# Patient Record
Sex: FEMALE | Race: WHITE | NOT HISPANIC OR LATINO | Employment: OTHER | URBAN - METROPOLITAN AREA
[De-identification: names, ages, dates, MRNs, and addresses within clinical notes are randomized per-mention and may not be internally consistent; named-entity substitution may affect disease eponyms.]

---

## 2017-01-04 RX ORDER — FLUTICASONE PROPIONATE 50 MCG
1 SPRAY, SUSPENSION (ML) NASAL DAILY
Qty: 1 BOTTLE | Refills: 3 | Status: SHIPPED | OUTPATIENT
Start: 2017-01-04 | End: 2017-10-16 | Stop reason: SDUPTHER

## 2017-01-14 ENCOUNTER — HOSPITAL ENCOUNTER (EMERGENCY)
Facility: HOSPITAL | Age: 67
Discharge: HOME | End: 2017-01-14
Attending: FAMILY MEDICINE
Payer: MEDICARE

## 2017-01-14 VITALS
SYSTOLIC BLOOD PRESSURE: 126 MMHG | WEIGHT: 230 LBS | TEMPERATURE: 98 F | DIASTOLIC BLOOD PRESSURE: 73 MMHG | OXYGEN SATURATION: 97 % | HEIGHT: 67 IN | HEART RATE: 62 BPM | RESPIRATION RATE: 16 BRPM | BODY MASS INDEX: 36.1 KG/M2

## 2017-01-14 DIAGNOSIS — Z86.79 HISTORY OF PAROXYSMAL SUPRAVENTRICULAR TACHYCARDIA: ICD-10-CM

## 2017-01-14 DIAGNOSIS — S16.1XXA ACUTE CERVICAL MYOFASCIAL STRAIN, INITIAL ENCOUNTER: Primary | ICD-10-CM
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
## 2017-01-14 PROCEDURE — 99283 EMERGENCY DEPT VISIT LOW MDM: CPT

## 2017-01-14 RX ORDER — DILTIAZEM HYDROCHLORIDE 120 MG/1
120 CAPSULE, COATED, EXTENDED RELEASE ORAL DAILY
Refills: 1 | Status: ON HOLD | COMMUNITY
Start: 2016-12-16 | End: 2017-03-11

## 2017-01-14 RX ORDER — APIXABAN 5 MG/1
5 TABLET, FILM COATED ORAL 2 TIMES DAILY
Refills: 1 | COMMUNITY
Start: 2016-12-15 | End: 2017-06-05 | Stop reason: SDUPTHER
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
## 2017-01-14 NOTE — DISCHARGE INSTRUCTIONS
Understanding Cervical Strain    There are 7 bones (vertebrae) in the neck that are part of the spine. These are called the cervical spine. Cervical strain is a medical term for neck pain. The neck has several layers of muscles. These are connected with tendons to the cervical spine and other bones. Neck pain is often the result of injury to these muscles and tendons.  Causes of cervical strain  Different types of stress on the neck can damage muscles and tendons (soft tissues) and cause cervical strain. Cervical tissues can be damaged by:  · The neck being forced past its normal range of motion, such as in a car accident or sports injury  · Constant, low-level stress, such as from poor posture or a poorly set-up workspace  Symptoms of cervical strain  These may include:  · Neck pain or stiffness  · Pain in the shoulders or upper back  · Muscle spasms  · Headache, often starting at the base of the neck  · Irritability, difficulty concentrating, or sleeplessness  Treatment for cervical strain  This problem often gets better on its own. Treatments aim to reduce pain and inflammation and increase the range of motion of the neck. Possible treatments include:  · Over-the-counter or prescription pain medicine. These help relieve pain and inflammation.  · Stretching exercises to decrease neck stiffness.  · Massage to decrease neck stiffness.  · Cold or heat pack. These help reduce pain and swelling.  Call 911  Call emergency services right away if you have any of these:  · Face drooping or numbness  · Numbness or weakness, especially in the arms or on one side  · Slurred speech or difficulty speaking  · Blurred vision   When to call your healthcare provider  Call your healthcare provider right away if you have any of these:  · Fever of 100.4°F (38°C) or higher, or as directed  · Pain or stiffness that gets worse  · Symptoms that dont get better, or get worse  · Numbness, tingling, weakness or shooting pains into the  arms or legs  · New symptoms  © 9640-3370 [employer]. [address] [place] [address], [place] [address]. All rights reserved. This information is not intended as a substitute for professional medical care. Always follow your healthcare professional's instructions.

## 2017-01-24 ENCOUNTER — OFFICE VISIT (OUTPATIENT)
Dept: FAMILY MEDICINE | Facility: CLINIC | Age: 67
End: 2017-01-24
Payer: MEDICARE

## 2017-01-24 VITALS
WEIGHT: 236.13 LBS | HEIGHT: 67 IN | BODY MASS INDEX: 37.06 KG/M2 | DIASTOLIC BLOOD PRESSURE: 60 MMHG | SYSTOLIC BLOOD PRESSURE: 94 MMHG | HEART RATE: 52 BPM | TEMPERATURE: 98 F

## 2017-01-24 DIAGNOSIS — I48.91 ATRIAL FIBRILLATION WITH RVR: ICD-10-CM

## 2017-01-24 DIAGNOSIS — M25.561 ACUTE PAIN OF BOTH KNEES: ICD-10-CM
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
## 2017-01-24 DIAGNOSIS — M47.812 SPONDYLOSIS OF CERVICAL REGION WITHOUT MYELOPATHY OR RADICULOPATHY: ICD-10-CM

## 2017-01-24 DIAGNOSIS — E66.01 SEVERE OBESITY (BMI 35.0-35.9 WITH COMORBIDITY): ICD-10-CM

## 2017-01-24 DIAGNOSIS — M25.562 ACUTE PAIN OF BOTH KNEES: ICD-10-CM

## 2017-01-24 PROBLEM — S16.1XXA CERVICAL MUSCLE STRAIN: Status: ACTIVE | Noted: 2017-01-24

## 2017-01-24 PROCEDURE — 99499 UNLISTED E&M SERVICE: CPT | Mod: S$GLB,,, | Performed by: FAMILY MEDICINE

## 2017-01-24 PROCEDURE — 99999 PR PBB SHADOW E&M-EST. PATIENT-LVL III: CPT | Mod: PBBFAC,,, | Performed by: FAMILY MEDICINE

## 2017-01-24 PROCEDURE — 1125F AMNT PAIN NOTED PAIN PRSNT: CPT | Mod: S$GLB,,, | Performed by: FAMILY MEDICINE

## 2017-01-24 PROCEDURE — 1159F MED LIST DOCD IN RCRD: CPT | Mod: S$GLB,,, | Performed by: FAMILY MEDICINE

## 2017-01-24 PROCEDURE — 99214 OFFICE O/P EST MOD 30 MIN: CPT | Mod: S$GLB,,, | Performed by: FAMILY MEDICINE

## 2017-01-24 PROCEDURE — 1157F ADVNC CARE PLAN IN RCRD: CPT | Mod: S$GLB,,, | Performed by: FAMILY MEDICINE

## 2017-01-24 PROCEDURE — 1160F RVW MEDS BY RX/DR IN RCRD: CPT | Mod: S$GLB,,, | Performed by: FAMILY MEDICINE
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
## 2017-01-24 NOTE — PATIENT INSTRUCTIONS
Let me know if you're not better & I can refer to Orthopedist    Follow with PT    Tylenol ES three times a day    Continue meds    Follow with cardiologist    Orders Placed This Encounter    Ambulatory Referral to Physical/Occupational Therapy        Continue other medications, I can refill them when needed    Counselled risk of heart attack, stroke  Counselled high fiber, low fat diet, exercise   Follow up yearly with fasting lipids, CMP, CBC, TSH prior    [unfilled]
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
## 2017-01-31 ENCOUNTER — CLINICAL SUPPORT (OUTPATIENT)
Dept: REHABILITATION | Facility: HOSPITAL | Age: 67
End: 2017-01-31
Attending: FAMILY MEDICINE
Payer: MEDICARE

## 2017-01-31 DIAGNOSIS — R52 PAIN AGGRAVATED BY PHYSICAL ACTIVITY: ICD-10-CM

## 2017-01-31 DIAGNOSIS — R29.898 DECREASED RANGE OF MOTION OF NECK: ICD-10-CM

## 2017-01-31 DIAGNOSIS — R29.898 WEAKNESS OF BOTH LOWER EXTREMITIES: ICD-10-CM

## 2017-01-31 DIAGNOSIS — R26.89 ANTALGIC GAIT: ICD-10-CM

## 2017-01-31 PROCEDURE — 97110 THERAPEUTIC EXERCISES: CPT

## 2017-01-31 PROCEDURE — G8979 MOBILITY GOAL STATUS: HCPCS | Mod: CI

## 2017-01-31 PROCEDURE — 97162 PT EVAL MOD COMPLEX 30 MIN: CPT

## 2017-01-31 PROCEDURE — G8978 MOBILITY CURRENT STATUS: HCPCS | Mod: CK
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
## 2017-02-02 PROBLEM — R52 PAIN AGGRAVATED BY PHYSICAL ACTIVITY: Status: ACTIVE | Noted: 2017-02-02

## 2017-02-02 PROBLEM — R29.898 WEAKNESS OF BOTH LOWER EXTREMITIES: Status: ACTIVE | Noted: 2017-02-02

## 2017-02-02 PROBLEM — R26.89 ANTALGIC GAIT: Status: ACTIVE | Noted: 2017-02-02

## 2017-02-02 PROBLEM — R29.898 DECREASED RANGE OF MOTION OF NECK: Status: ACTIVE | Noted: 2017-02-02

## 2017-02-07 ENCOUNTER — OFFICE VISIT (OUTPATIENT)
Dept: INTERNAL MEDICINE | Facility: CLINIC | Age: 67
End: 2017-02-07
Payer: MEDICARE

## 2017-02-07 VITALS
WEIGHT: 237.63 LBS | HEART RATE: 62 BPM | DIASTOLIC BLOOD PRESSURE: 68 MMHG | RESPIRATION RATE: 18 BRPM | HEIGHT: 67 IN | BODY MASS INDEX: 37.3 KG/M2 | SYSTOLIC BLOOD PRESSURE: 110 MMHG | TEMPERATURE: 97 F

## 2017-02-07 DIAGNOSIS — J01.40 ACUTE NON-RECURRENT PANSINUSITIS: Primary | ICD-10-CM

## 2017-02-07 PROCEDURE — 99213 OFFICE O/P EST LOW 20 MIN: CPT | Mod: S$GLB,,, | Performed by: INTERNAL MEDICINE

## 2017-02-07 PROCEDURE — 1157F ADVNC CARE PLAN IN RCRD: CPT | Mod: S$GLB,,, | Performed by: INTERNAL MEDICINE

## 2017-02-07 PROCEDURE — 1160F RVW MEDS BY RX/DR IN RCRD: CPT | Mod: S$GLB,,, | Performed by: INTERNAL MEDICINE

## 2017-02-07 PROCEDURE — 1159F MED LIST DOCD IN RCRD: CPT | Mod: S$GLB,,, | Performed by: INTERNAL MEDICINE

## 2017-02-07 PROCEDURE — 1126F AMNT PAIN NOTED NONE PRSNT: CPT | Mod: S$GLB,,, | Performed by: INTERNAL MEDICINE

## 2017-02-07 PROCEDURE — 99999 PR PBB SHADOW E&M-EST. PATIENT-LVL III: CPT | Mod: PBBFAC,,, | Performed by: INTERNAL MEDICINE

## 2017-02-07 RX ORDER — AMOXICILLIN AND CLAVULANATE POTASSIUM 875; 125 MG/1; MG/1
1 TABLET, FILM COATED ORAL 2 TIMES DAILY
Qty: 20 TABLET | Refills: 0 | Status: SHIPPED | OUTPATIENT
Start: 2017-02-07 | End: 2017-02-09

## 2017-02-07 RX ORDER — BENZONATATE 200 MG/1
200 CAPSULE ORAL 3 TIMES DAILY PRN
Qty: 30 CAPSULE | Refills: 0 | Status: SHIPPED | OUTPATIENT
Start: 2017-02-07 | End: 2017-02-17

## 2017-02-07 NOTE — PROGRESS NOTES
Subjective:       Patient ID: Flores Fuentes is a 66 y.o. female.    Chief Complaint: Cough and Sinus Problem (nasal drip)    HPI     Patient is a 66-year-old female here today for cough and sinus congestion.  Her symptoms began over one week ago, with sinus pressure and congestion.  She now describes a postnasal drip and a very productive cough with clear sputum.  She says her cough is worse at night.  No fever or chills.  Minor ear pressure bilaterally.  Her sore throat is mostly resolved.  No upset stomach or diarrhea.  She does have sick contacts with her  last similar symptoms.    Review of Systems   Constitutional: Negative for chills, fatigue and fever.   HENT: Positive for congestion, postnasal drip, rhinorrhea and sinus pressure. Negative for ear pain and sore throat.    Eyes: Negative for itching and visual disturbance.   Respiratory: Positive for cough. Negative for shortness of breath and wheezing.    Cardiovascular: Negative for chest pain, palpitations and leg swelling.   Gastrointestinal: Negative for abdominal pain and nausea.   Genitourinary: Negative for dysuria.   Musculoskeletal: Negative for arthralgias and myalgias.   Skin: Negative for rash.   Neurological: Negative for weakness, light-headedness and headaches.       Objective:      Physical Exam   Constitutional: She is oriented to person, place, and time. She appears well-developed and well-nourished. No distress.   HENT:   Head: Normocephalic and atraumatic.   Right Ear: External ear normal.   Left Ear: External ear normal.   Cobblestoning  Posterior oropharyngeal erythema  Mild ear effusion bilaterally  No tonsillar exudate   Eyes: Conjunctivae and EOM are normal. Pupils are equal, round, and reactive to light.   Neck: Normal range of motion. Neck supple. No thyromegaly present.   Cardiovascular: Normal rate, regular rhythm, normal heart sounds and intact distal pulses.    No murmur heard.  Pulmonary/Chest: Effort normal and  breath sounds normal. No respiratory distress. She has no wheezes. She has no rales.   Musculoskeletal: She exhibits no edema.   Lymphadenopathy:     She has no cervical adenopathy.   Neurological: She is alert and oriented to person, place, and time.   Skin: Skin is warm and dry. She is not diaphoretic.   Nursing note and vitals reviewed.      Assessment:       1. Acute non-recurrent pansinusitis        Plan:       Augmentin BID x 10 days  GI side effects reviewed  Continue oral antihistamine, flonase nasal spray daily  Rx for Tessalon perles 200 mg TID PRN cough

## 2017-02-08 ENCOUNTER — CLINICAL SUPPORT (OUTPATIENT)
Dept: REHABILITATION | Facility: HOSPITAL | Age: 67
End: 2017-02-08
Attending: FAMILY MEDICINE
Payer: MEDICARE

## 2017-02-08 DIAGNOSIS — R29.898 WEAKNESS OF BOTH LOWER EXTREMITIES: ICD-10-CM

## 2017-02-08 DIAGNOSIS — R29.898 DECREASED RANGE OF MOTION OF NECK: ICD-10-CM

## 2017-02-08 DIAGNOSIS — R26.89 ANTALGIC GAIT: ICD-10-CM

## 2017-02-08 DIAGNOSIS — R52 PAIN AGGRAVATED BY PHYSICAL ACTIVITY: ICD-10-CM

## 2017-02-08 PROCEDURE — 97110 THERAPEUTIC EXERCISES: CPT

## 2017-02-08 NOTE — PROGRESS NOTES
"TIME RECORD    Date:  02/08/2017    Start Time:  11:00  Stop Time:  12:00    PROCEDURES:    TIMED  Procedure Min.   TE 30   TE 30 NC                 UNTIMED  Procedure Min.             Total Timed Minutes:  30  Total Timed Units:  2  Total Untimed Units:  0  Charges Billed/# of units:  2 (TE-2)      Progress/Current Status    Subjective:     Patient ID: Flores Fuentes is a 66 y.o. female.  Diagnosis:   1. Decreased range of motion of neck     2. Weakness of both lower extremities     3. Antalgic gait     4. Pain aggravated by physical activity       Pain: 5 /10  Patient reports feeling a little better since her first visit.     Objective:     Patient was educated and performed therapeutic exercises as per log below 1:1 with PTA x 30 minutes and supervised exercises by PTA x 30 minutes to improve ROM, flexibility, strength and gait.  Patient declined cold pack at the end of therapy.     Date  02/08/17   VISIT 2/30   G CODE VISIT 2/10   POC EXP. DATE 03/31/17   VISIT AMOUNT    MEDICARE TOTAL 63.96    171.54   FACE-TO-FACE 03/02/17       Bike  --   TABLE:    HSS w/ strap 10 x 10"   Quad sets 10 x 5"   Bridge  --   LTR --   Marching  1 x 10   SLR 1 x 2 R, 1 x 10 L   Hip abduction - supine 1 x 15 RTB   Hip adduction - supine 10 x 3" w/ ball   Hip extension --   SAQ --   Heel Slides --   SEATED:    Cervical ROM  - flex/ ext  - rot R/L  - side bend R/L  - chin tucks   1 x 10  1 x 10  1 x 10  1 x 10   Levator scap. stretch 5 x 5"   UT stretch 5 x 5"   SCM stretch  5 x 5"   Pec. stretch 5 x 5"   LAQs --   HS curls --   Seated Hip Flex 1 x 10   STANDING:    Wall slides --   TKE --   Hip Abd --   Hip Flex --   Hip Ext --   HS Curls --   Step Ups --       CP declined       Initials GWA 1/6       Assessment:     Patient required frequent cues to keep exercises in a pain free range.    Patient Education/Response:     Patient was issued a written copy of today's exercises for her HEP and instructed to perform twice a day.  " Patient verbalized understanding instructions.     Plans and Goals:     Continue with Plan Of Care and progress toward PT goals.    Short Term Goals (4 Weeks):   1. This patient will be independent with a basic HEP.  2. This patient will have cervical AROM WFL with no increase in symptoms in order to drive safely.  3. This patient will increase LE strength by 1 grade in order to perform sit to stand transfer with no increase in symptoms or UE leverage.  4. This patient will have a pain rating of 5/10 at worst with ADLs.  5. Patient will be able to achieve greater than or equal to 45 on the FOTO knee placing patient in 40%<60% impaired, limited, or restricted category demonstrating overall improved functional ability with lower extremity.   6. Patient able to score greater than or equal to 64 on the FOTO neck placing patient in 20%<40% impaired, limited, or restricted category demonstrating overall decreased neck pain with functional activities    Long Term Goals (8 Weeks):   1. This patient will be independent with an updated HEP.  2. This patient will increase LE strength to 5/5 in order to be able to ascend/descend the step in front of her home with no difficulty.  3. This patient will have a pain rating of 2/10 at worst with ADLs.  4. Patient will be able to achieve greater than or equal to 65 on the FOTO knee placing patient in 20%<40% impaired, limited, or restricted category demonstrating overall improved functional ability with lower extremity.  5. Patient able to score greater than or equal to 84 on the FOTO neck placing patient in 1%<20% impaired, limited, or restricted category demonstrating overall decreased neck pain with functional activities

## 2017-02-09 ENCOUNTER — TELEPHONE (OUTPATIENT)
Dept: INTERNAL MEDICINE | Facility: CLINIC | Age: 67
End: 2017-02-09

## 2017-02-09 RX ORDER — DOXYCYCLINE 100 MG/1
100 CAPSULE ORAL EVERY 12 HOURS
Qty: 20 CAPSULE | Refills: 0 | Status: SHIPPED | OUTPATIENT
Start: 2017-02-09 | End: 2017-02-19

## 2017-02-09 NOTE — TELEPHONE ENCOUNTER
----- Message from Farnaz Taylor sent at 2/9/2017 10:59 AM CST -----  Contact: self  [phone]  Pt states she saw you on 02/07 and was told to take amoxicillin-clavulanate 875-125mg (AUGMENTIN), but pt states when she takes this medication it gives her heart palpitations. Pt states she tried cutting tablet in half but it still caused her heart to skip. She states this happened before when she took this medication. Pt would like to know if you can call her in a lesser dosage or something different. Please call to advise.

## 2017-02-09 NOTE — TELEPHONE ENCOUNTER
Spoke with pt-she stated that amoxicillin is giving her palpitations, can she be changed to something else?   please advise

## 2017-02-13 ENCOUNTER — CLINICAL SUPPORT (OUTPATIENT)
Dept: REHABILITATION | Facility: HOSPITAL | Age: 67
End: 2017-02-13
Attending: FAMILY MEDICINE
Payer: MEDICARE

## 2017-02-13 DIAGNOSIS — R29.898 DECREASED RANGE OF MOTION OF NECK: ICD-10-CM

## 2017-02-13 DIAGNOSIS — R52 PAIN AGGRAVATED BY PHYSICAL ACTIVITY: ICD-10-CM

## 2017-02-13 DIAGNOSIS — R29.898 WEAKNESS OF BOTH LOWER EXTREMITIES: ICD-10-CM

## 2017-02-13 DIAGNOSIS — R26.89 ANTALGIC GAIT: ICD-10-CM

## 2017-02-13 PROCEDURE — 97110 THERAPEUTIC EXERCISES: CPT

## 2017-02-13 PROCEDURE — 97140 MANUAL THERAPY 1/> REGIONS: CPT

## 2017-02-13 NOTE — PROGRESS NOTES
"TIME RECORD    Date:  02/13/2017    Start Time:  2:00  Stop Time:  3:00    PROCEDURES:    TIMED  Procedure Min.   TE 45   MT 15                 UNTIMED  Procedure Min.             Total Timed Minutes:  60  Total Timed Units:  4  Total Untimed Units:  0  Charges Billed/# of units:  4 (TE-3, MT-1)      Progress/Current Status    Subjective:     Patient ID: Flores Fuentes is a 66 y.o. female.  Diagnosis:   1. Decreased range of motion of neck     2. Weakness of both lower extremities     3. Antalgic gait     4. Pain aggravated by physical activity       Pain: 5 /10  She reports on Sat noticed that she had to drag her leg while walking in [employer], but she was able to do it today without dragging her leg. She has pain in her L triceps area when she lifts her arm the next day.    Objective:     Patient was educated and performed therapeutic exercises as per log below, along with today's progression 1:1 with PT x 45 minutes to improve ROM, flexibility, strength and gait. STM/MFR to B UT and cervical paraspinals x 15 minutes, 7 minutes was IASTM in a seated position. Patient declined cold pack at the end of therapy.     Date  02/13/17 02/08/17   VISIT 3/30 2/30   G CODE VISIT 3/10 2/10   POC EXP. DATE 03/31/17 03/31/17   VISIT AMOUNT    MEDICARE TOTAL 125.80    297.34 63.96    171.54   FACE-TO-FACE 03/02/17 03/02/17        Bike  -- --   TABLE:     HSS w/ strap 10 x 10" 10 x 10"   Quad sets 10 x 5" 10 x 5"   Bridge  -- --   LTR -- --   Marching  1 x 10 1 x 10   SLR 1 x 10 R SL, 1 x 10 L 1 x 2 R, 1 x 10 L   Hip abduction - supine 1 x 15 RTB 1 x 15 RTB   Hip adduction - supine 10 x 3" w/ ball 10 x 3" w/ ball   Hip extension -- --   SAQ -- --   Heel Slides -- --   SEATED:     Cervical ROM  - flex/ ext  - rot R/L  - side bend R/L  - chin tucks   1 x 10  1 x 10  1 x 10  1 x 10   1 x 10  1 x 10  1 x 10  1 x 10   Levator scap. stretch 5 x 5" 5 x 5"   UT stretch 5 x 5" 5 x 5"   SCM stretch  5 x 5" 5 x 5"   Pec. stretch 5 x 5" " "5 x 5"   LAQs 1 x 10 --   HS curls -- --   Seated Hip Flex 1 x 10 1 x 10   STANDING:     Wall slides -- --   TKE -- --   Hip Abd -- --   Hip Flex -- --   Hip Ext -- --   HS Curls -- --   Step Ups -- --        CP declined declined        Initials DG GWA 1/6       Assessment:     She continues to require cues to keep her exercises in a pain free range. She was able to perform all of today's exercises with no increase in symptoms prior to leaving the clinic.    Patient Education/Response:     Patient was given a hand out of her hamstring stretch in long sitting for her HEP and instructed to perform twice a day.  Patient verbalized understanding instructions.     Plans and Goals:     Continue with Plan Of Care and progress toward PT goals.    Short Term Goals (4 Weeks):   1. This patient will be independent with a basic HEP.  2. This patient will have cervical AROM WFL with no increase in symptoms in order to drive safely.  3. This patient will increase LE strength by 1 grade in order to perform sit to stand transfer with no increase in symptoms or UE leverage.  4. This patient will have a pain rating of 5/10 at worst with ADLs.  5. Patient will be able to achieve greater than or equal to 45 on the FOTO knee placing patient in 40%<60% impaired, limited, or restricted category demonstrating overall improved functional ability with lower extremity.   6. Patient able to score greater than or equal to 64 on the FOTO neck placing patient in 20%<40% impaired, limited, or restricted category demonstrating overall decreased neck pain with functional activities    Long Term Goals (8 Weeks):   1. This patient will be independent with an updated HEP.  2. This patient will increase LE strength to 5/5 in order to be able to ascend/descend the step in front of her home with no difficulty.  3. This patient will have a pain rating of 2/10 at worst with ADLs.  4. Patient will be able to achieve greater than or equal to 65 on the FOTO " knee placing patient in 20%<40% impaired, limited, or restricted category demonstrating overall improved functional ability with lower extremity.  5. Patient able to score greater than or equal to 84 on the FOTO neck placing patient in 1%<20% impaired, limited, or restricted category demonstrating overall decreased neck pain with functional activities

## 2017-02-15 ENCOUNTER — CLINICAL SUPPORT (OUTPATIENT)
Dept: REHABILITATION | Facility: HOSPITAL | Age: 67
End: 2017-02-15
Attending: FAMILY MEDICINE
Payer: MEDICARE

## 2017-02-15 DIAGNOSIS — R52 PAIN AGGRAVATED BY PHYSICAL ACTIVITY: ICD-10-CM

## 2017-02-15 DIAGNOSIS — R29.898 WEAKNESS OF BOTH LOWER EXTREMITIES: ICD-10-CM

## 2017-02-15 DIAGNOSIS — R29.898 DECREASED RANGE OF MOTION OF NECK: ICD-10-CM

## 2017-02-15 DIAGNOSIS — R26.89 ANTALGIC GAIT: ICD-10-CM

## 2017-02-15 PROCEDURE — 97140 MANUAL THERAPY 1/> REGIONS: CPT

## 2017-02-15 PROCEDURE — 97110 THERAPEUTIC EXERCISES: CPT

## 2017-02-15 NOTE — PROGRESS NOTES
"TIME RECORD    Date:  02/15/2017    Start Time:  11:00  Stop Time:  12:00    PROCEDURES:    TIMED  Procedure Min.   TE 37   TE sup 15 NC   MT 8             UNTIMED  Procedure Min.             Total Timed Minutes:  45  Total Timed Units:  3  Total Untimed Units:  0  Charges Billed/# of units:  3 (TE-2, MT-1)      Progress/Current Status    Subjective:     Patient ID: Flores Fuentes is a 66 y.o. female.  Diagnosis:   1. Decreased range of motion of neck     2. Weakness of both lower extremities     3. Antalgic gait     4. Pain aggravated by physical activity       Pain: 3 /10  Patient reports having less pain and tightness since her last visit.     Objective:     Patient was educated and performed therapeutic exercises as per log below, along with today's progression, 1:1 with PTA x 37 minutes and supervised exercises by PTA x 15 minutes to improve ROM, flexibility, strength and gait.  Patient received IASTM x 8 minutes in a seated position to left UT and levator scapula by PTA.  Patient declined cold pack at the end of therapy.     Date  02/15/17 02/13/17 02/08/17   VISIT 4/30 3/30 2/30   G CODE VISIT 4/10 3/10 2/10   POC EXP. DATE 03/31/17 03/31/17 03/31/17   VISIT AMOUNT    MEDICARE TOTAL 93.82    391.16 125.80    297.34 63.96    171.54   FACE-TO-FACE 03/02/17 03/02/17 03/02/17         Bike  -- -- --   TABLE:      HSS long sitting 10 x 10" 10 x 10" 10 x 10"   Quad sets 15 x 5" 10 x 5" 10 x 5"   Bridge  -- -- --   LTR -- -- --   Marching  1 x 10 1 x 10 1 x 10   SLR 1 x 10 B 1 x 10 R SL, 1 x 10 L 1 x 2 R, 1 x 10 L   Hip abduction - supine 1 x 20 RTB 1 x 15 RTB 1 x 15 RTB   Hip adduction - supine 15 x 3" w/ ball 10 x 3" w/ ball 10 x 3" w/ ball   Hip extension -- -- --   SAQ -- -- --   Heel Slides -- -- --   SEATED:      Cervical ROM  - flex/ ext  - rot R/L  - side bend R/L  - chin tucks   1 x 10  1 x 10  1 x 10  1 x 10   1 x 10  1 x 10  1 x 10  1 x 10   1 x 10  1 x 10  1 x 10  1 x 10   Levator scap. stretch 5 x " "5" 5 x 5" 5 x 5"   UT stretch 5 x 5" 5 x 5" 5 x 5"   SCM stretch  5 x 5" 5 x 5" 5 x 5"   Pec. stretch 5 x 5" 5 x 5" 5 x 5"   LAQs 1 x 15 1 x 10 --   HS curls -- -- --   Seated Hip Flex 1 x 15 1 x 10 1 x 10   STANDING:      Wall slides -- -- --   TKE -- -- --   Hip Abd -- -- --   Hip Flex -- -- --   Hip Ext -- -- --   HS Curls -- -- --   Step Ups -- -- --         CP declined declined declined         Initials GWA 1/6 DG GWA 1/6       Assessment:     Patient continues to require cues to keep her exercises in a pain free range along with cues for posture with sitting exercises.    Patient Education/Response:     Patient was instructed to continue with her HEP twice a day.  Patient verbalized understanding instructions.     Plans and Goals:     Continue with Plan Of Care and progress toward PT goals.    Short Term Goals (4 Weeks):   1. This patient will be independent with a basic HEP.  2. This patient will have cervical AROM WFL with no increase in symptoms in order to drive safely.  3. This patient will increase LE strength by 1 grade in order to perform sit to stand transfer with no increase in symptoms or UE leverage.  4. This patient will have a pain rating of 5/10 at worst with ADLs.  5. Patient will be able to achieve greater than or equal to 45 on the FOTO knee placing patient in 40%<60% impaired, limited, or restricted category demonstrating overall improved functional ability with lower extremity.   6. Patient able to score greater than or equal to 64 on the FOTO neck placing patient in 20%<40% impaired, limited, or restricted category demonstrating overall decreased neck pain with functional activities    Long Term Goals (8 Weeks):   1. This patient will be independent with an updated HEP.  2. This patient will increase LE strength to 5/5 in order to be able to ascend/descend the step in front of her home with no difficulty.  3. This patient will have a pain rating of 2/10 at worst with ADLs.  4. Patient will " be able to achieve greater than or equal to 65 on the FOTO knee placing patient in 20%<40% impaired, limited, or restricted category demonstrating overall improved functional ability with lower extremity.  5. Patient able to score greater than or equal to 84 on the FOTO neck placing patient in 1%<20% impaired, limited, or restricted category demonstrating overall decreased neck pain with functional activities

## 2017-02-20 ENCOUNTER — CLINICAL SUPPORT (OUTPATIENT)
Dept: REHABILITATION | Facility: HOSPITAL | Age: 67
End: 2017-02-20
Attending: FAMILY MEDICINE
Payer: MEDICARE

## 2017-02-20 DIAGNOSIS — R29.898 DECREASED RANGE OF MOTION OF NECK: ICD-10-CM

## 2017-02-20 DIAGNOSIS — R29.898 WEAKNESS OF BOTH LOWER EXTREMITIES: ICD-10-CM

## 2017-02-20 DIAGNOSIS — R52 PAIN AGGRAVATED BY PHYSICAL ACTIVITY: ICD-10-CM

## 2017-02-20 DIAGNOSIS — R26.89 ANTALGIC GAIT: ICD-10-CM

## 2017-02-20 PROCEDURE — 97140 MANUAL THERAPY 1/> REGIONS: CPT

## 2017-02-20 PROCEDURE — 97110 THERAPEUTIC EXERCISES: CPT

## 2017-02-20 NOTE — PROGRESS NOTES
"TIME RECORD    Date:  02/20/2017    Start Time:  11:00  Stop Time:  12:00    PROCEDURES:    TIMED  Procedure Min.   TE 20   TE sup 30 NC   MT 10             UNTIMED  Procedure Min.             Total Timed Minutes:  30  Total Timed Units:  2  Total Untimed Units:  0  Charges Billed/# of units:  2 (TE-1, MT-1)      Progress/Current Status    Subjective:     Patient ID: Flores Fuentes is a 66 y.o. female.  Diagnosis:   1. Decreased range of motion of neck     2. Weakness of both lower extremities     3. Antalgic gait     4. Pain aggravated by physical activity       Pain: 3 /10  She reports doing better, but the bottom of her R foot is bothering her the most with walking. She is still getting pain in L triceps after cervcial stretches.     Objective:     Patient was educated and performed therapeutic exercises as per log below, along with today's progressions and new exercises, 1:1 with PT x 20 minutes and supervised exercises by PT x 30 minutes to improve ROM, flexibility, strength and gait.  Patient received IASTM x 10 minutes in a seated position to left UT and levator scapula by PT.  Patient declined cold pack at the end of therapy.     Date  02/20/17 02/15/17 02/13/17 02/08/17   VISIT 5/30 4/30 3/30 2/30   G CODE VISIT 5/10 4/10 3/10 2/10   POC EXP. DATE 03/31/17 03/31/17 03/31/17 03/31/17   VISIT AMOUNT    MEDICARE TOTAL 61.84    453.00 93.82    391.16 125.80    297.34 63.96    171.54   FACE-TO-FACE 03/02/17 03/02/17 03/02/17 03/02/17          Bike  -- -- -- --   TABLE:       HSS long sitting 10 x 10" 10 x 10" 10 x 10" 10 x 10"   Quad sets 15 x 5" 15 x 5" 10 x 5" 10 x 5"   Bridge  -- -- -- --   LTR -- -- -- --   Marching  1 x 15 1 x 10 1 x 10 1 x 10   SLR 1 x 15 B 1 x 10 B 1 x 10 R SL, 1 x 10 L 1 x 2 R, 1 x 10 L   Hip abduction - supine 3 x 10 RTB 1 x 20 RTB 1 x 15 RTB 1 x 15 RTB   Hip adduction - supine 20 x 3" 15 x 3" w/ ball 10 x 3" w/ ball 10 x 3" w/ ball   Hip extension -- -- -- --   SAQ -- -- -- -- " "  Heel Slides -- -- -- --   SEATED:       Cervical ROM  - flex/ ext  - rot R/L  - side bend R/L  - chin tucks   1 x 10  1 x 10   1 x 10  1 x 10   1 x 10  1 x 10  1 x 10  1 x 10   1 x 10  1 x 10  1 x 10  1 x 10   1 x 10  1 x 10  1 x 10  1 x 10   Levator scap. stretch 5 x 5" 5 x 5" 5 x 5" 5 x 5"   UT stretch 5 x 5" 5 x 5" 5 x 5" 5 x 5"   SCM stretch  5 x 5" 5 x 5" 5 x 5" 5 x 5"   Pec. stretch 5 x 5" 5 x 5" 5 x 5" 5 x 5"   LAQs 1 x 20 1 x 15 1 x 10 --   HS curls -- -- -- --   Seated Hip Flex 2 x 10 1 x 15 1 x 10 1 x 10   STANDING:       Wall slides -- -- -- --   Mini squats 1 x 10 -- -- --   Hip Abd -- -- -- --   Hip Flex -- -- -- --   Hip Ext -- -- -- --   HS Curls -- -- -- --   Step Ups -- -- -- --   Rows   T's 1 x 10 YTB  1 x 10 YTB      CP declined declined declined declined          Initials DG GWA 1/6 DG GWA 1/6       Assessment:     She continues to require cues for posture with all exercises. She was able to perform all of today's progressions with no increase in symptoms prior to leaving the clinic.     Patient Education/Response:     Patient was instructed to continue with her HEP twice a day and given handouts of today's exercises to add to her HEP.  Patient verbalized understanding instructions.     Plans and Goals:     Continue with Plan Of Care and progress toward PT goals.    Short Term Goals (4 Weeks):   1. This patient will be independent with a basic HEP.  2. This patient will have cervical AROM WFL with no increase in symptoms in order to drive safely.  3. This patient will increase LE strength by 1 grade in order to perform sit to stand transfer with no increase in symptoms or UE leverage.  4. This patient will have a pain rating of 5/10 at worst with ADLs.  5. Patient will be able to achieve greater than or equal to 45 on the FOTO knee placing patient in 40%<60% impaired, limited, or restricted category demonstrating overall improved functional ability with lower extremity.   6. Patient able to " score greater than or equal to 64 on the FOTO neck placing patient in 20%<40% impaired, limited, or restricted category demonstrating overall decreased neck pain with functional activities    Long Term Goals (8 Weeks):   1. This patient will be independent with an updated HEP.  2. This patient will increase LE strength to 5/5 in order to be able to ascend/descend the step in front of her home with no difficulty.  3. This patient will have a pain rating of 2/10 at worst with ADLs.  4. Patient will be able to achieve greater than or equal to 65 on the FOTO knee placing patient in 20%<40% impaired, limited, or restricted category demonstrating overall improved functional ability with lower extremity.  5. Patient able to score greater than or equal to 84 on the FOTO neck placing patient in 1%<20% impaired, limited, or restricted category demonstrating overall decreased neck pain with functional activities

## 2017-02-22 ENCOUNTER — CLINICAL SUPPORT (OUTPATIENT)
Dept: REHABILITATION | Facility: HOSPITAL | Age: 67
End: 2017-02-22
Attending: FAMILY MEDICINE
Payer: MEDICARE

## 2017-02-22 DIAGNOSIS — R52 PAIN AGGRAVATED BY PHYSICAL ACTIVITY: ICD-10-CM

## 2017-02-22 DIAGNOSIS — R29.898 DECREASED RANGE OF MOTION OF NECK: ICD-10-CM

## 2017-02-22 DIAGNOSIS — R29.898 WEAKNESS OF BOTH LOWER EXTREMITIES: ICD-10-CM

## 2017-02-22 DIAGNOSIS — R26.89 ANTALGIC GAIT: ICD-10-CM

## 2017-02-22 PROCEDURE — 97110 THERAPEUTIC EXERCISES: CPT

## 2017-02-22 PROCEDURE — 97140 MANUAL THERAPY 1/> REGIONS: CPT

## 2017-02-22 NOTE — PROGRESS NOTES
"TIME RECORD    Date:  02/22/2017    Start Time:  10:00  Stop Time:  10:55    PROCEDURES:    TIMED  Procedure Min.   TE 20   TE sup 25 NC   MT 10             UNTIMED  Procedure Min.             Total Timed Minutes:  30  Total Timed Units:  2  Total Untimed Units:  0  Charges Billed/# of units:  2 (TE-1, MT-1)      Progress/Current Status    Subjective:     Patient ID: Flores Fuentes is a 66 y.o. female.  Diagnosis:   1. Decreased range of motion of neck     2. Weakness of both lower extremities     3. Antalgic gait     4. Pain aggravated by physical activity       Pain: 1 /10  Patient reports her pain is much better along with the tightness in her neck.    Objective:     Patient was educated and performed therapeutic exercises as per log below, along with today's progressions and new exercises, 1:1 with PTA x 20 minutes and supervised exercises by PTA x 25 minutes to improve ROM, flexibility, strength and gait.  Patient received IASTM x 10 minutes in a seated position to left UT and levator scapula by PTA.  Patient declined cold pack at the end of therapy.     Date  02/22/17 02/20/17 02/15/17 02/13/17 02/08/17   VISIT 6/30 5/30 4/30 3/30 2/30   G CODE VISIT 6/10 5/10 4/10 3/10 2/10   POC EXP. DATE 03/31/17 03/31/17 03/31/17 03/31/17 03/31/17   VISIT AMOUNT    MEDICARE TOTAL 61.84    514.84 61.84    453.00 93.82    391.16 125.80    297.34 63.96    171.54   FACE-TO-FACE 03/02/17 03/02/17 03/02/17 03/02/17 03/02/17           Bike  -- -- -- -- --   TABLE:        HSS long sitting 10 x 10" 10 x 10" 10 x 10" 10 x 10" 10 x 10"   Quad sets 20 x 5" 15 x 5" 15 x 5" 10 x 5" 10 x 5"   Bridge  -- -- -- -- --   LTR -- -- -- -- --   Marching  1 x 15 1 x 15 1 x 10 1 x 10 1 x 10   SLR 2 x 10 B 1 x 15 B 1 x 10 B 1 x 10 R SL, 1 x 10 L 1 x 2 R, 1 x 10 L   Hip abduction - supine 1 x 30 RTB 3 x 10 RTB 1 x 20 RTB 1 x 15 RTB 1 x 15 RTB   Hip adduction - supine 20 x 3" 20 x 3" 15 x 3" w/ ball 10 x 3" w/ ball 10 x 3" w/ ball   Hip " "extension -- -- -- -- --   SAQ -- -- -- -- --   Heel Slides -- -- -- -- --   SEATED:        Cervical ROM  - flex/ ext  - rot R/L  - side bend R/L  - chin tucks   1 x 10  1 x 10   1 x 10  1 x 10   1 x 10  1 x 10   1 x 10  1 x 10   1 x 10  1 x 10  1 x 10  1 x 10   1 x 10  1 x 10  1 x 10  1 x 10   1 x 10  1 x 10  1 x 10  1 x 10   Levator scap. stretch 5 x 5" 5 x 5" 5 x 5" 5 x 5" 5 x 5"   UT stretch 5 x 5" 5 x 5" 5 x 5" 5 x 5" 5 x 5"   SCM stretch  5 x 5" 5 x 5" 5 x 5" 5 x 5" 5 x 5"   Pec. stretch 5 x 5" 5 x 5" 5 x 5" 5 x 5" 5 x 5"   LAQs 1 x 20 1 x 20 1 x 15 1 x 10 --   HS curls -- -- -- -- --   Seated Hip Flex 2 x 10 2 x 10 1 x 15 1 x 10 1 x 10   STANDING:        Wall slides -- -- -- -- --   Mini squats 1 x 15 1 x 10 -- -- --   Hip Abd -- -- -- -- --   Hip Flex -- -- -- -- --   Hip Ext -- -- -- -- --   HS Curls -- -- -- -- --   Step Ups -- -- -- -- --   Rows   T's 2 x 10 YTB  2 x 10 YTB 1 x 10 YTB  1 x 10 YTB      CP declined declined declined declined declined           Initials GWA 1/6 DG GWA 1/6 DG GWA 1/6       Assessment:     She continues to require cues for posture with all exercises.  Patient demonstrates improvement with decreased complaint of pain and tightness.     Patient Education/Response:     Patient was instructed to continue with her HEP twice a day.  Patient verbalized understanding instructions.     Plans and Goals:     Continue with Plan Of Care and progress toward PT goals.    Short Term Goals (4 Weeks):   1. This patient will be independent with a basic HEP.  2. This patient will have cervical AROM WFL with no increase in symptoms in order to drive safely.  3. This patient will increase LE strength by 1 grade in order to perform sit to stand transfer with no increase in symptoms or UE leverage.  4. This patient will have a pain rating of 5/10 at worst with ADLs.  5. Patient will be able to achieve greater than or equal to 45 on the FOTO knee placing patient in 40%<60% impaired, limited, or " restricted category demonstrating overall improved functional ability with lower extremity.   6. Patient able to score greater than or equal to 64 on the FOTO neck placing patient in 20%<40% impaired, limited, or restricted category demonstrating overall decreased neck pain with functional activities    Long Term Goals (8 Weeks):   1. This patient will be independent with an updated HEP.  2. This patient will increase LE strength to 5/5 in order to be able to ascend/descend the step in front of her home with no difficulty.  3. This patient will have a pain rating of 2/10 at worst with ADLs.  4. Patient will be able to achieve greater than or equal to 65 on the FOTO knee placing patient in 20%<40% impaired, limited, or restricted category demonstrating overall improved functional ability with lower extremity.  5. Patient able to score greater than or equal to 84 on the FOTO neck placing patient in 1%<20% impaired, limited, or restricted category demonstrating overall decreased neck pain with functional activities

## 2017-03-01 ENCOUNTER — CLINICAL SUPPORT (OUTPATIENT)
Dept: REHABILITATION | Facility: HOSPITAL | Age: 67
End: 2017-03-01
Attending: FAMILY MEDICINE
Payer: MEDICARE

## 2017-03-01 DIAGNOSIS — R26.89 ANTALGIC GAIT: ICD-10-CM

## 2017-03-01 DIAGNOSIS — R52 PAIN AGGRAVATED BY PHYSICAL ACTIVITY: ICD-10-CM

## 2017-03-01 DIAGNOSIS — R29.898 DECREASED RANGE OF MOTION OF NECK: ICD-10-CM

## 2017-03-01 DIAGNOSIS — R29.898 WEAKNESS OF BOTH LOWER EXTREMITIES: ICD-10-CM

## 2017-03-01 PROCEDURE — 97110 THERAPEUTIC EXERCISES: CPT

## 2017-03-01 NOTE — PROGRESS NOTES
"TIME RECORD    Date:  03/01/2017    Start Time:  2:00  Stop Time:  2:50    PROCEDURES:    TIMED  Procedure Min.   TE  25   TE sup 25 NC                 UNTIMED  Procedure Min.             Total Timed Minutes:  25  Total Timed Units:  2  Total Untimed Units:  0  Charges Billed/# of units:  2 (TE-2)      Progress/Current Status    Subjective:     Patient ID: Flores Fuentes is a 66 y.o. female.  Diagnosis:   1. Decreased range of motion of neck     2. Weakness of both lower extremities     3. Antalgic gait     4. Pain aggravated by physical activity       Pain: 4 /10  Patient reports she continues with knee pain, R.>L, just above her patella.     Objective:     Patient was educated and performed therapeutic exercises as per log below, along with today's progressions, 1:1 with PTA x 25 minutes and supervised exercises by PTA x 25 minutes to improve ROM, flexibility, strength and gait.  Patient declined cold pack at the end of therapy.     Date  03/01/17 02/22/17 02/20/17 02/15/17 02/13/17 02/08/17   VISIT 7/30 6/30 5/30 4/30 3/30 2/30   G CODE VISIT 7/10 6/10 5/10 4/10 3/10 2/10   POC EXP. DATE 03/31/17 03/31/17 03/31/17 03/31/17 03/31/17 03/31/17   VISIT AMOUNT    MEDICARE TOTAL 63.96    578.80 61.84    514.84 61.84    453.00 93.82    391.16 125.80    297.34 63.96    171.54   FACE-TO-FACE 03/02/17 03/02/17 03/02/17 03/02/17 03/02/17 03/02/17            Bike  -- -- -- -- -- --   TABLE:         HSS long sitting 10 x 10" 10 x 10" 10 x 10" 10 x 10" 10 x 10" 10 x 10"   Quad sets 20 x 5" 20 x 5" 15 x 5" 15 x 5" 10 x 5" 10 x 5"   Bridge  -- -- -- -- -- --   LTR -- -- -- -- -- --   Marching  Hold - pain 1 x 15 1 x 15 1 x 10 1 x 10 1 x 10   SLR 1 x 10 R  1 x 20 L 2 x 10 B 1 x 15 B 1 x 10 B 1 x 10 R SL, 1 x 10 L 1 x 2 R, 1 x 10 L   Hip abduction - supine 1 x 30 RTB 1 x 30 RTB 3 x 10 RTB 1 x 20 RTB 1 x 15 RTB 1 x 15 RTB   Hip adduction - supine 20 x 3" 20 x 3" 20 x 3" 15 x 3" w/ ball 10 x 3" w/ ball 10 x 3" w/ ball   Hip " "extension -- -- -- -- -- --   SAQ -- -- -- -- -- --   Heel Slides -- -- -- -- -- --   SEATED:         Cervical ROM  - flex/ ext  - rot R/L  - side bend R/L  - chin tucks   1 x 15  1 x 15  1 x 15  1 x 10   1 x 10  1 x 10   1 x 10  1 x 10   1 x 10  1 x 10   1 x 10  1 x 10   1 x 10  1 x 10  1 x 10  1 x 10   1 x 10  1 x 10  1 x 10  1 x 10   1 x 10  1 x 10  1 x 10  1 x 10   Levator scap. stretch 5 x 5" 5 x 5" 5 x 5" 5 x 5" 5 x 5" 5 x 5"   UT stretch 5 x 5" 5 x 5" 5 x 5" 5 x 5" 5 x 5" 5 x 5"   SCM stretch  5 x 5" 5 x 5" 5 x 5" 5 x 5" 5 x 5" 5 x 5"   Pec. stretch 5 x 5" 5 x 5" 5 x 5" 5 x 5" 5 x 5" 5 x 5"   LAQs 1 x 20 1 x 20 1 x 20 1 x 15 1 x 10 --   HS curls -- -- -- -- -- --   Seated Hip Flex 2 x 10 2 x 10 2 x 10 1 x 15 1 x 10 1 x 10   STANDING:         Wall slides -- -- -- -- -- --   Mini squats 1 x 15 1 x 15 1 x 10 -- -- --   Hip Abd -- -- -- -- -- --   Hip Flex -- -- -- -- -- --   Hip Ext -- -- -- -- -- --   HS Curls -- -- -- -- -- --   Step Ups -- -- -- -- -- --   Rows   T's 1 x 25 YTB  1 x 25 YTB 2 x 10 YTB  2 x 10 YTB 1 x 10 YTB  1 x 10 YTB      CP declined declined declined declined declined declined            Initials GWA 2/6 GWA 1/6 DG GWA 1/6 DG GWA 1/6       Assessment:     She continues to require cues for posture with all exercises.  Patient did not complete all of her SLR on her right LE due to complaint of pain and weakness.     Patient Education/Response:     Patient was instructed to continue with her HEP twice a day.  Patient verbalized understanding instructions.     Plans and Goals:     Continue with Plan Of Care and progress toward PT goals.    Short Term Goals (4 Weeks):   1. This patient will be independent with a basic HEP.  2. This patient will have cervical AROM WFL with no increase in symptoms in order to drive safely.  3. This patient will increase LE strength by 1 grade in order to perform sit to stand transfer with no increase in symptoms or UE leverage.  4. This patient will have a pain " rating of 5/10 at worst with ADLs.  5. Patient will be able to achieve greater than or equal to 45 on the FOTO knee placing patient in 40%<60% impaired, limited, or restricted category demonstrating overall improved functional ability with lower extremity.   6. Patient able to score greater than or equal to 64 on the FOTO neck placing patient in 20%<40% impaired, limited, or restricted category demonstrating overall decreased neck pain with functional activities    Long Term Goals (8 Weeks):   1. This patient will be independent with an updated HEP.  2. This patient will increase LE strength to 5/5 in order to be able to ascend/descend the step in front of her home with no difficulty.  3. This patient will have a pain rating of 2/10 at worst with ADLs.  4. Patient will be able to achieve greater than or equal to 65 on the FOTO knee placing patient in 20%<40% impaired, limited, or restricted category demonstrating overall improved functional ability with lower extremity.  5. Patient able to score greater than or equal to 84 on the FOTO neck placing patient in 1%<20% impaired, limited, or restricted category demonstrating overall decreased neck pain with functional activities

## 2017-03-02 ENCOUNTER — DOCUMENTATION ONLY (OUTPATIENT)
Dept: REHABILITATION | Facility: HOSPITAL | Age: 67
End: 2017-03-02

## 2017-03-02 DIAGNOSIS — R26.89 ANTALGIC GAIT: ICD-10-CM

## 2017-03-02 DIAGNOSIS — R52 PAIN AGGRAVATED BY PHYSICAL ACTIVITY: ICD-10-CM

## 2017-03-02 DIAGNOSIS — R29.898 DECREASED RANGE OF MOTION OF NECK: ICD-10-CM

## 2017-03-02 DIAGNOSIS — R29.898 WEAKNESS OF BOTH LOWER EXTREMITIES: ICD-10-CM

## 2017-03-02 NOTE — PROGRESS NOTES
Face to Face PTA Conference performed with Usman Conley, PTA regarding patient's current status, overall progress, and plan of care    Face to Face PTA Conference performed with Nichole Ryan, PT regarding patient's current status, overall progress, and plan of care    Usman Conley  3/2/2017

## 2017-03-03 ENCOUNTER — CLINICAL SUPPORT (OUTPATIENT)
Dept: REHABILITATION | Facility: HOSPITAL | Age: 67
End: 2017-03-03
Attending: FAMILY MEDICINE
Payer: MEDICARE

## 2017-03-03 DIAGNOSIS — R29.898 WEAKNESS OF BOTH LOWER EXTREMITIES: ICD-10-CM

## 2017-03-03 DIAGNOSIS — R26.89 ANTALGIC GAIT: ICD-10-CM

## 2017-03-03 DIAGNOSIS — R29.898 DECREASED RANGE OF MOTION OF NECK: ICD-10-CM

## 2017-03-03 DIAGNOSIS — R52 PAIN AGGRAVATED BY PHYSICAL ACTIVITY: ICD-10-CM

## 2017-03-03 PROCEDURE — 97110 THERAPEUTIC EXERCISES: CPT

## 2017-03-03 PROCEDURE — 97140 MANUAL THERAPY 1/> REGIONS: CPT

## 2017-03-03 NOTE — PROGRESS NOTES
"TIME RECORD    Date:  03/03/2017    Start Time:  9:00  Stop Time:  9:55    PROCEDURES:    TIMED  Procedure Min.   TE 17   TE sup 30 NC   MT 8              UNTIMED  Procedure Min.             Total Timed Minutes:  25  Total Timed Units:  2  Total Untimed Units:  0  Charges Billed/# of units:  2 (TE-1, MT-1)      Progress/Current Status    Subjective:     Patient ID: Flores Fuentes is a 66 y.o. female.  Diagnosis:   1. Decreased range of motion of neck     2. Weakness of both lower extremities     3. Antalgic gait     4. Pain aggravated by physical activity       Pain: 2 /10  Patient reports her neck pain is low today.    Objective:     Patient was educated and performed therapeutic exercises as per log below, along with today's progressions, 1:1 with PTA x 17 minutes and supervised exercises by PTA x 30 minutes to improve ROM, flexibility, strength and gait.  Patient declined cold pack at the end of therapy.  Patient received IASTM x 8 minutes in a seated position to left UT and levator scapula by PTA. Patient declined cold pack at the end of therapy.     Date  03/03/17 03/01/17 02/22/17 02/20/17 02/15/17 02/13/17 02/08/17   VISIT 8/30 7/30 6/30 5/30 4/30 3/30 2/30   G CODE VISIT 8/10 7/10 6/10 5/10 4/10 3/10 2/10   POC EXP. DATE 03/31/17 03/31/17 03/31/17 03/31/17 03/31/17 03/31/17 03/31/17   VISIT AMOUNT    MEDICARE TOTAL 61.84    640.64 63.96    578.80 61.84    514.84 61.84    453.00 93.82    391.16 125.80    297.34 63.96    171.54   FACE-TO-FACE 04/02/17 03/02/17 03/02/17 03/02/17 03/02/17 03/02/17 03/02/17             Bike  -- -- -- -- -- -- --   TABLE:          HSS long sitting 10 x 10" 10 x 10" 10 x 10" 10 x 10" 10 x 10" 10 x 10" 10 x 10"   Quad sets 20 x 5" 20 x 5" 20 x 5" 15 x 5" 15 x 5" 10 x 5" 10 x 5"   Bridge  -- -- -- -- -- -- --   LTR -- -- -- -- -- -- --   Marching  -- Hold - pain 1 x 15 1 x 15 1 x 10 1 x 10 1 x 10   SLR 2 x 10 L  1 x 10 R 1 x 10 R  1 x 20 L 2 x 10 B 1 x 15 B 1 x 10 B 1 x 10 R " "SL, 1 x 10 L 1 x 2 R, 1 x 10 L   Hip abduction - supine 1 x 30 RTB 1 x 30 RTB 1 x 30 RTB 3 x 10 RTB 1 x 20 RTB 1 x 15 RTB 1 x 15 RTB   Hip adduction - supine 20 x 3" 20 x 3" 20 x 3" 20 x 3" 15 x 3" w/ ball 10 x 3" w/ ball 10 x 3" w/ ball   Hip extension -- -- -- -- -- -- --   SAQ -- -- -- -- -- -- --   Heel Slides -- -- -- -- -- -- --   SEATED:          Cervical ROM  - flex/ ext  - rot R/L  - side bend R/L  - chin tucks   1 x 15  1 x 15  1 x 15  1 x 15   1 x 15  1 x 15  1 x 15  1 x 10   1 x 10  1 x 10   1 x 10  1 x 10   1 x 10  1 x 10   1 x 10  1 x 10   1 x 10  1 x 10  1 x 10  1 x 10   1 x 10  1 x 10  1 x 10  1 x 10   1 x 10  1 x 10  1 x 10  1 x 10   Levator scap. stretch 5 x 5" 5 x 5" 5 x 5" 5 x 5" 5 x 5" 5 x 5" 5 x 5"   UT stretch 5 x 5" 5 x 5" 5 x 5" 5 x 5" 5 x 5" 5 x 5" 5 x 5"   SCM stretch  5 x 5" 5 x 5" 5 x 5" 5 x 5" 5 x 5" 5 x 5" 5 x 5"   Pec. stretch 5 x 5" 5 x 5" 5 x 5" 5 x 5" 5 x 5" 5 x 5" 5 x 5"   LAQs 1 x 20 1 x 20 1 x 20 1 x 20 1 x 15 1 x 10 --   HS curls -- -- -- -- -- -- --   Seated Hip Flex 2 x 10 2 x 10 2 x 10 2 x 10 1 x 15 1 x 10 1 x 10   STANDING:          Wall slides -- -- -- -- -- -- --   Mini squats 4 x 5 1 x 15 1 x 15 1 x 10 -- -- --   Hip Abd -- -- -- -- -- -- --   Hip Flex -- -- -- -- -- -- --   Hip Ext -- -- -- -- -- -- --   HS Curls -- -- -- -- -- -- --   Step Ups -- -- -- -- -- -- --   Rows   T's 3 x 10 YTB  3 x 10 YTB 1 x 25 YTB  1 x 25 YTB 2 x 10 YTB  2 x 10 YTB 1 x 10 YTB  1 x 10 YTB      CP declined declined declined declined declined declined declined             Initials GWA 3/6 GWA 2/6 GWA 1/6 DG GWA 1/6 DG GWA 1/6       Assessment:     She continues to require cues for posture with all exercises.  Patient completed her therapy along with today's progressions with no increase in symptoms prior to leaving the clinic.    Patient Education/Response:     Patient was instructed to continue with her HEP twice a day.  Patient verbalized understanding instructions.     Plans and Goals: "     Continue with Plan Of Care and progress toward PT goals.    Short Term Goals (4 Weeks):   1. This patient will be independent with a basic HEP.  2. This patient will have cervical AROM WFL with no increase in symptoms in order to drive safely.  3. This patient will increase LE strength by 1 grade in order to perform sit to stand transfer with no increase in symptoms or UE leverage.  4. This patient will have a pain rating of 5/10 at worst with ADLs.  5. Patient will be able to achieve greater than or equal to 45 on the FOTO knee placing patient in 40%<60% impaired, limited, or restricted category demonstrating overall improved functional ability with lower extremity.   6. Patient able to score greater than or equal to 64 on the FOTO neck placing patient in 20%<40% impaired, limited, or restricted category demonstrating overall decreased neck pain with functional activities    Long Term Goals (8 Weeks):   1. This patient will be independent with an updated HEP.  2. This patient will increase LE strength to 5/5 in order to be able to ascend/descend the step in front of her home with no difficulty.  3. This patient will have a pain rating of 2/10 at worst with ADLs.  4. Patient will be able to achieve greater than or equal to 65 on the FOTO knee placing patient in 20%<40% impaired, limited, or restricted category demonstrating overall improved functional ability with lower extremity.  5. Patient able to score greater than or equal to 84 on the FOTO neck placing patient in 1%<20% impaired, limited, or restricted category demonstrating overall decreased neck pain with functional activities

## 2017-03-06 ENCOUNTER — CLINICAL SUPPORT (OUTPATIENT)
Dept: REHABILITATION | Facility: HOSPITAL | Age: 67
End: 2017-03-06
Attending: FAMILY MEDICINE
Payer: MEDICARE

## 2017-03-06 DIAGNOSIS — R29.898 WEAKNESS OF BOTH LOWER EXTREMITIES: ICD-10-CM

## 2017-03-06 DIAGNOSIS — R52 PAIN AGGRAVATED BY PHYSICAL ACTIVITY: ICD-10-CM

## 2017-03-06 DIAGNOSIS — R26.89 ANTALGIC GAIT: ICD-10-CM

## 2017-03-06 DIAGNOSIS — R29.898 DECREASED RANGE OF MOTION OF NECK: ICD-10-CM

## 2017-03-06 PROCEDURE — 97110 THERAPEUTIC EXERCISES: CPT

## 2017-03-06 NOTE — PROGRESS NOTES
"TIME RECORD    Date:  03/06/2017    Start Time:  10:00  Stop Time:  10:50    PROCEDURES:    TIMED  Procedure Min.   TE 25   TE sup 25 NC                 UNTIMED  Procedure Min.             Total Timed Minutes:  25  Total Timed Units:  2  Total Untimed Units:  0  Charges Billed/# of units:  2 (TE-2)      Progress/Current Status    Subjective:     Patient ID: Flores Fuentes is a 66 y.o. female.  Diagnosis:   1. Decreased range of motion of neck     2. Weakness of both lower extremities     3. Antalgic gait     4. Pain aggravated by physical activity       Pain: 3 /10  Patient reports she continues with pain across the distal patella of both knees.  Patient reports that she has been practicing using "nose over toes" guide for getting out of chairs and it is helping her, having less pain and difficulty getting out of chairs.     Objective:     Patient was educated and performed therapeutic exercises as per log below, along with new exercise added and today's progressions, 1:1 with PTA x 25 minutes and supervised exercises by PTA x 25 minutes to improve ROM, flexibility, strength and gait.  Patient declined cold pack at the end of therapy.  Patient did not receive IASTM today, she declined. Patient declined cold pack at the end of therapy.     Date  03/06/17 03/03/17 03/01/17 02/22/17 02/20/17 02/15/17 02/13/17 02/08/17   VISIT 9/30 8/30 7/30 6/30 5/30 4/30 3/30 2/30   G CODE VISIT 9/10 8/10 7/10 6/10 5/10 4/10 3/10 2/10   POC EXP. DATE 03/31/17 03/31/17 03/31/17 03/31/17 03/31/17 03/31/17 03/31/17 03/31/17   VISIT AMOUNT    MEDICARE TOTAL 63.96    704.60 61.84    640.64 63.96    578.80 61.84    514.84 61.84    453.00 93.82    391.16 125.80    297.34 63.96    171.54   FACE-TO-FACE 04/02/17 04/02/17 03/02/17 03/02/17 03/02/17 03/02/17 03/02/17 03/02/17              Bike  -- -- -- -- -- -- -- --   TABLE:           HSS long sitting 10 x 10" 10 x 10" 10 x 10" 10 x 10" 10 x 10" 10 x 10" 10 x 10" 10 x 10"   Quad sets 20 " "x 5" 20 x 5" 20 x 5" 20 x 5" 15 x 5" 15 x 5" 10 x 5" 10 x 5"   Bridge  -- -- -- -- -- -- -- --   LTR -- -- -- -- -- -- -- --   Marching  1 x 20 -- Hold - pain 1 x 15 1 x 15 1 x 10 1 x 10 1 x 10   SLR 2 x 10 B 2 x 10 L  1 x 10 R 1 x 10 R  1 x 20 L 2 x 10 B 1 x 15 B 1 x 10 B 1 x 10 R SL, 1 x 10 L 1 x 2 R, 1 x 10 L   Hip abduction - sitting 1 x 20 GTB 1 x 30 RTB 1 x 30 RTB 1 x 30 RTB 3 x 10 RTB 1 x 20 RTB 1 x 15 RTB 1 x 15 RTB   Hip adduction - sitting 25 x 3" 20 x 3" 20 x 3" 20 x 3" 20 x 3" 15 x 3" w/ ball 10 x 3" w/ ball 10 x 3" w/ ball   Hip extension -- -- -- -- -- -- -- --   SAQ -- -- -- -- -- -- -- --   Heel Slides -- -- -- -- -- -- -- --   SEATED:           Cervical ROM  - flex/ ext  - rot R/L  - side bend R/L  - chin tucks   1 x 20  1 x 20  1 x 20  1 x 10   1 x 15  1 x 15  1 x 15  1 x 15   1 x 15  1 x 15  1 x 15  1 x 10   1 x 10  1 x 10   1 x 10  1 x 10   1 x 10  1 x 10   1 x 10  1 x 10   1 x 10  1 x 10  1 x 10  1 x 10   1 x 10  1 x 10  1 x 10  1 x 10   1 x 10  1 x 10  1 x 10  1 x 10   Levator scap. stretch 5 x 5" 5 x 5" 5 x 5" 5 x 5" 5 x 5" 5 x 5" 5 x 5" 5 x 5"   UT stretch 5 x 5" 5 x 5" 5 x 5" 5 x 5" 5 x 5" 5 x 5" 5 x 5" 5 x 5"   SCM stretch  5 x 5" 5 x 5" 5 x 5" 5 x 5" 5 x 5" 5 x 5" 5 x 5" 5 x 5"   Pec. stretch 5 x 5" 5 x 5" 5 x 5" 5 x 5" 5 x 5" 5 x 5" 5 x 5" 5 x 5"   LAQs 1 x 25 1 x 20 1 x 20 1 x 20 1 x 20 1 x 15 1 x 10 --   HS curls -- -- -- -- -- -- -- --   Seated Hip Flex 3 x 10 2 x 10 2 x 10 2 x 10 2 x 10 1 x 15 1 x 10 1 x 10   STANDING:           Wall slides -- -- -- -- -- -- -- --   Mini squats 4 x 5 4 x 5 1 x 15 1 x 15 1 x 10 -- -- --   Hip Abd -- -- -- -- -- -- -- --   Hip Flex -- -- -- -- -- -- -- --   Hip Ext -- -- -- -- -- -- -- --   HS Curls -- -- -- -- -- -- -- --   Step Ups -- -- -- -- -- -- -- --   Rows   T's  ER 2 x 10 RTB  3 x 10 YTB  2 x 10 YTB 3 x 10 YTB  3 x 10 YTB 1 x 25 YTB  1 x 25 YTB 2 x 10 YTB  2 x 10 YTB 1 x 10 YTB  1 x 10 YTB      CP declined declined declined declined declined " declined declined declined              Initials GWA 4/6 GWA 3/6 GWA 2/6 GWA 1/6 DG GWA 1/6 DG GWA 1/6       Assessment:     She continues to require cues for good posture with resistance exercises. Patient completed her therapy along with new exercise added and today's progressions with no increase in symptoms prior to leaving the clinic.    Patient Education/Response:     Patient was instructed to continue with her HEP twice a day.  Patient verbalized understanding instructions.     Plans and Goals:     Continue with Plan Of Care and progress toward PT goals.    Short Term Goals (4 Weeks):   1. This patient will be independent with a basic HEP.  2. This patient will have cervical AROM WFL with no increase in symptoms in order to drive safely.  3. This patient will increase LE strength by 1 grade in order to perform sit to stand transfer with no increase in symptoms or UE leverage.  4. This patient will have a pain rating of 5/10 at worst with ADLs.  5. Patient will be able to achieve greater than or equal to 45 on the FOTO knee placing patient in 40%<60% impaired, limited, or restricted category demonstrating overall improved functional ability with lower extremity.   6. Patient able to score greater than or equal to 64 on the FOTO neck placing patient in 20%<40% impaired, limited, or restricted category demonstrating overall decreased neck pain with functional activities    Long Term Goals (8 Weeks):   1. This patient will be independent with an updated HEP.  2. This patient will increase LE strength to 5/5 in order to be able to ascend/descend the step in front of her home with no difficulty.  3. This patient will have a pain rating of 2/10 at worst with ADLs.  4. Patient will be able to achieve greater than or equal to 65 on the FOTO knee placing patient in 20%<40% impaired, limited, or restricted category demonstrating overall improved functional ability with lower extremity.  5. Patient able to score greater  than or equal to 84 on the FOTO neck placing patient in 1%<20% impaired, limited, or restricted category demonstrating overall decreased neck pain with functional activities

## 2017-03-08 ENCOUNTER — CLINICAL SUPPORT (OUTPATIENT)
Dept: REHABILITATION | Facility: HOSPITAL | Age: 67
End: 2017-03-08
Attending: FAMILY MEDICINE
Payer: MEDICARE

## 2017-03-08 DIAGNOSIS — R29.898 WEAKNESS OF BOTH LOWER EXTREMITIES: ICD-10-CM

## 2017-03-08 DIAGNOSIS — R52 PAIN AGGRAVATED BY PHYSICAL ACTIVITY: ICD-10-CM

## 2017-03-08 DIAGNOSIS — R26.89 ANTALGIC GAIT: ICD-10-CM

## 2017-03-08 DIAGNOSIS — R29.898 DECREASED RANGE OF MOTION OF NECK: ICD-10-CM

## 2017-03-08 PROCEDURE — G8978 MOBILITY CURRENT STATUS: HCPCS | Mod: CK

## 2017-03-08 PROCEDURE — 97110 THERAPEUTIC EXERCISES: CPT

## 2017-03-08 PROCEDURE — G8979 MOBILITY GOAL STATUS: HCPCS | Mod: CI

## 2017-03-08 NOTE — PROGRESS NOTES
"TIME RECORD    Date:  03/08/2017    Start Time:  10:00  Stop Time:  11:00    PROCEDURES:    TIMED  Procedure Min.   TE 30   TE sup 30 NC                 UNTIMED  Procedure Min.             Total Timed Minutes:  30  Total Timed Units:  2  Total Untimed Units:  0  Charges Billed/# of units:  2 (TE-2)      Progress/Current Status    Subjective:     Patient ID: Flores Fuentes is a 66 y.o. female.  Diagnosis:   1. Decreased range of motion of neck     2. Weakness of both lower extremities     3. Antalgic gait     4. Pain aggravated by physical activity       Pain: 3 /10  She reports having pain in her knees still but her neck is better. She forgot her exercise sheets today but thinks she can do it without the sheets.     Objective:     Patient was educated and performed therapeutic exercises as per log below, along with new exercise added and today's progressions, 1:1 with PT x 30 minutes and supervised exercises by PT x 30 minutes to improve ROM, flexibility, strength and gait.  Patient declined cold pack at the end of therapy.  Patient declined manual therapy treatments today.     Date  03/08/17 03/06/17 03/03/17 03/01/17 02/22/17 02/20/17 02/15/17 02/13/17 02/08/17   VISIT 10/30 9/30 8/30 7/30 6/30 5/30 4/30 3/30 2/30   G CODE VISIT 1/10 9/10 8/10 7/10 6/10 5/10 4/10 3/10 2/10   POC EXP. DATE 03/31/17 03/31/17 03/31/17 03/31/17 03/31/17 03/31/17 03/31/17 03/31/17 03/31/17   VISIT AMOUNT    MEDICARE TOTAL 63.96    768.56 63.96    704.60 61.84    640.64 63.96    578.80 61.84    514.84 61.84    453.00 93.82    391.16 125.80    297.34 63.96    171.54   FACE-TO-FACE 04/02/17 04/02/17 04/02/17 03/02/17 03/02/17 03/02/17 03/02/17 03/02/17 03/02/17               Bike  -- -- -- -- -- -- -- -- --   TABLE:            HSS long sitting 10 x 10" 10 x 10" 10 x 10" 10 x 10" 10 x 10" 10 x 10" 10 x 10" 10 x 10" 10 x 10"   Quad sets 20 x 5" 20 x 5" 20 x 5" 20 x 5" 20 x 5" 15 x 5" 15 x 5" 10 x 5" 10 x 5"   Bridge  1 x 10 -- -- -- " "-- -- -- -- --   Marching  1 x 25 1 x 20 -- Hold - pain 1 x 15 1 x 15 1 x 10 1 x 10 1 x 10   SLR 1 x 25 B 2 x 10 B 2 x 10 L  1 x 10 R 1 x 10 R  1 x 20 L 2 x 10 B 1 x 15 B 1 x 10 B 1 x 10 R SL, 1 x 10 L 1 x 2 R, 1 x 10 L   Hip abduction - sitting 1 x 30 GTB 1 x 20 GTB 1 x 30 RTB 1 x 30 RTB 1 x 30 RTB 3 x 10 RTB 1 x 20 RTB 1 x 15 RTB 1 x 15 RTB   Hip adduction - sitting 30 x 3" 25 x 3" 20 x 3" 20 x 3" 20 x 3" 20 x 3" 15 x 3" w/ ball 10 x 3" w/ ball 10 x 3" w/ ball   Hip extension -- -- -- -- -- -- -- -- --   SAQ -- -- -- -- -- -- -- -- --   Heel Slides -- -- -- -- -- -- -- -- --   SEATED:            Cervical ROM  - flex/ ext  - rot R/L  - side bend R/L  - chin tucks   2 x 10  2 x 10  2 x 10  1 x 10   1 x 20  1 x 20  1 x 20  1 x 10   1 x 15  1 x 15  1 x 15  1 x 15   1 x 15  1 x 15  1 x 15  1 x 10   1 x 10  1 x 10   1 x 10  1 x 10   1 x 10  1 x 10   1 x 10  1 x 10   1 x 10  1 x 10  1 x 10  1 x 10   1 x 10  1 x 10  1 x 10  1 x 10   1 x 10  1 x 10  1 x 10  1 x 10   Levator scap. stretch 10 x 5" 5 x 5" 5 x 5" 5 x 5" 5 x 5" 5 x 5" 5 x 5" 5 x 5" 5 x 5"   UT stretch 10 x 5" 5 x 5" 5 x 5" 5 x 5" 5 x 5" 5 x 5" 5 x 5" 5 x 5" 5 x 5"   SCM stretch  10 x 5" 5 x 5" 5 x 5" 5 x 5" 5 x 5" 5 x 5" 5 x 5" 5 x 5" 5 x 5"   Pec. stretch 5 x 5" 5 x 5" 5 x 5" 5 x 5" 5 x 5" 5 x 5" 5 x 5" 5 x 5" 5 x 5"   LAQs 2 x 10 x 1# 1 x 25 1 x 20 1 x 20 1 x 20 1 x 20 1 x 15 1 x 10 --   HS curls 1 x 10 RTB -- -- -- -- -- -- -- --   Seated Hip Flex 2 x 10 x 1# 3 x 10 2 x 10 2 x 10 2 x 10 2 x 10 1 x 15 1 x 10 1 x 10   STANDING:            Wall slides -- -- -- -- -- -- -- -- --   Mini squats X 25 4 x 5 4 x 5 1 x 15 1 x 15 1 x 10 -- -- --   Hip Abd -- -- -- -- -- -- -- -- --   Hip Flex -- -- -- -- -- -- -- -- --   Hip Ext -- -- -- -- -- -- -- -- --   HS Curls -- -- -- -- -- -- -- -- --   Step Ups -- -- -- -- -- -- -- -- --   Rows   T's  ER 3 x 10 RTB  2 x 10 RTB  1 x 25 YTB 2 x 10 RTB  3 x 10 YTB  2 x 10 YTB 3 x 10 YTB  3 x 10 YTB 1 x 25 YTB  1 x 25 YTB 2 x 10 " YTB  2 x 10 YTB 1 x 10 YTB  1 x 10 YTB      CP declined declined declined declined declined declined declined declined declined               Initials DG GWA 4/6 GWA 3/6 GWA 2/6 GWA 1/6 DG GWA 1/6 DG GWA 1/6     FOTO neck 56, G code CK 40%<60%  FOTO knee 34, G code CL, 60%<80%  Assessment:     Patient was able to perform all of today's exercises with no increase in symptoms prior to leaving the clinic. She did require occasional cues to perform her exercises correctly and to only perform the number prescribed. Her current score on FOTO neck places her in the 40%<60% impaired, limited, or restricted category. Her score on FOTO knee places her in the 60%<80% impaired, limited, or restricted category.    Patient Education/Response:     Patient was instructed to continue with her HEP twice a day.  Patient verbalized understanding instructions.     Plans and Goals:     Continue with Plan Of Care and progress toward PT goals.    Short Term Goals (4 Weeks):   1. This patient will be independent with a basic HEP.  2. This patient will have cervical AROM WFL with no increase in symptoms in order to drive safely.  3. This patient will increase LE strength by 1 grade in order to perform sit to stand transfer with no increase in symptoms or UE leverage.  4. This patient will have a pain rating of 5/10 at worst with ADLs.  5. Patient will be able to achieve greater than or equal to 45 on the FOTO knee placing patient in 40%<60% impaired, limited, or restricted category demonstrating overall improved functional ability with lower extremity.   6. Patient able to score greater than or equal to 64 on the FOTO neck placing patient in 20%<40% impaired, limited, or restricted category demonstrating overall decreased neck pain with functional activities    Long Term Goals (8 Weeks):   1. This patient will be independent with an updated HEP.  2. This patient will increase LE strength to 5/5 in order to be able to ascend/descend the step  in front of her home with no difficulty.  3. This patient will have a pain rating of 2/10 at worst with ADLs.  4. Patient will be able to achieve greater than or equal to 65 on the FOTO knee placing patient in 20%<40% impaired, limited, or restricted category demonstrating overall improved functional ability with lower extremity.  5. Patient able to score greater than or equal to 84 on the FOTO neck placing patient in 1%<20% impaired, limited, or restricted category demonstrating overall decreased neck pain with functional activities

## 2017-03-10 ENCOUNTER — HOSPITAL ENCOUNTER (INPATIENT)
Facility: HOSPITAL | Age: 67
LOS: 3 days | Discharge: HOME | DRG: 310 | End: 2017-03-13
Attending: FAMILY MEDICINE | Admitting: INTERNAL MEDICINE
Payer: MEDICARE

## 2017-03-10 DIAGNOSIS — I48.91 ATRIAL FIBRILLATION WITH RAPID VENTRICULAR RESPONSE: Primary | ICD-10-CM

## 2017-03-10 LAB
ALBUMIN SERPL BCP-MCNC: 4.5 G/DL
ALP SERPL-CCNC: 99 IU/L
ALT SERPL W/O P-5'-P-CCNC: 20 IU/L
ANION GAP SERPL CALC-SCNC: 13 MMOL/L
AST SERPL-CCNC: 20 IU/L
BASOPHILS # BLD AUTO: 0.04 K/UL
BASOPHILS NFR BLD: 0.6 %
BILIRUB SERPL-MCNC: 0.6 MG/DL
BUN SERPL-MCNC: 17 MG/DL
CALCIUM SERPL-MCNC: 9.7 MG/DL
CHLORIDE SERPL-SCNC: 105 MMOL/L
CO2 SERPL-SCNC: 24 MMOL/L
CREAT SERPL-MCNC: 0.68 MG/DL
DIFFERENTIAL METHOD: NORMAL
EOSINOPHIL # BLD AUTO: 0.2 K/UL
EOSINOPHIL NFR BLD: 3.4 %
ERYTHROCYTE [DISTWIDTH] IN BLOOD BY AUTOMATED COUNT: 14 %
EST. GFR  (AFRICAN AMERICAN): >60 ML/MIN/1.73 M^2
EST. GFR  (NON AFRICAN AMERICAN): >60 ML/MIN/1.73 M^2
GLUCOSE SERPL-MCNC: 102 MG/DL
HCT VFR BLD AUTO: 41 %
HGB BLD-MCNC: 13.7 G/DL
INR PPP: 1.2
LYMPHOCYTES # BLD AUTO: 2.6 K/UL
LYMPHOCYTES NFR BLD: 39.5 %
MCH RBC QN AUTO: 28.9 PG
MCHC RBC AUTO-ENTMCNC: 33.4 %
MCV RBC AUTO: 87 FL
MONOCYTES # BLD AUTO: 0.7 K/UL
MONOCYTES NFR BLD: 10.9 %
NEUTROPHILS # BLD AUTO: 3 K/UL
NEUTROPHILS NFR BLD: 45.4 %
NT-PROBNP: 218 PG/ML
PLATELET # BLD AUTO: 319 K/UL
PMV BLD AUTO: 10.3 FL
POTASSIUM SERPL-SCNC: 4.2 MMOL/L
PROT SERPL-MCNC: 8.1 G/DL
PROTHROMBIN TIME: 13.3 SEC
RBC # BLD AUTO: 4.74 M/UL
SODIUM SERPL-SCNC: 142 MMOL/L
TROPONIN I SERPL DL<=0.01 NG/ML-MCNC: <0.012 NG/ML
TROPONIN I SERPL DL<=0.01 NG/ML-MCNC: <0.012 NG/ML
WBC # BLD AUTO: 6.53 K/UL

## 2017-03-10 PROCEDURE — 85610 PROTHROMBIN TIME: CPT

## 2017-03-10 PROCEDURE — 96376 TX/PRO/DX INJ SAME DRUG ADON: CPT

## 2017-03-10 PROCEDURE — 20000000 HC ICU ROOM

## 2017-03-10 PROCEDURE — 96365 THER/PROPH/DIAG IV INF INIT: CPT

## 2017-03-10 PROCEDURE — 99285 EMERGENCY DEPT VISIT HI MDM: CPT | Mod: 25

## 2017-03-10 PROCEDURE — 93005 ELECTROCARDIOGRAM TRACING: CPT

## 2017-03-10 PROCEDURE — 96366 THER/PROPH/DIAG IV INF ADDON: CPT

## 2017-03-10 PROCEDURE — 84484 ASSAY OF TROPONIN QUANT: CPT

## 2017-03-10 PROCEDURE — 83880 ASSAY OF NATRIURETIC PEPTIDE: CPT

## 2017-03-10 PROCEDURE — 80053 COMPREHEN METABOLIC PANEL: CPT

## 2017-03-10 PROCEDURE — 85025 COMPLETE CBC W/AUTO DIFF WBC: CPT

## 2017-03-10 PROCEDURE — 25000003 PHARM REV CODE 250: Performed by: FAMILY MEDICINE

## 2017-03-10 RX ORDER — DILTIAZEM HYDROCHLORIDE 120 MG/1
120 CAPSULE, COATED, EXTENDED RELEASE ORAL
Status: DISCONTINUED | OUTPATIENT
Start: 2017-03-10 | End: 2017-03-10

## 2017-03-10 RX ORDER — DILTIAZEM HCL 1 MG/ML
5 INJECTION, SOLUTION INTRAVENOUS
Status: COMPLETED | OUTPATIENT
Start: 2017-03-10 | End: 2017-03-10

## 2017-03-10 RX ORDER — DILTIAZEM HYDROCHLORIDE 5 MG/ML
20 INJECTION INTRAVENOUS
Status: COMPLETED | OUTPATIENT
Start: 2017-03-10 | End: 2017-03-10

## 2017-03-10 RX ORDER — DILTIAZEM HCL 1 MG/ML
5 INJECTION, SOLUTION INTRAVENOUS
Status: DISCONTINUED | OUTPATIENT
Start: 2017-03-10 | End: 2017-03-10

## 2017-03-10 RX ORDER — DILTIAZEM HYDROCHLORIDE 5 MG/ML
INJECTION INTRAVENOUS
Status: DISPENSED
Start: 2017-03-10 | End: 2017-03-11

## 2017-03-10 RX ORDER — DILTIAZEM HYDROCHLORIDE 5 MG/ML
35 INJECTION INTRAVENOUS
Status: COMPLETED | OUTPATIENT
Start: 2017-03-10 | End: 2017-03-10

## 2017-03-10 RX ADMIN — DILTIAZEM HYDROCHLORIDE 35 MG: 5 INJECTION INTRAVENOUS at 05:03

## 2017-03-10 RX ADMIN — DILTIAZEM HYDROCHLORIDE 20 MG: 5 INJECTION INTRAVENOUS at 04:03

## 2017-03-10 RX ADMIN — DILTIAZEM HYDROCHLORIDE 5 MG/HR: 5 INJECTION INTRAVENOUS at 07:03

## 2017-03-10 NOTE — IP AVS SNAPSHOT
[facility]  [address]  [place] [address]  Phone: [phone]           Patient Discharge Instructions     Our goal is to set you up for success. This packet includes information on your condition, medications, and your home care. It will help you to care for yourself so you don't get sicker and need to go back to the hospital.     Please ask your nurse if you have any questions.        There are many details to remember when preparing to leave the hospital. Here is what you will need to do:    1. Take your medicine. If you are prescribed medications, review your Medication List in the following pages. You may have new medications to  at the pharmacy and others that you'll need to stop taking. Review the instructions for how and when to take your medications. Talk with your doctor or nurses if you are unsure of what to do.     2. Go to your follow-up appointments. Specific follow-up information is listed in the following pages. Your may be contacted by a transition nurse or clinical provider about future appointments. Be sure we have all of the phone numbers to reach you, if needed. Please contact your provider's office if you are unable to make an appointment.     3. Watch for warning signs. Your doctor or nurse will give you detailed warning signs to watch for and when to call for assistance. These instructions may also include educational information about your condition. If you experience any of warning signs to your health, call your doctor.               ** Verify the list of medication(s) below is accurate and up to date. Carry this with you in case of emergency. If your medications have changed, please notify your healthcare provider.             Medication List      START taking these medications        Additional Info                      dronedarone 400 mg Tab   Commonly known as:  MULTAQ   Quantity:  60 tablet   Refills:  11   Dose:  400 mg    Last time this was given:   400 mg on 3/13/2017  8:19 AM   Instructions:  Take 1 tablet (400 mg total) by mouth 2 (two) times daily with meals.     Begin Date    AM    Noon    PM    Bedtime         CHANGE how you take these medications        Additional Info                      diltiaZEM 180 MG 24 hr capsule   Commonly known as:  CARDIZEM CD   Quantity:  30 capsule   Refills:  11   Dose:  180 mg   What changed:    - medication strength  - how much to take    Last time this was given:  180 mg on 3/13/2017 12:43 PM   Instructions:  Take 1 capsule (180 mg total) by mouth once daily.     Begin Date    AM    Noon    PM    Bedtime         CONTINUE taking these medications        Additional Info                      ELIQUIS 5 mg Tab   Refills:  1   Dose:  5 mg   Generic drug:  apixaban    Last time this was given:  5 mg on 3/13/2017 12:43 PM   Instructions:  Take 5 mg by mouth 2 (two) times daily.     Begin Date    AM    Noon    PM    Bedtime       FLUARIX QUAD 4488-0188 (PF) 60 mcg (15 mcg x 4)/0.5 mL Syrg   Refills:  0   Generic drug:  flu vac [id number] 36mos up(PF)      Begin Date    AM    Noon    PM    Bedtime       fluocinonide 0.05 % ointment   Commonly known as:  LIDEX   Refills:  5      Begin Date    AM    Noon    PM    Bedtime       fluticasone 50 mcg/actuation nasal spray   Commonly known as:  FLONASE   Quantity:  1 Bottle   Refills:  3   Dose:  1 spray    Instructions:  1 spray by Each Nare route once daily.     Begin Date    AM    Noon    PM    Bedtime       levothyroxine 25 MCG tablet   Commonly known as:  SYNTHROID   Quantity:  90 tablet   Refills:  3   Dose:  25 mcg    Last time this was given:  25 mcg on 3/13/2017  6:15 AM   Instructions:  Take 1 tablet (25 mcg total) by mouth once daily.     Begin Date    AM    Noon    PM    Bedtime       pravastatin 40 MG tablet   Commonly known as:  PRAVACHOL   Quantity:  90 tablet   Refills:  3   Dose:  40 mg    Last time this was given:  40 mg on 3/13/2017 12:43 PM   Instructions:  Take 1 tablet  (40 mg total) by mouth once daily.     Begin Date    AM    Noon    PM    Bedtime         STOP taking these medications     acebutolol 200 MG capsule   Commonly known as:  SECTRAL            Where to Get Your Medications      You can get these medications from any pharmacy     Bring a paper prescription for each of these medications     diltiaZEM 180 MG 24 hr capsule    dronedarone 400 mg Tab                  Please bring to all follow up appointments:    1. A copy of your discharge instructions.  2. All medicines you are currently taking in their original bottles.  3. Identification and insurance card.    Please arrive 15 minutes ahead of scheduled appointment time.    Please call 24 hours in advance if you must reschedule your appointment and/or time.        Your Scheduled Appointments     Mar 15, 2017 10:00 AM CDT   Established Physical Therapy with Usman Conley PTA [facility]    [address]  [place] [address]   [phone]            Jul 12, 2017  8:00 AM CDT   Fasting Lab with [facility]    [address]  [place] [address]   [phone]            Jul 19, 2017  2:00 PM CDT   Physical with Naz Condon MD   [facility] ([facility])    [address] [facility], [address]  [facility] [address] [phone]              Follow-up Information     Follow up with Robyn Mcdonough MD On 3/27/2017.    Specialty:  Cardiology    Why:  time: 9:45    Contact information:    [address]  [address]  [facility] [address] [phone]          Discharge Instructions     Future Orders    Activity as tolerated     Call MD for:  difficulty breathing or increased cough     Call MD for:  increased confusion or weakness     Call MD for:  persistent dizziness, light-headedness, or visual disturbances     Diet general     Questions:    Total calories:      Fat restriction, if any:       "Protein restriction, if any:      Na restriction, if any:      Fluid restriction:      Additional restrictions:        Discharge References/Attachments     ABOUT ARRHYTHMIAS (ENGLISH)    FOOD AND CHOLESTEROL, UNDERSTANDING  (ENGLISH)    LIVING WITH ATRIAL FIBRILLATION: PREVENTING STROKE (ENGLISH)    CARDIOVERSION (ENGLISH)    CARDIOVERSION, DISCHARGE INSTRUCTIONS FOR (ENGLISH)        Primary Diagnosis     Your primary diagnosis was:  Atrial Fibrillation (Irregular Heartbeat)      Admission Information     Date & Time Provider Department CSN    3/10/2017  4:08 PM Robyn Mcdonough MD [facility] [id number]      Care Providers     Provider Role Specialty Primary office phone    Robyn Mcdonough MD Attending Provider Cardiology [phone]    Robyn Mcdonough MD Surgeon  Cardiology [phone]      Your Vitals Were     BP Pulse Temp Resp Height Weight    109/63 62 97.7 °F (36.5 °C) (Oral) 17 5' 7" (1.702 m) 90.5 kg (199 lb 8.3 oz)    SpO2 BMI             96% 31.25 kg/m2         Recent Lab Values        10/7/2014                          10:55 AM           A1C 5.7                       Allergies as of 3/13/2017        Reactions    Decongestant D [Pseudoephedrine-dm]     Atrial Fibrillation    Augmentin [Amoxicillin-pot Clavulanate]     palpitations      [facility]     [facility] Nurse Care Line - 24/7 Assistance  Unless otherwise directed by your provider, please contact [facility], our nurse care line that is available for 24/7 assistance.     Registered nurses in the [facility] provide clinical advisement, health education, appointment booking, and other advisory services.  Call for this free service at [phone].        Advance Directives     An advance directive is a document which, in the event you are no longer able to make decisions for yourself, tells your healthcare team what kind of treatment you do or do not want to receive, or who you would like " to make those decisions for you.  If you do not currently have an advance directive, [facility] encourages you to create one.  For more information call:  [phone]), [phone] ([phone]),  or log on to [email or web address].        Language Assistance Services     ATTENTION: Language assistance services are available, free of charge. Please call [phone].      ATENCIÓN: Si habla español, tiene a fong disposición servicios gratuitos de asistencia lingüística. Llame al [phone].     [facility] Ý: N?u b?n nói Ti?ng Vi?t, có các d?ch v? h? tr? ngôn ng? mi?n phí dành cho b?n. G?i s? [phone].        Eliquis Informaiton            [facility] complies with applicable Federal civil rights laws and does not discriminate on the basis of race, color, national origin, age, disability, or sex.

## 2017-03-10 NOTE — ED NOTES
Pt denies chest, SOB, diaphoresis, dizziness, tingling, nausea or any other symptoms other than knowing when she goes into a-fib.

## 2017-03-10 NOTE — ED PROVIDER NOTES
Encounter Date: 3/10/2017       History     Chief Complaint   Patient presents with    irregular heart rate     Pt reports feeling irregular heart beat in her throat and chest that started about an hour ago.     Review of patient's allergies indicates:   Allergen Reactions    Decongestant d [pseudoephedrine-dm]      Atrial Fibrillation    Augmentin [amoxicillin-pot clavulanate]      palpitations       HPI Comments: Patient complains of palpitations since about an hour.  She feels like her heart is radiating and she has a history of atrial fibrillation.  Patient takes her medication for atrial fibrillation and is compliant.  Had similar kind of episodes a few times in the past.  Patient denies any chest pain.  No shortness of breath.    The history is provided by the patient.     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 Dr Viktor Toure    Paroxysmal atrioventricular tachycardia     Plantar fasciitis     Right knee meniscal tear     Dr Mayfield, tx conservatively    Severe obesity (BMI 35.0-35.9 with comorbidity) 1/24/2017    Thyroid disease      Past Surgical History:   Procedure Laterality Date    APPENDECTOMY      HYSTERECTOMY      TAHBSO for fibroids    MANDIBLE SURGERY  2015    L mandible, Dr Toure    TONSILLECTOMY       Family History   Problem Relation Age of Onset    Cancer Mother      stomach, liver    Melanoma Neg Hx      Social History   Substance Use Topics    Smoking status: Never Smoker    Smokeless tobacco: Never Used    Alcohol use No     Review of Systems   Constitutional: Negative for activity change, appetite change, chills and fever.   HENT: Negative for congestion, rhinorrhea and sore  throat.    Eyes: Negative for pain, discharge, redness and itching.   Respiratory: Negative for cough, chest tightness, shortness of breath and wheezing.    Cardiovascular: Positive for palpitations. Negative for chest pain.   Gastrointestinal: Negative for abdominal distention, abdominal pain, constipation, diarrhea, nausea and vomiting.   Genitourinary: Negative for dysuria and frequency.   Musculoskeletal: Negative for back pain and neck pain.   Skin: Negative for pallor.   Neurological: Negative for light-headedness, numbness and headaches.   Psychiatric/Behavioral: The patient is not nervous/anxious.    All other systems reviewed and are negative.      Physical Exam   Initial Vitals   BP Pulse Resp Temp SpO2   03/10/17 1610 03/10/17 1610 03/10/17 1610 03/10/17 1610 03/10/17 1610   160/82 133 15 97.9 °F (36.6 °C) 100 %     Physical Exam    Nursing note and vitals reviewed.  Constitutional: She appears well-developed and well-nourished.   HENT:   Head: Normocephalic and atraumatic.   Right Ear: External ear normal.   Left Ear: External ear normal.   Nose: Nose normal.   Mouth/Throat: Oropharynx is clear and moist.   Eyes: Conjunctivae and EOM are normal. Pupils are equal, round, and reactive to light.   Neck: Normal range of motion. Neck supple.   Cardiovascular: Normal heart sounds and intact distal pulses. An irregularly irregular rhythm present. Tachycardia present.    Pulmonary/Chest: Breath sounds normal. No respiratory distress. She has no wheezes. She has no rales. She exhibits no tenderness.   Abdominal: Soft. Bowel sounds are normal. She exhibits no distension and no mass. There is no tenderness. There is no rebound and no guarding.   Musculoskeletal: Normal range of motion.   Neurological: She is alert and oriented to person, place, and time. She has normal strength and normal reflexes. She displays normal reflexes. No cranial nerve deficit or sensory deficit.         ED Course   Procedures  Labs  Reviewed   CBC W/ AUTO DIFFERENTIAL    Narrative:     PLEASE REVIEW ORDER START TIME BEFORE MARKING SPECIMEN  COLLECTED.   COMPREHENSIVE METABOLIC PANEL    Narrative:     PLEASE REVIEW ORDER START TIME BEFORE MARKING SPECIMEN  COLLECTED.   PROTIME-INR    Narrative:     PLEASE REVIEW ORDER START TIME BEFORE MARKING SPECIMEN  COLLECTED.   TROPONIN I    Narrative:     PLEASE REVIEW ORDER START TIME BEFORE MARKING SPECIMEN  COLLECTED.   TROPONIN I    Narrative:     PLEASE REVIEW ORDER START TIME BEFORE MARKING SPECIMEN  COLLECTED.   NT-PRO NATRIURETIC PEPTIDE    Narrative:     PLEASE REVIEW ORDER START TIME BEFORE MARKING SPECIMEN  COLLECTED.             Medical Decision Making:   Clinical Tests:   Lab Tests: Ordered and Reviewed  Radiological Study: Ordered and Reviewed  Medical Tests: Ordered and Reviewed                   ED Course     Clinical Impression:   The encounter diagnosis was Atrial fibrillation with rapid ventricular response.    Disposition:   Disposition: Admitted  Condition: Stable       Sara Soria MD  03/10/17 5165

## 2017-03-11 PROBLEM — I48.91 ATRIAL FIBRILLATION WITH RAPID VENTRICULAR RESPONSE: Status: RESOLVED | Noted: 2017-03-10 | Resolved: 2017-03-11

## 2017-03-11 PROBLEM — I48.91 ATRIAL FIBRILLATION WITH RAPID VENTRICULAR RESPONSE: Status: ACTIVE | Noted: 2017-03-11

## 2017-03-11 LAB
ANION GAP SERPL CALC-SCNC: 10 MMOL/L
BUN SERPL-MCNC: 11 MG/DL
CALCIUM SERPL-MCNC: 9.4 MG/DL
CHLORIDE SERPL-SCNC: 106 MMOL/L
CO2 SERPL-SCNC: 23 MMOL/L
CREAT SERPL-MCNC: 0.8 MG/DL
EST. GFR  (AFRICAN AMERICAN): >60 ML/MIN/1.73 M^2
EST. GFR  (NON AFRICAN AMERICAN): >60 ML/MIN/1.73 M^2
GLUCOSE SERPL-MCNC: 104 MG/DL
MAGNESIUM SERPL-MCNC: 2.3 MG/DL
POTASSIUM SERPL-SCNC: 4 MMOL/L
SODIUM SERPL-SCNC: 139 MMOL/L
T4 SERPL-MCNC: 7.3 UG/DL
TROPONIN I SERPL DL<=0.01 NG/ML-MCNC: 0.01 NG/ML
TSH SERPL DL<=0.005 MIU/L-ACNC: 1.82 UIU/ML

## 2017-03-11 PROCEDURE — 84436 ASSAY OF TOTAL THYROXINE: CPT

## 2017-03-11 PROCEDURE — 83735 ASSAY OF MAGNESIUM: CPT

## 2017-03-11 PROCEDURE — 80048 BASIC METABOLIC PNL TOTAL CA: CPT

## 2017-03-11 PROCEDURE — 25000003 PHARM REV CODE 250: Performed by: INTERNAL MEDICINE

## 2017-03-11 PROCEDURE — 94761 N-INVAS EAR/PLS OXIMETRY MLT: CPT

## 2017-03-11 PROCEDURE — 84443 ASSAY THYROID STIM HORMONE: CPT

## 2017-03-11 PROCEDURE — 63600175 PHARM REV CODE 636 W HCPCS: Performed by: INTERNAL MEDICINE

## 2017-03-11 PROCEDURE — 84484 ASSAY OF TROPONIN QUANT: CPT

## 2017-03-11 PROCEDURE — 80162 ASSAY OF DIGOXIN TOTAL: CPT

## 2017-03-11 PROCEDURE — 36415 COLL VENOUS BLD VENIPUNCTURE: CPT

## 2017-03-11 PROCEDURE — 11000001 HC ACUTE MED/SURG PRIVATE ROOM

## 2017-03-11 RX ORDER — ACETAMINOPHEN 325 MG/1
650 TABLET ORAL EVERY 6 HOURS PRN
Status: DISCONTINUED | OUTPATIENT
Start: 2017-03-11 | End: 2017-03-13 | Stop reason: HOSPADM

## 2017-03-11 RX ORDER — LEVOTHYROXINE SODIUM 25 UG/1
25 TABLET ORAL
Status: DISCONTINUED | OUTPATIENT
Start: 2017-03-11 | End: 2017-03-13 | Stop reason: HOSPADM

## 2017-03-11 RX ORDER — ACEBUTOLOL HYDROCHLORIDE 200 MG/1
200 CAPSULE ORAL DAILY
Status: DISCONTINUED | OUTPATIENT
Start: 2017-03-11 | End: 2017-03-13

## 2017-03-11 RX ORDER — DILTIAZEM HYDROCHLORIDE 180 MG/1
180 CAPSULE, COATED, EXTENDED RELEASE ORAL DAILY
Status: DISCONTINUED | OUTPATIENT
Start: 2017-03-11 | End: 2017-03-13 | Stop reason: HOSPADM

## 2017-03-11 RX ORDER — ONDANSETRON 2 MG/ML
4 INJECTION INTRAMUSCULAR; INTRAVENOUS EVERY 8 HOURS PRN
Status: DISCONTINUED | OUTPATIENT
Start: 2017-03-11 | End: 2017-03-13 | Stop reason: HOSPADM

## 2017-03-11 RX ORDER — DILTIAZEM HYDROCHLORIDE 180 MG/1
180 CAPSULE, COATED, EXTENDED RELEASE ORAL DAILY
Qty: 30 CAPSULE | Refills: 11 | Status: SHIPPED | OUTPATIENT
Start: 2017-03-11 | End: 2017-05-23 | Stop reason: SDUPTHER

## 2017-03-11 RX ORDER — ZOLPIDEM TARTRATE 5 MG/1
5 TABLET ORAL NIGHTLY PRN
Status: DISCONTINUED | OUTPATIENT
Start: 2017-03-11 | End: 2017-03-13 | Stop reason: HOSPADM

## 2017-03-11 RX ORDER — PRAVASTATIN SODIUM 40 MG/1
40 TABLET ORAL DAILY
Status: DISCONTINUED | OUTPATIENT
Start: 2017-03-11 | End: 2017-03-13 | Stop reason: HOSPADM

## 2017-03-11 RX ORDER — DILTIAZEM HCL 1 MG/ML
5 INJECTION, SOLUTION INTRAVENOUS
Status: DISCONTINUED | OUTPATIENT
Start: 2017-03-11 | End: 2017-03-11

## 2017-03-11 RX ORDER — DILTIAZEM HCL 1 MG/ML
5 INJECTION, SOLUTION INTRAVENOUS CONTINUOUS
Status: DISCONTINUED | OUTPATIENT
Start: 2017-03-11 | End: 2017-03-11

## 2017-03-11 RX ORDER — DIGOXIN 0.25 MG/ML
250 INJECTION INTRAMUSCULAR; INTRAVENOUS ONCE
Status: COMPLETED | OUTPATIENT
Start: 2017-03-11 | End: 2017-03-11

## 2017-03-11 RX ORDER — FAMOTIDINE 20 MG/1
20 TABLET, FILM COATED ORAL DAILY PRN
Status: DISCONTINUED | OUTPATIENT
Start: 2017-03-11 | End: 2017-03-13 | Stop reason: HOSPADM

## 2017-03-11 RX ORDER — DILTIAZEM HCL 1 MG/ML
5 INJECTION, SOLUTION INTRAVENOUS
Status: COMPLETED | OUTPATIENT
Start: 2017-03-11 | End: 2017-03-11

## 2017-03-11 RX ADMIN — DILTIAZEM HYDROCHLORIDE 180 MG: 180 CAPSULE, COATED, EXTENDED RELEASE ORAL at 10:03

## 2017-03-11 RX ADMIN — DIGOXIN 250 MCG: 250 INJECTION, SOLUTION INTRAMUSCULAR; INTRAVENOUS at 03:03

## 2017-03-11 RX ADMIN — Medication 12.5 MG/HR: at 02:03

## 2017-03-11 RX ADMIN — APIXABAN 5 MG: 5 TABLET, FILM COATED ORAL at 08:03

## 2017-03-11 RX ADMIN — PRAVASTATIN SODIUM 40 MG: 40 TABLET ORAL at 08:03

## 2017-03-11 RX ADMIN — ACEBUTOLOL HYDROCHLORIDE 200 MG: 200 CAPSULE ORAL at 08:03

## 2017-03-11 RX ADMIN — DIGOXIN 250 MCG: 0.25 INJECTION INTRAMUSCULAR; INTRAVENOUS at 08:03

## 2017-03-11 RX ADMIN — LEVOTHYROXINE SODIUM 25 MCG: 25 TABLET ORAL at 10:03

## 2017-03-11 RX ADMIN — APIXABAN 5 MG: 5 TABLET, FILM COATED ORAL at 10:03

## 2017-03-11 NOTE — H&P
Cardiology    *    SUBJECTIVE:     History of Present Illness:  Patient is a 66 y.o. female presents with paroxysmal Afib on Eliquis , well known to Dr. Mcdonough from outpatient setting presented yesterday evening with palpitations and the sensation of an abnormal rhythm.This began spontaneously as the patient was sitting in the car with her .  Has been hospitalized 3 times for Afib with RVR since 2014. No complaints of CP, SOB, lightheadedness, presyncope or syncope. States that at home BP runs 80s/50s in the AM to 120s/70s in the afternoon. No symptoms with lower BP. She wishes that she could convert back to SR and states that she is still a bit anxious and feels that her HR is irregular. Has never had EP eval for PVI per patient. No bleeding issues on eliquis. Patient states good compliance with medications. Avoids palpitations inducing substances, no tobacco etoh or illicit drugs.       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 Dr Viktor Toure    Paroxysmal atrioventricular tachycardia     Plantar fasciitis     Right knee meniscal tear     Dr Mayfield, tx conservatively    Severe obesity (BMI 35.0-35.9 with comorbidity) 1/24/2017    Thyroid disease      Past Surgical History:   Procedure Laterality Date    APPENDECTOMY      HYSTERECTOMY  1990    TAHBSO for fibroids    MANDIBLE SURGERY  2015    L mandible, Dr Toure    TONSILLECTOMY       Family History   Problem Relation Age of Onset    Cancer Mother       stomach, liver    Melanoma Neg Hx      Social History   Substance Use Topics    Smoking status: Never Smoker    Smokeless tobacco: Never Used    Alcohol use No        Home meds:  No current facility-administered medications on file prior to encounter.      Current Outpatient Prescriptions on File Prior to Encounter   Medication Sig Dispense Refill    acebutolol (SECTRAL) 200 MG capsule Take 1 capsule (200 mg total) by mouth once daily. 90 capsule 3    diltiaZEM (CARDIZEM CD) 120 MG Cp24 Take 120 mg by mouth once daily.  1    ELIQUIS 5 mg Tab Take 5 mg by mouth 2 (two) times daily.  1    FLUARIX QUAD 4621-7684, PF, 60 mcg (15 mcg x 4)/0.5 mL Syrg       fluocinonide (LIDEX) 0.05 % ointment   5    fluticasone (FLONASE) 50 mcg/actuation nasal spray 1 spray by Each Nare route once daily. 1 Bottle 3    levothyroxine (SYNTHROID) 25 MCG tablet Take 1 tablet (25 mcg total) by mouth once daily. 90 tablet 3    pravastatin (PRAVACHOL) 40 MG tablet Take 1 tablet (40 mg total) by mouth once daily. 90 tablet 3       Current meds:  Scheduled Meds:   acebutolol  200 mg Oral Daily    apixaban  5 mg Oral BID    diltiaZEM  180 mg Oral Daily    levothyroxine  25 mcg Oral Before breakfast    pravastatin  40 mg Oral Daily     Continuous Infusions:   PRN Meds:.      OBJECTIVE:     Vital Signs (Most Recent)  Temp: 98 °F (36.7 °C) (03/11/17 0515)  Pulse: 80 (03/11/17 0907)  Resp: (!) 37 (03/11/17 0907)  BP: (!) 159/65 (03/11/17 0907)  SpO2: 97 % (03/11/17 0907)    Vital Signs Range (Last 24H):  Temp:  [97.8 °F (36.6 °C)-98.3 °F (36.8 °C)]   Pulse:  []   Resp:  [6-37]   BP: ()/()   SpO2:  [92 %-100 %]     Physical Exam:  GEN: NAD , appears well but slightly anxious  HEENT: NCAT, EOMI  Neck: supple, no jvd, no cbs  Heart: irregularly irregular soft heart sounds, no m/r/g  Lungs: cta b/l no w/r/r/c  Abdomen: soft, nabs  Ext: R>>L non pitting edema consistent with lymphedema (pt states this is chronic)  :  def  MSK:def  Skin: def    Laboratory:  LABS  CBC    Recent Labs  Lab 03/10/17  1622   WBC 6.53   RBC 4.74   HGB 13.7   HCT 41.0      MCV 87   MCH 28.9   MCHC 33.4     BMP    Recent Labs  Lab 03/10/17  1622 03/11/17  0358    139   K 4.2 4.0   CO2 24 23    106   BUN 17 11   CREATININE 0.68 0.8    104         Recent Labs  Lab 03/10/17  1622 03/11/17  0358   CALCIUM 9.7 9.4       LFT    Recent Labs  Lab 03/10/17  1622   PROT 8.1   ALBUMIN 4.5   BILITOT 0.6   AST 20   ALKPHOS 99   ALT 20       COAGS    Recent Labs  Lab 03/10/17  1622   INR 1.2     CE    Recent Labs  Lab 03/10/17  1622 03/11/17  0358   TROPONINI <0.012  <0.012 0.008     BNP  No results for input(s): BNP in the last 168 hours.  Lipid panel:  Lab Results   Component Value Date    CHOL 150 01/23/2016    CHOL 152 11/29/2014    CHOL 144 10/07/2014     Lab Results   Component Value Date    HDL 48 01/23/2016    HDL 41 11/29/2014    HDL 43 10/07/2014     Lab Results   Component Value Date    LDLCALC 85.6 01/23/2016    LDLCALC 92.0 11/29/2014    LDLCALC 83.8 10/07/2014     Lab Results   Component Value Date    TRIG 82 01/23/2016    TRIG 95 11/29/2014    TRIG 86 10/07/2014     Lab Results   Component Value Date    CHOLHDL 32.0 01/23/2016    CHOLHDL 27.0 11/29/2014    CHOLHDL 29.9 10/07/2014     Diagnostic Results:  Tele: afib with CVR      Chart review:  Echo: per 2014 recs: NLVSF and mild LA enlargement  ASSESSMENT/PLAN:     1. Paroxysmal Afib with RVR-etiology of exacerbation uncertain; currently rate controlled and on eliquis  2. Hypothyroidism  3. Chronic LE lymphedema  4. Paroxysmal Atrial tachycardia  5. VTE ppx: eliquis    Plan:    As d/w Dr. Mcdonough: will place patient on Cardizem 180 CD daily (increased from 120) and c/w bb. C/w other home meds.  Ok to check TFTs and Mag level.  No need for 2DECHO at this time  Can be d/c home later today if stable; Dr. Mcdonough will see in her clinic on 3/16/17  Ok for diet  now      Rakan Espinoza MD  [facility]  [phone] (m)  [phone] (p)

## 2017-03-11 NOTE — ED NOTES
Patient is resting comfortably with  at bedside. Needs attended. In no acute distress at this time. Will continue to monitor

## 2017-03-11 NOTE — PLAN OF CARE
03/11/17 1241   Discharge Assessment   Assessment Type Discharge Planning Assessment   Confirmed/corrected address and phone number on facesheet? Yes   Assessment information obtained from? Patient   Prior to hospitilization cognitive status: Alert/Oriented   Prior to hospitalization functional status: Independent   Current cognitive status: Alert/Oriented   Current Functional Status: Independent   Arrived From [facility]  ([facility])   Lives With spouse   Is patient able to care for self after discharge? Yes   How many people do you have in your home that can help with your care after discharge? >4   Who are your caregiver(s) and their phone number(s)? Dorian Fuentes()[phone]   Patient's perception of discharge disposition home or selfcare   Readmission Within The Last 30 Days no previous admission in last 30 days   Patient currently being followed by outpatient case management? No   Patient currently receives home health services? No   Does the patient currently use HME? No   Patient currently receives private duty nursing? No   Patient currently receives any other outside agency services? No   Equipment Currently Used at Home none   Do you have any problems affording any of your prescribed medications? No   Is the patient taking medications as prescribed? yes   Do you have any financial concerns preventing you from receiving the healthcare you need? No   Does the patient have transportation to healthcare appointments? Yes   Transportation Available car;family or friend will provide   On Dialysis? No   Does the patient receive services at the Coumadin Clinic? No   Are there any open cases? No   Discharge Plan A Home with family   Discharge Plan B Home Health   Patient/Family In Agreement With Plan yes

## 2017-03-11 NOTE — PLAN OF CARE
Spoke with Dr Espinoza about increased heart rate while ambulating and feeling of palpitations, waiting for new orders will continue to monitor

## 2017-03-12 LAB — DIGOXIN SERPL-MCNC: 1.4 NG/ML

## 2017-03-12 PROCEDURE — 93010 ELECTROCARDIOGRAM REPORT: CPT | Mod: ,,, | Performed by: INTERNAL MEDICINE

## 2017-03-12 PROCEDURE — 11000001 HC ACUTE MED/SURG PRIVATE ROOM

## 2017-03-12 PROCEDURE — 93005 ELECTROCARDIOGRAM TRACING: CPT

## 2017-03-12 PROCEDURE — 25000003 PHARM REV CODE 250: Performed by: INTERNAL MEDICINE

## 2017-03-12 RX ADMIN — PRAVASTATIN SODIUM 40 MG: 40 TABLET ORAL at 11:03

## 2017-03-12 RX ADMIN — DRONEDARONE 400 MG: 400 TABLET, FILM COATED ORAL at 11:03

## 2017-03-12 RX ADMIN — DILTIAZEM HYDROCHLORIDE 180 MG: 180 CAPSULE, COATED, EXTENDED RELEASE ORAL at 09:03

## 2017-03-12 RX ADMIN — APIXABAN 5 MG: 5 TABLET, FILM COATED ORAL at 11:03

## 2017-03-12 RX ADMIN — DRONEDARONE 400 MG: 400 TABLET, FILM COATED ORAL at 04:03

## 2017-03-12 RX ADMIN — LEVOTHYROXINE SODIUM 25 MCG: 25 TABLET ORAL at 06:03

## 2017-03-12 RX ADMIN — APIXABAN 5 MG: 5 TABLET, FILM COATED ORAL at 08:03

## 2017-03-12 NOTE — PLAN OF CARE
Pharmacy called to say that they do not carry acebutolol and that it would have to be ordered. Dr Mcdonough notified-Okay for patient to take from her home supply.

## 2017-03-12 NOTE — PROGRESS NOTES
Cardiology    SUBJECTIVE:     NAEON. [id number] on tele overnight however this morning when patient sat at the edge of bed and made a motion to rise up and go to use the restroom her HR increased to 130s-140s (in Afib) and she feel lightheadedness and palpitations. She is nervous about being discharged in this condition. Currently asymptomatic. Patient received total of 500 mcg IV of digoxin yesterday, dig level noted from yesterday evening.       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, l mandible, 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 Dr Viktor Toure    Paroxysmal atrioventricular tachycardia     Plantar fasciitis     Right knee meniscal tear     Dr Mayfield, tx conservatively    Severe obesity (BMI 35.0-35.9 with comorbidity) 1/24/2017    Thyroid disease      Past Surgical History:   Procedure Laterality Date    APPENDECTOMY      HYSTERECTOMY  1990    TAHBSO for fibroids    MANDIBLE SURGERY  2015    L mandible, Dr Toure    TONSILLECTOMY       Family History   Problem Relation Age of Onset    Cancer Mother      stomach, liver    Melanoma Neg Hx      Social History   Substance Use Topics    Smoking status: Never Smoker    Smokeless tobacco: Never Used    Alcohol use No        Home meds:  No current facility-administered medications on file prior to encounter.      Current Outpatient Prescriptions on File Prior to Encounter   Medication Sig Dispense Refill    acebutolol (SECTRAL) 200 MG capsule Take 1 capsule (200 mg total) by mouth once daily. 90 capsule 3     ELIQUIS 5 mg Tab Take 5 mg by mouth 2 (two) times daily.  1    FLUARIX QUAD 3763-4167, PF, 60 mcg (15 mcg x 4)/0.5 mL Syrg       fluocinonide (LIDEX) 0.05 % ointment   5    fluticasone (FLONASE) 50 mcg/actuation nasal spray 1 spray by Each Nare route once daily. 1 Bottle 3    levothyroxine (SYNTHROID) 25 MCG tablet Take 1 tablet (25 mcg total) by mouth once daily. 90 tablet 3    pravastatin (PRAVACHOL) 40 MG tablet Take 1 tablet (40 mg total) by mouth once daily. 90 tablet 3       Current meds:  Scheduled Meds:   acebutolol  200 mg Oral Daily    apixaban  5 mg Oral BID    diltiaZEM  180 mg Oral Daily    levothyroxine  25 mcg Oral Before breakfast    pravastatin  40 mg Oral Daily     Continuous Infusions:   PRN Meds:.acetaminophen, famotidine, ondansetron, zolpidem      OBJECTIVE:     Vital Signs (Most Recent)  Temp: 97.8 °F (36.6 °C) (03/12/17 0740)  Pulse: 87 (03/12/17 0740)  Resp: 18 (03/12/17 0740)  BP: 109/68 (03/12/17 0740)  SpO2: 95 % (03/11/17 1730)    Vital Signs Range (Last 24H):  Temp:  [97.5 °F (36.4 °C)-98 °F (36.7 °C)]   Pulse:  []   Resp:  [13-37]   BP: ()/(55-83)   SpO2:  [87 %-97 %]     Physical Exam:  GEN: NAD , appears well but slightly anxious  HEENT: NCAT, EOMI  Neck: supple, no jvd, no cbs  Heart: irregularly irregular soft heart sounds, no m/r/g  Lungs: cta b/l no w/r/r/c  Abdomen: soft, nabs  Ext: R>>L non pitting edema consistent with lymphedema (pt states this is chronic)  : def  MSK:def  Skin: def    Laboratory:  LABS  CBC    Recent Labs  Lab 03/10/17  1622   WBC 6.53   RBC 4.74   HGB 13.7   HCT 41.0      MCV 87   MCH 28.9   MCHC 33.4     BMP    Recent Labs  Lab 03/10/17  1622 03/11/17  0358    139   K 4.2 4.0   CO2 24 23    106   BUN 17 11   CREATININE 0.68 0.8    104         Recent Labs  Lab 03/10/17  1622 03/11/17  0358 03/11/17  1000   CALCIUM 9.7 9.4  --    MG  --   --  2.3       LFT    Recent Labs  Lab 03/10/17  1622   PROT 8.1    ALBUMIN 4.5   BILITOT 0.6   AST 20   ALKPHOS 99   ALT 20       COAGS    Recent Labs  Lab 03/10/17  1622   INR 1.2     CE    Recent Labs  Lab 03/10/17  1622 03/11/17  0358   TROPONINI <0.012  <0.012 0.008     BNP  No results for input(s): BNP in the last 168 hours.  Lipid panel:  Lab Results   Component Value Date    CHOL 150 01/23/2016    CHOL 152 11/29/2014    CHOL 144 10/07/2014     Lab Results   Component Value Date    HDL 48 01/23/2016    HDL 41 11/29/2014    HDL 43 10/07/2014     Lab Results   Component Value Date    LDLCALC 85.6 01/23/2016    LDLCALC 92.0 11/29/2014    LDLCALC 83.8 10/07/2014     Lab Results   Component Value Date    TRIG 82 01/23/2016    TRIG 95 11/29/2014    TRIG 86 10/07/2014     Lab Results   Component Value Date    CHOLHDL 32.0 01/23/2016    CHOLHDL 27.0 11/29/2014    CHOLHDL 29.9 10/07/2014       ASSESSMENT/PLAN:     1. Paroxysmal Afib with RVR-etiology of exacerbation uncertain; currently rate controlled and on eliquis. Rate is not controlled with ambulation. She does not meet appropriate RACE II criteria with even minimal exertion.   2. Hypothyroidism-TFT wnl  3. Chronic LE lymphedema-stable  4. Paroxysmal Atrial tachycardia-inactive  5. VTE ppx: eliquis     Plan:     Patient is now s/p cardizem gtt (weaned off), Oral increased dose of cardizem, home dose of BB and 2 doses of 250 mcg IV digoxin. She is still in Afib and with minimal exertion develops RVR + symptoms as above.    D/W Dr. Mcdonough and patient; will try Dronedarone 400 mg BID (AADT) for rhythm control.    -per attending DCCV will unlikely work in the long term  -C/w current medications.  -may consider repeat ECHO this admission or soon as outpatient, she may need an o/p EP referral to be considered for PVI and Afib ablation if AADT doesn't work.       Dispo: possible D/C tomorrow if stable and asymptomatic       Rakan Espinoza MD  [facility]  [phone] (m)  [phone] (p)      Staff: Dr. Najera

## 2017-03-12 NOTE — PLAN OF CARE
Transferred to room 230 report given to Jackson DORADO, no s/s of acute distress, no c/o pain/discomfort

## 2017-03-12 NOTE — PLAN OF CARE
Pt lying in bed, family at bedside. No acute distress noted. Pt remains on tele, afib in 70s -90s predominately but with occasional increases to 110's. Bed alarm on, call light in reach, fall precautions in place. Report given to oncoming nurse.

## 2017-03-13 ENCOUNTER — ANESTHESIA (OUTPATIENT)
Dept: SURGERY | Facility: HOSPITAL | Age: 67
DRG: 310 | End: 2017-03-13
Payer: MEDICARE

## 2017-03-13 ENCOUNTER — ANESTHESIA EVENT (OUTPATIENT)
Dept: SURGERY | Facility: HOSPITAL | Age: 67
DRG: 310 | End: 2017-03-13
Payer: MEDICARE

## 2017-03-13 ENCOUNTER — SURGERY (OUTPATIENT)
Age: 67
End: 2017-03-13

## 2017-03-13 VITALS — RESPIRATION RATE: 6 BRPM

## 2017-03-13 VITALS
WEIGHT: 199.5 LBS | HEIGHT: 67 IN | DIASTOLIC BLOOD PRESSURE: 63 MMHG | OXYGEN SATURATION: 96 % | BODY MASS INDEX: 31.31 KG/M2 | TEMPERATURE: 98 F | RESPIRATION RATE: 17 BRPM | SYSTOLIC BLOOD PRESSURE: 109 MMHG | HEART RATE: 62 BPM

## 2017-03-13 PROBLEM — I48.91 ATRIAL FIBRILLATION WITH RAPID VENTRICULAR RESPONSE: Status: RESOLVED | Noted: 2017-03-11 | Resolved: 2017-03-13

## 2017-03-13 PROCEDURE — 63600175 PHARM REV CODE 636 W HCPCS: Performed by: NURSE ANESTHETIST, CERTIFIED REGISTERED

## 2017-03-13 PROCEDURE — 92960 CARDIOVERSION ELECTRIC EXT: CPT

## 2017-03-13 PROCEDURE — 37000008 HC ANESTHESIA 1ST 15 MINUTES: Performed by: INTERNAL MEDICINE

## 2017-03-13 PROCEDURE — 5A2204Z RESTORATION OF CARDIAC RHYTHM, SINGLE: ICD-10-PCS | Performed by: INTERNAL MEDICINE

## 2017-03-13 PROCEDURE — 25000003 PHARM REV CODE 250: Performed by: INTERNAL MEDICINE

## 2017-03-13 PROCEDURE — 25000003 PHARM REV CODE 250: Performed by: NURSE ANESTHETIST, CERTIFIED REGISTERED

## 2017-03-13 PROCEDURE — 93005 ELECTROCARDIOGRAM TRACING: CPT

## 2017-03-13 PROCEDURE — 71000033 HC RECOVERY, INTIAL HOUR: Performed by: INTERNAL MEDICINE

## 2017-03-13 RX ORDER — PROPOFOL 10 MG/ML
VIAL (ML) INTRAVENOUS
Status: DISCONTINUED | OUTPATIENT
Start: 2017-03-13 | End: 2017-03-13

## 2017-03-13 RX ORDER — LIDOCAINE HCL/PF 100 MG/5ML
SYRINGE (ML) INTRAVENOUS
Status: DISCONTINUED | OUTPATIENT
Start: 2017-03-13 | End: 2017-03-13

## 2017-03-13 RX ORDER — SODIUM CHLORIDE 9 MG/ML
INJECTION, SOLUTION INTRAVENOUS CONTINUOUS PRN
Status: DISCONTINUED | OUTPATIENT
Start: 2017-03-13 | End: 2017-03-13

## 2017-03-13 RX ADMIN — PRAVASTATIN SODIUM 40 MG: 40 TABLET ORAL at 12:03

## 2017-03-13 RX ADMIN — LIDOCAINE HYDROCHLORIDE 50 MG: 20 INJECTION, SOLUTION INTRAVENOUS at 11:03

## 2017-03-13 RX ADMIN — DRONEDARONE 400 MG: 400 TABLET, FILM COATED ORAL at 08:03

## 2017-03-13 RX ADMIN — APIXABAN 5 MG: 5 TABLET, FILM COATED ORAL at 12:03

## 2017-03-13 RX ADMIN — DILTIAZEM HYDROCHLORIDE 180 MG: 180 CAPSULE, COATED, EXTENDED RELEASE ORAL at 12:03

## 2017-03-13 RX ADMIN — SODIUM CHLORIDE: 0.9 INJECTION, SOLUTION INTRAVENOUS at 11:03

## 2017-03-13 RX ADMIN — LEVOTHYROXINE SODIUM 25 MCG: 25 TABLET ORAL at 06:03

## 2017-03-13 RX ADMIN — PROPOFOL 120 MG: 10 INJECTION, EMULSION INTRAVENOUS at 11:03

## 2017-03-13 NOTE — PLAN OF CARE
Went over discharge instructions with the patient, verbalized understanding. Removed IV, tip intact. Removed tele, no ectopy

## 2017-03-13 NOTE — PLAN OF CARE
Pt was cardioverted today and Dr. Mcdonough request follow up in 2 weeks and plans to discharge home later today if remains stable. Follow up  appt scheduled for 3/27/17 @ 9:45. Pt's spouse in room and will provide transportation home when discharge orders written. Pt voices no other discharge needs.     03/13/17 1244   Final Note   Assessment Type Final Discharge Note   Discharge Disposition Home   Discharge planning education complete? Yes   What phone number can be called within the next 1-3 days to see how you are doing after discharge? [phone]   Hospital Follow Up  Appt(s) scheduled? (TN left message for follow up with DR. Mcdonough in 2 weeks)   Offered [facility] -- Bedside Delivery? n/a   Discharge/Hospital Encounter Summary to (non-[facility]) PCP n/a   Referral to Outpatient Case Management complete? n/a   Referral to / orders for Home Health Complete? n/a   30 day supply of medicines given at discharge, if documented non-compliance / non-adherence? n/a   Any social issues identified prior to discharge? No   Did you assess the readiness or willingness of the family or caregiver to support self management of care? Yes   Right Care Referral Info   Post Acute Recommendation No Care

## 2017-03-13 NOTE — PLAN OF CARE
Meets criteria to return to room  Rouses easily and spontaneously. VSS.  Heart rate Sinus rhythm. O2 sat 96-97% on room air. See Flow sheet.

## 2017-03-13 NOTE — TRANSFER OF CARE
"Anesthesia Transfer of Care Note    Patient: Flores Fuentes    Procedure(s) Performed: Procedure(s) (LRB):  CARDIOVERSION (N/A)    Patient location: PACU    Anesthesia Type: MAC    Transport from OR: Transported from OR on room air with adequate spontaneous ventilation    Post pain: adequate analgesia    Post assessment: no apparent anesthetic complications    Post vital signs: stable    Level of consciousness: awake, alert and oriented    Nausea/Vomiting: no nausea/vomiting    Complications: none          Last vitals:   Visit Vitals    /72    Pulse (!) 126    Temp 36.5 °C (97.7 °F) (Oral)    Resp 15    Ht 5' 7" (1.702 m)    Wt 90.5 kg (199 lb 8.3 oz)    SpO2 98%    Breastfeeding No    BMI 31.25 kg/m2     "

## 2017-03-13 NOTE — PLAN OF CARE
Pt lying in bed, no acute distress noted. Family at bedside. Pt remains on tele, afib in 70s-90s while in bed and 120-140 when getting up for restroom. Bed low, rails up x 2, call light in reach. Report given to oncoming nurse.

## 2017-03-13 NOTE — ANESTHESIA POSTPROCEDURE EVALUATION
"Anesthesia Post Evaluation    Patient: Flores Fuentes    Procedure(s) Performed: Procedure(s) (LRB):  CARDIOVERSION (N/A)    Final Anesthesia Type: MAC  Patient location during evaluation: PACU  Patient participation: Yes- Able to Participate  Level of consciousness: awake and alert  Post-procedure vital signs: reviewed and stable  Pain management: adequate  Airway patency: patent  PONV status at discharge: No PONV  Anesthetic complications: no      Cardiovascular status: blood pressure returned to baseline  Respiratory status: unassisted, room air and spontaneous ventilation  Hydration status: euvolemic  Follow-up not needed.        Visit Vitals    /63    Pulse 62    Temp 36.5 °C (97.7 °F) (Oral)    Resp 17    Ht 5' 7" (1.702 m)    Wt 90.5 kg (199 lb 8.3 oz)    SpO2 96%    Breastfeeding No    BMI 31.25 kg/m2       Pain/Dalia Score: Pain Assessment Performed: Yes (3/13/2017  8:00 AM)  Presence of Pain: denies (3/13/2017  8:00 AM)  Dalia Score: 10 (3/13/2017 12:00 PM)      "

## 2017-03-13 NOTE — ANESTHESIA PREPROCEDURE EVALUATION
03/13/2017  Flores Fuentes is a 66 y.o., female with PMH paroxsymal afib on Eliquis here for PERCIO cardioversion.    [facility] Anesthesia Evaluation    I have reviewed the Patient Summary Reports.    I have reviewed the Nursing Notes.   I have reviewed the Medications.     Review of Systems  Anesthesia Hx:  No problems with previous Anesthesia Denies Hx of Anesthetic complications  History of prior surgery of interest to airway management or planning: Previous anesthesia: General Denies Family Hx of Anesthesia complications.   Denies Personal Hx of Anesthesia complications.   Social:  Non-Smoker, No Alcohol Use    Hematology/Oncology:  Hematology Normal   Oncology Normal     EENT/Dental:EENT/Dental Normal   Cardiovascular:   Exercise tolerance: good Valvular problems/Murmurs Dysrhythmias atrial fibrillation hyperlipidemia    Pulmonary:  Pulmonary Normal    Renal/:  Renal/ Normal     Hepatic/GI:  Hepatic/GI Normal    Musculoskeletal:   Arthritis     Neurological:   Neuromuscular Disease,    Endocrine:   Hypothyroidism    Dermatological:  Skin Normal    Psych:  Psychiatric Normal           Physical Exam  General:  Well nourished    Airway/Jaw/Neck:  Airway Findings: Mouth Opening: Normal Mallampati: III  Improves to II with phonation.  TM Distance: Normal, at least 6 cm         Dental:  DENTAL FINDINGS: Normal   Chest/Lungs:  Chest/Lungs Findings: Clear to auscultation, Normal Respiratory Rate     Heart/Vascular:  Heart Findings: Rate: Normal  Rhythm: Irregularly Irregular  Sounds: Normal      Musculoskeletal:  Musculoskeletal Findings: Normal         Anesthesia Plan  Type of Anesthesia, risks & benefits discussed:  Anesthesia Type:  MAC, general  Patient's Preference:   Intra-op Monitoring Plan:   Intra-op Monitoring Plan Comments:   Post Op Pain Control Plan:   Post Op Pain Control Plan Comments:    Induction:    Beta Blocker:  Patient is on a Beta-Blocker and has received one dose within the past 24 hours (No further documentation required).       Informed Consent: Patient understands risks and agrees with Anesthesia plan.  Questions answered. Anesthesia consent signed with patient.  ASA Score: 3     Day of Surgery Review of History & Physical: I have interviewed and examined the patient. I have reviewed the patient's H&P dated:  There are no significant changes.  H&P update referred to the provider.         Ready For Surgery From Anesthesia Perspective.

## 2017-03-13 NOTE — PROCEDURES
Procedure: DCCV w/ 200 J biphasic x1 under MAC  Attending: Dr. SHAISTA Mcdonough  Fellow: Dr. LLOYD Espinoza    Patient underwent successful DCCV w/ 200 J biphasic x1 under MAC for Afib with RVR and was converted to SR. Post op ECG performed. Patient tolerated the procedure well without complications.    Likely discharge home later today on cardizem and multaqu

## 2017-03-13 NOTE — PLAN OF CARE
Did assessment on patient. Went over plan of care. Bed in low position, call light in reach.Saftey measure taken. Spouse at the bedside

## 2017-03-13 NOTE — DISCHARGE SUMMARY
Cardiology      SUBJECTIVE:     Patient was admitted with Afib w/ RVR a/w lightheadedness and palpitations. After attempts to control her rate with IV digoxin CCB and PO CCB, PO BB were inadequate it was decided to attempt DCCV which the patient agreed to. The procedure was successful and patient is being discharged in stable condition after being appropriately monitored after anesthesia.        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, l mandible, 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 Dr Viktor Toure    Paroxysmal atrioventricular tachycardia     Plantar fasciitis     Right knee meniscal tear     Dr Mayfield, tx conservatively    Severe obesity (BMI 35.0-35.9 with comorbidity) 1/24/2017    Thyroid disease      Past Surgical History:   Procedure Laterality Date    APPENDECTOMY      HYSTERECTOMY  1990    TAHBSO for fibroids    MANDIBLE SURGERY  2015    L mandible, Dr Toure    TONSILLECTOMY       Family History   Problem Relation Age of Onset    Cancer Mother      stomach, liver    Melanoma Neg Hx      Social History   Substance Use Topics    Smoking status: Never Smoker    Smokeless tobacco: Never Used    Alcohol use No        Home meds:  No current facility-administered medications on file prior to encounter.      Current Outpatient Prescriptions on File Prior to Encounter   Medication Sig Dispense Refill    ELIQUIS 5 mg Tab Take 5 mg by mouth 2 (two) times daily.  1    FLUARIX QUAD 3374-2235, PF, 60 mcg (15 mcg x 4)/0.5 mL Syrg       fluocinonide (LIDEX) 0.05 %  ointment   5    fluticasone (FLONASE) 50 mcg/actuation nasal spray 1 spray by Each Nare route once daily. 1 Bottle 3    levothyroxine (SYNTHROID) 25 MCG tablet Take 1 tablet (25 mcg total) by mouth once daily. 90 tablet 3    pravastatin (PRAVACHOL) 40 MG tablet Take 1 tablet (40 mg total) by mouth once daily. 90 tablet 3    [DISCONTINUED] acebutolol (SECTRAL) 200 MG capsule Take 1 capsule (200 mg total) by mouth once daily. 90 capsule 3       Current meds:  Scheduled Meds:   apixaban  5 mg Oral BID    diltiaZEM  180 mg Oral Daily    dronedarone  400 mg Oral BID WM    levothyroxine  25 mcg Oral Before breakfast    pravastatin  40 mg Oral Daily     Continuous Infusions:   PRN Meds:.acetaminophen, famotidine, ondansetron, zolpidem      OBJECTIVE:     Vital Signs (Most Recent)  Temp: 97.7 °F (36.5 °C) (03/13/17 0800)  Pulse: 62 (03/13/17 1200)  Resp: 17 (03/13/17 1200)  BP: 109/63 (03/13/17 1200)  SpO2: 96 % (03/13/17 1200)    Vital Signs Range (Last 24H):  Temp:  [97.7 °F (36.5 °C)-98 °F (36.7 °C)]   Pulse:  []   Resp:  [6-18]   BP: ()/(57-73)   SpO2:  [95 %-98 %]     Physical Exam:  GEN:nad  Heart: s1,s2 rrr, no m/r/g  Lungs: cta b/l      Laboratory:  LABS  CBC    Recent Labs  Lab 03/10/17  1622   WBC 6.53   RBC 4.74   HGB 13.7   HCT 41.0      MCV 87   MCH 28.9   MCHC 33.4     BMP    Recent Labs  Lab 03/10/17  1622 03/11/17  0358    139   K 4.2 4.0   CO2 24 23    106   BUN 17 11   CREATININE 0.68 0.8    104         Recent Labs  Lab 03/10/17  1622 03/11/17  0358 03/11/17  1000   CALCIUM 9.7 9.4  --    MG  --   --  2.3       LFT    Recent Labs  Lab 03/10/17  1622   PROT 8.1   ALBUMIN 4.5   BILITOT 0.6   AST 20   ALKPHOS 99   ALT 20       COAGS    Recent Labs  Lab 03/10/17  1622   INR 1.2     CE    Recent Labs  Lab 03/10/17  1622 03/11/17  0358   TROPONINI <0.012  <0.012 0.008     BNP  No results for input(s): BNP in the last 168 hours.  Lipid panel:  Lab Results    Component Value Date    CHOL 150 01/23/2016    CHOL 152 11/29/2014    CHOL 144 10/07/2014     Lab Results   Component Value Date    HDL 48 01/23/2016    HDL 41 11/29/2014    HDL 43 10/07/2014     Lab Results   Component Value Date    LDLCALC 85.6 01/23/2016    LDLCALC 92.0 11/29/2014    LDLCALC 83.8 10/07/2014     Lab Results   Component Value Date    TRIG 82 01/23/2016    TRIG 95 11/29/2014    TRIG 86 10/07/2014     Lab Results   Component Value Date    CHOLHDL 32.0 01/23/2016    CHOLHDL 27.0 11/29/2014    CHOLHDL 29.9 10/07/2014     ASSESSMENT/PLAN:   1. Afib with RVR on eliquis s/p DCCV w/ 200J under MAC-converted to SR and stable    Plan:    C/w Multaq, Cardizem CD and other medications as ordered  F/u with Dr. Mcdonough on 3/27/17  Get 2DECHO as outpatient  Recommending EP referral as outpatient for PVI consultation     All questions and concerns addressed with patient and her  at bedside.     Rakan Espinoza MD  [facility]  [phone] (m)  [phone] (p)

## 2017-03-13 NOTE — PROGRESS NOTES
Cardiology      SUBJECTIVE:     History of Present Illness:  JULITA, states she woke up a few times and felt uncomfortable palpitations. Tele shows Afib predominantly 80s-low 100s at rest. Patient has been kept NPO since last night and wants to have DCCV today.      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, l mandible, 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 Dr Viktor Toure    Paroxysmal atrioventricular tachycardia     Plantar fasciitis     Right knee meniscal tear     Dr Mayfield, tx conservatively    Severe obesity (BMI 35.0-35.9 with comorbidity) 1/24/2017    Thyroid disease      Past Surgical History:   Procedure Laterality Date    APPENDECTOMY      HYSTERECTOMY  1990    TAHBSO for fibroids    MANDIBLE SURGERY  2015    L mandible, Dr Toure    TONSILLECTOMY       Family History   Problem Relation Age of Onset    Cancer Mother      stomach, liver    Melanoma Neg Hx      Social History   Substance Use Topics    Smoking status: Never Smoker    Smokeless tobacco: Never Used    Alcohol use No        Home meds:  No current facility-administered medications on file prior to encounter.      Current Outpatient Prescriptions on File Prior to Encounter   Medication Sig Dispense Refill    acebutolol (SECTRAL) 200 MG capsule Take 1 capsule (200 mg total) by mouth once daily. 90 capsule 3    ELIQUIS 5 mg Tab Take 5 mg by mouth 2 (two) times daily.  1    FLUARIX QUAD 4783-0931, PF, 60 mcg (15 mcg x 4)/0.5 mL Syrg       fluocinonide (LIDEX) 0.05 % ointment   5    fluticasone  (FLONASE) 50 mcg/actuation nasal spray 1 spray by Each Nare route once daily. 1 Bottle 3    levothyroxine (SYNTHROID) 25 MCG tablet Take 1 tablet (25 mcg total) by mouth once daily. 90 tablet 3    pravastatin (PRAVACHOL) 40 MG tablet Take 1 tablet (40 mg total) by mouth once daily. 90 tablet 3       Current meds:  Scheduled Meds:   acebutolol  200 mg Oral Daily    apixaban  5 mg Oral BID    diltiaZEM  180 mg Oral Daily    dronedarone  400 mg Oral BID WM    levothyroxine  25 mcg Oral Before breakfast    pravastatin  40 mg Oral Daily     Continuous Infusions:   PRN Meds:.acetaminophen, famotidine, ondansetron, zolpidem      OBJECTIVE:     Vital Signs (Most Recent)  Temp: 97.9 °F (36.6 °C) (03/13/17 0530)  Pulse: 67 (03/13/17 0530)  Resp: 18 (03/13/17 0530)  BP: 112/65 (03/13/17 0530)  SpO2: 95 % (03/11/17 1730)    Vital Signs Range (Last 24H):  Temp:  [97.5 °F (36.4 °C)-98 °F (36.7 °C)]   Pulse:  []   Resp:  [18]   BP: (103-112)/(57-65)     Physical Exam:  GEN: NAD , appears well but slightly anxious  HEENT: NCAT, EOMI  Neck: supple, no jvd, no cbs  Heart: irregularly irregular soft heart sounds, no m/r/g  Lungs: cta b/l no w/r/r/c  Abdomen: soft, nabs  Ext: R>>L non pitting edema consistent with lymphedema (pt states this is chronic)  : def  MSK:def  Skin: def    Laboratory:  LABS  CBC    Recent Labs  Lab 03/10/17  1622   WBC 6.53   RBC 4.74   HGB 13.7   HCT 41.0      MCV 87   MCH 28.9   MCHC 33.4     BMP    Recent Labs  Lab 03/10/17  1622 03/11/17  0358    139   K 4.2 4.0   CO2 24 23    106   BUN 17 11   CREATININE 0.68 0.8    104         Recent Labs  Lab 03/10/17  1622 03/11/17  0358 03/11/17  1000   CALCIUM 9.7 9.4  --    MG  --   --  2.3       LFT    Recent Labs  Lab 03/10/17  1622   PROT 8.1   ALBUMIN 4.5   BILITOT 0.6   AST 20   ALKPHOS 99   ALT 20       COAGS    Recent Labs  Lab 03/10/17  1622   INR 1.2     CE    Recent Labs  Lab 03/10/17  1622 03/11/17  0356    TROPONINI <0.012  <0.012 0.008     BNP  No results for input(s): BNP in the last 168 hours.  Lipid panel:  Lab Results   Component Value Date    CHOL 150 01/23/2016    CHOL 152 11/29/2014    CHOL 144 10/07/2014     Lab Results   Component Value Date    HDL 48 01/23/2016    HDL 41 11/29/2014    HDL 43 10/07/2014     Lab Results   Component Value Date    LDLCALC 85.6 01/23/2016    LDLCALC 92.0 11/29/2014    LDLCALC 83.8 10/07/2014     Lab Results   Component Value Date    TRIG 82 01/23/2016    TRIG 95 11/29/2014    TRIG 86 10/07/2014     Lab Results   Component Value Date    CHOLHDL 32.0 01/23/2016    CHOLHDL 27.0 11/29/2014    CHOLHDL 29.9 10/07/2014       ASSESSMENT/PLAN:     Plan:      Patient is s/p cardizem gtt (weaned off), Oral increased dose of cardizem, home dose of BB and 2 doses of 250 mcg IV digoxin. Started on Multaq 3/12/17, still in Afib.      Consented for DCCV (no PERICO required given duration on anticoagulation) this AM. All risks/benefits explained.   Will cardiovert initially with 120J under MAC. Will discuss outpatient medication regiment with attending post cardioversion.     -may consider repeat ECHO this admission or soon as outpatient, she may need an o/p EP referral to be considered for PVI and Afib ablation if AADT doesn't work.         Dispo: possible D/C later today if stable and asymptomatic       Rakan Espinoza MD  [facility]  [phone] (m)  [phone] (p)

## 2017-03-14 ENCOUNTER — PATIENT OUTREACH (OUTPATIENT)
Dept: ADMINISTRATIVE | Facility: CLINIC | Age: 67
End: 2017-03-14
Payer: MEDICARE

## 2017-03-14 NOTE — PATIENT INSTRUCTIONS
Discharge Instructions for Atrial Fibrillation  You have been diagnosed with an abnormal heart rhythm called atrial fibrillation. With this condition, your hearts 2 upper chambers quiver rather than squeeze the blood out in a normal pattern. This leads to an irregular and sometimes rapid heartbeat. Some people will develop associated symptoms such as a flip-flopping heartbeat, chest pain, lightheadedness, or shortness of breath. Other people may have no symptoms at all. Atrial fibrillation is serious because it affects the hearts ability to fill with blood as it should. Blood clots may form. This increases the risk for stroke. Untreated atrial fibrillation can also lead to heart failure. Atrial fibrillation can be controlled. With treatment, most people with atrial fibrillation lead normal lives.  Treatment options  Recommended treatment for atrial fibrillation depends on your age, symptoms, how long you have had atrial fibrillation, and other factors. You will have a complete evaluation to find out if you have any abnormalities that caused your heart to go into atrial fibrillation. This might be blocked heart arteries or a thyroid problem. Your doctor will assess your particular case and discuss choices with you.  Treatment choices may include:  · Treating an underlying disorder that puts you at risk for atrial fibrillation. For example, correcting an abnormal thyroid or electrolyte problem, or treating a blocked heart artery.  · Restoring a normal heart rhythm with an electrical shock (cardioversion) or with an antiarrhythmic medicine (chemical cardioversion)  · Using medicine to control your heart rate in atrial fibrillation.  · Preventing the risk for blood clot and stroke using blood-thinning medicines. Your doctor will tell you what he or she recommends. Choices may include aspirin, clopidogrel, warfarin, dabigatran, rivaroxaban, apixaban, and edoxaban.  · Doing catheter ablation or a surgical maze  procedure. These use different methods to destroy certain areas of heart tissue. This interrupts the electrical signals causing atrial fibrillation. One of these procedures may be a choice when medicines do not work, or as an alternative to long-term medicine.  · Other treatment choices may be recommended for you by your doctor.  Managing risk factors for stroke and preventing heart failure are important parts of any treatment plan for atrial fibrillation.  Home care  · Take your medicines exactly as directed. Dont skip doses.  · Work with your doctor to find the right medicines and doses for you.  · Learn to take your own pulse. Keep a record of your results. Ask your doctor which pulse rates mean that you need medical attention. Slowing your pulse is often the goal of treatment. Ask your doctor if its OK for you to use an automatic machine to check your pulse at home. Sometimes these machines dont count the pulse correctly when you have atrial fibrillation.  · Limit your intake of coffee, tea, cola, and other beverages with caffeine. Talk with your doctor about whether you should eliminate caffeine.  · Avoid over-the-counter medicines that have caffeine in them.  · Let your doctor know what medicines you take, including prescription and over-the-counter medicines, as well as any supplements. They interfere with some medicines given for atrial fibrillation.  · Ask your doctor about whether you can drink alcohol. Some people need to avoid alcohol to better treat atrial fibrillation. If you are taking blood-thinner medicines, alcohol may interfere with them by increasing their effect.  · Never take stimulants such as amphetamines or cocaine. These drugs can speed up your heart rate and trigger atrial fibrillation.  Follow-up care  Follow up with your doctor, or as advised.     When should I call my healthcare provider  Call your healthcare provider right away if you have any of the  following:  · Weakness  · Dizziness  · Fainting  · Fatigue  · Shortness of breath  · Chest pain with increased activity  · A change in the usual regularity of your heartbeat, or an unusually fast heartbeat   Date Last Reviewed: 4/23/2016  © 9131-1560 [employer]. [address] [place] [address], [place] [address]. All rights reserved. This information is not intended as a substitute for professional medical care. Always follow your healthcare professional's instructions.

## 2017-03-20 ENCOUNTER — TELEPHONE (OUTPATIENT)
Dept: PRIMARY CARE CLINIC | Facility: CLINIC | Age: 67
End: 2017-03-20

## 2017-03-20 ENCOUNTER — OFFICE VISIT (OUTPATIENT)
Dept: PRIMARY CARE CLINIC | Facility: CLINIC | Age: 67
End: 2017-03-20
Payer: MEDICARE

## 2017-03-20 VITALS
WEIGHT: 234.38 LBS | HEART RATE: 56 BPM | DIASTOLIC BLOOD PRESSURE: 65 MMHG | BODY MASS INDEX: 36.7 KG/M2 | TEMPERATURE: 98 F | OXYGEN SATURATION: 99 % | SYSTOLIC BLOOD PRESSURE: 137 MMHG

## 2017-03-20 DIAGNOSIS — Z79.01 CHRONIC ANTICOAGULATION: ICD-10-CM

## 2017-03-20 DIAGNOSIS — I48.0 PAROXYSMAL ATRIAL FIBRILLATION WITH RAPID VENTRICULAR RESPONSE: Primary | ICD-10-CM

## 2017-03-20 DIAGNOSIS — E03.9 HYPOTHYROIDISM, UNSPECIFIED TYPE: ICD-10-CM

## 2017-03-20 DIAGNOSIS — E78.5 HYPERLIPIDEMIA, UNSPECIFIED HYPERLIPIDEMIA TYPE: ICD-10-CM

## 2017-03-20 DIAGNOSIS — Z92.89 HISTORY OF CARDIOVERSION: ICD-10-CM

## 2017-03-20 PROCEDURE — 1160F RVW MEDS BY RX/DR IN RCRD: CPT | Mod: S$GLB,,, | Performed by: INTERNAL MEDICINE

## 2017-03-20 PROCEDURE — 1159F MED LIST DOCD IN RCRD: CPT | Mod: S$GLB,,, | Performed by: INTERNAL MEDICINE

## 2017-03-20 PROCEDURE — 99499 UNLISTED E&M SERVICE: CPT | Mod: S$GLB,,, | Performed by: INTERNAL MEDICINE

## 2017-03-20 PROCEDURE — 99215 OFFICE O/P EST HI 40 MIN: CPT | Mod: S$GLB,,, | Performed by: INTERNAL MEDICINE

## 2017-03-20 PROCEDURE — 1157F ADVNC CARE PLAN IN RCRD: CPT | Mod: S$GLB,,, | Performed by: INTERNAL MEDICINE

## 2017-03-20 PROCEDURE — 1125F AMNT PAIN NOTED PAIN PRSNT: CPT | Mod: S$GLB,,, | Performed by: INTERNAL MEDICINE

## 2017-03-20 PROCEDURE — 99999 PR PBB SHADOW E&M-EST. PATIENT-LVL III: CPT | Mod: PBBFAC,,, | Performed by: INTERNAL MEDICINE

## 2017-03-20 NOTE — MR AVS SNAPSHOT
[facility]  [address]  [place] [address]  Phone: [phone]  Fax: [phone]                  Flores Fuentes   3/20/2017 10:30 AM   Office Visit    Description:  Female : 1950   Provider:  Melinda Walsh MD   Department:  [facility]           Diagnoses this Visit        Comments    Paroxysmal atrial fibrillation with rapid ventricular response    -  Primary     History of cardioversion         Chronic anticoagulation         Hypothyroidism, unspecified type         Hyperlipidemia, unspecified hyperlipidemia type                To Do List           Future Appointments        Provider Department Dept Phone    3/21/2017 10:00 AM Nichole Ryan, PT [facility] [phone]    3/23/2017 10:00 AM Nichole Ryan, PT [facility] [phone]    2017 8:00 AM [facility] [place] [facility] [phone]    2017 2:00 PM Naz Condon MD [facility] [phone]      Goals (5 Years of Data)     None      [facility]     [facility] Care [facility] -  Assistance  Registered nurses in the [facility] provide clinical advisement, health education, appointment booking, and other advisory services.  Call for this free service at [phone].             Medications           Message regarding Medications     Verify the changes and/or additions to your medication regime listed below are the same as discussed with your clinician today.  If any of these changes or additions are incorrect, please notify your healthcare provider.             Verify that the below list of medications is an accurate representation of the medications you are currently taking.  If none reported, the list may be blank. If incorrect, please contact your healthcare provider. Carry this list with you in case of emergency.           Current Medications      diltiaZEM (CARDIZEM CD) 180 MG 24 hr capsule Take 1 capsule (180 mg total) by mouth once daily.    dronedarone (MULTAQ) 400 mg Tab Take 1 tablet (400 mg total) by mouth 2 (two) times daily with meals.    ELIQUIS 5 mg Tab Take 5 mg by mouth 2 (two) times daily.    fluocinonide (LIDEX) 0.05 % ointment daily as needed.     fluticasone (FLONASE) 50 mcg/actuation nasal spray 1 spray by Each Nare route once daily.    levothyroxine (SYNTHROID) 25 MCG tablet Take 1 tablet (25 mcg total) by mouth once daily.    pravastatin (PRAVACHOL) 40 MG tablet Take 1 tablet (40 mg total) by mouth once daily.    FLUARIX QUAD 9541-2588, PF, 60 mcg (15 mcg x 4)/0.5 mL Syrg            Clinical Reference Information           Your Vitals Were     BP Pulse Temp Weight SpO2 BMI    137/65 (BP Location: Right arm, Patient Position: Sitting) 56 97.7 °F (36.5 °C) (Oral) 106.3 kg (234 lb 5.6 oz) 99% 36.7 kg/m2      Blood Pressure          Most Recent Value    BP  137/65      Allergies as of 3/20/2017     Decongestant D [Pseudoephedrine-dm]    Augmentin [Amoxicillin-pot Clavulanate]      Immunizations Administered on Date of Encounter - 3/20/2017     None      Language Assistance Services     ATTENTION: Language assistance services are available, free of charge. Please call [phone].      ATENCIÓN: Si habla español, tiene a fong disposición servicios gratuitos de asistencia lingüística. Llame al [phone].     [facility] Ý: N?u b?n nói Ti?ng Vi?t, có các d?ch v? h? tr? ngôn ng? mi?n phí dành cho b?n. G?i s? [phone].         [facility] complies with applicable Federal civil rights laws and does not discriminate on the basis of race, color, national origin, age, disability, or sex.

## 2017-03-20 NOTE — TELEPHONE ENCOUNTER
Call placed to pt as a reminder of appointment at 10:30 , pt stated understanding and agreed to comply.

## 2017-03-20 NOTE — PROGRESS NOTES
[facility]  New Visit Progress Note   Recent Hospital Discharge     PRESENTING HISTORY     Chief Complaint/Reason for Admission:  Follow up Hospital Discharge   No chief complaint on file.    PCP: Naz Condon MD    History of Present Illness:  Ms. Flores Fuentes is a 66 y.o. female who was recently admitted to the hospital.    Discharge Summaries  Rakan Espinoza MD   Cardiology   Cosigned by: Robyn Mcdonough MD at 3/13/2017  2:50 PM      []Hide copied text  []Hover for attribution information  Cardiology        SUBJECTIVE:      Patient was admitted with Afib w/ RVR a/w lightheadedness and palpitations. After attempts to control her rate with IV digoxin CCB and PO CCB, PO BB were inadequate it was decided to attempt DCCV which the patient agreed to. The procedure was successful and patient is being discharged in stable condition after being appropriately monitored after anesthesia.                 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 Dr Viktor Toure    Paroxysmal atrioventricular tachycardia      Plantar fasciitis      Right knee meniscal tear       Dr Mayfield, tx conservatively    Severe obesity (BMI 35.0-35.9 with comorbidity) 1/24/2017    Thyroid disease              Past Surgical History:   Procedure Laterality Date    APPENDECTOMY        HYSTERECTOMY   1990     TAHBSO for fibroids    MANDIBLE SURGERY   2015     L mandible, Dr Toure    TONSILLECTOMY                 Family  History   Problem Relation Age of Onset    Cancer Mother         stomach, liver    Melanoma Neg Hx             Social History   Substance Use Topics    Smoking status: Never Smoker    Smokeless tobacco: Never Used    Alcohol use No         Home meds:  No current facility-administered medications on file prior to encounter.              Current Outpatient Prescriptions on File Prior to Encounter   Medication Sig Dispense Refill    ELIQUIS 5 mg Tab Take 5 mg by mouth 2 (two) times daily.   1    FLUARIX QUAD 5240-2682, PF, 60 mcg (15 mcg x 4)/0.5 mL Syrg          fluocinonide (LIDEX) 0.05 % ointment     5    fluticasone (FLONASE) 50 mcg/actuation nasal spray 1 spray by Each Nare route once daily. 1 Bottle 3    levothyroxine (SYNTHROID) 25 MCG tablet Take 1 tablet (25 mcg total) by mouth once daily. 90 tablet 3    pravastatin (PRAVACHOL) 40 MG tablet Take 1 tablet (40 mg total) by mouth once daily. 90 tablet 3    [DISCONTINUED] acebutolol (SECTRAL) 200 MG capsule Take 1 capsule (200 mg total) by mouth once daily. 90 capsule 3         Current meds:  Scheduled Meds:   apixaban 5 mg Oral BID    diltiaZEM 180 mg Oral Daily    dronedarone 400 mg Oral BID WM    levothyroxine 25 mcg Oral Before breakfast    pravastatin 40 mg Oral Daily      Continuous Infusions:   PRN Meds:.acetaminophen, famotidine, ondansetron, zolpidem        OBJECTIVE:      Vital Signs (Most Recent)  Temp: 97.7 °F (36.5 °C) (03/13/17 0800)  Pulse: 62 (03/13/17 1200)  Resp: 17 (03/13/17 1200)  BP: 109/63 (03/13/17 1200)  SpO2: 96 % (03/13/17 1200)     Vital Signs Range (Last 24H):  Temp: [97.7 °F (36.5 °C)-98 °F (36.7 °C)]   Pulse: []   Resp: [6-18]   BP: ()/(57-73)   SpO2: [95 %-98 %]      Physical Exam:  GEN:nad  Heart: s1,s2 rrr, no m/r/g  Lungs: cta b/l        Laboratory:  LABS  CBC     Recent Labs  Lab 03/10/17  1622   WBC 6.53   RBC 4.74   HGB 13.7   HCT 41.0      MCV 87   MCH 28.9   MCHC 33.4      BMP     Recent  Labs  Lab 03/10/17  1622 03/11/17  0358    139   K 4.2 4.0   CO2 24 23    106   BUN 17 11   CREATININE 0.68 0.8    104            Recent Labs  Lab 03/10/17  1622 03/11/17  0358 03/11/17  1000   CALCIUM 9.7 9.4 --    MG --  --  2.3         LFT     Recent Labs  Lab 03/10/17  1622   PROT 8.1   ALBUMIN 4.5   BILITOT 0.6   AST 20   ALKPHOS 99   ALT 20         COAGS     Recent Labs  Lab 03/10/17  1622   INR 1.2      CE     Recent Labs  Lab 03/10/17  1622 03/11/17  0358   TROPONINI <0.012  <0.012 0.008      BNP  No results for input(s): BNP in the last 168 hours.  Lipid panel:        Lab Results   Component Value Date     CHOL 150 01/23/2016     CHOL 152 11/29/2014     CHOL 144 10/07/2014      Lab Results   Component Value Date     HDL 48 01/23/2016     HDL 41 11/29/2014     HDL 43 10/07/2014            Lab Results   Component Value Date     LDLCALC 85.6 01/23/2016     LDLCALC 92.0 11/29/2014     LDLCALC 83.8 10/07/2014            Lab Results   Component Value Date     TRIG 82 01/23/2016     TRIG 95 11/29/2014     TRIG 86 10/07/2014            Lab Results   Component Value Date     CHOLHDL 32.0 01/23/2016     CHOLHDL 27.0 11/29/2014     CHOLHDL 29.9 10/07/2014      ASSESSMENT/PLAN:   1. Afib with RVR on eliquis s/p DCCV w/ 200J under MAC-converted to SR and stable     Plan:     C/w Multaq, Cardizem CD and other medications as ordered  F/u with Dr. Mcdonough on 3/27/17  Get 2DECHO as outpatient  Recommending EP referral as outpatient for PVI consultation      All questions and concerns addressed with patient and her  at bedside.      Rakan Espinoza MD  [facility]  [phone] (m)  [phone] (p)          ___________________________________________________________________    Today:  Mrs. Fuentes is a 66-year-old female who [facility] the [facility] for hospital follow-up.    The patient was recently hospitalized for recurrent atrial fibrillation.  She underwent cardioversion  followed by medication adjustment.  Multaq was initiated.  She is anticoagulated with Eliquis, and this was continued.    The patient reports that she has a long-standing history of multifocal atrial tachycardia.  She has experienced 5 different episodes of atrial fibrillation.  In the past she generally cardioverted spontaneously while in the hospital on medication.  However this time she did not, and required cardioversion.    Labs from her hospital stay were reviewed with the patient.  Her TSH was within normal limits, and there were no significant electrolyte or hematologic abnormalities.      Review of Systems  General ROS: negative for chills, fever or weight loss  Psychological ROS: negative for hallucination, depression or suicidal ideation  Ophthalmic ROS: negative for blurry vision, photophobia or eye pain  ENT ROS: negative for epistaxis, sore throat or rhinorrhea  Respiratory ROS: no cough, shortness of breath, or wheezing  Cardiovascular ROS: no chest pain or dyspnea on exertion  Gastrointestinal ROS: no abdominal pain, change in bowel habits, or black/ bloody stools  Genito-Urinary ROS: no dysuria, trouble voiding, or hematuria  Musculoskeletal ROS: negative for gait disturbance or muscular weakness  Neurological ROS: no syncope or seizures; no ataxia  Dermatological ROS: negative for pruritis, rash and jaundice          PAST HISTORY:     Past Medical History:   Diagnosis Date    Atrial fibrillation 2014    Eliquis, q 6 mo, Dr Raya U [place]    Cervical muscle strain 1/24/2017    DJD (degenerative joint disease) of cervical spine 1/24/2017    Hyperlipidemia     Mitral valve disease     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, l mandible, 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 Dr Viktor Toure    Paroxysmal atrioventricular tachycardia     Plantar fasciitis     Right knee meniscal tear     Dr Mayfield, tx conservatively    Severe obesity (BMI 35.0-35.9 with comorbidity) 1/24/2017    Thyroid disease         Past Surgical History:   Procedure Laterality Date    APPENDECTOMY      HYSTERECTOMY  1990    TAHBSO for fibroids    MANDIBLE SURGERY  2015    L mandible, Dr Toure    TONSILLECTOMY         Family History   Problem Relation Age of Onset    Cancer Mother      stomach, liver    Melanoma Neg Hx        Social History     Social History    Marital status:      Spouse name: N/A    Number of children: N/A    Years of education: N/A     Social History Main Topics    Smoking status: Never Smoker    Smokeless tobacco: Never Used    Alcohol use No    Drug use: No    Sexual activity: Not Asked     Other Topics Concern    Are You Pregnant Or Think You May Be? No     Social History Narrative    Retired 2012,  [employer], 3 children, nonsmoker, ETOH socially, GYN [facility], colonoscopy normal 58, rec repeat at 68       MEDICATIONS & ALLERGIES:     Current Outpatient Prescriptions on File Prior to Visit   Medication Sig Dispense Refill    diltiaZEM (CARDIZEM CD) 180 MG 24 hr capsule Take 1 capsule (180 mg total) by mouth once daily. 30 capsule 11    dronedarone (MULTAQ) 400 mg Tab Take 1 tablet (400 mg total) by mouth 2 (two) times daily with meals. 60 tablet 11    ELIQUIS 5 mg Tab Take 5 mg by mouth 2 (two) times daily.  1    fluocinonide (LIDEX) 0.05 % ointment daily as needed.   5    fluticasone (FLONASE) 50 mcg/actuation nasal spray 1 spray by Each Nare route once daily. (Patient taking differently: 1 spray by Each Nare route daily as needed. ) 1 Bottle 3    levothyroxine (SYNTHROID) 25 MCG tablet Take 1 tablet (25 mcg total) by mouth once daily. 90 tablet 3    pravastatin (PRAVACHOL) 40 MG tablet Take 1 tablet (40 mg total) by mouth once daily. 90 tablet 3    FLUARIX QUAD 8630-0458, PF, 60 mcg (15 mcg x 4)/0.5 mL Syrg        No current facility-administered medications on file prior to visit.         Review of patient's allergies indicates:   Allergen Reactions    Decongestant d  [pseudoephedrine-dm]      Atrial Fibrillation    Augmentin [amoxicillin-pot clavulanate]      palpitations         OBJECTIVE:     Vital Signs:  Vitals:    03/20/17 1025   BP: 137/65   Pulse: (!) 56   Temp: 97.7 °F (36.5 °C)     Wt Readings from Last 1 Encounters:   03/20/17 1025 106.3 kg (234 lb 5.6 oz)     Body mass index is 36.7 kg/(m^2).       Physical Exam:  /65 (BP Location: Right arm, Patient Position: Sitting)  Pulse (!) 56  Temp 97.7 °F (36.5 °C) (Oral)   Wt 106.3 kg (234 lb 5.6 oz)  SpO2 99%  BMI 36.7 kg/m2  General appearance: alert, cooperative, no distress  Constitutional:Oriented to person, place, and time.appears well-developed and well-nourished.   HEENT: Normocephalic, atraumatic, neck symmetrical, no nasal discharge   Eyes: conjunctivae/corneas clear, PERRL, EOM's intact  Lungs: clear to auscultation bilaterally, no dullness to percussion bilaterally  Heart: regular rate and rhythm without rub; no displacement of the PMI   Abdomen: soft, non-tender; bowel sounds normoactive; no organomegaly  Extremities: extremities symmetric; no clubbing, cyanosis, or edema  Integument: Skin color, texture, turgor normal; no rashes; hair distrubution normal  Neurologic: Alert and oriented X 3, normal strength, normal coordination and gait  Psychiatric: no pressured speech; normal affect; no evidence of impaired cognition     Laboratory  Lab Results   Component Value Date    WBC 6.53 03/10/2017    HGB 13.7 03/10/2017    HCT 41.0 03/10/2017    MCV 87 03/10/2017     03/10/2017     BMP  Lab Results   Component Value Date     03/11/2017    K 4.0 03/11/2017     03/11/2017    CO2 23 03/11/2017    BUN 11 03/11/2017    CREATININE 0.8 03/11/2017    CALCIUM 9.4 03/11/2017    ANIONGAP 10 03/11/2017    ESTGFRAFRICA >60 03/11/2017    EGFRNONAA >60 03/11/2017     Lab Results   Component Value Date    ALT 20 03/10/2017    AST 20 03/10/2017    ALKPHOS 99 03/10/2017    BILITOT 0.6 03/10/2017     Lab  Results   Component Value Date    INR 1.2 03/10/2017    INR 1.0 10/06/2014    INR 1.0 08/27/2014     Lab Results   Component Value Date    HGBA1C 5.7 10/07/2014     No results for input(s): POCTGLUCOSE in the last 72 hours.      ASSESSMENT & PLAN:       Paroxysmal atrial fibrillation with rapid ventricular response  Patient has follow-up appointment with cardiologist,  Dr. Mcdonough on 3/27/17  Referral to an EP cardiology specialist was recommended upon hospital discharge, we will defer to her primary cardiologist for this referral.  Patient is currently maintained on Multaq, Cardizem, and anticoagulation with Eliquis.    History of cardioversion    Chronic anticoagulation  Stable on Eliquis, no bleeding complications noted.     Hypothyroidism, unspecified type  Stable on medication, Last TSH 1.825 (3/11/17)    Hyperlipidemia, unspecified hyperlipidemia type  Stable on statin, tolerating.       > 50 % of visit spent on education, counseling, coordination of care.     Patient stable and will be discharged from [facility].   Will follow with Cardiology and PCP.   Patient counseled that she may return to [facility] if needed.     Instructions for the patient:      Scheduled Follow-up :  Future Appointments  Date Time Provider Department Center   3/21/2017 10:00 AM [facility]   3/23/2017 10:00 AM Nichole Ryan, PT [facility]   7/12/2017 8:00 AM [facility]   7/19/2017 2:00 PM Naz Condon MD [facility]       Post Visit Medication List:     Medication List          This list is accurate as of: 3/20/17 11:27 AM.  Always use your most recent med list.                     diltiaZEM 180 MG 24 hr capsule   Commonly known as:  CARDIZEM CD   Take 1 capsule (180 mg total) by mouth once daily.       dronedarone 400 mg Tab   Commonly known as:  MULTAQ   Take 1 tablet (400 mg total) by mouth 2 (two) times daily with meals.        ELIQUIS 5 mg Tab   Generic drug:  apixaban       FLUARIX QUAD 2211-9425 (PF) 60 mcg (15 mcg x 4)/0.5 mL Syrg   Generic drug:  flu vac [id number] 36mos up(PF)       fluocinonide 0.05 % ointment   Commonly known as:  LIDEX       fluticasone 50 mcg/actuation nasal spray   Commonly known as:  FLONASE   1 spray by Each Nare route once daily.       levothyroxine 25 MCG tablet   Commonly known as:  SYNTHROID   Take 1 tablet (25 mcg total) by mouth once daily.       pravastatin 40 MG tablet   Commonly known as:  PRAVACHOL   Take 1 tablet (40 mg total) by mouth once daily.             Signing Physician:  Melinda Walsh MD

## 2017-03-21 ENCOUNTER — CLINICAL SUPPORT (OUTPATIENT)
Dept: REHABILITATION | Facility: HOSPITAL | Age: 67
End: 2017-03-21
Attending: FAMILY MEDICINE
Payer: MEDICARE

## 2017-03-21 DIAGNOSIS — R52 PAIN AGGRAVATED BY PHYSICAL ACTIVITY: ICD-10-CM

## 2017-03-21 DIAGNOSIS — R29.898 DECREASED RANGE OF MOTION OF NECK: ICD-10-CM

## 2017-03-21 DIAGNOSIS — R26.89 ANTALGIC GAIT: ICD-10-CM

## 2017-03-21 DIAGNOSIS — R29.898 WEAKNESS OF BOTH LOWER EXTREMITIES: ICD-10-CM

## 2017-03-21 PROCEDURE — 97110 THERAPEUTIC EXERCISES: CPT

## 2017-03-21 NOTE — PROGRESS NOTES
"TIME RECORD    Date:  03/21/2017    Start Time:  10:00  Stop Time:  11:00    PROCEDURES:    TIMED  Procedure Min.   TE 30   TE sup 30 NC                 UNTIMED  Procedure Min.             Total Timed Minutes:  30  Total Timed Units:  2  Total Untimed Units:  0  Charges Billed/# of units:  2 (TE-2)      Progress/Current Status    Subjective:     Patient ID: Flores Fuentes is a 66 y.o. female.  Diagnosis:   1. Decreased range of motion of neck     2. Weakness of both lower extremities     3. Antalgic gait     4. Pain aggravated by physical activity       Pain: 3 /10  She reports feeling better her pain is about a 2, she has been taking it easy since coming home from the hospital. She can tell a big difference with getting up from a chair now.    Objective:     Patient was educated and performed therapeutic exercises as per log below, supervsied by PT x 30 minutes and 1:1 with PT x 30 minutes to improve ROM, flexibility, strength and gait. Patient declined cold pack at the end of therapy.  Patient declined manual therapy treatments today.     Date  03/21/17 03/08/17 03/06/17 03/03/17 03/01/17 02/22/17 02/20/17 02/15/17 02/13/17 02/08/17   VISIT 11/30 10/30 9/30 8/30 7/30 6/30 5/30 4/30 3/30 2/30   G CODE VISIT 2/10 1/10 9/10 8/10 7/10 6/10 5/10 4/10 3/10 2/10   POC EXP. DATE 03/31/17 03/31/17 03/31/17 03/31/17 03/31/17 03/31/17 03/31/17 03/31/17 03/31/17 03/31/17   VISIT AMOUNT    MEDICARE TOTAL 63.96    832.52 63.96    768.56 63.96    704.60 61.84    640.64 63.96    578.80 61.84    514.84 61.84    453.00 93.82    391.16 125.80    297.34 63.96    171.54   FACE-TO-FACE 04/02/17 04/02/17 04/02/17 04/02/17 03/02/17 03/02/17 03/02/17 03/02/17 03/02/17 03/02/17                Norberto  -- -- -- -- -- -- -- -- -- --   TABLE:             HSS long sitting 10 x 10" 10 x 10" 10 x 10" 10 x 10" 10 x 10" 10 x 10" 10 x 10" 10 x 10" 10 x 10" 10 x 10"   Quad sets 20 x 5" 20 x 5" 20 x 5" 20 x 5" 20 x 5" 20 x 5" 15 x 5" 15 x 5" 10 x " "5" 10 x 5"   Bridge  Held  1 x 10 -- -- -- -- -- -- -- --   Marching  1 x 30 1 x 25 1 x 20 -- Hold - pain 1 x 15 1 x 15 1 x 10 1 x 10 1 x 10   SLR 1 x 20 B 1 x 25 B 2 x 10 B 2 x 10 L  1 x 10 R 1 x 10 R  1 x 20 L 2 x 10 B 1 x 15 B 1 x 10 B 1 x 10 R SL, 1 x 10 L 1 x 2 R, 1 x 10 L   Hip abduction - sitting 1 x 30 GTB 1 x 30 GTB 1 x 20 GTB 1 x 30 RTB 1 x 30 RTB 1 x 30 RTB 3 x 10 RTB 1 x 20 RTB 1 x 15 RTB 1 x 15 RTB   Hip adduction - sitting 30 x 3" 30 x 3" 25 x 3" 20 x 3" 20 x 3" 20 x 3" 20 x 3" 15 x 3" w/ ball 10 x 3" w/ ball 10 x 3" w/ ball   Hip extension -- -- -- -- -- -- -- -- -- --   SAQ -- -- -- -- -- -- -- -- -- --   Heel Slides -- -- -- -- -- -- -- -- -- --   SEATED:             Cervical ROM  - flex/ ext  - rot R/L  - side bend R/L  - chin tucks   2 x 10  2 x 10  2 x 10  2 x 10   2 x 10  2 x 10  2 x 10  1 x 10   1 x 20  1 x 20  1 x 20  1 x 10   1 x 15  1 x 15  1 x 15  1 x 15   1 x 15  1 x 15  1 x 15  1 x 10   1 x 10  1 x 10   1 x 10  1 x 10   1 x 10  1 x 10   1 x 10  1 x 10   1 x 10  1 x 10  1 x 10  1 x 10   1 x 10  1 x 10  1 x 10  1 x 10   1 x 10  1 x 10  1 x 10  1 x 10   Levator scap. stretch 10 x 5" 10 x 5" 5 x 5" 5 x 5" 5 x 5" 5 x 5" 5 x 5" 5 x 5" 5 x 5" 5 x 5"   UT stretch 10 x 5" 10 x 5" 5 x 5" 5 x 5" 5 x 5" 5 x 5" 5 x 5" 5 x 5" 5 x 5" 5 x 5"   SCM stretch  10 x 5" 10 x 5" 5 x 5" 5 x 5" 5 x 5" 5 x 5" 5 x 5" 5 x 5" 5 x 5" 5 x 5"   Pec. stretch 5 x 5" 5 x 5" 5 x 5" 5 x 5" 5 x 5" 5 x 5" 5 x 5" 5 x 5" 5 x 5" 5 x 5"   LAQs 1 x 30 2 x 10 x 1# 1 x 25 1 x 20 1 x 20 1 x 20 1 x 20 1 x 15 1 x 10 --   HS curls  2 x 10 RTB 1 x 10 RTB -- -- -- -- -- -- -- --   Seated Hip Flex 1 x 30 2 x 10 x 1# 3 x 10 2 x 10 2 x 10 2 x 10 2 x 10 1 x 15 1 x 10 1 x 10   STANDING:             Wall slides -- -- -- -- -- -- -- -- -- --   Mini squats X 25 X 25 4 x 5 4 x 5 1 x 15 1 x 15 1 x 10 -- -- --   Hip Abd -- -- -- -- -- -- -- -- -- --   Hip Flex -- -- -- -- -- -- -- -- -- --   Hip Ext -- -- -- -- -- -- -- -- -- --   HS Curls -- -- -- " -- -- -- -- -- -- --   Step Ups -- -- -- -- -- -- -- -- -- --   Rows   T's  ER 3 x 10 RTB  oot  oot 3 x 10 RTB  2 x 10 RTB  1 x 25 YTB 2 x 10 RTB  3 x 10 YTB  2 x 10 YTB 3 x 10 YTB  3 x 10 YTB 1 x 25 YTB  1 x 25 YTB 2 x 10 YTB  2 x 10 YTB 1 x 10 YTB  1 x 10 YTB      CP declined declined declined declined declined declined declined declined declined declined                Initials DG DG GWA 4/6 GWA 3/6 GWA 2/6 GWA 1/6 DG GWA 1/6 DG GWA 1/6     Assessment:     Patient requested to hold bridging exercise due to not wanting to elevate her heart rate and still adjusting to her new medications. She required occasional cues for correct technique with standing resistance exercises for scapula stabilization.     Patient Education/Response:     Patient was instructed to continue with her HEP twice a day. Patient verbalized understanding instructions.     Plans and Goals:     Continue with Plan Of Care and progress toward PT goals. Reassess patient for possible discharge next visit.    Short Term Goals (4 Weeks):   1. This patient will be independent with a basic HEP.  2. This patient will have cervical AROM WFL with no increase in symptoms in order to drive safely.  3. This patient will increase LE strength by 1 grade in order to perform sit to stand transfer with no increase in symptoms or UE leverage.  4. This patient will have a pain rating of 5/10 at worst with ADLs.  5. Patient will be able to achieve greater than or equal to 45 on the FOTO knee placing patient in 40%<60% impaired, limited, or restricted category demonstrating overall improved functional ability with lower extremity.   6. Patient able to score greater than or equal to 64 on the FOTO neck placing patient in 20%<40% impaired, limited, or restricted category demonstrating overall decreased neck pain with functional activities    Long Term Goals (8 Weeks):   1. This patient will be independent with an updated HEP.  2. This patient will increase LE strength  to 5/5 in order to be able to ascend/descend the step in front of her home with no difficulty.  3. This patient will have a pain rating of 2/10 at worst with ADLs.  4. Patient will be able to achieve greater than or equal to 65 on the FOTO knee placing patient in 20%<40% impaired, limited, or restricted category demonstrating overall improved functional ability with lower extremity.  5. Patient able to score greater than or equal to 84 on the FOTO neck placing patient in 1%<20% impaired, limited, or restricted category demonstrating overall decreased neck pain with functional activities

## 2017-03-23 ENCOUNTER — CLINICAL SUPPORT (OUTPATIENT)
Dept: REHABILITATION | Facility: HOSPITAL | Age: 67
End: 2017-03-23
Attending: FAMILY MEDICINE
Payer: MEDICARE

## 2017-03-23 ENCOUNTER — OFFICE VISIT (OUTPATIENT)
Dept: FAMILY MEDICINE | Facility: CLINIC | Age: 67
End: 2017-03-23
Payer: MEDICARE

## 2017-03-23 VITALS
SYSTOLIC BLOOD PRESSURE: 120 MMHG | HEART RATE: 56 BPM | DIASTOLIC BLOOD PRESSURE: 66 MMHG | WEIGHT: 233.44 LBS | HEIGHT: 67 IN | BODY MASS INDEX: 36.64 KG/M2 | TEMPERATURE: 98 F

## 2017-03-23 DIAGNOSIS — E03.9 HYPOTHYROIDISM, UNSPECIFIED TYPE: ICD-10-CM

## 2017-03-23 DIAGNOSIS — E66.01 SEVERE OBESITY (BMI 35.0-35.9 WITH COMORBIDITY): ICD-10-CM

## 2017-03-23 DIAGNOSIS — E78.5 HYPERLIPIDEMIA, UNSPECIFIED HYPERLIPIDEMIA TYPE: ICD-10-CM

## 2017-03-23 DIAGNOSIS — R29.898 DECREASED RANGE OF MOTION OF NECK: ICD-10-CM

## 2017-03-23 DIAGNOSIS — I48.0 PAROXYSMAL A-FIB: ICD-10-CM

## 2017-03-23 DIAGNOSIS — R26.89 ANTALGIC GAIT: ICD-10-CM

## 2017-03-23 DIAGNOSIS — Z00.00 ROUTINE GENERAL MEDICAL EXAMINATION AT A HEALTH CARE FACILITY: Primary | ICD-10-CM

## 2017-03-23 DIAGNOSIS — R52 PAIN AGGRAVATED BY PHYSICAL ACTIVITY: ICD-10-CM

## 2017-03-23 DIAGNOSIS — R29.898 WEAKNESS OF BOTH LOWER EXTREMITIES: ICD-10-CM

## 2017-03-23 PROCEDURE — G8980 MOBILITY D/C STATUS: HCPCS | Mod: CJ

## 2017-03-23 PROCEDURE — G8979 MOBILITY GOAL STATUS: HCPCS | Mod: CI

## 2017-03-23 PROCEDURE — 99999 PR PBB SHADOW E&M-EST. PATIENT-LVL III: CPT | Mod: PBBFAC,,, | Performed by: FAMILY MEDICINE

## 2017-03-23 PROCEDURE — 97110 THERAPEUTIC EXERCISES: CPT

## 2017-03-23 PROCEDURE — 99397 PER PM REEVAL EST PAT 65+ YR: CPT | Mod: S$GLB,,, | Performed by: FAMILY MEDICINE

## 2017-03-23 PROCEDURE — 99499 UNLISTED E&M SERVICE: CPT | Mod: S$GLB,,, | Performed by: FAMILY MEDICINE

## 2017-03-23 NOTE — Clinical Note
Physical therapy discharge summary on Floresearline Fuentes. Thank you for this referral.  Nichole Ryan, PT 3/23/2017

## 2017-03-23 NOTE — PATIENT INSTRUCTIONS
==============================================================    Follow with  GYN & cardiologist    Blood pressure stable, continue medications  ==================    RECOMMENDATIONS FOR FEMALES    Prevent the #1 cause of death- cardiovascular disease (HEART ATTACK & STROKE) by checking for normal blood pressure, cholesterol, sugars, & by not smoking.     VACCINES: Yearly FLU shot, ONE PNEUMONIA shot after 65, ONE SHINGLES shot after 60    Screening colonoscopy at AGE  50 & every 10 years to check for COLON CANCER,  one of the most common & preventable cancers. Repeat in 3 years if POLYP found    I recommend  high fiber (5 fresh fruits or vegetables daily), low fat diet and aerobic  exercise (huffing/ puffing/ sweating for 20 min straight at least 4 days a week)    Follow up yearly with fasting lipids, CMP, CBC, TSH prior.   ==============================================================    Pneumonia vaccine next year (since recent cardioversion with a fib & don't want to flare up any problems)

## 2017-03-23 NOTE — PROGRESS NOTES
"TIME RECORD    Date:  03/23/2017    Start Time:  10:00  Stop Time:  10:30    PROCEDURES:    TIMED  Procedure Min.   TE 30                     UNTIMED  Procedure Min.             Total Timed Minutes:  30  Total Timed Units:  2  Total Untimed Units:  0  Charges Billed/# of units:  2 (TE-2)      Progress/Current Status    Subjective:     Patient ID: Flores Fuentes is a 66 y.o. female.  Diagnosis:   1. Decreased range of motion of neck     2. Weakness of both lower extremities     3. Antalgic gait     4. Pain aggravated by physical activity       Pain: 3 /10  She reports wanting to hold on therapy at this time until she gets her medication settled for her heart.    Objective:     Patient was educated and performed therapeutic exercises as per log below along with today's reassessment, 1:1 with PT x 30 minutes to improve ROM, flexibility, strength and gait. Patient declined cold pack at the end of therapy.  Patient declined manual therapy treatments today.     Date  03/23/17 03/21/17 03/08/17 03/06/17 03/03/17 03/01/17 02/22/17 02/20/17 02/15/17 02/13/17 02/08/17   VISIT 12/30 11/30 10/30 9/30 8/30 7/30 6/30 5/30 4/30 3/30 2/30   G CODE VISIT 1/10 2/10 1/10 9/10 8/10 7/10 6/10 5/10 4/10 3/10 2/10   POC EXP. DATE 03/31/17 03/31/17 03/31/17 03/31/17 03/31/17 03/31/17 03/31/17 03/31/17 03/31/17 03/31/17 03/31/17   VISIT AMOUNT    MEDICARE TOTAL 63.96    896.48 63.96    832.52 63.96    768.56 63.96    704.60 61.84    640.64 63.96    578.80 61.84    514.84 61.84    453.00 93.82    391.16 125.80    297.34 63.96    171.54   FACE-TO-FACE 04/02/17 04/02/17 04/02/17 04/02/17 04/02/17 03/02/17 03/02/17 03/02/17 03/02/17 03/02/17 03/02/17                 Norberto  -- -- -- -- -- -- -- -- -- -- --   TABLE:              HSS long sitting 10 x 10" 10 x 10" 10 x 10" 10 x 10" 10 x 10" 10 x 10" 10 x 10" 10 x 10" 10 x 10" 10 x 10" 10 x 10"   Quad sets oot 20 x 5" 20 x 5" 20 x 5" 20 x 5" 20 x 5" 20 x 5" 15 x 5" 15 x 5" 10 x 5" 10 x 5" " "  Bridge  oot Held  1 x 10 -- -- -- -- -- -- -- --   Marching  oot 1 x 30 1 x 25 1 x 20 -- Hold - pain 1 x 15 1 x 15 1 x 10 1 x 10 1 x 10   SLR oot 1 x 20 B 1 x 25 B 2 x 10 B 2 x 10 L  1 x 10 R 1 x 10 R  1 x 20 L 2 x 10 B 1 x 15 B 1 x 10 B 1 x 10 R SL, 1 x 10 L 1 x 2 R, 1 x 10 L   Hip abduction - sitting oot 1 x 30 GTB 1 x 30 GTB 1 x 20 GTB 1 x 30 RTB 1 x 30 RTB 1 x 30 RTB 3 x 10 RTB 1 x 20 RTB 1 x 15 RTB 1 x 15 RTB   Hip adduction - sitting oot 30 x 3" 30 x 3" 25 x 3" 20 x 3" 20 x 3" 20 x 3" 20 x 3" 15 x 3" w/ ball 10 x 3" w/ ball 10 x 3" w/ ball   Hip extension -- -- -- -- -- -- -- -- -- -- --   SAQ -- -- -- -- -- -- -- -- -- -- --   SEATED:              Cervical ROM  - flex/ ext  - rot R/L  - side bend R/L  - chin tucks   2 x 10  2 x 10  2 x 10  2 x 10   2 x 10  2 x 10  2 x 10  2 x 10   2 x 10  2 x 10  2 x 10  1 x 10   1 x 20  1 x 20  1 x 20  1 x 10   1 x 15  1 x 15  1 x 15  1 x 15   1 x 15  1 x 15  1 x 15  1 x 10   1 x 10  1 x 10   1 x 10  1 x 10   1 x 10  1 x 10   1 x 10  1 x 10   1 x 10  1 x 10  1 x 10  1 x 10   1 x 10  1 x 10  1 x 10  1 x 10   1 x 10  1 x 10  1 x 10  1 x 10   Levator scap. stretch 10 x 5" 10 x 5" 10 x 5" 5 x 5" 5 x 5" 5 x 5" 5 x 5" 5 x 5" 5 x 5" 5 x 5" 5 x 5"   UT stretch 10 x 5" 10 x 5" 10 x 5" 5 x 5" 5 x 5" 5 x 5" 5 x 5" 5 x 5" 5 x 5" 5 x 5" 5 x 5"   SCM stretch  10 x 5" 10 x 5" 10 x 5" 5 x 5" 5 x 5" 5 x 5" 5 x 5" 5 x 5" 5 x 5" 5 x 5" 5 x 5"   Pec. stretch oot 5 x 5" 5 x 5" 5 x 5" 5 x 5" 5 x 5" 5 x 5" 5 x 5" 5 x 5" 5 x 5" 5 x 5"   LAQs oot 1 x 30 2 x 10 x 1# 1 x 25 1 x 20 1 x 20 1 x 20 1 x 20 1 x 15 1 x 10 --   HS curls oot  2 x 10 RTB 1 x 10 RTB -- -- -- -- -- -- -- --   Seated Hip Flex oot 1 x 30 2 x 10 x 1# 3 x 10 2 x 10 2 x 10 2 x 10 2 x 10 1 x 15 1 x 10 1 x 10   STANDING:              Wall slides -- -- -- -- -- -- -- -- -- -- --   Mini squats oot X 25 X 25 4 x 5 4 x 5 1 x 15 1 x 15 1 x 10 -- -- --   Hip Abd -- -- -- -- -- -- -- -- -- -- --   Hip Flex -- -- -- -- -- -- -- -- -- -- -- " "  Hip Ext -- -- -- -- -- -- -- -- -- -- --   HS Curls -- -- -- -- -- -- -- -- -- -- --   Step Ups -- -- -- -- -- -- -- -- -- -- --   Rows   T's  ER oot 3 x 10 RTB  oot  oot 3 x 10 RTB  2 x 10 RTB  1 x 25 YTB 2 x 10 RTB  3 x 10 YTB  2 x 10 YTB 3 x 10 YTB  3 x 10 YTB 1 x 25 YTB  1 x 25 YTB 2 x 10 YTB  2 x 10 YTB 1 x 10 YTB  1 x 10 YTB      CP declined declined declined declined declined declined declined declined declined declined declined                 Initials DG DG DG GWA 4/6 GWA 3/6 GWA 2/6 GWA 1/6 DG GWA 1/6 DG GWA 1/6     Cervical AROM: Pain/Dysfunction with Movement:   Flexion 45* "to stop" at T3 level   Extension 24* Tightness in B UT   Right side bending 45*    Left side bending 44*    Right rotation 76*    Left rotation 80*        L/E MMT Right Left Pain/Dysfunction with Movement   Hip Flexion 4/5 5/5    Hip Extension 3+/5 3+/5    Hip Abduction 4/5 5/5 R top of knee pain   Knee Flexion 5/5 5/5    Knee Extension 5/5 5/5 Pain across R knee   Ankle DF 5/5 5/5    Ankle PF 5/5 5/5      FOTO neck 61, G code CJ, 20%<40%  FOTO knee 49, G code CK, 40%<60%  Assessment:     Patient was able to demonstrate an increase in cervical ROM to WNL. Her LE strength has also improved but she continues to require UE leverage with transfers and complaints of pain with sit to stand transfers. Sh wanda is also reporting decreased complaints of pain with her usual ADLs. Her score on FOTO knee places her in the 40%<60% impaired, limited, or restricted category and her score on FOTO neck places her in the 20%<40% impaired, limited, or restricted category. She is independent with her HEP and has met all goals set for her to her max rehab potential. She is ready for discharge to [facility].    Patient Education/Response:     Patient was instructed to continue with her HEP twice a day. Patient verbalized understanding instructions.     Plans and Goals:     Discharge to HEP.    Short Term Goals (4 Weeks):   1. This patient will be independent with " a basic HEP. MET  2. This patient will have cervical AROM WFL with no increase in symptoms in order to drive safely. MET  3. This patient will increase LE strength by 1 grade in order to perform sit to stand transfer with no increase in symptoms or UE leverage. MET  4. This patient will have a pain rating of 5/10 at worst with ADLs. MET  5. Patient will be able to achieve greater than or equal to 45 on the FOTO knee placing patient in 40%<60% impaired, limited, or restricted category demonstrating overall improved functional ability with lower extremity. MET  6. Patient able to score greater than or equal to 64 on the FOTO neck placing patient in 20%<40% impaired, limited, or restricted category demonstrating overall decreased neck pain with functional activities. MET    Long Term Goals (8 Weeks):   1. This patient will be independent with an updated HEP. MET  2. This patient will increase LE strength to 5/5 in order to be able to ascend/descend the step in front of her home with no difficulty.PARTIALLY MET  3. This patient will have a pain rating of 2/10 at worst with ADLs. PARTIALLY MET  4. Patient will be able to achieve greater than or equal to 65 on the FOTO knee placing patient in 20%<40% impaired, limited, or restricted category demonstrating overall improved functional ability with lower extremity. NOT MET  5. Patient able to score greater than or equal to 84 on the FOTO neck placing patient in 1%<20% impaired, limited, or restricted category demonstrating overall decreased neck pain with functional activities PARTIALLY MET

## 2017-03-23 NOTE — MR AVS SNAPSHOT
[facility]  [address] [facility], [address]  [facility] [address]  Phone: [phone]  Fax: [phone]                  Flores Fuentes   3/23/2017 1:00 PM   Office Visit    Description:  Female : 1950   Provider:  Naz Condon MD   Department:  [facility]           Reason for Visit     Annual Exam           Diagnoses this Visit        Comments    Routine general medical examination at a health care facility    -  Primary     Paroxysmal a-fib         Severe obesity (BMI 35.0-35.9 with comorbidity)         Hypothyroidism, unspecified type         Hyperlipidemia, unspecified hyperlipidemia type                To Do List           Goals (5 Years of Data)     None      [facility]     [facility] On Call Nurse Care Line -  Assistance  Registered nurses in the [facility] On Call Center provide clinical advisement, health education, appointment booking, and other advisory services.  Call for this free service at [phone].             Medications           Message regarding Medications     Verify the changes and/or additions to your medication regime listed below are the same as discussed with your clinician today.  If any of these changes or additions are incorrect, please notify your healthcare provider.        STOP taking these medications     FLUARIX QUAD 3229-5268, PF, 60 mcg (15 mcg x 4)/0.5 mL Syrg            Verify that the below list of medications is an accurate representation of the medications you are currently taking.  If none reported, the list may be blank. If incorrect, please contact your healthcare provider. Carry this list with you in case of emergency.           Current Medications     diltiaZEM (CARDIZEM CD) 180 MG 24 hr capsule Take 1 capsule (180 mg total) by mouth once daily.    dronedarone (MULTAQ) 400 mg Tab Take 1 tablet (400 mg total) by mouth 2 (two) times daily with meals.    ELIQUIS 5 mg Tab Take 5 mg by mouth 2 (two) times  "daily.    fluocinonide (LIDEX) 0.05 % ointment daily as needed.     fluticasone (FLONASE) 50 mcg/actuation nasal spray 1 spray by Each Nare route once daily.    levothyroxine (SYNTHROID) 25 MCG tablet Take 1 tablet (25 mcg total) by mouth once daily.    pravastatin (PRAVACHOL) 40 MG tablet Take 1 tablet (40 mg total) by mouth once daily.           Clinical Reference Information           Your Vitals Were     BP Pulse Temp Height Weight BMI    120/66 (BP Location: Right arm) 56 97.6 °F (36.4 °C) 5' 7" (1.702 m) 105.9 kg (233 lb 7.5 oz) 36.57 kg/m2      Blood Pressure          Most Recent Value    BP  120/66      Allergies as of 3/23/2017     Decongestant D [Pseudoephedrine-dm]    Augmentin [Amoxicillin-pot Clavulanate]      Immunizations Administered on Date of Encounter - 3/23/2017     None      Instructions    ==============================================================    Follow with  GYN & cardiologist    Blood pressure stable, continue medications  ==================    RECOMMENDATIONS FOR FEMALES    Prevent the #1 cause of death- cardiovascular disease (HEART ATTACK & STROKE) by checking for normal blood pressure, cholesterol, sugars, & by not smoking.     VACCINES: Yearly FLU shot, ONE PNEUMONIA shot after 65, ONE SHINGLES shot after 60    Screening colonoscopy at AGE  50 & every 10 years to check for COLON CANCER,  one of the most common & preventable cancers. Repeat in 3 years if POLYP found    I recommend  high fiber (5 fresh fruits or vegetables daily), low fat diet and aerobic  exercise (huffing/ puffing/ sweating for 20 min straight at least 4 days a week)    Follow up yearly with fasting lipids, CMP, CBC, TSH prior.   ==============================================================           Language Assistance Services     ATTENTION: Language assistance services are available, free of charge. Please call [phone].      ATENCIÓN: Si habla español, tiene a fong disposición servicios gratuitos de " asistencia lingüística. Florencia calhoun [phone].     MADONNA Ý: N?u b?n nói Ti?ng Vi?t, có các d?ch v? h? tr? ngôn ng? mi?n phí dành cho b?n. G?i s? [phone].         [facility] complies with applicable Federal civil rights laws and does not discriminate on the basis of race, color, national origin, age, disability, or sex.
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
## 2017-03-23 NOTE — PLAN OF CARE
REHAB SERVICES OUTPATIENT DISCHARGE SUMMARY  Physical Therapy      Name:  Flores Fuentes  Date:  03/23/17  Date of Evaluation:  01/31/17  Physician:  Naz Condon MD  Total # Of Visits:  12  Cancelled:  2 No Shows:  1  Diagnosis:    1. Decreased range of motion of neck     2. Weakness of both lower extremities     3. Antalgic gait     4. Pain aggravated by physical activity         Physical/Functional Status:  At time of discharge, patient was able to demonstrate an increase in cervical ROM to WNL. Her LE strength has also improved but she continues to require UE leverage with transfers and complaints of pain with sit to stand transfers. Sh wanda is also reporting decreased complaints of pain with her usual ADLs. Her score on FOTO knee places her in the 40%<60% impaired, limited, or restricted category and her score on FOTO neck places her in the 20%<40% impaired, limited, or restricted category. She is independent with her HEP and has met all goals set for her to her max rehab potential. She is ready for discharge to [facility].    The patient is to be discharged from our Therapy service for the following reason(s):  Patient has reached the maximum rehab potential for the present time    Degree of Goal Achievement:  Patient has met all goals    Patient Education:  Patient educated to continue with her HEP three times a week.    Discharge Plan:  Home Program:  For LE strength, cervical ROM, flexibility, and posture.

## 2017-04-14 ENCOUNTER — PATIENT MESSAGE (OUTPATIENT)
Dept: FAMILY MEDICINE | Facility: CLINIC | Age: 67
End: 2017-04-14

## 2017-04-26 ENCOUNTER — HOSPITAL ENCOUNTER (EMERGENCY)
Facility: HOSPITAL | Age: 67
Discharge: HOME | End: 2017-04-27
Attending: EMERGENCY MEDICINE
Payer: MEDICARE

## 2017-04-26 DIAGNOSIS — Z86.79 HISTORY OF ATRIAL FIBRILLATION: ICD-10-CM

## 2017-04-26 DIAGNOSIS — R00.2 INTERMITTENT PALPITATIONS: Primary | ICD-10-CM

## 2017-04-26 PROCEDURE — 96360 HYDRATION IV INFUSION INIT: CPT

## 2017-04-26 PROCEDURE — 99284 EMERGENCY DEPT VISIT MOD MDM: CPT | Mod: 25

## 2017-04-26 PROCEDURE — 93010 ELECTROCARDIOGRAM REPORT: CPT | Mod: ,,, | Performed by: INTERNAL MEDICINE

## 2017-04-26 PROCEDURE — 93005 ELECTROCARDIOGRAM TRACING: CPT

## 2017-04-26 NOTE — ED AVS SNAPSHOT
[facility]  [address]  [place] [address]               Flores Fuentes   2017 11:53 PM   ED    Description:  Female : 1950   Department:  [facility]           Your Care was Coordinated By:     Provider Role From To    Pia Almonte MD Attending Provider 17 0956 --      Reason for Visit     Palpitations           Diagnoses this Visit        Comments    Intermittent palpitations    -  Primary     History of atrial fibrillation           ED Disposition     None           To Do List           Follow-up Information     Follow up with Nza Condon MD In 1 week.    Specialty:  Family Medicine    Contact information:    [address] [facility] [address]  [address]  [facility] [address]  [phone]          Follow up with Robyn Mcdonough MD In 1 week.    Specialty:  Cardiology    Contact information:    [address]  [address]  [facility] [address]  [phone]        [facility]     [facility] Care Line -  Assistance  Unless otherwise directed by your provider, please contact [facility], our nurse care line that is available for  assistance.     Registered nurses in the [facility] provide: appointment scheduling, clinical advisement, health education, and other advisory services.  Call: [phone] (toll free)               Medications           Message regarding Medications     Verify the changes and/or additions to your medication regime listed below are the same as discussed with your clinician today.  If any of these changes or additions are incorrect, please notify your healthcare provider.        These medications were administered today        Dose Freq    sodium chloride 0.9% bolus 500 mL 500 mL Once    Sig: Inject 500 mLs into the vein once.    Class: Normal    Route: Intravenous           Verify that the below list of medications is an accurate representation of the medications you  "are currently taking.  If none reported, the list may be blank. If incorrect, please contact your healthcare provider. Carry this list with you in case of emergency.           Current Medications     diltiaZEM (CARDIZEM CD) 180 MG 24 hr capsule Take 1 capsule (180 mg total) by mouth once daily.    dronedarone (MULTAQ) 400 mg Tab Take 1 tablet (400 mg total) by mouth 2 (two) times daily with meals.    ELIQUIS 5 mg Tab Take 5 mg by mouth 2 (two) times daily.    fluocinonide (LIDEX) 0.05 % ointment daily as needed.     fluticasone (FLONASE) 50 mcg/actuation nasal spray 1 spray by Each Nare route once daily.    levothyroxine (SYNTHROID) 25 MCG tablet Take 1 tablet (25 mcg total) by mouth once daily.    pravastatin (PRAVACHOL) 40 MG tablet Take 1 tablet (40 mg total) by mouth once daily.           Clinical Reference Information           Your Vitals Were     BP Pulse Temp Resp Height Weight    122/69 63 97.7 °F (36.5 °C) (Oral) 12 5' 7" (1.702 m) 103.4 kg (228 lb)    SpO2 BMI             99% 35.71 kg/m2         Allergies as of 4/27/2017        Reactions    Decongestant D [Pseudoephedrine-dm]     Atrial Fibrillation    Augmentin [Amoxicillin-pot Clavulanate]     palpitations      Immunizations Administered on Date of Encounter - 4/27/2017     None      ED Micro, Lab, POCT     Start Ordered       Status Ordering Provider    04/27/17 0010 04/27/17 0009  CBC auto differential  STAT      Final result     04/27/17 0010 04/27/17 0009  Comprehensive metabolic panel  STAT      Final result     04/27/17 0010 04/27/17 0009  Protime-INR  STAT      Final result     04/27/17 0010 04/27/17 0009  Troponin I  Now then every 3 hours     Comments:  PLEASE REVIEW ORDER START TIME BEFORE MARKING SPECIMEN COLLECTED.   Start Status   04/27/17 0010 Final result   04/27/17 0310 Acknowledged       Acknowledged     04/27/17 0010 04/27/17 0009  Magnesium  STAT      Final result       ED Imaging Orders     None        Discharge Instructions     "     Understanding Heart Palpitations    Heart palpitations are a symptom. Its the feeling you have when your heartbeat seems to be racing, pounding, skipping, or fluttering. Heart palpitations are most often felt in the chest. Sometimes, they may also be felt in the neck.  What causes heart palpitations?  In most cases, heart palpitations are caused by:  · Stress or anxiety  · Exercise  · Pregnancy  · Some medicines  · Caffeine  · Nicotine  · Alcohol  · Illegal drugs, such as cocaine  · Health problems, such as anemia or overactive thyroid  In some cases, heart palpitations may be caused by a problem with the heart. Abnormal heart rhythms (arrhythmias) are the main concern. They may need to be managed by you and your healthcare provider or treated right away.  How are heart palpitations treated?  Treatments for heart palpitations depend on the cause. Options may include:  · Managing the things that trigger your heart palpitations. This could mean:  ¨ Learning ways to reduce stress and anxiety  ¨ Avoiding caffeine, nicotine, alcohol, or illegal drugs  ¨ Stopping the use of certain medicines, under your doctors guidance  · Medicines, procedures, or surgery to treat an arrhythmia or other health problem that is causing your symptoms  What are the complications of heart palpitations?  Complications of heart palpitations are rare unless they are caused by a problem such as an arrhythmia. In such cases, complications can include:  · Fainting  · Heart failure. This problem occurs when the heart is so weak it no longer pumps blood well.  · Blood clots and stroke  · Sudden cardiac arrest. This problem occurs when the heart suddenly stops beating.  When should I call my healthcare provider?  Call your healthcare provider right away if you have any of these:  · Fever of 100.4°F (38°C) or higher, or as directed  · Symptoms that dont get better with treatment, or symptoms that get worse  · New symptoms, such as chest pain,  "shortness of breath, dizziness, or fainting   Date Last Reviewed: 5/1/2016  © 4500-1896 [employer]. [address] [place] [address], [place] [address]. All rights reserved. This information is not intended as a substitute for professional medical care. Always follow your healthcare professional's instructions.          Heart Palpitations    Palpitations are the feeling that your heart is beating hard, fast, or irregular. Some describe it as "pounding" or "skipped beats." Palpitations may occur in someone with heart disease, but can also occur in a healthy person.  Heart-related causes:  · Arrhythmia (a change from the heart's normal rhythm)  · Heart valve disease  · Disease of the heart muscle  · Coronary artery disease  · High blood pressure  Non-heart-related causes:  · Certain medicines (such as asthma inhalers and decongestants)  · Some herbal supplements, energy drinks and pills, and weight loss pills  · Illegal stimulant drugs (such as cocaine, crank, methamphetamine, PCP, bath salts, ecstasy)  · Caffeine, alcohol, and tobacco  · Medical conditions such as thyroid disease, anemia, anxiety, and panic disorder  Sometimes the cause can't be found.  Home care  Follow these home care tips:  · Avoid excess caffeine, alcohol, tobacco, and any stimulant drugs.  · Tell your doctor about any prescription or over-the-counter or herbal medicines you take.  Follow-up care  · Follow up with your doctor, or as advised.  Call 911  This is the fastest and safest way to get to the emergency department. The paramedics can also begin treatment on the way to the hospital, if needed.  Don't wait until your symptoms are severe to call 911. These are reasons to call 911:  · Chest pain  · Shortness of breath  · Feeling lightheaded, faint, or dizzy  · Fainting or loss of consciousness  · Very irregular heartbeat  · Rapid heartbeat that makes you uncomfortable  · Slower than usual heart rate associated with symptoms  · Slower than " usual heart rate  · Chest pain with weakness, dizziness, heavy sweating, nausea, or vomiting  · Extreme drowsiness or confusion  · Weakness of an arm or leg, or on 1 side of the face  · Difficulty with speech or vision  When to seek medical advice  Get prompt medical attention if you have palpitations and any of the following:  · Weakness  · Dizziness  · Lightheadedness  · Fainting  Date Last Reviewed: 4/27/2016 © 2000-2016 [employer]. [address] [place] [address], [place] [address]. All rights reserved. This information is not intended as a substitute for professional medical care. Always follow your healthcare professional's instructions.           [facility] complies with applicable Federal civil rights laws and does not discriminate on the basis of race, color, national origin, age, disability, or sex.        Language Assistance Services     ATTENTION: Language assistance services are available, free of charge. Please call [phone].      ATENCIÓN: Si habla español, tiene a fong disposición servicios gratuitos de asistencia lingüística. Llame al [phone].     CHÚ Ý: N?u b?n nói Ti?ng Vi?t, có các d?ch v? h? tr? ngôn ng? mi?n phí dành cho b?n. G?i s? [phone].

## 2017-04-27 VITALS
OXYGEN SATURATION: 100 % | RESPIRATION RATE: 13 BRPM | BODY MASS INDEX: 35.79 KG/M2 | DIASTOLIC BLOOD PRESSURE: 62 MMHG | WEIGHT: 228 LBS | HEART RATE: 67 BPM | SYSTOLIC BLOOD PRESSURE: 126 MMHG | TEMPERATURE: 98 F | HEIGHT: 67 IN

## 2017-04-27 LAB
ALBUMIN SERPL BCP-MCNC: 4 G/DL
ALP SERPL-CCNC: 73 U/L
ALT SERPL W/O P-5'-P-CCNC: 11 U/L
ANION GAP SERPL CALC-SCNC: 12 MMOL/L
AST SERPL-CCNC: 14 U/L
BASOPHILS # BLD AUTO: 0.04 K/UL
BASOPHILS NFR BLD: 0.6 %
BILIRUB SERPL-MCNC: 0.7 MG/DL
BUN SERPL-MCNC: 18 MG/DL
CALCIUM SERPL-MCNC: 9.2 MG/DL
CHLORIDE SERPL-SCNC: 108 MMOL/L
CO2 SERPL-SCNC: 20 MMOL/L
CREAT SERPL-MCNC: 0.8 MG/DL
DIFFERENTIAL METHOD: NORMAL
EOSINOPHIL # BLD AUTO: 0.2 K/UL
EOSINOPHIL NFR BLD: 2.7 %
ERYTHROCYTE [DISTWIDTH] IN BLOOD BY AUTOMATED COUNT: 14 %
EST. GFR  (AFRICAN AMERICAN): >60 ML/MIN/1.73 M^2
EST. GFR  (NON AFRICAN AMERICAN): >60 ML/MIN/1.73 M^2
GLUCOSE SERPL-MCNC: 95 MG/DL
HCT VFR BLD AUTO: 41 %
HGB BLD-MCNC: 14 G/DL
INR PPP: 1
LYMPHOCYTES # BLD AUTO: 2 K/UL
LYMPHOCYTES NFR BLD: 31.4 %
MAGNESIUM SERPL-MCNC: 2.3 MG/DL
MCH RBC QN AUTO: 29 PG
MCHC RBC AUTO-ENTMCNC: 34.1 %
MCV RBC AUTO: 85 FL
MONOCYTES # BLD AUTO: 0.6 K/UL
MONOCYTES NFR BLD: 9.7 %
NEUTROPHILS # BLD AUTO: 3.5 K/UL
NEUTROPHILS NFR BLD: 55.4 %
PLATELET # BLD AUTO: 298 K/UL
PMV BLD AUTO: 10.3 FL
POTASSIUM SERPL-SCNC: 3.9 MMOL/L
PROT SERPL-MCNC: 7.5 G/DL
PROTHROMBIN TIME: 10.9 SEC
RBC # BLD AUTO: 4.82 M/UL
SODIUM SERPL-SCNC: 140 MMOL/L
TROPONIN I SERPL DL<=0.01 NG/ML-MCNC: 0.01 NG/ML
WBC # BLD AUTO: 6.3 K/UL

## 2017-04-27 PROCEDURE — 85025 COMPLETE CBC W/AUTO DIFF WBC: CPT

## 2017-04-27 PROCEDURE — 84484 ASSAY OF TROPONIN QUANT: CPT

## 2017-04-27 PROCEDURE — 85610 PROTHROMBIN TIME: CPT

## 2017-04-27 PROCEDURE — 83735 ASSAY OF MAGNESIUM: CPT

## 2017-04-27 PROCEDURE — 80053 COMPREHEN METABOLIC PANEL: CPT

## 2017-04-27 PROCEDURE — 25000003 PHARM REV CODE 250: Performed by: EMERGENCY MEDICINE

## 2017-04-27 RX ADMIN — SODIUM CHLORIDE 500 ML: 0.9 INJECTION, SOLUTION INTRAVENOUS at 12:04

## 2017-04-27 NOTE — DISCHARGE INSTRUCTIONS
Understanding Heart Palpitations    Heart palpitations are a symptom. Its the feeling you have when your heartbeat seems to be racing, pounding, skipping, or fluttering. Heart palpitations are most often felt in the chest. Sometimes, they may also be felt in the neck.  What causes heart palpitations?  In most cases, heart palpitations are caused by:  · Stress or anxiety  · Exercise  · Pregnancy  · Some medicines  · Caffeine  · Nicotine  · Alcohol  · Illegal drugs, such as cocaine  · Health problems, such as anemia or overactive thyroid  In some cases, heart palpitations may be caused by a problem with the heart. Abnormal heart rhythms (arrhythmias) are the main concern. They may need to be managed by you and your healthcare provider or treated right away.  How are heart palpitations treated?  Treatments for heart palpitations depend on the cause. Options may include:  · Managing the things that trigger your heart palpitations. This could mean:  ¨ Learning ways to reduce stress and anxiety  ¨ Avoiding caffeine, nicotine, alcohol, or illegal drugs  ¨ Stopping the use of certain medicines, under your doctors guidance  · Medicines, procedures, or surgery to treat an arrhythmia or other health problem that is causing your symptoms  What are the complications of heart palpitations?  Complications of heart palpitations are rare unless they are caused by a problem such as an arrhythmia. In such cases, complications can include:  · Fainting  · Heart failure. This problem occurs when the heart is so weak it no longer pumps blood well.  · Blood clots and stroke  · Sudden cardiac arrest. This problem occurs when the heart suddenly stops beating.  When should I call my healthcare provider?  Call your healthcare provider right away if you have any of these:  · Fever of 100.4°F (38°C) or higher, or as directed  · Symptoms that dont get better with treatment, or symptoms that get worse  · New symptoms, such as chest pain,  "shortness of breath, dizziness, or fainting   Date Last Reviewed: 5/1/2016  © 2784-1605 [employer], [employer]. [address] [place] [address], [place] [address]. All rights reserved. This information is not intended as a substitute for professional medical care. Always follow your healthcare professional's instructions.          Heart Palpitations    Palpitations are the feeling that your heart is beating hard, fast, or irregular. Some describe it as "pounding" or "skipped beats." Palpitations may occur in someone with heart disease, but can also occur in a healthy person.  Heart-related causes:  · Arrhythmia (a change from the heart's normal rhythm)  · Heart valve disease  · Disease of the heart muscle  · Coronary artery disease  · High blood pressure  Non-heart-related causes:  · Certain medicines (such as asthma inhalers and decongestants)  · Some herbal supplements, energy drinks and pills, and weight loss pills  · Illegal stimulant drugs (such as cocaine, crank, methamphetamine, PCP, bath salts, ecstasy)  · Caffeine, alcohol, and tobacco  · Medical conditions such as thyroid disease, anemia, anxiety, and panic disorder  Sometimes the cause can't be found.  Home care  Follow these home care tips:  · Avoid excess caffeine, alcohol, tobacco, and any stimulant drugs.  · Tell your doctor about any prescription or over-the-counter or herbal medicines you take.  Follow-up care  · Follow up with your doctor, or as advised.  Call 911  This is the fastest and safest way to get to the emergency department. The paramedics can also begin treatment on the way to the hospital, if needed.  Don't wait until your symptoms are severe to call 911. These are reasons to call 911:  · Chest pain  · Shortness of breath  · Feeling lightheaded, faint, or dizzy  · Fainting or loss of consciousness  · Very irregular heartbeat  · Rapid heartbeat that makes you uncomfortable  · Slower than usual heart rate associated with symptoms  · Slower than " usual heart rate  · Chest pain with weakness, dizziness, heavy sweating, nausea, or vomiting  · Extreme drowsiness or confusion  · Weakness of an arm or leg, or on 1 side of the face  · Difficulty with speech or vision  When to seek medical advice  Get prompt medical attention if you have palpitations and any of the following:  · Weakness  · Dizziness  · Lightheadedness  · Fainting  Date Last Reviewed: 4/27/2016  © 4014-0966 [employer]. [address] [place] [address], [place] [address]. All rights reserved. This information is not intended as a substitute for professional medical care. Always follow your healthcare professional's instructions.

## 2017-04-27 NOTE — ED NOTES
"Pt sitting up in bed, AAO x's 3,  at bedside. Pt stated that she came to the ER with c/o "fast HR" that does not feel like her a.fib usually feels. Pt is NSR on monitor with HR 60's. Pt denies SOB, NV, CP or any other symptoms.   APPEARANCE: Alert, oriented and in no acute distress.  CARDIAC: Normal rate and rhythm, no murmur heard.   PERIPHERAL VASCULAR: peripheral pulses present. Normal cap refill. No edema. Warm to touch.    RESPIRATORY:Normal rate and effort, breath sounds clear bilaterally throughout chest. Respirations are equal and unlabored no obvious signs of distress.  GASTRO: soft, bowel sounds normal, no tenderness, no abdominal distention.  MUSC: Full ROM. No bony tenderness or soft tissue tenderness. No obvious deformity.  SKIN: Skin is warm and dry, normal skin turgor, mucous membranes moist.  NEURO: 5/5 strength major flexors/extensors bilaterally. Sensory intact to light touch bilaterally. Demarcus coma scale: eyes open spontaneously-4, oriented & converses-5, obeys commands-6. No neurological abnormalities.   MENTAL STATUS: awake, alert and aware of environment.  EYE: PERRL, both eyes: pupils brisk and reactive to light. Normal size.  ENT: EARS: no obvious drainage. NOSE: no active bleeding.       "

## 2017-04-27 NOTE — ED PROVIDER NOTES
Encounter Date: 4/26/2017       History     Chief Complaint   Patient presents with    Palpitations     pt. reports palpitations in chest approx. 1hr ago. hx. a-fib.      Review of patient's allergies indicates:   Allergen Reactions    Decongestant d [pseudoephedrine-dm]      Atrial Fibrillation    Augmentin [amoxicillin-pot clavulanate]      palpitations       HPI Comments: Patient has seen Dr. Raya and is on eliquis, antiarrhythmics.  Has required cardioversion once previously. No recent excess caffeine, decongestants.  Seems to occur when eating dinner most frequently.       Patient is a 66 y.o. female presenting with the following complaint: palpitations.   Palpitations    This is a recurrent problem. The current episode started 1 to 2 hours ago. The problem occurs occasionally. The problem has been gradually improving. The problem is associated with anxiety (Worse after eating. ). On average, each episode lasts 15 minutes. Associated symptoms include dizziness. Pertinent negatives include no fever, no chest pain, no chest pressure, no nausea, no back pain, no weakness and no shortness of breath. She has tried breathing exercises for the symptoms. The treatment provided mild relief. Past medical history comments: Hypothyroidism and atrial fibrillation. .     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, l mandible, 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 Dr Viktor Toure    Paroxysmal atrioventricular tachycardia     Plantar fasciitis     Right knee meniscal tear     Dr Mayfield, tx conservatively    Severe obesity (BMI 35.0-35.9 with comorbidity) 1/24/2017    Thyroid disease      Past Surgical History:   Procedure Laterality Date     APPENDECTOMY      [facility]  [facility]    [facility]ro[facility]    MANDIBLE SURGERY  2015    L mandible, Dr Toure    TONSILLECTOMY       Family History   Problem Relation Age of Onset    Cancer Mother      stomach, liver    Melanoma Neg Hx      Social History   Substance Use Topics    Smoking status: Never Smoker    Smokeless tobacco: Never Used    Alcohol use No     Review of Systems   Constitutional: Negative for fever.   HENT: Negative for sore throat.    Respiratory: Negative for shortness of breath.    Cardiovascular: Positive for palpitations. Negative for chest pain.   Gastrointestinal: Negative for nausea.   Genitourinary: Negative for dysuria.   Musculoskeletal: Negative for back pain.   Skin: Negative for rash.   Neurological: Positive for dizziness. Negative for weakness.   Hematological: Does not bruise/bleed easily.   All other systems reviewed and are negative.      Physical Exam   Initial Vitals   BP Pulse Resp Temp SpO2   04/26/17 2350 04/26/17 2350 04/26/17 2350 04/26/17 2350 04/26/17 2350   166/85 70 16 97.7 °F (36.5 °C) 97 %     Physical Exam    Nursing note and vitals reviewed.  Constitutional: She appears well-developed and well-nourished.   HENT:   Head: Normocephalic and atraumatic.   Eyes: Conjunctivae and EOM are normal. Pupils are equal, round, and reactive to light. Right eye exhibits no discharge. Left eye exhibits no discharge. No scleral icterus.   Neck: Normal range of motion. Neck supple.   Cardiovascular: Normal rate, regular rhythm and normal heart sounds. Exam reveals no gallop and no friction rub.    No murmur heard.  Pulmonary/Chest: Breath sounds normal. No stridor. No respiratory distress. She has no wheezes. She has no rhonchi. She has no rales.   Abdominal: Soft. Bowel sounds are normal. She exhibits no distension and no mass. There is no tenderness. There is no rebound and no guarding.   Neurological: She is alert and oriented to person, place, and time. She has normal  strength. No cranial nerve deficit or sensory deficit.   Skin: Skin is warm and dry.   Psychiatric: She has a normal mood and affect. Her behavior is normal. Judgment and thought content normal.         ED Course   Procedures  Labs Reviewed   COMPREHENSIVE METABOLIC PANEL - Abnormal; Notable for the following:        Result Value    CO2 20 (*)     All other components within normal limits    Narrative:     PLEASE REVIEW ORDER START TIME BEFORE MARKING SPECIMEN  COLLECTED.   CBC W/ AUTO DIFFERENTIAL    Narrative:     PLEASE REVIEW ORDER START TIME BEFORE MARKING SPECIMEN  COLLECTED.   PROTIME-INR    Narrative:     PLEASE REVIEW ORDER START TIME BEFORE MARKING SPECIMEN  COLLECTED.   TROPONIN I    Narrative:     PLEASE REVIEW ORDER START TIME BEFORE MARKING SPECIMEN  COLLECTED.   MAGNESIUM    Narrative:     PLEASE REVIEW ORDER START TIME BEFORE MARKING SPECIMEN  COLLECTED.   TROPONIN I     EKG Readings: (Independently Interpreted)   Initial Reading: No STEMI. Rhythm: Normal Sinus Rhythm. Ectopy: No Ectopy. Conduction: Normal. ST Segments: Normal ST Segments. T Waves: Normal. Axis: Normal. Clinical Impression: Normal Sinus Rhythm            Medical Decision Making:   Initial Assessment:   Palpitations in patient with hx of atrial fibrillation on eliquis.  Currently in NSR.  Will r/o electrolyte abnormalities and monitor.    Differential Diagnosis:   Paroxysmal atrial fibrillation, sinus arrhythmia, COPD, bronchitis, CHF, GERD  ED Management:  Patient remained in sinus rhythm and labs did not show any acute abnormalities. Recommended f/u with cardiologist ASAP.                   ED Course     Clinical Impression:   The primary encounter diagnosis was Intermittent palpitations. A diagnosis of History of atrial fibrillation was also pertinent to this visit.    Disposition:   Disposition: Discharged  Condition: Stable       Pia Almonte MD  05/02/17 1040

## 2017-05-17 ENCOUNTER — OFFICE VISIT (OUTPATIENT)
Dept: FAMILY MEDICINE | Facility: CLINIC | Age: 67
End: 2017-05-17
Payer: MEDICARE

## 2017-05-17 VITALS
TEMPERATURE: 98 F | BODY MASS INDEX: 36.09 KG/M2 | HEART RATE: 56 BPM | WEIGHT: 229.94 LBS | HEIGHT: 67 IN | DIASTOLIC BLOOD PRESSURE: 70 MMHG | SYSTOLIC BLOOD PRESSURE: 138 MMHG

## 2017-05-17 DIAGNOSIS — I48.0 PAROXYSMAL A-FIB: Chronic | ICD-10-CM

## 2017-05-17 DIAGNOSIS — L98.9 LESION OF SKIN OF FACE: Primary | ICD-10-CM

## 2017-05-17 PROCEDURE — 99999 PR PBB SHADOW E&M-EST. PATIENT-LVL III: CPT | Mod: PBBFAC,,, | Performed by: FAMILY MEDICINE

## 2017-05-17 PROCEDURE — 1159F MED LIST DOCD IN RCRD: CPT | Mod: S$GLB,,, | Performed by: FAMILY MEDICINE

## 2017-05-17 PROCEDURE — 99499 UNLISTED E&M SERVICE: CPT | Mod: S$GLB,,, | Performed by: FAMILY MEDICINE

## 2017-05-17 PROCEDURE — 1126F AMNT PAIN NOTED NONE PRSNT: CPT | Mod: S$GLB,,, | Performed by: FAMILY MEDICINE

## 2017-05-17 PROCEDURE — 99212 OFFICE O/P EST SF 10 MIN: CPT | Mod: S$GLB,,, | Performed by: FAMILY MEDICINE

## 2017-05-17 PROCEDURE — 1160F RVW MEDS BY RX/DR IN RCRD: CPT | Mod: S$GLB,,, | Performed by: FAMILY MEDICINE

## 2017-05-17 NOTE — PROGRESS NOTES
Subjective:      Patient ID: Flores Fuentes is a 66 y.o. female.    Chief Complaint: dry spot left cheek    Here today for flaking lesion on the left cheek which has been present for about a year but increased in size, no bleeding.  She would like me to evaluate this    She'll then also make a referral to an electrophysiologist with her atrial fibrillation    Current Outpatient Prescriptions on File Prior to Visit   Medication Sig    diltiaZEM (CARDIZEM CD) 180 MG 24 hr capsule Take 1 capsule (180 mg total) by mouth once daily. (Patient taking differently: Take 120 mg by mouth once daily. )    dronedarone (MULTAQ) 400 mg Tab Take 1 tablet (400 mg total) by mouth 2 (two) times daily with meals.    ELIQUIS 5 mg Tab Take 5 mg by mouth 2 (two) times daily.    fluocinonide (LIDEX) 0.05 % ointment daily as needed.     fluticasone (FLONASE) 50 mcg/actuation nasal spray 1 spray by Each Nare route once daily. (Patient taking differently: 1 spray by Each Nare route daily as needed. )    levothyroxine (SYNTHROID) 25 MCG tablet Take 1 tablet (25 mcg total) by mouth once daily.    pravastatin (PRAVACHOL) 40 MG tablet Take 1 tablet (40 mg total) by mouth once daily.     No current facility-administered medications on file prior to visit.      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, l mandible, 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 Dr Viktor Toure    Paroxysmal atrioventricular tachycardia     Plantar fasciitis     Right knee meniscal tear     Dr Mayfield, tx conservatively    Severe obesity (BMI 35.0-35.9 with comorbidity) 1/24/2017    Thyroid disease      Past Surgical History:   Procedure Laterality Date    APPENDECTOMY      HYSTERECTOMY  1990     "[facility] for fibroids    MANDIBLE SURGERY  2015    L mandible, Dr Toure    TONSILLECTOMY       Social History     Social History Narrative    Retired 2012,  [employer], 3 children, nonsmoker, ETOH socially, GYN [facility], colonoscopy normal 58, rec repeat at 68     Family History   Problem Relation Age of Onset    Cancer Mother      stomach, liver    Melanoma Neg Hx      Vitals:    05/17/17 0941   BP: 138/70   Pulse: (!) 56   Temp: 97.5 °F (36.4 °C)   Weight: 104.3 kg (229 lb 15 oz)   Height: 5' 7" (1.702 m)   PainSc: 0-No pain     Objective:   Physical Exam   Skin:   1-1/2 cm irregular flaking lesion on the left cheek, not elevated, not dark     Assessment:     1. Lesion of skin of face    2. Paroxysmal a-fib      Plan:     Orders Placed This Encounter    Ambulatory referral to Dermatology    Ambulatory Referral to Electrophysiology       There are no Patient Instructions on file for this visit.                            "

## 2017-05-17 NOTE — MR AVS SNAPSHOT
[facility]  [address] [facility], [address]  [facility] [address]  Phone: [phone]  Fax: [phone]                  Flores Fuentes   2017 10:40 AM   Office Visit    Description:  Female : 1950   Provider:  Naz Condon MD   Department:  [facility]           Reason for Visit     dry spot left cheek           Diagnoses this Visit        Comments    Lesion of skin of face    -  Primary     Paroxysmal a-fib                To Do List           Future Appointments        Provider Department Dept Phone    2017 10:40 AM Naz Condon MD [facility] [phone]      Goals (5 Years of Data)     None      [facility]     [facility] On Call Nurse Care Line -  Assistance  Unless otherwise directed by your provider, please contact [facility], our nurse care line that is available for  assistance.     Registered nurses in the [facility] On Call Center provide: appointment scheduling, clinical advisement, health education, and other advisory services.  Call: [phone] (toll free)               Medications           Message regarding Medications     Verify the changes and/or additions to your medication regime listed below are the same as discussed with your clinician today.  If any of these changes or additions are incorrect, please notify your healthcare provider.             Verify that the below list of medications is an accurate representation of the medications you are currently taking.  If none reported, the list may be blank. If incorrect, please contact your healthcare provider. Carry this list with you in case of emergency.           Current Medications     diltiaZEM (CARDIZEM CD) 180 MG 24 hr capsule Take 1 capsule (180 mg total) by mouth once daily.    dronedarone (MULTAQ) 400 mg Tab Take 1 tablet (400 mg total) by mouth 2 (two) times daily with meals.    ELIQUIS 5 mg Tab Take 5 mg by mouth 2 (two) times daily.  "   fluocinonide (LIDEX) 0.05 % ointment daily as needed.     fluticasone (FLONASE) 50 mcg/actuation nasal spray 1 spray by Each Nare route once daily.    levothyroxine (SYNTHROID) 25 MCG tablet Take 1 tablet (25 mcg total) by mouth once daily.    pravastatin (PRAVACHOL) 40 MG tablet Take 1 tablet (40 mg total) by mouth once daily.           Clinical Reference Information           Your Vitals Were     BP Pulse Temp Height Weight BMI    138/70 (BP Location: Right arm) 56 97.5 °F (36.4 °C) 5' 7" (1.702 m) 104.3 kg (229 lb 15 oz) 36.01 kg/m2      Blood Pressure          Most Recent Value    BP  138/70      Allergies as of 5/17/2017     Decongestant D [Pseudoephedrine-dm]    Augmentin [Amoxicillin-pot Clavulanate]      Immunizations Administered on Date of Encounter - 5/17/2017     None      Orders Placed During Today's Visit      Normal Orders This Visit    Ambulatory referral to Dermatology     Ambulatory Referral to Electrophysiology       Language Assistance Services     ATTENTION: Language assistance services are available, free of charge. Please call [phone].      ATENCIÓN: Si habla shirley, tiene a fong disposición servicios gratuitos de asistencia lingüística. Llame al [phone].     MADONNA Ý: N?u b?n nói Ti?ng Vi?t, có các d?ch v? h? tr? ngôn ng? mi?n phí dành cho b?n. G?i s? [phone].         [facility] complies with applicable Federal civil rights laws and does not discriminate on the basis of race, color, national origin, age, disability, or sex.        "

## 2017-05-19 DIAGNOSIS — I48.0 PAF (PAROXYSMAL ATRIAL FIBRILLATION): Primary | ICD-10-CM

## 2017-05-22 ENCOUNTER — OFFICE VISIT (OUTPATIENT)
Dept: CARDIOLOGY | Facility: CLINIC | Age: 67
End: 2017-05-22
Payer: MEDICARE

## 2017-05-22 VITALS
SYSTOLIC BLOOD PRESSURE: 128 MMHG | WEIGHT: 229 LBS | DIASTOLIC BLOOD PRESSURE: 75 MMHG | HEIGHT: 67 IN | HEART RATE: 64 BPM | BODY MASS INDEX: 35.94 KG/M2

## 2017-05-22 DIAGNOSIS — I48.0 PAF (PAROXYSMAL ATRIAL FIBRILLATION): ICD-10-CM

## 2017-05-22 DIAGNOSIS — E78.5 HYPERLIPIDEMIA, UNSPECIFIED HYPERLIPIDEMIA TYPE: Chronic | ICD-10-CM

## 2017-05-22 DIAGNOSIS — I48.0 PAROXYSMAL A-FIB: Primary | Chronic | ICD-10-CM

## 2017-05-22 DIAGNOSIS — E03.4 HYPOTHYROIDISM DUE TO ACQUIRED ATROPHY OF THYROID: Chronic | ICD-10-CM

## 2017-05-22 PROCEDURE — 1126F AMNT PAIN NOTED NONE PRSNT: CPT | Mod: S$GLB,,, | Performed by: INTERNAL MEDICINE

## 2017-05-22 PROCEDURE — 99999 PR PBB SHADOW E&M-EST. PATIENT-LVL III: CPT | Mod: PBBFAC,,, | Performed by: INTERNAL MEDICINE

## 2017-05-22 PROCEDURE — 1159F MED LIST DOCD IN RCRD: CPT | Mod: S$GLB,,, | Performed by: INTERNAL MEDICINE

## 2017-05-22 PROCEDURE — 1160F RVW MEDS BY RX/DR IN RCRD: CPT | Mod: S$GLB,,, | Performed by: INTERNAL MEDICINE

## 2017-05-22 PROCEDURE — 93000 ELECTROCARDIOGRAM COMPLETE: CPT | Mod: S$GLB,,, | Performed by: INTERNAL MEDICINE

## 2017-05-22 PROCEDURE — 99205 OFFICE O/P NEW HI 60 MIN: CPT | Mod: S$GLB,,, | Performed by: INTERNAL MEDICINE

## 2017-05-22 PROCEDURE — 99499 UNLISTED E&M SERVICE: CPT | Mod: S$GLB,,, | Performed by: INTERNAL MEDICINE

## 2017-05-22 RX ORDER — METOPROLOL SUCCINATE 25 MG/1
25 TABLET, EXTENDED RELEASE ORAL DAILY
Qty: 90 TABLET | Refills: 3 | Status: SHIPPED | OUTPATIENT
Start: 2017-05-22 | End: 2017-06-05 | Stop reason: SDUPTHER

## 2017-05-22 RX ORDER — FLECAINIDE ACETATE 150 MG/1
150 TABLET ORAL DAILY PRN
Qty: 10 TABLET | Refills: 3 | Status: SHIPPED | OUTPATIENT
Start: 2017-05-22 | End: 2019-04-02 | Stop reason: SDUPTHER

## 2017-05-22 NOTE — PROGRESS NOTES
Subjective:    Patient ID:  Flores Fuentes is a 66 y.o. female who presents for evaluation of Atrial Fibrillation      Atrial Fibrillation   Symptoms include palpitations. Symptoms are negative for chest pain, dizziness, shortness of breath, syncope and weakness. Past medical history includes atrial fibrillation.      66 y.o. F  AF first dx 2006 after lots of caffeine. Few episodes since then.  HL, on meds  obesity  hypothyroid, on med    Went to ER 3/10 with palps. Underwent DCCV 3/13/17 after remaining in AF with RVR. Started on multaq, and BB d/c'd.  Since, feeling not quite as well as usually, and on 4/17, at which time HR was 120s and didn't feel same as prior AF episodes.  She'd been on BB for years w/o issues.  Good exertion tolerance. No CP.    No recent echo    My interpretation of today's ECG is NSR      Review of Systems   Constitution: Negative. Negative for weakness and malaise/fatigue.   HENT: Negative.  Negative for ear pain and tinnitus.    Eyes: Negative for blurred vision.   Cardiovascular: Positive for palpitations. Negative for chest pain, dyspnea on exertion, near-syncope and syncope.   Respiratory: Negative.  Negative for shortness of breath.    Endocrine: Negative.  Negative for polyuria.   Hematologic/Lymphatic: Does not bruise/bleed easily.   Skin: Negative.  Negative for rash.   Musculoskeletal: Negative.  Negative for joint pain and muscle weakness.   Gastrointestinal: Negative.  Negative for abdominal pain and change in bowel habit.   Genitourinary: Negative for frequency.   Neurological: Negative.  Negative for dizziness.   Psychiatric/Behavioral: Negative.  Negative for depression. The patient is not nervous/anxious.    Allergic/Immunologic: Negative for environmental allergies.        Objective:    Physical Exam   Constitutional: She is oriented to person, place, and time. Vital signs are normal. She appears well-developed and well-nourished. She is active and cooperative.    HENT:   Head: Normocephalic and atraumatic.   Eyes: Conjunctivae and EOM are normal.   Neck: Normal range of motion. Carotid bruit is not present. No tracheal deviation and no edema present. No thyroid mass and no thyromegaly present.   Cardiovascular: Normal rate, regular rhythm, normal heart sounds, intact distal pulses and normal pulses.   No extrasystoles are present. PMI is not displaced.  Exam reveals no gallop and no friction rub.    No murmur heard.  Pulmonary/Chest: Effort normal and breath sounds normal. No respiratory distress. She has no wheezes. She has no rales.   Abdominal: Soft. Normal appearance. She exhibits no distension. There is no hepatosplenomegaly.   Musculoskeletal: Normal range of motion.   Neurological: She is alert and oriented to person, place, and time. Coordination normal.   Skin: Skin is warm and dry. No rash noted.   Psychiatric: She has a normal mood and affect. Her speech is normal and behavior is normal. Thought content normal. Cognition and memory are normal.   Nursing note and vitals reviewed.        Assessment:       1. Paroxysmal a-fib    2. Hypothyroidism due to acquired atrophy of thyroid    3. Hyperlipidemia, unspecified hyperlipidemia type         Plan:       Discussed AF and its basic pathophysiology, including its health implications and treatment options (rate vs. rhythm control, meds vs. procedural/device treatment) as appropriate for the patient.  Discussed options for anticoagulation, including ASA vs coumadin vs dabigatran/rivaroxaban/apixaban (novel anticoagulants). Discussed risks and benefits of each, including dosing, side effects, risk (including no specific reversal agent for some NOACs), and cost. Answered all questions. This process took about 15 minutes.    Symptomatic AF.  d/c Multaq due to pt not feeling well on it, and due to its low efficacy and the need to take it chronically (rather than prn).  Due to rare episodes of AF, a Pill-In-The-Pocket  approach would made sense here. Discussed pill-in-the-pocket approach to AF. Gave pt instructions to present to ER if sustained AF is felt, for the first trial of that approach; Give Flecainide 300 mg PO x 1 in ED. Provided reference to [facility] article (Amanda et al. N Engl J Med 2004;351:2384-91). If tolerated and effective, thereafter patient would be able to self-direct pill-in-the-pocket treatment.     Update echo  Restart beta blocker    Return in 1 year with echo, or earlier prn.

## 2017-05-22 NOTE — LETTER
May 22, 2017      Naz Condon MD  [address] [facility]  [address]  [facility] [address]           [facility]  [address], [address]  [facility] [address]  Phone: [phone]          Patient: Flores Fuentes   MR Number: [id number]   YOB: 1950   Date of Visit: 5/22/2017       Dear Dr. Naz Condon:    Thank you for referring Flores Fuentes to me for evaluation. Attached you will find relevant portions of my assessment and plan of care.    If you have questions, please do not hesitate to call me. I look forward to following Flores Fuentes along with you.    Sincerely,    Ameya Servin MD    Enclosure  CC:  No Recipients    If you would like to receive this communication electronically, please contact [email or web address] or [phone] to request more information on [employer] Link access.    For providers and/or their staff who would like to refer a patient to [facility], please contact us through our one-stop-shop provider referral line, [facility] , at [phone].    If you feel you have received this communication in error or would no longer like to receive these types of communications, please e-mail [email or web address]

## 2017-05-23 DIAGNOSIS — I48.0 PAROXYSMAL A-FIB: Primary | Chronic | ICD-10-CM

## 2017-05-23 RX ORDER — DILTIAZEM HYDROCHLORIDE 120 MG/1
120 CAPSULE, COATED, EXTENDED RELEASE ORAL DAILY
Qty: 90 CAPSULE | Refills: 3
Start: 2017-05-23 | End: 2017-06-05 | Stop reason: SDUPTHER

## 2017-05-23 NOTE — TELEPHONE ENCOUNTER
----- Message from Ameya Servin MD sent at 5/23/2017 12:16 PM CDT -----  Contact: [phone]  The lower dose, as we restarted the beta blocker yesterday.    ----- Message -----  From: Helene Carrera RN  Sent: 5/23/2017  11:28 AM  To: Ameya Servin MD    Pt was seen in clinic yesterday. She reports Dr. Mcdonough decreased her Diltiazem back to 120 mg from 180 mg. Which dose you want her on?  ----- Message -----  From: Brittani Santana MA  Sent: 5/22/2017   4:03 PM  To: Helene Carrera RN    Hi Helene,    Pt has  Two dosages of Diltiazem and wants to know which one to take. She has 120 and 180mg, please advise.    ThanksBrittani  ----- Message -----  From: Urszula Whiting  Sent: 5/22/2017   3:56 PM  To: Malathi BELL Staff    Pt requesting a nurse call back regarding her medication. Please advise.

## 2017-05-26 ENCOUNTER — HOSPITAL ENCOUNTER (OUTPATIENT)
Dept: CARDIOLOGY | Facility: HOSPITAL | Age: 67
Discharge: HOME | End: 2017-05-26
Attending: INTERNAL MEDICINE
Payer: MEDICARE

## 2017-05-26 DIAGNOSIS — I48.0 PAROXYSMAL A-FIB: Chronic | ICD-10-CM

## 2017-05-26 LAB
DIASTOLIC DYSFUNCTION: NO
ESTIMATED PA SYSTOLIC PRESSURE: 25.28
MITRAL VALVE MOBILITY: NORMAL
RETIRED EF AND QEF - SEE NOTES: 65 (ref 55–65)
TRICUSPID VALVE REGURGITATION: NORMAL

## 2017-05-26 PROCEDURE — 93306 TTE W/DOPPLER COMPLETE: CPT | Mod: PO

## 2017-05-26 PROCEDURE — 93306 TTE W/DOPPLER COMPLETE: CPT | Mod: 26,,, | Performed by: INTERNAL MEDICINE

## 2017-05-30 ENCOUNTER — TELEPHONE (OUTPATIENT)
Dept: ELECTROPHYSIOLOGY | Facility: CLINIC | Age: 67
End: 2017-05-30

## 2017-05-30 NOTE — TELEPHONE ENCOUNTER
----- Message from Ameya Servin MD sent at 5/29/2017  4:24 PM CDT -----  Contact: pt   Might need more beta blocker indeed.  Let's wait for beta blocker to reach steady state (4 days). If sx persist, say, on Wednesday, please get a 24 hour Holter this week and have her f/u with me or Krystle next week.  thanks  DPM      ----- Message -----  From: Helene Carrera RN  Sent: 5/29/2017   2:49 PM  To: Ameya Servin MD    Pt saw you on 5/22-you stopped her Multaq due to SE's, Started Metoprolol 25 mg daily, then Flecainide pill in pocket. Pt now reports intermittent palpitations since med changes. She is asking if Metoprolol dose needs increase?  ----- Message -----  From: Deanna Fitzgerald  Sent: 5/29/2017   8:52 AM  To: Helene Carrera RN    PT called because she is noticing heart palpitations since she has started taking the new medicine.  She was just started on the metoporal about 3 days ago.  She can be reached @ [phone].  Thanks!!

## 2017-05-30 NOTE — TELEPHONE ENCOUNTER
Spoke with pt about palpitations and waiting to see if they persist until Wednesday. Pt reports they seem to be subsiding some. Pt will dunia back tomorrow and update on how she is doing.

## 2017-05-31 ENCOUNTER — INITIAL CONSULT (OUTPATIENT)
Dept: DERMATOLOGY | Facility: CLINIC | Age: 67
End: 2017-05-31
Payer: MEDICARE

## 2017-05-31 VITALS — BODY MASS INDEX: 35.87 KG/M2 | WEIGHT: 229 LBS

## 2017-05-31 DIAGNOSIS — D22.9 NEVUS OF MULTIPLE SITES: ICD-10-CM

## 2017-05-31 DIAGNOSIS — L91.8 CUTANEOUS SKIN TAGS: ICD-10-CM

## 2017-05-31 DIAGNOSIS — L82.1 SEBORRHEIC KERATOSES: Primary | ICD-10-CM

## 2017-05-31 PROCEDURE — 99999 PR PBB SHADOW E&M-EST. PATIENT-LVL II: CPT | Mod: PBBFAC,,, | Performed by: DERMATOLOGY

## 2017-05-31 PROCEDURE — 1159F MED LIST DOCD IN RCRD: CPT | Mod: S$GLB,,, | Performed by: DERMATOLOGY

## 2017-05-31 PROCEDURE — 99202 OFFICE O/P NEW SF 15 MIN: CPT | Mod: S$GLB,,, | Performed by: DERMATOLOGY

## 2017-05-31 NOTE — PROGRESS NOTES
"  Subjective:       Patient ID:  Flores Fuentes is a 66 y.o. female who presents for   Chief Complaint   Patient presents with    Spot    Skin Check     face     History of Present Illness: The patient presents with chief complaint of spot.  Location: left cheek  Duration: months  Signs/Symptoms: none     Prior treatments: non        Spot         Review of Systems   Constitutional: Negative for fever.   Skin: Negative for itching and rash.   Hematologic/Lymphatic: Does not bruise/bleed easily.        Objective:    Physical Exam   Constitutional: She appears well-developed and well-nourished. No distress.   Neurological: She is alert and oriented to person, place, and time. She is not disoriented.   Psychiatric: She has a normal mood and affect.   Skin:   Areas Examined (abnormalities noted in diagram):   Head / Face Inspection Performed  Neck Inspection Performed  Chest / Axilla Inspection Performed  Back Inspection Performed  RUE Inspected  LUE Inspection Performed  RLE Inspected  LLE Inspection Performed                   Diagram Legend        Pigmented verrucoid papule/plaque c/w seborrheic keratosis        Pedunculated fleshy papule(s) c/w skin tag(s)       Assessment / Plan:        Seborrheic keratoses  .reassurance    Cutaneous skin tags  reassurance        Nevus of multiple sites  Brochure provided  The "ABCD" rules to observe pigmented lesions were reviewed.               Return in about 1 year (around 5/31/2018).  "

## 2017-05-31 NOTE — LETTER
May 31, 2017      Naz Condon MD  [address] [facility]  [address]  [facility] [address]           [place] [facility] Dermatology  [address] [address]  Phone: [phone]  Fax: [phone]          Patient: Flores Fuentes   MR Number: [id number]   YOB: 1950   Date of Visit: 5/31/2017       Dear Dr. Naz Condon:    Thank you for referring Flores Fuentes to me for evaluation. Attached you will find relevant portions of my assessment and plan of care.    If you have questions, please do not hesitate to call me. I look forward to following Flores Fuentes along with you.    Sincerely,    Carito Ac MD    Enclosure  CC:  No Recipients    If you would like to receive this communication electronically, please contact [email or web address] or [phone] to request more information on [employer] Link access.    For providers and/or their staff who would like to refer a patient to [facility], please contact us through our one-stop-shop provider referral line, [facility], at [phone].    If you feel you have received this communication in error or would no longer like to receive these types of communications, please e-mail [email or web address]

## 2017-06-05 DIAGNOSIS — I48.0 PAROXYSMAL A-FIB: Chronic | ICD-10-CM

## 2017-06-05 RX ORDER — METOPROLOL SUCCINATE 25 MG/1
25 TABLET, EXTENDED RELEASE ORAL DAILY
Qty: 90 TABLET | Refills: 3 | Status: SHIPPED | OUTPATIENT
Start: 2017-06-05 | End: 2018-06-09 | Stop reason: SDUPTHER

## 2017-06-05 RX ORDER — DILTIAZEM HYDROCHLORIDE 120 MG/1
120 CAPSULE, COATED, EXTENDED RELEASE ORAL DAILY
Qty: 90 CAPSULE | Refills: 3 | Status: SHIPPED | OUTPATIENT
Start: 2017-06-05 | End: 2018-03-10 | Stop reason: SDUPTHER

## 2017-06-05 NOTE — TELEPHONE ENCOUNTER
----- Message from Johanna Joy sent at 6/5/2017  9:42 AM CDT -----  Contact: pt  Pt need refills on her Cardizem  mg CP24, Toprol-XL 25 mg 24hr tablet, Eliquis 5 mg. [facility]    Thanks

## 2017-10-16 RX ORDER — FLUTICASONE PROPIONATE 50 MCG
SPRAY, SUSPENSION (ML) NASAL
Qty: 32 G | Refills: 3 | Status: SHIPPED | OUTPATIENT
Start: 2017-10-16 | End: 2018-03-28 | Stop reason: SDUPTHER

## 2017-10-31 ENCOUNTER — HOSPITAL ENCOUNTER (OUTPATIENT)
Dept: RADIOLOGY | Facility: HOSPITAL | Age: 67
Discharge: HOME | End: 2017-10-31
Attending: FAMILY MEDICINE
Payer: MEDICARE

## 2017-10-31 ENCOUNTER — OFFICE VISIT (OUTPATIENT)
Dept: FAMILY MEDICINE | Facility: CLINIC | Age: 67
End: 2017-10-31
Payer: MEDICARE

## 2017-10-31 VITALS
OXYGEN SATURATION: 98 % | HEIGHT: 67 IN | WEIGHT: 233.69 LBS | BODY MASS INDEX: 36.68 KG/M2 | SYSTOLIC BLOOD PRESSURE: 118 MMHG | DIASTOLIC BLOOD PRESSURE: 60 MMHG | TEMPERATURE: 98 F | HEART RATE: 56 BPM

## 2017-10-31 DIAGNOSIS — R06.2 WHEEZING: ICD-10-CM

## 2017-10-31 DIAGNOSIS — E78.5 HYPERLIPIDEMIA, UNSPECIFIED HYPERLIPIDEMIA TYPE: Chronic | ICD-10-CM

## 2017-10-31 DIAGNOSIS — I48.0 PAROXYSMAL A-FIB: Chronic | ICD-10-CM

## 2017-10-31 DIAGNOSIS — J18.9 COMMUNITY ACQUIRED PNEUMONIA, UNSPECIFIED LATERALITY: ICD-10-CM

## 2017-10-31 DIAGNOSIS — R06.2 WHEEZING: Primary | ICD-10-CM

## 2017-10-31 PROCEDURE — 71020 XR CHEST PA AND LATERAL: CPT | Mod: 26,,, | Performed by: RADIOLOGY

## 2017-10-31 PROCEDURE — 71020 XR CHEST PA AND LATERAL: CPT | Mod: TC,PO

## 2017-10-31 PROCEDURE — 99214 OFFICE O/P EST MOD 30 MIN: CPT | Mod: S$GLB,,, | Performed by: FAMILY MEDICINE

## 2017-10-31 PROCEDURE — 99499 UNLISTED E&M SERVICE: CPT | Mod: S$GLB,,, | Performed by: FAMILY MEDICINE

## 2017-10-31 PROCEDURE — 99999 PR PBB SHADOW E&M-EST. PATIENT-LVL III: CPT | Mod: PBBFAC,,, | Performed by: FAMILY MEDICINE

## 2017-10-31 RX ORDER — FLUTICASONE PROPIONATE AND SALMETEROL 250; 50 UG/1; UG/1
1 POWDER RESPIRATORY (INHALATION) 2 TIMES DAILY
Qty: 1 EACH | Refills: 1 | Status: SHIPPED | OUTPATIENT
Start: 2017-10-31 | End: 2018-03-28

## 2017-10-31 RX ORDER — LEVOFLOXACIN 250 MG/1
250 TABLET ORAL DAILY
Qty: 7 TABLET | Refills: 0 | Status: SHIPPED | OUTPATIENT
Start: 2017-10-31 | End: 2017-10-31 | Stop reason: SDUPTHER

## 2017-10-31 RX ORDER — LEVOFLOXACIN 500 MG/1
500 TABLET, FILM COATED ORAL DAILY
Qty: 7 TABLET | Refills: 0 | Status: SHIPPED | OUTPATIENT
Start: 2017-10-31 | End: 2018-03-28

## 2017-10-31 NOTE — PATIENT INSTRUCTIONS
--------------------------  WATER BALANCE-  Your body systems work best with 64 ounces DAILY (HALF A GALLON DAILY)  - to flush out impurities & cleanse our organs. For every 8 ounces of caffeine you drink, ADD ANOTHER CUP of water to your daily water needs. (2 cups of coffee and one diet coke = you need 11 glasses of water a day). We were not made to drink sugary drinks  - not even artificial sweeteners. You'll notice a difference in your brain function, energy level & overall wellness. Your liver & kidney will thank you too for keeping them properly cleansed  ---------------------------    Let me know if your heart races with Advair or if your cough worsens - then proceed directly to the emergency room    You can take plain Mucinex twice daily

## 2017-10-31 NOTE — PROGRESS NOTES
Subjective:      Patient ID: Flores Fuentes is a 67 y.o. female.    Chief Complaint: Cough and Nasal Congestion    Presents today with continual cough, sore throat, shortness of breath, productive cough worse at night, post nasal drip . More fatigued, symptoms for 7 days & worsening. No fever , nausea, vomiting, diarrhea. No other meds. Decreased appetite.    No results found for: MICALBCREAT    Current Outpatient Prescriptions on File Prior to Visit   Medication Sig    apixaban (ELIQUIS) 5 mg Tab Take 1 tablet (5 mg total) by mouth 2 (two) times daily.    diltiaZEM (CARDIZEM CD) 120 MG Cp24 Take 1 capsule (120 mg total) by mouth once daily.    flecainide (TAMBOCOR) 150 MG Tab Take 1 tablet (150 mg total) by mouth daily as needed (for new-onset AF).    fluocinonide (LIDEX) 0.05 % ointment daily as needed.     fluticasone (FLONASE) 50 mcg/actuation nasal spray USE 1 SPRAY IN EACH NOSTRIL ONE TIME DAILY    levothyroxine (SYNTHROID) 25 MCG tablet Take 1 tablet (25 mcg total) by mouth once daily.    metoprolol succinate (TOPROL-XL) 25 MG 24 hr tablet Take 1 tablet (25 mg total) by mouth once daily.    pravastatin (PRAVACHOL) 40 MG tablet Take 1 tablet (40 mg total) by mouth once daily.     No current facility-administered medications on file prior to visit.      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, l mandible, 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 Dr Viktor Toure    Paroxysmal atrioventricular tachycardia     Plantar fasciitis     Right knee meniscal tear     Dr Mayfield, tx conservatively    Severe obesity (BMI 35.0-35.9 with comorbidity) 1/24/2017    Thyroid disease      Past Surgical History:   Procedure Laterality Date     "APPENDECTOMY      HYSTERECTOMY  1990    [facility] for fibroids    MANDIBLE SURGERY  2015    L mandible, Dr Toure    TONSILLECTOMY       Social History     Social History Narrative    Retired 2012,  [employer], 3 children, nonsmoker, ETOH socially, GYN [facility], colonoscopy normal 58, rec repeat at 68     Family History   Problem Relation Age of Onset    Cancer Mother      stomach, liver    Melanoma Neg Hx      Vitals:    10/31/17 1038 10/31/17 1140   BP: 118/60    Pulse: (!) 56    Temp: 97.7 °F (36.5 °C)    SpO2:  98%   Weight: 106 kg (233 lb 11 oz)    Height: 5' 7" (1.702 m)    PainSc:   1      Objective:   Physical Exam   Constitutional: She appears well-developed and well-nourished. She appears distressed.   HENT:   Right Ear: Tympanic membrane and external ear normal.   Left Ear: Tympanic membrane and external ear normal.   Nose: Mucosal edema and rhinorrhea present. Right sinus exhibits no maxillary sinus tenderness and no frontal sinus tenderness. Left sinus exhibits no maxillary sinus tenderness and no frontal sinus tenderness.   Mouth/Throat: Mucous membranes are normal. Posterior oropharyngeal erythema present. No oropharyngeal exudate.   Eyes: EOM are normal. Pupils are equal, round, and reactive to light.   Neck: Normal range of motion. Neck supple. No thyromegaly present.   Cardiovascular: Normal rate, regular rhythm and normal heart sounds.    No murmur heard.  Pulmonary/Chest: Effort normal. She has wheezes. She has no rales.   Wheezes RLL with rales   Lymphadenopathy:     She has no cervical adenopathy.   Skin: Skin is warm and dry.   Nursing note and vitals reviewed.    Assessment:     1. Wheezing    2. Community acquired pneumonia, unspecified laterality    3. Paroxysmal a-fib    4. Hyperlipidemia, unspecified hyperlipidemia type      Plan:     Orders Placed This Encounter    X-Ray Chest PA And Lateral    fluticasone-salmeterol 250-50 mcg/dose (ADVAIR) 250-50 mcg/dose diskus inhaler    " levoFLOXacin (LEVAQUIN) 500 MG tablet     Continue other medications    Patient Instructions   --------------------------  WATER BALANCE-  Your body systems work best with 64 ounces DAILY (HALF A GALLON DAILY)  - to flush out impurities & cleanse our organs. For every 8 ounces of caffeine you drink, ADD ANOTHER CUP of water to your daily water needs. (2 cups of coffee and one diet coke = you need 11 glasses of water a day). We were not made to drink sugary drinks  - not even artificial sweeteners. You'll notice a difference in your brain function, energy level & overall wellness. Your liver & kidney will thank you too for keeping them properly cleansed  ---------------------------    Let me know if your heart races with Advair or if your cough worsens - then proceed directly to the emergency room    You can take plain Mucinex twice daily

## 2017-11-01 ENCOUNTER — PATIENT MESSAGE (OUTPATIENT)
Dept: FAMILY MEDICINE | Facility: CLINIC | Age: 67
End: 2017-11-01

## 2017-11-02 ENCOUNTER — PATIENT MESSAGE (OUTPATIENT)
Dept: FAMILY MEDICINE | Facility: CLINIC | Age: 67
End: 2017-11-02

## 2017-11-14 ENCOUNTER — PATIENT MESSAGE (OUTPATIENT)
Dept: FAMILY MEDICINE | Facility: CLINIC | Age: 67
End: 2017-11-14

## 2017-11-27 ENCOUNTER — PATIENT MESSAGE (OUTPATIENT)
Dept: CARDIOLOGY | Facility: CLINIC | Age: 67
End: 2017-11-27

## 2017-11-30 ENCOUNTER — TELEPHONE (OUTPATIENT)
Dept: ELECTROPHYSIOLOGY | Facility: CLINIC | Age: 67
End: 2017-11-30

## 2017-11-30 ENCOUNTER — HOSPITAL ENCOUNTER (EMERGENCY)
Facility: HOSPITAL | Age: 67
Discharge: HOME | End: 2017-11-30
Attending: EMERGENCY MEDICINE
Payer: MEDICARE

## 2017-11-30 VITALS
HEIGHT: 67 IN | BODY MASS INDEX: 36.1 KG/M2 | HEART RATE: 51 BPM | RESPIRATION RATE: 14 BRPM | SYSTOLIC BLOOD PRESSURE: 138 MMHG | DIASTOLIC BLOOD PRESSURE: 66 MMHG | WEIGHT: 230 LBS | OXYGEN SATURATION: 98 % | TEMPERATURE: 98 F

## 2017-11-30 DIAGNOSIS — I48.0 PAROXYSMAL ATRIAL FIBRILLATION: ICD-10-CM

## 2017-11-30 DIAGNOSIS — R00.2 PALPITATIONS: Primary | ICD-10-CM

## 2017-11-30 LAB
ALBUMIN SERPL BCP-MCNC: 3.8 G/DL
ALP SERPL-CCNC: 88 U/L
ALT SERPL W/O P-5'-P-CCNC: 13 U/L
ANION GAP SERPL CALC-SCNC: 9 MMOL/L
AST SERPL-CCNC: 16 U/L
BASOPHILS # BLD AUTO: 0.03 K/UL
BASOPHILS NFR BLD: 0.6 %
BILIRUB SERPL-MCNC: 0.8 MG/DL
BNP SERPL-MCNC: 95 PG/ML
BUN SERPL-MCNC: 12 MG/DL
CALCIUM SERPL-MCNC: 9.3 MG/DL
CHLORIDE SERPL-SCNC: 108 MMOL/L
CO2 SERPL-SCNC: 25 MMOL/L
CREAT SERPL-MCNC: 0.7 MG/DL
DIFFERENTIAL METHOD: NORMAL
EOSINOPHIL # BLD AUTO: 0.2 K/UL
EOSINOPHIL NFR BLD: 3.4 %
ERYTHROCYTE [DISTWIDTH] IN BLOOD BY AUTOMATED COUNT: 14.2 %
EST. GFR  (AFRICAN AMERICAN): >60 ML/MIN/1.73 M^2
EST. GFR  (NON AFRICAN AMERICAN): >60 ML/MIN/1.73 M^2
GLUCOSE SERPL-MCNC: 95 MG/DL
HCT VFR BLD AUTO: 41.7 %
HGB BLD-MCNC: 13.8 G/DL
LYMPHOCYTES # BLD AUTO: 1.5 K/UL
LYMPHOCYTES NFR BLD: 28.7 %
MCH RBC QN AUTO: 29.1 PG
MCHC RBC AUTO-ENTMCNC: 33.1 G/DL
MCV RBC AUTO: 88 FL
MONOCYTES # BLD AUTO: 0.4 K/UL
MONOCYTES NFR BLD: 8.2 %
NEUTROPHILS # BLD AUTO: 3.1 K/UL
NEUTROPHILS NFR BLD: 58.9 %
PLATELET # BLD AUTO: 254 K/UL
PMV BLD AUTO: 10.4 FL
POTASSIUM SERPL-SCNC: 4.3 MMOL/L
PROT SERPL-MCNC: 7.9 G/DL
RBC # BLD AUTO: 4.75 M/UL
SODIUM SERPL-SCNC: 142 MMOL/L
TROPONIN I SERPL DL<=0.01 NG/ML-MCNC: <0.006 NG/ML
WBC # BLD AUTO: 5.26 K/UL

## 2017-11-30 PROCEDURE — 93010 ELECTROCARDIOGRAM REPORT: CPT | Mod: ,,, | Performed by: INTERNAL MEDICINE

## 2017-11-30 PROCEDURE — 84484 ASSAY OF TROPONIN QUANT: CPT

## 2017-11-30 PROCEDURE — 99284 EMERGENCY DEPT VISIT MOD MDM: CPT | Mod: 25

## 2017-11-30 PROCEDURE — 83880 ASSAY OF NATRIURETIC PEPTIDE: CPT

## 2017-11-30 PROCEDURE — 93005 ELECTROCARDIOGRAM TRACING: CPT

## 2017-11-30 PROCEDURE — 85025 COMPLETE CBC W/AUTO DIFF WBC: CPT

## 2017-11-30 PROCEDURE — 93010 ELECTROCARDIOGRAM REPORT: CPT | Mod: 76,,, | Performed by: INTERNAL MEDICINE

## 2017-11-30 PROCEDURE — 80053 COMPREHEN METABOLIC PANEL: CPT

## 2017-11-30 NOTE — TELEPHONE ENCOUNTER
Returned call to Pt this morning. Pt went into Afib this morning and went to the ER. She was given the pill in the pocket (flencainide 300 mg) and monitored. Pt is now in Sinus. She was told to follow up with Dr Servin. Advised I would update Dr Servin and get back with her. Pt voiced understanding.

## 2017-11-30 NOTE — ED PROVIDER NOTES
Encounter Date: 11/30/2017    SCRIBE #1 NOTE: I, Margarita Bolaños, am scribing for, and in the presence of, Mele Soliman MD.       History     Chief Complaint   Patient presents with    Palpitations     hx. of a-fib., awoke with palpitations at 0600. pt. took two flecainide tabs at onset of symptoms     The patient is a 67 y.o. female with hx of atrial fibrillation and HTN that presents to the ED with a complaint of palpitations that began at 600 this morning. Pt reports feeling that her heart was racing and took two Flecainide tabs in triage. The patient has required cardioversion in the past for her atrial fibrillation. She also reports frequent but brief episodes of palpitations, this episode was different this morning and felt like her prior episodes of afib, lasting longer and feeling a faster beat than her occasional palpations. Pt was advised by cardiologist to check in the ED the first time taking Flecainide. No other symptoms reported.  She denies any chest pain, shortness of breath, lightheadedness or dizziness. After taking medication the patient's symptoms completely resolved and she has no complaints at this time.       The history is provided by the patient.     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, l mandible, 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 Dr Viktor Toure    Paroxysmal atrioventricular tachycardia     Plantar fasciitis     Right knee meniscal tear     Dr Mayfield, tx conservatively    Severe obesity (BMI 35.0-35.9 with comorbidity)  1/24/2017    Thyroid disease      Past Surgical History:   Procedure Laterality Date    APPENDECTOMY      HYSTERECTOMY  1990    [facility] for fibroids    MANDIBLE SURGERY  2015    L mandible, Dr Toure    TONSILLECTOMY       Family History   Problem Relation Age of Onset    Cancer Mother      stomach, liver    Melanoma Neg Hx      Social History   Substance Use Topics    Smoking status: Never Smoker    Smokeless tobacco: Never Used    Alcohol use No     Review of Systems   Constitutional: Negative for chills, fatigue and fever.   HENT: Negative for congestion, facial swelling and voice change.    Eyes: Negative for photophobia, pain and redness.   Respiratory: Negative for cough, choking and shortness of breath.    Cardiovascular: Positive for palpitations. Negative for chest pain and leg swelling.             Gastrointestinal: Negative for abdominal pain, diarrhea and vomiting.   Genitourinary: Negative for dysuria, frequency and urgency.   Musculoskeletal: Negative for back pain, neck pain and neck stiffness.   Skin: Negative for rash.   Neurological: Negative for seizures, speech difficulty, light-headedness, numbness and headaches.   All other systems reviewed and are negative.      Physical Exam     Initial Vitals [11/30/17 0634]   BP Pulse Resp Temp SpO2   (!) 133/93 (!) 146 20 97.8 °F (36.6 °C) 100 %      MAP       106.33         Physical Exam    Nursing note and vitals reviewed.  Constitutional: She appears well-developed and well-nourished. No distress.   HENT:   Head: Normocephalic and atraumatic.   Mouth/Throat: Oropharynx is clear and moist.   Eyes: Conjunctivae and EOM are normal. Pupils are equal, round, and reactive to light.   Neck: Normal range of motion. Neck supple.   Cardiovascular: Regular rhythm, normal heart sounds and intact distal pulses.   Pulmonary/Chest: Breath sounds normal. No respiratory distress. She has no wheezes. She has no rhonchi. She has no rales.   Abdominal: Soft. Bowel  sounds are normal. She exhibits no distension. There is no tenderness. There is no rebound and no guarding.   Musculoskeletal: Normal range of motion. She exhibits no edema or tenderness.   Neurological: She is alert and oriented to person, place, and time. She has normal strength. No cranial nerve deficit or sensory deficit.   Skin: Skin is warm and dry. Capillary refill takes less than 2 seconds.         ED Course   Procedures  Labs Reviewed   CBC W/ AUTO DIFFERENTIAL   COMPREHENSIVE METABOLIC PANEL   TROPONIN I   B-TYPE NATRIURETIC PEPTIDE     EKG Readings: (Independently Interpreted)   Initial Reading: No STEMI. Previous EKG: Compared with most recent EKG Previous EKG Date: 5/22/17 (minimally changed). Rhythm: Normal Sinus Rhythm. Heart Rate: 59. ST Segments: Normal ST Segments. T Waves: Normal. Axis: Normal.   Nonspecific intavaventricular conductive delay.     Imaging Results          X-Ray Chest AP Portable (Final result)  Result time 11/30/17 07:22:34    Final result by Julieta Amor MD (11/30/17 07:22:34)                 Impression:      No evidence of acute intrathoracic process on this single radiographic view of the chest.      Electronically signed by: JULIETA AMOR  Date:     11/30/17  Time:    07:22              Narrative:    Comparison: 10/31/2017    Technique: Single AP chest radiograph.    Findings: The cardiomediastinal silhouette is within normal limits. The visualized airway is unremarkable.  The lungs appear symmetrically aerated without definite focal alveolar consolidation. No large pleural effusion or pneumothorax is appreciated.  Osseous structures demonstrate no acute abnormality.                            I have visualized all imaging for this patient, radiology has done the interpretation.      Medical Decision Making:   History:   Old Medical Records: I decided to obtain old medical records.  Initial Assessment:   The patient is a 67 y.o. female with hx of atrial fibrillation and HTN    that presents to the ED with a complaint of palpitation that began at 600. Pt took 2 tabs of flecainide PTA and now she has resolution of symptoms and no complaints at this time. Will order CXR and EKG. Will also evaluate with basic blood work including CBC, CMP, troponin, and BNP.   Differential Diagnosis:   ACS, dissection, thyroid disease/storm, dehydration, electrolyte dyscrasia, A. fib, other arrhythmia  Independently Interpreted Test(s):   I have ordered and independently interpreted X-rays - see prior notes.  I have ordered and independently interpreted EKG Reading(s) - see prior notes  Clinical Tests:   Lab Tests: Reviewed       <> Summary of Lab: benign  ED Management:  Heart rate stable throughout ED stay without any intervention. No other complaints. Will discharge the patient. She knows to follow up with cardiologist and return to the ED with any new or worsening symptoms.                    ED Course      Clinical Impression:   The primary encounter diagnosis was Palpitations. A diagnosis of Paroxysmal atrial fibrillation was also pertinent to this visit.    Disposition:   Disposition: Discharged  Condition: Stable    I, Dr. Mele Soliman, personally performed the services described in this documentation. All medical record entries made by the scribe were at my direction and in my presence.  I have reviewed the chart and agree that the record reflects my personal performance and is accurate and complete. Mele Soliman MD.  4:41 AM 12/13/2017                        Mele Soliman MD  12/13/17 0443

## 2017-11-30 NOTE — TELEPHONE ENCOUNTER
----- Message from Alanna Rivas MA sent at 11/30/2017  8:12 AM CST -----  Contact: self  Can I just offer pt tomorrow since no availability today?  ----- Message -----  From: Jennifer Parra  Sent: 11/30/2017   8:09 AM  To: Mal[facility] BELL Staff    Patient need to speak with nurse   Pt states went into Afib and went to ER patient states currently out of Afib.  patient need appointment for today   Please call pt at [phone]

## 2017-11-30 NOTE — TELEPHONE ENCOUNTER
Updated Dr Servin on Pt going to ER this morning for raoid Afib and the pill in the pocket being given. Pt now in Sinus. Dr Servin advised that Pt does not need to be seen. The pill in the pocket did its job. She can continue the Pill in the pocket method for these infrequent episodes. If the episodes become more frequent or the pill in the pocket method not working she needs to call the office to make adjustments to medications.     Contacted Pt to explain what Dr Servin's advised. Pt voiced understanding.

## 2017-11-30 NOTE — ED NOTES
Patient identifiers for Flores Fuentes checked and correct.  LOC: The patient is awake, alert and aware of environment with an appropriate affect, the patient is oriented x 3 and speaking appropriately.  APPEARANCE: Patient resting comfortably and in no acute distress, patient is clean and well groomed, patient's clothing are properly fastened.  SKIN: The skin is warm and dry, patient has normal skin turgor and moist mucus membranes.  MUSKULOSKELETAL: Patient moving all extremities well, no obvious swelling or deformities noted.  RESPIRATORY: Airway is open and patent, respirations are spontaneous, patient has a normal effort and rate.  CARDIAC: Bradycardic.

## 2017-11-30 NOTE — ED NOTES
Assumed care of this patient. Pt is alert and oriented. Pt is on cardiac monitor. HR 54 at this time. Pt states she took a pill prescribed by MD and her heart rate decreased.  is at bedside. SR up and CB in reach. No distress noted.

## 2017-12-01 DIAGNOSIS — R00.2 PALPITATIONS: Primary | ICD-10-CM

## 2017-12-05 RX ORDER — LEVOTHYROXINE SODIUM 25 UG/1
TABLET ORAL
Qty: 90 TABLET | Refills: 1 | Status: SHIPPED | OUTPATIENT
Start: 2017-12-05 | End: 2018-03-28 | Stop reason: SDUPTHER

## 2017-12-11 ENCOUNTER — PATIENT MESSAGE (OUTPATIENT)
Dept: CARDIOLOGY | Facility: CLINIC | Age: 67
End: 2017-12-11

## 2018-01-08 RX ORDER — PRAVASTATIN SODIUM 40 MG/1
TABLET ORAL
Qty: 90 TABLET | Refills: 0 | Status: SHIPPED | OUTPATIENT
Start: 2018-01-08 | End: 2018-03-10 | Stop reason: SDUPTHER

## 2018-02-21 ENCOUNTER — PES CALL (OUTPATIENT)
Dept: ADMINISTRATIVE | Facility: CLINIC | Age: 68
End: 2018-02-21

## 2018-03-10 DIAGNOSIS — I48.0 PAROXYSMAL A-FIB: Chronic | ICD-10-CM

## 2018-03-12 RX ORDER — PRAVASTATIN SODIUM 40 MG/1
TABLET ORAL
Qty: 90 TABLET | Refills: 0 | Status: SHIPPED | OUTPATIENT
Start: 2018-03-12 | End: 2018-03-28 | Stop reason: SDUPTHER

## 2018-03-13 RX ORDER — DILTIAZEM HYDROCHLORIDE 120 MG/1
CAPSULE, EXTENDED RELEASE ORAL
Qty: 90 CAPSULE | Refills: 3 | Status: SHIPPED | OUTPATIENT
Start: 2018-03-13 | End: 2019-03-07 | Stop reason: SDUPTHER

## 2018-03-26 DIAGNOSIS — E03.9 HYPOTHYROIDISM, UNSPECIFIED TYPE: ICD-10-CM

## 2018-03-26 DIAGNOSIS — Z00.00 ROUTINE GENERAL MEDICAL EXAMINATION AT A HEALTH CARE FACILITY: Primary | ICD-10-CM

## 2018-03-26 DIAGNOSIS — E78.5 HYPERLIPIDEMIA, UNSPECIFIED HYPERLIPIDEMIA TYPE: ICD-10-CM

## 2018-03-27 ENCOUNTER — APPOINTMENT (OUTPATIENT)
Dept: LAB | Facility: HOSPITAL | Age: 68
End: 2018-03-27
Attending: FAMILY MEDICINE
Payer: MEDICARE

## 2018-03-28 ENCOUNTER — OFFICE VISIT (OUTPATIENT)
Dept: FAMILY MEDICINE | Facility: CLINIC | Age: 68
End: 2018-03-28
Payer: MEDICARE

## 2018-03-28 VITALS
HEIGHT: 67 IN | SYSTOLIC BLOOD PRESSURE: 90 MMHG | WEIGHT: 239.63 LBS | DIASTOLIC BLOOD PRESSURE: 70 MMHG | TEMPERATURE: 98 F | BODY MASS INDEX: 37.61 KG/M2 | HEART RATE: 52 BPM

## 2018-03-28 DIAGNOSIS — N39.3 STRESS INCONTINENCE, FEMALE: ICD-10-CM

## 2018-03-28 DIAGNOSIS — E03.8 OTHER SPECIFIED HYPOTHYROIDISM: Chronic | ICD-10-CM

## 2018-03-28 DIAGNOSIS — E78.00 PURE HYPERCHOLESTEROLEMIA: ICD-10-CM

## 2018-03-28 DIAGNOSIS — Z00.00 ROUTINE GENERAL MEDICAL EXAMINATION AT A HEALTH CARE FACILITY: Primary | ICD-10-CM

## 2018-03-28 DIAGNOSIS — Z28.39 IMMUNIZATION DEFICIENCY: ICD-10-CM

## 2018-03-28 DIAGNOSIS — E66.01 SEVERE OBESITY (BMI 35.0-35.9 WITH COMORBIDITY): ICD-10-CM

## 2018-03-28 DIAGNOSIS — I48.0 PAROXYSMAL A-FIB: Chronic | ICD-10-CM

## 2018-03-28 PROCEDURE — 99999 PR PBB SHADOW E&M-EST. PATIENT-LVL III: CPT | Mod: PBBFAC,,, | Performed by: FAMILY MEDICINE

## 2018-03-28 PROCEDURE — 99397 PER PM REEVAL EST PAT 65+ YR: CPT | Mod: 25,S$GLB,, | Performed by: FAMILY MEDICINE

## 2018-03-28 PROCEDURE — 90732 PPSV23 VACC 2 YRS+ SUBQ/IM: CPT | Mod: S$GLB,,, | Performed by: FAMILY MEDICINE

## 2018-03-28 PROCEDURE — 99499 UNLISTED E&M SERVICE: CPT | Mod: S$GLB,,, | Performed by: FAMILY MEDICINE

## 2018-03-28 PROCEDURE — G0009 ADMIN PNEUMOCOCCAL VACCINE: HCPCS | Mod: S$GLB,,, | Performed by: FAMILY MEDICINE

## 2018-03-28 RX ORDER — LEVOTHYROXINE SODIUM 25 UG/1
TABLET ORAL
Qty: 90 TABLET | Refills: 3 | Status: SHIPPED | OUTPATIENT
Start: 2018-03-28 | End: 2019-06-05 | Stop reason: SDUPTHER

## 2018-03-28 RX ORDER — PRAVASTATIN SODIUM 40 MG/1
40 TABLET ORAL DAILY
Qty: 90 TABLET | Refills: 3 | Status: SHIPPED | OUTPATIENT
Start: 2018-03-28 | End: 2019-06-05 | Stop reason: SDUPTHER

## 2018-03-28 RX ORDER — FLUTICASONE PROPIONATE 50 MCG
SPRAY, SUSPENSION (ML) NASAL
Qty: 32 G | Refills: 3 | Status: SHIPPED | OUTPATIENT
Start: 2018-03-28 | End: 2019-09-10 | Stop reason: SDUPTHER

## 2018-03-28 NOTE — PROGRESS NOTES
Subjective:      Patient ID: Flores Fuentes is a 67 y.o. female.    Chief Complaint: Annual Exam    Here today for general exam.     He has had 3 atrial fibrillation episodes since I last saw her, but her new cardiologist gave her flecainide to take at the onset of exacerbation.  It has worked beautifully and subsides within 60 minutes.  She feels that her episodes have decreased recently.  She is on several medications noted below, denies any side effects    She is having urinary stress incontinence when she coughs or sneezes.  Symptoms worsened after her hysterectomy.  The pads are expensive.  She is asking for treatment options.    She follows with GYn    Denies any chest pain, shortness of breath, nausea vomiting constipation diarrhea, blood in stool, heartburn    The 10-year ASCVD risk score (Berlin Heightsshaneka CEE Jr., et al., 2013) is: 3.4%    Values used to calculate the score:      Age: 67 years      Sex: Female      Is Non- : No      Diabetic: No      Tobacco smoker: No      Systolic Blood Pressure: 90 mmHg      Is BP treated: No      HDL Cholesterol: 44 mg/dL      Total Cholesterol: 152 mg/dL   No results found for: MICALBCREAT  Lab Results   Component Value Date    LDLCALC 92.0 03/27/2018    LDLCALC 77.4 03/21/2017    CHOL 152 03/27/2018    HDL 44 03/27/2018    TRIG 80 03/27/2018       Lab Results   Component Value Date     03/27/2018    K 4.3 03/27/2018     03/27/2018    CO2 27 03/27/2018    GLU 86 03/27/2018    BUN 12 03/27/2018    CREATININE 0.58 03/27/2018    CALCIUM 9.1 03/27/2018    PROT 7.3 03/27/2018    ALBUMIN 4.0 03/27/2018    BILITOT 0.4 03/27/2018    ALKPHOS 88 03/27/2018    AST 20 03/27/2018    ALT 27 03/27/2018    ANIONGAP 11 03/27/2018    ESTGFRAFRICA >60.0 03/27/2018    EGFRNONAA >60.0 03/27/2018    WBC 6.39 03/27/2018    WBC 6.39 03/27/2018    HGB 13.0 03/27/2018    HGB 13.0 03/27/2018    HCT 41.2 03/27/2018    HCT 41.2 03/27/2018    MCV 91 03/27/2018    MCV 91  03/27/2018     03/27/2018     03/27/2018    TSH 2.920 03/27/2018         Current Outpatient Prescriptions on File Prior to Visit   Medication Sig    apixaban (ELIQUIS) 5 mg Tab Take 1 tablet (5 mg total) by mouth 2 (two) times daily.    CARTIA  mg Cp24 TAKE 1 CAPSULE EVERY DAY    flecainide (TAMBOCOR) 150 MG Tab Take 1 tablet (150 mg total) by mouth daily as needed (for new-onset AF).    fluocinonide (LIDEX) 0.05 % ointment daily as needed.     fluticasone (FLONASE) 50 mcg/actuation nasal spray USE 1 SPRAY IN EACH NOSTRIL ONE TIME DAILY    levothyroxine (SYNTHROID) 25 MCG tablet TAKE 1 TABLET ONE TIME DAILY    metoprolol succinate (TOPROL-XL) 25 MG 24 hr tablet Take 1 tablet (25 mg total) by mouth once daily.   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, l mandible, 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 Dr Viktor Toure    Paroxysmal atrioventricular tachycardia     Plantar fasciitis     Right knee meniscal tear     Dr Mayfield, tx conservatively    Severe obesity (BMI 35.0-35.9 with comorbidity) 1/24/2017    Stress incontinence, female 3/28/2018    After HYST, Try Kegels    Thyroid disease      Past Surgical History:   Procedure Laterality Date    APPENDECTOMY      HYSTERECTOMY  1990    [facility] for fibroids    MANDIBLE SURGERY  2015    L mandible, Dr Toure    TONSILLECTOMY       Social History     Social History Narrative    Retired 2012,  [employer], 3 children, nonsmoker, ETOH socially, GYN [facility], colonoscopy normal 58, rec repeat at 68     Family History   Problem Relation Age of Onset    Cancer Mother      stomach, liver     "Melanoma Neg Hx      Vitals:    03/28/18 0840   BP: 90/70   Pulse: (!) 52   Temp: 97.5 °F (36.4 °C)   Weight: 108.7 kg (239 lb 10.2 oz)   Height: 5' 7" (1.702 m)     Objective:   Physical Exam   Constitutional: She is oriented to person, place, and time. She appears well-developed and well-nourished.   HENT:   Head: Normocephalic and atraumatic.   Right Ear: External ear normal.   Left Ear: External ear normal.   Nose: Nose normal.   Mouth/Throat: Oropharynx is clear and moist.   Eyes: EOM are normal. Pupils are equal, round, and reactive to light.   Neck: Neck supple. No thyromegaly present.   Cardiovascular: Normal rate, regular rhythm, normal heart sounds and intact distal pulses.    No murmur heard.  Pulmonary/Chest: Effort normal and breath sounds normal. She has no wheezes.   Abdominal: Soft. Bowel sounds are normal. She exhibits no distension and no mass. There is no tenderness. There is no rebound and no guarding.   Musculoskeletal: She exhibits no edema.   Lymphadenopathy:     She has no cervical adenopathy.   Neurological: She is alert and oriented to person, place, and time.   Skin: Skin is warm and dry.   Psychiatric: She has a normal mood and affect. Her behavior is normal.     Assessment:     1. Routine general medical examination at a health care facility    2. Pure hypercholesterolemia    3. Paroxysmal A-fib    4. Severe obesity (BMI 35.0-35.9 with comorbidity)    5. Other specified hypothyroidism    6. Stress incontinence, female      Plan:     Orders Placed This Encounter    pravastatin (PRAVACHOL) 40 MG tablet    levothyroxine (SYNTHROID) 25 MCG tablet    fluticasone (FLONASE) 50 mcg/actuation nasal spray       Patient Instructions   Follow with GYN, Dr Murillo (has mammo & DEXA next week with him) & with Cardiologist for A Fib    Continue medications, BP normal    Try Kegel exercises - if not better, consider seeing Urogynecology    ==============================================    Pneumonia " vaccine    Due for  Vaccines  - Tetanus  Your insurance will pay for certain vaccines only when administered by your pharmacy. We sent a prescription to your pharmacy    ==============================================================      RECOMMENDATIONS FOR FEMALES    Prevent the #1 cause of death- cardiovascular disease (HEART ATTACK & STROKE) by checking for normal blood pressure, cholesterol, sugars, & by not smoking.     VACCINES: Yearly FLU shot, ONE PNEUMONIA shot after 65, ONE SHINGLES shot after 60    Screening colonoscopy at AGE  50 & every 10 years to check for COLON CANCER,  one of the most common & preventable cancers. Or FIT z12.11, Repeat in 3 years if POLYP found     I recommend  high fiber (5 fresh fruits or vegetables daily), low fat diet and aerobic  exercise (huffing/ puffing/ sweating for 20 min straight at least 4 days a week)    Follow up yearly with mammogram (every 2 years) , fasting lipids, CMP, CBC, TSH prior.   ==============================================================

## 2018-03-28 NOTE — PATIENT INSTRUCTIONS
Follow with GYN, Dr Murillo (has mammo & DEXA next week with him) & with Cardiologist for A Fib    Continue medications, BP normal    Try Kegel exercises - if not better, consider seeing Urogynecology    ==============================================    Pneumonia vaccine    Due for  Vaccines  - Tetanus  Your insurance will pay for certain vaccines only when administered by your pharmacy. We sent a prescription to your pharmacy    ==============================================================      RECOMMENDATIONS FOR FEMALES    Prevent the #1 cause of death- cardiovascular disease (HEART ATTACK & STROKE) by checking for normal blood pressure, cholesterol, sugars, & by not smoking.     VACCINES: Yearly FLU shot, ONE PNEUMONIA shot after 65, ONE SHINGLES shot after 60    Screening colonoscopy at AGE  50 & every 10 years to check for COLON CANCER,  one of the most common & preventable cancers. Or FIT z12.11, Repeat in 3 years if POLYP found     I recommend  high fiber (5 fresh fruits or vegetables daily), low fat diet and aerobic  exercise (huffing/ puffing/ sweating for 20 min straight at least 4 days a week)    Follow up yearly with mammogram (every 2 years) , fasting lipids, CMP, CBC, TSH prior.   ==============================================================

## 2018-03-28 NOTE — PROGRESS NOTES
Two patient identifiers used, allergies reviewed, vaccine confirmed.  Pneumovax/23 pneumococcal polysaccharide vaccine administered IM.Pt tolerated well, no redness or bruising at injection site.  Pt advised to remain in clinic 15 mins following injection for observation.

## 2018-04-03 ENCOUNTER — TELEPHONE (OUTPATIENT)
Dept: ELECTROPHYSIOLOGY | Facility: CLINIC | Age: 68
End: 2018-04-03

## 2018-04-03 DIAGNOSIS — I48.0 PAROXYSMAL A-FIB: Primary | ICD-10-CM

## 2018-04-03 NOTE — TELEPHONE ENCOUNTER
----- Message from Amber Ayers MA sent at 4/3/2018  4:28 PM CDT -----  Contact: self  Please I need ECHo  order/MYRNA/Malathi  ----- Message -----  From: Mirela Culver RN  Sent: 4/3/2018  12:01 PM  To: Amber Ayers MA        ----- Message -----  From: Debby Leung MA  Sent: 4/3/2018  11:23 AM  To: Malathi BELL Staff    Pt. has an appt. on 6/4 with  from  recall.Needs an echo & ekg before visit but there is no orders. Pl;ease call [phone].Please do echo in [place]

## 2018-05-09 DIAGNOSIS — I48.0 PAROXYSMAL A-FIB: Chronic | ICD-10-CM

## 2018-05-09 RX ORDER — APIXABAN 5 MG/1
TABLET, FILM COATED ORAL
Qty: 180 TABLET | Refills: 3 | Status: SHIPPED | OUTPATIENT
Start: 2018-05-09 | End: 2019-06-05 | Stop reason: SDUPTHER

## 2018-05-21 ENCOUNTER — HOSPITAL ENCOUNTER (OUTPATIENT)
Dept: CARDIOLOGY | Facility: HOSPITAL | Age: 68
Discharge: HOME | End: 2018-05-21
Attending: INTERNAL MEDICINE
Payer: MEDICARE

## 2018-05-21 DIAGNOSIS — I48.0 PAROXYSMAL A-FIB: ICD-10-CM

## 2018-05-21 LAB
DIASTOLIC DYSFUNCTION: NO
ESTIMATED PA SYSTOLIC PRESSURE: 21.66
MITRAL VALVE MOBILITY: NORMAL
RETIRED EF AND QEF - SEE NOTES: 65 (ref 55–65)
TRICUSPID VALVE REGURGITATION: NORMAL

## 2018-05-21 PROCEDURE — 93306 TTE W/DOPPLER COMPLETE: CPT | Mod: PO

## 2018-05-21 PROCEDURE — 93306 TTE W/DOPPLER COMPLETE: CPT | Mod: 26,,, | Performed by: INTERNAL MEDICINE

## 2018-05-30 DIAGNOSIS — I48.0 PAF (PAROXYSMAL ATRIAL FIBRILLATION): Primary | ICD-10-CM

## 2018-06-04 ENCOUNTER — OFFICE VISIT (OUTPATIENT)
Dept: CARDIOLOGY | Facility: CLINIC | Age: 68
End: 2018-06-04
Payer: MEDICARE

## 2018-06-04 VITALS
WEIGHT: 238 LBS | SYSTOLIC BLOOD PRESSURE: 134 MMHG | DIASTOLIC BLOOD PRESSURE: 66 MMHG | HEART RATE: 57 BPM | BODY MASS INDEX: 37.35 KG/M2 | HEIGHT: 67 IN

## 2018-06-04 DIAGNOSIS — E78.00 PURE HYPERCHOLESTEROLEMIA: Primary | Chronic | ICD-10-CM

## 2018-06-04 DIAGNOSIS — I48.0 PAF (PAROXYSMAL ATRIAL FIBRILLATION): ICD-10-CM

## 2018-06-04 DIAGNOSIS — I48.0 PAROXYSMAL A-FIB: Chronic | ICD-10-CM

## 2018-06-04 PROCEDURE — 93000 ELECTROCARDIOGRAM COMPLETE: CPT | Mod: S$GLB,,, | Performed by: INTERNAL MEDICINE

## 2018-06-04 PROCEDURE — 99499 UNLISTED E&M SERVICE: CPT | Mod: S$PBB,,, | Performed by: INTERNAL MEDICINE

## 2018-06-04 PROCEDURE — 99214 OFFICE O/P EST MOD 30 MIN: CPT | Mod: S$GLB,,, | Performed by: INTERNAL MEDICINE

## 2018-06-04 PROCEDURE — 99999 PR PBB SHADOW E&M-EST. PATIENT-LVL III: CPT | Mod: PBBFAC,,, | Performed by: INTERNAL MEDICINE

## 2018-06-04 NOTE — PROGRESS NOTES
Subjective:    Patient ID:  Flores Fuentes is a 67 y.o. female who presents for evaluation of Atrial Fibrillation      Atrial Fibrillation   Symptoms include palpitations. Symptoms are negative for chest pain, dizziness, shortness of breath, syncope and weakness. Past medical history includes atrial fibrillation.      67 y.o. F  AF first dx 2006 after lots of caffeine. Few episodes since then. Pill in pocket strategy.  HL, on meds  obesity  hypothyroid, on med    Went to ER 3/10 with palps. Underwent DCCV 3/13/17 after remaining in AF with RVR. Started on multaq, and BB d/c'd.  Since, feeling not quite as well as usually, and on 4/17, at which time HR was 120s and didn't feel same as prior AF episodes.  She'd been on BB for years w/o issues. Good exertion tolerance. No CP.    Due to rare PAF episodes, at the last visit we adopted a pill-in-the-pocket strategy. She used it first in 11/17; went to the ER. AF resolved <30 min after taking dose. Had 2 other episodes, self-treated successfully, in 12/17 and 1/18. Since, no AF. She's feeling great.    My interpretation of today's ECG is NSR      Review of Systems   Constitution: Negative. Negative for weakness and malaise/fatigue.   HENT: Negative.  Negative for ear pain and tinnitus.    Eyes: Negative for blurred vision.   Cardiovascular: Positive for palpitations. Negative for chest pain, dyspnea on exertion, near-syncope and syncope.   Respiratory: Negative.  Negative for shortness of breath.    Endocrine: Negative.  Negative for polyuria.   Hematologic/Lymphatic: Does not bruise/bleed easily.   Skin: Negative.  Negative for rash.   Musculoskeletal: Negative.  Negative for joint pain and muscle weakness.   Gastrointestinal: Negative.  Negative for abdominal pain and change in bowel habit.   Genitourinary: Negative for frequency.   Neurological: Negative.  Negative for dizziness.   Psychiatric/Behavioral: Negative.  Negative for depression. The patient is not  nervous/anxious.    Allergic/Immunologic: Negative for environmental allergies.        Objective:    Physical Exam   Constitutional: She is oriented to person, place, and time. Vital signs are normal. She appears well-developed and well-nourished. She is active and cooperative.   HENT:   Head: Normocephalic and atraumatic.   Eyes: Conjunctivae and EOM are normal.   Neck: Normal range of motion. Carotid bruit is not present. No tracheal deviation and no edema present. No thyroid mass and no thyromegaly present.   Cardiovascular: Normal rate, regular rhythm, normal heart sounds, intact distal pulses and normal pulses.   No extrasystoles are present. PMI is not displaced.  Exam reveals no gallop and no friction rub.    No murmur heard.  Pulmonary/Chest: Effort normal and breath sounds normal. No respiratory distress. She has no wheezes. She has no rales.   Abdominal: Soft. Normal appearance. She exhibits no distension. There is no hepatosplenomegaly.   Musculoskeletal: Normal range of motion.   Neurological: She is alert and oriented to person, place, and time. Coordination normal.   Skin: Skin is warm and dry. No rash noted.   Psychiatric: She has a normal mood and affect. Her speech is normal and behavior is normal. Thought content normal. Cognition and memory are normal.   Nursing note and vitals reviewed.        Assessment:       1. Pure hypercholesterolemia    2. PAF (paroxysmal atrial fibrillation)    3. Paroxysmal A-fib         Plan:       Doing great on pill-in-pocket flecainide. Using it rarely (none x 5 months!).    Continue present mgmt. Continue eliquis for a/c.    Return in 1 year with echo, or earlier prn.

## 2018-06-09 DIAGNOSIS — I48.0 PAROXYSMAL A-FIB: Chronic | ICD-10-CM

## 2018-06-10 RX ORDER — METOPROLOL SUCCINATE 25 MG/1
TABLET, EXTENDED RELEASE ORAL
Qty: 90 TABLET | Refills: 3 | Status: SHIPPED | OUTPATIENT
Start: 2018-06-10 | End: 2019-06-05 | Stop reason: SDUPTHER

## 2018-07-31 ENCOUNTER — PATIENT MESSAGE (OUTPATIENT)
Dept: CARDIOLOGY | Facility: CLINIC | Age: 68
End: 2018-07-31

## 2018-08-03 ENCOUNTER — PATIENT MESSAGE (OUTPATIENT)
Dept: CARDIOLOGY | Facility: CLINIC | Age: 68
End: 2018-08-03

## 2018-09-25 ENCOUNTER — PES CALL (OUTPATIENT)
Dept: ADMINISTRATIVE | Facility: CLINIC | Age: 68
End: 2018-09-25

## 2018-10-12 ENCOUNTER — PATIENT MESSAGE (OUTPATIENT)
Dept: CARDIOLOGY | Facility: CLINIC | Age: 68
End: 2018-10-12

## 2018-10-12 ENCOUNTER — TELEPHONE (OUTPATIENT)
Dept: ELECTROPHYSIOLOGY | Facility: CLINIC | Age: 68
End: 2018-10-12

## 2018-10-12 DIAGNOSIS — R00.2 PALPITATIONS: Primary | ICD-10-CM

## 2018-10-12 NOTE — TELEPHONE ENCOUNTER
Returned call to Pt. Pt stated for the last week her palpitations have really been active.  She stated while she isn't in any pain its very aggravating having this pounding in your chest.  Pt stated its not AFIB its palpitations. Advised Pt that we could get a holter to evaluate whats going on.  Appt scheduled.      ----- Message from Yessy Roberts MA sent at 10/12/2018 11:05 AM CDT -----  Contact: Patient      ----- Message -----  From: Rose Mayr Tom  Sent: 10/12/2018  10:49 AM  To: Malathi BELL Staff    The Pt is calling to see if the email she sent was looked at and if someone was going to answer it today. Please call her back @ [phone]. Thanks, Rose Mary

## 2018-12-06 ENCOUNTER — PATIENT MESSAGE (OUTPATIENT)
Dept: FAMILY MEDICINE | Facility: CLINIC | Age: 68
End: 2018-12-06

## 2019-02-07 ENCOUNTER — TELEPHONE (OUTPATIENT)
Dept: FAMILY MEDICINE | Facility: CLINIC | Age: 69
End: 2019-02-07

## 2019-02-07 ENCOUNTER — PES CALL (OUTPATIENT)
Dept: ADMINISTRATIVE | Facility: CLINIC | Age: 69
End: 2019-02-07

## 2019-02-07 DIAGNOSIS — E03.8 OTHER SPECIFIED HYPOTHYROIDISM: Chronic | ICD-10-CM

## 2019-02-07 DIAGNOSIS — E78.00 PURE HYPERCHOLESTEROLEMIA: Chronic | ICD-10-CM

## 2019-02-07 DIAGNOSIS — Z00.00 ANNUAL PHYSICAL EXAM: Primary | ICD-10-CM

## 2019-02-07 NOTE — TELEPHONE ENCOUNTER
Pt advised lab orders have been entered and linked.  If pt needs Levothyroxine refill prior to appt, just have Frances send refill request.

## 2019-02-07 NOTE — TELEPHONE ENCOUNTER
----- Message from Claudette Calderon sent at 2/7/2019 11:03 AM CST -----  Contact: self/[phone]  Pt called in regard to getting her epp lab orders put into the system. Pt appointment is on 4 10-19.      Please advise

## 2019-02-27 DIAGNOSIS — Z23 NEED FOR TETANUS BOOSTER: Primary | ICD-10-CM

## 2019-02-27 NOTE — TELEPHONE ENCOUNTER
Pt advised order for Tdap (Boostrix) has been sent to [facility].

## 2019-02-27 NOTE — TELEPHONE ENCOUNTER
----- Message from Claudette Hickman sent at 2/27/2019  9:11 AM CST -----  Contact: patient [phone]  Pt cut herself while shaving last night. Her tetanus shot is not up to date and she would like to have an order sent to her pharmacy. Zita, Please call pt when order has been sent    [facility] [address] AT [facility] [phone]

## 2019-03-07 DIAGNOSIS — I48.0 PAROXYSMAL A-FIB: Chronic | ICD-10-CM

## 2019-03-08 RX ORDER — DILTIAZEM HYDROCHLORIDE 120 MG/1
CAPSULE, EXTENDED RELEASE ORAL
Qty: 90 CAPSULE | Refills: 3 | Status: SHIPPED | OUTPATIENT
Start: 2019-03-08 | End: 2020-03-03

## 2019-04-02 ENCOUNTER — TELEPHONE (OUTPATIENT)
Dept: DERMATOLOGY | Facility: CLINIC | Age: 69
End: 2019-04-02

## 2019-04-02 ENCOUNTER — PATIENT OUTREACH (OUTPATIENT)
Dept: ADMINISTRATIVE | Facility: HOSPITAL | Age: 69
End: 2019-04-02

## 2019-04-02 RX ORDER — FLECAINIDE ACETATE 150 MG/1
TABLET ORAL
Qty: 10 TABLET | Refills: 3 | Status: SHIPPED | OUTPATIENT
Start: 2019-04-02 | End: 2020-11-23

## 2019-04-02 NOTE — TELEPHONE ENCOUNTER
----- Message from Maribel Maldonado MA sent at 4/2/2019  4:06 PM CDT -----  Contact: Patient   This is a Ray pt . Can you see if someone over there can see her soon  ----- Message -----  From: Maribel Maldonado MA  Sent: 4/2/2019  10:21 AM  To: Maribel Maldonado MA        ----- Message -----  From: Bere Ferreira  Sent: 4/2/2019   9:40 AM  To: Vashti TREJO Staff    Patient Requesting Sooner Appointment.     Reason for sooner appt.: Patient has rash on her left breast that she would like to have examined. Please contact pt to schedule appt.     Communication Preference: [phone] or [phone]

## 2019-04-10 ENCOUNTER — LAB VISIT (OUTPATIENT)
Dept: LAB | Facility: HOSPITAL | Age: 69
End: 2019-04-10
Attending: FAMILY MEDICINE
Payer: MEDICARE

## 2019-04-10 DIAGNOSIS — Z00.00 ANNUAL PHYSICAL EXAM: ICD-10-CM

## 2019-04-10 DIAGNOSIS — E78.00 PURE HYPERCHOLESTEROLEMIA: Chronic | ICD-10-CM

## 2019-04-10 DIAGNOSIS — E03.8 OTHER SPECIFIED HYPOTHYROIDISM: Chronic | ICD-10-CM

## 2019-04-10 LAB
ALBUMIN SERPL BCP-MCNC: 4.3 G/DL (ref 3.5–5.2)
ALP SERPL-CCNC: 78 U/L (ref 38–126)
ALT SERPL W/O P-5'-P-CCNC: 18 U/L (ref 10–44)
ANION GAP SERPL CALC-SCNC: 7 MMOL/L (ref 8–16)
AST SERPL-CCNC: 25 U/L (ref 15–46)
BASOPHILS # BLD AUTO: 0.04 K/UL (ref 0–0.2)
BASOPHILS NFR BLD: 0.7 % (ref 0–1.9)
BILIRUB SERPL-MCNC: 0.6 MG/DL (ref 0.1–1)
BUN SERPL-MCNC: 18 MG/DL (ref 7–17)
CALCIUM SERPL-MCNC: 9.4 MG/DL (ref 8.7–10.5)
CHLORIDE SERPL-SCNC: 104 MMOL/L (ref 95–110)
CHOLEST SERPL-MCNC: 151 MG/DL (ref 120–199)
CHOLEST/HDLC SERPL: 3.1 {RATIO} (ref 2–5)
CO2 SERPL-SCNC: 27 MMOL/L (ref 23–29)
CREAT SERPL-MCNC: 0.67 MG/DL (ref 0.5–1.4)
DIFFERENTIAL METHOD: NORMAL
EOSINOPHIL # BLD AUTO: 0.2 K/UL (ref 0–0.5)
EOSINOPHIL NFR BLD: 3.2 % (ref 0–8)
ERYTHROCYTE [DISTWIDTH] IN BLOOD BY AUTOMATED COUNT: 14.1 % (ref 11.5–14.5)
EST. GFR  (AFRICAN AMERICAN): >60 ML/MIN/1.73 M^2
EST. GFR  (NON AFRICAN AMERICAN): >60 ML/MIN/1.73 M^2
GLUCOSE SERPL-MCNC: 94 MG/DL (ref 70–110)
HCT VFR BLD AUTO: 43 % (ref 37–48.5)
HDLC SERPL-MCNC: 48 MG/DL (ref 40–75)
HDLC SERPL: 31.8 % (ref 20–50)
HGB BLD-MCNC: 13.9 G/DL (ref 12–16)
LDLC SERPL CALC-MCNC: 89 MG/DL (ref 63–159)
LYMPHOCYTES # BLD AUTO: 1.7 K/UL (ref 1–4.8)
LYMPHOCYTES NFR BLD: 30.8 % (ref 18–48)
MCH RBC QN AUTO: 28.7 PG (ref 27–31)
MCHC RBC AUTO-ENTMCNC: 32.3 G/DL (ref 32–36)
MCV RBC AUTO: 89 FL (ref 82–98)
MONOCYTES # BLD AUTO: 0.5 K/UL (ref 0.3–1)
MONOCYTES NFR BLD: 8.8 % (ref 4–15)
NEUTROPHILS # BLD AUTO: 3.2 K/UL (ref 1.8–7.7)
NEUTROPHILS NFR BLD: 56.3 % (ref 38–73)
NONHDLC SERPL-MCNC: 103 MG/DL
PLATELET # BLD AUTO: 275 K/UL (ref 150–350)
PMV BLD AUTO: 10.7 FL (ref 9.2–12.9)
POTASSIUM SERPL-SCNC: 5.3 MMOL/L (ref 3.5–5.1)
PROT SERPL-MCNC: 7.7 G/DL (ref 6–8.4)
RBC # BLD AUTO: 4.84 M/UL (ref 4–5.4)
SODIUM SERPL-SCNC: 138 MMOL/L (ref 136–145)
TRIGL SERPL-MCNC: 70 MG/DL (ref 30–150)
TSH SERPL DL<=0.005 MIU/L-ACNC: 2.85 UIU/ML (ref 0.4–4)
WBC # BLD AUTO: 5.65 K/UL (ref 3.9–12.7)

## 2019-04-10 PROCEDURE — 36415 COLL VENOUS BLD VENIPUNCTURE: CPT | Mod: HCNC,PO

## 2019-04-10 PROCEDURE — 80061 LIPID PANEL: CPT | Mod: HCNC

## 2019-04-10 PROCEDURE — 85025 COMPLETE CBC W/AUTO DIFF WBC: CPT | Mod: HCNC,PO

## 2019-04-10 PROCEDURE — 80053 COMPREHEN METABOLIC PANEL: CPT | Mod: HCNC,PO

## 2019-04-10 PROCEDURE — 84443 ASSAY THYROID STIM HORMONE: CPT | Mod: HCNC,PO

## 2019-04-15 ENCOUNTER — OFFICE VISIT (OUTPATIENT)
Dept: DERMATOLOGY | Facility: CLINIC | Age: 69
End: 2019-04-15
Payer: MEDICARE

## 2019-04-15 DIAGNOSIS — Z12.83 SCREENING EXAM FOR SKIN CANCER: ICD-10-CM

## 2019-04-15 DIAGNOSIS — D48.5 NEOPLASM OF UNCERTAIN BEHAVIOR OF SKIN: Primary | ICD-10-CM

## 2019-04-15 DIAGNOSIS — L57.0 ACTINIC KERATOSIS: ICD-10-CM

## 2019-04-15 DIAGNOSIS — L30.9 ECZEMA, UNSPECIFIED TYPE: ICD-10-CM

## 2019-04-15 DIAGNOSIS — D18.00 ANGIOMA: ICD-10-CM

## 2019-04-15 PROCEDURE — 99999 PR PBB SHADOW E&M-EST. PATIENT-LVL II: ICD-10-PCS | Mod: PBBFAC,HCNC,, | Performed by: DERMATOLOGY

## 2019-04-15 PROCEDURE — 99999 PR PBB SHADOW E&M-EST. PATIENT-LVL II: CPT | Mod: PBBFAC,HCNC,, | Performed by: DERMATOLOGY

## 2019-04-15 PROCEDURE — 1101F PT FALLS ASSESS-DOCD LE1/YR: CPT | Mod: HCNC,CPTII,S$GLB, | Performed by: DERMATOLOGY

## 2019-04-15 PROCEDURE — 99213 PR OFFICE/OUTPT VISIT, EST, LEVL III, 20-29 MIN: ICD-10-PCS | Mod: 25,HCNC,S$GLB, | Performed by: DERMATOLOGY

## 2019-04-15 PROCEDURE — 88305 TISSUE EXAM BY PATHOLOGIST: CPT | Mod: HCNC | Performed by: PATHOLOGY

## 2019-04-15 PROCEDURE — 11102 PR TANGENTIAL BIOPSY, SKIN, SINGLE LESION: ICD-10-PCS | Mod: HCNC,S$GLB,, | Performed by: DERMATOLOGY

## 2019-04-15 PROCEDURE — 99213 OFFICE O/P EST LOW 20 MIN: CPT | Mod: 25,HCNC,S$GLB, | Performed by: DERMATOLOGY

## 2019-04-15 PROCEDURE — 88305 TISSUE SPECIMEN TO PATHOLOGY, DERMATOLOGY: ICD-10-PCS | Mod: 26,HCNC,, | Performed by: PATHOLOGY

## 2019-04-15 PROCEDURE — 17000 PR DESTRUCTION(LASER SURGERY,CRYOSURGERY,CHEMOSURGERY),PREMALIGNANT LESIONS,FIRST LESION: ICD-10-PCS | Mod: 59,HCNC,S$GLB, | Performed by: DERMATOLOGY

## 2019-04-15 PROCEDURE — 88305 TISSUE EXAM BY PATHOLOGIST: CPT | Mod: 26,HCNC,, | Performed by: PATHOLOGY

## 2019-04-15 PROCEDURE — 1101F PR PT FALLS ASSESS DOC 0-1 FALLS W/OUT INJ PAST YR: ICD-10-PCS | Mod: HCNC,CPTII,S$GLB, | Performed by: DERMATOLOGY

## 2019-04-15 PROCEDURE — 11102 TANGNTL BX SKIN SINGLE LES: CPT | Mod: HCNC,S$GLB,, | Performed by: DERMATOLOGY

## 2019-04-15 PROCEDURE — 17000 DESTRUCT PREMALG LESION: CPT | Mod: 59,HCNC,S$GLB, | Performed by: DERMATOLOGY

## 2019-04-15 RX ORDER — TRIAMCINOLONE ACETONIDE 1 MG/G
OINTMENT TOPICAL 2 TIMES DAILY
Qty: 15 G | Refills: 1 | Status: SHIPPED | OUTPATIENT
Start: 2019-04-15 | End: 2019-06-19

## 2019-04-15 NOTE — PROGRESS NOTES
Subjective:       Patient ID:  Flores Fuentes is a 68 y.o. female who presents for   Chief Complaint   Patient presents with    Rash     breast    Skin Check     UBSE     Rash  - Initial  Affected locations: chest  Duration: 4 months  Signs / symptoms: itching and redness  Aggravated by: nothing  Treatments tried: eczema creams.    Here for rash on left breast; had in the past was biopsied and returned as eczema. Has tried OTC medications on it. No other triggers. Tried neosporin and old lidex ointment; it is dry.    Also would like UBSE; has spots; last checked 5/2017 and had SK left cheek per Dr Ray that is now red and inflamed.  She has a flaky spot near her left nose that does not heal.    Review of Systems   Skin: Positive for rash and activity-related sunscreen use. Negative for daily sunscreen use, recent sunburn and wears hat.   Hematologic/Lymphatic: Bruises/bleeds easily.        Objective:    Physical Exam   Constitutional: She appears well-developed and well-nourished. No distress.   Neurological: She is alert and oriented to person, place, and time. She is not disoriented.   Psychiatric: She has a normal mood and affect.   Skin:   Areas Examined (abnormalities noted in diagram):   Head / Face Inspection Performed  Neck Inspection Performed  Chest / Axilla Inspection Performed  Abdomen Inspection Performed  Back Inspection Performed  RUE Inspected  LUE Inspection Performed  Nails and Digits Inspection Performed                   Diagram Legend     Erythematous scaling macule/papule c/w actinic keratosis       Vascular papule c/w angioma      Pigmented verrucoid papule/plaque c/w seborrheic keratosis      Yellow umbilicated papule c/w sebaceous hyperplasia      Irregularly shaped tan macule c/w lentigo     1-2 mm smooth white papules consistent with Milia      Movable subcutaneous cyst with punctum c/w epidermal inclusion cyst      Subcutaneous movable cyst c/w pilar cyst      Firm pink to brown  papule c/w dermatofibroma      Pedunculated fleshy papule(s) c/w skin tag(s)      Evenly pigmented macule c/w junctional nevus     Mildly variegated pigmented, slightly irregular-bordered macule c/w mildly atypical nevus      Flesh colored to evenly pigmented papule c/w intradermal nevus       Pink pearly papule/plaque c/w basal cell carcinoma      Erythematous hyperkeratotic cursted plaque c/w SCC      Surgical scar with no sign of skin cancer recurrence      Open and closed comedones      Inflammatory papules and pustules      Verrucoid papule consistent consistent with wart     Erythematous eczematous patches and plaques     Dystrophic onycholytic nail with subungual debris c/w onychomycosis     Umbilicated papule    Erythematous-base heme-crusted tan verrucoid plaque consistent with inflamed seborrheic keratosis     Erythematous Silvery Scaling Plaque c/w Psoriasis     See annotation          Assessment / Plan:      Pathology Orders:     Normal Orders This Visit    Tissue Specimen To Pathology, Dermatology     Questions:    Directional Terms:  Other(comment)    Clinical Information:  erythematous scaly 1.5 cm plaque AK r/o SCCIS    Specific Site:  left cheek        Neoplasm of uncertain behavior of skin - left cheek  -     Tissue Specimen To Pathology, Dermatology  Shave biopsy procedure note:    Shave biopsy performed after verbal consent including risk of infection, scar, recurrence, need for additional treatment of site. Area prepped with alcohol, anesthetized with approximately 1.0cc of 1% lidocaine with epinephrine. Lesional tissue shaved with razor blade. Hemostasis achieved with application of aluminum chloride. No complications. Dressing applied. Wound care explained.      Eczema, unspecified type - left breast -   -     triamcinolone acetonide 0.1% (KENALOG) 0.1 % ointment; Apply topically 2 (two) times daily. To rash on left breast until resolves  Dispense: 15 g; Refill: 1    Actinic  keratosis  Cryosurgery Procedure Note    Verbal consent from the patient is obtained including, but not limited to, risk of hypopigmentation/hyperpigmentation, scar, recurrence of lesion. The patient is aware of the precancerous quality and need for treatment of these lesions. Liquid nitrogen cryosurgery is applied to the 1 actinic keratoses, as detailed in the physical exam, to produce a freeze injury. The patient is aware that blisters may form and is instructed on wound care with gentle cleansing and use of vaseline ointment to keep moist until healed. The patient is supplied a handout on cryosurgery and is instructed to call if lesions do not completely resolve.    Screening exam for skin cancer  Upper body skin examination performed today including at least 6 points as noted in physical examination. Suspicious lesions noted.    Patient instructed in importance in daily sun protection of at least spf 30. Sun avoidance and topical protection discussed.         Angioma  This is a benign vascular lesion. Reassurance given. No treatment required.              Follow up if symptoms worsen or fail to improve.

## 2019-04-17 ENCOUNTER — LAB VISIT (OUTPATIENT)
Dept: LAB | Facility: HOSPITAL | Age: 69
End: 2019-04-17
Attending: FAMILY MEDICINE
Payer: MEDICARE

## 2019-04-17 ENCOUNTER — OFFICE VISIT (OUTPATIENT)
Dept: FAMILY MEDICINE | Facility: CLINIC | Age: 69
End: 2019-04-17
Payer: MEDICARE

## 2019-04-17 VITALS
DIASTOLIC BLOOD PRESSURE: 70 MMHG | BODY MASS INDEX: 39.38 KG/M2 | SYSTOLIC BLOOD PRESSURE: 124 MMHG | HEIGHT: 67 IN | WEIGHT: 250.88 LBS | HEART RATE: 58 BPM | TEMPERATURE: 97 F

## 2019-04-17 DIAGNOSIS — E87.5 POTASSIUM EXCESS: ICD-10-CM

## 2019-04-17 DIAGNOSIS — N39.3 STRESS INCONTINENCE, FEMALE: ICD-10-CM

## 2019-04-17 DIAGNOSIS — I48.0 PAF (PAROXYSMAL ATRIAL FIBRILLATION): Primary | ICD-10-CM

## 2019-04-17 DIAGNOSIS — Z12.39 SCREENING FOR BREAST CANCER: ICD-10-CM

## 2019-04-17 DIAGNOSIS — E66.01 SEVERE OBESITY (BMI 35.0-35.9 WITH COMORBIDITY): ICD-10-CM

## 2019-04-17 LAB — POTASSIUM SERPL-SCNC: 4.4 MMOL/L (ref 3.5–5.1)

## 2019-04-17 PROCEDURE — 99499 UNLISTED E&M SERVICE: CPT | Mod: HCNC,S$GLB,, | Performed by: FAMILY MEDICINE

## 2019-04-17 PROCEDURE — 99499 UNLISTED E&M SERVICE: CPT | Mod: HCNC,,, | Performed by: FAMILY MEDICINE

## 2019-04-17 PROCEDURE — 99999 PR PBB SHADOW E&M-EST. PATIENT-LVL IV: ICD-10-PCS | Mod: PBBFAC,HCNC,, | Performed by: FAMILY MEDICINE

## 2019-04-17 PROCEDURE — 99999 PR PBB SHADOW E&M-EST. PATIENT-LVL IV: CPT | Mod: PBBFAC,HCNC,, | Performed by: FAMILY MEDICINE

## 2019-04-17 PROCEDURE — 99499 RISK ADDL DX/OHS AUDIT: ICD-10-PCS | Mod: HCNC,S$GLB,, | Performed by: FAMILY MEDICINE

## 2019-04-17 PROCEDURE — 84132 ASSAY OF SERUM POTASSIUM: CPT | Mod: HCNC

## 2019-04-17 PROCEDURE — 1101F PR PT FALLS ASSESS DOC 0-1 FALLS W/OUT INJ PAST YR: ICD-10-PCS | Mod: HCNC,CPTII,S$GLB, | Performed by: FAMILY MEDICINE

## 2019-04-17 PROCEDURE — 36415 COLL VENOUS BLD VENIPUNCTURE: CPT | Mod: HCNC,PO

## 2019-04-17 PROCEDURE — 99214 PR OFFICE/OUTPT VISIT, EST, LEVL IV, 30-39 MIN: ICD-10-PCS | Mod: HCNC,S$GLB,, | Performed by: FAMILY MEDICINE

## 2019-04-17 PROCEDURE — 99214 OFFICE O/P EST MOD 30 MIN: CPT | Mod: HCNC,S$GLB,, | Performed by: FAMILY MEDICINE

## 2019-04-17 PROCEDURE — 1101F PT FALLS ASSESS-DOCD LE1/YR: CPT | Mod: HCNC,CPTII,S$GLB, | Performed by: FAMILY MEDICINE

## 2019-04-17 PROCEDURE — 99499 RISK ADDL DX/OHS AUDIT: ICD-10-PCS | Mod: HCNC,,, | Performed by: FAMILY MEDICINE

## 2019-04-17 NOTE — PATIENT INSTRUCTIONS
Trusted medical sites for you to peruse  American Academy of Family Practice  Journal of the American Medical Association  CDC.gov    Follow with Cardiologist yearly, sooner if needed. She takes Flecainide prn flare up of a fib.     Follow with GYN for female health & cancer prevention    ==============================  RECOMMENDATIONS FOR FEMALES  ==============================  Your #1 MEDICINE is DAILY EXERCISE - 15-20 minutes of huffing & puffing EVERY DAY.     Prevent the #1 cause of death- cardiovascular disease (HEART ATTACK & STROKE) by checking for normal blood pressure, cholesterol, sugars, & by not smoking.     VACCINES: Yearly FLU shot, PNEUMONIA shot after 65,  SHINGLES shot after 50    Screening colonoscopy at AGE  50 & every 10 years to check for COLON CANCER,  one of the most common & preventable cancers (Or FIT kit yearly) Repeat in 3 years if POLYP found     I recommend  high fiber (5 fresh fruits or vegetables daily), low fat diet and aerobic  exercise (huffing/ puffing/ sweating for 20 min straight at least 4 days a week)    Follow up yearly with mammogram, fasting lipids, CMP, CBC prior.   ==============================================================

## 2019-04-17 NOTE — PROGRESS NOTES
Subjective:      Patient ID: Flores Fuentes is a 68 y.o. female.    Chief Complaint: follow up labs, meds    Here today for follow up labs, meds    She follows with Cardiologist yearly for atrial fib & has Flecainide prn flare up .  She had a flare-up last week that resolved after 0.5 hr with flecainide, previous was last September.    She saw dermatologist recently for removal of skin lesions on the face.  She googles symptoms & gets very upset concerning possible medical diagnosis.     She has persisting urinary incontinence, having to wear pads at night.  Keep goes did not work.  She would like referral to specialist for evaluation & treatment options    Denies any chest pain, shortness of breath, nausea vomiting constipation diarrhea, blood in stool, heartburn      Current Outpatient Medications:     CARTIA  mg Cp24, TAKE 1 CAPSULE EVERY DAY, Disp: 90 capsule, Rfl: 3    ELIQUIS 5 mg Tab, TAKE 1 TABLET TWICE DAILY, Disp: 180 tablet, Rfl: 3    flecainide (TAMBOCOR) 150 MG Tab, 2 tabs (300 mg total) up to once per day for atrial fibrillation., Disp: 10 tablet, Rfl: 3    fluocinonide (LIDEX) 0.05 % ointment, daily as needed. , Disp: , Rfl: 5    fluticasone (FLONASE) 50 mcg/actuation nasal spray, USE 1 SPRAY IN EACH NOSTRIL ONE TIME DAILY, Disp: 32 g, Rfl: 3    levothyroxine (SYNTHROID) 25 MCG tablet, TAKE 1 TABLET ONE TIME DAILY, Disp: 90 tablet, Rfl: 3    metoprolol succinate (TOPROL-XL) 25 MG 24 hr tablet, TAKE 1 TABLET EVERY DAY, Disp: 90 tablet, Rfl: 3    pravastatin (PRAVACHOL) 40 MG tablet, Take 1 tablet (40 mg total) by mouth once daily., Disp: 90 tablet, Rfl: 3    triamcinolone acetonide 0.1% (KENALOG) 0.1 % ointment, Apply topically 2 (two) times daily. To rash on left breast until resolves, Disp: 15 g, Rfl: 1    Lab Results   Component Value Date    HGBA1C 5.7 10/07/2014     No results found for: MICALBCREAT  Lab Results   Component Value Date    LDLCALC 89.0 04/10/2019    LDLCALC 92.0  03/27/2018    CHOL 151 04/10/2019    HDL 48 04/10/2019    TRIG 70 04/10/2019       Lab Results   Component Value Date     04/10/2019    K 4.4 04/17/2019     04/10/2019    CO2 27 04/10/2019    GLU 94 04/10/2019    BUN 18 (H) 04/10/2019    CREATININE 0.67 04/10/2019    CALCIUM 9.4 04/10/2019    PROT 7.7 04/10/2019    ALBUMIN 4.3 04/10/2019    BILITOT 0.6 04/10/2019    ALKPHOS 78 04/10/2019    AST 25 04/10/2019    ALT 18 04/10/2019    ANIONGAP 7 (L) 04/10/2019    ESTGFRAFRICA >60.0 04/10/2019    EGFRNONAA >60.0 04/10/2019    WBC 5.65 04/10/2019    HGB 13.9 04/10/2019    HGB 13.0 03/27/2018    HGB 13.0 03/27/2018    HCT 43.0 04/10/2019    MCV 89 04/10/2019     04/10/2019    TSH 2.850 04/10/2019    HEPCAB Negative 01/23/2016       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, l mandible, 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 Dr Viktor Toure    Paroxysmal atrioventricular tachycardia     Plantar fasciitis     Right knee meniscal tear     Dr Mayfield, tx conservatively    Severe obesity (BMI 35.0-35.9 with comorbidity) 1/24/2017    Stress incontinence, female 03/28/2018    After HYST, Kegels didn't work, worse at night wearing pads    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    Thyroid disease      Past Surgical History:   Procedure Laterality Date    APPENDECTOMY      CARDIOVERSION N/A 3/13/2017    Performed by [facility]yn [facility]cd[facility]ough [facility] at [facility]    HYSTERECTOMY  1990    TAHBSO for fibroids    MANDIBLE SURGERY  2015    L mandible, Dr Toure    TONSILLECTOMY       Social History     Social History Narrative    Retired 2012,  [employer], 3 children,  "nonsmoker, ETOH socially, GYN [facility], colonoscopy normal 58, rec repeat at 68     Family History   Problem Relation Age of Onset    Cancer Mother         stomach, liver    Melanoma Neg Hx      Vitals:    04/17/19 0841   BP: 124/70   Pulse: (!) 58   Temp: 97.4 °F (36.3 °C)   TempSrc: Oral   Weight: 113.8 kg (250 lb 14.1 oz)   Height: 5' 7" (1.702 m)   PainSc: 0-No pain     Objective:   Physical Exam   Constitutional: She is oriented to person, place, and time. She appears well-developed and well-nourished.   HENT:   Head: Normocephalic and atraumatic.   Right Ear: External ear normal.   Left Ear: External ear normal.   Nose: Nose normal.   Mouth/Throat: Oropharynx is clear and moist.   Eyes: Pupils are equal, round, and reactive to light. EOM are normal.   Neck: Neck supple. No thyromegaly present.   Cardiovascular: Normal rate, regular rhythm, normal heart sounds and intact distal pulses.   No murmur heard.  Pulmonary/Chest: Effort normal and breath sounds normal. She has no wheezes.   Abdominal: Soft. Bowel sounds are normal. She exhibits no distension and no mass. There is no tenderness. There is no rebound and no guarding.   Musculoskeletal: She exhibits no edema.   Lymphadenopathy:     She has no cervical adenopathy.   Neurological: She is alert and oriented to person, place, and time.   Skin: Skin is warm and dry.   Psychiatric: She has a normal mood and affect. Her behavior is normal.     Assessment:     1. PAF (paroxysmal atrial fibrillation)    2. Severe obesity (BMI 35.0-35.9 with comorbidity)    3. Potassium excess    4. Stress incontinence, female    5. Screening for breast cancer      Plan:     Orders Placed This Encounter    Mammo Digital Screening Bilat    Potassium    Ambulatory Referral to Urogynecology       Patient Instructions   Trusted medical sites for you to peruse  American Academy of Family Practice  Journal of the American Medical Association  CDC.gov    Follow with Cardiologist " yearly, sooner if needed. She takes Flecainide prn flare up of a fib.     Follow with GYN for female health & cancer prevention    ==============================  RECOMMENDATIONS FOR FEMALES  ==============================  Your #1 MEDICINE is DAILY EXERCISE - 15-20 minutes of huffing & puffing EVERY DAY.     Prevent the #1 cause of death- cardiovascular disease (HEART ATTACK & STROKE) by checking for normal blood pressure, cholesterol, sugars, & by not smoking.     VACCINES: Yearly FLU shot, PNEUMONIA shot after 65,  SHINGLES shot after 50    Screening colonoscopy at AGE  50 & every 10 years to check for COLON CANCER,  one of the most common & preventable cancers (Or FIT kit yearly) Repeat in 3 years if POLYP found     I recommend  high fiber (5 fresh fruits or vegetables daily), low fat diet and aerobic  exercise (huffing/ puffing/ sweating for 20 min straight at least 4 days a week)    Follow up yearly with mammogram, fasting lipids, CMP, CBC prior.   ==============================================================                                  Answers for HPI/ROS submitted by the patient on 4/16/2019   activity change: No  unexpected weight change: No  neck pain: No  hearing loss: No  rhinorrhea: No  trouble swallowing: No  eye discharge: No  visual disturbance: No  chest tightness: No  wheezing: No  chest pain: No  palpitations: No  blood in stool: No  constipation: No  vomiting: No  diarrhea: No  polydipsia: No  polyuria: No  difficulty urinating: No  hematuria: No  menstrual problem: No  dysuria: No  joint swelling: No  arthralgias: No  headaches: No  weakness: No  confusion: No  dysphoric mood: No

## 2019-04-22 ENCOUNTER — HOSPITAL ENCOUNTER (OUTPATIENT)
Dept: RADIOLOGY | Facility: HOSPITAL | Age: 69
Discharge: HOME | End: 2019-04-22
Attending: FAMILY MEDICINE
Payer: MEDICARE

## 2019-04-22 DIAGNOSIS — Z12.39 SCREENING FOR BREAST CANCER: ICD-10-CM

## 2019-04-22 DIAGNOSIS — L57.0 ACTINIC KERATOSIS: Primary | ICD-10-CM

## 2019-04-22 PROCEDURE — 77067 SCR MAMMO BI INCL CAD: CPT | Mod: TC,HCNC,PO

## 2019-04-22 RX ORDER — FLUOROURACIL 50 MG/G
CREAM TOPICAL 2 TIMES DAILY
Qty: 40 G | Refills: 0 | Status: SHIPPED | OUTPATIENT
Start: 2019-04-22 | End: 2019-09-09

## 2019-04-23 ENCOUNTER — OFFICE VISIT (OUTPATIENT)
Dept: UROGYNECOLOGY | Facility: CLINIC | Age: 69
End: 2019-04-23
Payer: MEDICARE

## 2019-04-23 VITALS
SYSTOLIC BLOOD PRESSURE: 124 MMHG | WEIGHT: 251.31 LBS | HEIGHT: 67 IN | DIASTOLIC BLOOD PRESSURE: 60 MMHG | BODY MASS INDEX: 39.44 KG/M2

## 2019-04-23 DIAGNOSIS — N95.2 VAGINAL ATROPHY: ICD-10-CM

## 2019-04-23 DIAGNOSIS — Z01.419 WELL WOMAN EXAM: Primary | ICD-10-CM

## 2019-04-23 DIAGNOSIS — R35.1 NOCTURIA: ICD-10-CM

## 2019-04-23 DIAGNOSIS — N39.46 MIXED INCONTINENCE URGE AND STRESS: ICD-10-CM

## 2019-04-23 DIAGNOSIS — N81.89 PELVIC FLOOR WEAKNESS: ICD-10-CM

## 2019-04-23 PROCEDURE — 87088 URINE BACTERIA CULTURE: CPT | Mod: HCNC

## 2019-04-23 PROCEDURE — G0101 PR CA SCREEN;PELVIC/BREAST EXAM: ICD-10-PCS | Mod: HCNC,S$GLB,, | Performed by: NURSE PRACTITIONER

## 2019-04-23 PROCEDURE — 99999 PR PBB SHADOW E&M-EST. PATIENT-LVL IV: ICD-10-PCS | Mod: PBBFAC,HCNC,, | Performed by: NURSE PRACTITIONER

## 2019-04-23 PROCEDURE — 99999 PR PBB SHADOW E&M-EST. PATIENT-LVL IV: CPT | Mod: PBBFAC,HCNC,, | Performed by: NURSE PRACTITIONER

## 2019-04-23 PROCEDURE — G0101 CA SCREEN;PELVIC/BREAST EXAM: HCPCS | Mod: HCNC,S$GLB,, | Performed by: NURSE PRACTITIONER

## 2019-04-23 PROCEDURE — 87086 URINE CULTURE/COLONY COUNT: CPT | Mod: HCNC

## 2019-04-23 PROCEDURE — 51701 PR INSERTION OF NON-INDWELLING BLADDER CATHETERIZATION FOR RESIDUAL UR: ICD-10-PCS | Mod: HCNC,S$GLB,, | Performed by: NURSE PRACTITIONER

## 2019-04-23 PROCEDURE — 87186 SC STD MICRODIL/AGAR DIL: CPT | Mod: HCNC

## 2019-04-23 PROCEDURE — 51701 INSERT BLADDER CATHETER: CPT | Mod: HCNC,S$GLB,, | Performed by: NURSE PRACTITIONER

## 2019-04-23 PROCEDURE — 87077 CULTURE AEROBIC IDENTIFY: CPT | Mod: HCNC

## 2019-04-23 RX ORDER — OXYBUTYNIN CHLORIDE 10 MG/1
10 TABLET, EXTENDED RELEASE ORAL DAILY
Qty: 30 TABLET | Refills: 11 | Status: SHIPPED | OUTPATIENT
Start: 2019-04-23 | End: 2019-06-19

## 2019-04-23 NOTE — PROGRESS NOTES
04/23/2019    SUBJECTIVE:   68 y.o. female for annual exam.    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, l mandible, 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 Dr Viktor Toure    Paroxysmal atrioventricular tachycardia     Plantar fasciitis     Right knee meniscal tear     Dr Mayfield, tx conservatively    Severe obesity (BMI 35.0-35.9 with comorbidity) 1/24/2017    Stress incontinence, female 03/28/2018    After HYST, Kegels didn't work, worse at night wearing pads    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    Thyroid disease        Past Surgical History:   Procedure Laterality Date    APPENDECTOMY      CARDIOVERSION N/A 3/13/2017    Performed [facility]    HYSTERECTOMY  1990    TAHBSO for fibroids    MANDIBLE SURGERY  2015    L mandible, Dr Toure    OOPHORECTOMY      TONSILLECTOMY         Current Outpatient Medications   Medication Sig Dispense Refill    CARTIA  mg Cp24 TAKE 1 CAPSULE EVERY DAY 90 capsule 3    ELIQUIS 5 mg Tab TAKE 1 TABLET TWICE DAILY 180 tablet 3    flecainide (TAMBOCOR) 150 MG Tab 2 tabs (300 mg total) up to once per day for atrial fibrillation. 10 tablet 3    fluocinonide (LIDEX) 0.05 % ointment daily as needed.   5    fluorouracil (EFUDEX) 5 % cream Apply topically 2 (two) times daily. Thin layer to site on left cheek x 2 weeks or until irritation develops, then stop. 40 g 0    fluticasone (FLONASE) 50 mcg/actuation nasal spray USE 1 SPRAY IN EACH NOSTRIL ONE TIME DAILY 32 g 3    levothyroxine (SYNTHROID) 25 MCG tablet TAKE 1 TABLET ONE TIME DAILY 90 tablet 3    metoprolol succinate (TOPROL-XL) 25 MG 24  "hr tablet TAKE 1 TABLET EVERY DAY 90 tablet 3    pravastatin (PRAVACHOL) 40 MG tablet Take 1 tablet (40 mg total) by mouth once daily. 90 tablet 3    triamcinolone acetonide 0.1% (KENALOG) 0.1 % ointment Apply topically 2 (two) times daily. To rash on left breast until resolves 15 g 1    oxybutynin (DITROPAN-XL) 10 MG 24 hr tablet Take 1 tablet (10 mg total) by mouth once daily. 30 tablet 11    sulfamethoxazole-trimethoprim 800-160mg (BACTRIM DS) 800-160 mg Tab Take 1 tablet by mouth 2 (two) times daily. 6 tablet 0     No current facility-administered medications for this visit.      Allergies: Decongestant d [pseudoephedrine-dm] and Augmentin [amoxicillin-pot clavulanate]   No LMP recorded. Patient has had a hysterectomy.      Well Woman:  Pap: post hysterectomy due to fibroids.  Denies history of abnormal pap smear  Mammo:[id number] normal  Colonoscopy:  Dexa:2018 normal per patient report      Family History  Family History   Problem Relation Age of Onset    Cancer Mother         stomach, liver    Breast cancer Neg Hx     Ovarian cancer Neg Hx     Cervical cancer Neg Hx     Endometrial cancer Neg Hx     Vaginal cancer Neg Hx     Melanoma Neg Hx         ROS:  Feeling well.   No dyspnea or chest pain on exertion.    No abdominal pain, change in bowel habits, black or bloody stools.    + UUI.  Using 8-9/ day.  Voiding every 2 hours.  Nocturia 2/ night.  Limits fluids prior to bedtime. + enuresis  GYN ROS: no breast pain or new or enlarging lumps on self exam, no vaginal bleeding.   No neurological complaints.    OBJECTIVE:   The patient appears well, alert, oriented x 3, in no distress.  /60 (BP Location: Right arm, Patient Position: Sitting, BP Method: Large (Manual))   Ht 5' 7" (1.702 m)   Wt 114 kg (251 lb 5.2 oz)   BMI 39.36 kg/m²   ENT normal.  Neck supple. No adenopathy or thyromegaly. NANY.   Lungs are clear, good air entry, no wheezes, rhonchi or rales.   S1 and S2 normal, no murmurs, " regular rate and rhythm.   Abdomen soft without tenderness, guarding, mass or organomegaly.   Extremities show no edema, normal peripheral pulses.   Neurological is normal, no focal findings.    BREAST EXAM: breasts appear normal, no suspicious masses, no skin or nipple changes or axillary nodes    PELVIC EXAM:   VULVA: normal appearing vulva with no masses, tenderness or lesions, + hemangiomas R > L  VAGINA: normal appearing vagina with normal color and discharge, no lesions,  CERVIX: surgically absent,   UTERUS: absent,   ADNEXA: no masses,   RECTAL: deferred    pvr 20    Kegel 1/5    ASSESSMENT:   1. Well woman exam     2. Mixed incontinence urge and stress  Urine culture    Ambulatory consult to Physical Therapy    oxybutynin (DITROPAN-XL) 10 MG 24 hr tablet    CANCELED: Ambulatory consult to Physical Therapy   3. Pelvic floor weakness  Ambulatory consult to Physical Therapy    CANCELED: Ambulatory consult to Physical Therapy   4. Vaginal atrophy     5. Nocturia         PLAN:     1. Well woman up to date    2.Mixed urinary incontinence, urge > stress:    --Empty bladder every 3 hours.  Empty well: wait a minute, lean forward on toilet.    --Avoid dietary irritants (see sheet).  Keep diary x 3-5 days to determine your irritants. Keep 3 days now and 3 days after 1 month of PT and oxybutynin  --KEGELS: do 10 in AM and 10 in PM, holding each x 10 seconds.  When you feel urge to go, STOP, KEGEL, and when urge has passed, then go to bathroom.    --start pelvic floor PT with Bere Anna ([facility]: [facility], [place] [address]).  Phone: [phone].  Fax:  [phone].\  --URGE: trial oxybutynin xl 10 mg daily. Takes 2-4 weeks to see if will have effect.  For dry mouth: get sour, sugar free lozenge or gum.    --STRESS:  Pessary vs. Sling.   --urine culture today    2. Vaginal atrophy (dryness):   Use REPLENS or REFRESH OTC: 1/2 applicator full in vagina twice a week.      3. Nocturia (nighttime  urination): stop fluids 2 hours before bed.  If have leg swelling:  Elevate feet above chest x 1 hour before bed to get excess fluid off.  Can also use support hose (knee highs).      4. RTC 3 months      40 minutes were spent in face to face time with this patient  90 % of this time was spent in counseling and/or coordination of care  Anette VILLARREAL [facility]and [facility]  Division of Female Pelvic Medicine and Reconstructive Surgery  Department of Obstetrics & Gynecology

## 2019-04-23 NOTE — LETTER
April 28, 2019      Naz Condon MD  [address] [facility]  [address]  [facility] [address]           [facility] - Gynecology  [address]  [place] [address]  Phone: [phone]          Patient: Flores Fuentes   MR Number: [id number]   YOB: 1950   Date of Visit: 4/23/2019       Dear Dr. Naz Condon:    Thank you for referring Flores Fuentes to me for evaluation. Attached you will find relevant portions of my assessment and plan of care.    If you have questions, please do not hesitate to call me. I look forward to following Flores Fuentes along with you.    Sincerely,    Anette Infante, NP    Enclosure  CC:  No Recipients    If you would like to receive this communication electronically, please contact [email or web address] or [phone] to request more information on [facility] Link access.    For providers and/or their staff who would like to refer a patient to [facility], please contact us through our one-stop-shop provider referral line, [facility], at [phone].    If you feel you have received this communication in error or would no longer like to receive these types of communications, please e-mail [email or web address]

## 2019-04-23 NOTE — PATIENT INSTRUCTIONS
1. Well woman up to date    2.Mixed urinary incontinence, urge > stress:    --Empty bladder every 3 hours.  Empty well: wait a minute, lean forward on toilet.    --Avoid dietary irritants (see sheet).  Keep diary x 3-5 days to determine your irritants. Keep 3 days now and 3 days after 1 month of PT and oxybutynin  --KEGELS: do 10 in AM and 10 in PM, holding each x 10 seconds.  When you feel urge to go, STOP, KEGEL, and when urge has passed, then go to bathroom.    --start pelvic floor PT with Bere Anna ([facility]: [address], [place] [address]).  Phone: [phone].  Fax:  [phone].\  --URGE: trial oxybutynin xl 10 mg daily. Takes 2-4 weeks to see if will have effect.  For dry mouth: get sour, sugar free lozenge or gum.    --STRESS:  Pessary vs. Sling.   --urine culture today    2. Vaginal atrophy (dryness):   Use REPLENS or REFRESH OTC: 1/2 applicator full in vagina twice a week.      3. Nocturia (nighttime urination): stop fluids 2 hours before bed.  If have leg swelling:  Elevate feet above chest x 1 hour before bed to get excess fluid off.  Can also use support hose (knee highs).      4. RTC 3 months    ______________________________________________________________________    Bladder Irritants  Certain foods and drinks have been associated with worsening symptoms of urinary frequency, urgency, urge incontinence, or  bladder pain. If you suffer from any of these conditions, you may wish to try eliminating one or more of these foods from your  diet and see if your symptoms improve. If bladder symptoms are related to dietary factors, strict adherence to a diet that  eliminates the food should bring marked relief in 10 days. Once you are feeling better, you can begin to add foods back into  your diet, one at a time. If symptoms return, you will be able to identify the irritant. As you add foods back to your diet it is  very important that you drink significant amounts of water.  Low-acid fruit  substitutions include apricots, papaya, pears and watermelon. Coffee drinkers can drink Kava or other lowacid  instant drinks. Tea drinkers can substitute non-citrus herbal and sun brewed teas. Calcium carbonate co-buffered with  calcium ascorbate can be substituted for Vitamin C. Prelief is a dietary supplement that works as an acid blocker for the  bladder.  Where to get more information:   Overcoming Bladder Disorders by Hodan Maria and Gorge Garduno, 1990   You Dont Have to Live with Cystitis! By Chica Farnsworth, 1988  List of Common Bladder Irritants*  Alcoholic beverages  Apples and apple juice  Cantaloupe  Carbonated beverages  Chili and spicy foods  Chocolate  Citrus fruit  Coffee (including decaffeinated)  Cranberries and cranberry juice  Grapes  Guava  Milk Products: milk, cheese, cottage cheese, yogurt, ice cream  Peaches  Pineapple  Plums  Strawberries  Sugar especially artificial sweeteners, saccharin, aspartame, corn sweeteners, honey, fructose, sucrose, lactose  Tea  Tomatoes and tomato juice  Vitamin B complex  Vinegar  *Most people are not sensitive to ALL of these products; your goal is to find the foods that make YOUR  symptoms worse

## 2019-04-24 ENCOUNTER — PATIENT MESSAGE (OUTPATIENT)
Dept: CARDIOLOGY | Facility: CLINIC | Age: 69
End: 2019-04-24

## 2019-04-25 LAB — BACTERIA UR CULT: NORMAL

## 2019-04-26 ENCOUNTER — PATIENT MESSAGE (OUTPATIENT)
Dept: UROGYNECOLOGY | Facility: CLINIC | Age: 69
End: 2019-04-26

## 2019-04-26 DIAGNOSIS — N30.00 ACUTE CYSTITIS WITHOUT HEMATURIA: Primary | ICD-10-CM

## 2019-04-26 RX ORDER — SULFAMETHOXAZOLE AND TRIMETHOPRIM 800; 160 MG/1; MG/1
1 TABLET ORAL 2 TIMES DAILY
Qty: 6 TABLET | Refills: 0 | Status: SHIPPED | OUTPATIENT
Start: 2019-04-26 | End: 2019-06-19

## 2019-04-29 ENCOUNTER — TELEPHONE (OUTPATIENT)
Dept: ELECTROPHYSIOLOGY | Facility: CLINIC | Age: 69
End: 2019-04-29

## 2019-04-29 ENCOUNTER — PATIENT MESSAGE (OUTPATIENT)
Dept: CARDIOLOGY | Facility: CLINIC | Age: 69
End: 2019-04-29

## 2019-04-29 ENCOUNTER — PATIENT MESSAGE (OUTPATIENT)
Dept: DERMATOLOGY | Facility: CLINIC | Age: 69
End: 2019-04-29

## 2019-04-29 NOTE — TELEPHONE ENCOUNTER
,      The patient was recently prescribed new medication from her urologist- Oxybutynin for her bladder and Sulfamethoxazole-TMP DS for a UTI. She read that these 2 meds can cause her heart rate to increase. She spoke to her pharmacist who could not give her a clear answer.    She is reluctant to start as she has H/O occasional AFIB.    Please advise of your recommendations.       Emmy DORADO CCM

## 2019-04-30 ENCOUNTER — TELEPHONE (OUTPATIENT)
Dept: ELECTROPHYSIOLOGY | Facility: CLINIC | Age: 69
End: 2019-04-30

## 2019-04-30 NOTE — TELEPHONE ENCOUNTER
Ameya Servin MD  You 12 hours ago (9:17 PM)      TMP/SMX definitely no problem.   Oxybutinin: quite unlikely to be a problem. If she gets any symptom, she should stop using it and discuss alternatives with the prescriber... but I doubt she'll have any problem.   DPM    Routing comment       You  Ameya Servin MD 18 hours ago (2:55 PM)      Please see my note.   Thanks.Sherrell Ward RN      Routing comment       You 18 hours ago (2:52 PM)         ,        The patient was recently prescribed new medication from her urologist- Oxybutynin for her bladder and Sulfamethoxazole-TMP DS for a UTI. She read that these 2 meds can cause her heart rate to increase. She spoke to her pharmacist who could not give her a clear answer.     She is reluctant to start as she has H/O occasional AFIB.     Please advise of your recommendations.         Emmy DORADO CCM

## 2019-04-30 NOTE — TELEPHONE ENCOUNTER
Spoke with patient and notified that  recommends the following:    TMP/SMX definitely no problem.   Oxybutinin: quite unlikely to be a problem. If she gets any symptom, she should stop using it and discuss alternatives with the prescriber... but I doubt she'll have any problem.       Pt verbalized understanding. No further questions or concerns at this time.      Emmy DORADO CCM

## 2019-05-02 ENCOUNTER — PATIENT MESSAGE (OUTPATIENT)
Dept: FAMILY MEDICINE | Facility: CLINIC | Age: 69
End: 2019-05-02

## 2019-05-07 ENCOUNTER — PATIENT MESSAGE (OUTPATIENT)
Dept: DERMATOLOGY | Facility: CLINIC | Age: 69
End: 2019-05-07

## 2019-05-31 ENCOUNTER — OFFICE VISIT (OUTPATIENT)
Dept: DERMATOLOGY | Facility: CLINIC | Age: 69
End: 2019-05-31
Payer: MEDICARE

## 2019-05-31 DIAGNOSIS — R21 RASH AND NONSPECIFIC SKIN ERUPTION: Primary | ICD-10-CM

## 2019-05-31 DIAGNOSIS — L57.0 ACTINIC KERATOSIS: ICD-10-CM

## 2019-05-31 PROCEDURE — 99999 PR PBB SHADOW E&M-EST. PATIENT-LVL II: ICD-10-PCS | Mod: PBBFAC,HCNC,, | Performed by: DERMATOLOGY

## 2019-05-31 PROCEDURE — 88305 TISSUE SPECIMEN TO PATHOLOGY, DERMATOLOGY: ICD-10-PCS | Mod: 26,HCNC,, | Performed by: PATHOLOGY

## 2019-05-31 PROCEDURE — 1101F PT FALLS ASSESS-DOCD LE1/YR: CPT | Mod: HCNC,CPTII,S$GLB, | Performed by: DERMATOLOGY

## 2019-05-31 PROCEDURE — 1101F PR PT FALLS ASSESS DOC 0-1 FALLS W/OUT INJ PAST YR: ICD-10-PCS | Mod: HCNC,CPTII,S$GLB, | Performed by: DERMATOLOGY

## 2019-05-31 PROCEDURE — 99999 PR PBB SHADOW E&M-EST. PATIENT-LVL II: CPT | Mod: PBBFAC,HCNC,, | Performed by: DERMATOLOGY

## 2019-05-31 PROCEDURE — 11104 PR PUNCH BIOPSY, SKIN, SINGLE LESION: ICD-10-PCS | Mod: HCNC,S$GLB,, | Performed by: DERMATOLOGY

## 2019-05-31 PROCEDURE — 99213 OFFICE O/P EST LOW 20 MIN: CPT | Mod: 25,HCNC,S$GLB, | Performed by: DERMATOLOGY

## 2019-05-31 PROCEDURE — 88305 TISSUE EXAM BY PATHOLOGIST: CPT | Mod: HCNC | Performed by: PATHOLOGY

## 2019-05-31 PROCEDURE — 11104 PUNCH BX SKIN SINGLE LESION: CPT | Mod: HCNC,S$GLB,, | Performed by: DERMATOLOGY

## 2019-05-31 PROCEDURE — 88305 TISSUE EXAM BY PATHOLOGIST: CPT | Mod: 26,HCNC,, | Performed by: PATHOLOGY

## 2019-05-31 PROCEDURE — 99213 PR OFFICE/OUTPT VISIT, EST, LEVL III, 20-29 MIN: ICD-10-PCS | Mod: 25,HCNC,S$GLB, | Performed by: DERMATOLOGY

## 2019-05-31 NOTE — PROGRESS NOTES
Subjective:       Patient ID:  Flores Fuentes is a 68 y.o. female who presents for   Chief Complaint   Patient presents with    Actinic Keratosis     L cheek    Rash     L breast f/u     Actinic Keratosis  - Follow-up  Symptom course: stable  Affected locations: face  Signs / symptoms: asymptomatic    Rash  - Follow-up  Currently using: TAC ointment.  AFFECTED LOCATIONS F/U: L breast.  Signs / symptoms: asymptomatic    Here for f/u biopsy proven ak LEFT CHEEK, s/p Efudex treatment x 2 weeks. Area became red and inflamed. It is still pink but no longer scaly. Doing well.  She also asks about rash on left breast; she has had rash on this nipple in the past that was biopsied to r/o paget's and returned as eczema. She reports using TAC 0.1 consistently and the 3 dots of scaly skin have not resolved.        Review of Systems   Skin: Positive for rash and activity-related sunscreen use. Negative for itching, daily sunscreen use and wears hat.   Hematologic/Lymphatic: Bruises/bleeds easily.        Objective:    Physical Exam   Constitutional: She appears well-developed and well-nourished. No distress.   Neurological: She is alert and oriented to person, place, and time. She is not disoriented.   Psychiatric: She has a normal mood and affect.   Skin:   Areas Examined (abnormalities noted in diagram):   Head / Face Inspection Performed  Chest / Axilla Inspection Performed                   Diagram Legend     Erythematous scaling macule/papule c/w actinic keratosis       Vascular papule c/w angioma      Pigmented verrucoid papule/plaque c/w seborrheic keratosis      Yellow umbilicated papule c/w sebaceous hyperplasia      Irregularly shaped tan macule c/w lentigo     1-2 mm smooth white papules consistent with Milia      Movable subcutaneous cyst with punctum c/w epidermal inclusion cyst      Subcutaneous movable cyst c/w pilar cyst      Firm pink to brown papule c/w dermatofibroma      Pedunculated fleshy papule(s)  c/w skin tag(s)      Evenly pigmented macule c/w junctional nevus     Mildly variegated pigmented, slightly irregular-bordered macule c/w mildly atypical nevus      Flesh colored to evenly pigmented papule c/w intradermal nevus       Pink pearly papule/plaque c/w basal cell carcinoma      Erythematous hyperkeratotic cursted plaque c/w SCC      Surgical scar with no sign of skin cancer recurrence      Open and closed comedones      Inflammatory papules and pustules      Verrucoid papule consistent consistent with wart     Erythematous eczematous patches and plaques     Dystrophic onycholytic nail with subungual debris c/w onychomycosis     Umbilicated papule    Erythematous-base heme-crusted tan verrucoid plaque consistent with inflamed seborrheic keratosis     Erythematous Silvery Scaling Plaque c/w Psoriasis     See annotation          Assessment / Plan:      Pathology Orders:     Normal Orders This Visit    Tissue Specimen To Pathology, Dermatology     Questions:    Directional Terms:  Other(comment)    Clinical Information:  3 nonhealing papules near breast - trauma r/o malignancy - scaly thin plaques    Specific Site:  left breast        Rash and nonspecific skin eruption - left breast favor resolving eczema  -     Tissue Specimen To Pathology, Dermatology  Punch biopsy procedure note:  Punch biopsy performed after verbal consent obtained. Area marked and prepped with alcohol. Approximately 1cc of 1% lidocaine with epinephrine injected. 3 mm disposable punch used to remove lesion. Hemostasis obtained and biopsy site closed with 1 - 2 Prolene sutures. Wound care instructions reviewed with patient and handout given.    May continue TAC to the 2 other papules not biopsied today.    Actinic keratosis  Resolved left cheek s/p efudex  Continue to monitor site    Patient instructed in importance in daily sun protection of at least spf 30. Sun avoidance and topical protection discussed.              Follow up in about 2  weeks (around 6/14/2019).

## 2019-06-03 ENCOUNTER — HOSPITAL ENCOUNTER (OUTPATIENT)
Dept: CARDIOLOGY | Facility: HOSPITAL | Age: 69
Discharge: HOME | End: 2019-06-03
Attending: INTERNAL MEDICINE
Payer: MEDICARE

## 2019-06-03 ENCOUNTER — TELEPHONE (OUTPATIENT)
Dept: ELECTROPHYSIOLOGY | Facility: CLINIC | Age: 69
End: 2019-06-03

## 2019-06-03 DIAGNOSIS — E78.00 PURE HYPERCHOLESTEROLEMIA: ICD-10-CM

## 2019-06-03 DIAGNOSIS — I48.0 PAF (PAROXYSMAL ATRIAL FIBRILLATION): ICD-10-CM

## 2019-06-03 DIAGNOSIS — I48.0 PAROXYSMAL A-FIB: ICD-10-CM

## 2019-06-03 DIAGNOSIS — I48.0 PAF (PAROXYSMAL ATRIAL FIBRILLATION): Primary | ICD-10-CM

## 2019-06-03 PROCEDURE — 93306 TTE W/DOPPLER COMPLETE: CPT | Mod: HCNC,PO

## 2019-06-03 PROCEDURE — 93306 TTE W/DOPPLER COMPLETE: CPT | Mod: 26,HCNC,, | Performed by: STUDENT IN AN ORGANIZED HEALTH CARE EDUCATION/TRAINING PROGRAM

## 2019-06-03 PROCEDURE — 93306 TRANSTHORACIC ECHO (TTE) COMPLETE: ICD-10-PCS | Mod: 26,HCNC,, | Performed by: STUDENT IN AN ORGANIZED HEALTH CARE EDUCATION/TRAINING PROGRAM

## 2019-06-03 NOTE — TELEPHONE ENCOUNTER
Spoke w/ pt & rescheduled for 6/19 w/ [id number]  ----- Message from Jarett Khan sent at 6/3/2019  9:17 AM CDT -----  Contact: [phone]  Patient will be out of town on 06/06/19 and would like to reschedule her appt. Patient advised she needs to be seen before 08/29/19. Please call.

## 2019-06-04 LAB
AORTIC ROOT ANNULUS: 2.77 CM
AORTIC VALVE CUSP SEPERATION: 2.15 CM
AV INDEX (PROSTH): 0.86
AV MEAN GRADIENT: 5.19 MMHG
AV PEAK GRADIENT: 8.41 MMHG
AV VALVE AREA: 2.25 CM2
AV VELOCITY RATIO: 0.85
CV ECHO LV RWT: 0.39 CM
DOP CALC AO PEAK VEL: 1.45 M/S
DOP CALC AO VTI: 41.69 CM
DOP CALC LVOT AREA: 2.6 CM2
DOP CALC LVOT DIAMETER: 1.82 CM
DOP CALC LVOT PEAK VEL: 1.23 M/S
DOP CALC LVOT STROKE VOLUME: 93.63 CM3
DOP CALCLVOT PEAK VEL VTI: 36.01 CM
E WAVE DECELERATION TIME: 176.31 MSEC
E/A RATIO: 1.27
E/E' RATIO: 10.36
ECHO LV POSTERIOR WALL: 1 CM (ref 0.6–1.1)
FRACTIONAL SHORTENING: 45 % (ref 28–44)
INTERVENTRICULAR SEPTUM: 1.01 CM (ref 0.6–1.1)
IVC PROX: 2 CM
LA MAJOR: 5.29 CM
LA MINOR: 5.77 CM
LA WIDTH: 4 CM
LEFT ATRIUM SIZE: 4.1 CM
LEFT ATRIUM VOLUME: 76.94 CM3
LEFT INTERNAL DIMENSION IN SYSTOLE: 2.83 CM (ref 2.1–4)
LEFT VENTRICLE DIASTOLIC VOLUME: 128.88 ML
LEFT VENTRICLE SYSTOLIC VOLUME: 30.29 ML
LEFT VENTRICULAR INTERNAL DIMENSION IN DIASTOLE: 5.19 CM (ref 3.5–6)
LEFT VENTRICULAR MASS: 194.83 G
LV LATERAL E/E' RATIO: 11.4
LV SEPTAL E/E' RATIO: 9.5
MV A" WAVE DURATION": 134 MSEC
MV PEAK A VEL: 0.9 M/S
MV PEAK E VEL: 1.14 M/S
PISA TR MAX VEL: 2.66 M/S
PULM VEIN A" WAVE DURATION": 171 MSEC
PULM VEIN S/D RATIO: 0.75
PV PEAK D VEL: 0.6 M/S
PV PEAK S VEL: 0.45 M/S
PV PEAK VELOCITY: 1.09 CM/S
RA MAJOR: 5.33 CM
RA PRESSURE: 3 MMHG
RA WIDTH: 2.82 CM
TDI LATERAL: 0.1
TDI SEPTAL: 0.12
TDI: 0.11
TR MAX PG: 28.3 MMHG
TRICUSPID ANNULAR PLANE SYSTOLIC EXCURSION: 2.73 CM
TV REST PULMONARY ARTERY PRESSURE: 31 MMHG

## 2019-06-05 DIAGNOSIS — I48.0 PAROXYSMAL A-FIB: Chronic | ICD-10-CM

## 2019-06-05 RX ORDER — APIXABAN 5 MG/1
TABLET, FILM COATED ORAL
Qty: 180 TABLET | Refills: 3 | Status: SHIPPED | OUTPATIENT
Start: 2019-06-05 | End: 2020-05-31

## 2019-06-05 RX ORDER — PRAVASTATIN SODIUM 40 MG/1
TABLET ORAL
Qty: 90 TABLET | Refills: 3 | Status: SHIPPED | OUTPATIENT
Start: 2019-06-05 | End: 2020-06-01

## 2019-06-05 RX ORDER — METOPROLOL SUCCINATE 25 MG/1
TABLET, EXTENDED RELEASE ORAL
Qty: 90 TABLET | Refills: 3 | Status: SHIPPED | OUTPATIENT
Start: 2019-06-05 | End: 2019-07-13

## 2019-06-05 RX ORDER — LEVOTHYROXINE SODIUM 25 UG/1
TABLET ORAL
Qty: 90 TABLET | Refills: 3 | Status: SHIPPED | OUTPATIENT
Start: 2019-06-05 | End: 2020-06-01

## 2019-06-07 LAB — HEMOCCULT STL QL IA: NEGATIVE

## 2019-06-13 ENCOUNTER — PES CALL (OUTPATIENT)
Dept: ADMINISTRATIVE | Facility: CLINIC | Age: 69
End: 2019-06-13

## 2019-06-13 ENCOUNTER — PATIENT MESSAGE (OUTPATIENT)
Dept: DERMATOLOGY | Facility: CLINIC | Age: 69
End: 2019-06-13

## 2019-06-19 ENCOUNTER — HOSPITAL ENCOUNTER (OUTPATIENT)
Dept: CARDIOLOGY | Facility: CLINIC | Age: 69
Discharge: HOME | End: 2019-06-19
Payer: MEDICARE

## 2019-06-19 ENCOUNTER — CLINICAL SUPPORT (OUTPATIENT)
Dept: DERMATOLOGY | Facility: CLINIC | Age: 69
End: 2019-06-19
Payer: MEDICARE

## 2019-06-19 ENCOUNTER — OFFICE VISIT (OUTPATIENT)
Dept: ELECTROPHYSIOLOGY | Facility: CLINIC | Age: 69
End: 2019-06-19
Payer: MEDICARE

## 2019-06-19 VITALS
BODY MASS INDEX: 39.93 KG/M2 | SYSTOLIC BLOOD PRESSURE: 139 MMHG | DIASTOLIC BLOOD PRESSURE: 70 MMHG | WEIGHT: 254.44 LBS | HEIGHT: 67 IN | HEART RATE: 54 BPM

## 2019-06-19 DIAGNOSIS — I48.0 PAROXYSMAL A-FIB: Primary | Chronic | ICD-10-CM

## 2019-06-19 DIAGNOSIS — I48.0 PAF (PAROXYSMAL ATRIAL FIBRILLATION): ICD-10-CM

## 2019-06-19 DIAGNOSIS — E78.2 MIXED HYPERLIPIDEMIA: ICD-10-CM

## 2019-06-19 DIAGNOSIS — Z48.02 VISIT FOR SUTURE REMOVAL: Primary | ICD-10-CM

## 2019-06-19 DIAGNOSIS — I10 HYPERTENSION, UNSPECIFIED TYPE: ICD-10-CM

## 2019-06-19 PROCEDURE — 3078F DIAST BP <80 MM HG: CPT | Mod: HCNC,CPTII,S$GLB, | Performed by: INTERNAL MEDICINE

## 2019-06-19 PROCEDURE — 3075F PR MOST RECENT SYSTOLIC BLOOD PRESS GE 130-139MM HG: ICD-10-PCS | Mod: HCNC,CPTII,S$GLB, | Performed by: INTERNAL MEDICINE

## 2019-06-19 PROCEDURE — 1101F PT FALLS ASSESS-DOCD LE1/YR: CPT | Mod: HCNC,CPTII,S$GLB, | Performed by: INTERNAL MEDICINE

## 2019-06-19 PROCEDURE — 99999 PR PBB SHADOW E&M-EST. PATIENT-LVL III: ICD-10-PCS | Mod: PBBFAC,HCNC,,

## 2019-06-19 PROCEDURE — 99214 OFFICE O/P EST MOD 30 MIN: CPT | Mod: HCNC,S$GLB,, | Performed by: INTERNAL MEDICINE

## 2019-06-19 PROCEDURE — 3078F PR MOST RECENT DIASTOLIC BLOOD PRESSURE < 80 MM HG: ICD-10-PCS | Mod: HCNC,CPTII,S$GLB, | Performed by: INTERNAL MEDICINE

## 2019-06-19 PROCEDURE — 93010 RHYTHM STRIP: ICD-10-PCS | Mod: HCNC,S$GLB,, | Performed by: INTERNAL MEDICINE

## 2019-06-19 PROCEDURE — 99214 PR OFFICE/OUTPT VISIT, EST, LEVL IV, 30-39 MIN: ICD-10-PCS | Mod: HCNC,S$GLB,, | Performed by: INTERNAL MEDICINE

## 2019-06-19 PROCEDURE — 99024 POSTOP FOLLOW-UP VISIT: CPT | Mod: HCNC,S$GLB,, | Performed by: DERMATOLOGY

## 2019-06-19 PROCEDURE — 93005 ELECTROCARDIOGRAM TRACING: CPT | Mod: HCNC,S$GLB,, | Performed by: INTERNAL MEDICINE

## 2019-06-19 PROCEDURE — 99999 PR PBB SHADOW E&M-EST. PATIENT-LVL III: CPT | Mod: PBBFAC,HCNC,,

## 2019-06-19 PROCEDURE — 99999 PR PBB SHADOW E&M-EST. PATIENT-LVL III: ICD-10-PCS | Mod: PBBFAC,HCNC,, | Performed by: INTERNAL MEDICINE

## 2019-06-19 PROCEDURE — 1101F PR PT FALLS ASSESS DOC 0-1 FALLS W/OUT INJ PAST YR: ICD-10-PCS | Mod: HCNC,CPTII,S$GLB, | Performed by: INTERNAL MEDICINE

## 2019-06-19 PROCEDURE — 99024 PR POST-OP FOLLOW-UP VISIT: ICD-10-PCS | Mod: HCNC,S$GLB,, | Performed by: DERMATOLOGY

## 2019-06-19 PROCEDURE — 3075F SYST BP GE 130 - 139MM HG: CPT | Mod: HCNC,CPTII,S$GLB, | Performed by: INTERNAL MEDICINE

## 2019-06-19 PROCEDURE — 99999 PR PBB SHADOW E&M-EST. PATIENT-LVL III: CPT | Mod: PBBFAC,HCNC,, | Performed by: INTERNAL MEDICINE

## 2019-06-19 PROCEDURE — 93010 ELECTROCARDIOGRAM REPORT: CPT | Mod: HCNC,S$GLB,, | Performed by: INTERNAL MEDICINE

## 2019-06-19 PROCEDURE — 93005 RHYTHM STRIP: ICD-10-PCS | Mod: HCNC,S$GLB,, | Performed by: INTERNAL MEDICINE

## 2019-06-19 NOTE — PROGRESS NOTES
Subjective:    Patient ID:  Flores Fuentes is a 68 y.o. female who presents for evaluation of paf      Atrial Fibrillation   Symptoms include palpitations. Symptoms are negative for chest pain, dizziness, shortness of breath, syncope and weakness. Past medical history includes atrial fibrillation.      68 y.o. F  AF first dx 2006 after lots of caffeine. Few episodes since then. Pill in pocket strategy.  HL, on meds  obesity  hypothyroid, on med    Went to ER 3/10 with palps. Underwent DCCV 3/13/17 after remaining in AF with RVR. Started on multaq, and BB d/c'd.  Since, feeling not quite as well as usually, and on 4/17, at which time HR was 120s and didn't feel same as prior AF episodes.  She'd been on BB for years w/o issues. Good exertion tolerance. No CP.    Due to rare PAF episodes, at the last visit we adopted a pill-in-the-pocket strategy. She used it first in 11/17; went to the ER. AF resolved <30 min after taking dose. Had 2 other episodes, self-treated successfully, in 12/17 and 1/18. Since last seen, has had 4 such episodes, all aborted with PIP. She's feeling great.    echo 6/19 55%    My interpretation of today's ECG is NSR      Review of Systems   Constitution: Negative. Negative for malaise/fatigue.   HENT: Negative.  Negative for ear pain and tinnitus.    Eyes: Negative for blurred vision.   Cardiovascular: Positive for palpitations. Negative for chest pain, dyspnea on exertion, near-syncope and syncope.   Respiratory: Negative.  Negative for shortness of breath.    Endocrine: Negative.  Negative for polyuria.   Hematologic/Lymphatic: Does not bruise/bleed easily.   Skin: Negative.  Negative for rash.   Musculoskeletal: Negative.  Negative for joint pain and muscle weakness.   Gastrointestinal: Negative.  Negative for abdominal pain and change in bowel habit.   Genitourinary: Negative for frequency.   Neurological: Negative.  Negative for dizziness and weakness.   Psychiatric/Behavioral:  Negative.  Negative for depression. The patient is not nervous/anxious.    Allergic/Immunologic: Negative for environmental allergies.        Objective:    Physical Exam   Constitutional: She is oriented to person, place, and time. Vital signs are normal. She appears well-developed and well-nourished. She is active and cooperative.   HENT:   Head: Normocephalic and atraumatic.   Eyes: Conjunctivae and EOM are normal.   Neck: Normal range of motion. Carotid bruit is not present. No tracheal deviation and no edema present. No thyroid mass and no thyromegaly present.   Cardiovascular: Normal rate, regular rhythm, normal heart sounds, intact distal pulses and normal pulses.  No extrasystoles are present. PMI is not displaced. Exam reveals no gallop and no friction rub.   No murmur heard.  Pulmonary/Chest: Effort normal and breath sounds normal. No respiratory distress. She has no wheezes. She has no rales.   Abdominal: Soft. Normal appearance. She exhibits no distension. There is no hepatosplenomegaly.   Musculoskeletal: Normal range of motion.   Neurological: She is alert and oriented to person, place, and time. Coordination normal.   Skin: Skin is warm and dry. No rash noted.   Psychiatric: She has a normal mood and affect. Her speech is normal and behavior is normal. Thought content normal. Cognition and memory are normal.   Nursing note and vitals reviewed.        Assessment:       1. Paroxysmal A-fib    2. Mixed hyperlipidemia    3. Hypertension, unspecified type         Plan:       Doing great on pill-in-pocket flecainide. Using it rarely. She's very happy with her AF control.    Continue present mgmt. Continue eliquis for a/c.    Return in 1 year with echo, or earlier prn.

## 2019-06-19 NOTE — PROGRESS NOTES
Suture Removal note:  CC: 68 y.o. female patient is here for suture removal.         HPI: Patient is s/p punch of rule out malignancy scaly thin plaques from the left breast on 5/31/2019.  Patient reports no problems.    WOUND PE:  Sutures intact.  Wound healing well.  Good approximation of skin edges.  No signs or symptoms of infection.    IMPRESSION:  Skin, left breast, punch biopsy:  -LICHENOID DERMATITIS, see comment  COMMENT: Such findings can be seen in lichen planus or lichenoid drug eruption. Given the presence of a few  rare scattered eosinophils within the infiltrate, a lichenoid drug is a possibility. Other lichenoid processes may  display similar histology. Clinical correlation is recommended. - margins clear.    PLAN:  Sutures removed today.  Continue wound care.    RTC: In 6 months or sooner if concerns arise.

## 2019-06-21 ENCOUNTER — PATIENT MESSAGE (OUTPATIENT)
Dept: ELECTROPHYSIOLOGY | Facility: CLINIC | Age: 69
End: 2019-06-21

## 2019-06-25 ENCOUNTER — PATIENT MESSAGE (OUTPATIENT)
Dept: ELECTROPHYSIOLOGY | Facility: CLINIC | Age: 69
End: 2019-06-25

## 2019-07-05 ENCOUNTER — HOSPITAL ENCOUNTER (EMERGENCY)
Facility: HOSPITAL | Age: 69
Discharge: HOME | End: 2019-07-05
Attending: EMERGENCY MEDICINE
Payer: MEDICARE

## 2019-07-05 VITALS
HEART RATE: 62 BPM | OXYGEN SATURATION: 98 % | HEIGHT: 67 IN | TEMPERATURE: 98 F | BODY MASS INDEX: 37.67 KG/M2 | SYSTOLIC BLOOD PRESSURE: 150 MMHG | WEIGHT: 240 LBS | RESPIRATION RATE: 19 BRPM | DIASTOLIC BLOOD PRESSURE: 78 MMHG

## 2019-07-05 DIAGNOSIS — M25.569 KNEE PAIN: ICD-10-CM

## 2019-07-05 DIAGNOSIS — M25.461 EFFUSION OF RIGHT KNEE: Primary | ICD-10-CM

## 2019-07-05 DIAGNOSIS — M79.604 RIGHT LEG PAIN: ICD-10-CM

## 2019-07-05 PROCEDURE — 25000003 PHARM REV CODE 250: Mod: HCNC | Performed by: NURSE PRACTITIONER

## 2019-07-05 PROCEDURE — 99284 EMERGENCY DEPT VISIT MOD MDM: CPT | Mod: HCNC

## 2019-07-05 RX ORDER — NAPROXEN 375 MG/1
375 TABLET ORAL 2 TIMES DAILY WITH MEALS
Qty: 6 TABLET | Refills: 0 | Status: SHIPPED | OUTPATIENT
Start: 2019-07-05 | End: 2019-07-17

## 2019-07-05 RX ORDER — ACETAMINOPHEN 325 MG/1
650 TABLET ORAL
Status: COMPLETED | OUTPATIENT
Start: 2019-07-05 | End: 2019-07-05

## 2019-07-05 RX ORDER — TRAMADOL HYDROCHLORIDE 50 MG/1
50 TABLET ORAL EVERY 6 HOURS PRN
Qty: 10 TABLET | Refills: 0 | Status: SHIPPED | OUTPATIENT
Start: 2019-07-05 | End: 2019-07-17

## 2019-07-05 RX ADMIN — ACETAMINOPHEN 650 MG: 325 TABLET ORAL at 05:07

## 2019-07-05 NOTE — ED PROVIDER NOTES
Encounter Date: 7/5/2019       History     Chief Complaint   Patient presents with    Knee Pain     Pain to R knee, reports had been getting out of chair and since then has been having severe pains. Reports hx of osteoarthritis.      68-year-old female presents the ED for right knee pain. She reports knee pain for a few weeks worsening over the past few days.  She reports that the pain is worse with movement.  No fever.  She does have history of AFib and is on Xarelto.  She reports compliance with her Xarelto.  The patient states that she was moving some objects a few weeks ago which is when the pain started.  A few days ago when trying to get up from a chair the pain worsened.  The patient also reports recent jaw surgery.  The pain started prior to surgery.  She reports chronic right lower extremity swelling which is unchanged from usual.  No weakness, numbness/tingling, or wound.  She states that she is reluctant to take medications for pain but has tried 1 dose of Tylenol last night.  No other complaints at this time.    The history is provided by the patient and the spouse.     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, l mandible, 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 Dr Viktor Toure    Paroxysmal atrioventricular tachycardia     Plantar fasciitis     Right knee meniscal tear     Dr Mayfield, tx conservatively    Severe obesity (BMI 35.0-35.9 with comorbidity)  1/24/2017    Stress incontinence, female 03/28/2018    After HYST, Vimal didn't work, worse at night wearing pads    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    Thyroid disease      Past Surgical History:   Procedure Laterality Date    APPENDECTOMY      CARDIOVERSION N/A 3/13/2017    [facility]    HYSTERECTOMY  1990    TAHBSO for fibroids    MANDIBLE SURGERY  2015    L jamie, Dr Toure    OOPHORECTOMY      TONSILLECTOMY       Family History   Problem Relation Age of Onset    Cancer Mother         stomach, liver    Breast cancer Neg Hx     Ovarian cancer Neg Hx     Cervical cancer Neg Hx     Endometrial cancer Neg Hx     Vaginal cancer Neg Hx     Melanoma Neg Hx      Social History     Tobacco Use    Smoking status: Never Smoker    Smokeless tobacco: Never Used   Substance Use Topics    Alcohol use: No    Drug use: No     Review of Systems   Constitutional: Positive for activity change. Negative for fever.   Respiratory: Negative for cough and shortness of breath.    Cardiovascular: Positive for leg swelling (chronic). Negative for chest pain.   Gastrointestinal: Negative for abdominal pain and vomiting.   Musculoskeletal: Positive for arthralgias and joint swelling. Negative for back pain, neck pain and neck stiffness.   Skin: Negative for color change and wound.   Allergic/Immunologic: Negative for immunocompromised state.   Neurological: Negative for weakness and numbness.   Hematological: Bruises/bleeds easily.   Psychiatric/Behavioral: Negative for confusion.       Physical Exam     Initial Vitals [07/05/19 1653]   BP Pulse Resp Temp SpO2   (!) 164/73 68 19 98.3 °F (36.8 °C) 99 %      MAP       --         Physical Exam    Nursing note and vitals reviewed.  Constitutional: Vital signs are normal. She appears well-developed and well-nourished. She is active and cooperative. She is easily aroused.  Non-toxic appearance. She does not have a  sickly appearance. She does not appear ill. No distress.   HENT:   Head: Normocephalic and atraumatic.   Eyes: Conjunctivae and EOM are normal.   Neck: Normal range of motion. Neck supple.   Cardiovascular: Normal rate.   Pulses:       Dorsalis pedis pulses are 2+ on the right side, and 2+ on the left side.   Pulmonary/Chest: Effort normal.   Abdominal: Soft. Normal appearance and bowel sounds are normal.   Musculoskeletal:        Right knee: She exhibits decreased range of motion (mild in flexion due to pain. ) and effusion. She exhibits no swelling, no ecchymosis, no deformity, no laceration, no erythema, normal alignment, no LCL laxity, normal patellar mobility, no bony tenderness, normal meniscus and no MCL laxity. Tenderness found. Medial joint line tenderness noted. No lateral joint line, no MCL, no LCL and no patellar tendon tenderness noted.        Right upper leg: Normal.        Right lower leg: She exhibits edema (chronic). She exhibits no tenderness, no bony tenderness, no swelling, no deformity and no laceration.        Right foot: Normal.   RLE venous stasis skin changes. No wounds. Right leg edema which pt states is unchanged from usual. Negative Mildred's sign bilaterally.    Neurological: She is alert, oriented to person, place, and time and easily aroused. She has normal strength. No sensory deficit. GCS eye subscore is 4. GCS verbal subscore is 5. GCS motor subscore is 6.   Skin: Skin is warm, dry and intact. Capillary refill takes less than 2 seconds. No abrasion, no bruising and no rash noted. No erythema.   Psychiatric: She has a normal mood and affect. Her speech is normal and behavior is normal. Judgment and thought content normal. Cognition and memory are normal.         ED Course   Procedures  Labs Reviewed - No data to display       Imaging Results          X-Ray Knee 3 View Right (Final result)  Result time 07/05/19 18:33:50    Final result by Ruperto Howe MD (07/05/19 18:33:50)                  Impression:      Large knee effusion.  Degenerative changes.  No fracture or erosion seen.      Electronically signed by: Ruperto Howe  Date:    07/05/2019  Time:    18:33             Narrative:    EXAMINATION:  XR KNEE 3 VIEW RIGHT    CLINICAL HISTORY:  Pain in unspecified knee    TECHNIQUE:  AP, lateral, and Merchant views of the right knee were performed.    COMPARISON:  09/04/2014 knee films    FINDINGS:  Frontal, oblique and cross-table lateral views presented.  There is moderate degenerative changes of the femoral tibial compartment which is worsening from 2014.  There is increasing medial joint line spurring.  Bone density may be mildly decreased.  There appears to be surface vessels in the subcutaneous tissues.  There is cystic change of the patellar articular surface.  There is a large knee effusion.  Patellar tendon appears normal.                               US Lower Extremity Veins Right (Final result)  Result time 07/05/19 18:04:22    Final result by Josefina Barrett MD (07/05/19 18:04:22)                 Impression:      No evidence of right lower extremity deep venous thrombosis.    Right popliteal fossa cyst.      Electronically signed by: Josefina Barrett MD  Date:    07/05/2019  Time:    18:04             Narrative:    EXAMINATION:  US LOWER EXTREMITY VEINS RIGHT    CLINICAL HISTORY:  Pain in right leg    TECHNIQUE:  Duplex and color flow Doppler evaluation of the right lower extremity veins was performed.    COMPARISON:  None    FINDINGS:  No evidence of clot involving the bilateral common femoral veins or right greater saphenous, femoral, popliteal, peroneal, anterior and posterior tibial veins.  All venous structures demonstrate normal respiratory phasicity and augment adequately.  Right popliteal fossa cyst is visualized measuring 5.4 x 3.0 x 2.6 cm.                                 Medical Decision Making:   Initial Assessment:   68-year-old female here for right knee pain for the  past few weeks, worse in the past few days.  Pain is worse with movement.  She reports history of arthritis.  She also has history of AFib in is on Xarelto.  Patient recently had jaw surgery.  The patient appears well, nontoxic.  She reports chronic right lower extremity swelling which is unchanged.  Painful flexion of right knee.  There is a right knee effusion and tenderness to the medial and posterior joint.  No laxity.  Negative Homans bilaterally. NVI BLE.   Differential Diagnosis:   Strain, effusion, OA, DJD, DVT, baker's cyst  Clinical Tests:   Radiological Study: Ordered and Reviewed  ED Management:  Xray, US RLE Veins, PO tylenol  Other:   I have discussed this case with another health care provider.       <> Summary of the Discussion: Discussed with  who did assess this patient.   Ultrasound negative for DVT.  No sign of septic joint.  The patient has a right knee effusion.  The patient was assessed by my attending provider.  She is provided an Ace wrap and advised rest and compression.  She will be prescribed a short course of NSAID therapy.  Also advised follow up with Orthopedics.  Return to the ED if her condition changes, progresses, or if there are any concerns.  Review narcotic prescription precautions.  Rx Ultram and Naprosyn.              Attending Attestation:     Physician Attestation Statement for NP/PA:   I have conducted a face to face encounter with this patient in addition to the NP/PA, due to NP/PA Request    Other NP/PA Attestation Additions:    History of Present Illness: Patient with PMHX of Afib on xarelto here with right knee pain after moving things several days ago.  Denies fever, chills   Physical Exam: VSS, NAD, nontoxic appearing.  Right knee without deformity.  No swelling, no warmth, no erythema  + medial TTP.  ROM restricted secondary to pain   Medical Decision Making: US negative for DVT.  XR reveals knee effusion.  Low suspicion for septic knee given physical exam  and no hx of infectious sx.  Therapeutic arthrocentesis could worsen patient's condition given the fact that she is anticoagulated.  Recommended RICE and follow up with her orthopedic surgeon                    Clinical Impression:       ICD-10-CM ICD-9-CM   1. Effusion of right knee M25.461 719.06   2. Knee pain M25.569 719.46   3. Right leg pain M79.604 729.5                                Theresa Valencia, SABAS  07/05/19 1913       Pati Calloway MD  07/06/19 1716

## 2019-07-05 NOTE — ED TRIAGE NOTES
Pt presents to ED with C/O R knee pain with onset x2 weeks ago after getting out of a chair. She states she may have over worked her knees that day. She states pain is 8/10 at this time and that she a hx of  osteoarthritis. )

## 2019-07-08 ENCOUNTER — HOSPITAL ENCOUNTER (OUTPATIENT)
Dept: RADIOLOGY | Facility: HOSPITAL | Age: 69
Discharge: HOME | End: 2019-07-08
Attending: FAMILY MEDICINE
Payer: MEDICARE

## 2019-07-08 ENCOUNTER — OFFICE VISIT (OUTPATIENT)
Dept: SPORTS MEDICINE | Facility: CLINIC | Age: 69
End: 2019-07-08
Payer: MEDICARE

## 2019-07-08 VITALS — WEIGHT: 240 LBS | BODY MASS INDEX: 37.67 KG/M2 | HEIGHT: 67 IN | TEMPERATURE: 98 F

## 2019-07-08 DIAGNOSIS — M25.562 PAIN IN BOTH KNEES, UNSPECIFIED CHRONICITY: ICD-10-CM

## 2019-07-08 DIAGNOSIS — M25.561 PAIN IN BOTH KNEES, UNSPECIFIED CHRONICITY: ICD-10-CM

## 2019-07-08 DIAGNOSIS — M25.561 CHRONIC PAIN OF RIGHT KNEE: ICD-10-CM

## 2019-07-08 DIAGNOSIS — G89.29 CHRONIC PAIN OF RIGHT KNEE: ICD-10-CM

## 2019-07-08 DIAGNOSIS — M17.0 PRIMARY LOCALIZED OSTEOARTHRITIS OF KNEES, BILATERAL: Primary | ICD-10-CM

## 2019-07-08 PROCEDURE — 73564 XR KNEE ORTHO BILAT WITH FLEXION: ICD-10-PCS | Mod: 26,50,HCNC, | Performed by: RADIOLOGY

## 2019-07-08 PROCEDURE — 73564 X-RAY EXAM KNEE 4 OR MORE: CPT | Mod: TC,50,HCNC,FY,PO

## 2019-07-08 PROCEDURE — 1101F PT FALLS ASSESS-DOCD LE1/YR: CPT | Mod: HCNC,CPTII,S$GLB, | Performed by: FAMILY MEDICINE

## 2019-07-08 PROCEDURE — 99204 OFFICE O/P NEW MOD 45 MIN: CPT | Mod: 25,HCNC,S$GLB, | Performed by: FAMILY MEDICINE

## 2019-07-08 PROCEDURE — 99999 PR PBB SHADOW E&M-EST. PATIENT-LVL III: ICD-10-PCS | Mod: PBBFAC,HCNC,, | Performed by: FAMILY MEDICINE

## 2019-07-08 PROCEDURE — 99204 PR OFFICE/OUTPT VISIT, NEW, LEVL IV, 45-59 MIN: ICD-10-PCS | Mod: 25,HCNC,S$GLB, | Performed by: FAMILY MEDICINE

## 2019-07-08 PROCEDURE — 99999 PR PBB SHADOW E&M-EST. PATIENT-LVL III: CPT | Mod: PBBFAC,HCNC,, | Performed by: FAMILY MEDICINE

## 2019-07-08 PROCEDURE — 20611 LARGE JOINT ASPIRATION/INJECTION: R KNEE: ICD-10-PCS | Mod: HCNC,RT,S$GLB, | Performed by: FAMILY MEDICINE

## 2019-07-08 PROCEDURE — 73564 X-RAY EXAM KNEE 4 OR MORE: CPT | Mod: 26,50,HCNC, | Performed by: RADIOLOGY

## 2019-07-08 PROCEDURE — 20611 DRAIN/INJ JOINT/BURSA W/US: CPT | Mod: HCNC,RT,S$GLB, | Performed by: FAMILY MEDICINE

## 2019-07-08 PROCEDURE — 1101F PR PT FALLS ASSESS DOC 0-1 FALLS W/OUT INJ PAST YR: ICD-10-PCS | Mod: HCNC,CPTII,S$GLB, | Performed by: FAMILY MEDICINE

## 2019-07-08 RX ORDER — TRIAMCINOLONE ACETONIDE 40 MG/ML
40 INJECTION, SUSPENSION INTRA-ARTICULAR; INTRAMUSCULAR
Status: DISCONTINUED | OUTPATIENT
Start: 2019-07-08 | End: 2019-07-08 | Stop reason: HOSPADM

## 2019-07-08 RX ADMIN — TRIAMCINOLONE ACETONIDE 40 MG: 40 INJECTION, SUSPENSION INTRA-ARTICULAR; INTRAMUSCULAR at 03:07

## 2019-07-08 NOTE — PROCEDURES
"Large Joint Aspiration/Injection: R knee  Date/Time: 7/8/2019 3:10 PM  Performed by: Orion Lewis MD  Authorized by: Orion Lewis MD     Consent Done?:  Yes (Verbal)  Indications:  Pain  Procedure site marked: Yes    Timeout: Prior to procedure the correct patient, procedure, and site was verified      Location:  Knee  Site:  R knee  Prep: Patient was prepped and draped in usual sterile fashion    Ultrasonic Guidance for needle placement: Yes  Images are saved and documented.  Needle size:  20 G  Approach:  Lateral  Medications:  40 mg triamcinolone acetonide 40 mg/mL  Patient tolerance:  Patient tolerated the procedure well with no immediate complications    Additional Comments: Description of ultrasound utilization for needle guidance:   Ultrasound guidance used for needle localization. Images saved and stored for documentation. The knee joint was visualized. Dynamic visualization of the 20g x 3.5" needle was continuous throughout the procedure.      "
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
## 2019-07-08 NOTE — LETTER
July 8, 2019      Pati Calloway MD  [address] [place] [address]           [facility]  [address]  [facility] [address]  Phone: [phone]          Patient: Flores Fuentes   MR Number: [id number]   YOB: 1950   Date of Visit: 7/8/2019       Dear Dr. Pati Calloway:    Thank you for referring Flores Fuentes to me for evaluation. Attached you will find relevant portions of my assessment and plan of care.    If you have questions, please do not hesitate to call me. I look forward to following Flores Fuentes along with you.    Sincerely,    Orion Lewis MD    Enclosure  CC:  No Recipients    If you would like to receive this communication electronically, please contact [email or web address] or [phone] to request more information on [employer] access.    For providers and/or their staff who would like to refer a patient to [facility], please contact us through our one-stop-shop provider referral line, [facility], at [phone].    If you feel you have received this communication in error or would no longer like to receive these types of communications, please e-mail [email or web address]

## 2019-07-09 ENCOUNTER — TELEPHONE (OUTPATIENT)
Dept: UROGYNECOLOGY | Facility: CLINIC | Age: 69
End: 2019-07-09

## 2019-07-09 NOTE — TELEPHONE ENCOUNTER
----- Message from Sally Soto sent at 7/9/2019  3:10 PM CDT -----  Contact: Flores from [employer]  Name of Who is Calling: Flores from [employer]    What is the request in detail Flores from [employer] is requesting a call back in regards to patient, no other information was given. Please contact to further discuss and advise      Can the clinic reply by [facility]: No    What Number to Call Back if not in [facility]: [phone]

## 2019-07-11 ENCOUNTER — PATIENT MESSAGE (OUTPATIENT)
Dept: ELECTROPHYSIOLOGY | Facility: CLINIC | Age: 69
End: 2019-07-11

## 2019-07-13 ENCOUNTER — HOSPITAL ENCOUNTER (EMERGENCY)
Facility: HOSPITAL | Age: 69
Discharge: HOME | End: 2019-07-13
Attending: EMERGENCY MEDICINE
Payer: MEDICARE

## 2019-07-13 VITALS
HEART RATE: 66 BPM | SYSTOLIC BLOOD PRESSURE: 135 MMHG | TEMPERATURE: 98 F | BODY MASS INDEX: 37.67 KG/M2 | DIASTOLIC BLOOD PRESSURE: 68 MMHG | HEIGHT: 67 IN | OXYGEN SATURATION: 98 % | WEIGHT: 240 LBS | RESPIRATION RATE: 20 BRPM

## 2019-07-13 DIAGNOSIS — R00.2 PALPITATIONS: ICD-10-CM

## 2019-07-13 DIAGNOSIS — I48.0 PAROXYSMAL A-FIB: Chronic | ICD-10-CM

## 2019-07-13 LAB
ALBUMIN SERPL BCP-MCNC: 4.2 G/DL (ref 3.5–5.2)
ALP SERPL-CCNC: 97 U/L (ref 55–135)
ALT SERPL W/O P-5'-P-CCNC: 15 U/L (ref 10–44)
ANION GAP SERPL CALC-SCNC: 9 MMOL/L (ref 8–16)
AST SERPL-CCNC: 15 U/L (ref 10–40)
BASOPHILS # BLD AUTO: 0.02 K/UL (ref 0–0.2)
BASOPHILS NFR BLD: 0.3 % (ref 0–1.9)
BILIRUB SERPL-MCNC: 0.6 MG/DL (ref 0.1–1)
BNP SERPL-MCNC: 79 PG/ML (ref 0–99)
BUN SERPL-MCNC: 15 MG/DL (ref 8–23)
CALCIUM SERPL-MCNC: 9.8 MG/DL (ref 8.7–10.5)
CHLORIDE SERPL-SCNC: 105 MMOL/L (ref 95–110)
CO2 SERPL-SCNC: 25 MMOL/L (ref 23–29)
CREAT SERPL-MCNC: 0.8 MG/DL (ref 0.5–1.4)
DIFFERENTIAL METHOD: NORMAL
EOSINOPHIL # BLD AUTO: 0.2 K/UL (ref 0–0.5)
EOSINOPHIL NFR BLD: 3.1 % (ref 0–8)
ERYTHROCYTE [DISTWIDTH] IN BLOOD BY AUTOMATED COUNT: 13.9 % (ref 11.5–14.5)
EST. GFR  (AFRICAN AMERICAN): >60 ML/MIN/1.73 M^2
EST. GFR  (NON AFRICAN AMERICAN): >60 ML/MIN/1.73 M^2
GLUCOSE SERPL-MCNC: 100 MG/DL (ref 70–110)
HCT VFR BLD AUTO: 43.7 % (ref 37–48.5)
HGB BLD-MCNC: 14.2 G/DL (ref 12–16)
LYMPHOCYTES # BLD AUTO: 2.2 K/UL (ref 1–4.8)
LYMPHOCYTES NFR BLD: 29.9 % (ref 18–48)
MAGNESIUM SERPL-MCNC: 2.5 MG/DL (ref 1.6–2.6)
MCH RBC QN AUTO: 29.4 PG (ref 27–31)
MCHC RBC AUTO-ENTMCNC: 32.5 G/DL (ref 32–36)
MCV RBC AUTO: 91 FL (ref 82–98)
MONOCYTES # BLD AUTO: 0.7 K/UL (ref 0.3–1)
MONOCYTES NFR BLD: 9 % (ref 4–15)
NEUTROPHILS # BLD AUTO: 4.1 K/UL (ref 1.8–7.7)
NEUTROPHILS NFR BLD: 57.7 % (ref 38–73)
PLATELET # BLD AUTO: 322 K/UL (ref 150–350)
PMV BLD AUTO: 9.6 FL (ref 9.2–12.9)
POTASSIUM SERPL-SCNC: 3.7 MMOL/L (ref 3.5–5.1)
PROT SERPL-MCNC: 7.9 G/DL (ref 6–8.4)
RBC # BLD AUTO: 4.83 M/UL (ref 4–5.4)
SODIUM SERPL-SCNC: 139 MMOL/L (ref 136–145)
TROPONIN I SERPL DL<=0.01 NG/ML-MCNC: <0.006 NG/ML (ref 0–0.03)
TSH SERPL DL<=0.005 MIU/L-ACNC: 2.76 UIU/ML (ref 0.4–4)
WBC # BLD AUTO: 7.2 K/UL (ref 3.9–12.7)

## 2019-07-13 PROCEDURE — 84443 ASSAY THYROID STIM HORMONE: CPT | Mod: HCNC

## 2019-07-13 PROCEDURE — 80053 COMPREHEN METABOLIC PANEL: CPT | Mod: HCNC

## 2019-07-13 PROCEDURE — 99285 EMERGENCY DEPT VISIT HI MDM: CPT | Mod: HCNC

## 2019-07-13 PROCEDURE — 25000003 PHARM REV CODE 250: Mod: HCNC | Performed by: EMERGENCY MEDICINE

## 2019-07-13 PROCEDURE — 85025 COMPLETE CBC W/AUTO DIFF WBC: CPT | Mod: HCNC

## 2019-07-13 PROCEDURE — 93005 ELECTROCARDIOGRAM TRACING: CPT | Mod: HCNC

## 2019-07-13 PROCEDURE — 83880 ASSAY OF NATRIURETIC PEPTIDE: CPT | Mod: HCNC

## 2019-07-13 PROCEDURE — 84484 ASSAY OF TROPONIN QUANT: CPT | Mod: HCNC

## 2019-07-13 PROCEDURE — 83735 ASSAY OF MAGNESIUM: CPT | Mod: HCNC

## 2019-07-13 RX ORDER — METOPROLOL SUCCINATE 50 MG/1
50 TABLET, EXTENDED RELEASE ORAL DAILY
Qty: 30 TABLET | Refills: 0 | Status: SHIPPED | OUTPATIENT
Start: 2019-07-13 | End: 2019-07-18 | Stop reason: SDUPTHER

## 2019-07-13 RX ORDER — METOPROLOL TARTRATE 25 MG/1
25 TABLET, FILM COATED ORAL
Status: DISCONTINUED | OUTPATIENT
Start: 2019-07-13 | End: 2019-07-13

## 2019-07-13 RX ORDER — POTASSIUM CHLORIDE 20 MEQ/1
40 TABLET, EXTENDED RELEASE ORAL
Status: COMPLETED | OUTPATIENT
Start: 2019-07-13 | End: 2019-07-13

## 2019-07-13 RX ADMIN — POTASSIUM CHLORIDE 40 MEQ: 20 TABLET, EXTENDED RELEASE ORAL at 04:07

## 2019-07-13 NOTE — ED NOTES
APPEARANCE: Alert, oriented and in no acute distress.  PERIPHERAL VASCULAR: peripheral pulses present. Normal cap refill. No edema. Warm to touch.    RESPIRATORY:Normal rate and effort, breath sounds clear bilaterally throughout chest. Respirations are equal and unlabored no obvious signs of distress.  GASTRO: soft, bowel sounds normal, no tenderness, no abdominal distention.  MUSC: Full ROM. No bony tenderness or soft tissue tenderness. No obvious deformity.  SKIN: Skin is warm and dry, normal skin turgor, mucous membranes moist.  NEURO: 5/5 strength major flexors/extensors bilaterally. Sensory intact to light touch bilaterally. Demarcus coma scale: eyes open spontaneously-4, oriented & converses-5, obeys commands-6. No neurological abnormalities.   MENTAL STATUS: awake, alert and aware of environment.  EYE: PERRL, both eyes: pupils brisk and reactive to light. Normal size.  ENT: EARS: no obvious drainage. NOSE: no active bleeding.

## 2019-07-13 NOTE — ED PROVIDER NOTES
Encounter Date: 7/13/2019    SCRIBE #1 NOTE: I, Christian Mejía, am scribing for, and in the presence of,  Dr. Washburn. I have scribed the entire note.       History     Chief Complaint   Patient presents with    Palpitations     c/o intermittent palpitations since receiving an injection in her knee on Monday.      Flores Fuentes is a 68 y.o. 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    The patient presents to the ED due to intermittent palpitations for the past 5 days. She reports that she received a steroid injection on 7/8 for knee pain, and developed palpitations about 2 hours later. Patient denies any CP, SOB, lightheadedness, dizziness, numbness, weakness, or any other symptoms. She has a history of a-fib and is compliant with all of her medications including Eliquis. She states her current palpitations are unlike her prior episodes of a-fib. Patient denies any recent caffeine or drug use.    The history is provided by the patient.     Review of patient's allergies indicates:   Allergen Reactions    Decongestant d [pseudoephedrine-dm]      Atrial Fibrillation    Augmentin [amoxicillin-pot clavulanate]      palpitations      Amoxicillin Palpitations     Past Medical History:   Diagnosis Date    Atrial fibrillation 2014    cardioverted 2017, Eliquis, q 6 mo, Dr Servin, flecainide at home prn flare up 4/2019, 9/2018)    Cervical muscle strain 1/24/2017    DJD (degenerative joint disease) of cervical spine 1/24/2017     Hyperlipidemia     Mitral valve disease     Mixed hyperlipidemia 11/7/2013    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, l mandible, 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 Dr Viktor Toure    Paroxysmal atrioventricular tachycardia     Plantar fasciitis     Right knee meniscal tear     Dr Mayfield, tx conservatively    Severe obesity (BMI 35.0-35.9 with comorbidity) 1/24/2017    Stress incontinence, female 03/28/2018    After HYST, Kegels didn't work, worse at night wearing pads    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    Thyroid disease      Past Surgical History:   Procedure Laterality Date    APPENDECTOMY      CARDIOVERSION N/A 3/13/2017    Performed by [facility]yn [facility]cdonough [facility] at [facility]    HYSTERECTOMY  1990    TAHBSO for fibroids    MANDIBLE SURGERY  2015    L mandible, Dr Toure    OOPHORECTOMY      TONSILLECTOMY       Family History   Problem Relation Age of Onset    Cancer Mother         stomach, liver    Breast cancer Neg Hx     Ovarian cancer Neg Hx     Cervical cancer Neg Hx     Endometrial cancer Neg Hx     Vaginal cancer Neg Hx     Melanoma Neg Hx      Social History     Tobacco Use    Smoking status: Never Smoker    Smokeless tobacco: Never Used   Substance Use Topics    Alcohol use: No    Drug use: No     Review of Systems   Constitutional: Negative for chills and fever.   HENT: Negative for sore throat.    Respiratory: Negative for cough and shortness of breath.    Cardiovascular: Positive for palpitations. Negative for chest pain.   Gastrointestinal: Negative for nausea and vomiting.   Genitourinary: Negative for dysuria, frequency and urgency.   Musculoskeletal: Negative for back pain, neck pain and neck stiffness.   Skin: Negative for rash and wound.   Neurological: Negative for syncope and weakness.   Hematological: Does not bruise/bleed easily.   Psychiatric/Behavioral: Negative for agitation, behavioral problems and confusion.     Physical Exam     Initial Vitals [07/13/19  1402]   BP Pulse Resp Temp SpO2   (!) 197/77 66 18 98.3 °F (36.8 °C) 98 %      MAP       --         Physical Exam    Nursing note and vitals reviewed.  Constitutional: She appears well-developed and well-nourished. She is not diaphoretic. No distress.   HENT:   Head: Normocephalic and atraumatic.   Mouth/Throat: Oropharynx is clear and moist.   Eyes: EOM are normal. Pupils are equal, round, and reactive to light.   Neck: No tracheal deviation present.   Cardiovascular: Normal rate, normal heart sounds and intact distal pulses.   No murmur heard.  Pulmonary/Chest: Breath sounds normal. No stridor. No respiratory distress. She has no wheezes.   Abdominal: Soft. Bowel sounds are normal. She exhibits no distension and no mass. There is no tenderness.   Musculoskeletal: Normal range of motion. She exhibits edema.   RLE swelling   Neurological: She is alert and oriented to person, place, and time. No cranial nerve deficit or sensory deficit.   Skin: Skin is warm and dry. Capillary refill takes less than 2 seconds. No rash noted.       ED Course   Procedures  Labs Reviewed   B-TYPE NATRIURETIC PEPTIDE   CBC W/ AUTO DIFFERENTIAL   COMPREHENSIVE METABOLIC PANEL   MAGNESIUM   TROPONIN I   TSH     EKG Readings: (Independently Interpreted)   Sinus rhythm with PVCs at 70 bpm  T wave inversions in lead III  No STEMI     X-Rays:   Independently Interpreted Readings:   Other Readings:  Reviewed by myself, read by radiology.    Imaging Results          X-Ray Chest 1 View (Final result)  Result time 07/13/19 14:42:36    Final result by Kiko Rosa MD (07/13/19 14:42:36)                 Impression:      1. Interstitial attenuation may be on the basis of shallow inspiratory effort however interstitial edema or other nonspecific pneumonitis are considerations.  Obscuration of the left costophrenic angle is likely related to overlying soft tissue rather than pleural effusion although correlation is advised.      Electronically  signed by: Kiko Rosa MD  Date:    07/13/2019  Time:    14:42             Narrative:    EXAMINATION:  XR CHEST 1 VIEW    CLINICAL HISTORY:  Palpitations    TECHNIQUE:  Single frontal view of the chest was performed.    COMPARISON:  11/30/2017    FINDINGS:  The cardiomediastinal silhouette is prominent, similar to the previous exam noting some magnification by technique.  The aorta is tortuous..  There is obscuration of the left costophrenic angle, likely on the basis of overlying soft tissue, small effusion not excluded..  The trachea is midline.  The lungs are symmetrically expanded bilaterally with coarse interstitial attenuation.  No large focal consolidation seen.  There is no pneumothorax.  The osseous structures are remarkable for degenerative change..                              Medical Decision Making:   Differential Diagnosis:   Differential diagnosis includes but is not limited to:   Electrolyte abnormality hyperthyroid dysrhythmia ACS CHF  Clinical Tests:   Lab Tests: Ordered and Reviewed  Radiological Study: Ordered and Reviewed  Medical Tests: Ordered and Reviewed  ED Management:  60-year-old female presents today with palpitations.  She has a history of paroxysmal AFib as currently taking metoprolol and diltiazem extended release for her symptoms. Patient denies dizziness syncope chest pain or trouble breathing.    Her labs are without significant abnormality given additional potassium here to hopefully increase her potassium greater than 4.  Magnesium greater than 2.    EKG is remarkable for occasional PVCs, trigeminy pattern.    I discussed with Cardiology reviewed current medications including diltiazem and cardia.  He recommended increasing metoprolol to 50 mg at night.    Patient denies additional complaints.  I advised that she follow up with her cardiologist Dr. Servin on Monday to arrange a follow-up appointment sometime this week.    Patient was given strict return precautions to the  emergency department immediately if she begins to have lightheadedness dizziness chest pain trouble breathing loss of consciousness or any other concerns.    After taking into careful account the historical factors and physical exam findings of the patient's presentation today, in conjunction with the empirical and objective data obtained on ED workup, no acute emergent medical condition has been identified. The patient appears to be low risk for an emergent medical condition and I feel it is safe and appropriate at this time for the patient to be discharged to follow-up as detailed in their discharge instructions for reevaluation and possible continued outpatient workup and management. I have discussed the specifics of the workup with the patient and the patient has verbalized understanding of the details of the workup, the diagnosis, the treatment plan, and the need for outpatient follow-up.  Although the patient has no emergent etiology today this does not preclude the development of an emergent condition so in addition, I have advised the patient that they can return to the ED and/or activate EMS at any time with worsening of their symptoms, change of their symptoms, or with any other medical complaint.  The patient remained comfortable and stable during their visit in the ED.  Discharge and follow-up instructions discussed with the patient who expressed understanding and willingness to comply with my recommendations.                      ED Course as of Jul 14 1009   Sat Jul 13, 2019   1634 Discussed with Dr. Fritz, reviewed EKGs and medications including cartia and metoprolol dosage. Recommends increasing metoprolol XL to 50 daily for improved rate control     [RN]      ED Course User Index  [RN] Anshul Washburn Jr., MD     Clinical Impression:       ICD-10-CM ICD-9-CM   1. Palpitations R00.2 785.1   2. Paroxysmal A-fib I48.0 427.31          Scribe attestation I, Anshul Washburn,  personally performed the  services described in this documentation. All medical record entries made by the scribe were at my direction and in my presence.  I have reviewed the chart and agree that the record reflects my personal performance and is accurate and complete. Anshul Washburn M.D. 10:15 AM07/14/2019    Portions of this note were dictated using voice recognition software and may contain dictation related errors in spelling/grammar/syntax not found on text review         Anshul Washburn Jr., MD  07/14/19 1015

## 2019-07-13 NOTE — DISCHARGE INSTRUCTIONS
Please take metoprolol 50 mg at night if you're still having palpitations.  Please follow up with Dr. Servin on Monday.  Please return if you are having dizziness shortness of breath chest pain worsening palpitations or if you have any other concerns.

## 2019-07-13 NOTE — ED NOTES
68 year old female presents to ed cc of palpitations that began on Monday after receiving injection for knee pain. Patient denies any active chest pain denies sob patient awake alert ox4 in no acute distress family at bedside call light at bedside nurse will continue to monitor

## 2019-07-16 NOTE — PROGRESS NOTES
Ms. Fuentes is a patient of Dr. Servin and was last seen in clinic 6/19/2019.      Subjective:   Patient ID:  Flores Fuentes is a 68 y.o. female who presents for follow-up of Atrial Fibrillation  .     HPI:    Ms. Fuentes is a 68 y.o. female with pAF, HLD, obesity, hypothyroid here for hospital follow up.     Background:    AF first dx 2006 after lots of caffeine. Few episodes since then. Pill in pocket strategy.  HL, on meds  obesity  hypothyroid, on med    Went to ER 3/10 with palps. Underwent DCCV 3/13/17 after remaining in AF with RVR. Started on multaq, and BB d/c'd.  Since, feeling not quite as well as usually, and on 4/17, at which time HR was 120s and didn't feel same as prior AF episodes.  She'd been on BB for years w/o issues. Good exertion tolerance. No CP.    Due to rare PAF episodes, at the last visit we adopted a pill-in-the-pocket strategy. She used it first in 11/17; went to the ER. AF resolved <30 min after taking dose. Had 2 other episodes, self-treated successfully, in 12/17 and 1/18. Since last seen, has had 4 such episodes, all aborted with PIP. She's feeling great.    Update (07/17/2019):    7/13/2019 went to hospital with palpitations - EKG showed SR with PVCs. Prior to this, she had a kenalog injection for her knee and a procedure on her jaw and says she was under a lot of stress. Today she says her palpitations are not as severe as when she was in the hospital, but they worsen with exercise. Palpitations do not feel like her usual atrial fibrillation episodes and she has not been taking flecainide. Denies JOHANSEN, CP, light-headedness, or syncope.     He is currently taking eliquis 5mg BID for stroke prophylaxis and denies significant bleeding episodes. She is currently being treated with flecainide 300mg daily PRN for rhythm control and diltiazem 120mg daily and metoprolol succinate 25mg daily for HR control. Kidney function is stable, with a creatinine of 0.8 on 7/13/2019. I have personally  reviewed the patient's EKG today, which shows sinus rhythm at 62bpm. MN interval is 184. QTc is 406. No PVCs on rhythm strip.    Recent Cardiac Tests:    2D Echo (6/3/2019):  · Normal left ventricular systolic function. The estimated ejection fraction is 55%  · Normal LV diastolic function.  · Normal right ventricular systolic function.  · Mild left atrial enlargement.  · The estimated PA systolic pressure is 31 mm Hg  · Normal central venous pressure (3 mm Hg).    Current Outpatient Medications   Medication Sig    CARTIA  mg Cp24 TAKE 1 CAPSULE EVERY DAY    ELIQUIS 5 mg Tab TAKE 1 TABLET TWICE DAILY    flecainide (TAMBOCOR) 150 MG Tab 2 tabs (300 mg total) up to once per day for atrial fibrillation.    fluocinonide (LIDEX) 0.05 % ointment daily as needed.     fluorouracil (EFUDEX) 5 % cream Apply topically 2 (two) times daily. Thin layer to site on left cheek x 2 weeks or until irritation develops, then stop.    fluticasone (FLONASE) 50 mcg/actuation nasal spray USE 1 SPRAY IN EACH NOSTRIL ONE TIME DAILY    levothyroxine (SYNTHROID) 25 MCG tablet TAKE 1 TABLET EVERY DAY    metoprolol succinate (TOPROL-XL) 50 MG 24 hr tablet Take 1 tablet (50 mg total) by mouth once daily.    pravastatin (PRAVACHOL) 40 MG tablet TAKE 1 TABLET ONE TIME DAILY     No current facility-administered medications for this visit.        Review of Systems   Constitution: Negative for malaise/fatigue.   Cardiovascular: Positive for irregular heartbeat and palpitations. Negative for chest pain, dyspnea on exertion and leg swelling.   Respiratory: Negative for shortness of breath.    Hematologic/Lymphatic: Negative for bleeding problem.   Skin: Negative for rash.   Musculoskeletal: Negative for myalgias.   Gastrointestinal: Negative for hematemesis, hematochezia and nausea.   Genitourinary: Negative for hematuria.   Neurological: Negative for light-headedness.   Psychiatric/Behavioral: Negative for altered mental status.  "  Allergic/Immunologic: Negative for persistent infections.     Objective:        /60   Pulse 67   Ht 5' 7" (1.702 m)   Wt 108.9 kg (240 lb)   BMI 37.59 kg/m²     Physical Exam   Constitutional: She is oriented to person, place, and time. She appears well-developed and well-nourished.   HENT:   Head: Normocephalic.   Nose: Nose normal.   Eyes: Pupils are equal, round, and reactive to light.   Cardiovascular: Normal rate, regular rhythm, S1 normal and S2 normal.   No murmur heard.  Pulses:       Radial pulses are 2+ on the right side, and 2+ on the left side.   Pulmonary/Chest: Breath sounds normal. No respiratory distress.   Abdominal: Normal appearance.   Musculoskeletal: Normal range of motion. She exhibits no edema.   Neurological: She is alert and oriented to person, place, and time.   Skin: Skin is warm and dry. No erythema.   Psychiatric: She has a normal mood and affect. Her speech is normal and behavior is normal.   Nursing note and vitals reviewed.    Lab Results   Component Value Date     07/13/2019    K 3.7 07/13/2019    MG 2.5 07/13/2019    BUN 15 07/13/2019    CREATININE 0.8 07/13/2019    ALT 15 07/13/2019    AST 15 07/13/2019    HGB 14.2 07/13/2019    HCT 43.7 07/13/2019    TSH 2.757 07/13/2019    LDLCALC 89.0 04/10/2019       Recent Labs   Lab 03/10/17  1622 04/27/17  0023   INR 1.2 1.0       Assessment:     1. PVC's (premature ventricular contractions)    2. Paroxysmal A-fib    3. Hypertension, unspecified type      Plan:     In summary, Ms. Fuentes is a 68 y.o. female with pAF, HLD, obesity, hypothyroid here for follow up.   She is s/p hospital visit for palpitations. EKG showed frequent PVCs. No atrial fibrillation. No PVCs or AF on EKG today, but she reports that she is still experiencing palpitations with exertion. Likely PVCs were a result of various stressors, including kenalog injection. Her metoprolol was increased in the hospital but she was unsure if she should continue with " this. I encouraged her to go ahead with this increase and we will get a Holter monitor in a couple of weeks to assess. If her PVCs continue to be a problem, can consider switching diltiazem to verapamil. She takes flecainide PRN (pill in a pocket) for AF and is on eliquis for CVA prophylaxis.    Continue increase of metoprolol to 50mg daily.  24hr Holter monitor in 2 weeks.   RTC in one month, sooner if needed.    *A copy of this note has been sent to Dr. Servin*    Follow up in about 1 month (around 8/14/2019).    ------------------------------------------------------------------    LEXY Bal, NP-C  Cardiac Electrophysiology

## 2019-07-17 ENCOUNTER — OFFICE VISIT (OUTPATIENT)
Dept: ELECTROPHYSIOLOGY | Facility: CLINIC | Age: 69
End: 2019-07-17
Payer: MEDICARE

## 2019-07-17 VITALS
HEART RATE: 67 BPM | HEIGHT: 67 IN | WEIGHT: 240 LBS | SYSTOLIC BLOOD PRESSURE: 126 MMHG | DIASTOLIC BLOOD PRESSURE: 60 MMHG | BODY MASS INDEX: 37.67 KG/M2

## 2019-07-17 DIAGNOSIS — I10 HYPERTENSION, UNSPECIFIED TYPE: ICD-10-CM

## 2019-07-17 DIAGNOSIS — I49.3 PVC'S (PREMATURE VENTRICULAR CONTRACTIONS): Primary | ICD-10-CM

## 2019-07-17 DIAGNOSIS — I48.0 PAROXYSMAL A-FIB: Chronic | ICD-10-CM

## 2019-07-17 PROCEDURE — 1101F PR PT FALLS ASSESS DOC 0-1 FALLS W/OUT INJ PAST YR: ICD-10-PCS | Mod: HCNC,CPTII,S$GLB, | Performed by: NURSE PRACTITIONER

## 2019-07-17 PROCEDURE — 93005 RHYTHM STRIP: ICD-10-PCS | Mod: HCNC,S$GLB,, | Performed by: INTERNAL MEDICINE

## 2019-07-17 PROCEDURE — 93010 ELECTROCARDIOGRAM REPORT: CPT | Mod: HCNC,S$GLB,, | Performed by: INTERNAL MEDICINE

## 2019-07-17 PROCEDURE — 99999 PR PBB SHADOW E&M-EST. PATIENT-LVL III: ICD-10-PCS | Mod: PBBFAC,HCNC,, | Performed by: NURSE PRACTITIONER

## 2019-07-17 PROCEDURE — 99214 PR OFFICE/OUTPT VISIT, EST, LEVL IV, 30-39 MIN: ICD-10-PCS | Mod: HCNC,S$GLB,, | Performed by: NURSE PRACTITIONER

## 2019-07-17 PROCEDURE — 93005 ELECTROCARDIOGRAM TRACING: CPT | Mod: HCNC,S$GLB,, | Performed by: INTERNAL MEDICINE

## 2019-07-17 PROCEDURE — 3074F SYST BP LT 130 MM HG: CPT | Mod: HCNC,CPTII,S$GLB, | Performed by: NURSE PRACTITIONER

## 2019-07-17 PROCEDURE — 3078F PR MOST RECENT DIASTOLIC BLOOD PRESSURE < 80 MM HG: ICD-10-PCS | Mod: HCNC,CPTII,S$GLB, | Performed by: NURSE PRACTITIONER

## 2019-07-17 PROCEDURE — 99999 PR PBB SHADOW E&M-EST. PATIENT-LVL III: CPT | Mod: PBBFAC,HCNC,, | Performed by: NURSE PRACTITIONER

## 2019-07-17 PROCEDURE — 3074F PR MOST RECENT SYSTOLIC BLOOD PRESSURE < 130 MM HG: ICD-10-PCS | Mod: HCNC,CPTII,S$GLB, | Performed by: NURSE PRACTITIONER

## 2019-07-17 PROCEDURE — 93010 RHYTHM STRIP: ICD-10-PCS | Mod: HCNC,S$GLB,, | Performed by: INTERNAL MEDICINE

## 2019-07-17 PROCEDURE — 99214 OFFICE O/P EST MOD 30 MIN: CPT | Mod: HCNC,S$GLB,, | Performed by: NURSE PRACTITIONER

## 2019-07-17 PROCEDURE — 3078F DIAST BP <80 MM HG: CPT | Mod: HCNC,CPTII,S$GLB, | Performed by: NURSE PRACTITIONER

## 2019-07-17 PROCEDURE — 1101F PT FALLS ASSESS-DOCD LE1/YR: CPT | Mod: HCNC,CPTII,S$GLB, | Performed by: NURSE PRACTITIONER

## 2019-07-18 ENCOUNTER — PATIENT MESSAGE (OUTPATIENT)
Dept: ELECTROPHYSIOLOGY | Facility: CLINIC | Age: 69
End: 2019-07-18

## 2019-07-18 ENCOUNTER — PATIENT MESSAGE (OUTPATIENT)
Dept: UROGYNECOLOGY | Facility: CLINIC | Age: 69
End: 2019-07-18

## 2019-07-18 DIAGNOSIS — I48.0 PAROXYSMAL A-FIB: Primary | Chronic | ICD-10-CM

## 2019-07-18 RX ORDER — METOPROLOL SUCCINATE 50 MG/1
50 TABLET, EXTENDED RELEASE ORAL DAILY
Qty: 30 TABLET | Refills: 11 | Status: SHIPPED | OUTPATIENT
Start: 2019-07-18 | End: 2020-02-19 | Stop reason: DRUGHIGH

## 2019-07-18 RX ORDER — METOPROLOL SUCCINATE 50 MG/1
50 TABLET, EXTENDED RELEASE ORAL DAILY
Qty: 30 TABLET | Refills: 11 | Status: SHIPPED | OUTPATIENT
Start: 2019-07-18 | End: 2019-07-18 | Stop reason: SDUPTHER

## 2019-07-18 NOTE — TELEPHONE ENCOUNTER
WIN,    I did 7 weeks of physical therapy.  The outcome got me on the right track.  I am doing my keagels every day and it is helping.  The amount of pads have gone from 9 to 4 in a 24hr span.  At P.T.  she showed me an exercise to help relax the bladder.  Working on that.  I am not using any medication to relax the bladder  due to my fear of having any Afib situations.  I would rather do this naturally.  I still have leakage but it is better. There are times when I just can't stop it.  At this time I feel comfortable enough to just continue working on my exercises to help  make things better.  Is this okay in your opinion?   I have an appt. with  you 07/23 and would like to know if we can hold off on that and I promise I will keep working on this issue.    Flores Fuentes

## 2019-07-26 ENCOUNTER — TELEPHONE (OUTPATIENT)
Dept: SPORTS MEDICINE | Facility: CLINIC | Age: 69
End: 2019-07-26

## 2019-07-26 NOTE — TELEPHONE ENCOUNTER
patient education:      Below is what Dr. Lewis recommends for treating joint & muscle pain:    #1   up to 48 hours after injury  ice: apply 20 minutes on/20 minutes off, 3-4 times a day    after 48 hours after injury  heat: apply warm (but not hot enough to burn your skin) heat several times a day   examples: heating pad, hot tub, hot bath    #2   acetaminophen (Tylenol)  1000 mg (two extra-strength tablets), three times a day [breakfast, mid-day, bedtime]   NOT FOR: people with liver problems   NOT FOR: people with alcohol problems   The maximum dose is 3000 mg / day    If you still have pain 20 minutes after taking #2, then take:    #3   ibuprofen (Advil, Motrin)  600 mg (three tablets), three times a day [breakfast, mid-day, bedtime]   NOT FOR: people with kidney or severe liver problems   NOT FOR: people with heart problems, blood clots, or a history of strokes   NOT FOR: long-term use (no more than 14 days in a row)   NOT FOR: people with stomach ulcers    or    naproxen (Aleve, Naprosyn)  440 mg (two tablets), twice a day [breakfast, bedtime]  NOT FOR: people with kidney or severe liver problems   NOT FOR: people with heart problems, blood clots, or a history of strokes   NOT FOR: long-term use (no more than 14 days in a row)   NOT FOR: people with stomach ulcers    If for any reason, your condition worsens, please do not hesitate to seek emergency care.     As far as relieving the pain the options are:    #1 Physical Therapy aimed at strengthening the muscles around the joint.  #2 Injections (CSI) to help relieve the inflammation  #3 Orthobiologics - Platelet Rich Plasma (PRP), Bone Marrow Aspirate Concentrate (BMAC)  #4 Surgery     The patient chooses #1      Cj Rasheed   Sports Medicine Assistant   [facility] [place]         ----- Message from Arya Silva sent at 7/26/2019  9:39 AM CDT -----  Contact: patient   Please call pt at [phone]      Patient is having severe  right knee pain and requesting a same day appt (nothing available)    Thank you

## 2019-07-29 ENCOUNTER — CLINICAL SUPPORT (OUTPATIENT)
Dept: REHABILITATION | Facility: HOSPITAL | Age: 69
End: 2019-07-29
Payer: MEDICARE

## 2019-07-29 DIAGNOSIS — R26.89 ANTALGIC GAIT: Primary | ICD-10-CM

## 2019-07-29 DIAGNOSIS — M25.661 DECREASED RANGE OF MOTION OF RIGHT KNEE: ICD-10-CM

## 2019-07-29 DIAGNOSIS — R29.898 WEAKNESS OF BOTH LOWER EXTREMITIES: ICD-10-CM

## 2019-07-29 PROCEDURE — G8979 MOBILITY GOAL STATUS: HCPCS | Mod: CK,HCNC,PO

## 2019-07-29 PROCEDURE — 97110 THERAPEUTIC EXERCISES: CPT | Mod: HCNC,PO

## 2019-07-29 PROCEDURE — G8978 MOBILITY CURRENT STATUS: HCPCS | Mod: CL,HCNC,PO

## 2019-07-29 PROCEDURE — 97162 PT EVAL MOD COMPLEX 30 MIN: CPT | Mod: HCNC,PO

## 2019-07-29 NOTE — PLAN OF CARE
[facility]  Physical Therapy Initial Evaluation    Name: Flores Fuentes  Clinic Number: [id number]    Therapy Diagnosis:   Encounter Diagnoses   Name Primary?    Decreased range of motion of right knee     Weakness of both lower extremities     Antalgic gait Yes     Physician: rOion Lewis, *    Physician Orders: PT Eval and Treat   Medical Diagnosis from Referral: Primary localized osteoarthritis of knees, bilateral  Evaluation Date: 7/29/2019  Authorization Period Expiration: 12/31/2019  Plan of Care Expiration: 9/29/2019  Visit # / Visits authorized: 1/ 20    Time In: 8:00 am  Time Out: 9:00 am  Total Billable Time: 60 minutes    Precautions: Standard and Fall    Subjective   Date of onset: 6 weeks ago  History of current condition - Flores reports: a 's cyst ruptured behind her R knee about 6 weeks ago and she was in severe pain afterwards. She had this severe pain off/on for at least 3 weeks and was unable to lift her right foot off the floor. She received an injection but for 2 weeks she was still getting the pain. After talking to her MD's office she started to use heat and 2 days later that sever pain was gone. Currently she has a tightness in the back of her knee and a burning sensation across the patella. She has not been doing much walking since the injury 6 weeks ago and only recently started walking in between the rooms in her home and has not started to try to resume her walking program outdoor. Prior to this she was walking outside around the block (under a mile) but was doing this occasionally. She has been trying to focus on flexing while she walks as she has been walking with a peg leg. She has difficulty getting in/out of a vehicle and has to use her hands to lift her right leg. She also has to hold on to the walls of the shower to step into it, and has not resumed doing all of her usual household duties. She is currently doing some light  housekeeping.      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    Medications:   Flores has a current medication list which includes the following prescription(s): cartia xt, eliquis, flecainide, fluocinonide, fluorouracil, fluticasone propionate, levothyroxine, metoprolol succinate, and pravastatin.    Allergies:   Review of patient's allergies indicates:   Allergen Reactions    Decongestant d [pseudoephedrine-dm]      Atrial Fibrillation    Augmentin [amoxicillin-pot clavulanate]      palpitations      Amoxicillin Palpitations        Imaging, x-rays: Bilateral knee 07/08/2019  FINDINGS:  DJD with the narrowing of the patellofemoral and the medial tibiofemoral joint spaces bilaterally.  No fracture or dislocation.  No bone destruction identified    Prior Therapy: Patient previously seen in this facility for knee and neck  Social History: single story home, small step to enter the home, lives with their family  Occupation: retired  Prior Level of  Function: Independent  Current Level of Function: Mod I    Pain:  Current 5/10, worst 10/10, best 5/10   Location: right knee   Description: Aching, Burning and Tight  Aggravating Factors: Standing, Walking, Night Time, Lifting and Getting out of bed/chair  Easing Factors: pain medication, heating pad, injection, hot bath and elevation    Pts goals: to walk without pain    Objective     Observation: Patient is a 68 year old female, who presents to the clinic ambulating without an AD, mild antalgic gait.    Posture: R LE in extension in sitting, decreased WB R LE with squatting, decreased WB on R LE in standing.     Range of Motion:   Knee Left active Left Passive Right Active R passive   Flexion 115 120 92 100   Extension 5 hyper NT -10 0       Lower Extremity Strength  Right LE  Left LE    Knee extension: 3+/5 pain across Knee extension: 4/5   Knee flexion: 3+/5 pain across Knee flexion: 5/5   Hip flexion: 3/5 Hip flexion: 3+/5   Hip extension:  NT Hip extension: NT   Hip abduction: 3/5 Hip abduction: 3/5   Hip adduction: 3/5 Hip adduction 3/5   Ankle dorsiflexion: 4/5 Ankle dorsiflexion: 5/5   Ankle plantarflexion: 5/5 pain across pat Ankle plantarflexion: 5/5       Special Tests:   Left Right   Valgus Stress Test negative negative   Varus Stress test negative negative   Lachman's test negative negative   Posterior Lachman negative negative   Jeanne's Test negative negative   Patellar Grind Test negative positive     Function:    - SLS R: unable  - SLS L: unable  - Squat: decreased WB on R LE, increase pronation of B feet.   - Sit <--> Stand: increased reliance on UEs and L LE  - Bed Mobility: independent        Joint Mobility: hypomobile patella all plans R compared to L     Palpation: B medial joint line, superior patella border R    Sensation: light touch intact    Flexibility: SLR L 60 degrees, R 55 degrees      Edema: any measurements are unreliable as patient reports having a significant size difference  between her two legs since she was 12 years old.         CMS Impairment/Limitation/Restriction for FOTO Knee Survey    Therapist reviewed FOTO scores for Flores Fuentes on 7/29/2019.   FOTO documents entered into [employer] - see Media section.    Limitation Score: 69%  Category: Mobility    Current : CL = least 60% but < 80% impaired, limited or restricted  Goal: CK = at least 40% but < 60% impaired, limited or restricted  Discharge: N/A at this time       TREATMENT   Treatment Time In: 8:45 am  Treatment Time Out: 9:00 am  Total Treatment time separate from Evaluation: 15 minutes    Flores received therapeutic exercises to develop strength, ROM and flexibility for 15 minutes including:  Supine and seated hamstring stretch, quad sets, heel slides.      Home Exercises and Patient Education Provided    Education provided:   - compliance with HEP  - role of PT and goals for PT    Written Home Exercises Provided: yes.  Exercises were reviewed and Flores was able to demonstrate them prior to the end of the session.  Flores demonstrated good  understanding of the education provided.     See EMR under Patient Instructions for exercises provided 7/29/2019.    Assessment   Flores is a 68 y.o. female referred to outpatient Physical Therapy with a medical diagnosis of Primary localized osteoarthritis of knees, bilateral. Pt presents with decreased range of motion of her right knee, weakness of both lower extremities R>L, an antalgic gait pattern, and pain that limits her functional mobility and ability to perform her usual ADLs. Due to her limited range of motion and strength she relies on her UEs for transfers and is unable to perform her usual recreational activities. Her current score on FOTO Knee Survey places her in the 60%<80% impaired, limited, or restricted category.     Pt prognosis is Fair.   Pt will benefit from skilled outpatient Physical Therapy to address the deficits stated above and in the chart below,  provide pt/family education, and to maximize pt's level of independence.     Plan of care discussed with patient: Yes  Pt's spiritual, cultural and educational needs considered and patient is agreeable to the plan of care and goals as stated below:     Anticipated Barriers for therapy: none    Medical Necessity is demonstrated by the following  History  Co-morbidities and personal factors that may impact the plan of care Co-morbidities:   high BMI, level of undertstanding of current condition and arthritis    Personal Factors:   no deficits     moderate   Examination  Body Structures and Functions, activity limitations and participation restrictions that may impact the plan of care Body Regions:   lower extremities    Body Systems:    ROM  strength  gait  transfers    Participation Restrictions:   Difficulty with transfers, prolonged ambulation, standing, performing usual household duties    Activity limitations:   Learning and applying knowledge  no deficits    General Tasks and Commands  no deficits    Communication  no deficits    Mobility  lifting and carrying objects  walking  using transportation (bus, train, plane, car)  driving (bike, car, motorcycle)    Self care  dressing    Domestic Life  shopping  cooking  doing house work (cleaning house, washing dishes, laundry)    Interactions/Relationships  no deficits    Life Areas  no deficits    Community and Social Life  community life  recreation and leisure         moderate   Clinical Presentation evolving clinical presentation with changing clinical characteristics moderate   Decision Making/ Complexity Score: moderate     Goals:  Short Term Goals: 4 weeks   1. This patient will be independent with a basic HEP.  2. This patient will increase R knee AROM by 20 degrees in jair/doff socks and shoes with no difficulty.   3. This patient will increase LE strength by 1 grade in order to perform vehicle transfer with no increase in symptoms or UE leverage.  4. This  patient will have a pain rating of 5/10 at worst with ADLs.  5. Patient will be able to achieve greater than or equal to 41 on the FOTO knee placing patient in 40%<60% impaired, limited, or restricted category demonstrating overall improved functional ability with lower extremity.   Long Term Goals: 8 weeks   1. This patient will be independent with an updated HEP.  2. This patient will increase LE strength to 5/5 in order to be able to perform her usual household duties with no increase in symptoms.   3. This patient will have a pain rating of 2/10 at worst with ADLs.  4. Patient will be able to achieve greater than or equal to 50 on the FOTO knee placing patient in 40%<60% impaired, limited, or restricted category demonstrating overall improved functional ability with lower extremity.      Plan   Plan of care Certification: 7/29/2019 to 9/29/2019.    Outpatient Physical Therapy 2 times weekly for 8 weeks to include the following interventions: Gait Training, Manual Therapy, Moist Heat/ Ice, Neuromuscular Re-ed, Patient Education, Therapeutic Exercise and IASTM. Dry needling with manual therapy techniques to decrease pain, inflammation and swelling, increase circulation and promote healing process.    Nichole Ryan, PT

## 2019-07-29 NOTE — PATIENT INSTRUCTIONS
Supine: Leg Stretch With Strap (Intermediate)        Lie on back with one knee bent, foot flat on floor. Hook strap around other foot. Straighten knee. Raise leg further toward its maximal range. Hold 10 seconds. Relax leg completely down to floor.   Repeat 10 times per session. Do 2 sessions per day.    Copyright © [employer]. All rights reserved.   Chair Sitting        Sit at edge of seat, spine straight, one leg extended. Put a hand on each thigh and bend forward from the hip, keeping spine straight. Allow hand on extended leg to reach toward toes. Support upper body with other arm. Hold 10 seconds.  Repeat 10 times per session. Do 2 sessions per day.    Copyright © [employer]. All rights reserved.   Quad Sets        Slowly tighten thigh muscles of straight, right leg while counting out loud to 5. Relax.  Repeat 10 times. Do 2 sessions per day.     https://[employer]/292     Copyright © [employer]. All rights reserved.   Heel Slide        Bend right knee and pull heel toward buttocks.  Repeat 10 times. Do 2 sessions per day.     https://[employer]/300     Copyright © [employer]. All rights reserved.

## 2019-07-30 PROBLEM — M25.661 DECREASED RANGE OF MOTION OF RIGHT KNEE: Status: ACTIVE | Noted: 2019-07-30

## 2019-07-30 PROBLEM — R29.898 WEAKNESS OF BOTH LOWER EXTREMITIES: Status: ACTIVE | Noted: 2019-07-30

## 2019-08-05 ENCOUNTER — CLINICAL SUPPORT (OUTPATIENT)
Dept: REHABILITATION | Facility: HOSPITAL | Age: 69
End: 2019-08-05
Payer: MEDICARE

## 2019-08-05 DIAGNOSIS — R26.89 ANTALGIC GAIT: ICD-10-CM

## 2019-08-05 DIAGNOSIS — R29.898 WEAKNESS OF BOTH LOWER EXTREMITIES: ICD-10-CM

## 2019-08-05 DIAGNOSIS — M25.661 DECREASED RANGE OF MOTION OF RIGHT KNEE: ICD-10-CM

## 2019-08-05 PROCEDURE — 97110 THERAPEUTIC EXERCISES: CPT | Mod: HCNC,PO

## 2019-08-05 PROCEDURE — 97014 ELECTRIC STIMULATION THERAPY: CPT | Mod: HCNC,PO

## 2019-08-05 NOTE — PATIENT INSTRUCTIONS
Ankle Pump        Bend ankles up and down, alternating feet.  Repeat 10 times. Do 2 sessions per day.     https://[employer].us/290     Copyright © [employer]. All rights reserved.

## 2019-08-05 NOTE — PROGRESS NOTES
"  Physical Therapy Daily Treatment Note     Name: Flores Olsen [place]  Clinic Number: [id number]    Therapy Diagnosis:   Encounter Diagnoses   Name Primary?    Decreased range of motion of right knee     Weakness of both lower extremities     Antalgic gait      Physician: Orion Lewis, *    Visit Date: 8/5/2019    Physician Orders: PT Eval and Treat   Medical Diagnosis from Referral: Primary localized osteoarthritis of knees, bilateral  Evaluation Date: 7/29/2019  Authorization Period Expiration: 12/31/2019  Plan of Care Expiration: 9/29/2019  Visit # / Visits authorized: 2/ 20    Time In: 10:00 am  Time Out:10:55 am  Total Billable Time: 55 minutes    Precautions: Standard and Fall    Subjective     Pt reports: pain waking her up at 3 am this morning, had like a knot R lateral thigh above her knee. She has been having the swelling 4x since her initial evaluation. This was the first time that it has woken her up.  She was not compliant with home exercise program.  Response to previous treatment: no soreness after initial eval, felt it that next day and that night feels like it fills with water.   Functional change: none    Pain: 7/10  Location: right knee      Objective     Flores received therapeutic exercises to develop strength, ROM and flexibility for 45 minutes including:      Date 8/5/19   Visit 2/20   FTF 8/29/19   FOTO 2/5   G code   2/10  8/29/19   POC exp 9/29/19   Vs total   total 105.57  218.77       Gastroc stretch 10 x 10"   Bike --   QS 10 x 3"   Ankle pumps 1 x 10   SAQ 1 x 10   SLR Next?   Hip Abd Next?   Heel Slides 1 x 10   Kari Hip Add 10 x 3" w/ ball   LAQs --   Seated Hip Flex --       TKE --   Hip Abd --   Hip Flex --   Hip Ext --   HS Curls --       Step Ups --   Step Downs --       E-stim 10'   MH 10'   Initials DG     Flores received hot pack for 10 minutes to L knee at end of session..    Flores received TENS electrical stimulation for pain to the R knee. Pt received knee " program on EMPI TENS unit for 10 minutes. Flores tolerated treatment well without any adverse effects.       Home Exercises Provided and Patient Education Provided     Education provided:   - importance of performing her HEP on a regular basis.   - using her heating pad for only 20 minutes at a time, but she can use it several times a day.     Written Home Exercises Provided: yes.  Exercises were reviewed and Flores was able to demonstrate them prior to the end of the session.  Flores demonstrated fair  understanding of the education provided.     See EMR under Patient Instructions for exercises provided 8/5/2019.    Assessment     Flores required frequent verbal cues to not push herself to the point of pain with all of her exercises and frequent encouragement to attempt all of her exercises.  She was able to perform all of today's activities with no increase in symptoms prior to leaving the clinic and reported feeling looser after modalities. She ambulated into the clinic with bilateral axillary crutches using a four point gait pattern keeping her R knee in extension, at the end of the session she ambulated out of the gym with a four point gait pattern flexing her R knee during swing phase of gait.     Flores is progressing slowly towards her goals.   Pt prognosis is Fair.     Pt will continue to benefit from skilled outpatient physical therapy to address the deficits listed in the problem list box on initial evaluation, provide pt/family education and to maximize pt's level of independence in the home and community environment.     Pt's spiritual, cultural and educational needs considered and pt agreeable to plan of care and goals.     Anticipated barriers to physical therapy: None    Goals:   Short Term Goals: 4 weeks   1. This patient will be independent with a basic HEP.  2. This patient will increase R knee AROM by 20 degrees in jair/doff socks and shoes with no difficulty.   3. This patient will increase  LE strength by 1 grade in order to perform vehicle transfer with no increase in symptoms or UE leverage.  4. This patient will have a pain rating of 5/10 at worst with ADLs.  5. Patient will be able to achieve greater than or equal to 41 on the FOTO knee placing patient in 40%<60% impaired, limited, or restricted category demonstrating overall improved functional ability with lower extremity.   Long Term Goals: 8 weeks   1. This patient will be independent with an updated HEP.  2. This patient will increase LE strength to 5/5 in order to be able to perform her usual household duties with no increase in symptoms.   3. This patient will have a pain rating of 2/10 at worst with ADLs.  4. Patient will be able to achieve greater than or equal to 50 on the FOTO knee placing patient in 40%<60% impaired, limited, or restricted category demonstrating overall improved functional ability with lower extremity.    Plan     Begin SLR and hip abduction next visit. Continue with moist head and electrical stimulation as needed next visit.     Nichole Ryan, PT

## 2019-08-07 ENCOUNTER — CLINICAL SUPPORT (OUTPATIENT)
Dept: REHABILITATION | Facility: HOSPITAL | Age: 69
End: 2019-08-07
Payer: MEDICARE

## 2019-08-07 DIAGNOSIS — R26.89 ANTALGIC GAIT: ICD-10-CM

## 2019-08-07 DIAGNOSIS — R29.898 WEAKNESS OF BOTH LOWER EXTREMITIES: ICD-10-CM

## 2019-08-07 DIAGNOSIS — M25.661 DECREASED RANGE OF MOTION OF RIGHT KNEE: ICD-10-CM

## 2019-08-07 PROCEDURE — 97110 THERAPEUTIC EXERCISES: CPT | Mod: HCNC,PO

## 2019-08-07 NOTE — PROGRESS NOTES
"  Physical Therapy Daily Treatment Note     Name: Flores Fuentes  Clinic Number: [id number]    Therapy Diagnosis:   Encounter Diagnoses   Name Primary?    Decreased range of motion of right knee     Weakness of both lower extremities     Antalgic gait      Physician: Orion Lewis, *    Visit Date: 8/7/2019    Physician Orders: PT Eval and Treat   Medical Diagnosis from Referral: Primary localized osteoarthritis of knees, bilateral  Evaluation Date: 7/29/2019  Authorization Period Expiration: 12/31/2019  Plan of Care Expiration: 9/29/2019  Visit # / Visits authorized: 3/ 20    Time In: 10:00 am  Time Out: 10:45 am  Total Billable Time: 35 minutes    Precautions: Standard and Fall    Subjective     Pt reports: doing better than the other day, haven't had the awful pain at night or during the day since last visit. She tried to walk flexing the knee but is real real tight behind the knee.   Flores was compliant with home exercise program.  Response to previous treatment: increased soreness after the last visit.    Functional change: none    Pain: 5/10  Location: right knee      Objective     Flores received therapeutic exercises to develop strength, ROM and flexibility for 35 minutes including:      Date 08/07/19 8/5/19   Visit 3/20 2/20   FTF 8/29/19 8/29/19   FOTO 3/5 2/5   G code   3/10  8/29/19 2/10  8/29/19   POC exp 9/29/19 9/29/19   Vs total   total 60.64  279.41 105.57  218.77        Gastroc stretch 10 x 10" 10 x 10"   Bike -- --   QS 15 x 3" 10 x 3"   Glute sets 10 x 3"    Ankle pumps 1 x 15 1 x 10   SAQ 1 x 15 1 x 10   SLR 1 x 10 Next?   Hip Abd 1 x 10 RTB Next?   Heel Slides 1 x 15 1 x 10   Kari Hip Add 15 x 3" w/ ball  10 x 3" w/ ball   LAQs 1 x 10 --   Seated Hip Flex 1 x 10 --        TKE -- --   Hip Abd -- --   Hip Flex -- --   Hip Ext -- --   HS Curls -- --        Step Ups -- --   Step Downs -- --        E-stim -- 10'   MH 10' 10'   Initials DG DG     Flores received hot pack for 10 " minutes to L knee at end of session..    Home Exercises Provided and Patient Education Provided     Education provided:   - importance of performing her HEP on a regular basis.     Written Home Exercises Provided: yes.  Exercises were reviewed and Flores was able to demonstrate them prior to the end of the session.  Flores demonstrated fair  understanding of the education provided.     See EMR under Patient Instructions for exercises provided 8/5/2019.    Assessment     Flores required occasional verbal cues to move through her full available range of motion with LAQs and SAQs. She was able to perform all of today's progressions and new exercises with no increase in symptoms prior to leaving the clinic. When ambulating into/out of the gym with one axillary crutch she required one verbal cue to increase knee flexion on the right.     Flores is progressing slowly towards her goals.   Pt prognosis is Fair.     Pt will continue to benefit from skilled outpatient physical therapy to address the deficits listed in the problem list box on initial evaluation, provide pt/family education and to maximize pt's level of independence in the home and community environment.     Pt's spiritual, cultural and educational needs considered and pt agreeable to plan of care and goals.     Anticipated barriers to physical therapy: None    Goals:   Short Term Goals: 4 weeks   1. This patient will be independent with a basic HEP. IN PROGRESS  2. This patient will increase R knee AROM by 20 degrees in jair/doff socks and shoes with no difficulty. IN PROGRESS  3. This patient will increase LE strength by 1 grade in order to perform vehicle transfer with no increase in symptoms or UE leverage. IN PROGRESS  4. This patient will have a pain rating of 5/10 at worst with ADLs. IN PROGRESS  5. Patient will be able to achieve greater than or equal to 41 on the FOTO knee placing patient in 40%<60% impaired, limited, or restricted category  demonstrating overall improved functional ability with lower extremity. IN PROGRESS  Long Term Goals: 8 weeks   1. This patient will be independent with an updated HEP. IN PROGRESS  2. This patient will increase LE strength to 5/5 in order to be able to perform her usual household duties with no increase in symptoms. IN PROGRESS  3. This patient will have a pain rating of 2/10 at worst with ADLs. IN PROGRESS  4. Patient will be able to achieve greater than or equal to 50 on the FOTO knee placing patient in 40%<60% impaired, limited, or restricted category demonstrating overall improved functional ability with lower extremity. IN PROGRESS    Plan     Increase reps with all exercises next visit. Begin seated hamstring curls next visit. Continue with moist head and electrical stimulation as needed next visit.     Nichole Ryan, PT

## 2019-08-07 NOTE — PATIENT INSTRUCTIONS
Strengthening: Straight Leg Raise (Phase 3)        Resting on hands, tighten muscles on front of left thigh, then lift leg from surface, keeping knee locked.  Repeat 10 times per set. Do 3 sets per session. Do 2 sessions per day.     https://[employer]/618     Copyright © [employer]. All rights reserved.   Strengthening: Hip Abductor - Resisted        With band looped around both legs above knees, push thighs apart.  Repeat 10 times per set. Do 3 sets per session. Do 2 sessions per day.     https://[employer]/688     Copyright © [employer]. All rights reserved.   Knee Extension: Resisted (Sitting)        With band looped around left ankle and under other foot, straighten leg with ankle loop. Keep other leg bent to increase resistance.  Repeat 10 times per set. Do 3 sets per session. Do 2 sessions per day.     https://[employer]/690     Copyright © [employer]. All rights reserved.   Gluteal Squeeze        Squeeze buttocks muscles as tightly as possible while counting out loud to 3.  Repeat 10 times per set. Do 3 sets per session. Do 2 sessions per day.     https://[employer]/363     Copyright © [employer]. All rights reserved.   Sitting Chair Flexion        Bring knee up toward chest. Repeat with other leg.  Repeat 10 times per set. Do 3 sets per session. Do 2 sessions per day.     https://[employer]/391     Copyright © [employer]. All rights reserved.

## 2019-08-12 ENCOUNTER — CLINICAL SUPPORT (OUTPATIENT)
Dept: REHABILITATION | Facility: HOSPITAL | Age: 69
End: 2019-08-12
Payer: MEDICARE

## 2019-08-12 DIAGNOSIS — R29.898 WEAKNESS OF BOTH LOWER EXTREMITIES: ICD-10-CM

## 2019-08-12 DIAGNOSIS — R26.89 ANTALGIC GAIT: ICD-10-CM

## 2019-08-12 DIAGNOSIS — M25.661 DECREASED RANGE OF MOTION OF RIGHT KNEE: ICD-10-CM

## 2019-08-12 PROCEDURE — 97110 THERAPEUTIC EXERCISES: CPT | Mod: HCNC,PO

## 2019-08-12 NOTE — PROGRESS NOTES
"  Physical Therapy Daily Treatment Note     Name: Flores Fuentes  Clinic Number: [id number]    Therapy Diagnosis:   Encounter Diagnoses   Name Primary?    Decreased range of motion of right knee     Weakness of both lower extremities     Antalgic gait      Physician: Orion Lewis, *    Visit Date: 8/12/2019    Physician Orders: PT Eval and Treat   Medical Diagnosis from Referral: Primary localized osteoarthritis of knees, bilateral  Evaluation Date: 7/29/2019  Authorization Period Expiration: 12/31/2019  Plan of Care Expiration: 9/29/2019  Visit # / Visits authorized: 4/ 20    Time In: 10:00 am  Time Out: 10:45 am  Total Billable Time: 35 minutes    Precautions: Standard and Fall    Subjective     Pt reports: having increased knee pain over the weekend due to trying to walk without crutch and normal gait pattern.   Flores was not compliant with home exercise program.  Response to previous treatment: increased soreness after the last visit.    Functional change: none    Pain: 7/10  Location: right knee      Objective     Flores received therapeutic exercises to develop strength, ROM and flexibility for 35 minutes including:      Date 8/12/19 08/07/19 8/5/19   Visit 4/20 3/20 2/20   FTF 8/29/19 8/29/19 8/29/19   FOTO 4/5 3/5 2/5   G code   4/10  8/29/19 3/10  8/29/19 2/10  8/29/19   POC exp 9/29/19 9/29/19 9/29/19   Vs total   total 60.64  340.05 60.64  279.41 105.57  218.77         Gastroc stretch 10 x 10" 10 x 10" 10 x 10"   Bike  -- --   QS 15 x 3" 15 x 3" 10 x 3"   Glute sets 10 x 3" 10 x 3"    Ankle pumps 2 x 10 1 x 15 1 x 10   SAQ 2 x 10 1 x 15 1 x 10   SLR Not today 1 x 10 Next?   Hip Abd 2 x 10 RTB 1 x 10 RTB Next?   Heel Slides 1 x 5 1 x 15 1 x 10   Kari Hip Add 20 x 3" w/ball 15 x 3" w/ ball  10 x 3" w/ ball   LAQs 1 x 10 1 x 10 --   Seated Hip Flex 1 x 10 1 x 10 --         TKE  -- --   Hip Abd  -- --   Hip Flex  -- --   Hip Ext  -- --   HS Curls  -- --         Step Ups  -- --   Step Downs "  -- --         E-stim  -- 10'   MH 10' 10' 10'   Initials DENG ANAYA DG     Flores received hot pack for 10 minutes to L knee at end of session.    Patient was screened for use of kinesiotape and was cleared for use.  1. Has the patient ever had a reaction to the adhesive in bandaids? YNo  2. Has the patient ever uses athletic/kinesiotape in the past? No  3. Is the patient hemodynamically impaired (PE, DVT, CHF, Kidney failure)? No  4. Can the PT/PTA apply the tape to your skin? Yes    Patient was instructed on duration to wear the tape, proper drying techniques for the tape, and to remove the tape if he/she had any issues with it.    Kinesiotaping for tibial internal rotation with space correction across joint line.   Patient verbalized understanding of these instructions.      Home Exercises Provided and Patient Education Provided     Education provided:   - importance of performing her HEP on a regular basis.     Written Home Exercises Provided: yes.  Exercises were reviewed and Flores was able to demonstrate them prior to the end of the session.  Flores demonstrated fair  understanding of the education provided.     See EMR under Patient Instructions for exercises provided 8/5/2019.    Assessment     Flores returns to therapy on this date with increased complaints of knee pain due to increasing her walking without crutch this weekend.  She receives kinesiotaping in attempt to decrease joint pain while ambulating.  Patient was instructed to use crutch while walking to decrease weight bearing through right LE. She will attempt progression of exercises next visit to improve strength and mobility of knees.      Flores is progressing slowly towards her goals.   Pt prognosis is Fair.     Pt will continue to benefit from skilled outpatient physical therapy to address the deficits listed in the problem list box on initial evaluation, provide pt/family education and to maximize pt's level of independence in the home and  community environment.     Pt's spiritual, cultural and educational needs considered and pt agreeable to plan of care and goals.     Anticipated barriers to physical therapy: None    Goals:   Short Term Goals: 4 weeks   1. This patient will be independent with a basic HEP. IN PROGRESS  2. This patient will increase R knee AROM by 20 degrees in jair/doff socks and shoes with no difficulty. IN PROGRESS  3. This patient will increase LE strength by 1 grade in order to perform vehicle transfer with no increase in symptoms or UE leverage. IN PROGRESS  4. This patient will have a pain rating of 5/10 at worst with ADLs. IN PROGRESS  5. Patient will be able to achieve greater than or equal to 41 on the FOTO knee placing patient in 40%<60% impaired, limited, or restricted category demonstrating overall improved functional ability with lower extremity. IN PROGRESS  Long Term Goals: 8 weeks   1. This patient will be independent with an updated HEP. IN PROGRESS  2. This patient will increase LE strength to 5/5 in order to be able to perform her usual household duties with no increase in symptoms. IN PROGRESS  3. This patient will have a pain rating of 2/10 at worst with ADLs. IN PROGRESS  4. Patient will be able to achieve greater than or equal to 50 on the FOTO knee placing patient in 40%<60% impaired, limited, or restricted category demonstrating overall improved functional ability with lower extremity. IN PROGRESS    Plan     Increase reps with all exercises next visit. Begin seated hamstring curls next visit. Continue with moist heat and electrical stimulation as needed next visit.     Dorian Rachel, PT

## 2019-08-14 ENCOUNTER — CLINICAL SUPPORT (OUTPATIENT)
Dept: REHABILITATION | Facility: HOSPITAL | Age: 69
End: 2019-08-14
Payer: MEDICARE

## 2019-08-14 DIAGNOSIS — R29.898 WEAKNESS OF BOTH LOWER EXTREMITIES: ICD-10-CM

## 2019-08-14 DIAGNOSIS — R26.89 ANTALGIC GAIT: ICD-10-CM

## 2019-08-14 DIAGNOSIS — M25.661 DECREASED RANGE OF MOTION OF RIGHT KNEE: ICD-10-CM

## 2019-08-14 PROCEDURE — 97110 THERAPEUTIC EXERCISES: CPT | Mod: HCNC,PO

## 2019-08-14 NOTE — PROGRESS NOTES
"  Physical Therapy Daily Treatment Note     Name: Flores Fuentes  Clinic Number: [id number]    Therapy Diagnosis:   Encounter Diagnoses   Name Primary?    Decreased range of motion of right knee     Weakness of both lower extremities     Antalgic gait      Physician: Orion Lewis, *    Visit Date: 8/14/2019    Physician Orders: PT Eval and Treat   Medical Diagnosis from Referral: Primary localized osteoarthritis of knees, bilateral  Evaluation Date: 7/29/2019  Authorization Period Expiration: 12/31/2019  Plan of Care Expiration: 9/29/2019  Visit # / Visits authorized: 5/ 20    Time In: 10:00 am  Time Out: 10:43 am  Total Billable Time: 33 minutes    Precautions: Standard and Fall    Subjective     Pt reports:she was able to bend knee after last visit, but today she is feeling pressure all around the knee. She had to go to [facility] yesterday and she feels she can't flex her leg when walking.  Flores was not compliant with home exercise program.  Response to previous treatment: no increase in soreness after the last visit.    Functional change: none    Pain: 5/10  Location: right knee      Objective     Flores received therapeutic exercises to develop strength, ROM and flexibility for 33 minutes including:    Date 8/14/19 8/12/19 08/07/19 8/5/19   Visit 5/20 4/20 3/20 2/20   FTF 8/29/19 8/29/19 8/29/19 8/29/19   FOTO 5/5 4/5 3/5 2/5   G code   5/10  8/29/19 4/10  8/29/19 3/10  8/29/19 2/10  8/29/19   POC exp 9/29/19 9/29/19 9/29/19 9/29/19   Vs total   total 60.64  400.69 60.64  340.05 60.64  279.41 105.57  218.77          Gastroc stretch 10 x 10" 10 x 10" 10 x 10" 10 x 10"   Bike -- -- -- --   QS 20 x 3" 15 x 3" 15 x 3" 10 x 3"   Glute sets 15 x 3" 10 x 3" 10 x 3"    Ankle pumps 2 x 10 2 x 10 1 x 15 1 x 10   SAQ 2 x 10 2 x 10 1 x 15 1 x 10   SLR 2 x 5 w/ min A Not today 1 x 10 Next?   Hip Abd 1 x 25 RTB 2 x 10 RTB 1 x 10 RTB Next?   Heel Slides 1 x 10 1 x 5 1 x 15 1 x 10   Kari Hip Add 20 x 3" w/ ball " "20 x 3" w/ball 15 x 3" w/ ball  10 x 3" w/ ball   LAQs 1 x 10 1 x 10 1 x 10 --   Seated Hip Flex 1 x 10 1 x 10 1 x 10 --   Seated knee flex 1 x 10 YTB      TKE --  -- --   Hip Abd --  -- --   Hip Flex --  -- --   Hip Ext --  -- --   HS Curls --  -- --          Step Ups --  -- --   Step Downs --  -- --          E-stim --  -- 10'   MH 10' 10' 10' 10'   Initials DG MA DG DG     Flores received hot pack for 10 minutes to L knee at end of session.    FOTO Knee Survey 67% impaired, limited, or restricted category.    Home Exercises Provided and Patient Education Provided     Education provided:   - importance of performing her HEP on a regular basis.     Written Home Exercises Provided: yes.  Exercises were reviewed and Flores was able to demonstrate them prior to the end of the session.  Flores demonstrated fair  understanding of the education provided.     See EMR under Patient Instructions for exercises provided 8/5/2019.    Assessment     Flores continues to require verbal and tactile cues to move through her full range of motion with all of her exercises. She also required Min A with one finger to perform SLR today. Kinesiotape was still intact from previous visit. She also requested several short rest breaks with all exercises due to complaints of fatigue. Patient continues to ambulate with single axillary crutch due to complaints of pain with increased WB on R LE.     Flores is progressing slowly towards her goals.   Pt prognosis is Fair.     Pt will continue to benefit from skilled outpatient physical therapy to address the deficits listed in the problem list box on initial evaluation, provide pt/family education and to maximize pt's level of independence in the home and community environment.     Pt's spiritual, cultural and educational needs considered and pt agreeable to plan of care and goals.     Anticipated barriers to physical therapy: None    Goals:   Short Term Goals: 4 weeks   1. This patient will " be independent with a basic HEP. IN PROGRESS  2. This patient will increase R knee AROM by 20 degrees in jair/doff socks and shoes with no difficulty. IN PROGRESS  3. This patient will increase LE strength by 1 grade in order to perform vehicle transfer with no increase in symptoms or UE leverage. IN PROGRESS  4. This patient will have a pain rating of 5/10 at worst with ADLs. IN PROGRESS  5. Patient will be able to achieve greater than or equal to 41 on the FOTO knee placing patient in 40%<60% impaired, limited, or restricted category demonstrating overall improved functional ability with lower extremity. IN PROGRESS  Long Term Goals: 8 weeks   1. This patient will be independent with an updated HEP. IN PROGRESS  2. This patient will increase LE strength to 5/5 in order to be able to perform her usual household duties with no increase in symptoms. IN PROGRESS  3. This patient will have a pain rating of 2/10 at worst with ADLs. IN PROGRESS  4. Patient will be able to achieve greater than or equal to 50 on the FOTO knee placing patient in 40%<60% impaired, limited, or restricted category demonstrating overall improved functional ability with lower extremity. IN PROGRESS    Plan     Increase reps with seated hamstring curls next visit. Continue with moist heat and electrical stimulation as needed next visit.     Nichole Ryan, PT

## 2019-08-15 ENCOUNTER — TELEPHONE (OUTPATIENT)
Dept: SPORTS MEDICINE | Facility: CLINIC | Age: 69
End: 2019-08-15

## 2019-08-15 NOTE — TELEPHONE ENCOUNTER
Called Flores Fuentes to discuss Physical Therapy with her.   I was unable to reach the patient, I left patient a voicemail to return my call.    Cj Rasheed   Sports Medicine Assistant   [facility]     ----- Message from Cj Rasheed MA sent at 8/15/2019  2:35 PM CDT -----  Contact: Dorian () @ [phone]      ----- Message -----  From: Tess Scott  Sent: 8/15/2019   1:27 PM  To: Debbie HUTCHISON Staff    Pt is currently going to physical therapy but she can not do the therapy because it is too painful.  Pls call to discuss asap.

## 2019-08-16 ENCOUNTER — TELEPHONE (OUTPATIENT)
Dept: SPORTS MEDICINE | Facility: CLINIC | Age: 69
End: 2019-08-16

## 2019-08-16 DIAGNOSIS — M25.561 ACUTE PAIN OF RIGHT KNEE: ICD-10-CM

## 2019-08-16 DIAGNOSIS — M25.561 MECHANICAL KNEE PAIN, RIGHT: Primary | ICD-10-CM

## 2019-08-16 NOTE — TELEPHONE ENCOUNTER
Communication with :    Patient is unable to move her leg at this time. Pt. reports that she continues to have mechanical sx's and it is unbearable to complete fPT at this time. Spoke to patient about MRI vs. VSI/Orthobiologics.    Patient would like to get an MRI at this time of the right knee.    Cj Rasheed   Sports Medicine Assistant   [facility] [place]     ----- Message from Margarita Bridges sent at 8/16/2019  2:02 PM CDT -----  Contact: Dad  Needs Advice    Reason for call: Pt is having pain and would like a call back. Please call 's phone.         Communication Preference: [phone]    Additional Information:

## 2019-08-19 NOTE — TELEPHONE ENCOUNTER
Called patient back to schedule MRI and make f/u appointment. Patient agreed.    Cj Rasheed   Sports Medicine Assistant   [facility] [place]

## 2019-08-21 ENCOUNTER — TELEPHONE (OUTPATIENT)
Dept: ELECTROPHYSIOLOGY | Facility: CLINIC | Age: 69
End: 2019-08-21

## 2019-08-21 NOTE — TELEPHONE ENCOUNTER
Returned call to [facility] clinic and got the answering machine. Left message.         ----- Message from Amina Murillo MA sent at 8/20/2019  3:48 PM CDT -----  Contact: Odilia [facility]      ----- Message -----  From: Ashley Santana  Sent: 8/20/2019   3:42 PM  To: Malathi BELL Staff     Type: Needs Medical Advice    Who Called:  Odilia  Best Call Back Number: [phone]  Additional Information: odilia is stating she has sent a fax on 8/14/19 for risk stratifications and recommendations for her procedure on 8/27/19 and have not heard anything back.Please call back and advise.

## 2019-08-22 ENCOUNTER — TELEPHONE (OUTPATIENT)
Dept: ELECTROPHYSIOLOGY | Facility: CLINIC | Age: 69
End: 2019-08-22

## 2019-08-22 ENCOUNTER — HOSPITAL ENCOUNTER (OUTPATIENT)
Dept: RADIOLOGY | Facility: HOSPITAL | Age: 69
Discharge: HOME | End: 2019-08-22
Attending: FAMILY MEDICINE
Payer: MEDICARE

## 2019-08-22 DIAGNOSIS — M25.561 ACUTE PAIN OF RIGHT KNEE: ICD-10-CM

## 2019-08-22 DIAGNOSIS — M25.561 MECHANICAL KNEE PAIN, RIGHT: ICD-10-CM

## 2019-08-22 NOTE — TELEPHONE ENCOUNTER
----- Message from Amina Murillo MA sent at 8/22/2019  3:29 PM CDT -----  Contact: Jackie with [facility] dental       ----- Message -----  From: April Casper  Sent: 8/22/2019   3:25 PM  To: Malathi BELL Staff    Jackie with [facility] is requesting a call back in regards to pt clearance for her upcoming surgery. Jackie stated pt surgery is 08/27/19 and the clinic will need her clearance before then.       Jackie with [facility] can be contacted at [phone]

## 2019-08-22 NOTE — TELEPHONE ENCOUNTER
Attempted to return call and got the answering service. Unable to leave a message. We do not have the fax. Also need to inform her that we will make recommendations regarding Eliquis but will not provide risk stratification. That will need to come from general cards or primary care.

## 2019-08-24 ENCOUNTER — HOSPITAL ENCOUNTER (OUTPATIENT)
Dept: RADIOLOGY | Facility: HOSPITAL | Age: 69
Discharge: HOME | End: 2019-08-24
Attending: FAMILY MEDICINE
Payer: MEDICARE

## 2019-08-24 PROCEDURE — 73721 MRI JNT OF LWR EXTRE W/O DYE: CPT | Mod: TC,HCNC,RT

## 2019-08-24 PROCEDURE — 73721 MRI KNEE WITHOUT CONTRAST RIGHT: ICD-10-PCS | Mod: 26,HCNC,RT, | Performed by: RADIOLOGY

## 2019-08-24 PROCEDURE — 73721 MRI JNT OF LWR EXTRE W/O DYE: CPT | Mod: 26,HCNC,RT, | Performed by: RADIOLOGY

## 2019-08-26 ENCOUNTER — TELEPHONE (OUTPATIENT)
Dept: ELECTROPHYSIOLOGY | Facility: CLINIC | Age: 69
End: 2019-08-26

## 2019-08-26 ENCOUNTER — TELEPHONE (OUTPATIENT)
Dept: PRIMARY CARE CLINIC | Facility: CLINIC | Age: 69
End: 2019-08-26

## 2019-08-26 ENCOUNTER — OFFICE VISIT (OUTPATIENT)
Dept: SPORTS MEDICINE | Facility: CLINIC | Age: 69
End: 2019-08-26
Payer: MEDICARE

## 2019-08-26 VITALS — BODY MASS INDEX: 37.67 KG/M2 | TEMPERATURE: 98 F | HEIGHT: 67 IN | WEIGHT: 240 LBS

## 2019-08-26 DIAGNOSIS — M23.41 BODIES, LOOSE, JOINT, KNEE, RIGHT: ICD-10-CM

## 2019-08-26 DIAGNOSIS — I48.0 PAROXYSMAL A-FIB: Chronic | ICD-10-CM

## 2019-08-26 DIAGNOSIS — Z01.810 PREOP CARDIOVASCULAR EXAM: Primary | ICD-10-CM

## 2019-08-26 DIAGNOSIS — M17.11 PRIMARY OSTEOARTHRITIS OF RIGHT KNEE: ICD-10-CM

## 2019-08-26 DIAGNOSIS — M25.561 CHRONIC PAIN OF RIGHT KNEE: Primary | ICD-10-CM

## 2019-08-26 DIAGNOSIS — G89.29 CHRONIC PAIN OF RIGHT KNEE: Primary | ICD-10-CM

## 2019-08-26 PROCEDURE — 99999 PR PBB SHADOW E&M-EST. PATIENT-LVL III: ICD-10-PCS | Mod: PBBFAC,HCNC,, | Performed by: FAMILY MEDICINE

## 2019-08-26 PROCEDURE — 1101F PT FALLS ASSESS-DOCD LE1/YR: CPT | Mod: HCNC,CPTII,S$GLB, | Performed by: FAMILY MEDICINE

## 2019-08-26 PROCEDURE — 99999 PR PBB SHADOW E&M-EST. PATIENT-LVL III: CPT | Mod: PBBFAC,HCNC,, | Performed by: FAMILY MEDICINE

## 2019-08-26 PROCEDURE — 99214 OFFICE O/P EST MOD 30 MIN: CPT | Mod: HCNC,S$GLB,, | Performed by: FAMILY MEDICINE

## 2019-08-26 PROCEDURE — 1101F PR PT FALLS ASSESS DOC 0-1 FALLS W/OUT INJ PAST YR: ICD-10-PCS | Mod: HCNC,CPTII,S$GLB, | Performed by: FAMILY MEDICINE

## 2019-08-26 PROCEDURE — 99214 PR OFFICE/OUTPT VISIT, EST, LEVL IV, 30-39 MIN: ICD-10-PCS | Mod: HCNC,S$GLB,, | Performed by: FAMILY MEDICINE

## 2019-08-26 NOTE — TELEPHONE ENCOUNTER
----- Message from Alanna Farias sent at 8/26/2019  4:36 PM CDT -----  Contact: Patient [phone] or [phone]  Patient is scheduled or surgery in the morning. Cardiology just told her a few minutes ago that they will not give her clearance because he is not a general cardiologist and she needs to go through her PCP. Requesting a call from you today/ASAP!    I scheduled a preop appt tomorrow in the event that she has to cancel and reschedule her surgery.    Please call and advise.    Thank You
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
## 2019-08-26 NOTE — TELEPHONE ENCOUNTER
Pt advised that I will speak with Dr. Condon lst thing in the am, probably unlikely that Dr. Condon will be able to clear her for dental surgery (removal cyst from jaw) for 10:30 am surgery scheduled for tomorrow morning.

## 2019-08-26 NOTE — TELEPHONE ENCOUNTER
Spoke w/ pts  & informed him that we have been trying to contact the dentist office, but no one answers. Fax was never received. We cannot give clearance, only how long to hold miranda villar's last encounter. He will contact dentist office to re fax form.  ----- Message from Inés Moss sent at 8/26/2019  9:18 AM CDT -----  Contact: [facility] oral surger.  If can fax over the clearance that is in her to chart told them that what they are asking for- risk stratification, will not be approved by tameka Servin with it will show to surgeon. Thanks    [phone]

## 2019-08-26 NOTE — TELEPHONE ENCOUNTER
Spoke w/ pt to let her know that we cannot give cardiac clearance for her procedure. Informed pt that she will need to get clearance from a pcp or general cardiologist.  ----- Message from Ly Nava sent at 8/26/2019  3:28 PM CDT -----  Contact: pt  Pt states that a medical clr was supposed to be sent to [facility] and they still have not received the clr for her surgery tomorrow.  Pt can be reached at [phone].    Thank you

## 2019-08-27 ENCOUNTER — PES CALL (OUTPATIENT)
Dept: ADMINISTRATIVE | Facility: CLINIC | Age: 69
End: 2019-08-27

## 2019-08-27 ENCOUNTER — PATIENT OUTREACH (OUTPATIENT)
Dept: ADMINISTRATIVE | Facility: OTHER | Age: 69
End: 2019-08-27

## 2019-08-27 DIAGNOSIS — Z12.11 ENCOUNTER FOR FIT (FECAL IMMUNOCHEMICAL TEST) SCREENING: Primary | ICD-10-CM

## 2019-08-27 NOTE — TELEPHONE ENCOUNTER
Pt would like to know which cardiologist she should see since Dr. Servin (Electrophysiologist) was not able to clear her for surgery.

## 2019-08-27 NOTE — TELEPHONE ENCOUNTER
----- Message from Alanna Farias sent at 8/27/2019  8:58 AM CDT -----  Contact: Dr Salas/[facility] [phone] cell  Stated that they do not need clearance. All they need is cardiac risk stratification. If you are able to give this, please call either way.    Please call and advise.    Thank You

## 2019-08-27 NOTE — TELEPHONE ENCOUNTER
In July, another doctor ordered CBC, CMP & CXR which are within normal limitis, but with Atrial Fib, she needs heart clearance by a Cardiologist for surgery    Referral in

## 2019-08-27 NOTE — TELEPHONE ENCOUNTER
She is cleared from my standpoint. Cardiac clearance will come from her Cardiologist Dr Servin. Letter written. Ok to fax

## 2019-09-05 ENCOUNTER — PATIENT OUTREACH (OUTPATIENT)
Dept: ADMINISTRATIVE | Facility: OTHER | Age: 69
End: 2019-09-05

## 2019-09-09 ENCOUNTER — OFFICE VISIT (OUTPATIENT)
Dept: CARDIOLOGY | Facility: CLINIC | Age: 69
End: 2019-09-09
Payer: MEDICARE

## 2019-09-09 VITALS
DIASTOLIC BLOOD PRESSURE: 75 MMHG | OXYGEN SATURATION: 97 % | HEIGHT: 67 IN | SYSTOLIC BLOOD PRESSURE: 130 MMHG | HEART RATE: 53 BPM | WEIGHT: 253 LBS | BODY MASS INDEX: 39.71 KG/M2

## 2019-09-09 DIAGNOSIS — R05.9 COUGH: ICD-10-CM

## 2019-09-09 DIAGNOSIS — E78.2 MIXED HYPERLIPIDEMIA: ICD-10-CM

## 2019-09-09 DIAGNOSIS — J00 COMMON COLD: ICD-10-CM

## 2019-09-09 DIAGNOSIS — E66.9 OBESITY (BMI 30-39.9): ICD-10-CM

## 2019-09-09 DIAGNOSIS — Z01.810 ENCOUNTER FOR PRE-OPERATIVE CARDIOVASCULAR CLEARANCE: Primary | ICD-10-CM

## 2019-09-09 DIAGNOSIS — I10 ESSENTIAL HYPERTENSION: ICD-10-CM

## 2019-09-09 DIAGNOSIS — I48.0 PAROXYSMAL A-FIB: Chronic | ICD-10-CM

## 2019-09-09 PROCEDURE — 99499 UNLISTED E&M SERVICE: CPT | Mod: S$GLB,,, | Performed by: INTERNAL MEDICINE

## 2019-09-09 PROCEDURE — 3075F PR MOST RECENT SYSTOLIC BLOOD PRESS GE 130-139MM HG: ICD-10-PCS | Mod: HCNC,CPTII,S$GLB, | Performed by: INTERNAL MEDICINE

## 2019-09-09 PROCEDURE — 1101F PT FALLS ASSESS-DOCD LE1/YR: CPT | Mod: HCNC,CPTII,S$GLB, | Performed by: INTERNAL MEDICINE

## 2019-09-09 PROCEDURE — 1101F PR PT FALLS ASSESS DOC 0-1 FALLS W/OUT INJ PAST YR: ICD-10-PCS | Mod: HCNC,CPTII,S$GLB, | Performed by: INTERNAL MEDICINE

## 2019-09-09 PROCEDURE — 99499 RISK ADDL DX/OHS AUDIT: ICD-10-PCS | Mod: S$GLB,,, | Performed by: INTERNAL MEDICINE

## 2019-09-09 PROCEDURE — 3078F DIAST BP <80 MM HG: CPT | Mod: HCNC,CPTII,S$GLB, | Performed by: INTERNAL MEDICINE

## 2019-09-09 PROCEDURE — 99999 PR PBB SHADOW E&M-EST. PATIENT-LVL III: ICD-10-PCS | Mod: PBBFAC,HCNC,, | Performed by: INTERNAL MEDICINE

## 2019-09-09 PROCEDURE — 3075F SYST BP GE 130 - 139MM HG: CPT | Mod: HCNC,CPTII,S$GLB, | Performed by: INTERNAL MEDICINE

## 2019-09-09 PROCEDURE — 99204 OFFICE O/P NEW MOD 45 MIN: CPT | Mod: HCNC,S$GLB,, | Performed by: INTERNAL MEDICINE

## 2019-09-09 PROCEDURE — 99999 PR PBB SHADOW E&M-EST. PATIENT-LVL III: CPT | Mod: PBBFAC,HCNC,, | Performed by: INTERNAL MEDICINE

## 2019-09-09 PROCEDURE — 99204 PR OFFICE/OUTPT VISIT, NEW, LEVL IV, 45-59 MIN: ICD-10-PCS | Mod: HCNC,S$GLB,, | Performed by: INTERNAL MEDICINE

## 2019-09-09 PROCEDURE — 3078F PR MOST RECENT DIASTOLIC BLOOD PRESSURE < 80 MM HG: ICD-10-PCS | Mod: HCNC,CPTII,S$GLB, | Performed by: INTERNAL MEDICINE

## 2019-09-09 RX ORDER — BENZONATATE 100 MG/1
100 CAPSULE ORAL 2 TIMES DAILY PRN
Qty: 10 CAPSULE | Refills: 0 | Status: SHIPPED | OUTPATIENT
Start: 2019-09-09 | End: 2019-09-19

## 2019-09-09 RX ORDER — FLUOCINONIDE 0.5 MG/G
CREAM TOPICAL 2 TIMES DAILY
COMMUNITY
End: 2022-02-01

## 2019-09-09 NOTE — LETTER
September 9, 2019      Naz Condon MD  [address] [place]  [facility] [address]           [facility]  [address] [place] [address] [address]  [facility] [address]  Phone: [phone]          Patient: Flores Fuentes   MR Number: [id number]   YOB: 1950   Date of Visit: 9/9/2019       Dear Dr. Naz Condon:    Thank you for referring Flores Fuentes to me for evaluation. Attached you will find relevant portions of my assessment and plan of care.    If you have questions, please do not hesitate to call me. I look forward to following Flores Fuentes along with you.    Sincerely,    Simone Shannon MD    Enclosure  CC:  No Recipients    If you would like to receive this communication electronically, please contact [email or web address] or [phone] to request more information on [employer] access.    For providers and/or their staff who would like to refer a patient to [facility], please contact us through our one-stop-shop provider referral line, [facility], at [phone].    If you feel you have received this communication in error or would no longer like to receive these types of communications, please e-mail [email or web address]

## 2019-09-09 NOTE — PROGRESS NOTES
Subjective:   @Patient ID:  Flores Fuentes is a 68 y.o. female who presents for evaluation of Pre-op risk stratification.   HPI:     Pre op evaluation for Jaw surgery.   Patient denies any  Chest pain or palpitations. Her activity at this time is limited due to knee problem. However, before July 2019 activity level was very good. She used to walk 2 miles without any issues. Also used to climb a flight of stairs without issues.  No prior CAD. No Tobacco. No DM.    HTN: BP is well controlled.   Paroxysmal A.fib: Currently SR and stable on AAD/ CCB/BB    She has congestion and cold symptoms, along with cough..     Pertinent cardiac hx:    Paroxysmal A.fib, F/U with Dr. Servin. On rhythm control strategy with flecainide (PIP). On Eliquis for anticoagulation.     2D Echo (6/3/2019):  · Normal left ventricular systolic function. The estimated ejection fraction is 55%  · Normal LV diastolic function.  · Normal right ventricular systolic function.  · Mild left atrial enlargement.  · The estimated PA systolic pressure is 31 mm Hg  · Normal central venous pressure (3 mm Hg).    Patient Active Problem List    Diagnosis Date Noted    Common cold 09/09/2019    Encounter for pre-operative cardiovascular clearance 09/09/2019    Obesity (BMI 30-39.9) 09/09/2019    Decreased range of motion of right knee 07/30/2019    Weakness of both lower extremities 07/30/2019    HTN (hypertension) 06/19/2019    Stress incontinence, female 03/28/2018     After HYST, Try Kegels      Antalgic gait 02/02/2017    Cervical muscle strain 01/24/2017    DJD (degenerative joint disease) of cervical spine 01/24/2017    Severe obesity (BMI 35.0-35.9 with comorbidity) 01/24/2017    Primary localized osteoarthrosis, lower leg 11/17/2014    Paroxysmal A-fib 10/14/2014    Mixed hyperlipidemia 11/07/2013    Subclinical iodine-deficiency hypothyroidism 11/07/2013           Left Arm BP - Sitting: (P) 121/75            Lipid panel  Lab Results    Component Value Date    CHOL 151 04/10/2019    CHOL 152 03/27/2018    CHOL 141 03/21/2017     Lab Results   Component Value Date    HDL 48 04/10/2019    HDL 44 03/27/2018    HDL 47 03/21/2017     Lab Results   Component Value Date    LDLCALC 89.0 04/10/2019    LDLCALC 92.0 03/27/2018    LDLCALC 77.4 03/21/2017     Lab Results   Component Value Date    TRIG 70 04/10/2019    TRIG 80 03/27/2018    TRIG 83 03/21/2017     Lab Results   Component Value Date    CHOLHDL 31.8 04/10/2019    CHOLHDL 28.9 03/27/2018    CHOLHDL 33.3 03/21/2017            Review of Systems   Constitution: Negative for chills and fever.   HENT: Positive for congestion. Negative for hearing loss and nosebleeds.    Eyes: Negative for blurred vision.   Cardiovascular: Positive for leg swelling (RT side , chronic lymphedema. ). Negative for chest pain and palpitations.   Respiratory: Positive for cough. Negative for hemoptysis and shortness of breath.    Hematologic/Lymphatic: Negative for bleeding problem.   Skin: Negative for itching.   Musculoskeletal: Positive for arthritis.   Gastrointestinal: Negative for abdominal pain and hematochezia.   Genitourinary: Negative for hematuria.   Neurological: Negative for dizziness.   Psychiatric/Behavioral: Negative for altered mental status and depression.       Objective:   Physical Exam   Constitutional: She is oriented to person, place, and time. She appears well-developed and well-nourished.   HENT:   Head: Normocephalic and atraumatic.   Eyes: Conjunctivae are normal.   Neck: Neck supple. No JVD present.   Cardiovascular: Normal rate, regular rhythm and normal heart sounds. Exam reveals no gallop and no friction rub.   No murmur heard.  Pulmonary/Chest: Effort normal and breath sounds normal. No stridor. No respiratory distress. She has no wheezes.   Abdominal: Soft. She exhibits no distension. There is no tenderness.   Musculoskeletal: She exhibits edema (Rt side. No pitting ).   Neurological: She is  alert and oriented to person, place, and time.   Skin: Skin is warm and dry.   Psychiatric: She has a normal mood and affect. Her behavior is normal.       Assessment:     1. Encounter for pre-operative cardiovascular clearance    2. Paroxysmal A-fib    3. Mixed hyperlipidemia    4. Essential hypertension    5. Cough    6. Common cold    7. Obesity (BMI 30-39.9)        Plan:     - Recent ECHO report reviewed   - Pt has no active cardiac condition (ACS/USA, decompenstated CHF, significant arrhythmias or severe valvular disease).  Mets >4 recently prior to the knee problem. Patient has acceptable risk for moderate risk non cardiovascular surgery. No further cardiac work up required prior to undergoing the surgical procedure.    - Continue BB and Statin  - Continue  lifestyle changes.  - Tessalon pearls for cough     - Return sooner for concerns or questions. If symptoms persist go to the ED  I have reviewed all pertinent data on this patient   I have reviewed the patient's medical history in detail and updated the computerized patient record.  Follow up as scheduled. Return sooner for concerns or questions  She expressed verbal understanding and agreed with the plan    It was my please seeing Ms. Fuentes. Please don't hesitate to contact us with any questions. Than you.     Patient's Medications   New Prescriptions    BENZONATATE (TESSALON) 100 MG CAPSULE    Take 1 capsule (100 mg total) by mouth 2 (two) times daily as needed for Cough.   Previous Medications    CARTIA  MG CP24    TAKE 1 CAPSULE EVERY DAY    ELIQUIS 5 MG TAB    TAKE 1 TABLET TWICE DAILY    FLECAINIDE (TAMBOCOR) 150 MG TAB    2 tabs (300 mg total) up to once per day for atrial fibrillation.    FLUOCINONIDE 0.05% (LIDEX) 0.05 % CREAM    Apply topically 2 (two) times daily.    FLUTICASONE (FLONASE) 50 MCG/ACTUATION NASAL SPRAY    USE 1 SPRAY IN EACH NOSTRIL ONE TIME DAILY    LEVOTHYROXINE (SYNTHROID) 25 MCG TABLET    TAKE 1 TABLET EVERY DAY     METOPROLOL SUCCINATE (TOPROL-XL) 50 MG 24 HR TABLET    Take 1 tablet (50 mg total) by mouth once daily.    PRAVASTATIN (PRAVACHOL) 40 MG TABLET    TAKE 1 TABLET ONE TIME DAILY   Modified Medications    No medications on file   Discontinued Medications    FLUOCINONIDE (LIDEX) 0.05 % OINTMENT    daily as needed.     FLUOROURACIL (EFUDEX) 5 % CREAM    Apply topically 2 (two) times daily. Thin layer to site on left cheek x 2 weeks or until irritation develops, then stop.

## 2019-09-09 NOTE — PATIENT INSTRUCTIONS
Understanding Atrial Fibrillation    An arrhythmia is any problem with the speed or pattern of the heartbeat. Atrial fibrillation (AFib) is a common type of arrhythmia. It causes fast, chaotic electrical signals in the atria. This leads to poor functioning of the heart. It also affects how much blood your heart can pump out to the body.  Afib may occur once in a while and go away on its own. Or it may continue for longer periods and need treatment.  AFib can lead to serious problems, such as stroke. Your healthcare provider will need to monitor and manage it.  What happens during atrial fibrillation?   The heart has an electrical system that sends signals to control the heartbeat. As signals move through the heart, they tell the hearts upper chambers (atria) and lower chambers (ventricles) when to squeeze (contract) and relax. This lets blood move through the heart and out to the body and lungs.  With AFib, the atria receive abnormal signals. This causes them to contract in a fast and irregular way, and out of sync with the ventricles. When this happens, the atria also have a harder time moving blood into the ventricles. Blood may then pool in the atria, which increases the risk for blood clots and stroke. The ventricles also may contract too quickly and irregularly. As a result, they may not pump blood to the body and lungs as well as they should. This can weaken the heart muscle over time and cause heart failure.  What causes atrial fibrillation?  AFib is more common in older adults. It has many possible causes including:  · Coronary artery disease  · Heart valve disease  · Heart attack  · Heart surgery  · High blood pressure  · Thyroid disease  · Diabetes  · Lung disease  · Sleep apnea  · Heavy alcohol use  In some cases of AFib, doctors do not know the cause.  What are the symptoms of atrial fibrillation?  AFib may or may not cause symptoms. If symptoms do occur, they may include:  · A fast, pounding,  irregular heartbeat  · Shortness of breath  · Tiredness  · Dizziness or fainting  · Chest pain  How is atrial fibrillation treated?  Treatments for AFib can include any of the options below.  · Medicines. You may be prescribed:  ¨ Heart rate medicines to help slow down the heartbeat  ¨ Heart rhythm medicines to help the heart beat more regularly  ¨ Anti-clotting medicines to help reduce the risk for blood clots and stroke  · Electrical cardioversion. Your healthcare provider uses special pads or paddles to send one or more brief electrical shocks to the heart. This can help reset the heartbeat to normal.  · Ablation. Long, thin tubes called catheters are threaded through a blood vessel to the heart. There, the catheters send out hot or cold energy to the areas causing the abnormal signals. This energy destroys the problem tissue or cells. This improves the chances that your heart will stay in normal rhythm without using medicines. If your heart rate and rhythm cant be controlled, you may need ablation and a pacemaker. These will help control the heart rate and regularity of the heartbeat.  · Surgery. During surgery, your healthcare provider may use different methods to create scar tissue in the areas of the heart causing the abnormal signals. The scar tissue disrupts the abnormal signals and may stop AFib from occurring.  What are the complications of atrial fibrillation?  These can include:  · Blood clots  · Stroke  · Heart failure. This problem occurs when the heart muscle weakens so much that it can no longer pump blood well.  When should I call my healthcare provider?  Call your healthcare provider right away if you have any of these:  · Symptoms that dont get better with treatment, or get worse  · New symptoms   Date Last Reviewed: 5/1/2016  © 4793-0097 [employer]. [address] [place] [address], [place] [address]. All rights reserved. This information is not intended as a substitute for professional  medical care. Always follow your healthcare professional's instructions.

## 2019-09-11 RX ORDER — FLUTICASONE PROPIONATE 50 MCG
SPRAY, SUSPENSION (ML) NASAL
Qty: 32 G | Refills: 3 | Status: SHIPPED | OUTPATIENT
Start: 2019-09-11 | End: 2020-06-02 | Stop reason: SDUPTHER

## 2019-09-26 ENCOUNTER — PATIENT OUTREACH (OUTPATIENT)
Dept: ADMINISTRATIVE | Facility: OTHER | Age: 69
End: 2019-09-26

## 2019-10-31 ENCOUNTER — PATIENT OUTREACH (OUTPATIENT)
Dept: ADMINISTRATIVE | Facility: HOSPITAL | Age: 69
End: 2019-10-31

## 2019-11-18 ENCOUNTER — PATIENT MESSAGE (OUTPATIENT)
Dept: DERMATOLOGY | Facility: CLINIC | Age: 69
End: 2019-11-18

## 2019-11-19 ENCOUNTER — TELEPHONE (OUTPATIENT)
Dept: ADMINISTRATIVE | Facility: OTHER | Age: 69
End: 2019-11-19

## 2019-11-20 PROBLEM — R26.89 ANTALGIC GAIT: Status: RESOLVED | Noted: 2017-02-02 | Resolved: 2019-11-20

## 2019-11-20 PROBLEM — R29.898 WEAKNESS OF BOTH LOWER EXTREMITIES: Status: RESOLVED | Noted: 2019-07-30 | Resolved: 2019-11-20

## 2019-11-20 PROBLEM — M25.661 DECREASED RANGE OF MOTION OF RIGHT KNEE: Status: RESOLVED | Noted: 2019-07-30 | Resolved: 2019-11-20

## 2019-12-01 ENCOUNTER — PATIENT OUTREACH (OUTPATIENT)
Dept: ADMINISTRATIVE | Facility: OTHER | Age: 69
End: 2019-12-01

## 2019-12-04 ENCOUNTER — OFFICE VISIT (OUTPATIENT)
Dept: DERMATOLOGY | Facility: CLINIC | Age: 69
End: 2019-12-04
Payer: MEDICARE

## 2019-12-04 DIAGNOSIS — L21.9 SEBORRHEIC DERMATITIS: ICD-10-CM

## 2019-12-04 DIAGNOSIS — L90.0 LICHEN SCLEROSUS ET ATROPHICUS: Primary | ICD-10-CM

## 2019-12-04 DIAGNOSIS — Z12.83 SCREENING EXAM FOR SKIN CANCER: ICD-10-CM

## 2019-12-04 PROCEDURE — 99214 OFFICE O/P EST MOD 30 MIN: CPT | Mod: HCNC,S$GLB,, | Performed by: DERMATOLOGY

## 2019-12-04 PROCEDURE — 99999 PR PBB SHADOW E&M-EST. PATIENT-LVL II: CPT | Mod: PBBFAC,HCNC,, | Performed by: DERMATOLOGY

## 2019-12-04 PROCEDURE — 1159F MED LIST DOCD IN RCRD: CPT | Mod: HCNC,S$GLB,, | Performed by: DERMATOLOGY

## 2019-12-04 PROCEDURE — 1126F AMNT PAIN NOTED NONE PRSNT: CPT | Mod: HCNC,S$GLB,, | Performed by: DERMATOLOGY

## 2019-12-04 PROCEDURE — 99214 PR OFFICE/OUTPT VISIT, EST, LEVL IV, 30-39 MIN: ICD-10-PCS | Mod: HCNC,S$GLB,, | Performed by: DERMATOLOGY

## 2019-12-04 PROCEDURE — 1101F PR PT FALLS ASSESS DOC 0-1 FALLS W/OUT INJ PAST YR: ICD-10-PCS | Mod: HCNC,CPTII,S$GLB, | Performed by: DERMATOLOGY

## 2019-12-04 PROCEDURE — 1126F PR PAIN SEVERITY QUANTIFIED, NO PAIN PRESENT: ICD-10-PCS | Mod: HCNC,S$GLB,, | Performed by: DERMATOLOGY

## 2019-12-04 PROCEDURE — 1159F PR MEDICATION LIST DOCUMENTED IN MEDICAL RECORD: ICD-10-PCS | Mod: HCNC,S$GLB,, | Performed by: DERMATOLOGY

## 2019-12-04 PROCEDURE — 1101F PT FALLS ASSESS-DOCD LE1/YR: CPT | Mod: HCNC,CPTII,S$GLB, | Performed by: DERMATOLOGY

## 2019-12-04 PROCEDURE — 99999 PR PBB SHADOW E&M-EST. PATIENT-LVL II: ICD-10-PCS | Mod: PBBFAC,HCNC,, | Performed by: DERMATOLOGY

## 2019-12-04 RX ORDER — KETOCONAZOLE 20 MG/G
CREAM TOPICAL 2 TIMES DAILY
Qty: 60 G | Refills: 2 | Status: SHIPPED | OUTPATIENT
Start: 2019-12-04 | End: 2021-06-10 | Stop reason: ALTCHOICE

## 2019-12-04 RX ORDER — CLOBETASOL PROPIONATE 0.5 MG/G
CREAM TOPICAL 2 TIMES DAILY
Qty: 45 G | Refills: 1 | Status: SHIPPED | OUTPATIENT
Start: 2019-12-04 | End: 2021-06-10 | Stop reason: ALTCHOICE

## 2019-12-04 NOTE — PROGRESS NOTES
Subjective:       Patient ID:  Flores Fuentes is a 69 y.o. female who presents for   Chief Complaint   Patient presents with    Rash     breast     Patient is a 70 yo woman present for a rash f/u. Rash is present No symptoms . No treatment     History of Present Illness: The patient presents for follow up of skin check. Hx of AK to left cheek s/p Efudex with resolution.    The patient was last seen on: 05/2019 where the results of a breast biopsy for r/o Paget's which came back for lichenified dermatitis and she would like seen again.    Other skin complaints: Scaly on right cheek for months, also c/o dryness to left cheek that has persisted following a procedure that betadine was used to cleanse the face (rash on face has been slow to resolve..        Review of Systems   Skin: Positive for daily sunscreen use and activity-related sunscreen use. Negative for recent sunburn and wears hat.   Hematologic/Lymphatic: Bruises/bleeds easily (on Eliquis).        Objective:    Physical Exam   Constitutional: She appears well-developed and well-nourished. No distress.   Neurological: She is alert and oriented to person, place, and time. She is not disoriented.   Psychiatric: She has a normal mood and affect.   Skin:   Areas Examined (abnormalities noted in diagram):   Head / Face Inspection Performed  Neck Inspection Performed  Chest / Axilla Inspection Performed  Back Inspection Performed  RUE Inspected  LUE Inspection Performed                   Diagram Legend     Erythematous scaling macule/papule c/w actinic keratosis       Vascular papule c/w angioma      Pigmented verrucoid papule/plaque c/w seborrheic keratosis      Yellow umbilicated papule c/w sebaceous hyperplasia      Irregularly shaped tan macule c/w lentigo     1-2 mm smooth white papules consistent with Milia      Movable subcutaneous cyst with punctum c/w epidermal inclusion cyst      Subcutaneous movable cyst c/w pilar cyst      Firm pink to brown  papule c/w dermatofibroma      Pedunculated fleshy papule(s) c/w skin tag(s)      Evenly pigmented macule c/w junctional nevus     Mildly variegated pigmented, slightly irregular-bordered macule c/w mildly atypical nevus      Flesh colored to evenly pigmented papule c/w intradermal nevus       Pink pearly papule/plaque c/w basal cell carcinoma      Erythematous hyperkeratotic cursted plaque c/w SCC      Surgical scar with no sign of skin cancer recurrence      Open and closed comedones      Inflammatory papules and pustules      Verrucoid papule consistent consistent with wart     Erythematous eczematous patches and plaques     Dystrophic onycholytic nail with subungual debris c/w onychomycosis     Umbilicated papule    Erythematous-base heme-crusted tan verrucoid plaque consistent with inflamed seborrheic keratosis     Erythematous Silvery Scaling Plaque c/w Psoriasis     See annotation      Assessment / Plan:        There are no diagnoses linked to this encounter.         No follow-ups on file.

## 2019-12-04 NOTE — PROGRESS NOTES
Subjective:       Patient ID:  Flores Fuentes is a 69 y.o. 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  Here for f/u the left breast rash. Uses lidex intermittently. Has not completely resolved. No vaginal itching, burning or spots. Declines examination of those areas today.  She also has dry skin of forehead and eyebrows. Using aquaphor for dry skin after use of betadine on face for procedure, it is helping.    Review of Systems   Genitourinary: Negative for genital sores and genital itching.   Skin: Positive for daily sunscreen use and activity-related sunscreen use.        Objective:    Physical Exam   Constitutional: She appears well-developed and well-nourished. No distress.   Neurological: She is alert and oriented to person, place, and time. She is not disoriented.   Psychiatric: She has a normal mood and affect.   Skin:   Areas Examined (abnormalities noted in diagram):   Head / Face Inspection Performed  Neck Inspection Performed  Chest / Axilla Inspection Performed  Back Inspection Performed  RUE Inspected  LUE Inspection Performed                   Diagram Legend     Erythematous scaling macule/papule c/w actinic keratosis       Vascular papule c/w angioma      Pigmented verrucoid papule/plaque c/w seborrheic keratosis      Yellow umbilicated papule c/w sebaceous hyperplasia      Irregularly shaped tan macule c/w lentigo     1-2 mm smooth white papules consistent with Milia      Movable subcutaneous cyst with punctum c/w epidermal inclusion cyst      Subcutaneous movable cyst c/w pilar cyst      Firm pink to brown papule c/w dermatofibroma      Pedunculated fleshy papule(s) c/w skin tag(s)      Evenly pigmented macule c/w junctional nevus     Mildly variegated pigmented, slightly irregular-bordered  macule c/w mildly atypical nevus      Flesh colored to evenly pigmented papule c/w intradermal nevus       Pink pearly papule/plaque c/w basal cell carcinoma      Erythematous hyperkeratotic cursted plaque c/w SCC      Surgical scar with no sign of skin cancer recurrence      Open and closed comedones      Inflammatory papules and pustules      Verrucoid papule consistent consistent with wart     Erythematous eczematous patches and plaques     Dystrophic onycholytic nail with subungual debris c/w onychomycosis     Umbilicated papule    Erythematous-base heme-crusted tan verrucoid plaque consistent with inflamed seborrheic keratosis     Erythematous Silvery Scaling Plaque c/w Psoriasis     See annotation    -LICHENOID DERMATITIS, see comment  COMMENT: Such findings can be seen in lichen planus or lichenoid drug eruption. Given the presence of a few  rare scattered eosinophils within the infiltrate, a lichenoid drug is a possibility. Other lichenoid processes may  display similar histology. Clinical correlation is recommended.    Assessment / Plan:        Lichen sclerosus et atrophicus - extragenital - left breast ongoing since 1990s pt denies any vaginal/perianal symptoms, declines examination of those areas today -   -     clobetasol (TEMOVATE) 0.05 % cream; Apply topically 2 (two) times daily. To rash on left breast area until resolved  Dispense: 45 g; Refill: 1  In future can use protopic PRN    Seborrheic dermatitis  -     ketoconazole (NIZORAL) 2 % cream; Apply topically 2 (two) times daily. To dry skin on forehead eyebrows and nose folds  Dispense: 60 g; Refill: 2    Screening exam for skin cancer    Area(s) of previous AK evaluated with no signs of recurrence.    Upper body skin examination performed today including at least 6 points as noted in physical examination. No lesions suspicious for malignancy noted.           Follow up in about 1 year (around 12/4/2020).

## 2020-01-01 ENCOUNTER — PATIENT MESSAGE (OUTPATIENT)
Dept: ELECTROPHYSIOLOGY | Facility: CLINIC | Age: 70
End: 2020-01-01

## 2020-01-03 NOTE — TELEPHONE ENCOUNTER
----- Message from Amina Murillo MA sent at 1/3/2020  9:33 AM CST -----  Contact: Patient      ----- Message -----  From: Roscoe Pratt  Sent: 1/3/2020   9:07 AM CST  To: [facility] Staff    Patient called in and wanted to speak with someone at the office regarding her ELIQUIS 5 mg Tab prescription and would like a call back from the office. The patient mention that she is almost out and would like a call from the office.  can be reached at    [phone]

## 2020-01-03 NOTE — TELEPHONE ENCOUNTER
Delay with mail order prescription. Pt needs 7 days of Eliquis sent to local pharmacy to not miss doses.

## 2020-01-08 NOTE — PLAN OF CARE
Handoff report to Elif DORADO.   Please advise how tight compression stockings should be. Will look up dx code again.

## 2020-01-15 ENCOUNTER — PES CALL (OUTPATIENT)
Dept: ADMINISTRATIVE | Facility: CLINIC | Age: 70
End: 2020-01-15

## 2020-01-21 ENCOUNTER — TELEPHONE (OUTPATIENT)
Dept: PRIMARY CARE CLINIC | Facility: CLINIC | Age: 70
End: 2020-01-21

## 2020-01-21 DIAGNOSIS — E78.2 MIXED HYPERLIPIDEMIA: ICD-10-CM

## 2020-01-21 DIAGNOSIS — Z00.00 ANNUAL PHYSICAL EXAM: Primary | ICD-10-CM

## 2020-01-21 DIAGNOSIS — E07.9 THYROID DISEASE: ICD-10-CM

## 2020-01-21 NOTE — TELEPHONE ENCOUNTER
----- Message from Lacey Justin sent at 1/21/2020  2:05 PM CST -----  Contact: Pt self Mobile [phone]  Doctor appointment and lab have been scheduled.  Please link lab orders to the lab appointment.  Date of doctor appointment:  06/02/2020  Date of lab appointment:  Urine specimen 05/26/2020  Physical or EP: Physical

## 2020-01-21 NOTE — TELEPHONE ENCOUNTER
Lab orders entered and linked.  Pt requests order for urinalysis at time of lab.  Pt had UTI last year after her annual exam with no symptoms and would like urine rechecked this year.

## 2020-01-22 ENCOUNTER — PATIENT MESSAGE (OUTPATIENT)
Dept: PRIMARY CARE CLINIC | Facility: CLINIC | Age: 70
End: 2020-01-22

## 2020-01-22 DIAGNOSIS — Z12.31 OTHER SCREENING MAMMOGRAM: Primary | ICD-10-CM

## 2020-02-14 ENCOUNTER — TELEPHONE (OUTPATIENT)
Dept: ELECTROPHYSIOLOGY | Facility: CLINIC | Age: 70
End: 2020-02-14

## 2020-02-14 NOTE — TELEPHONE ENCOUNTER
----- Message from Giselle Parry MA sent at 2/14/2020  8:23 AM CST -----  Contact:  patient call  The patient need to talk  to the nurse about her palpitations she would like to see  April is too late. Thank you.

## 2020-02-14 NOTE — TELEPHONE ENCOUNTER
Spoke with pt and she is having increased episodes of palpitations over the past few months. Denies SOB or chest pain. Pt states that they often occur while she is doing physical activity, but then calm down when she rests. Currently on anticoagulant and compliant. Hx of afib

## 2020-02-14 NOTE — TELEPHONE ENCOUNTER
Made pt appt with YENNI Guerrero NP on the day Servin has clinic to discuss palpitations. Pt encouraged to call or seek care in the meantime if condition worsens.

## 2020-02-17 NOTE — PROGRESS NOTES
Ms. Fuentes is a patient of Dr. Servin and was last seen in clinic 7/17/2019.      Subjective:   Patient ID:  Flores Fuentes is a 69 y.o. female who presents for follow-up of Palpitations  .     HPI:    Ms. Fuentes is a 69 y.o. female with pAF, HLD, obesity, hypothyroid here for follow up.       Background:    AF first dx 2006 after lots of caffeine. Few episodes since then. Pill in pocket strategy.  HL, on meds  obesity  hypothyroid, on med    Went to ER 3/10 with palps. Underwent DCCV 3/13/17 after remaining in AF with RVR. Started on multaq, and BB d/c'd.  Since, feeling not quite as well as usually, and on 4/17, at which time HR was 120s and didn't feel same as prior AF episodes.  She'd been on BB for years w/o issues. Good exertion tolerance. No CP.    Due to rare PAF episodes, at the last visit we adopted a pill-in-the-pocket strategy. She used it first in 11/17; went to the ER. AF resolved <30 min after taking dose. Had 2 other episodes, self-treated successfully, in 12/17 and 1/18. Since last seen, has had 4 such episodes, all aborted with PIP.     7/13/2019 went to hospital with palpitations - EKG showed SR with PVCs. Prior to this, she had a kenalog injection for her knee and a procedure on her jaw and says she was under a lot of stress. Today she says her palpitations are not as severe as when she was in the hospital, but they worsen with exercise. Palpitations do not feel like her usual atrial fibrillation episodes and she has not been taking flecainide. Likely PVCs were a result of various stressors, including kenalog injection. Her metoprolol was increased in the hospital but she was unsure if she should continue with this. I encouraged her to go ahead with this increase and we will get a Holter monitor in a couple of weeks to assess. If her PVCs continue to be a problem, can consider switching diltiazem to verapamil.     Update (02/19/2020):    Today she says she never increased her metoprolol  "because her palpitations resolved shortly after her clinic visit 7/2019. Unfortunately they have returned. She describes heart "flip flop" pounding, pause, and brief racing that increases with exercise and decreases when she relaxes and takes a deep breath. Denies CP, JOHANSEN, LH, syncope.    He is currently taking eliquis 5mg BID for stroke prophylaxis and denies significant bleeding episodes. She is currently being treated with flecainide 300mg daily PRN for rhythm control and diltiazem 120mg daily and metoprolol succinate 25mg daily (she did not increase her BB) for HR control. Kidney function is stable, with a creatinine of 0.8 on 7/13/2019.    I have personally reviewed the patient's EKG today, which shows sinus rhythm with PVCs at 68bpm. [place] interval is 174. QRS is 74. QTc is 469.    Recent Cardiac Tests:    2D Echo (6/3/2019):  · Normal left ventricular systolic function. The estimated ejection fraction is 55%  · Normal LV diastolic function.  · Normal right ventricular systolic function.  · Mild left atrial enlargement.  · The estimated PA systolic pressure is 31 mm Hg  · Normal central venous pressure (3 mm Hg).    Current Outpatient Medications   Medication Sig    CARTIA  mg Cp24 TAKE 1 CAPSULE EVERY DAY    clobetasol (TEMOVATE) 0.05 % cream Apply topically 2 (two) times daily. To rash on left breast area until resolved    ELIQUIS 5 mg Tab TAKE 1 TABLET TWICE DAILY    flecainide (TAMBOCOR) 150 MG Tab 2 tabs (300 mg total) up to once per day for atrial fibrillation.    fluocinonide 0.05% (LIDEX) 0.05 % cream Apply topically 2 (two) times daily.    fluticasone propionate (FLONASE) 50 mcg/actuation nasal spray USE 1 SPRAY IN EACH NOSTRIL ONE TIME DAILY    ketoconazole (NIZORAL) 2 % cream Apply topically 2 (two) times daily. To dry skin on forehead eyebrows and nose folds    levothyroxine (SYNTHROID) 25 MCG tablet TAKE 1 TABLET EVERY DAY    metoprolol succinate (TOPROL-XL) 50 MG 24 hr tablet Take 1 " "tablet (50 mg total) by mouth once daily.    pravastatin (PRAVACHOL) 40 MG tablet TAKE 1 TABLET ONE TIME DAILY     No current facility-administered medications for this visit.        Review of Systems   Constitution: Negative for malaise/fatigue.   Cardiovascular: Positive for irregular heartbeat and palpitations. Negative for chest pain, dyspnea on exertion and leg swelling.   Respiratory: Positive for snoring. Negative for shortness of breath.    Hematologic/Lymphatic: Negative for bleeding problem.   Skin: Negative for rash.   Musculoskeletal: Negative for myalgias.   Gastrointestinal: Negative for hematemesis, hematochezia and nausea.   Genitourinary: Negative for hematuria.   Neurological: Negative for light-headedness.   Psychiatric/Behavioral: Negative for altered mental status.   Allergic/Immunologic: Negative for persistent infections.     Objective:          /72   Pulse 68   Ht 5' 7" (1.702 m)   Wt 119.9 kg (264 lb 5.3 oz)   BMI 41.40 kg/m²     Physical Exam   Constitutional: She is oriented to person, place, and time. She appears well-developed and well-nourished.   HENT:   Head: Normocephalic.   Nose: Nose normal.   Eyes: Pupils are equal, round, and reactive to light.   Cardiovascular: Normal rate, regular rhythm, S1 normal and S2 normal.  Occasional extrasystoles are present.   No murmur heard.  Pulses:       Radial pulses are 2+ on the right side, and 2+ on the left side.   Pulmonary/Chest: Breath sounds normal. No respiratory distress.   Abdominal: Normal appearance.   Musculoskeletal: Normal range of motion. She exhibits no edema.   Neurological: She is alert and oriented to person, place, and time.   Skin: Skin is warm and dry. No erythema.   Psychiatric: She has a normal mood and affect. Her speech is normal and behavior is normal.   Nursing note and vitals reviewed.    Lab Results   Component Value Date     07/13/2019    K 3.7 07/13/2019    MG 2.5 07/13/2019    BUN 15 07/13/2019 "    CREATININE 0.8 07/13/2019    ALT 15 07/13/2019    AST 15 07/13/2019    HGB 14.2 07/13/2019    HCT 43.7 07/13/2019    TSH 2.757 07/13/2019    LDLCALC 89.0 04/10/2019       Recent Labs   Lab 03/10/17  1622 04/27/17  0023   INR 1.2 1.0       Assessment:     1. Paroxysmal A-fib    2. Essential hypertension    3. Obesity (BMI 30-39.9)    4. Palpitations    5. Daytime somnolence    6. Snoring      Plan:     In summary, Ms. Fuentes is a 69 y.o. female with pAF, HLD, obesity, hypothyroid here for follow up.   She continues to have palpitations, that resolve with deep breaths. These sound like PVCs. Will order Holter to confirm and to evaluate burden. Advised her to increase metoprolol as previously ordered for symptom control. She snores and has not had a sleep study. Will order consult.    Sleep consult.  48hr Holter   RTC 3 mo, sooner if needed.    *A copy of this note has been sent to Dr. Servin*    Follow up in about 3 months (around 5/19/2020).    ------------------------------------------------------------------    LEXY Bal, NP-C  Cardiac Electrophysiology

## 2020-02-19 ENCOUNTER — OFFICE VISIT (OUTPATIENT)
Dept: ELECTROPHYSIOLOGY | Facility: CLINIC | Age: 70
End: 2020-02-19
Payer: MEDICARE

## 2020-02-19 VITALS
HEIGHT: 67 IN | SYSTOLIC BLOOD PRESSURE: 124 MMHG | BODY MASS INDEX: 41.48 KG/M2 | WEIGHT: 264.31 LBS | HEART RATE: 68 BPM | DIASTOLIC BLOOD PRESSURE: 72 MMHG

## 2020-02-19 DIAGNOSIS — R40.0 DAYTIME SOMNOLENCE: ICD-10-CM

## 2020-02-19 DIAGNOSIS — I10 ESSENTIAL HYPERTENSION: ICD-10-CM

## 2020-02-19 DIAGNOSIS — R06.83 SNORING: ICD-10-CM

## 2020-02-19 DIAGNOSIS — I48.0 PAROXYSMAL A-FIB: Primary | Chronic | ICD-10-CM

## 2020-02-19 DIAGNOSIS — R00.2 PALPITATIONS: ICD-10-CM

## 2020-02-19 DIAGNOSIS — E66.9 OBESITY (BMI 30-39.9): ICD-10-CM

## 2020-02-19 PROCEDURE — 99499 UNLISTED E&M SERVICE: CPT | Mod: HCNC,S$GLB,, | Performed by: NURSE PRACTITIONER

## 2020-02-19 PROCEDURE — 99214 PR OFFICE/OUTPT VISIT, EST, LEVL IV, 30-39 MIN: ICD-10-PCS | Mod: HCNC,S$GLB,, | Performed by: NURSE PRACTITIONER

## 2020-02-19 PROCEDURE — 93005 ELECTROCARDIOGRAM TRACING: CPT | Mod: HCNC,S$GLB,, | Performed by: INTERNAL MEDICINE

## 2020-02-19 PROCEDURE — 93005 RHYTHM STRIP: ICD-10-PCS | Mod: HCNC,S$GLB,, | Performed by: INTERNAL MEDICINE

## 2020-02-19 PROCEDURE — 1126F AMNT PAIN NOTED NONE PRSNT: CPT | Mod: HCNC,S$GLB,, | Performed by: NURSE PRACTITIONER

## 2020-02-19 PROCEDURE — 93010 RHYTHM STRIP: ICD-10-PCS | Mod: HCNC,S$GLB,, | Performed by: INTERNAL MEDICINE

## 2020-02-19 PROCEDURE — 99499 RISK ADDL DX/OHS AUDIT: ICD-10-PCS | Mod: HCNC,S$GLB,, | Performed by: NURSE PRACTITIONER

## 2020-02-19 PROCEDURE — 1101F PR PT FALLS ASSESS DOC 0-1 FALLS W/OUT INJ PAST YR: ICD-10-PCS | Mod: HCNC,CPTII,S$GLB, | Performed by: NURSE PRACTITIONER

## 2020-02-19 PROCEDURE — 1159F MED LIST DOCD IN RCRD: CPT | Mod: HCNC,S$GLB,, | Performed by: NURSE PRACTITIONER

## 2020-02-19 PROCEDURE — 3074F SYST BP LT 130 MM HG: CPT | Mod: HCNC,CPTII,S$GLB, | Performed by: NURSE PRACTITIONER

## 2020-02-19 PROCEDURE — 1126F PR PAIN SEVERITY QUANTIFIED, NO PAIN PRESENT: ICD-10-PCS | Mod: HCNC,S$GLB,, | Performed by: NURSE PRACTITIONER

## 2020-02-19 PROCEDURE — 99999 PR PBB SHADOW E&M-EST. PATIENT-LVL III: CPT | Mod: PBBFAC,HCNC,, | Performed by: NURSE PRACTITIONER

## 2020-02-19 PROCEDURE — 1101F PT FALLS ASSESS-DOCD LE1/YR: CPT | Mod: HCNC,CPTII,S$GLB, | Performed by: NURSE PRACTITIONER

## 2020-02-19 PROCEDURE — 1159F PR MEDICATION LIST DOCUMENTED IN MEDICAL RECORD: ICD-10-PCS | Mod: HCNC,S$GLB,, | Performed by: NURSE PRACTITIONER

## 2020-02-19 PROCEDURE — 99999 PR PBB SHADOW E&M-EST. PATIENT-LVL III: ICD-10-PCS | Mod: PBBFAC,HCNC,, | Performed by: NURSE PRACTITIONER

## 2020-02-19 PROCEDURE — 3078F PR MOST RECENT DIASTOLIC BLOOD PRESSURE < 80 MM HG: ICD-10-PCS | Mod: HCNC,CPTII,S$GLB, | Performed by: NURSE PRACTITIONER

## 2020-02-19 PROCEDURE — 93010 ELECTROCARDIOGRAM REPORT: CPT | Mod: HCNC,S$GLB,, | Performed by: INTERNAL MEDICINE

## 2020-02-19 PROCEDURE — 99214 OFFICE O/P EST MOD 30 MIN: CPT | Mod: HCNC,S$GLB,, | Performed by: NURSE PRACTITIONER

## 2020-02-19 PROCEDURE — 3074F PR MOST RECENT SYSTOLIC BLOOD PRESSURE < 130 MM HG: ICD-10-PCS | Mod: HCNC,CPTII,S$GLB, | Performed by: NURSE PRACTITIONER

## 2020-02-19 PROCEDURE — 3078F DIAST BP <80 MM HG: CPT | Mod: HCNC,CPTII,S$GLB, | Performed by: NURSE PRACTITIONER

## 2020-02-19 RX ORDER — METOPROLOL SUCCINATE 25 MG/1
25 TABLET, EXTENDED RELEASE ORAL DAILY
COMMUNITY
Start: 2020-01-01 | End: 2020-05-31

## 2020-02-19 NOTE — PROGRESS NOTES
Patient, Flores Fuentes (MRN #[id number]), presented with a recorded BMI of 41.4 kg/m^2 consistent with the definition of morbid obesity (ICD-10 E66.01). The patient's morbid obesity was monitored, evaluated, addressed and/or treated. This addendum to the medical record is made on 02/19/2020.

## 2020-02-20 ENCOUNTER — CLINICAL SUPPORT (OUTPATIENT)
Dept: CARDIOLOGY | Facility: CLINIC | Age: 70
End: 2020-02-20
Attending: NURSE PRACTITIONER
Payer: MEDICARE

## 2020-02-20 DIAGNOSIS — R00.2 PALPITATIONS: ICD-10-CM

## 2020-02-20 PROCEDURE — 93224 XTRNL ECG REC UP TO 48 HRS: CPT | Mod: HCNC,S$GLB,, | Performed by: INTERNAL MEDICINE

## 2020-02-20 PROCEDURE — 93224 HOLTER MONITOR - 48 HOUR (CUPID ONLY): ICD-10-PCS | Mod: HCNC,S$GLB,, | Performed by: INTERNAL MEDICINE

## 2020-02-27 LAB
OHS CV EVENT MONITOR DAY: 0
OHS CV HOLTER LENGTH DECIMAL HOURS: 48
OHS CV HOLTER LENGTH HOURS: 48
OHS CV HOLTER LENGTH MINUTES: 0

## 2020-02-28 ENCOUNTER — TELEPHONE (OUTPATIENT)
Dept: ELECTROPHYSIOLOGY | Facility: CLINIC | Age: 70
End: 2020-02-28

## 2020-02-28 NOTE — TELEPHONE ENCOUNTER
Message relayed  ----- Message from Saira Guerrero NP sent at 2/27/2020  5:02 PM CST -----  Please let the patient know her Holter monitor shows no atrial fibrillation. Her reported palpitations are sometimes associated with early beats which are infrequent and not dangerous. Thanks.

## 2020-02-28 NOTE — TELEPHONE ENCOUNTER
Message relayed  ----- Message from Saira Guerrero NP sent at 2/28/2020 10:24 AM CST -----  Her overall heart rates are not fast enough to allow for any medication changes. She should continue her current management, but I strongly encourage her to have a sleep study, which can help a lot. I gave her a referral at her last clinic visit. Thanks.    ----- Message -----  From: Sarahi Burgos MA  Sent: 2/28/2020  10:13 AM CST  To: Saira Guerrero NP     would like to confirm that their will be no changes in medication dosage  . At night she has really strong papillations , she deals with this by taking a seat and taking deep breaths. Is their any other suggestions for her ?   ----- Message -----  From: Saira Guerrero NP  Sent: 2/27/2020   5:02 PM CST  To: Sarahi Burgos MA    Please let the patient know her Holter monitor shows no atrial fibrillation. Her reported palpitations are sometimes associated with early beats which are infrequent and not dangerous. Thanks.

## 2020-03-02 ENCOUNTER — OFFICE VISIT (OUTPATIENT)
Dept: OPTOMETRY | Facility: CLINIC | Age: 70
End: 2020-03-02
Payer: COMMERCIAL

## 2020-03-02 ENCOUNTER — OFFICE VISIT (OUTPATIENT)
Dept: OPTOMETRY | Facility: CLINIC | Age: 70
End: 2020-03-02

## 2020-03-02 DIAGNOSIS — H52.4 PRESBYOPIA: ICD-10-CM

## 2020-03-02 DIAGNOSIS — H26.9 CORTICAL CATARACT OF BOTH EYES: ICD-10-CM

## 2020-03-02 DIAGNOSIS — Z46.0 FITTING AND ADJUSTMENT OF SPECTACLES AND CONTACT LENSES: Primary | ICD-10-CM

## 2020-03-02 DIAGNOSIS — Z04.9 DISEASE RULED OUT AFTER EXAMINATION: ICD-10-CM

## 2020-03-02 DIAGNOSIS — G43.109 OCULAR MIGRAINE: ICD-10-CM

## 2020-03-02 DIAGNOSIS — H52.03 HYPEROPIA, BILATERAL: Primary | ICD-10-CM

## 2020-03-02 DIAGNOSIS — H25.13 NS (NUCLEAR SCLEROSIS), BILATERAL: ICD-10-CM

## 2020-03-02 PROCEDURE — 99999 PR PBB SHADOW E&M-EST. PATIENT-LVL II: CPT | Mod: PBBFAC,,, | Performed by: OPTOMETRIST

## 2020-03-02 PROCEDURE — 92310 PR CONTACT LENS FITTING (NO CHANGE): ICD-10-PCS | Mod: CSM,,, | Performed by: OPTOMETRIST

## 2020-03-02 PROCEDURE — 92015 DETERMINE REFRACTIVE STATE: CPT | Mod: S$GLB,,, | Performed by: OPTOMETRIST

## 2020-03-02 PROCEDURE — 92004 PR EYE EXAM, NEW PATIENT,COMPREHESV: ICD-10-PCS | Mod: S$GLB,,, | Performed by: OPTOMETRIST

## 2020-03-02 PROCEDURE — 99999 PR PBB SHADOW E&M-EST. PATIENT-LVL I: CPT | Mod: PBBFAC,,, | Performed by: OPTOMETRIST

## 2020-03-02 PROCEDURE — 99999 PR PBB SHADOW E&M-EST. PATIENT-LVL II: ICD-10-PCS | Mod: PBBFAC,,, | Performed by: OPTOMETRIST

## 2020-03-02 PROCEDURE — 92310 CONTACT LENS FITTING OU: CPT | Mod: CSM,,, | Performed by: OPTOMETRIST

## 2020-03-02 PROCEDURE — 99999 PR PBB SHADOW E&M-EST. PATIENT-LVL I: ICD-10-PCS | Mod: PBBFAC,,, | Performed by: OPTOMETRIST

## 2020-03-02 PROCEDURE — 92015 PR REFRACTION: ICD-10-PCS | Mod: S$GLB,,, | Performed by: OPTOMETRIST

## 2020-03-02 PROCEDURE — 92004 COMPRE OPH EXAM NEW PT 1/>: CPT | Mod: S$GLB,,, | Performed by: OPTOMETRIST

## 2020-03-02 NOTE — PROGRESS NOTES
HPI     Pt here for annual  Last exam was about 2 yrs ago  Patient denies diplopia, headaches, flashes, pain, and   itching/burning/tearing.    Has occasionaly floaters  Ocular migraines rarely   Pt does not use any eye drops.      Last edited by Zuleika Belcher on 3/2/2020 10:27 AM. (History)            Assessment /Plan     For exam results, see Encounter Report.    Hyperopia, bilateral  Presbyopia   Rx specs   CL fit today: dispensed trials of dailies total one  OU   Remove nightly, replace daily   OK to order if happy with vision and comfort OU    NS (nuclear sclerosis), bilateral  Cortical cataract of both eyes   Mild, not visually significant at this time    Ocular migraine  Disease ruled out after examination      RTC 1 year DFE

## 2020-03-03 DIAGNOSIS — I48.0 PAROXYSMAL A-FIB: Chronic | ICD-10-CM

## 2020-03-03 RX ORDER — DILTIAZEM HYDROCHLORIDE 120 MG/1
CAPSULE, COATED, EXTENDED RELEASE ORAL
Qty: 90 CAPSULE | Refills: 3 | Status: SHIPPED | OUTPATIENT
Start: 2020-03-03 | End: 2021-03-09

## 2020-03-04 ENCOUNTER — PATIENT MESSAGE (OUTPATIENT)
Dept: OPTOMETRY | Facility: CLINIC | Age: 70
End: 2020-03-04

## 2020-03-05 ENCOUNTER — TELEPHONE (OUTPATIENT)
Dept: SLEEP MEDICINE | Facility: CLINIC | Age: 70
End: 2020-03-05

## 2020-03-05 NOTE — TELEPHONE ENCOUNTER
----- Message from Deanna Marcum sent at 3/5/2020  9:22 AM CST -----  Contact: pt  Pt would like to be called back regarding her sleep study    Pt can be reached at [phone]

## 2020-03-06 ENCOUNTER — OFFICE VISIT (OUTPATIENT)
Dept: OPTOMETRY | Facility: CLINIC | Age: 70
End: 2020-03-06
Payer: MEDICARE

## 2020-03-06 DIAGNOSIS — H52.03 HYPEROPIA, BILATERAL: Primary | ICD-10-CM

## 2020-03-06 DIAGNOSIS — Z46.0 FITTING AND ADJUSTMENT OF SPECTACLES AND CONTACT LENSES: ICD-10-CM

## 2020-03-06 PROCEDURE — 99499 UNLISTED E&M SERVICE: CPT | Mod: HCNC,S$GLB,, | Performed by: OPTOMETRIST

## 2020-03-06 PROCEDURE — 99499 NO LOS: ICD-10-PCS | Mod: HCNC,S$GLB,, | Performed by: OPTOMETRIST

## 2020-03-06 NOTE — PROGRESS NOTES
HPI     Pt having problems with both near and far with new cls OU.  Pt states near va lacked clear definition and distance is blurry OU  Comfort was good    Last edited by Zuleika Belcher on 3/6/2020  8:22 AM. (History)            Assessment /Plan     For exam results, see Encounter Report.    Hyperopia, bilateral    Fitting and adjustment of spectacles and contact lenses      Tried 1 day moist MF, but still had shadowy vision  Tried DT1 MF with increased power, still blurry at distance  Dispensed trials of oasys 1 day monovision OD distance, OS near    Ok to order if happy with vision and comfort    RTC 1 year, sooner PRN

## 2020-03-09 ENCOUNTER — OFFICE VISIT (OUTPATIENT)
Dept: SLEEP MEDICINE | Facility: CLINIC | Age: 70
End: 2020-03-09
Payer: MEDICARE

## 2020-03-09 VITALS
WEIGHT: 266.31 LBS | BODY MASS INDEX: 41.8 KG/M2 | DIASTOLIC BLOOD PRESSURE: 68 MMHG | HEIGHT: 67 IN | SYSTOLIC BLOOD PRESSURE: 139 MMHG | HEART RATE: 52 BPM

## 2020-03-09 DIAGNOSIS — I48.0 PAROXYSMAL A-FIB: Primary | ICD-10-CM

## 2020-03-09 DIAGNOSIS — R06.83 SNORING: ICD-10-CM

## 2020-03-09 DIAGNOSIS — G47.39 OTHER SLEEP APNEA: ICD-10-CM

## 2020-03-09 DIAGNOSIS — E66.01 SEVERE OBESITY (BMI 35.0-35.9 WITH COMORBIDITY): ICD-10-CM

## 2020-03-09 DIAGNOSIS — R40.0 DAYTIME SOMNOLENCE: ICD-10-CM

## 2020-03-09 PROCEDURE — 99203 OFFICE O/P NEW LOW 30 MIN: CPT | Mod: HCNC,S$GLB,, | Performed by: INTERNAL MEDICINE

## 2020-03-09 PROCEDURE — 1101F PT FALLS ASSESS-DOCD LE1/YR: CPT | Mod: HCNC,CPTII,S$GLB, | Performed by: INTERNAL MEDICINE

## 2020-03-09 PROCEDURE — 3078F DIAST BP <80 MM HG: CPT | Mod: HCNC,CPTII,S$GLB, | Performed by: INTERNAL MEDICINE

## 2020-03-09 PROCEDURE — 99499 UNLISTED E&M SERVICE: CPT | Mod: HCNC,S$GLB,, | Performed by: INTERNAL MEDICINE

## 2020-03-09 PROCEDURE — 3075F SYST BP GE 130 - 139MM HG: CPT | Mod: HCNC,CPTII,S$GLB, | Performed by: INTERNAL MEDICINE

## 2020-03-09 PROCEDURE — 99499 RISK ADDL DX/OHS AUDIT: ICD-10-PCS | Mod: HCNC,S$GLB,, | Performed by: INTERNAL MEDICINE

## 2020-03-09 PROCEDURE — 99999 PR PBB SHADOW E&M-EST. PATIENT-LVL III: ICD-10-PCS | Mod: PBBFAC,HCNC,, | Performed by: INTERNAL MEDICINE

## 2020-03-09 PROCEDURE — 1125F PR PAIN SEVERITY QUANTIFIED, PAIN PRESENT: ICD-10-PCS | Mod: HCNC,S$GLB,, | Performed by: INTERNAL MEDICINE

## 2020-03-09 PROCEDURE — 99999 PR PBB SHADOW E&M-EST. PATIENT-LVL III: CPT | Mod: PBBFAC,HCNC,, | Performed by: INTERNAL MEDICINE

## 2020-03-09 PROCEDURE — 1159F MED LIST DOCD IN RCRD: CPT | Mod: HCNC,S$GLB,, | Performed by: INTERNAL MEDICINE

## 2020-03-09 PROCEDURE — 3075F PR MOST RECENT SYSTOLIC BLOOD PRESS GE 130-139MM HG: ICD-10-PCS | Mod: HCNC,CPTII,S$GLB, | Performed by: INTERNAL MEDICINE

## 2020-03-09 PROCEDURE — 1159F PR MEDICATION LIST DOCUMENTED IN MEDICAL RECORD: ICD-10-PCS | Mod: HCNC,S$GLB,, | Performed by: INTERNAL MEDICINE

## 2020-03-09 PROCEDURE — 3078F PR MOST RECENT DIASTOLIC BLOOD PRESSURE < 80 MM HG: ICD-10-PCS | Mod: HCNC,CPTII,S$GLB, | Performed by: INTERNAL MEDICINE

## 2020-03-09 PROCEDURE — 1125F AMNT PAIN NOTED PAIN PRSNT: CPT | Mod: HCNC,S$GLB,, | Performed by: INTERNAL MEDICINE

## 2020-03-09 PROCEDURE — 1101F PR PT FALLS ASSESS DOC 0-1 FALLS W/OUT INJ PAST YR: ICD-10-PCS | Mod: HCNC,CPTII,S$GLB, | Performed by: INTERNAL MEDICINE

## 2020-03-09 PROCEDURE — 99203 PR OFFICE/OUTPT VISIT, NEW, LEVL III, 30-44 MIN: ICD-10-PCS | Mod: HCNC,S$GLB,, | Performed by: INTERNAL MEDICINE

## 2020-03-09 NOTE — PROGRESS NOTES
Subjective:       Patient ID: Flores Fuentes is a 69 y.o. female.    Chief Complaint: Sleeping Problem    HPI   I had the pleasure of seeing Flores Fuentes today, who is a 69 y.o. female that presents with snoring and atrial fibrillation.    Flores Fuentes does not havea CDL.    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    Flores Fuentes presents with has snoring that has been going on for 5 years    Bedtime when working ranges from 2000 to 2030.   When not working, bedtime ranges from 2000 to 2030.   Sleep latency ranges from 45 to 60 minutes.   Average number of awakenings is 2-3 and return to sleep is variable.   Wake up time when working is 0900 to 0930.   When not working, wake up time is 0900 to 0930.   Patient doesrested upon awakening.    Flores Fuentes consumes approximately 0 beverages with caffeine are consumed daily.   An average of 0 beverages with alcohol are consumed    Medications taken for sleep currently: none  Previous medications taken: none     Flores Fuentes does experience daytime sleepiness.   Naps are taken about 1 times weekly, usually lasting 10 to 15 minutes.  Flores currently does operate an automobile.  Flores Fuentes does not experience drowsiness when driving.   Patient does doze off when sedentary.   Flores Fuentes does not have auxiliary symptoms of narcolepsy including sleep onset paralysis, hypnagogic hallucinations, sleep attacks and cataplexy  ESS.    Flores Fuentes has a history of snoring.   Snoring is described as moderate and constant.   Apneic episodes have not been noticed during sleep.   A witness to sleep is present.   The patient awakens with mouth dryness.      Flores Fuentes does not not have symptoms of Restless Legs Syndrome. Nocturnal leg movements have not been noticed.   The patient does not experience sleep related leg cramps.   There is not a history of parasomnia.      Current Outpatient  Medications:     clobetasol (TEMOVATE) 0.05 % cream, Apply topically 2 (two) times daily. To rash on left breast area until resolved, Disp: 45 g, Rfl: 1    diltiaZEM (CARDIZEM CD) 120 MG Cp24, TAKE 1 CAPSULE EVERY DAY, Disp: 90 capsule, Rfl: 3    ELIQUIS 5 mg Tab, TAKE 1 TABLET TWICE DAILY, Disp: 180 tablet, Rfl: 3    flecainide (TAMBOCOR) 150 MG Tab, 2 tabs (300 mg total) up to once per day for atrial fibrillation., Disp: 10 tablet, Rfl: 3    fluocinonide 0.05% (LIDEX) 0.05 % cream, Apply topically 2 (two) times daily., Disp: , Rfl:     fluticasone propionate (FLONASE) 50 mcg/actuation nasal spray, USE 1 SPRAY IN EACH NOSTRIL ONE TIME DAILY, Disp: 32 g, Rfl: 3    levothyroxine (SYNTHROID) 25 MCG tablet, TAKE 1 TABLET EVERY DAY, Disp: 90 tablet, Rfl: 3    metoprolol succinate (TOPROL-XL) 25 MG 24 hr tablet, Take 25 mg by mouth once daily., Disp: , Rfl:     pravastatin (PRAVACHOL) 40 MG tablet, TAKE 1 TABLET ONE TIME DAILY, Disp: 90 tablet, Rfl: 3    ketoconazole (NIZORAL) 2 % cream, Apply topically 2 (two) times daily. To dry skin on forehead eyebrows and nose folds (Patient not taking: Reported on 3/9/2020), Disp: 60 g, Rfl: 2     Review of patient's allergies indicates:   Allergen Reactions    Decongestant d [pseudoephedrine-dm]      Atrial Fibrillation    Augmentin [amoxicillin-pot clavulanate]      palpitations      Amoxicillin Palpitations    Diphenhydramine-pseudoephed Palpitations     TACHYCARDIA    Povidone-iodine Rash     Mild erythema of skin         Past Medical History:   Diagnosis Date    Atrial fibrillation 2014    cardioverted 2017, Eliquis, q 6 mo, Dr Servin, flecainide at home prn flare up 4/2019, 9/2018)    Cervical muscle strain 1/24/2017    DJD (degenerative joint disease) of cervical spine 1/24/2017    Hyperlipidemia     Mitral valve disease     Mixed hyperlipidemia 11/07/2013    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, l mandible, 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 Dr Viktor Toure     Paroxysmal atrioventricular tachycardia     Plantar fasciitis     Right knee meniscal tear     Dr Mayfield, tx conservatively    Severe obesity (BMI 35.0-35.9 with comorbidity) 1/24/2017    Stress incontinence, female 03/28/2018    After HYST, Vimal didn't work, worse at night wearing pads    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    Thyroid disease        Past Surgical History:   Procedure Laterality Date    APPENDECTOMY      HYSTERECTOMY  1990    TAHBSO for fibroids    MANDIBLE SURGERY  2015    L mandible, Dr Toure    MANDIBLE SURGERY Left 8/27/2019    OOPHORECTOMY      TONSILLECTOMY         Family History   Problem Relation Age of Onset    Cancer Mother         stomach, liver    Breast cancer Neg Hx     Ovarian cancer Neg Hx     Cervical cancer Neg Hx     Endometrial cancer Neg Hx     Vaginal cancer Neg Hx     Melanoma Neg Hx        Social History     Socioeconomic History    Marital status:      Spouse name: Not on file    Number of children: Not on file    Years of education: Not on file    Highest education level: Not on file   Occupational History    Not on file   Social Needs    Financial resource strain: Not on file    Food insecurity:     Worry: Not on file     Inability: Not on file    Transportation needs:     Medical: Not on file     Non-medical: Not on file   Tobacco Use    Smoking status: Never Smoker    Smokeless tobacco: Never Used   Substance and Sexual Activity    Alcohol use: No    Drug use: No    Sexual activity: Not Currently   Lifestyle    Physical activity:     Days per week: Not on file     Minutes per session: Not on file    Stress: Not on file   Relationships    Social connections:     Talks on phone: Not on file     Gets together: Not on file     Attends Yazidism service: Not on file     Active member of club or organization: Not on file     Attends meetings of clubs or organizations: Not on file     Relationship status: Not on  file   Other Topics Concern    Are you pregnant or think you may be? No    Breast-feeding Not Asked   Social History Narrative    Retired 2012,  [employer], 3 children, nonsmoker, ETOH socially, GYN here (no need for pap HYST for fibroids), colonoscopy normal 58, rec repeat at 68           Old medical records.    Vitals:    03/09/20 1059   BP: 139/68   Pulse: (!) 52              The patient was given open opportunity to ask questions and/or express concerns about treatment plan.   All questions/concerns were discussed.   Driving precautions were provided.     Two patient identifiers used prior to evaluation.    Thank you for referring Flores Fuentes for evaluation.             Review of Systems   Constitutional: Negative for activity change, appetite change, chills, diaphoresis, fatigue, fever and unexpected weight change.   HENT: Negative for congestion, dental problem, drooling, facial swelling, hearing loss, mouth sores, nosebleeds, postnasal drip, rhinorrhea, sneezing, sore throat, tinnitus, trouble swallowing and voice change.    Eyes: Negative for photophobia and visual disturbance.   Respiratory: Positive for apnea. Negative for cough, choking, chest tightness, shortness of breath, wheezing and stridor.    Cardiovascular: Positive for palpitations. Negative for chest pain and leg swelling.   Gastrointestinal: Negative for abdominal distention, abdominal pain, blood in stool, constipation, diarrhea, nausea and vomiting.   Endocrine: Negative for cold intolerance, heat intolerance, polydipsia, polyphagia and polyuria.   Genitourinary: Positive for enuresis and frequency. Negative for flank pain.   Musculoskeletal: Positive for arthralgias. Negative for back pain, gait problem, joint swelling, myalgias, neck pain and neck stiffness.   Skin: Negative for rash and wound.   Allergic/Immunologic: Negative for environmental allergies, food allergies and immunocompromised state.   Neurological: Negative  for dizziness, tremors, seizures, syncope, facial asymmetry, speech difficulty, weakness, light-headedness, numbness and headaches.   Hematological: Negative for adenopathy. Does not bruise/bleed easily.   Psychiatric/Behavioral: Positive for sleep disturbance. Negative for agitation, behavioral problems, confusion, decreased concentration, dysphoric mood, hallucinations, self-injury and suicidal ideas. The patient is not nervous/anxious and is not hyperactive.    All other systems reviewed and are negative.      Objective:      Physical Exam   Constitutional: She is oriented to person, place, and time. She appears well-developed and well-nourished. No distress.   HENT:   Head: Normocephalic and atraumatic.   Nose: Nose normal.   Mouth/Throat: Uvula is midline, oropharynx is clear and moist and mucous membranes are normal. She does not have dentures. No uvula swelling. No oropharyngeal exudate or posterior oropharyngeal edema. Tonsils are 0 on the right. Tonsils are 0 on the left. No tonsillar exudate.   Eyes: EOM are normal.   Neck: Normal range of motion. Neck supple. No JVD present. No tracheal deviation present. No thyromegaly present.   Cardiovascular: Normal rate, regular rhythm, normal heart sounds and intact distal pulses. Exam reveals no gallop and no friction rub.   No murmur heard.  Pulmonary/Chest: Effort normal and breath sounds normal. No stridor. No respiratory distress. She has no wheezes. She has no rales. She exhibits no tenderness.   Musculoskeletal: Normal range of motion.   Lymphadenopathy:     She has no cervical adenopathy.   Neurological: She is alert and oriented to person, place, and time. She displays normal reflexes. No cranial nerve deficit. She exhibits normal muscle tone. Coordination normal.   Skin: Skin is warm and dry. She is not diaphoretic.   Psychiatric: She has a normal mood and affect. Her behavior is normal. Judgment and thought content normal.   Nursing note and vitals  reviewed.      Assessment:       1. Paroxysmal A-fib    2. Daytime somnolence    3. Snoring    4. Severe obesity (BMI 35.0-35.9 with comorbidity)        Plan:       Due to listed symptoms, a home sleep study is recommended and ordered.   Description of procedure given to patient.   If significant Obstructive Sleep Apnea (MATTHEW) is found during the initial portion of the study, therapy will be initiated with nasal Continuous Positive Airway Pressure (CPAP).   Goals of therapy were discussed, alternative treatments listed and patient agrees to this form of therapy if indicated.   The pathophysiology of MATTHEW was discussed.   The effects of MATTHEW on patient's co-morbid conditions and the increased morbidity and/or mortality associated with this condition were reviewed.   The patient was given open opportunity to ask questions and/or express concerns about treatment plan.   All questions/concerns were discussed.   Driving precautions were provided.   Patient was given option for home sleep apnea test or attended polysomnography.         Thank you for referring Flores Fuentes for evaluation.

## 2020-03-09 NOTE — LETTER
March 9, 2020      Saira Guerrero, BIANCA  [address]  [facility] [address]           [facility] [place] [address] [facility] [address]  [address]  [facility] [address]  Phone: [phone]          Patient: Flores Fuentes   MR Number: [id number]   YOB: 1950   Date of Visit: 3/9/2020       Dear Saira Guerrero:    Thank you for referring Flores Fuentes to me for evaluation. Attached you will find relevant portions of my assessment and plan of care.    If you have questions, please do not hesitate to call me. I look forward to following Flores Fuentes along with you.    Sincerely,    Leeann Castillo MD    Enclosure  CC:  No Recipients    If you would like to receive this communication electronically, please contact [email or web address] or [phone] to request more information on [employer] access.    For providers and/or their staff who would like to refer a patient to [facility], please contact us through our one-stop-shop provider referral line, [facility], at [phone].    If you feel you have received this communication in error or would no longer like to receive these types of communications, please e-mail [email or web address]

## 2020-03-11 ENCOUNTER — TELEPHONE (OUTPATIENT)
Dept: SLEEP MEDICINE | Facility: OTHER | Age: 70
End: 2020-03-11

## 2020-03-12 ENCOUNTER — PATIENT MESSAGE (OUTPATIENT)
Dept: OPTOMETRY | Facility: CLINIC | Age: 70
End: 2020-03-12

## 2020-04-20 ENCOUNTER — TELEPHONE (OUTPATIENT)
Dept: SLEEP MEDICINE | Facility: OTHER | Age: 70
End: 2020-04-20

## 2020-04-28 ENCOUNTER — TELEPHONE (OUTPATIENT)
Dept: ELECTROPHYSIOLOGY | Facility: CLINIC | Age: 70
End: 2020-04-28

## 2020-04-28 NOTE — TELEPHONE ENCOUNTER
Spoke w/ pt & r/s her appt to an audio visit on 5/15. Pt confirmed.  ----- Message from Lety Puckett RN sent at 4/28/2020  3:45 PM CDT -----  Contact: Self      ----- Message -----  From: Inés Moss  Sent: 4/28/2020   3:43 PM CDT  To: Malathi BELL Staff    Pt has an appt for 5/20 & is asking if it will stay or changed, if can call.  Thanks     [phone]

## 2020-05-14 ENCOUNTER — HOSPITAL ENCOUNTER (OUTPATIENT)
Dept: SLEEP MEDICINE | Facility: OTHER | Age: 70
Discharge: HOME | End: 2020-05-14
Attending: INTERNAL MEDICINE
Payer: MEDICARE

## 2020-05-14 DIAGNOSIS — G47.39 OTHER SLEEP APNEA: ICD-10-CM

## 2020-05-14 DIAGNOSIS — E66.01 SEVERE OBESITY (BMI 35.0-35.9 WITH COMORBIDITY): ICD-10-CM

## 2020-05-14 DIAGNOSIS — R40.0 DAYTIME SOMNOLENCE: ICD-10-CM

## 2020-05-14 DIAGNOSIS — I48.0 PAROXYSMAL A-FIB: ICD-10-CM

## 2020-05-14 DIAGNOSIS — R06.83 SNORING: ICD-10-CM

## 2020-05-14 DIAGNOSIS — G47.33 OSA (OBSTRUCTIVE SLEEP APNEA): Primary | ICD-10-CM

## 2020-05-14 PROCEDURE — 95800 SLP STDY UNATTENDED: CPT | Mod: HCNC

## 2020-05-15 ENCOUNTER — OFFICE VISIT (OUTPATIENT)
Dept: ELECTROPHYSIOLOGY | Facility: CLINIC | Age: 70
End: 2020-05-15
Payer: MEDICARE

## 2020-05-15 VITALS — DIASTOLIC BLOOD PRESSURE: 75 MMHG | SYSTOLIC BLOOD PRESSURE: 118 MMHG | HEART RATE: 60 BPM

## 2020-05-15 DIAGNOSIS — I48.0 PAROXYSMAL A-FIB: Chronic | ICD-10-CM

## 2020-05-15 DIAGNOSIS — E78.2 MIXED HYPERLIPIDEMIA: ICD-10-CM

## 2020-05-15 DIAGNOSIS — I10 ESSENTIAL HYPERTENSION: Primary | ICD-10-CM

## 2020-05-15 PROCEDURE — 1159F PR MEDICATION LIST DOCUMENTED IN MEDICAL RECORD: ICD-10-PCS | Mod: HCNC,95,, | Performed by: INTERNAL MEDICINE

## 2020-05-15 PROCEDURE — 3074F PR MOST RECENT SYSTOLIC BLOOD PRESSURE < 130 MM HG: ICD-10-PCS | Mod: HCNC,CPTII,95, | Performed by: INTERNAL MEDICINE

## 2020-05-15 PROCEDURE — 3074F SYST BP LT 130 MM HG: CPT | Mod: HCNC,CPTII,95, | Performed by: INTERNAL MEDICINE

## 2020-05-15 PROCEDURE — 1159F MED LIST DOCD IN RCRD: CPT | Mod: HCNC,95,, | Performed by: INTERNAL MEDICINE

## 2020-05-15 PROCEDURE — 99443 PR PHYSICIAN TELEPHONE EVALUATION 21-30 MIN: ICD-10-PCS | Mod: HCNC,95,, | Performed by: INTERNAL MEDICINE

## 2020-05-15 PROCEDURE — 1101F PR PT FALLS ASSESS DOC 0-1 FALLS W/OUT INJ PAST YR: ICD-10-PCS | Mod: HCNC,CPTII,95, | Performed by: INTERNAL MEDICINE

## 2020-05-15 PROCEDURE — 99443 PR PHYSICIAN TELEPHONE EVALUATION 21-30 MIN: CPT | Mod: HCNC,95,, | Performed by: INTERNAL MEDICINE

## 2020-05-15 PROCEDURE — 3078F PR MOST RECENT DIASTOLIC BLOOD PRESSURE < 80 MM HG: ICD-10-PCS | Mod: HCNC,CPTII,95, | Performed by: INTERNAL MEDICINE

## 2020-05-15 PROCEDURE — 3078F DIAST BP <80 MM HG: CPT | Mod: HCNC,CPTII,95, | Performed by: INTERNAL MEDICINE

## 2020-05-15 PROCEDURE — 1101F PT FALLS ASSESS-DOCD LE1/YR: CPT | Mod: HCNC,CPTII,95, | Performed by: INTERNAL MEDICINE

## 2020-05-15 NOTE — PROGRESS NOTES
The patient location is: home  The chief complaint leading to consultation is: palp  Visit type: audio only  Total time spent with patient: 30  Each patient to whom he or she provides medical services by telemedicine is:  (1) informed of the relationship between the physician and patient and the respective role of any other health care provider with respect to management of the patient; and (2) notified that he or she may decline to receive medical services by telemedicine and may withdraw from such care at any time.    Subjective:   Patient ID:  Flores Fuentes is a 69 y.o. female who presents for follow-up of palpitations.     HPI:    Ms. Fuentes is a 69 y.o. female with pAF, HLD, obesity, hypothyroid here for follow up.   AF first dx 2006 after lots of caffeine. Few episodes since then. Pill in pocket strategy.  HL, on meds  obesity  hypothyroid, on med    Went to ER 3/10 with palps. Underwent DCCV 3/13/17 after remaining in AF with RVR. Started on multaq, and BB d/c'd.  Since, feeling not quite as well as usually, and on 4/17, at which time HR was 120s and didn't feel same as prior AF episodes.  She'd been on BB for years w/o issues. Good exertion tolerance. No CP.    Due to rare PAF episodes, at the last visit we adopted a pill-in-the-pocket strategy. She used it first in 11/17; went to the ER. AF resolved <30 min after taking dose. Had 2 other episodes, self-treated successfully, in 12/17 and 1/18. Since last seen, has had 4 such episodes, all aborted with PIP.     7/13/2019 went to hospital with palpitations - EKG showed SR with PVCs. Prior to this, she had a kenalog injection for her knee and a procedure on her jaw and says she was under a lot of stress. Today she says her palpitations are not as severe as when she was in the hospital, but they worsen with exercise. Palpitations do not feel like her usual atrial fibrillation episodes and she has not been taking flecainide. Likely PVCs were a result of  various stressors, including kenalog injection. Her metoprolol was increased in the hospital but she was unsure if she should continue with this. I encouraged her to go ahead with this increase and we will get a Holter monitor in a couple of weeks to assess. If her PVCs continue to be a problem, can consider switching diltiazem to verapamil.     Holter showed NSR with about 1k PVCs.  She's been feeling better since last seen. Still taking low-dose BB.  AF is under very good control. Only about 2x in past year. Flecainide works well.    Recent Cardiac Tests:    2D Echo (6/3/2019):  · Normal left ventricular systolic function. The estimated ejection fraction is 55%  · Normal LV diastolic function.  · Normal right ventricular systolic function.  · Mild left atrial enlargement.  · The estimated PA systolic pressure is 31 mm Hg  · Normal central venous pressure (3 mm Hg).    Current Outpatient Medications   Medication Sig    CARTIA  mg Cp24 TAKE 1 CAPSULE EVERY DAY    clobetasol (TEMOVATE) 0.05 % cream Apply topically 2 (two) times daily. To rash on left breast area until resolved    ELIQUIS 5 mg Tab TAKE 1 TABLET TWICE DAILY    flecainide (TAMBOCOR) 150 MG Tab 2 tabs (300 mg total) up to once per day for atrial fibrillation.    fluocinonide 0.05% (LIDEX) 0.05 % cream Apply topically 2 (two) times daily.    fluticasone propionate (FLONASE) 50 mcg/actuation nasal spray USE 1 SPRAY IN EACH NOSTRIL ONE TIME DAILY    ketoconazole (NIZORAL) 2 % cream Apply topically 2 (two) times daily. To dry skin on forehead eyebrows and nose folds    levothyroxine (SYNTHROID) 25 MCG tablet TAKE 1 TABLET EVERY DAY    metoprolol succinate (TOPROL-XL) 50 MG 24 hr tablet Take 1 tablet (50 mg total) by mouth once daily.    pravastatin (PRAVACHOL) 40 MG tablet TAKE 1 TABLET ONE TIME DAILY     No current facility-administered medications for this visit.        Review of Systems   Constitution: Negative. Negative for  malaise/fatigue.   HENT: Negative.  Negative for ear pain and tinnitus.    Eyes: Negative for blurred vision.   Cardiovascular: Positive for palpitations. Negative for chest pain, dyspnea on exertion, leg swelling, near-syncope and syncope.   Respiratory: Positive for snoring. Negative for shortness of breath.    Endocrine: Negative.  Negative for polyuria.   Hematologic/Lymphatic: Negative for bleeding problem. Does not bruise/bleed easily.   Skin: Negative.  Negative for rash.   Musculoskeletal: Negative.  Negative for joint pain, muscle weakness and myalgias.   Gastrointestinal: Negative.  Negative for abdominal pain, change in bowel habit, hematemesis, hematochezia and nausea.   Genitourinary: Negative for frequency and hematuria.   Neurological: Negative.  Negative for dizziness, light-headedness and weakness.   Psychiatric/Behavioral: Negative.  Negative for altered mental status and depression. The patient is not nervous/anxious.    Allergic/Immunologic: Negative for environmental allergies and persistent infections.     Objective:        There were no vitals taken for this visit.    No distress.  Speaks in full sentences.    Lab Results   Component Value Date     07/13/2019    K 3.7 07/13/2019    MG 2.5 07/13/2019    BUN 15 07/13/2019    CREATININE 0.8 07/13/2019    ALT 15 07/13/2019    AST 15 07/13/2019    HGB 14.2 07/13/2019    HCT 43.7 07/13/2019    TSH 2.757 07/13/2019    LDLCALC 89.0 04/10/2019           Assessment:     PAF  Likely MATTHEW    Plan:     In summary, Ms. Fuentes is a 69 y.o. female with pAF, HLD, obesity, hypothyroid here for follow up.   She continues to have palpitations, that resolve with deep breaths. These sound like PVCs. Will order Holter to confirm and to evaluate burden. Advised her to increase metoprolol as previously ordered for symptom control.     BB, dilt  NOAC for a/c  Continue PIP flecainide prn AF    Return in 1 year with echo, or earlier prn.

## 2020-05-18 PROCEDURE — 95800 SLP STDY UNATTENDED: CPT | Mod: 26,HCNC,, | Performed by: INTERNAL MEDICINE

## 2020-05-18 PROCEDURE — 95800 PR SLEEP STUDY, UNATTENDED, RECORD HEART RATE/O2 SAT/RESP ANAL/SLEEP TIME: ICD-10-PCS | Mod: 26,HCNC,, | Performed by: INTERNAL MEDICINE

## 2020-05-20 ENCOUNTER — PATIENT MESSAGE (OUTPATIENT)
Dept: SLEEP MEDICINE | Facility: CLINIC | Age: 70
End: 2020-05-20

## 2020-05-20 DIAGNOSIS — I48.0 PAROXYSMAL A-FIB: ICD-10-CM

## 2020-05-20 DIAGNOSIS — G47.33 OSA (OBSTRUCTIVE SLEEP APNEA): Primary | ICD-10-CM

## 2020-05-20 DIAGNOSIS — R40.0 DAYTIME SOMNOLENCE: ICD-10-CM

## 2020-05-20 NOTE — PROCEDURES
"The sleep study that you ordered is complete.  You have ordered sleep LAB services to perform the sleep study for Flores Fuentes     Please find Sleep Study result in  the "Media tab" of Chart Review menu.     Patient is already established with a Sleep Medicine provider        For any questions, please contact our clinic staff at [phone] to talk to clinical staff.     "

## 2020-05-26 ENCOUNTER — HOSPITAL ENCOUNTER (OUTPATIENT)
Dept: RADIOLOGY | Facility: HOSPITAL | Age: 70
Discharge: HOME | End: 2020-05-26
Attending: FAMILY MEDICINE
Payer: MEDICARE

## 2020-05-26 DIAGNOSIS — Z12.31 OTHER SCREENING MAMMOGRAM: ICD-10-CM

## 2020-05-26 PROCEDURE — 77067 SCR MAMMO BI INCL CAD: CPT | Mod: TC,HCNC,PO

## 2020-05-29 DIAGNOSIS — I48.0 PAROXYSMAL A-FIB: Chronic | ICD-10-CM

## 2020-05-31 RX ORDER — METOPROLOL SUCCINATE 25 MG/1
TABLET, EXTENDED RELEASE ORAL
Qty: 90 TABLET | Refills: 3 | Status: SHIPPED | OUTPATIENT
Start: 2020-05-31 | End: 2021-05-06

## 2020-05-31 RX ORDER — APIXABAN 5 MG/1
TABLET, FILM COATED ORAL
Qty: 180 TABLET | Refills: 3 | Status: SHIPPED | OUTPATIENT
Start: 2020-05-31 | End: 2021-05-06

## 2020-06-01 RX ORDER — PRAVASTATIN SODIUM 40 MG/1
TABLET ORAL
Qty: 90 TABLET | Refills: 0 | Status: SHIPPED | OUTPATIENT
Start: 2020-06-01 | End: 2020-09-09

## 2020-06-01 RX ORDER — LEVOTHYROXINE SODIUM 25 UG/1
TABLET ORAL
Qty: 90 TABLET | Refills: 0 | Status: SHIPPED | OUTPATIENT
Start: 2020-06-01 | End: 2020-06-02 | Stop reason: SDUPTHER

## 2020-06-02 ENCOUNTER — OFFICE VISIT (OUTPATIENT)
Dept: PRIMARY CARE CLINIC | Facility: CLINIC | Age: 70
End: 2020-06-02
Payer: MEDICARE

## 2020-06-02 VITALS
DIASTOLIC BLOOD PRESSURE: 74 MMHG | SYSTOLIC BLOOD PRESSURE: 138 MMHG | WEIGHT: 267.63 LBS | HEART RATE: 57 BPM | HEIGHT: 67 IN | OXYGEN SATURATION: 98 % | BODY MASS INDEX: 42 KG/M2 | TEMPERATURE: 98 F

## 2020-06-02 DIAGNOSIS — I48.0 PAROXYSMAL A-FIB: Primary | Chronic | ICD-10-CM

## 2020-06-02 DIAGNOSIS — I10 ESSENTIAL HYPERTENSION: ICD-10-CM

## 2020-06-02 DIAGNOSIS — R31.21 ASYMPTOMATIC MICROSCOPIC HEMATURIA: ICD-10-CM

## 2020-06-02 DIAGNOSIS — Z78.0 POSTMENOPAUSAL: ICD-10-CM

## 2020-06-02 DIAGNOSIS — N39.3 STRESS INCONTINENCE, FEMALE: ICD-10-CM

## 2020-06-02 DIAGNOSIS — E02 SUBCLINICAL IODINE-DEFICIENCY HYPOTHYROIDISM: ICD-10-CM

## 2020-06-02 DIAGNOSIS — G47.33 OSA (OBSTRUCTIVE SLEEP APNEA): ICD-10-CM

## 2020-06-02 PROBLEM — E66.01 SEVERE OBESITY (BMI 35.0-35.9 WITH COMORBIDITY): Status: RESOLVED | Noted: 2017-01-24 | Resolved: 2020-06-02

## 2020-06-02 PROBLEM — Z01.810 ENCOUNTER FOR PRE-OPERATIVE CARDIOVASCULAR CLEARANCE: Status: RESOLVED | Noted: 2019-09-09 | Resolved: 2020-06-02

## 2020-06-02 PROBLEM — J00 COMMON COLD: Status: RESOLVED | Noted: 2019-09-09 | Resolved: 2020-06-02

## 2020-06-02 PROBLEM — E66.9 OBESITY (BMI 30-39.9): Status: RESOLVED | Noted: 2019-09-09 | Resolved: 2020-06-02

## 2020-06-02 PROCEDURE — 99214 PR OFFICE/OUTPT VISIT, EST, LEVL IV, 30-39 MIN: ICD-10-PCS | Mod: HCNC,S$GLB,, | Performed by: FAMILY MEDICINE

## 2020-06-02 PROCEDURE — 99999 PR PBB SHADOW E&M-EST. PATIENT-LVL III: ICD-10-PCS | Mod: PBBFAC,HCNC,, | Performed by: FAMILY MEDICINE

## 2020-06-02 PROCEDURE — 3075F PR MOST RECENT SYSTOLIC BLOOD PRESS GE 130-139MM HG: ICD-10-PCS | Mod: HCNC,CPTII,S$GLB, | Performed by: FAMILY MEDICINE

## 2020-06-02 PROCEDURE — 99999 PR PBB SHADOW E&M-EST. PATIENT-LVL III: CPT | Mod: PBBFAC,HCNC,, | Performed by: FAMILY MEDICINE

## 2020-06-02 PROCEDURE — 3075F SYST BP GE 130 - 139MM HG: CPT | Mod: HCNC,CPTII,S$GLB, | Performed by: FAMILY MEDICINE

## 2020-06-02 PROCEDURE — 1159F PR MEDICATION LIST DOCUMENTED IN MEDICAL RECORD: ICD-10-PCS | Mod: HCNC,S$GLB,, | Performed by: FAMILY MEDICINE

## 2020-06-02 PROCEDURE — 1101F PR PT FALLS ASSESS DOC 0-1 FALLS W/OUT INJ PAST YR: ICD-10-PCS | Mod: HCNC,CPTII,S$GLB, | Performed by: FAMILY MEDICINE

## 2020-06-02 PROCEDURE — 3078F PR MOST RECENT DIASTOLIC BLOOD PRESSURE < 80 MM HG: ICD-10-PCS | Mod: HCNC,CPTII,S$GLB, | Performed by: FAMILY MEDICINE

## 2020-06-02 PROCEDURE — 1101F PT FALLS ASSESS-DOCD LE1/YR: CPT | Mod: HCNC,CPTII,S$GLB, | Performed by: FAMILY MEDICINE

## 2020-06-02 PROCEDURE — 3078F DIAST BP <80 MM HG: CPT | Mod: HCNC,CPTII,S$GLB, | Performed by: FAMILY MEDICINE

## 2020-06-02 PROCEDURE — 99214 OFFICE O/P EST MOD 30 MIN: CPT | Mod: HCNC,S$GLB,, | Performed by: FAMILY MEDICINE

## 2020-06-02 PROCEDURE — 1159F MED LIST DOCD IN RCRD: CPT | Mod: HCNC,S$GLB,, | Performed by: FAMILY MEDICINE

## 2020-06-02 RX ORDER — FLUTICASONE PROPIONATE 50 MCG
SPRAY, SUSPENSION (ML) NASAL
Qty: 33 ML | Refills: 3 | Status: SHIPPED | OUTPATIENT
Start: 2020-06-02 | End: 2021-11-10

## 2020-06-02 RX ORDER — LEVOTHYROXINE SODIUM 25 UG/1
25 TABLET ORAL DAILY
Qty: 90 TABLET | Refills: 3 | Status: SHIPPED | OUTPATIENT
Start: 2020-06-02 | End: 2021-05-09

## 2020-06-02 NOTE — PROGRESS NOTES
Subjective:      Patient ID: Flores Fuentes is a 69 y.o. female.    Chief Complaint: follow up labs, meds HTN    Here today for follow up labs, meds HTN    She saw cardiologist Dr. Mccain in May 2020. He ordered Holter & increased Metoprolol prn. Continue Flecainide prn.     She is having more urinary incontinence. she saw urologist Dr. Marita BRIGHT in April 2019 for urinary incontinence. Kegels & PT helped only somewhat. Currently denies urinary discomfort. She did have E coli urinary tract infection, urinalysis May 26, 2020 occult blood 2+. She does not want surgery, but is open to pessary.     She has had hysterectomy    Denies any chest pain, shortness of breath, nausea vomiting constipation diarrhea, blood in stool, heartburn      Current Outpatient Medications:     clobetasol (TEMOVATE) 0.05 % cream, Apply topically 2 (two) times daily. To rash on left breast area until resolved, Disp: 45 g, Rfl: 1    diltiaZEM (CARDIZEM CD) 120 MG Cp24, TAKE 1 CAPSULE EVERY DAY, Disp: 90 capsule, Rfl: 3    ELIQUIS 5 mg Tab, TAKE 1 TABLET TWICE DAILY, Disp: 180 tablet, Rfl: 3    flecainide (TAMBOCOR) 150 MG Tab, 2 tabs (300 mg total) up to once per day for atrial fibrillation., Disp: 10 tablet, Rfl: 3    fluocinonide 0.05% (LIDEX) 0.05 % cream, Apply topically 2 (two) times daily., Disp: , Rfl:     fluticasone propionate (FLONASE) 50 mcg/actuation nasal spray, Each nares Nasal spray qd, Disp: 33 mL, Rfl: 3    ketoconazole (NIZORAL) 2 % cream, Apply topically 2 (two) times daily. To dry skin on forehead eyebrows and nose folds, Disp: 60 g, Rfl: 2    levothyroxine (SYNTHROID) 25 MCG tablet, Take 1 tablet (25 mcg total) by mouth once daily., Disp: 90 tablet, Rfl: 3    metoprolol succinate (TOPROL-XL) 25 MG 24 hr tablet, TAKE 1 TABLET EVERY DAY, Disp: 90 tablet, Rfl: 3    pravastatin (PRAVACHOL) 40 MG tablet, TAKE 1 TABLET EVERY DAY, Disp: 90 tablet, Rfl: 0    Lab Results   Component Value Date    HGBA1C 5.7  10/07/2014     No results found for: MICALBCREAT  Lab Results   Component Value Date    LDLCALC 93.6 05/26/2020    LDLCALC 89.0 04/10/2019    CHOL 148 05/26/2020    HDL 38 (L) 05/26/2020    TRIG 82 05/26/2020       Lab Results   Component Value Date     05/26/2020    K 4.2 05/26/2020     05/26/2020    CO2 25 05/26/2020    GLU 85 05/26/2020    BUN 15 05/26/2020    CREATININE 0.59 05/26/2020    CALCIUM 9.0 05/26/2020    PROT 7.5 05/26/2020    ALBUMIN 4.2 05/26/2020    BILITOT 0.7 05/26/2020    ALKPHOS 91 05/26/2020    AST 19 05/26/2020    ALT 12 05/26/2020    ANIONGAP 9 05/26/2020    ESTGFRAFRICA >60.0 05/26/2020    EGFRNONAA >60.0 05/26/2020    WBC 6.01 05/26/2020    HGB 13.4 05/26/2020    HGB 14.2 07/13/2019    HCT 41.3 05/26/2020    MCV 90 05/26/2020     05/26/2020    TSH 2.220 05/26/2020    HEPCAB Negative 01/23/2016       No results found for: LH, FSH, TOTALTESTOST, PROGESTERONE, ESTRADIOL, [id number], [id number], FERRITIN, IRON, TRANSFERRIN, TIBC, FESATURATED, ZINC      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, l mandible, 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 Dr Viktor Toure    Paroxysmal atrioventricular tachycardia     Plantar fasciitis     Right knee meniscal tear     Dr Mayfield, tx conservatively    Severe obesity (BMI 35.0-35.9 with comorbidity) 1/24/2017    Stress incontinence, female 03/28/2018    After HYST, Kegels & PT  didn't work, worse at night wearing pads, Dr Infante    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    Thyroid disease      Past  "Surgical History:   Procedure Laterality Date    APPENDECTOMY      HYSTERECTOMY  1990    TAHBSO for fibroids    MANDIBLE SURGERY  2015    L mandible, Dr Toure    MANDIBLE SURGERY Left 8/27/2019    OOPHORECTOMY      TONSILLECTOMY       Social History     Social History Narrative    Retired 2012,  [employer], 3 children, nonsmoker, ETOH socially, GYN here (no need for pap HYST for fibroids), colonoscopy normal 58, rec repeat at 68     Family History   Problem Relation Age of Onset    Cancer Mother         stomach, liver    Breast cancer Neg Hx     Ovarian cancer Neg Hx     Cervical cancer Neg Hx     Endometrial cancer Neg Hx     Vaginal cancer Neg Hx     Melanoma Neg Hx      Vitals:    06/02/20 1019 06/02/20 1109   BP: (!) 140/74 138/74   Pulse: (!) 57    Temp: 97.7 °F (36.5 °C)    SpO2: 98%    Weight: 121.4 kg (267 lb 10.2 oz)    Height: 5' 7" (1.702 m)      Objective:   Physical Exam   Constitutional: She is oriented to person, place, and time. She appears well-developed and well-nourished.   HENT:   Head: Normocephalic and atraumatic.   Right Ear: External ear normal.   Left Ear: External ear normal.   Nose: Nose normal.   Mouth/Throat: Oropharynx is clear and moist.   Eyes: Pupils are equal, round, and reactive to light. EOM are normal.   Neck: Neck supple. No thyromegaly present.   Cardiovascular: Normal rate, regular rhythm, normal heart sounds and intact distal pulses.   No murmur heard.  Pulmonary/Chest: Effort normal and breath sounds normal. She has no wheezes.   Abdominal: Soft. Bowel sounds are normal. She exhibits no distension and no mass. There is no tenderness. There is no rebound and no guarding.   Musculoskeletal: She exhibits no edema.   Lymphadenopathy:     She has no cervical adenopathy.   Neurological: She is alert and oriented to person, place, and time.   Skin: Skin is warm and dry.   Psychiatric: She has a normal mood and affect. Her behavior is normal.     Assessment:     1. " Paroxysmal A-fib    2. Essential hypertension    3. Subclinical iodine-deficiency hypothyroidism    4. MATTHEW (obstructive sleep apnea)    5. Asymptomatic microscopic hematuria    6. Stress incontinence, female    7. Postmenopausal      Plan:     Orders Placed This Encounter    DXA Bone Density Spine And Hip    Ambulatory referral/consult to Urogynecology    Hypertension [facility] (HDMP) Enrollment Order    levothyroxine (SYNTHROID) 25 MCG tablet    fluticasone propionate (FLONASE) 50 mcg/actuation nasal spray     Follow [facility] cardiologist yearly     She does not want surgery for urinary incontinence, but is open to pessary.     Continue meds    ==============================================  FOR HIGH BLOOD PRESSURE (HYPERTENSION)-------------    Take your medication every day     Try to stop these things that can elevate blood pressure: salt, caffeine, energy drinks, diet pills, sudafed, alcohol , taking NSAIDS daily (advil, alleve, ibuprofen, naproxen) and birth control    DECREASE ALCOHOL CONSUMPTION    DECREASE SALT (fast foods, frozen, canned, processed foods, ham, turkey, fried foods, chips, crackers, etc) & drink 8 glasses of water a day with minimal caffeine ( 1 cup a day)   ==============================================      ==============================================================  I'd like to advise you on current CANCER SCREENING recommendations:  ~~~~~~~~~~~~~~~~~~~~~~~~~~~~~~~~~~~~~~~~~~~~~~~~~~~~~~~~~~~~~  Had HYSTERECTOMY  MAMMOGRAM  every 1-2 years, from 50 - 74 years old. We can discuss your risk at 40 & determine whether to get mammogram sooner  Of course, I can perform this sooner if there is family history of cancer, or if you have problems or questions    ==============================  RECOMMENDATIONS FOR FEMALES  ==============================  Your #1 MEDICINE is DAILY EXERCISE - 15-20 minutes of huffing & puffing EVERY DAY.     Prevent the #1 cause of death- cardiovascular  disease (HEART ATTACK & STROKE) by checking for normal blood pressure, cholesterol, sugars, & by not smoking.     VACCINES: Yearly FLU shot, PNEUMONIA shot after 65,  SHINGLES shot after 50    Screening colonoscopy at AGE  50 & every 10 years to check for COLON CANCER,  one of the most common & preventable cancers (Or FIT kit yearly) Repeat in 3 years if POLYP found     I recommend  high fiber (5 fresh fruits or vegetables daily), low fat diet and aerobic  exercise (huffing/ puffing/ sweating for 20 min straight at least 4 days a week)    Follow up yearly with mammogram, fasting lipids, CMP, CBC prior.   ==============================================================      There are no Patient Instructions on file for this visit.                            Answers for HPI/ROS submitted by the patient on 5/30/2020   activity change: No  unexpected weight change: No  neck pain: No  hearing loss: No  rhinorrhea: No  trouble swallowing: No  eye discharge: No  visual disturbance: No  chest tightness: No  wheezing: No  chest pain: No  palpitations: No  blood in stool: No  constipation: No  vomiting: No  diarrhea: No  polydipsia: No  polyuria: No  difficulty urinating: No  hematuria: No  menstrual problem: No  dysuria: No  joint swelling: No  arthralgias: No  headaches: No  weakness: No  confusion: No  dysphoric mood: No

## 2020-06-04 ENCOUNTER — HOSPITAL ENCOUNTER (OUTPATIENT)
Dept: RADIOLOGY | Facility: HOSPITAL | Age: 70
Discharge: HOME | End: 2020-06-04
Attending: FAMILY MEDICINE
Payer: MEDICARE

## 2020-06-04 DIAGNOSIS — Z78.0 POSTMENOPAUSAL: ICD-10-CM

## 2020-06-04 PROBLEM — M85.852 OSTEOPENIA OF NECK OF LEFT FEMUR: Status: ACTIVE | Noted: 2020-06-04

## 2020-06-04 PROCEDURE — 77080 DXA BONE DENSITY AXIAL: CPT | Mod: TC,HCNC,PO

## 2020-06-11 ENCOUNTER — PATIENT OUTREACH (OUTPATIENT)
Dept: ADMINISTRATIVE | Facility: OTHER | Age: 70
End: 2020-06-11

## 2020-06-11 DIAGNOSIS — Z12.11 ENCOUNTER FOR FIT (FECAL IMMUNOCHEMICAL TEST) SCREENING: Primary | ICD-10-CM

## 2020-06-11 NOTE — PROGRESS NOTES
Care Everywhere: updated  Immunization: updated  Health Maintenance: updated  Media Review: reviewed for outside colon cancer report  Legacy Review: n/a  Order placed: fecal immunochemical test  Upcoming appts:n/a

## 2020-06-12 ENCOUNTER — OFFICE VISIT (OUTPATIENT)
Dept: UROGYNECOLOGY | Facility: CLINIC | Age: 70
End: 2020-06-12
Payer: MEDICARE

## 2020-06-12 VITALS
BODY MASS INDEX: 42.32 KG/M2 | HEIGHT: 67 IN | WEIGHT: 269.63 LBS | DIASTOLIC BLOOD PRESSURE: 58 MMHG | SYSTOLIC BLOOD PRESSURE: 122 MMHG

## 2020-06-12 DIAGNOSIS — N95.2 VAGINAL ATROPHY: ICD-10-CM

## 2020-06-12 DIAGNOSIS — N39.41 URGE INCONTINENCE: Primary | ICD-10-CM

## 2020-06-12 DIAGNOSIS — N39.3 STRESS INCONTINENCE, FEMALE: ICD-10-CM

## 2020-06-12 PROCEDURE — 1101F PR PT FALLS ASSESS DOC 0-1 FALLS W/OUT INJ PAST YR: ICD-10-PCS | Mod: HCNC,CPTII,S$GLB, | Performed by: NURSE PRACTITIONER

## 2020-06-12 PROCEDURE — 87077 CULTURE AEROBIC IDENTIFY: CPT | Mod: HCNC

## 2020-06-12 PROCEDURE — 87186 SC STD MICRODIL/AGAR DIL: CPT | Mod: HCNC

## 2020-06-12 PROCEDURE — 99214 PR OFFICE/OUTPT VISIT, EST, LEVL IV, 30-39 MIN: ICD-10-PCS | Mod: 25,HCNC,S$GLB, | Performed by: NURSE PRACTITIONER

## 2020-06-12 PROCEDURE — 1159F MED LIST DOCD IN RCRD: CPT | Mod: HCNC,S$GLB,, | Performed by: NURSE PRACTITIONER

## 2020-06-12 PROCEDURE — 3074F PR MOST RECENT SYSTOLIC BLOOD PRESSURE < 130 MM HG: ICD-10-PCS | Mod: HCNC,CPTII,S$GLB, | Performed by: NURSE PRACTITIONER

## 2020-06-12 PROCEDURE — 99999 PR PBB SHADOW E&M-EST. PATIENT-LVL IV: ICD-10-PCS | Mod: PBBFAC,HCNC,, | Performed by: NURSE PRACTITIONER

## 2020-06-12 PROCEDURE — 3074F SYST BP LT 130 MM HG: CPT | Mod: HCNC,CPTII,S$GLB, | Performed by: NURSE PRACTITIONER

## 2020-06-12 PROCEDURE — 1159F PR MEDICATION LIST DOCUMENTED IN MEDICAL RECORD: ICD-10-PCS | Mod: HCNC,S$GLB,, | Performed by: NURSE PRACTITIONER

## 2020-06-12 PROCEDURE — 51701 PR INSERTION OF NON-INDWELLING BLADDER CATHETERIZATION FOR RESIDUAL UR: ICD-10-PCS | Mod: HCNC,S$GLB,, | Performed by: NURSE PRACTITIONER

## 2020-06-12 PROCEDURE — 3078F PR MOST RECENT DIASTOLIC BLOOD PRESSURE < 80 MM HG: ICD-10-PCS | Mod: HCNC,CPTII,S$GLB, | Performed by: NURSE PRACTITIONER

## 2020-06-12 PROCEDURE — 87086 URINE CULTURE/COLONY COUNT: CPT | Mod: HCNC

## 2020-06-12 PROCEDURE — 99999 PR PBB SHADOW E&M-EST. PATIENT-LVL IV: CPT | Mod: PBBFAC,HCNC,, | Performed by: NURSE PRACTITIONER

## 2020-06-12 PROCEDURE — 1126F AMNT PAIN NOTED NONE PRSNT: CPT | Mod: HCNC,S$GLB,, | Performed by: NURSE PRACTITIONER

## 2020-06-12 PROCEDURE — 87088 URINE BACTERIA CULTURE: CPT | Mod: HCNC

## 2020-06-12 PROCEDURE — 1126F PR PAIN SEVERITY QUANTIFIED, NO PAIN PRESENT: ICD-10-PCS | Mod: HCNC,S$GLB,, | Performed by: NURSE PRACTITIONER

## 2020-06-12 PROCEDURE — 99214 OFFICE O/P EST MOD 30 MIN: CPT | Mod: 25,HCNC,S$GLB, | Performed by: NURSE PRACTITIONER

## 2020-06-12 PROCEDURE — 51701 INSERT BLADDER CATHETER: CPT | Mod: HCNC,S$GLB,, | Performed by: NURSE PRACTITIONER

## 2020-06-12 PROCEDURE — 3078F DIAST BP <80 MM HG: CPT | Mod: HCNC,CPTII,S$GLB, | Performed by: NURSE PRACTITIONER

## 2020-06-12 PROCEDURE — 1101F PT FALLS ASSESS-DOCD LE1/YR: CPT | Mod: HCNC,CPTII,S$GLB, | Performed by: NURSE PRACTITIONER

## 2020-06-12 RX ORDER — OXYBUTYNIN CHLORIDE 10 MG/1
10 TABLET, EXTENDED RELEASE ORAL DAILY
Qty: 30 TABLET | Refills: 12 | Status: SHIPPED | OUTPATIENT
Start: 2020-06-12 | End: 2021-06-10 | Stop reason: ALTCHOICE

## 2020-06-12 NOTE — PROGRESS NOTES
[facility] UROGYNECOLOGY-[id number]   [address] [address]    Flores Olsen Richardson  [id number]  1950  June 12, 2020    Consulting Physician: Naz Condon MD   Primary M.D.: Naz Condon MD    Chief Complaint   Patient presents with    Pessary Check    Urinary Incontinence       HPI:     1)  UI:  (+) ANNIKA < (+) UUI  X 10years.  (+) pads:2-3/day, usually minimum wetness and 1/night usually moderate wetness.  Daytime frequency: Q 1 hours.  Nocturia: Yes: 1/night.   (--) dysuria,  (--) hematuria,  (--) frequent UTIs.  (+) complete bladder emptying.   --did complete pelvic floor therapy with Bere Anna last year    2)  POP:  Absent. {Desc;  Symptoms:(--)    (--) vaginal bleeding. (--) vaginal discharge. (--) sexually active.   (+)  Vaginal dryness.  (--) vaginal estrogen use.     3)  BM:  (--) constipation/straining.  (--) chronic diarrhea. (--) hematochezia.  (--) fecal incontinence.  (--) fecal smearing/urgency.  (+) complete evacuation.      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, l mandible, 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 Dr Viktor Toure    Osteopenia of neck of left femur 6/4/2020    Paroxysmal atrioventricular tachycardia     Plantar fasciitis     Right knee meniscal tear     Dr Mayfield, tx conservatively    Severe obesity (BMI 35.0-35.9 with comorbidity) 1/24/2017    Stress incontinence, female 03/28/2018    After HYST, Kegels & PT  didn't work, worse at night wearing pads, Dr Infante     Subclinical iodine-deficiency hypothyroidism 2013    Thyroid disease         Past Surgical History  Past Surgical History:   Procedure Laterality Date    APPENDECTOMY      HYSTERECTOMY      TAHBSO for fibroids    MANDIBLE SURGERY  2015    L jamie, Dr Toure    MANDIBLE SURGERY Left 2019    OOPHORECTOMY      TONSILLECTOMY          Hysterectomy: Yes -   Date: .  Indication: abnormal uterine bleeding/ fibroids.    Type:  vaginal  Cervix present: No  Ovaries present: No  Other procedures at time of hysterectomy:  n/a    Past Ob History     x 2.  Largest infant weight: 8# 5 oz  yes FAVD. yes episiotomy.      Gynecologic History  LMP: No LMP recorded. Patient has had a hysterectomy.  Age of menarche: 12  Age of menopause: hyst -- stopped premarin in  due to afib-- had hot flashes  Menstrual history: metrorrhagia  Pap test: post hyst History of abnormal paps: No.  History of STIs:  No  Mammogram: Date of last: 2020 Result: Normal  Colonoscopy: Date of last: 2018.  Result:  External hemorrhoids.  Repeat due:  10 years.  --doing cologuard now  DEXA:  Date of last:2020  Result:  normal.     Family History  Family History   Problem Relation Age of Onset    Cancer Mother         stomach, liver    Breast cancer Neg Hx     Ovarian cancer Neg Hx     Cervical cancer Neg Hx     Endometrial cancer Neg Hx     Vaginal cancer Neg Hx     Melanoma Neg Hx       Colon CA: No  Breast CA: No  GYN CA: No   CA: No    Social History  Social History     Tobacco Use   Smoking Status Never Smoker   Smokeless Tobacco Never Used   .     Social History     Substance and Sexual Activity   Alcohol Use No   .    Social History     Substance and Sexual Activity   Drug Use No   .  The patient is .  Resides in [place] [address].  Employment status: retired [employer] office    Allergies  Review of patient's allergies indicates:   Allergen Reactions    Decongestant d  [pseudoephedrine-dm]      Atrial Fibrillation    Augmentin [amoxicillin-pot clavulanate]      palpitations      Amoxicillin Palpitations    Diphenhydramine-pseudoephed Palpitations     TACHYCARDIA    Povidone-iodine Rash     Mild erythema of skin       Medications  Current Outpatient Medications on File Prior to Visit   Medication Sig Dispense Refill    clobetasol (TEMOVATE) 0.05 % cream Apply topically 2 (two) times daily. To rash on left breast area until resolved 45 g 1    diltiaZEM (CARDIZEM CD) 120 MG Cp24 TAKE 1 CAPSULE EVERY DAY 90 capsule 3    ELIQUIS 5 mg Tab TAKE 1 TABLET TWICE DAILY 180 tablet 3    flecainide (TAMBOCOR) 150 MG Tab 2 tabs (300 mg total) up to once per day for atrial fibrillation. 10 tablet 3    fluocinonide 0.05% (LIDEX) 0.05 % cream Apply topically 2 (two) times daily.      fluticasone propionate (FLONASE) 50 mcg/actuation nasal spray Each nares Nasal spray qd 33 mL 3    ketoconazole (NIZORAL) 2 % cream Apply topically 2 (two) times daily. To dry skin on forehead eyebrows and nose folds 60 g 2    levothyroxine (SYNTHROID) 25 MCG tablet Take 1 tablet (25 mcg total) by mouth once daily. 90 tablet 3    metoprolol succinate (TOPROL-XL) 25 MG 24 hr tablet TAKE 1 TABLET EVERY DAY 90 tablet 3    pravastatin (PRAVACHOL) 40 MG tablet TAKE 1 TABLET EVERY DAY 90 tablet 0     No current facility-administered medications on file prior to visit.        Review of Systems A 14 point ROS was reviewed with pertinent positives as noted above in the history of present illness.      Constitutional: negative  Eyes: negative  Endocrine: negative  Gastrointestinal: negative  Cardiovascular: negative  Respiratory: negative  Allergic/Immunologic: negative  Integumentary: negative  Psychiatric: negative  Musculoskeletal: negative   Ear/Nose/Throat: negative  Neurologic: negative  Genitourinary: SEE HPI  Hematologic/Lymphatic: negative   Breast: negative    Urogynecologic Exam  BP (!) 122/58 (BP  "Location: Right arm, Patient Position: Sitting)   Ht 5' 7" (1.702 m)   Wt 122.3 kg (269 lb 10 oz)   BMI 42.23 kg/m²     GENERAL APPEARANCE:  The patient is well-developed, well-nourished.   Neck:  Supple with no thyromegaly, no carotid bruits.  Heart:  Regular rate and rhythm, no murmurs, rubs or gallops.  Lungs:  Clear.  No CVA tenderness.  Abdomen:  Soft, nontender, nondistended, no hepatosplenomegaly.      PELVIC:    External genitalia:  Normal Bartholins, Skenes and labia bilaterally.    Urethra:  No caruncle, diverticulum or masses.  (+) hypermobility.    Vagina:  Atrophy (+) , no bladder masses or tender, no discharge.    Cervix:  absent  Uterus: uterus absent  Adnexa: Not palpable.    POP-Q:  Aa 0; Ba 0; C -8; Ap -3; Bp -3; D n/a.  Genital hiatus 4 cm, perineal body 2.5 total vaginal length 8.      NEUROLOGIC:  Cranial nerves 2 through 12 intact.  Strength 5/5.  DTRs 2+ lower extremities.  S2 through 4 normal.  Sacral reflexes intact.    EXT: NAZARIO, 2+ pulses bilaterally, no C/C/E    COUGH STRESS TEST:  negative  KEGEL: 3 /5    RECTAL:    External:  Normal, (--) hemorrhoids, (--) dovetailing.   Internal:   deferred    PVR: 20 mL    Impression    1. Urge incontinence    2. Stress incontinence, female    3. Vaginal atrophy        Initial Plan  The patient was counseled regarding these issues. The patient was given a summary sheet containing each of these issues with possible options for evaluation and management. When appropriate, we also reviewed computer-generated diagrams specific to their diagnoses..  All questions were addressed to the patient's satisfaction.     1..Mixed urinary incontinence, urge > stress:    --Empty bladder every 3 hours.  Empty well: wait a minute, lean forward on toilet.    --Avoid dietary irritants (see sheet).  Keep diary x 3-5 days to determine your irritants. Keep 3 days now and 3 days after 1 month of PT and oxybutynin  --KEGELS: do 10 in AM and 10 in PM, holding each x 10 " seconds.  When you feel urge to go, STOP, KEGEL, and when urge has passed, then go to bathroom.    --start pelvic floor PT with Bere Anna ([facility]: [address] [place] [address]).  Phone: [phone].  Fax:  [phone].\  --URGE: trial oxybutynin xl 10 mg daily. Takes 2-4 weeks to see if will have effect.  For dry mouth: get sour, sugar free lozenge or gum.    --STRESS:  Pessary vs. Sling.   --urine culture/micro today     2. Vaginal atrophy (dryness):   Use REPLENS or REFRESH OTC: 1/2 applicator full in vagina twice a week.       3. Nocturia (nighttime urination): stop fluids 2 hours before bed.  If have leg swelling:  Elevate feet above chest x 1 hour before bed to get excess fluid off.  Can also use support hose (knee highs).    --continue cpcp     4. Weight loss  --discussed healthy eating options    5.midline cystocele  --asymptomatic at this time    6. RTC 2 months    Approximately 30 min were spent in consult, 90 % in discussion.     Thank you for requesting consultation of your patient.  I look forward to participating in their care.    Anette Infante  Female Pelvic Medicine and Reconstructive Surgery  [facility] [place], [place]

## 2020-06-12 NOTE — LETTER
June 12, 2020      Naz Condon MD  [address]  [facility] [address]           [facility] UroGynecology-[id number]   [address] [address]  Phone: [phone]          Patient: Flores Fuentes   MR Number: [id number]   YOB: 1950   Date of Visit: 6/12/2020       Dear Dr. Naz Condon:    Thank you for referring Flores Fuentes to me for evaluation. Attached you will find relevant portions of my assessment and plan of care.    If you have questions, please do not hesitate to call me. I look forward to following Flores Fuentes along with you.    Sincerely,    Anette Infante, BIANCA    Enclosure  CC:  No Recipients    If you would like to receive this communication electronically, please contact [email or web address] or [phone] to request more information on [employer] Link access.    For providers and/or their staff who would like to refer a patient to [facility], please contact us through our one-stop-shop provider referral line, [facility] , at [phone].    If you feel you have received this communication in error or would no longer like to receive these types of communications, please e-mail [email or web address]

## 2020-06-12 NOTE — PATIENT INSTRUCTIONS
1..Mixed urinary incontinence, urge > stress:    --Empty bladder every 3 hours.  Empty well: wait a minute, lean forward on toilet.    --Avoid dietary irritants (see sheet).  Keep diary x 3-5 days to determine your irritants. Keep 3 days now and 3 days after 1 month of PT and oxybutynin  --KEGELS: do 10 in AM and 10 in PM, holding each x 10 seconds.  When you feel urge to go, STOP, KEGEL, and when urge has passed, then go to bathroom.    --continue pelvic floor home exercises (consider restarting PT)  --URGE: trial oxybutynin xl 10 mg daily. Takes 2-4 weeks to see if will have effect.  For dry mouth: get sour, sugar free lozenge or gum.    --STRESS:  Pessary vs. Sling.   --urine culture/micro today  --consider suds if oxybutynin does not help.     2. Vaginal atrophy (dryness):   Use REPLENS or REFRESH OTC: 1/2 applicator full in vagina twice a week.     3. Nocturia (nighttime urination): stop fluids 2 hours before bed.  If have leg swelling:  Elevate feet above chest x 1 hour before bed to get excess fluid off.  Can also use support hose (knee highs).    --continue cpcp     4. Weight loss  --discussed healthy eating options    5.midline cystocele  --asymptomatic at this time    6. RTC 2 months    ___________________________________________________________________    Bladder Irritants  Certain foods and drinks have been associated with worsening symptoms of urinary frequency, urgency, urge incontinence, or  bladder pain. If you suffer from any of these conditions, you may wish to try eliminating one or more of these foods from your  diet and see if your symptoms improve. If bladder symptoms are related to dietary factors, strict adherence to a diet that  eliminates the food should bring marked relief in 10 days. Once you are feeling better, you can begin to add foods back into  your diet, one at a time. If symptoms return, you will be able to identify the irritant. As you add foods back to your diet it is  very  important that you drink significant amounts of water.  Low-acid fruit substitutions include apricots, papaya, pears and watermelon. Coffee drinkers can drink Kava or other lowacid  instant drinks. Tea drinkers can substitute non-citrus herbal and sun brewed teas. Calcium carbonate co-buffered with  calcium ascorbate can be substituted for Vitamin C. Prelief is a dietary supplement that works as an acid blocker for the  bladder.  Where to get more information:   Overcoming Bladder Disorders by Hodan Maria and Gorge Garduno, 1990   You Dont Have to Live with Cystitis! By Chica Farnsworth, 1988  List of Common Bladder Irritants*  Alcoholic beverages  Apples and apple juice  Cantaloupe  Carbonated beverages  Chili and spicy foods  Chocolate  Citrus fruit  Coffee (including decaffeinated)  Cranberries and cranberry juice  Grapes  Guava  Milk Products: milk, cheese, cottage cheese, yogurt, ice cream  Peaches  Pineapple  Plums  Strawberries  Sugar especially artificial sweeteners, saccharin, aspartame, corn sweeteners, honey, fructose, sucrose, lactose  Tea  Tomatoes and tomato juice  Vitamin B complex  Vinegar  *Most people are not sensitive to ALL of these products; your goal is to find the foods that make YOUR  symptoms worse

## 2020-06-14 LAB — BACTERIA UR CULT: ABNORMAL

## 2020-06-15 ENCOUNTER — PATIENT MESSAGE (OUTPATIENT)
Dept: UROGYNECOLOGY | Facility: CLINIC | Age: 70
End: 2020-06-15

## 2020-06-15 ENCOUNTER — TELEPHONE (OUTPATIENT)
Dept: UROGYNECOLOGY | Facility: CLINIC | Age: 70
End: 2020-06-15

## 2020-06-15 DIAGNOSIS — N30.00 ACUTE CYSTITIS WITHOUT HEMATURIA: Primary | ICD-10-CM

## 2020-06-15 RX ORDER — SULFAMETHOXAZOLE AND TRIMETHOPRIM 800; 160 MG/1; MG/1
1 TABLET ORAL 2 TIMES DAILY
Qty: 6 TABLET | Refills: 0 | Status: SHIPPED | OUTPATIENT
Start: 2020-06-15 | End: 2021-06-10 | Stop reason: ALTCHOICE

## 2020-07-11 LAB — HEMOCCULT STL QL IA: POSITIVE

## 2020-07-13 ENCOUNTER — PATIENT OUTREACH (OUTPATIENT)
Dept: ADMINISTRATIVE | Facility: OTHER | Age: 70
End: 2020-07-13

## 2020-07-14 ENCOUNTER — OFFICE VISIT (OUTPATIENT)
Dept: SLEEP MEDICINE | Facility: CLINIC | Age: 70
End: 2020-07-14
Payer: MEDICARE

## 2020-07-14 DIAGNOSIS — G47.33 OSA (OBSTRUCTIVE SLEEP APNEA): Primary | ICD-10-CM

## 2020-07-14 PROCEDURE — 99212 OFFICE O/P EST SF 10 MIN: CPT | Mod: HCNC,95,, | Performed by: INTERNAL MEDICINE

## 2020-07-14 PROCEDURE — 1101F PT FALLS ASSESS-DOCD LE1/YR: CPT | Mod: HCNC,CPTII,95, | Performed by: INTERNAL MEDICINE

## 2020-07-14 PROCEDURE — 99212 PR OFFICE/OUTPT VISIT, EST, LEVL II, 10-19 MIN: ICD-10-PCS | Mod: HCNC,95,, | Performed by: INTERNAL MEDICINE

## 2020-07-14 PROCEDURE — 1101F PR PT FALLS ASSESS DOC 0-1 FALLS W/OUT INJ PAST YR: ICD-10-PCS | Mod: HCNC,CPTII,95, | Performed by: INTERNAL MEDICINE

## 2020-07-14 PROCEDURE — 1159F MED LIST DOCD IN RCRD: CPT | Mod: HCNC,95,, | Performed by: INTERNAL MEDICINE

## 2020-07-14 PROCEDURE — 1159F PR MEDICATION LIST DOCUMENTED IN MEDICAL RECORD: ICD-10-PCS | Mod: HCNC,95,, | Performed by: INTERNAL MEDICINE

## 2020-07-14 NOTE — PROGRESS NOTES
Chart reviewed.   Immunizations: Triggered Imm Registry     Orders placed: n/a  Upcoming appts to satisfy DALI topics: n/a

## 2020-07-14 NOTE — PROGRESS NOTES
Subjective:       Patient ID: Flores Fuentes is a 69 y.o. female.    Chief Complaint: Sleep Apnea    HPI     Flores Fuentes returns for PAP follow up for compliance documentation    Flores Fuentes has a history of mild Obstructive Sleep Apnea diagnosed in 2020 (AHI 13).   The last PAP titration was NA with AHI NA at NA cm H20.   Device is 2-3 month(s) old.   Current setting 5-11 cm H20.   DME is [facility].      Flores Fuentes is using CPAP 100% of nights and averages 7 hours and 49 minutes.   Device use greater than 4 hours is 100%.   Patient does not have problems with the pressure setting.   Mask interface issues are not experienced.   A nasal mask interface is used.   The patient does experience side effects from therapy including mouth dryness.   The patient experiences the following benefits of therapy:  more rested, reduced morbidity, reduced accident risk, reduced mortality, reduced cardiovascular risk and less sleep disruption   There are not equipment issues.   Mean pressure 5.9, average peak pressure 8.2 and 90th percentile pressure 7.5.   Estimated AHI 3.8.        Scheduled Meds:  Continuous Infusions:  PRN Meds:.           Importance of PAP compliance discussed.   Care and use of equipment discussed.   Download and relevant sleep studies reviewed with patient    Discussed therapeutic goals for positive airway pressure therapy(CPAP or BiPAP).  Ideal is usage 100% of nights for at least 6 hours per night.  Minimum usage is 70% of night for at least 4 hours per night used    The patient was given open opportunity to ask questions and/or express concerns about treatment plan.   All questions/concerns were discussed.            Review of Systems      Objective:      Physical Exam    Assessment:       1. MATTHEW (obstructive sleep apnea)        Plan:       No change.   Follow up 1 year.       The patient location is: home  The chief complaint leading to consultation is: compliance    Visit  type: audiovisual    Face to Face time with patient:   12 minutes of total time spent on the encounter, which includes face to face time and non-face to face time preparing to see the patient (eg, review of tests), Obtaining and/or reviewing separately obtained history, Documenting clinical information in the electronic or other health record, Independently interpreting results (not separately reported) and communicating results to the patient/family/caregiver, or Care coordination (not separately reported).         Each patient to whom he or she provides medical services by telemedicine is:  (1) informed of the relationship between the physician and patient and the respective role of any other health care provider with respect to management of the patient; and (2) notified that he or she may decline to receive medical services by telemedicine and may withdraw from such care at any time.    Notes:

## 2020-07-22 ENCOUNTER — PATIENT MESSAGE (OUTPATIENT)
Dept: PRIMARY CARE CLINIC | Facility: CLINIC | Age: 70
End: 2020-07-22

## 2020-07-22 ENCOUNTER — TELEPHONE (OUTPATIENT)
Dept: GASTROENTEROLOGY | Facility: CLINIC | Age: 70
End: 2020-07-22

## 2020-07-22 NOTE — TELEPHONE ENCOUNTER
MA offered pt appt with Dr. Ac on 7/23/20 at 8:30 am.    Pt confirmed appt and thank MA     Message routed to Manuela to schedule appt.   ----- Message from Saira Diamond sent at 7/22/2020  1:36 PM CDT -----  Regarding: pt  Patient Requesting Sooner Appointment.     Reason for sooner appt.: pt is calling to speak with the nurse pt is a new pt coming in for blood in stool pt is being referred by Naz Condon   When is the first available appointment? N/A   Communication Preference: can you please call pt at [phone]  Additional Information: none    CHANTE

## 2020-07-23 ENCOUNTER — TELEPHONE (OUTPATIENT)
Dept: ENDOSCOPY | Facility: HOSPITAL | Age: 70
End: 2020-07-23

## 2020-07-23 ENCOUNTER — OFFICE VISIT (OUTPATIENT)
Dept: GASTROENTEROLOGY | Facility: CLINIC | Age: 70
End: 2020-07-23
Payer: MEDICARE

## 2020-07-23 VITALS
BODY MASS INDEX: 41.46 KG/M2 | HEART RATE: 59 BPM | HEIGHT: 67 IN | SYSTOLIC BLOOD PRESSURE: 118 MMHG | WEIGHT: 264.13 LBS | DIASTOLIC BLOOD PRESSURE: 62 MMHG

## 2020-07-23 DIAGNOSIS — Z12.11 SPECIAL SCREENING FOR MALIGNANT NEOPLASMS, COLON: Primary | ICD-10-CM

## 2020-07-23 DIAGNOSIS — K92.1 BLOOD IN STOOL: Primary | ICD-10-CM

## 2020-07-23 DIAGNOSIS — Z01.818 PRE-OP TESTING: Primary | ICD-10-CM

## 2020-07-23 PROCEDURE — 99999 PR PBB SHADOW E&M-EST. PATIENT-LVL III: CPT | Mod: PBBFAC,HCNC,, | Performed by: INTERNAL MEDICINE

## 2020-07-23 PROCEDURE — 99999 PR PBB SHADOW E&M-EST. PATIENT-LVL III: ICD-10-PCS | Mod: PBBFAC,HCNC,, | Performed by: INTERNAL MEDICINE

## 2020-07-23 PROCEDURE — 1126F PR PAIN SEVERITY QUANTIFIED, NO PAIN PRESENT: ICD-10-PCS | Mod: HCNC,S$GLB,, | Performed by: INTERNAL MEDICINE

## 2020-07-23 PROCEDURE — 3078F PR MOST RECENT DIASTOLIC BLOOD PRESSURE < 80 MM HG: ICD-10-PCS | Mod: HCNC,CPTII,S$GLB, | Performed by: INTERNAL MEDICINE

## 2020-07-23 PROCEDURE — 3008F BODY MASS INDEX DOCD: CPT | Mod: HCNC,CPTII,S$GLB, | Performed by: INTERNAL MEDICINE

## 2020-07-23 PROCEDURE — 1126F AMNT PAIN NOTED NONE PRSNT: CPT | Mod: HCNC,S$GLB,, | Performed by: INTERNAL MEDICINE

## 2020-07-23 PROCEDURE — 99204 PR OFFICE/OUTPT VISIT, NEW, LEVL IV, 45-59 MIN: ICD-10-PCS | Mod: HCNC,S$GLB,, | Performed by: INTERNAL MEDICINE

## 2020-07-23 PROCEDURE — 1101F PR PT FALLS ASSESS DOC 0-1 FALLS W/OUT INJ PAST YR: ICD-10-PCS | Mod: HCNC,CPTII,S$GLB, | Performed by: INTERNAL MEDICINE

## 2020-07-23 PROCEDURE — 1101F PT FALLS ASSESS-DOCD LE1/YR: CPT | Mod: HCNC,CPTII,S$GLB, | Performed by: INTERNAL MEDICINE

## 2020-07-23 PROCEDURE — 3074F PR MOST RECENT SYSTOLIC BLOOD PRESSURE < 130 MM HG: ICD-10-PCS | Mod: HCNC,CPTII,S$GLB, | Performed by: INTERNAL MEDICINE

## 2020-07-23 PROCEDURE — 99204 OFFICE O/P NEW MOD 45 MIN: CPT | Mod: HCNC,S$GLB,, | Performed by: INTERNAL MEDICINE

## 2020-07-23 PROCEDURE — 1159F MED LIST DOCD IN RCRD: CPT | Mod: HCNC,S$GLB,, | Performed by: INTERNAL MEDICINE

## 2020-07-23 PROCEDURE — 3008F PR BODY MASS INDEX (BMI) DOCUMENTED: ICD-10-PCS | Mod: HCNC,CPTII,S$GLB, | Performed by: INTERNAL MEDICINE

## 2020-07-23 PROCEDURE — 1159F PR MEDICATION LIST DOCUMENTED IN MEDICAL RECORD: ICD-10-PCS | Mod: HCNC,S$GLB,, | Performed by: INTERNAL MEDICINE

## 2020-07-23 PROCEDURE — 3074F SYST BP LT 130 MM HG: CPT | Mod: HCNC,CPTII,S$GLB, | Performed by: INTERNAL MEDICINE

## 2020-07-23 PROCEDURE — 3078F DIAST BP <80 MM HG: CPT | Mod: HCNC,CPTII,S$GLB, | Performed by: INTERNAL MEDICINE

## 2020-07-23 RX ORDER — POLYETHYLENE GLYCOL 3350, SODIUM SULFATE ANHYDROUS, SODIUM BICARBONATE, SODIUM CHLORIDE, POTASSIUM CHLORIDE 236; 22.74; 6.74; 5.86; 2.97 G/4L; G/4L; G/4L; G/4L; G/4L
4 POWDER, FOR SOLUTION ORAL ONCE
Qty: 4000 ML | Refills: 0 | Status: SHIPPED | OUTPATIENT
Start: 2020-07-23 | End: 2020-07-23

## 2020-07-23 NOTE — H&P (VIEW-ONLY)
[facility] Consultation Note    Reason for Consult:  The encounter diagnosis was Blood in stool.    PCP:   Naz Condon   [address] [facility] [address]    Referring MD:  Naz Condon Md  [address] [place] [address]    Initial History of Present Illness (HPI):  This is a 69 y.o. female here for evaluation of blood in stool  Came back + but not in our system. Started after she went on keto diet and got constipated and straining - saw brbpr. She did her stool test 3 days after this    Abdominal pain - no  Reflux - no  Dysphagia - no   Bowel habits - normally regular every 2 days except when she went on keto diet  GI bleeding - none  NSAID usage - none        ROS:  Constitutional: No fevers, chills, No weight loss  ENT: No allergies  CV: No chest pain  Pulm: No cough, No shortness of breath  Ophtho: No vision changes  GI: see HPI  Derm: No rash  Heme: No lymphadenopathy, No bruising  MSK: No arthritis  : No dysuria, No hematuria  Endo: No hot or cold intolerance  Neuro: No syncope, No seizure  Psych: No anxiety, No depression    Medical History:  has a past medical history of Asymptomatic microscopic hematuria (6/2/2020), Atrial fibrillation (2014), Cervical muscle strain (1/24/2017), DJD (degenerative joint disease) of cervical spine (1/24/2017), Essential hypertension (6/19/2019), Hyperlipidemia, Mitral valve disease, Mixed hyperlipidemia (11/07/2013), 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    Surgical History:  has a past surgical history that includes Tonsillectomy; Appendectomy; Mandible surgery (2015); Hysterectomy (1990); Oophorectomy; and Mandible surgery  "(Left, 8/27/2019).    Family History: family history includes Cancer in her mother..     Social History:  reports that she has never smoked. She has never used smokeless tobacco. She reports that she does not drink alcohol or use drugs.    Review of patient's allergies indicates:   Allergen Reactions    Decongestant d [pseudoephedrine-dm]      Atrial Fibrillation    Augmentin [amoxicillin-pot clavulanate]      palpitations      Amoxicillin Palpitations    Diphenhydramine-pseudoephed Palpitations     TACHYCARDIA    Povidone-iodine Rash     Mild erythema of skin       Medication List with Changes/Refills   Current Medications    CLOBETASOL (TEMOVATE) 0.05 % CREAM    Apply topically 2 (two) times daily. To rash on left breast area until resolved    DILTIAZEM (CARDIZEM CD) 120 MG CP24    TAKE 1 CAPSULE EVERY DAY    ELIQUIS 5 MG TAB    TAKE 1 TABLET TWICE DAILY    FLECAINIDE (TAMBOCOR) 150 MG TAB    2 tabs (300 mg total) up to once per day for atrial fibrillation.    FLUOCINONIDE 0.05% (LIDEX) 0.05 % CREAM    Apply topically 2 (two) times daily.    FLUTICASONE PROPIONATE (FLONASE) 50 MCG/ACTUATION NASAL SPRAY    Each nares Nasal spray qd    KETOCONAZOLE (NIZORAL) 2 % CREAM    Apply topically 2 (two) times daily. To dry skin on forehead eyebrows and nose folds    LEVOTHYROXINE (SYNTHROID) 25 MCG TABLET    Take 1 tablet (25 mcg total) by mouth once daily.    METOPROLOL SUCCINATE (TOPROL-XL) 25 MG 24 HR TABLET    TAKE 1 TABLET EVERY DAY    OXYBUTYNIN (DITROPAN-XL) 10 MG 24 HR TABLET    Take 1 tablet (10 mg total) by mouth once daily.    PRAVASTATIN (PRAVACHOL) 40 MG TABLET    TAKE 1 TABLET EVERY DAY    SULFAMETHOXAZOLE-TRIMETHOPRIM 800-160MG (BACTRIM DS) 800-160 MG TAB    Take 1 tablet by mouth 2 (two) times daily.         Objective Findings:    Vital Signs:  /62 (BP Location: Right arm, Patient Position: Sitting, BP Method: Large (Automatic))   Pulse (!) 59   Ht 5' 7" (1.702 m)   Wt 119.8 kg (264 lb 1.8 oz)  "  BMI 41.37 kg/m²   Body mass index is 41.37 kg/m².    Physical Exam:  General Appearance: Well appearing in no acute distress  Head:   Normocephalic, without obvious abnormality  Eyes:    No scleral icterus, EOMI  ENT: Neck supple, Lips, mucosa, and tongue normal; teeth and gums normal  Lungs: CTA bilaterally in anterior and posterior fields, no wheezes, no crackles.  Heart:  Regular rate and rhythm, S1, S2 normal, no murmurs heard  Abdomen: Soft, non tender, non distended with positive bowel sounds in all four quadrants. No hepatosplenomegaly, ascites, or mass  Extremities: 2+ pulses, no clubbing, cyanosis or edema  Skin: No rash  Neurologic: CN II-XII intact      Labs:  Lab Results   Component Value Date    WBC 6.01 05/26/2020    HGB 13.4 05/26/2020    HCT 41.3 05/26/2020     05/26/2020    CHOL 148 05/26/2020    TRIG 82 05/26/2020    HDL 38 (L) 05/26/2020    ALT 12 05/26/2020    AST 19 05/26/2020     05/26/2020    K 4.2 05/26/2020     05/26/2020    CREATININE 0.59 05/26/2020    BUN 15 05/26/2020    CO2 25 05/26/2020    TSH 2.220 05/26/2020    INR 1.0 04/27/2017    HGBA1C 5.7 10/07/2014       No results found for: HPYLORINTERP  No results found for: HPYLORIANTIG        Imaging:    Endoscopy:    Colonoscopy - 2008 - wnl      Assessment:  1. Blood in stool           Recommendations:  1. Go ahead with colonoscopy as she is due anyway  2. Don't see need for upper based on her lack of symptoms or anemia    No follow-ups on file.      Order summary:  Orders Placed This Encounter    Case request GI: COLONOSCOPY         Thank you so much for allowing me to participate in the care of Flores Olsen Gina Ac MD

## 2020-07-23 NOTE — LETTER
July 23, 2020      Naz Condon MD  [address] [facility]  [address]  [facility] [address]           [facility] Gastroenter[facility]  [address]  [place] [address]  Phone: [phone]  Fax: [phone]          Patient: Flores Fuentes   MR Number: [id number]   YOB: 1950   Date of Visit: 7/23/2020       Dear Dr. Naz Condon:    Thank you for referring Flores Fuentes to me for evaluation. Attached you will find relevant portions of my assessment and plan of care.    If you have questions, please do not hesitate to call me. I look forward to following Flores Fuentes along with you.    Sincerely,    Angus Ac MD    Enclosure  CC:  No Recipients    If you would like to receive this communication electronically, please contact [email or web address] or [phone] to request more information on [employer] Link access.    For providers and/or their staff who would like to refer a patient to [facility], please contact us through our one-stop-shop provider referral line, [facility], at [phone].    If you feel you have received this communication in error or would no longer like to receive these types of communications, please e-mail [email or web address]

## 2020-07-23 NOTE — TELEPHONE ENCOUNTER
Per current guideline recommendations, patient can be cleared to hold Eliquis for 2 days prior to procedure and may resume at MD discretion (per Dr Servin).  Thanks

## 2020-07-23 NOTE — PROGRESS NOTES
[facility] Consultation Note    Reason for Consult:  The encounter diagnosis was Blood in stool.    PCP:   Naz Condon   [address] [facility] [address]    Referring MD:  Naz Condon Md  [address] [place] [address]    Initial History of Present Illness (HPI):  This is a 69 y.o. female here for evaluation of blood in stool  Came back + but not in our system. Started after she went on keto diet and got constipated and straining - saw brbpr. She did her stool test 3 days after this    Abdominal pain - no  Reflux - no  Dysphagia - no   Bowel habits - normally regular every 2 days except when she went on keto diet  GI bleeding - none  NSAID usage - none        ROS:  Constitutional: No fevers, chills, No weight loss  ENT: No allergies  CV: No chest pain  Pulm: No cough, No shortness of breath  Ophtho: No vision changes  GI: see HPI  Derm: No rash  Heme: No lymphadenopathy, No bruising  MSK: No arthritis  : No dysuria, No hematuria  Endo: No hot or cold intolerance  Neuro: No syncope, No seizure  Psych: No anxiety, No depression    Medical History:  has a past medical history of Asymptomatic microscopic hematuria (6/2/2020), Atrial fibrillation (2014), Cervical muscle strain (1/24/2017), DJD (degenerative joint disease) of cervical spine (1/24/2017), Essential hypertension (6/19/2019), Hyperlipidemia, Mitral valve disease, Mixed hyperlipidemia (11/07/2013), 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    Surgical History:  has a past surgical history that includes Tonsillectomy; Appendectomy; Mandible surgery (2015); Hysterectomy (1990); Oophorectomy; and Mandible surgery  "(Left, 8/27/2019).    Family History: family history includes Cancer in her mother..     Social History:  reports that she has never smoked. She has never used smokeless tobacco. She reports that she does not drink alcohol or use drugs.    Review of patient's allergies indicates:   Allergen Reactions    Decongestant d [pseudoephedrine-dm]      Atrial Fibrillation    Augmentin [amoxicillin-pot clavulanate]      palpitations      Amoxicillin Palpitations    Diphenhydramine-pseudoephed Palpitations     TACHYCARDIA    Povidone-iodine Rash     Mild erythema of skin       Medication List with Changes/Refills   Current Medications    CLOBETASOL (TEMOVATE) 0.05 % CREAM    Apply topically 2 (two) times daily. To rash on left breast area until resolved    DILTIAZEM (CARDIZEM CD) 120 MG CP24    TAKE 1 CAPSULE EVERY DAY    ELIQUIS 5 MG TAB    TAKE 1 TABLET TWICE DAILY    FLECAINIDE (TAMBOCOR) 150 MG TAB    2 tabs (300 mg total) up to once per day for atrial fibrillation.    FLUOCINONIDE 0.05% (LIDEX) 0.05 % CREAM    Apply topically 2 (two) times daily.    FLUTICASONE PROPIONATE (FLONASE) 50 MCG/ACTUATION NASAL SPRAY    Each nares Nasal spray qd    KETOCONAZOLE (NIZORAL) 2 % CREAM    Apply topically 2 (two) times daily. To dry skin on forehead eyebrows and nose folds    LEVOTHYROXINE (SYNTHROID) 25 MCG TABLET    Take 1 tablet (25 mcg total) by mouth once daily.    METOPROLOL SUCCINATE (TOPROL-XL) 25 MG 24 HR TABLET    TAKE 1 TABLET EVERY DAY    OXYBUTYNIN (DITROPAN-XL) 10 MG 24 HR TABLET    Take 1 tablet (10 mg total) by mouth once daily.    PRAVASTATIN (PRAVACHOL) 40 MG TABLET    TAKE 1 TABLET EVERY DAY    SULFAMETHOXAZOLE-TRIMETHOPRIM 800-160MG (BACTRIM DS) 800-160 MG TAB    Take 1 tablet by mouth 2 (two) times daily.         Objective Findings:    Vital Signs:  /62 (BP Location: Right arm, Patient Position: Sitting, BP Method: Large (Automatic))   Pulse (!) 59   Ht 5' 7" (1.702 m)   Wt 119.8 kg (264 lb 1.8 oz)  "  BMI 41.37 kg/m²   Body mass index is 41.37 kg/m².    Physical Exam:  General Appearance: Well appearing in no acute distress  Head:   Normocephalic, without obvious abnormality  Eyes:    No scleral icterus, EOMI  ENT: Neck supple, Lips, mucosa, and tongue normal; teeth and gums normal  Lungs: CTA bilaterally in anterior and posterior fields, no wheezes, no crackles.  Heart:  Regular rate and rhythm, S1, S2 normal, no murmurs heard  Abdomen: Soft, non tender, non distended with positive bowel sounds in all four quadrants. No hepatosplenomegaly, ascites, or mass  Extremities: 2+ pulses, no clubbing, cyanosis or edema  Skin: No rash  Neurologic: CN II-XII intact      Labs:  Lab Results   Component Value Date    WBC 6.01 05/26/2020    HGB 13.4 05/26/2020    HCT 41.3 05/26/2020     05/26/2020    CHOL 148 05/26/2020    TRIG 82 05/26/2020    HDL 38 (L) 05/26/2020    ALT 12 05/26/2020    AST 19 05/26/2020     05/26/2020    K 4.2 05/26/2020     05/26/2020    CREATININE 0.59 05/26/2020    BUN 15 05/26/2020    CO2 25 05/26/2020    TSH 2.220 05/26/2020    INR 1.0 04/27/2017    HGBA1C 5.7 10/07/2014       No results found for: HPYLORINTERP  No results found for: HPYLORIANTIG        Imaging:    Endoscopy:    Colonoscopy - 2008 - wnl      Assessment:  1. Blood in stool           Recommendations:  1. Go ahead with colonoscopy as she is due anyway  2. Don't see need for upper based on her lack of symptoms or anemia    No follow-ups on file.      Order summary:  Orders Placed This Encounter    Case request GI: COLONOSCOPY         Thank you so much for allowing me to participate in the care of Flores Olsen Gina Ac MD

## 2020-07-23 NOTE — TELEPHONE ENCOUNTER
Patient has an order for a colonoscopy  Is it ok to hold Eliquis 2 days prior to procedure?  Please advise.  Thank you.  Arina

## 2020-08-10 ENCOUNTER — LAB VISIT (OUTPATIENT)
Dept: FAMILY MEDICINE | Facility: CLINIC | Age: 70
End: 2020-08-10
Payer: MEDICARE

## 2020-08-10 DIAGNOSIS — Z01.818 PRE-OP TESTING: ICD-10-CM

## 2020-08-10 PROCEDURE — U0003 INFECTIOUS AGENT DETECTION BY NUCLEIC ACID (DNA OR RNA); SEVERE ACUTE RESPIRATORY SYNDROME CORONAVIRUS 2 (SARS-COV-2) (CORONAVIRUS DISEASE [COVID-19]), AMPLIFIED PROBE TECHNIQUE, MAKING USE OF HIGH THROUGHPUT TECHNOLOGIES AS DESCRIBED BY CMS-2020-01-R: HCPCS | Mod: HCNC

## 2020-08-11 ENCOUNTER — OFFICE VISIT (OUTPATIENT)
Dept: UROGYNECOLOGY | Facility: CLINIC | Age: 70
End: 2020-08-11
Payer: MEDICARE

## 2020-08-11 ENCOUNTER — PATIENT MESSAGE (OUTPATIENT)
Dept: ENDOSCOPY | Facility: HOSPITAL | Age: 70
End: 2020-08-11

## 2020-08-11 DIAGNOSIS — N39.46 MIXED INCONTINENCE URGE AND STRESS: Primary | ICD-10-CM

## 2020-08-11 DIAGNOSIS — N81.11 MIDLINE CYSTOCELE: ICD-10-CM

## 2020-08-11 DIAGNOSIS — N95.2 VAGINAL ATROPHY: ICD-10-CM

## 2020-08-11 DIAGNOSIS — N81.89 PELVIC FLOOR WEAKNESS: ICD-10-CM

## 2020-08-11 LAB — SARS-COV-2 RNA RESP QL NAA+PROBE: NOT DETECTED

## 2020-08-11 PROCEDURE — 99213 PR OFFICE/OUTPT VISIT, EST, LEVL III, 20-29 MIN: ICD-10-PCS | Mod: 95,,, | Performed by: NURSE PRACTITIONER

## 2020-08-11 PROCEDURE — 1159F PR MEDICATION LIST DOCUMENTED IN MEDICAL RECORD: ICD-10-PCS | Mod: 95,,, | Performed by: NURSE PRACTITIONER

## 2020-08-11 PROCEDURE — 99213 OFFICE O/P EST LOW 20 MIN: CPT | Mod: 95,,, | Performed by: NURSE PRACTITIONER

## 2020-08-11 PROCEDURE — 1159F MED LIST DOCD IN RCRD: CPT | Mod: 95,,, | Performed by: NURSE PRACTITIONER

## 2020-08-11 PROCEDURE — 1101F PR PT FALLS ASSESS DOC 0-1 FALLS W/OUT INJ PAST YR: ICD-10-PCS | Mod: CPTII,95,, | Performed by: NURSE PRACTITIONER

## 2020-08-11 PROCEDURE — 1101F PT FALLS ASSESS-DOCD LE1/YR: CPT | Mod: CPTII,95,, | Performed by: NURSE PRACTITIONER

## 2020-08-11 NOTE — PROGRESS NOTES
Urogyn follow up  08/11/2020  .  [facility]  [address]  [facility] [id number]    Flores Olsen [place]  [id number]  1950      The patient location is: home  The chief complaint leading to consultation is: follow up of mixed urinary incontinence    Visit type: audiovisual    Face to Face time with patient: 20  30 minutes of total time spent on the encounter, which includes face to face time and non-face to face time preparing to see the patient (eg, review of tests), Obtaining and/or reviewing separately obtained history, Documenting clinical information in the electronic or other health record, Independently interpreting results (not separately reported) and communicating results to the patient/family/caregiver, or Care coordination (not separately reported).         Each patient to whom he or she provides medical services by telemedicine is:  (1) informed of the relationship between the physician and patient and the respective role of any other health care provider with respect to management of the patient; and (2) notified that he or she may decline to receive medical services by telemedicine and may withdraw from such care at any time.    Notes:         Last HPI from 06/12/2019  1)  UI:  (+) ANNIKA < (+) UUI  X 10years.  (+) pads:2-3/day, usually minimum wetness and 1/night usually moderate wetness.  Daytime frequency: Q 1 hours.  Nocturia: Yes: 1/night.   (--) dysuria,  (--) hematuria,  (--) frequent UTIs.  (+) complete bladder emptying.   --did complete pelvic floor therapy with Bere Anna last year     2)  POP:  Absent. {Desc;  Symptoms:(--)    (--) vaginal bleeding. (--) vaginal discharge. (--) sexually active.   (+)  Vaginal dryness.  (--) vaginal estrogen use.      3)  BM:  (--) constipation/straining.  (--) chronic diarrhea. (--) hematochezia.  (--) fecal incontinence.  (--) fecal smearing/urgency.  (+) complete evacuation.       4)sleep apnea-- using cpap machine    Changes from  last visit:  1..Mixed urinary incontinence, urge > stress:    --+ UUI with standing -- has improved with doing kegels  --went to pelvic floor PT with PT last summer-- has been doing home exercise program  --using 2-3 using number #5-- was initially using #6  --not using oxybutynin--was afraid to use it due to palpitations     2. Vaginal atrophy (dryness):     --using REPLENS twice weekly     3. Nocturia (nighttime urination):  --recently started CPAP  -- limiting fluids prior to bedtime  --only getting up 1/ night     4. 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, l mandible, 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 Dr Viktor Toure    Osteopenia of neck of left femur 6/4/2020    Paroxysmal atrioventricular tachycardia     Plantar fasciitis     Right knee meniscal tear     Dr Mayfield, tx conservatively    Severe obesity (BMI 35.0-35.9 with comorbidity) 1/24/2017    Stress incontinence, female 03/28/2018    After HYST, Kegels & PT  didn't work, worse at night wearing pads, Dr Infante    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    Thyroid disease        Past Surgical History:   Procedure Laterality Date    APPENDECTOMY      HYSTERECTOMY  1990    TAHBSO for fibroids    MANDIBLE SURGERY  2015    L mandible, Dr Toure    MANDIBLE SURGERY Left 8/27/2019    OOPHORECTOMY      TONSILLECTOMY         Family History   Problem Relation Age of Onset    Cancer Mother         stomach, liver     Breast cancer Neg Hx     Ovarian cancer Neg Hx     Cervical cancer Neg Hx     Endometrial cancer Neg Hx     Vaginal cancer Neg Hx     Melanoma Neg Hx     Colon cancer Neg Hx     Esophageal cancer Neg Hx        Social History     Socioeconomic History    Marital status:      Spouse name: Not on file    Number of children: Not on file    Years of education: Not on file    Highest education level: Not on file   Occupational History    Not on file   Social Needs    Financial resource strain: Not on file    Food insecurity     Worry: Not on file     Inability: Not on file    Transportation needs     Medical: Not on file     Non-medical: Not on file   Tobacco Use    Smoking status: Never Smoker    Smokeless tobacco: Never Used   Substance and Sexual Activity    Alcohol use: No    Drug use: No    Sexual activity: Not Currently   Lifestyle    Physical activity     Days per week: Not on file     Minutes per session: Not on file    Stress: Not on file   Relationships    Social connections     Talks on phone: Not on file     Gets together: Not on file     Attends Jew service: Not on file     Active member of club or organization: Not on file     Attends meetings of clubs or organizations: Not on file     Relationship status: Not on file   Other Topics Concern    Are you pregnant or think you may be? No    Breast-feeding Not Asked   Social History Narrative    Retired 2012,  [employer], 3 children, nonsmoker, ETOH socially, GYN here (no need for pap HYST for fibroids), colonoscopy normal 58, rec repeat at 68       Current Outpatient Medications   Medication Sig Dispense Refill    clobetasol (TEMOVATE) 0.05 % cream Apply topically 2 (two) times daily. To rash on left breast area until resolved 45 g 1    diltiaZEM (CARDIZEM CD) 120 MG Cp24 TAKE 1 CAPSULE EVERY DAY 90 capsule 3    ELIQUIS 5 mg Tab TAKE 1 TABLET TWICE DAILY 180 tablet 3    flecainide (TAMBOCOR) 150 MG Tab 2 tabs (300 mg  total) up to once per day for atrial fibrillation. 10 tablet 3    fluocinonide 0.05% (LIDEX) 0.05 % cream Apply topically 2 (two) times daily.      fluticasone propionate (FLONASE) 50 mcg/actuation nasal spray Each nares Nasal spray qd 33 mL 3    ketoconazole (NIZORAL) 2 % cream Apply topically 2 (two) times daily. To dry skin on forehead eyebrows and nose folds 60 g 2    levothyroxine (SYNTHROID) 25 MCG tablet Take 1 tablet (25 mcg total) by mouth once daily. 90 tablet 3    metoprolol succinate (TOPROL-XL) 25 MG 24 hr tablet TAKE 1 TABLET EVERY DAY 90 tablet 3    oxybutynin (DITROPAN-XL) 10 MG 24 hr tablet Take 1 tablet (10 mg total) by mouth once daily. 30 tablet 12    pravastatin (PRAVACHOL) 40 MG tablet TAKE 1 TABLET EVERY DAY 90 tablet 0    sulfamethoxazole-trimethoprim 800-160mg (BACTRIM DS) 800-160 mg Tab Take 1 tablet by mouth 2 (two) times daily. 6 tablet 0     No current facility-administered medications for this visit.        Review of patient's allergies indicates:   Allergen Reactions    Decongestant d [pseudoephedrine-dm]      Atrial Fibrillation    Augmentin [amoxicillin-pot clavulanate]      palpitations      Amoxicillin Palpitations    Diphenhydramine-pseudoephed Palpitations     TACHYCARDIA    Povidone-iodine Rash     Mild erythema of skin       Well woman:  Pap test: post hyst History of abnormal paps: No.  History of STIs:  No  Mammogram: Date of last: 05/2020   Result: Normal  Colonoscopy: Date of last: 06/2018.  Result:  External hemorrhoids.  Repeat due:  10 years.  --doing cologuard now  DEXA:  Date of last:06/2020  Result:  normal.    ROS:  As per HPI.      Exam  There were no vitals taken for this visit.  General: alert and oriented, no acute distress  Respiratory: normal respiratory effort  Abd: soft, non-tender, non-distended    Pelvic--deferred    Impression  No diagnosis found.  We reviewed the above issues and discussed options for short-term versus long-term management of  her problems.   Plan:    1..Mixed urinary incontinence, urge > stress:    --Empty bladder every 3 hours.  Empty well: wait a minute, lean forward on toilet.    --Avoid dietary irritants (see sheet).  Keep diary x 3-5 days to determine your irritants. Keep 3 days now and 3 days after 1 month of PT and oxybutynin  --KEGELS: do 10 in AM and 10 in PM, holding each x 10 seconds.  When you feel urge to go, STOP, KEGEL, and when urge has passed, then go to bathroom.    --continue pelvic floor home exercises (consider restarting PT)  --URGE: trial oxybutynin xl 10 mg daily. Takes 2-4 weeks to see if will have effect.  For dry mouth: get sour, sugar free lozenge or gum.    --STRESS:  Pessary vs. Sling.   .     2. Vaginal atrophy (dryness):   Use REPLENS or REFRESH OTC: 1/2 applicator full in vagina twice a week.     3. Nocturia (nighttime urination): stop fluids 2 hours before bed.  If have leg swelling:  Elevate feet above chest x 1 hour before bed to get excess fluid off.  Can also use support hose (knee highs).    --continue cpcp     4. Weight loss  --discussed healthy eating options     5.midline cystocele  --asymptomatic at this time     6. RTC 1 year for annual exam    90 minutes were spent in face to face time with this patient  30 % of this time was spent in counseling and/or coordination of care    SABAS Pinto-BC [facility]  Division of Female Pelvic Medicine and Reconstructive Surgery  Department of Obstetrics & Gynecology

## 2020-08-12 ENCOUNTER — ANESTHESIA EVENT (OUTPATIENT)
Dept: ENDOSCOPY | Facility: HOSPITAL | Age: 70
End: 2020-08-12
Payer: MEDICARE

## 2020-08-12 ENCOUNTER — TELEPHONE (OUTPATIENT)
Dept: ELECTROPHYSIOLOGY | Facility: CLINIC | Age: 70
End: 2020-08-12

## 2020-08-12 ENCOUNTER — TELEPHONE (OUTPATIENT)
Dept: GASTROENTEROLOGY | Facility: CLINIC | Age: 70
End: 2020-08-12

## 2020-08-12 NOTE — TELEPHONE ENCOUNTER
MA returned patient's call.     Mrs. Fuentes is calling because she has concerns about taking Gavilyte-g prep. Message routed to our endoscopy schedulers.

## 2020-08-12 NOTE — TELEPHONE ENCOUNTER
----- Message from Josselyn De Paz MA sent at 8/12/2020  8:36 AM CDT -----  Regarding: FW: call back  Please advise     Thank you,   Josselyn De Paz MA  [facility]      ----- Message -----  From: Marek Winters  Sent: 8/12/2020   8:27 AM CDT  To: Malathi BELL Staff  Subject: call back                                        Type: Patient Call Back    Who called:Flores     What is the request in detail: The patient is requesting a call back from the staff. She is schedule for a colonoscopy soon. But she has some questions to ask in regards to the solution she is supposed to drink. She wants to know if it safe to drink. She was prescribed the Gavolyte -G solution.    Can the clinic reply by [facility]?no    Would the patient rather a call back or a response via [facility]? Call back     Best call back number:[phone]

## 2020-08-12 NOTE — PATIENT INSTRUCTIONS
1..Mixed urinary incontinence, urge > stress:    --Empty bladder every 3 hours.  Empty well: wait a minute, lean forward on toilet.    --Avoid dietary irritants (see sheet).  Keep diary x 3-5 days to determine your irritants. Keep 3 days now and 3 days after 1 month of PT and oxybutynin  --KEGELS: do 10 in AM and 10 in PM, holding each x 10 seconds.  When you feel urge to go, STOP, KEGEL, and when urge has passed, then go to bathroom.    --continue pelvic floor home exercises (consider restarting PT)  --URGE: trial oxybutynin xl 10 mg daily. Takes 2-4 weeks to see if will have effect.  For dry mouth: get sour, sugar free lozenge or gum.    --STRESS:  Pessary vs. Sling.   .     2. Vaginal atrophy (dryness):   Use REPLENS or REFRESH OTC: 1/2 applicator full in vagina twice a week.     3. Nocturia (nighttime urination): stop fluids 2 hours before bed.  If have leg swelling:  Elevate feet above chest x 1 hour before bed to get excess fluid off.  Can also use support hose (knee highs).    --continue cpcp     4. Weight loss  --discussed healthy eating options     5.midline cystocele  --asymptomatic at this time     6. RTC 1 year for annual exam

## 2020-08-12 NOTE — TELEPHONE ENCOUNTER
Per Dr Ac-patient can use Miralax prep.  Contacted patient-informed of new prep. Stated understanding.  Prep instructions sent via [facility] [employer].

## 2020-08-12 NOTE — TELEPHONE ENCOUNTER
Pt has concerns about the prep and her arrhthymia. Can she do Mirilax prep instead?  Not sure if the Mirilax is any different than her concerns with the Golytely. Please Advise.

## 2020-08-12 NOTE — TELEPHONE ENCOUNTER
----- Message from Gill Gr sent at 8/12/2020  8:21 AM CDT -----  Pt is calling about her procedure on 8/13 and would like for the nurse to give her a call back

## 2020-08-13 ENCOUNTER — HOSPITAL ENCOUNTER (OUTPATIENT)
Facility: HOSPITAL | Age: 70
Discharge: HOME | End: 2020-08-13
Attending: INTERNAL MEDICINE | Admitting: INTERNAL MEDICINE
Payer: MEDICARE

## 2020-08-13 ENCOUNTER — ANESTHESIA (OUTPATIENT)
Dept: ENDOSCOPY | Facility: HOSPITAL | Age: 70
End: 2020-08-13
Payer: MEDICARE

## 2020-08-13 VITALS
HEART RATE: 78 BPM | WEIGHT: 258 LBS | OXYGEN SATURATION: 98 % | HEIGHT: 67 IN | RESPIRATION RATE: 15 BRPM | TEMPERATURE: 98 F | DIASTOLIC BLOOD PRESSURE: 77 MMHG | BODY MASS INDEX: 40.49 KG/M2 | SYSTOLIC BLOOD PRESSURE: 134 MMHG

## 2020-08-13 DIAGNOSIS — Z12.11 COLON CANCER SCREENING: ICD-10-CM

## 2020-08-13 DIAGNOSIS — Z12.11 SPECIAL SCREENING FOR MALIGNANT NEOPLASMS, COLON: Primary | ICD-10-CM

## 2020-08-13 PROCEDURE — E9220 PRA ENDO ANESTHESIA: HCPCS | Mod: HCNC,CRNA,, | Performed by: NURSE ANESTHETIST, CERTIFIED REGISTERED

## 2020-08-13 PROCEDURE — G0121 COLON CA SCRN NOT HI RSK IND: ICD-10-PCS | Mod: HCNC,,, | Performed by: INTERNAL MEDICINE

## 2020-08-13 PROCEDURE — 63600175 PHARM REV CODE 636 W HCPCS: Mod: HCNC | Performed by: NURSE ANESTHETIST, CERTIFIED REGISTERED

## 2020-08-13 PROCEDURE — 37000009 HC ANESTHESIA EA ADD 15 MINS: Mod: HCNC | Performed by: INTERNAL MEDICINE

## 2020-08-13 PROCEDURE — G0121 COLON CA SCRN NOT HI RSK IND: HCPCS | Mod: HCNC,,, | Performed by: INTERNAL MEDICINE

## 2020-08-13 PROCEDURE — G0121 COLON CA SCRN NOT HI RSK IND: HCPCS | Mod: HCNC | Performed by: INTERNAL MEDICINE

## 2020-08-13 PROCEDURE — 37000008 HC ANESTHESIA 1ST 15 MINUTES: Mod: HCNC | Performed by: INTERNAL MEDICINE

## 2020-08-13 PROCEDURE — E9220 PRA ENDO ANESTHESIA: ICD-10-PCS | Mod: HCNC,CRNA,, | Performed by: NURSE ANESTHETIST, CERTIFIED REGISTERED

## 2020-08-13 PROCEDURE — E9220 PRA ENDO ANESTHESIA: HCPCS | Mod: HCNC,ANES,, | Performed by: ANESTHESIOLOGY

## 2020-08-13 PROCEDURE — 25000003 PHARM REV CODE 250: Mod: HCNC | Performed by: INTERNAL MEDICINE

## 2020-08-13 PROCEDURE — E9220 PRA ENDO ANESTHESIA: ICD-10-PCS | Mod: HCNC,ANES,, | Performed by: ANESTHESIOLOGY

## 2020-08-13 RX ORDER — LIDOCAINE HYDROCHLORIDE 20 MG/ML
INJECTION INTRAVENOUS
Status: DISCONTINUED | OUTPATIENT
Start: 2020-08-13 | End: 2020-08-13

## 2020-08-13 RX ORDER — PROPOFOL 10 MG/ML
VIAL (ML) INTRAVENOUS CONTINUOUS PRN
Status: DISCONTINUED | OUTPATIENT
Start: 2020-08-13 | End: 2020-08-13

## 2020-08-13 RX ORDER — PROPOFOL 10 MG/ML
VIAL (ML) INTRAVENOUS
Status: DISCONTINUED | OUTPATIENT
Start: 2020-08-13 | End: 2020-08-13

## 2020-08-13 RX ORDER — SODIUM CHLORIDE 9 MG/ML
INJECTION, SOLUTION INTRAVENOUS CONTINUOUS
Status: DISCONTINUED | OUTPATIENT
Start: 2020-08-13 | End: 2020-08-13 | Stop reason: HOSPADM

## 2020-08-13 RX ADMIN — LIDOCAINE HYDROCHLORIDE 50 MG: 20 INJECTION, SOLUTION INTRAVENOUS at 08:08

## 2020-08-13 RX ADMIN — PROPOFOL 150 MCG/KG/MIN: 10 INJECTION, EMULSION INTRAVENOUS at 08:08

## 2020-08-13 RX ADMIN — SODIUM CHLORIDE: 0.9 INJECTION, SOLUTION INTRAVENOUS at 07:08

## 2020-08-13 RX ADMIN — PROPOFOL 100 MG: 10 INJECTION, EMULSION INTRAVENOUS at 08:08

## 2020-08-13 NOTE — TRANSFER OF CARE
"Anesthesia Transfer of Care Note    Patient: Flores Fuentes    Procedure(s) Performed: Procedure(s) (LRB):  COLONOSCOPY (N/A)    Patient location: PACU    Anesthesia Type: general    Transport from OR: Transported from OR on 6-10 L/min O2 by face mask with adequate spontaneous ventilation    Post pain: adequate analgesia    Post assessment: no apparent anesthetic complications and tolerated procedure well    Post vital signs: stable    Level of consciousness: sedated and awake    Nausea/Vomiting: no nausea/vomiting    Complications: none    Transfer of care protocol was followed      Last vitals:   Visit Vitals  BP (!) 163/69 (BP Location: Left arm, Patient Position: Lying)   Pulse 68   Temp 36.6 °C (97.9 °F) (Temporal)   Resp 18   Ht 5' 7" (1.702 m)   Wt 117 kg (258 lb)   SpO2 98%   Breastfeeding No   BMI 40.41 kg/m²     "

## 2020-08-13 NOTE — DISCHARGE INSTRUCTIONS
Colonoscopy     A camera attached to a flexible tube with a viewing lens is used to take video pictures.     Colonoscopy is a test to view the inside of your lower digestive tract (colon and rectum). Sometimes it can show the last part of the small intestine (ileum). During the test, small pieces of tissue may be removed for testing. This is called a biopsy. Small growths, such as polyps, may also be removed.   Why is colonoscopy done?  The test is done to help look for colon cancer. And it can help find the source of abdominal pain, bleeding, and changes in bowel habits. It may be needed once a year, depending on factors such as your:  · Age  · Health history  · Family health history  · Symptoms  · Results from any prior colonoscopy  Risks and possible complications  These include:  · Bleeding               · A puncture or tear in the colon   · Risks of anesthesia  · A cancer lesion not being seen  Getting ready   To prepare for the test:  · Talk with your healthcare provider about the risks of the test (see below). Also ask your healthcare provider about alternatives to the test.  · Tell your healthcare provider about any medicines you take. Also tell him or her about any health conditions you may have.  · Make sure your rectum and colon are empty for the test. Follow the diet and bowel prep instructions exactly. If you dont, the test may need to be rescheduled.  · Plan for a friend or family member to drive you home after the test.     Colonoscopy provides an inside view of the entire colon.     You may discuss the results with your doctor right away or at a future visit.  During the test   The test is usually done in the hospital on an outpatient basis. This means you go home the same day. The procedure takes about 30 minutes. During that time:  · You are given relaxing (sedating) medicine through an IV line. You may be drowsy, or fully asleep.  · The healthcare provider will first give you a physical exam to  check for anal and rectal problems.  · Then the anus is lubricated and the scope inserted.  · If you are awake, you may have a feeling similar to needing to have a bowel movement. You may also feel pressure as air is pumped into the colon. Its OK to pass gas during the procedure.  · Biopsy, polyp removal, or other treatments may be done during the test.  After the test   You may have gas right after the test. It can help to try to pass it to help prevent later bloating. Your healthcare provider may discuss the results with you right away. Or you may need to schedule a follow-up visit to talk about the results. After the test, you can go back to your normal eating and other activities. You may be tired from the sedation and need to rest for a few hours.  Date Last Reviewed: 11/1/2016 © 2000-2017 [employer]. [address] [place] [address], [place] [address]. All rights reserved. This information is not intended as a substitute for professional medical care. Always follow your healthcare professional's instructions.

## 2020-08-13 NOTE — ANESTHESIA PREPROCEDURE EVALUATION
08/13/2020  Flores Fuentes is a 69 y.o., female.  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, l mandible, 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 Dr Viktor Toure    Osteopenia of neck of left femur 6/4/2020    Paroxysmal atrioventricular tachycardia     Plantar fasciitis     Right knee meniscal tear     Dr Mayfield, tx conservatively    Severe obesity (BMI 35.0-35.9 with comorbidity) 1/24/2017    Stress incontinence, female 03/28/2018    After HYST, Kegels & PT  didn't work, worse at night wearing pads, Dr Infante    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    Thyroid disease      Past Surgical History:   Procedure Laterality Date    APPENDECTOMY      HYSTERECTOMY  1990    TAHBSO for fibroids    MANDIBLE SURGERY  2015    L mandible, Dr Toure    MANDIBLE SURGERY Left 8/27/2019    OOPHORECTOMY      TONSILLECTOMY           Anesthesia Evaluation    I have reviewed the Patient Summary Reports.    I have reviewed the Nursing Notes. I have reviewed the NPO Status.      Review of Systems  Anesthesia Hx:  No problems with previous Anesthesia  History of prior surgery of interest to airway management or planning: Denies Family Hx of Anesthesia complications.   Denies Personal Hx of Anesthesia complications.   Social:  Non-Smoker    Cardiovascular:   Hypertension Dysrhythmias atrial fibrillation A-fib, s/p cardioversion 2017, presented to ED last night after starting colon prep w  a-fib w RVR 140s, converted spontaneously in ED. Has held eloquis for procedure. No complaints this AM.   Pulmonary:  Pulmonary Normal    Endocrine:   Hypothyroidism        Physical Exam  General:  Well nourished, Morbid Obesity    Airway/Jaw/Neck:  Airway Findings: Mouth Opening: Normal Tongue: Normal  General Airway Assessment: Adult  Mallampati: I  TM Distance: 4 - 6 cm      Dental:  Dental Findings: In tact        Mental Status:  Mental Status Findings:  Cooperative, Alert and Oriented         Anesthesia Plan  Type of Anesthesia, risks & benefits discussed:  Anesthesia Type:  general  Patient's Preference:   Intra-op Monitoring Plan: standard ASA monitors  Intra-op Monitoring Plan Comments:   Post Op Pain Control Plan:   Post Op Pain Control Plan Comments:   Induction:   IV  Beta Blocker:         Informed Consent: Patient understands risks and agrees with Anesthesia plan.  Questions answered. Anesthesia consent signed with patient.  ASA Score: 3     Day of Surgery Review of History & Physical:    H&P update referred to the surgeon.         Ready For Surgery From Anesthesia Perspective.

## 2020-08-13 NOTE — ANESTHESIA POSTPROCEDURE EVALUATION
Anesthesia Post Evaluation    Patient: Flores Fuentes    Procedure(s) Performed: Procedure(s) (LRB):  COLONOSCOPY (N/A)    Final Anesthesia Type: general    Patient location during evaluation: GI PACU  Patient participation: Yes- Able to Participate  Level of consciousness: awake and alert  Post-procedure vital signs: reviewed and stable  Pain management: adequate  Airway patency: patent    PONV status at discharge: No PONV  Anesthetic complications: no      Cardiovascular status: hemodynamically stable  Respiratory status: unassisted, spontaneous ventilation and room air  Hydration status: euvolemic  Follow-up not needed.          Vitals Value Taken Time   /77 08/13/20 0900   Temp 36.7 °C (98 °F) 08/13/20 0840   Pulse 78 08/13/20 0900   Resp 15 08/13/20 0900   SpO2 98 % 08/13/20 0900         Event Time   Out of Recovery 09:10:20         Pain/Dalia Score: Dalia Score: 10 (8/13/2020  8:41 AM)

## 2020-08-13 NOTE — ANESTHESIA RELEASE NOTE
"Anesthesia Release from PACU Note    Patient: Flores Fuentes    Procedure(s) Performed: Procedure(s) (LRB):  COLONOSCOPY (N/A)    Anesthesia type: general    Post pain: Adequate analgesia    Post assessment: no apparent anesthetic complications and tolerated procedure well    Last Vitals:   Visit Vitals  /77   Pulse 78   Temp 36.7 °C (98 °F)   Resp 15   Ht 5' 7" (1.702 m)   Wt 117 kg (258 lb)   SpO2 98%   Breastfeeding No   BMI 40.41 kg/m²       Post vital signs: stable    Level of consciousness: awake and alert     Nausea/Vomiting: no nausea/no vomiting    Complications: none    Airway Patency: patent    Respiratory: unassisted, spontaneous ventilation, room air    Cardiovascular: stable and blood pressure at baseline    Hydration: euvolemic  "

## 2020-08-13 NOTE — [employer] PATIENT INSTRUCTIONS
Discharge Summary/Instructions after an Endoscopic Procedure  Patient Name: Flores Fuentes  Patient MRN: [id number]  Patient YOB: 1950  Thursday, August 13, 2020  Angus Ac MD  RESTRICTIONS:  During your procedure today, you received medications for sedation.  These   medications may affect your judgment, balance and coordination.  Therefore,   for 24 hours, you have the following restrictions:   - DO NOT drive a car, operate machinery, make legal/financial decisions,   sign important papers or drink alcohol.    ACTIVITY:  Today: no heavy lifting, straining or running due to procedural   sedation/anesthesia.  The following day: return to full activity including work.  DIET:  Eat and drink normally unless instructed otherwise.     TREATMENT FOR COMMON SIDE EFFECTS:  - Mild abdominal pain, nausea, belching, bloating or excessive gas:  rest,   eat lightly and use a heating pad.  - Sore Throat: treat with throat lozenges and/or gargle with warm salt   water.  - Because air was used during the procedure, expelling large amounts of air   from your rectum or belching is normal.  - If a bowel prep was taken, you may not have a bowel movement for 1-3 days.    This is normal.  SYMPTOMS TO WATCH FOR AND REPORT TO YOUR PHYSICIAN:  1. Abdominal pain or bloating, other than gas cramps.  2. Chest pain.  3. Back pain.  4. Signs of infection such as: chills or fever occurring within 24 hours   after the procedure.  5. Rectal bleeding, which would show as bright red, maroon, or black stools.   (A tablespoon of blood from the rectum is not serious, especially if   hemorrhoids are present.)  6. Vomiting.  7. Weakness or dizziness.  GO DIRECTLY TO THE NEAREST EMERGENCY ROOM IF YOU HAVE ANY OF THE FOLLOWING:      Difficulty breathing              Chills and/or fever over 101 F   Persistent vomiting and/or vomiting blood   Severe abdominal pain   Severe chest pain   Black, tarry stools   Bleeding- more than one tablespoon   Any  other symptom or condition that you feel may need urgent attention  Your doctor recommends these additional instructions:  If any biopsies were taken, your doctors clinic will contact you in 1 to 2   weeks with any results.  - Patient has a contact number available for emergencies.  The signs and   symptoms of potential delayed complications were discussed with the   patient.  Return to normal activities tomorrow.  Written discharge   instructions were provided to the patient.   - Discharge patient to home.   - Repeat colonoscopy in 10 years for screening purposes.   - The findings and recommendations were discussed with the designated   responsible adult.   - Resume Eliquis (apixaban) at prior dose today.  For questions, problems or results please call your physician - Angus Ac MD at Work:  [phone].  [facility], EMERGENCY ROOM PHONE NUMBER: [phone]  IF A COMPLICATION OR EMERGENCY SITUATION ARISES AND YOU ARE UNABLE TO REACH   YOUR PHYSICIAN - GO DIRECTLY TO THE EMERGENCY ROOM.  Angus Ac MD  8/13/2020 8:33:59 AM  This report has been verified and signed electronically.  [employer]

## 2020-08-13 NOTE — INTERVAL H&P NOTE
The patient has been examined and the H&P has been reviewed:    I concur with the findings and no changes have occurred since H&P was written.    Anesthesia risks, benefits and alternative options discussed and understood by patient/family.    History and Exam unchanged from visit. Had A fib last night after prep    Procedure - Colonoscopy  Neck - supple  Plan of anesthesia - General  ASA - per anesthesia  Mallampati - per anesthesia  Anesthesia problems - no  Family history of anesthesia problems - no        There are no hospital problems to display for this patient.

## 2020-08-21 ENCOUNTER — PATIENT OUTREACH (OUTPATIENT)
Dept: ADMINISTRATIVE | Facility: HOSPITAL | Age: 70
End: 2020-08-21

## 2020-08-21 NOTE — PROGRESS NOTES
Received FIT KIT results.  Scanned to media   updated    Patient FIT KIT positive.    Patient had c-scope on 8/13/2020

## 2020-09-09 ENCOUNTER — PATIENT OUTREACH (OUTPATIENT)
Dept: ADMINISTRATIVE | Facility: OTHER | Age: 70
End: 2020-09-09

## 2020-09-09 RX ORDER — PRAVASTATIN SODIUM 40 MG/1
TABLET ORAL
Qty: 90 TABLET | Refills: 3 | Status: SHIPPED | OUTPATIENT
Start: 2020-09-09 | End: 2021-08-09

## 2020-09-10 ENCOUNTER — OFFICE VISIT (OUTPATIENT)
Dept: CARDIOLOGY | Facility: CLINIC | Age: 70
End: 2020-09-10
Payer: MEDICARE

## 2020-09-10 VITALS
DIASTOLIC BLOOD PRESSURE: 72 MMHG | WEIGHT: 267.75 LBS | SYSTOLIC BLOOD PRESSURE: 136 MMHG | HEART RATE: 60 BPM | OXYGEN SATURATION: 98 % | HEIGHT: 67 IN | BODY MASS INDEX: 42.02 KG/M2

## 2020-09-10 DIAGNOSIS — I48.0 PAROXYSMAL A-FIB: Primary | Chronic | ICD-10-CM

## 2020-09-10 DIAGNOSIS — I10 ESSENTIAL HYPERTENSION: ICD-10-CM

## 2020-09-10 DIAGNOSIS — G47.33 OSA (OBSTRUCTIVE SLEEP APNEA): ICD-10-CM

## 2020-09-10 DIAGNOSIS — E78.2 MIXED HYPERLIPIDEMIA: ICD-10-CM

## 2020-09-10 PROCEDURE — 3075F PR MOST RECENT SYSTOLIC BLOOD PRESS GE 130-139MM HG: ICD-10-PCS | Mod: HCNC,CPTII,S$GLB, | Performed by: INTERNAL MEDICINE

## 2020-09-10 PROCEDURE — 3078F PR MOST RECENT DIASTOLIC BLOOD PRESSURE < 80 MM HG: ICD-10-PCS | Mod: HCNC,CPTII,S$GLB, | Performed by: INTERNAL MEDICINE

## 2020-09-10 PROCEDURE — 3008F BODY MASS INDEX DOCD: CPT | Mod: HCNC,CPTII,S$GLB, | Performed by: INTERNAL MEDICINE

## 2020-09-10 PROCEDURE — 1101F PT FALLS ASSESS-DOCD LE1/YR: CPT | Mod: HCNC,CPTII,S$GLB, | Performed by: INTERNAL MEDICINE

## 2020-09-10 PROCEDURE — 99214 OFFICE O/P EST MOD 30 MIN: CPT | Mod: HCNC,S$GLB,, | Performed by: INTERNAL MEDICINE

## 2020-09-10 PROCEDURE — 3075F SYST BP GE 130 - 139MM HG: CPT | Mod: HCNC,CPTII,S$GLB, | Performed by: INTERNAL MEDICINE

## 2020-09-10 PROCEDURE — 1159F MED LIST DOCD IN RCRD: CPT | Mod: HCNC,S$GLB,, | Performed by: INTERNAL MEDICINE

## 2020-09-10 PROCEDURE — 99999 PR PBB SHADOW E&M-EST. PATIENT-LVL IV: ICD-10-PCS | Mod: PBBFAC,HCNC,, | Performed by: INTERNAL MEDICINE

## 2020-09-10 PROCEDURE — 99214 PR OFFICE/OUTPT VISIT, EST, LEVL IV, 30-39 MIN: ICD-10-PCS | Mod: HCNC,S$GLB,, | Performed by: INTERNAL MEDICINE

## 2020-09-10 PROCEDURE — 1159F PR MEDICATION LIST DOCUMENTED IN MEDICAL RECORD: ICD-10-PCS | Mod: HCNC,S$GLB,, | Performed by: INTERNAL MEDICINE

## 2020-09-10 PROCEDURE — 1101F PR PT FALLS ASSESS DOC 0-1 FALLS W/OUT INJ PAST YR: ICD-10-PCS | Mod: HCNC,CPTII,S$GLB, | Performed by: INTERNAL MEDICINE

## 2020-09-10 PROCEDURE — 3008F PR BODY MASS INDEX (BMI) DOCUMENTED: ICD-10-PCS | Mod: HCNC,CPTII,S$GLB, | Performed by: INTERNAL MEDICINE

## 2020-09-10 PROCEDURE — 99999 PR PBB SHADOW E&M-EST. PATIENT-LVL IV: CPT | Mod: PBBFAC,HCNC,, | Performed by: INTERNAL MEDICINE

## 2020-09-10 PROCEDURE — 1125F AMNT PAIN NOTED PAIN PRSNT: CPT | Mod: HCNC,S$GLB,, | Performed by: INTERNAL MEDICINE

## 2020-09-10 PROCEDURE — 1125F PR PAIN SEVERITY QUANTIFIED, PAIN PRESENT: ICD-10-PCS | Mod: HCNC,S$GLB,, | Performed by: INTERNAL MEDICINE

## 2020-09-10 PROCEDURE — 3078F DIAST BP <80 MM HG: CPT | Mod: HCNC,CPTII,S$GLB, | Performed by: INTERNAL MEDICINE

## 2020-09-10 NOTE — PATIENT INSTRUCTIONS
Eating Heart-Healthy Foods  Eating has a big impact on your heart health. In fact, eating healthier can improve several of your heart risks at once. For instance, it helps you manage weight, cholesterol, and blood pressure. Here are ideas to help you make heart-healthy changes without giving up all the foods and flavors you love.  Getting started  · Talk with your health care provider about eating plans, such as the DASH or Mediterranean diet. You may also be referred to a dietitian.  · Change a few things at a time. Give yourself time to get used to a few eating changes before adding more.  · Work to create a tasty, healthy eating plan that you can stick to for the rest of your life.    Goals for healthy eating  Below are some tips to improve your eating habits:  · Limit saturated fats and trans fats. Saturated fats raise your levels of cholesterol, so keep these fats to a minimum. They are found in foods such as fatty meats, whole milk, cheese, and palm and coconut oils. Avoid trans fats because they lower good cholesterol as well as raise bad cholesterol. Trans fats are most often found in processed foods.  · Reduce sodium (salt) intake. Eating too much salt may increase your blood pressure. Limit your sodium intake to 2,300 milligrams (mg) per day, or less if your health care provider recommends it. Dining out less often and eating fewer processed foods are two great ways to decrease the amount of salt you consume.  · Managing calories. A calorie is a unit of energy. Your body burns calories for fuel, but if you eat more calories than your body burns, the extras are stored as fat. Your health care provider can help you create a diet plan to manage your calories. This will likely include eating healthier foods as well as exercising regularly. To help you track your progress, keep a diary to record what you eat and how often you exercise.  Choose the right foods  Aim to make these foods staples of your diet. If  you have diabetes, you may have different recommendations than what is listed here:  · Fruits and vegetable provide plenty of nutrients without a lot of calories. At meals, fill half your plate with these foods. Split the other half of your plate between whole grains and lean protein.  · Whole grains are high in fiber and rich in vitamins and nutrients. Good choices include whole-wheat bread, pasta, and brown rice.  · Lean proteins give you nutrition with less fat. Good choices include fish, skinless chicken, and beans.  · Low-fat or nonfat dairy provides nutrients without a lot of fat. Try low-fat or nonfat milk, cheese, or yogurt.  · Healthy fats can be good for you in small amounts. These are unsaturated fats, such as olive oil, nuts, and fish. Try to have at least 2 servings per week of fatty fish such as salmon, sardines, mackerel, rainbow trout, and albacore tuna. These contain omega-3 fatty acids, which are good for your heart. Flaxseed is another source of a heart-healthy fat.  More on heart healthy eating    Read food labels  Healthy eating starts at the grocery store. Be sure to pay attention to food labels on packaged foods. Look for products that are high in fiber and protein, and low in saturated fat, cholesterol, and sodium. Avoid products that contain trans fat. And pay close attention to serving size. For instance, if you plan to eat two servings, double all the numbers on the label.  Prepare food right  A key part of healthy cooking is cutting down on added fat and salt. Look on the internet for lower-fat, lower-sodium recipes. Also, try these tips:  · Remove fat from meat and skin from poultry before cooking.  · Skim fat from the surface of soups and sauces.  · Broil, boil, bake, steam, grill, and microwave food without added fats.  · Choose ingredients that spice up your food without adding calories, fat, or sodium. Try these items: horseradish, hot sauce, lemon, mustard, nonfat salad dressings,  and vinegar. For salt-free herbs and spices, try basil, cilantro, cinnamon, pepper, and rosemary.  Date Last Reviewed: 6/25/2015  © 5945-0250 [employer]. [address] [place] [address]. All rights reserved. This information is not intended as a substitute for professional medical care. Always follow your healthcare professional's instructions.          Aerobic Exercise for a Healthy Heart  Exercise is a lot more than an energy booster and a stress reliever. It also strengthens your heart muscle, lowers your blood pressure and cholesterol, and burns calories. It can also improve your resting muscle tone, and your mood.     Remember, some activity is better than none.    Choose an aerobic activity  Choose an activity that makes your heart and lungs work harder than they do when you rest or walk normally. This aerobic exercise can improve the way your heart and other muscles use oxygen. Make it fun by exercising with a friend and choosing an activity you enjoy. Here are some ideas:  · Walking  · Swimming  · Bicycling  · Stair climbing  · Dancing  · Jogging  · Gardening  Exercise regularly  If you havent been exercising regularly,  get your doctors OK first. Then start slowly.  Here are some tips:  · Begin exercising 3 times a week for 5 to 10 minutes at a time.  · When you feel comfortable, add a few minutes each session.  · Slowly build up to exercising 3 to 4 times each week. Each session should last for 40 minutes, on average, and involve moderate- to vigorous-intensity physical activity.  · If you have been given nitroglycerin, be sure to carry it when you exercise.  · If you get chest pain (angina) when youre exercising, stop what youre doing, take your nitroglycerin, and call your doctor.  Date Last Reviewed: 6/2/2016  © 6770-6452 [employer]. [address] [place] [address]. All rights reserved. This information is not intended as a substitute for  professional medical care. Always follow your healthcare professional's instructions.

## 2020-09-10 NOTE — PROGRESS NOTES
Subjective:   @Patient ID:  Flores Fuentes is a 69 y.o. female who presents for evaluation of Pre-op risk stratification.   HPI:   Patient is here for follow up  She stated that she has been ok since last visit. She had 2 episodes of a.fib. The last one in 8/2020. She went to to ER and converted with CCB.   She is doing well since then.  She is very anxious which makes her palpitations worse.   Tolerating OAC well.     No prior CAD. No Tobacco. No DM.    HTN: BP is well controlled.   Paroxysmal A.fib: Currently SR and stable on AAD/ CCB/BB    She has congestion and cold symptoms, along with cough..     Pertinent cardiac hx:    Paroxysmal A.fib, F/U with Dr. Servin. On rhythm control strategy with flecainide (PIP). On Eliquis for anticoagulation.     2D Echo (6/3/2019):  · Normal left ventricular systolic function. The estimated ejection fraction is 55%  · Normal LV diastolic function.  · Normal right ventricular systolic function.  · Mild left atrial enlargement.  · The estimated PA systolic pressure is 31 mm Hg  · Normal central venous pressure (3 mm Hg).    Patient Active Problem List    Diagnosis Date Noted    Colon cancer screening 08/13/2020    Osteopenia of neck of left femur 06/04/2020    Asymptomatic microscopic hematuria 06/02/2020    MATTHEW (obstructive sleep apnea)     Essential hypertension 06/19/2019    Stress incontinence, female 03/28/2018     After HYST Try Kegels      Cervical muscle strain 01/24/2017    DJD (degenerative joint disease) of cervical spine 01/24/2017    Primary localized osteoarthrosis, lower leg 11/17/2014    Paroxysmal A-fib 10/14/2014    Mixed hyperlipidemia 11/07/2013    Subclinical iodine-deficiency hypothyroidism 11/07/2013                        Lipid panel  Lab Results   Component Value Date    CHOL 148 05/26/2020    CHOL 151 04/10/2019    CHOL 152 03/27/2018     Lab Results   Component Value Date    HDL 38 (L) 05/26/2020    HDL 48 04/10/2019    HDL 44  03/27/2018     Lab Results   Component Value Date    LDLCALC 93.6 05/26/2020    LDLCALC 89.0 04/10/2019    LDLCALC 92.0 03/27/2018     Lab Results   Component Value Date    TRIG 82 05/26/2020    TRIG 70 04/10/2019    TRIG 80 03/27/2018     Lab Results   Component Value Date    CHOLHDL 25.7 05/26/2020    CHOLHDL 31.8 04/10/2019    CHOLHDL 28.9 03/27/2018            Review of Systems   Constitution: Negative for chills and fever.   HENT: Negative for congestion, hearing loss and nosebleeds.    Eyes: Negative for blurred vision.   Cardiovascular: Positive for leg swelling (RT side , chronic lymphedema. ). Negative for chest pain and palpitations.   Respiratory: Negative for cough, hemoptysis and shortness of breath.    Hematologic/Lymphatic: Negative for bleeding problem.   Skin: Negative for itching.   Musculoskeletal: Positive for arthritis.   Gastrointestinal: Negative for abdominal pain and hematochezia.   Genitourinary: Negative for hematuria.   Neurological: Negative for dizziness.   Psychiatric/Behavioral: Negative for altered mental status and depression.       Objective:   Physical Exam   Constitutional: She is oriented to person, place, and time. She appears well-developed and well-nourished.   HENT:   Head: Normocephalic and atraumatic.   Eyes: Conjunctivae are normal.   Neck: Neck supple. No JVD present.   Cardiovascular: Normal rate, regular rhythm and normal heart sounds. Exam reveals no gallop and no friction rub.   No murmur heard.  Pulmonary/Chest: Effort normal and breath sounds normal. No stridor. No respiratory distress. She has no wheezes.   Abdominal: Soft. She exhibits no distension. There is no abdominal tenderness.   Musculoskeletal:         General: Edema (Rt side. No pitting ) present.   Neurological: She is alert and oriented to person, place, and time.   Skin: Skin is warm and dry.   Psychiatric: She has a normal mood and affect. Her behavior is normal.       Assessment:     1. Paroxysmal  A-fib    2. Mixed hyperlipidemia    3. Essential hypertension    4. MATTHEW (obstructive sleep apnea)        Plan:       - Continue BB/CCB and Statin  - PIP strategy   - Continue  lifestyle changes.  - F/U with Dr. Servin as schedule     - Return sooner for concerns or questions. If symptoms persist go to the ED  I have reviewed all pertinent data on this patient   I have reviewed the patient's medical history in detail and updated the computerized patient record.  Follow up as scheduled. Return sooner for concerns or questions  She expressed verbal understanding and agreed with the plan    F/U  One year     It was my please seeing Ms. Fuentes. Please don't hesitate to contact us with any questions. Than you.     Patient's Medications   New Prescriptions    No medications on file   Previous Medications    CLOBETASOL (TEMOVATE) 0.05 % CREAM    Apply topically 2 (two) times daily. To rash on left breast area until resolved    DILTIAZEM (CARDIZEM CD) 120 MG CP24    TAKE 1 CAPSULE EVERY DAY    ELIQUIS 5 MG TAB    TAKE 1 TABLET TWICE DAILY    FLECAINIDE (TAMBOCOR) 150 MG TAB    2 tabs (300 mg total) up to once per day for atrial fibrillation.    FLUOCINONIDE 0.05% (LIDEX) 0.05 % CREAM    Apply topically 2 (two) times daily.    FLUTICASONE PROPIONATE (FLONASE) 50 MCG/ACTUATION NASAL SPRAY    Each nares Nasal spray qd    KETOCONAZOLE (NIZORAL) 2 % CREAM    Apply topically 2 (two) times daily. To dry skin on forehead eyebrows and nose folds    LEVOTHYROXINE (SYNTHROID) 25 MCG TABLET    Take 1 tablet (25 mcg total) by mouth once daily.    METOPROLOL SUCCINATE (TOPROL-XL) 25 MG 24 HR TABLET    TAKE 1 TABLET EVERY DAY    OXYBUTYNIN (DITROPAN-XL) 10 MG 24 HR TABLET    Take 1 tablet (10 mg total) by mouth once daily.    PRAVASTATIN (PRAVACHOL) 40 MG TABLET    TAKE 1 TABLET EVERY DAY    SULFAMETHOXAZOLE-TRIMETHOPRIM 800-160MG (BACTRIM DS) 800-160 MG TAB    Take 1 tablet by mouth 2 (two) times daily.   Modified Medications    No  medications on file   Discontinued Medications    No medications on file

## 2020-09-29 ENCOUNTER — PATIENT MESSAGE (OUTPATIENT)
Dept: OTHER | Facility: OTHER | Age: 70
End: 2020-09-29

## 2020-12-11 ENCOUNTER — PATIENT MESSAGE (OUTPATIENT)
Dept: OTHER | Facility: OTHER | Age: 70
End: 2020-12-11

## 2021-01-15 ENCOUNTER — PATIENT MESSAGE (OUTPATIENT)
Dept: PRIMARY CARE CLINIC | Facility: CLINIC | Age: 71
End: 2021-01-15

## 2021-01-18 ENCOUNTER — IMMUNIZATION (OUTPATIENT)
Dept: INTERNAL MEDICINE | Facility: CLINIC | Age: 71
End: 2021-01-18
Payer: MEDICARE

## 2021-01-18 DIAGNOSIS — Z23 NEED FOR VACCINATION: Primary | ICD-10-CM

## 2021-01-18 PROCEDURE — 91300 COVID-19, MRNA, LNP-S, PF, 30 MCG/0.3 ML DOSE VACCINE: CPT | Mod: PBBFAC | Performed by: FAMILY MEDICINE

## 2021-02-02 ENCOUNTER — TELEPHONE (OUTPATIENT)
Dept: PRIMARY CARE CLINIC | Facility: CLINIC | Age: 71
End: 2021-02-02

## 2021-02-02 ENCOUNTER — PATIENT MESSAGE (OUTPATIENT)
Dept: PRIMARY CARE CLINIC | Facility: CLINIC | Age: 71
End: 2021-02-02

## 2021-02-02 DIAGNOSIS — I10 ESSENTIAL HYPERTENSION: Primary | ICD-10-CM

## 2021-02-03 DIAGNOSIS — Z12.31 SCREENING MAMMOGRAM, ENCOUNTER FOR: Primary | ICD-10-CM

## 2021-02-04 ENCOUNTER — PATIENT MESSAGE (OUTPATIENT)
Dept: ELECTROPHYSIOLOGY | Facility: CLINIC | Age: 71
End: 2021-02-04

## 2021-02-04 ENCOUNTER — TELEPHONE (OUTPATIENT)
Dept: ELECTROPHYSIOLOGY | Facility: CLINIC | Age: 71
End: 2021-02-04

## 2021-02-04 DIAGNOSIS — I48.0 PAROXYSMAL A-FIB: Primary | ICD-10-CM

## 2021-02-08 ENCOUNTER — IMMUNIZATION (OUTPATIENT)
Dept: INTERNAL MEDICINE | Facility: CLINIC | Age: 71
End: 2021-02-08
Payer: MEDICARE

## 2021-02-08 DIAGNOSIS — Z23 NEED FOR VACCINATION: Primary | ICD-10-CM

## 2021-02-08 PROCEDURE — 91300 COVID-19, MRNA, LNP-S, PF, 30 MCG/0.3 ML DOSE VACCINE: CPT | Mod: PBBFAC | Performed by: FAMILY MEDICINE

## 2021-02-08 PROCEDURE — 0002A COVID-19, MRNA, LNP-S, PF, 30 MCG/0.3 ML DOSE VACCINE: CPT | Mod: PBBFAC | Performed by: FAMILY MEDICINE

## 2021-05-12 ENCOUNTER — OFFICE VISIT (OUTPATIENT)
Dept: SLEEP MEDICINE | Facility: CLINIC | Age: 71
End: 2021-05-12
Payer: MEDICARE

## 2021-05-12 DIAGNOSIS — I48.0 PAROXYSMAL A-FIB: ICD-10-CM

## 2021-05-12 DIAGNOSIS — G47.33 OSA (OBSTRUCTIVE SLEEP APNEA): Primary | ICD-10-CM

## 2021-05-12 DIAGNOSIS — E66.01 SEVERE OBESITY (BMI 35.0-35.9 WITH COMORBIDITY): ICD-10-CM

## 2021-05-12 PROCEDURE — 99212 OFFICE O/P EST SF 10 MIN: CPT | Mod: 95,,, | Performed by: INTERNAL MEDICINE

## 2021-05-12 PROCEDURE — 1159F MED LIST DOCD IN RCRD: CPT | Mod: 95,,, | Performed by: INTERNAL MEDICINE

## 2021-05-12 PROCEDURE — 99212 PR OFFICE/OUTPT VISIT, EST, LEVL II, 10-19 MIN: ICD-10-PCS | Mod: 95,,, | Performed by: INTERNAL MEDICINE

## 2021-05-12 PROCEDURE — 1159F PR MEDICATION LIST DOCUMENTED IN MEDICAL RECORD: ICD-10-PCS | Mod: 95,,, | Performed by: INTERNAL MEDICINE

## 2021-05-13 DIAGNOSIS — G47.33 OSA (OBSTRUCTIVE SLEEP APNEA): Primary | ICD-10-CM

## 2021-05-14 ENCOUNTER — HOSPITAL ENCOUNTER (OUTPATIENT)
Dept: CARDIOLOGY | Facility: HOSPITAL | Age: 71
Discharge: HOME | End: 2021-05-14
Attending: INTERNAL MEDICINE
Payer: MEDICARE

## 2021-05-14 DIAGNOSIS — E78.2 MIXED HYPERLIPIDEMIA: ICD-10-CM

## 2021-05-14 DIAGNOSIS — I48.0 PAROXYSMAL A-FIB: ICD-10-CM

## 2021-05-14 DIAGNOSIS — I10 ESSENTIAL HYPERTENSION: ICD-10-CM

## 2021-05-14 LAB
AORTIC ROOT ANNULUS: 3.42 CM
AORTIC VALVE CUSP SEPERATION: 1.73 CM
AV INDEX (PROSTH): 0.67
AV MEAN GRADIENT: 5 MMHG
AV PEAK GRADIENT: 8 MMHG
AV VALVE AREA: 2.46 CM2
AV VELOCITY RATIO: 0.69
CV ECHO LV RWT: 0.38 CM
DOP CALC AO PEAK VEL: 1.4 M/S
DOP CALC AO VTI: 37.24 CM
DOP CALC LVOT AREA: 3.7 CM2
DOP CALC LVOT DIAMETER: 2.17 CM
DOP CALC LVOT PEAK VEL: 0.97 M/S
DOP CALC LVOT STROKE VOLUME: 91.67 CM3
DOP CALCLVOT PEAK VEL VTI: 24.8 CM
E WAVE DECELERATION TIME: 212.44 MSEC
E/A RATIO: 1.81
E/E' RATIO: 12.25 M/S
ECHO LV POSTERIOR WALL: 0.97 CM (ref 0.6–1.1)
EJECTION FRACTION: 60 %
FRACTIONAL SHORTENING: 26 % (ref 28–44)
INTERVENTRICULAR SEPTUM: 0.95 CM (ref 0.6–1.1)
IVC PROX: 2.4 CM
LA MAJOR: 5.69 CM
LA MINOR: 4.62 CM
LA WIDTH: 3.3 CM
LEFT ATRIUM SIZE: 5.32 CM
LEFT ATRIUM VOLUME: 76.1 CM3
LEFT INTERNAL DIMENSION IN SYSTOLE: 3.79 CM (ref 2.1–4)
LEFT VENTRICLE DIASTOLIC VOLUME: 124.6 ML
LEFT VENTRICLE SYSTOLIC VOLUME: 61.56 ML
LEFT VENTRICULAR INTERNAL DIMENSION IN DIASTOLE: 5.11 CM (ref 3.5–6)
LEFT VENTRICULAR MASS: 178.64 G
LV LATERAL E/E' RATIO: 10.89 M/S
LV SEPTAL E/E' RATIO: 14 M/S
MV MEAN GRADIENT: 1 MMHG
MV PEAK A VEL: 0.54 M/S
MV PEAK E VEL: 0.98 M/S
MV PEAK GRADIENT: 4 MMHG
MV STENOSIS PRESSURE HALF TIME: 61.61 MS
MV VALVE AREA P 1/2 METHOD: 3.57 CM2
PISA TR MAX VEL: 2.65 M/S
PV PEAK VELOCITY: 0.97 CM/S
RA MAJOR: 5.09 CM
RA PRESSURE: 8 MMHG
RA WIDTH: 3 CM
RIGHT VENTRICULAR END-DIASTOLIC DIMENSION: 3.8 CM
SINUS: 3.01 CM
TDI LATERAL: 0.09 M/S
TDI SEPTAL: 0.07 M/S
TDI: 0.08 M/S
TR MAX PG: 28 MMHG
TV REST PULMONARY ARTERY PRESSURE: 36 MMHG

## 2021-05-14 PROCEDURE — 93306 TTE W/DOPPLER COMPLETE: CPT | Mod: PO

## 2021-05-14 PROCEDURE — 93306 TTE W/DOPPLER COMPLETE: CPT | Mod: 26,,, | Performed by: INTERNAL MEDICINE

## 2021-05-14 PROCEDURE — 93306 ECHO (CUPID ONLY): ICD-10-PCS | Mod: 26,,, | Performed by: INTERNAL MEDICINE

## 2021-06-07 ENCOUNTER — HOSPITAL ENCOUNTER (OUTPATIENT)
Dept: RADIOLOGY | Facility: HOSPITAL | Age: 71
Discharge: HOME | End: 2021-06-07
Attending: FAMILY MEDICINE
Payer: MEDICARE

## 2021-06-07 DIAGNOSIS — Z12.31 SCREENING MAMMOGRAM, ENCOUNTER FOR: ICD-10-CM

## 2021-06-07 PROCEDURE — 77067 SCR MAMMO BI INCL CAD: CPT | Mod: TC,PO

## 2021-06-10 ENCOUNTER — OFFICE VISIT (OUTPATIENT)
Dept: PRIMARY CARE CLINIC | Facility: CLINIC | Age: 71
End: 2021-06-10
Payer: MEDICARE

## 2021-06-10 VITALS
DIASTOLIC BLOOD PRESSURE: 72 MMHG | TEMPERATURE: 98 F | SYSTOLIC BLOOD PRESSURE: 124 MMHG | HEART RATE: 60 BPM | HEIGHT: 67 IN | BODY MASS INDEX: 43.49 KG/M2 | OXYGEN SATURATION: 96 % | WEIGHT: 277.13 LBS

## 2021-06-10 DIAGNOSIS — Z00.00 ROUTINE GENERAL MEDICAL EXAMINATION AT A HEALTH CARE FACILITY: Primary | ICD-10-CM

## 2021-06-10 DIAGNOSIS — I48.0 PAROXYSMAL A-FIB: ICD-10-CM

## 2021-06-10 DIAGNOSIS — Z79.01 CHRONIC ANTICOAGULATION: ICD-10-CM

## 2021-06-10 DIAGNOSIS — I10 ESSENTIAL HYPERTENSION: ICD-10-CM

## 2021-06-10 DIAGNOSIS — E02 SUBCLINICAL IODINE-DEFICIENCY HYPOTHYROIDISM: ICD-10-CM

## 2021-06-10 DIAGNOSIS — E78.2 MIXED HYPERLIPIDEMIA: ICD-10-CM

## 2021-06-10 DIAGNOSIS — G47.33 OSA (OBSTRUCTIVE SLEEP APNEA): ICD-10-CM

## 2021-06-10 PROCEDURE — 3008F PR BODY MASS INDEX (BMI) DOCUMENTED: ICD-10-PCS | Mod: CPTII,S$GLB,, | Performed by: FAMILY MEDICINE

## 2021-06-10 PROCEDURE — 99397 PR PREVENTIVE VISIT,EST,65 & OVER: ICD-10-PCS | Mod: S$GLB,,, | Performed by: FAMILY MEDICINE

## 2021-06-10 PROCEDURE — 99499 UNLISTED E&M SERVICE: CPT | Mod: S$GLB,,, | Performed by: FAMILY MEDICINE

## 2021-06-10 PROCEDURE — 3008F BODY MASS INDEX DOCD: CPT | Mod: CPTII,S$GLB,, | Performed by: FAMILY MEDICINE

## 2021-06-10 PROCEDURE — 1125F PR PAIN SEVERITY QUANTIFIED, PAIN PRESENT: ICD-10-PCS | Mod: S$GLB,,, | Performed by: FAMILY MEDICINE

## 2021-06-10 PROCEDURE — 1125F AMNT PAIN NOTED PAIN PRSNT: CPT | Mod: S$GLB,,, | Performed by: FAMILY MEDICINE

## 2021-06-10 PROCEDURE — 99397 PER PM REEVAL EST PAT 65+ YR: CPT | Mod: S$GLB,,, | Performed by: FAMILY MEDICINE

## 2021-06-10 PROCEDURE — 1101F PT FALLS ASSESS-DOCD LE1/YR: CPT | Mod: CPTII,S$GLB,, | Performed by: FAMILY MEDICINE

## 2021-06-10 PROCEDURE — 99499 RISK ADDL DX/OHS AUDIT: ICD-10-PCS | Mod: S$GLB,,, | Performed by: FAMILY MEDICINE

## 2021-06-10 PROCEDURE — 3288F PR FALLS RISK ASSESSMENT DOCUMENTED: ICD-10-PCS | Mod: CPTII,S$GLB,, | Performed by: FAMILY MEDICINE

## 2021-06-10 PROCEDURE — 3288F FALL RISK ASSESSMENT DOCD: CPT | Mod: CPTII,S$GLB,, | Performed by: FAMILY MEDICINE

## 2021-06-10 PROCEDURE — 99999 PR PBB SHADOW E&M-EST. PATIENT-LVL III: CPT | Mod: PBBFAC,,, | Performed by: FAMILY MEDICINE

## 2021-06-10 PROCEDURE — 99999 PR PBB SHADOW E&M-EST. PATIENT-LVL III: ICD-10-PCS | Mod: PBBFAC,,, | Performed by: FAMILY MEDICINE

## 2021-06-10 PROCEDURE — 1101F PR PT FALLS ASSESS DOC 0-1 FALLS W/OUT INJ PAST YR: ICD-10-PCS | Mod: CPTII,S$GLB,, | Performed by: FAMILY MEDICINE

## 2021-06-22 ENCOUNTER — OFFICE VISIT (OUTPATIENT)
Dept: UROGYNECOLOGY | Facility: CLINIC | Age: 71
End: 2021-06-22
Payer: MEDICARE

## 2021-06-22 VITALS
SYSTOLIC BLOOD PRESSURE: 124 MMHG | DIASTOLIC BLOOD PRESSURE: 70 MMHG | WEIGHT: 276.5 LBS | BODY MASS INDEX: 43.4 KG/M2 | HEIGHT: 67 IN

## 2021-06-22 DIAGNOSIS — N81.89 PELVIC FLOOR WEAKNESS: ICD-10-CM

## 2021-06-22 DIAGNOSIS — N39.46 MIXED INCONTINENCE URGE AND STRESS: Primary | ICD-10-CM

## 2021-06-22 DIAGNOSIS — N95.2 VAGINAL ATROPHY: ICD-10-CM

## 2021-06-22 DIAGNOSIS — N81.11 MIDLINE CYSTOCELE: ICD-10-CM

## 2021-06-22 PROCEDURE — 1101F PT FALLS ASSESS-DOCD LE1/YR: CPT | Mod: CPTII,S$GLB,, | Performed by: NURSE PRACTITIONER

## 2021-06-22 PROCEDURE — 1159F MED LIST DOCD IN RCRD: CPT | Mod: S$GLB,,, | Performed by: NURSE PRACTITIONER

## 2021-06-22 PROCEDURE — 99213 OFFICE O/P EST LOW 20 MIN: CPT | Mod: S$GLB,,, | Performed by: NURSE PRACTITIONER

## 2021-06-22 PROCEDURE — 3288F FALL RISK ASSESSMENT DOCD: CPT | Mod: CPTII,S$GLB,, | Performed by: NURSE PRACTITIONER

## 2021-06-22 PROCEDURE — 99999 PR PBB SHADOW E&M-EST. PATIENT-LVL IV: CPT | Mod: PBBFAC,,, | Performed by: NURSE PRACTITIONER

## 2021-06-22 PROCEDURE — 99213 PR OFFICE/OUTPT VISIT, EST, LEVL III, 20-29 MIN: ICD-10-PCS | Mod: S$GLB,,, | Performed by: NURSE PRACTITIONER

## 2021-06-22 PROCEDURE — 1159F PR MEDICATION LIST DOCUMENTED IN MEDICAL RECORD: ICD-10-PCS | Mod: S$GLB,,, | Performed by: NURSE PRACTITIONER

## 2021-06-22 PROCEDURE — 3008F BODY MASS INDEX DOCD: CPT | Mod: CPTII,S$GLB,, | Performed by: NURSE PRACTITIONER

## 2021-06-22 PROCEDURE — 1101F PR PT FALLS ASSESS DOC 0-1 FALLS W/OUT INJ PAST YR: ICD-10-PCS | Mod: CPTII,S$GLB,, | Performed by: NURSE PRACTITIONER

## 2021-06-22 PROCEDURE — 1126F AMNT PAIN NOTED NONE PRSNT: CPT | Mod: S$GLB,,, | Performed by: NURSE PRACTITIONER

## 2021-06-22 PROCEDURE — 99999 PR PBB SHADOW E&M-EST. PATIENT-LVL IV: ICD-10-PCS | Mod: PBBFAC,,, | Performed by: NURSE PRACTITIONER

## 2021-06-22 PROCEDURE — 1126F PR PAIN SEVERITY QUANTIFIED, NO PAIN PRESENT: ICD-10-PCS | Mod: S$GLB,,, | Performed by: NURSE PRACTITIONER

## 2021-06-22 PROCEDURE — 3288F PR FALLS RISK ASSESSMENT DOCUMENTED: ICD-10-PCS | Mod: CPTII,S$GLB,, | Performed by: NURSE PRACTITIONER

## 2021-06-22 PROCEDURE — 3008F PR BODY MASS INDEX (BMI) DOCUMENTED: ICD-10-PCS | Mod: CPTII,S$GLB,, | Performed by: NURSE PRACTITIONER

## 2021-06-30 ENCOUNTER — PATIENT OUTREACH (OUTPATIENT)
Dept: ADMINISTRATIVE | Facility: OTHER | Age: 71
End: 2021-06-30

## 2021-07-01 ENCOUNTER — OFFICE VISIT (OUTPATIENT)
Dept: CARDIOLOGY | Facility: CLINIC | Age: 71
End: 2021-07-01
Payer: MEDICARE

## 2021-07-01 VITALS
HEIGHT: 67 IN | BODY MASS INDEX: 42.56 KG/M2 | DIASTOLIC BLOOD PRESSURE: 83 MMHG | WEIGHT: 271.19 LBS | SYSTOLIC BLOOD PRESSURE: 137 MMHG | HEART RATE: 55 BPM

## 2021-07-01 DIAGNOSIS — I48.0 PAROXYSMAL A-FIB: Primary | Chronic | ICD-10-CM

## 2021-07-01 DIAGNOSIS — I45.6 WOLFF-PARKINSON-WHITE (WPW) PATTERN: ICD-10-CM

## 2021-07-01 PROCEDURE — 93005 ELECTROCARDIOGRAM TRACING: CPT | Mod: S$GLB,,, | Performed by: INTERNAL MEDICINE

## 2021-07-01 PROCEDURE — 1125F PR PAIN SEVERITY QUANTIFIED, PAIN PRESENT: ICD-10-PCS | Mod: S$GLB,,, | Performed by: INTERNAL MEDICINE

## 2021-07-01 PROCEDURE — 3008F PR BODY MASS INDEX (BMI) DOCUMENTED: ICD-10-PCS | Mod: CPTII,S$GLB,, | Performed by: INTERNAL MEDICINE

## 2021-07-01 PROCEDURE — 93010 ELECTROCARDIOGRAM REPORT: CPT | Mod: S$GLB,,, | Performed by: INTERNAL MEDICINE

## 2021-07-01 PROCEDURE — 99215 OFFICE O/P EST HI 40 MIN: CPT | Mod: S$GLB,,, | Performed by: INTERNAL MEDICINE

## 2021-07-01 PROCEDURE — 1125F AMNT PAIN NOTED PAIN PRSNT: CPT | Mod: S$GLB,,, | Performed by: INTERNAL MEDICINE

## 2021-07-01 PROCEDURE — 3288F PR FALLS RISK ASSESSMENT DOCUMENTED: ICD-10-PCS | Mod: CPTII,S$GLB,, | Performed by: INTERNAL MEDICINE

## 2021-07-01 PROCEDURE — 99999 PR PBB SHADOW E&M-EST. PATIENT-LVL III: CPT | Mod: PBBFAC,,, | Performed by: INTERNAL MEDICINE

## 2021-07-01 PROCEDURE — 1159F MED LIST DOCD IN RCRD: CPT | Mod: S$GLB,,, | Performed by: INTERNAL MEDICINE

## 2021-07-01 PROCEDURE — 99999 PR PBB SHADOW E&M-EST. PATIENT-LVL III: ICD-10-PCS | Mod: PBBFAC,,, | Performed by: INTERNAL MEDICINE

## 2021-07-01 PROCEDURE — 1101F PR PT FALLS ASSESS DOC 0-1 FALLS W/OUT INJ PAST YR: ICD-10-PCS | Mod: CPTII,S$GLB,, | Performed by: INTERNAL MEDICINE

## 2021-07-01 PROCEDURE — 93005 RHYTHM STRIP: ICD-10-PCS | Mod: S$GLB,,, | Performed by: INTERNAL MEDICINE

## 2021-07-01 PROCEDURE — 1159F PR MEDICATION LIST DOCUMENTED IN MEDICAL RECORD: ICD-10-PCS | Mod: S$GLB,,, | Performed by: INTERNAL MEDICINE

## 2021-07-01 PROCEDURE — 3008F BODY MASS INDEX DOCD: CPT | Mod: CPTII,S$GLB,, | Performed by: INTERNAL MEDICINE

## 2021-07-01 PROCEDURE — 93010 RHYTHM STRIP: ICD-10-PCS | Mod: S$GLB,,, | Performed by: INTERNAL MEDICINE

## 2021-07-01 PROCEDURE — 99215 PR OFFICE/OUTPT VISIT, EST, LEVL V, 40-54 MIN: ICD-10-PCS | Mod: S$GLB,,, | Performed by: INTERNAL MEDICINE

## 2021-07-01 PROCEDURE — 1101F PT FALLS ASSESS-DOCD LE1/YR: CPT | Mod: CPTII,S$GLB,, | Performed by: INTERNAL MEDICINE

## 2021-07-01 PROCEDURE — 3288F FALL RISK ASSESSMENT DOCD: CPT | Mod: CPTII,S$GLB,, | Performed by: INTERNAL MEDICINE

## 2021-07-12 ENCOUNTER — PATIENT MESSAGE (OUTPATIENT)
Dept: PRIMARY CARE CLINIC | Facility: CLINIC | Age: 71
End: 2021-07-12

## 2021-07-12 ENCOUNTER — PATIENT MESSAGE (OUTPATIENT)
Dept: ELECTROPHYSIOLOGY | Facility: CLINIC | Age: 71
End: 2021-07-12

## 2021-07-12 DIAGNOSIS — I48.0 PAROXYSMAL A-FIB: Primary | ICD-10-CM

## 2021-07-13 PROBLEM — K59.01 SLOW TRANSIT CONSTIPATION: Status: ACTIVE | Noted: 2021-07-13

## 2021-07-22 ENCOUNTER — OFFICE VISIT (OUTPATIENT)
Dept: CARDIOLOGY | Facility: CLINIC | Age: 71
End: 2021-07-22
Payer: MEDICARE

## 2021-07-22 VITALS — WEIGHT: 267.5 LBS | BODY MASS INDEX: 41.99 KG/M2 | HEART RATE: 67 BPM | HEIGHT: 67 IN | OXYGEN SATURATION: 97 %

## 2021-07-22 DIAGNOSIS — I45.6 WOLFF-PARKINSON-WHITE (WPW) PATTERN: ICD-10-CM

## 2021-07-22 DIAGNOSIS — G47.33 OSA (OBSTRUCTIVE SLEEP APNEA): ICD-10-CM

## 2021-07-22 DIAGNOSIS — Z79.01 CHRONIC ANTICOAGULATION: ICD-10-CM

## 2021-07-22 DIAGNOSIS — I48.0 PAROXYSMAL A-FIB: Primary | Chronic | ICD-10-CM

## 2021-07-22 DIAGNOSIS — E78.2 MIXED HYPERLIPIDEMIA: ICD-10-CM

## 2021-07-22 DIAGNOSIS — E66.01 MORBID OBESITY: Chronic | ICD-10-CM

## 2021-07-22 DIAGNOSIS — I10 ESSENTIAL HYPERTENSION: ICD-10-CM

## 2021-07-22 PROCEDURE — 3288F PR FALLS RISK ASSESSMENT DOCUMENTED: ICD-10-PCS | Mod: CPTII,S$GLB,, | Performed by: INTERNAL MEDICINE

## 2021-07-22 PROCEDURE — 1101F PR PT FALLS ASSESS DOC 0-1 FALLS W/OUT INJ PAST YR: ICD-10-PCS | Mod: CPTII,S$GLB,, | Performed by: INTERNAL MEDICINE

## 2021-07-22 PROCEDURE — 99499 UNLISTED E&M SERVICE: CPT | Mod: HCNC,S$GLB,, | Performed by: INTERNAL MEDICINE

## 2021-07-22 PROCEDURE — 3008F BODY MASS INDEX DOCD: CPT | Mod: CPTII,S$GLB,, | Performed by: INTERNAL MEDICINE

## 2021-07-22 PROCEDURE — 1101F PT FALLS ASSESS-DOCD LE1/YR: CPT | Mod: CPTII,S$GLB,, | Performed by: INTERNAL MEDICINE

## 2021-07-22 PROCEDURE — 99214 OFFICE O/P EST MOD 30 MIN: CPT | Mod: S$GLB,,, | Performed by: INTERNAL MEDICINE

## 2021-07-22 PROCEDURE — 99999 PR PBB SHADOW E&M-EST. PATIENT-LVL III: ICD-10-PCS | Mod: PBBFAC,,, | Performed by: INTERNAL MEDICINE

## 2021-07-22 PROCEDURE — 1159F PR MEDICATION LIST DOCUMENTED IN MEDICAL RECORD: ICD-10-PCS | Mod: CPTII,S$GLB,, | Performed by: INTERNAL MEDICINE

## 2021-07-22 PROCEDURE — 1125F AMNT PAIN NOTED PAIN PRSNT: CPT | Mod: CPTII,S$GLB,, | Performed by: INTERNAL MEDICINE

## 2021-07-22 PROCEDURE — 3008F PR BODY MASS INDEX (BMI) DOCUMENTED: ICD-10-PCS | Mod: CPTII,S$GLB,, | Performed by: INTERNAL MEDICINE

## 2021-07-22 PROCEDURE — 99499 RISK ADDL DX/OHS AUDIT: ICD-10-PCS | Mod: HCNC,S$GLB,, | Performed by: INTERNAL MEDICINE

## 2021-07-22 PROCEDURE — 99999 PR PBB SHADOW E&M-EST. PATIENT-LVL III: CPT | Mod: PBBFAC,,, | Performed by: INTERNAL MEDICINE

## 2021-07-22 PROCEDURE — 1159F MED LIST DOCD IN RCRD: CPT | Mod: CPTII,S$GLB,, | Performed by: INTERNAL MEDICINE

## 2021-07-22 PROCEDURE — 3288F FALL RISK ASSESSMENT DOCD: CPT | Mod: CPTII,S$GLB,, | Performed by: INTERNAL MEDICINE

## 2021-07-22 PROCEDURE — 1125F PR PAIN SEVERITY QUANTIFIED, PAIN PRESENT: ICD-10-PCS | Mod: CPTII,S$GLB,, | Performed by: INTERNAL MEDICINE

## 2021-07-22 PROCEDURE — 99214 PR OFFICE/OUTPT VISIT, EST, LEVL IV, 30-39 MIN: ICD-10-PCS | Mod: S$GLB,,, | Performed by: INTERNAL MEDICINE

## 2021-08-03 ENCOUNTER — TELEPHONE (OUTPATIENT)
Dept: ELECTROPHYSIOLOGY | Facility: CLINIC | Age: 71
End: 2021-08-03

## 2021-08-07 ENCOUNTER — PATIENT MESSAGE (OUTPATIENT)
Dept: CARDIOLOGY | Facility: CLINIC | Age: 71
End: 2021-08-07

## 2021-08-12 ENCOUNTER — PATIENT MESSAGE (OUTPATIENT)
Dept: PRIMARY CARE CLINIC | Facility: CLINIC | Age: 71
End: 2021-08-12

## 2021-09-15 ENCOUNTER — PATIENT MESSAGE (OUTPATIENT)
Dept: ELECTROPHYSIOLOGY | Facility: CLINIC | Age: 71
End: 2021-09-15

## 2021-10-05 ENCOUNTER — PATIENT MESSAGE (OUTPATIENT)
Dept: ELECTROPHYSIOLOGY | Facility: CLINIC | Age: 71
End: 2021-10-05

## 2021-10-07 ENCOUNTER — PATIENT MESSAGE (OUTPATIENT)
Dept: ELECTROPHYSIOLOGY | Facility: CLINIC | Age: 71
End: 2021-10-07

## 2021-10-07 DIAGNOSIS — I48.0 PAROXYSMAL A-FIB: Primary | ICD-10-CM

## 2021-10-14 ENCOUNTER — IMMUNIZATION (OUTPATIENT)
Dept: INTERNAL MEDICINE | Facility: CLINIC | Age: 71
End: 2021-10-14
Payer: MEDICARE

## 2021-10-14 DIAGNOSIS — Z23 NEED FOR VACCINATION: Primary | ICD-10-CM

## 2021-10-14 PROCEDURE — 91300 COVID-19, MRNA, LNP-S, PF, 30 MCG/0.3 ML DOSE VACCINE: CPT | Mod: HCNC,PBBFAC | Performed by: FAMILY MEDICINE

## 2021-10-14 PROCEDURE — 0003A COVID-19, MRNA, LNP-S, PF, 30 MCG/0.3 ML DOSE VACCINE: CPT | Mod: HCNC,PBBFAC | Performed by: FAMILY MEDICINE

## 2021-10-26 ENCOUNTER — PATIENT MESSAGE (OUTPATIENT)
Dept: MEDSURG UNIT | Facility: HOSPITAL | Age: 71
End: 2021-10-26
Payer: MEDICARE

## 2021-10-29 ENCOUNTER — TELEPHONE (OUTPATIENT)
Dept: ELECTROPHYSIOLOGY | Facility: CLINIC | Age: 71
End: 2021-10-29
Payer: MEDICARE

## 2021-11-01 ENCOUNTER — HOSPITAL ENCOUNTER (OUTPATIENT)
Facility: HOSPITAL | Age: 71
Discharge: HOME | End: 2021-11-01
Attending: INTERNAL MEDICINE | Admitting: INTERNAL MEDICINE
Payer: MEDICARE

## 2021-11-01 VITALS
RESPIRATION RATE: 17 BRPM | OXYGEN SATURATION: 98 % | WEIGHT: 245 LBS | HEIGHT: 67 IN | TEMPERATURE: 98 F | DIASTOLIC BLOOD PRESSURE: 67 MMHG | BODY MASS INDEX: 38.45 KG/M2 | SYSTOLIC BLOOD PRESSURE: 150 MMHG | HEART RATE: 81 BPM

## 2021-11-01 DIAGNOSIS — I45.6 WOLFF-PARKINSON-WHITE (WPW) PATTERN: Primary | ICD-10-CM

## 2021-11-01 DIAGNOSIS — Z95.9 CARDIAC DEVICE IN SITU: ICD-10-CM

## 2021-11-01 DIAGNOSIS — I48.0 PAROXYSMAL A-FIB: ICD-10-CM

## 2021-11-01 LAB
INR PPP: 1 (ref 0.8–1.2)
PROTHROMBIN TIME: 10.5 SEC (ref 9–12.5)

## 2021-11-01 PROCEDURE — 63600175 PHARM REV CODE 636 W HCPCS: Mod: HCNC | Performed by: NURSE PRACTITIONER

## 2021-11-01 PROCEDURE — 93005 ELECTROCARDIOGRAM TRACING: CPT | Mod: HCNC

## 2021-11-01 PROCEDURE — 25000003 PHARM REV CODE 250: Mod: HCNC | Performed by: NURSE PRACTITIONER

## 2021-11-01 PROCEDURE — 33285 PR INSERTION,SUBQ CARDIAC RHYTHM MONITOR, W/PRGRMG: ICD-10-PCS | Mod: HCNC,,, | Performed by: INTERNAL MEDICINE

## 2021-11-01 PROCEDURE — 33285 INSJ SUBQ CAR RHYTHM MNTR: CPT | Mod: HCNC | Performed by: INTERNAL MEDICINE

## 2021-11-01 PROCEDURE — 85610 PROTHROMBIN TIME: CPT | Mod: HCNC | Performed by: NURSE PRACTITIONER

## 2021-11-01 PROCEDURE — 93010 ELECTROCARDIOGRAM REPORT: CPT | Mod: HCNC,,, | Performed by: INTERNAL MEDICINE

## 2021-11-01 PROCEDURE — C1764 EVENT RECORDER, CARDIAC: HCPCS | Mod: HCNC | Performed by: INTERNAL MEDICINE

## 2021-11-01 PROCEDURE — 25000003 PHARM REV CODE 250: Mod: HCNC | Performed by: INTERNAL MEDICINE

## 2021-11-01 PROCEDURE — 33285 INSJ SUBQ CAR RHYTHM MNTR: CPT | Mod: HCNC,,, | Performed by: INTERNAL MEDICINE

## 2021-11-01 PROCEDURE — 99226 PR SUBSEQUENT OBSERVATION CARE,LEVEL III: CPT | Mod: HCNC,,, | Performed by: INTERNAL MEDICINE

## 2021-11-01 PROCEDURE — 93010 EKG 12-LEAD: ICD-10-PCS | Mod: HCNC,,, | Performed by: INTERNAL MEDICINE

## 2021-11-01 PROCEDURE — 99226 PR SUBSEQUENT OBSERVATION CARE,LEVEL III: ICD-10-PCS | Mod: HCNC,,, | Performed by: INTERNAL MEDICINE

## 2021-11-01 DEVICE — MON LNQ11 REVEAL LINQ USA
Type: IMPLANTABLE DEVICE | Site: CHEST | Status: FUNCTIONAL
Brand: REVEAL LINQ™

## 2021-11-01 RX ORDER — VANCOMYCIN HCL IN 5 % DEXTROSE 1G/250ML
1000 PLASTIC BAG, INJECTION (ML) INTRAVENOUS
Status: DISPENSED | OUTPATIENT
Start: 2021-11-01

## 2021-11-01 RX ORDER — LIDOCAINE HYDROCHLORIDE AND EPINEPHRINE 20; 10 MG/ML; UG/ML
INJECTION, SOLUTION INFILTRATION; PERINEURAL
Status: DISCONTINUED | OUTPATIENT
Start: 2021-11-01 | End: 2021-11-01 | Stop reason: HOSPADM

## 2021-11-01 RX ADMIN — VANCOMYCIN HYDROCHLORIDE 1000 MG: 1 INJECTION, POWDER, LYOPHILIZED, FOR SOLUTION INTRAVENOUS at 12:11

## 2021-11-08 ENCOUNTER — CLINICAL SUPPORT (OUTPATIENT)
Dept: CARDIOLOGY | Facility: HOSPITAL | Age: 71
End: 2021-11-08
Attending: INTERNAL MEDICINE
Payer: MEDICARE

## 2021-11-08 DIAGNOSIS — I48.0 PAROXYSMAL A-FIB: ICD-10-CM

## 2021-11-08 PROCEDURE — 93285 CARDIAC DEVICE CHECK - IN CLINIC & HOSPITAL: ICD-10-PCS | Mod: 26,HCNC,, | Performed by: INTERNAL MEDICINE

## 2021-11-08 PROCEDURE — 93285 PRGRMG DEV EVAL SCRMS IP: CPT | Mod: 26,HCNC,, | Performed by: INTERNAL MEDICINE

## 2021-11-10 ENCOUNTER — IMMUNIZATION (OUTPATIENT)
Dept: PHARMACY | Facility: CLINIC | Age: 71
End: 2021-11-10
Payer: MEDICARE

## 2021-11-10 RX ORDER — FLUTICASONE PROPIONATE 50 MCG
SPRAY, SUSPENSION (ML) NASAL
Qty: 32 G | Refills: 3 | Status: SHIPPED | OUTPATIENT
Start: 2021-11-10 | End: 2022-10-25

## 2021-11-17 ENCOUNTER — PATIENT MESSAGE (OUTPATIENT)
Dept: CARDIOLOGY | Facility: CLINIC | Age: 71
End: 2021-11-17
Payer: MEDICARE

## 2021-11-19 ENCOUNTER — CLINICAL SUPPORT (OUTPATIENT)
Dept: CARDIOLOGY | Facility: HOSPITAL | Age: 71
End: 2021-11-19
Attending: INTERNAL MEDICINE
Payer: MEDICARE

## 2021-11-19 ENCOUNTER — DOCUMENTATION ONLY (OUTPATIENT)
Dept: ELECTROPHYSIOLOGY | Facility: CLINIC | Age: 71
End: 2021-11-19
Payer: MEDICARE

## 2021-11-19 DIAGNOSIS — I48.0 PAROXYSMAL A-FIB: ICD-10-CM

## 2021-11-23 ENCOUNTER — OFFICE VISIT (OUTPATIENT)
Dept: UROGYNECOLOGY | Facility: CLINIC | Age: 71
End: 2021-11-23
Payer: MEDICARE

## 2021-11-23 VITALS — BODY MASS INDEX: 39.38 KG/M2 | WEIGHT: 250.88 LBS | HEIGHT: 67 IN

## 2021-11-23 DIAGNOSIS — N39.46 MIXED INCONTINENCE URGE AND STRESS: Primary | ICD-10-CM

## 2021-11-23 DIAGNOSIS — N39.41 URGE INCONTINENCE: ICD-10-CM

## 2021-11-23 DIAGNOSIS — N81.11 MIDLINE CYSTOCELE: ICD-10-CM

## 2021-11-23 DIAGNOSIS — N95.2 VAGINAL ATROPHY: ICD-10-CM

## 2021-11-23 PROCEDURE — 99213 PR OFFICE/OUTPT VISIT, EST, LEVL III, 20-29 MIN: ICD-10-PCS | Mod: HCNC,S$GLB,, | Performed by: NURSE PRACTITIONER

## 2021-11-23 PROCEDURE — 99999 PR PBB SHADOW E&M-EST. PATIENT-LVL III: CPT | Mod: PBBFAC,HCNC,, | Performed by: NURSE PRACTITIONER

## 2021-11-23 PROCEDURE — 99999 PR PBB SHADOW E&M-EST. PATIENT-LVL III: ICD-10-PCS | Mod: PBBFAC,HCNC,, | Performed by: NURSE PRACTITIONER

## 2021-11-23 PROCEDURE — 99213 OFFICE O/P EST LOW 20 MIN: CPT | Mod: HCNC,S$GLB,, | Performed by: NURSE PRACTITIONER

## 2021-12-01 ENCOUNTER — CLINICAL SUPPORT (OUTPATIENT)
Dept: CARDIOLOGY | Facility: HOSPITAL | Age: 71
End: 2021-12-01
Payer: MEDICARE

## 2021-12-01 DIAGNOSIS — Z95.818 PRESENCE OF OTHER CARDIAC IMPLANTS AND GRAFTS: ICD-10-CM

## 2021-12-01 PROCEDURE — 93298 REM INTERROG DEV EVAL SCRMS: CPT | Mod: HCNC,,, | Performed by: INTERNAL MEDICINE

## 2021-12-01 PROCEDURE — G2066 INTER DEVC REMOTE 30D: HCPCS | Mod: HCNC | Performed by: INTERNAL MEDICINE

## 2021-12-01 PROCEDURE — 93298 CARDIAC DEVICE CHECK - REMOTE: ICD-10-PCS | Mod: HCNC,,, | Performed by: INTERNAL MEDICINE

## 2021-12-31 ENCOUNTER — CLINICAL SUPPORT (OUTPATIENT)
Dept: CARDIOLOGY | Facility: HOSPITAL | Age: 71
End: 2021-12-31
Payer: MEDICARE

## 2021-12-31 DIAGNOSIS — Z95.818 PRESENCE OF OTHER CARDIAC IMPLANTS AND GRAFTS: ICD-10-CM

## 2021-12-31 PROCEDURE — 93298 REM INTERROG DEV EVAL SCRMS: CPT | Mod: HCNC,,, | Performed by: INTERNAL MEDICINE

## 2021-12-31 PROCEDURE — 93298 CARDIAC DEVICE CHECK - REMOTE: ICD-10-PCS | Mod: HCNC,,, | Performed by: INTERNAL MEDICINE

## 2021-12-31 PROCEDURE — G2066 INTER DEVC REMOTE 30D: HCPCS | Mod: HCNC | Performed by: INTERNAL MEDICINE

## 2022-01-03 ENCOUNTER — HOSPITAL ENCOUNTER (EMERGENCY)
Facility: HOSPITAL | Age: 72
Discharge: HOME | End: 2022-01-03
Attending: EMERGENCY MEDICINE
Payer: MEDICARE

## 2022-01-03 VITALS
WEIGHT: 235 LBS | HEART RATE: 56 BPM | DIASTOLIC BLOOD PRESSURE: 63 MMHG | SYSTOLIC BLOOD PRESSURE: 135 MMHG | BODY MASS INDEX: 36.81 KG/M2 | RESPIRATION RATE: 16 BRPM | TEMPERATURE: 98 F | OXYGEN SATURATION: 98 %

## 2022-01-03 DIAGNOSIS — Z20.822 SUSPECTED COVID-19 VIRUS INFECTION: Primary | ICD-10-CM

## 2022-01-03 PROCEDURE — U0005 INFEC AGEN DETEC AMPLI PROBE: HCPCS | Performed by: PHYSICIAN ASSISTANT

## 2022-01-03 PROCEDURE — 99283 EMERGENCY DEPT VISIT LOW MDM: CPT | Mod: HCNC,ER

## 2022-01-03 PROCEDURE — U0003 INFECTIOUS AGENT DETECTION BY NUCLEIC ACID (DNA OR RNA); SEVERE ACUTE RESPIRATORY SYNDROME CORONAVIRUS 2 (SARS-COV-2) (CORONAVIRUS DISEASE [COVID-19]), AMPLIFIED PROBE TECHNIQUE, MAKING USE OF HIGH THROUGHPUT TECHNOLOGIES AS DESCRIBED BY CMS-2020-01-R: HCPCS | Mod: HCNC,ER | Performed by: PHYSICIAN ASSISTANT

## 2022-01-03 NOTE — ED PROVIDER NOTES
Encounter Date: 1/3/2022       History     Chief Complaint   Patient presents with    COVID-19 Concerns     I have two people in our house that are positive and I started with a  scratchy throat for 2 days.      Patient is a 70 y/o F who presents to ED with c/o scratchy throat and COVID exposure in household.        Review of patient's allergies indicates:   Allergen Reactions    Decongestant d [pseudoephedrine-dm]      Atrial Fibrillation    Augmentin [amoxicillin-pot clavulanate]      palpitations      Amoxicillin Palpitations    Diphenhydramine-pseudoephed Palpitations     TACHYCARDIA    Povidone-iodine Rash     Mild erythema of skin     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, l mandible, 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 Dr Viktor Toure    Osteopenia of neck of left femur 6/4/2020    Paroxysmal atrioventricular tachycardia     Plantar fasciitis     Right knee meniscal tear     Dr Mayfield, tx conservatively    Severe obesity (BMI 35.0-35.9 with comorbidity) 1/24/2017    Slow transit constipation 7/13/2021    Despite fruits & veg & 1200 dunia diet, try Colace daily    Stress incontinence, female 03/28/2018    hasn't tried oxybutynin, After HYST, Kegels & PT  didn't work, worse at night wearing pads, Dr Infante    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    Thyroid disease      Past Surgical History:   Procedure Laterality Date    APPENDECTOMY      COLONOSCOPY N/A 8/13/2020    Procedure: COLONOSCOPY;  Surgeon: Angus  MD Osorio;  Location: [facility] (4TH FLR);  Service: Endoscopy;  Laterality: N/A;  ok to hold Eliquis 2 days per Dr Servin     COVID test at [facility] on 8/10-GT    HYSTERECTOMY  1990    TAHBSO for fibroids    INSERTION OF IMPLANTABLE LOOP RECORDER Left 11/1/2021    Procedure: Insertion, Implantable Loop Recorder;  Surgeon: Ameya Servin MD;  Location: [facility];  Service: Cardiology;  Laterality: Left;  AF, ILR implant, MDT,  DM, 3 Prep    MANDIBLE SURGERY  2015    L mandible, Dr Toure    MANDIBLE SURGERY Left 8/27/2019    OOPHORECTOMY      TONSILLECTOMY       Family History   Problem Relation Age of Onset    Cancer Mother         stomach, liver    Breast cancer Neg Hx     Ovarian cancer Neg Hx     Cervical cancer Neg Hx     Endometrial cancer Neg Hx     Vaginal cancer Neg Hx     Melanoma Neg Hx     Colon cancer Neg Hx     Esophageal cancer Neg Hx      Social History     Tobacco Use    Smoking status: Never Smoker    Smokeless tobacco: Never Used   Substance Use Topics    Alcohol use: No    Drug use: No     Review of Systems   Constitutional: Negative for chills, diaphoresis, fatigue and fever.   HENT: Positive for sore throat (Scratchy throat). Negative for congestion and rhinorrhea.    Respiratory: Negative for cough and shortness of breath.    Cardiovascular: Negative for chest pain.   Gastrointestinal: Negative for diarrhea, nausea and vomiting.   Musculoskeletal: Negative for arthralgias, back pain and myalgias.   Skin: Negative for rash.   Neurological: Negative for weakness and headaches.       Physical Exam     Initial Vitals [01/03/22 1152]   BP Pulse Resp Temp SpO2   135/63 (!) 56 16 98 °F (36.7 °C) 98 %      MAP       --         Physical Exam    Nursing note and vitals reviewed.  Constitutional: She appears well-developed and well-nourished. She is not diaphoretic. No distress.   HENT:   Head: Normocephalic and atraumatic.   Eyes: Conjunctivae and EOM are normal. Pupils are equal,  round, and reactive to light.   Neck: Neck supple.   Normal range of motion.  Cardiovascular: Normal rate, regular rhythm, normal heart sounds and intact distal pulses.   Pulmonary/Chest: Breath sounds normal. No respiratory distress.   Abdominal: Abdomen is soft. Bowel sounds are normal. There is no abdominal tenderness.   Musculoskeletal:         General: No tenderness or edema. Normal range of motion.      Cervical back: Normal range of motion and neck supple.     Neurological: She is alert and oriented to person, place, and time. She has normal strength. GCS score is 15. GCS eye subscore is 4. GCS verbal subscore is 5. GCS motor subscore is 6.   Skin: Skin is warm. Capillary refill takes less than 2 seconds. No rash noted.         ED Course   Procedures  Labs Reviewed   SARS-COV-2 (COVID-19) QUALITATIVE PCR          Imaging Results    None          Medications - No data to display  Medical Decision Making:   ED Management:  COVID pending.   Vital signs are stable, nontoxic appearing.  No respiratory distress.  Patient will be discharged home.  Discussed CDC guidelines if patient were to test positive for COVID.  Discussed symptomatic and supportive care measures for viral illness.  ED precautions were discussed to return for any worsening or change in symptoms.  Patient voiced understanding and agreed with the plan of care.  All questions were answered.             ED Course as of 01/03/22 1532   Mon Jan 03, 2022   1155 Patient is a 72 y/o F who presents to ED with c/o scratchy throat and COVID exposure in household.  [EM]      ED Course User Index  [EM] Zuleika Abreu PA-C             Clinical Impression:   Final diagnoses:  [Z20.822] Suspected COVID-19 virus infection (Primary)          ED Disposition Condition    Discharge Stable        ED Prescriptions     None        Follow-up Information     Follow up With Specialties Details Why Contact Info    Naz Condon MD Family Medicine   [address] [place] [address] [address]  [facility]  [address]  [facility] [id number]  [phone]             Zuleika Abreu PA-C  01/03/22 1533

## 2022-01-05 LAB
SARS-COV-2 RNA RESP QL NAA+PROBE: NOT DETECTED
SARS-COV-2- CYCLE NUMBER: NORMAL

## 2022-01-07 ENCOUNTER — TELEPHONE (OUTPATIENT)
Dept: PRIMARY CARE CLINIC | Facility: CLINIC | Age: 72
End: 2022-01-07
Payer: MEDICARE

## 2022-01-07 ENCOUNTER — PATIENT MESSAGE (OUTPATIENT)
Dept: PRIMARY CARE CLINIC | Facility: CLINIC | Age: 72
End: 2022-01-07
Payer: MEDICARE

## 2022-01-07 DIAGNOSIS — Z12.31 SCREENING MAMMOGRAM, ENCOUNTER FOR: Primary | ICD-10-CM

## 2022-01-07 DIAGNOSIS — I10 ESSENTIAL HYPERTENSION: Primary | ICD-10-CM

## 2022-01-07 DIAGNOSIS — Z12.31 OTHER SCREENING MAMMOGRAM: Primary | ICD-10-CM

## 2022-01-07 RX ORDER — BENZONATATE 200 MG/1
200 CAPSULE ORAL 3 TIMES DAILY PRN
Qty: 30 CAPSULE | Refills: 0 | Status: SHIPPED | OUTPATIENT
Start: 2022-01-07 | End: 2022-01-17

## 2022-01-10 ENCOUNTER — TELEPHONE (OUTPATIENT)
Dept: PRIMARY CARE CLINIC | Facility: CLINIC | Age: 72
End: 2022-01-10
Payer: MEDICARE

## 2022-01-10 DIAGNOSIS — M85.80 OSTEOPENIA, UNSPECIFIED LOCATION: Primary | ICD-10-CM

## 2022-01-10 DIAGNOSIS — Z78.0 POSTMENOPAUSAL: ICD-10-CM

## 2022-01-10 NOTE — TELEPHONE ENCOUNTER
----- Message from Zakiya Mann sent at 1/10/2022 10:44 AM CST -----  Pt is requesting a order for Bone Denisty.

## 2022-01-11 ENCOUNTER — PATIENT MESSAGE (OUTPATIENT)
Dept: PRIMARY CARE CLINIC | Facility: CLINIC | Age: 72
End: 2022-01-11
Payer: MEDICARE

## 2022-01-25 NOTE — PROGRESS NOTES
Ms. Fuentes is a patient of Dr. Servin and was last seen in clinic 7/1/2021.      Subjective:   Patient ID:  Flores Fuentes is a 71 y.o. female who presents for follow-up of Atrial Fibrillation  .     HPI:    Ms. Fuentes is a 71 y.o. female with pAF, HLD, obesity, hypothyroid here for follow up.     Background:    AF first dx 2006 after lots of caffeine. Few episodes since then. Pill in pocket strategy.  HL, on meds  obesity  hypothyroid, on med    Went to ER 3/10 with palps. Underwent DCCV 3/13/17 after remaining in AF with RVR. Started on multaq, and BB d/c'd.  Since, feeling not quite as well as usually, and on 4/17, at which time HR was 120s and didn't feel same as prior AF episodes.  She'd been on BB for years w/o issues. Good exertion tolerance. No CP.    Due to rare PAF episodes, at the last visit we adopted a pill-in-the-pocket strategy. She used it first in 11/17; went to the ER. AF resolved <30 min after taking dose. Had 2 other episodes, self-treated successfully, in 12/17 and 1/18. Since last seen, has had 4 such episodes, all aborted with PIP.     7/13/2019 went to hospital with palpitations - EKG showed SR with PVCs. Prior to this, she had a kenalog injection for her knee and a procedure on her jaw and says she was under a lot of stress. Today she says her palpitations are not as severe as when she was in the hospital, but they worsen with exercise. Palpitations do not feel like her usual atrial fibrillation episodes and she has not been taking flecainide. Likely PVCs were a result of various stressors, including kenalog injection. Her metoprolol was increased in the hospital but she was unsure if she should continue with this. I encouraged her to go ahead with this increase and we will get a Holter monitor in a couple of weeks to assess. If her PVCs continue to be a problem, can consider switching diltiazem to verapamil.     Holter showed NSR with about 1k PVCs.  She's been feeling better since  last seen. Still taking low-dose BB.  AF is under very good control. Only about 2x in past year. Flecainide works well: terminates arrhythmia within 3 hours.  echo 5/21: 60%  ECG is NSR with intermittent preexcitation (!) -- not seen previously. Delta +I,II,L, V2-V6; -III,F; +/-V1    BB, dilt  NOAC for a/c  Continue PIP flecainide prn AF.    Her AP is low risk as evidenced by its (extremely) intermittent nature -- first seen today, and intermittent today. However it could still support SVT; perhaps her intermittent palpitations are not all AF but rather SVT.  Explained APs and their propensity toward SVT (and AF).  We'll place an ILR to follow for ?AF vs ?SVT. Considering ablation (of AP +/- AF!).    Update (02/01/2022):    11/1/2021: Successful implantation of Loop Recorder.    Today she says she has been feeling well. No sustained AF in over a year - has not needed PIP flecainide. Brief palps lasting seconds. She has been focusing on mindfulness/stress reduction with good success. Using CPAP faithfully. No JOHANSEN, CP, LH, syncope.    She is currently taking eliquis 5mg BID for stroke prophylaxis and denies significant bleeding episodes. She is currently being treated with diltiazem 120mg daily and metoprolol succinate 25mg daily for HR control.  Kidney function is stable, with a creatinine of 0.7 on 6/7/2021.    I have personally reviewed the patient's EKG today, which shows sinus rhythm with PVC at 57bpm. WY interval is 184. QRS is 92. QT is 454. No preexcitation noted on EKG, but intermittent preexcitation on rhythm strip.    Relevant Cardiac Test Results:    2D Echo (5/14/2021):  · The left ventricle is mildly enlarged with eccentric hypertrophy and normal systolic function.  · The estimated ejection fraction is 60%.  · Normal left ventricular diastolic function.  · Normal right ventricular size with normal right ventricular systolic function.  · Mild tricuspid regurgitation.  · Intermediate central venous  "pressure (8 mmHg).  · The estimated PA systolic pressure is 36 mmHg.  ·   Current Outpatient Medications   Medication Sig    flecainide (TAMBOCOR) 150 MG Tab TAKE 2 TABS (300 MG TOTAL) UP TO ONCE PER DAY FOR ATRIAL FIBRILLATION.    fluticasone propionate (FLONASE) 50 mcg/actuation nasal spray USE 1 SPRAY IN EACH NOSTRIL EVERY DAY    levothyroxine (SYNTHROID) 25 MCG tablet TAKE 1 TABLET ONE TIME DAILY    metoprolol succinate (TOPROL-XL) 25 MG 24 hr tablet TAKE 1 TABLET EVERY DAY    pravastatin (PRAVACHOL) 40 MG tablet TAKE 1 TABLET EVERY DAY    apixaban (ELIQUIS) 5 mg Tab Take 1 tablet (5 mg total) by mouth 2 (two) times daily.    diltiaZEM (CARDIZEM CD) 120 MG Cp24 Take 1 capsule (120 mg total) by mouth once daily.     No current facility-administered medications for this visit.     Facility-Administered Medications Ordered in Other Visits   Medication    sodium chloride 0.9% bolus 1,000 mL    vancomycin in dextrose 5 % 1 gram/250 mL IVPB 1,000 mg       Review of Systems   Constitutional: Negative for malaise/fatigue.   Cardiovascular: Positive for irregular heartbeat and palpitations (brief). Negative for chest pain, dyspnea on exertion and leg swelling.   Respiratory: Negative for shortness of breath.    Hematologic/Lymphatic: Negative for bleeding problem.   Skin: Negative for rash.   Musculoskeletal: Negative for myalgias.   Gastrointestinal: Negative for hematemesis, hematochezia and nausea.   Genitourinary: Negative for hematuria.   Neurological: Negative for light-headedness.   Psychiatric/Behavioral: Negative for altered mental status.   Allergic/Immunologic: Negative for persistent infections.       Objective:          /60   Pulse (!) 57   Ht 5' 7" (1.702 m)   Wt 115.3 kg (254 lb 3.1 oz)   BMI 39.81 kg/m²     Physical Exam  Vitals and nursing note reviewed.   Constitutional:       Appearance: Normal appearance. She is well-developed and well-nourished.   HENT:      Head: Normocephalic.    "   Nose: Nose normal.   Eyes:      Pupils: Pupils are equal, round, and reactive to light.   Cardiovascular:      Rate and Rhythm: Normal rate and regular rhythm. Occasional extrasystoles are present.  Pulmonary:      Effort: No respiratory distress.      Breath sounds: Normal breath sounds.   Musculoskeletal:         General: No edema. Normal range of motion.   Skin:     General: Skin is warm and dry.      Findings: No erythema.   Neurological:      Mental Status: She is alert and oriented to person, place, and time.   Psychiatric:         Mood and Affect: Mood and affect normal.         Speech: Speech normal.         Behavior: Behavior normal.       Lab Results   Component Value Date     06/07/2021    K 3.9 06/07/2021    MG 2.5 08/12/2020    BUN 17 06/07/2021    CREATININE 0.72 06/07/2021    ALT 16 06/07/2021    AST 24 06/07/2021    HGB 13.7 06/07/2021    HCT 42.1 06/07/2021    HCT 50 08/12/2020    TSH 4.210 (H) 06/07/2021    LDLCALC 90.6 06/07/2021       Recent Labs   Lab 11/01/21  1226   INR 1.0         Assessment:     1. Paroxysmal A-fib    2. Kym-Parkinson-White (WPW) pattern - intermittent    3. Essential hypertension    4. Chronic anticoagulation    5. Morbid obesity    6. MATTHEW (obstructive sleep apnea)      Plan:     In summary, Ms. Fuentes is a 71 y.o. female with pAF, HLD, obesity, hypothyroid here for follow up.   She is She is feeling well. No sustained palpitations. No AF on ILR. Brief AT and ectopy. False pauses.  Intermittent pre-excitation on rhythm strip. We discussed that should he palpitations become sustained, RFA would likely be recommended.  For now she remains on metoprolol and diltiazem. Has not needed flecainide PIP. CHADSVASc 3 on eliquis.     Continue current medications  Continue loop recorder monitoring  RTC 6 mo, sooner if needed.    *A copy of this note has been sent to Dr. Servin*    Follow up in about 6 months (around  8/1/2022).    ------------------------------------------------------------------    LEXY Bal, NP-C  Cardiac Electrophysiology

## 2022-01-30 ENCOUNTER — CLINICAL SUPPORT (OUTPATIENT)
Dept: CARDIOLOGY | Facility: HOSPITAL | Age: 72
End: 2022-01-30
Payer: MEDICARE

## 2022-01-30 DIAGNOSIS — Z95.818 PRESENCE OF OTHER CARDIAC IMPLANTS AND GRAFTS: ICD-10-CM

## 2022-01-30 PROCEDURE — G2066 INTER DEVC REMOTE 30D: HCPCS | Mod: HCNC | Performed by: INTERNAL MEDICINE

## 2022-01-30 PROCEDURE — 93298 CARDIAC DEVICE CHECK - REMOTE: ICD-10-PCS | Mod: HCNC,,, | Performed by: INTERNAL MEDICINE

## 2022-01-30 PROCEDURE — 93298 REM INTERROG DEV EVAL SCRMS: CPT | Mod: HCNC,,, | Performed by: INTERNAL MEDICINE

## 2022-01-31 ENCOUNTER — TELEPHONE (OUTPATIENT)
Dept: ELECTROPHYSIOLOGY | Facility: CLINIC | Age: 72
End: 2022-01-31
Payer: MEDICARE

## 2022-02-01 ENCOUNTER — HOSPITAL ENCOUNTER (OUTPATIENT)
Dept: CARDIOLOGY | Facility: CLINIC | Age: 72
Discharge: HOME | End: 2022-02-01
Payer: MEDICARE

## 2022-02-01 ENCOUNTER — OFFICE VISIT (OUTPATIENT)
Dept: ELECTROPHYSIOLOGY | Facility: CLINIC | Age: 72
End: 2022-02-01
Payer: MEDICARE

## 2022-02-01 VITALS
SYSTOLIC BLOOD PRESSURE: 130 MMHG | BODY MASS INDEX: 39.9 KG/M2 | WEIGHT: 254.19 LBS | HEIGHT: 67 IN | DIASTOLIC BLOOD PRESSURE: 60 MMHG | HEART RATE: 57 BPM

## 2022-02-01 DIAGNOSIS — I45.6 WOLFF-PARKINSON-WHITE (WPW) PATTERN: ICD-10-CM

## 2022-02-01 DIAGNOSIS — G47.33 OSA (OBSTRUCTIVE SLEEP APNEA): ICD-10-CM

## 2022-02-01 DIAGNOSIS — I48.0 PAROXYSMAL A-FIB: Chronic | ICD-10-CM

## 2022-02-01 DIAGNOSIS — Z79.01 CHRONIC ANTICOAGULATION: ICD-10-CM

## 2022-02-01 DIAGNOSIS — E66.01 MORBID OBESITY: Chronic | ICD-10-CM

## 2022-02-01 DIAGNOSIS — I10 ESSENTIAL HYPERTENSION: ICD-10-CM

## 2022-02-01 DIAGNOSIS — I48.0 PAROXYSMAL A-FIB: Primary | Chronic | ICD-10-CM

## 2022-02-01 PROCEDURE — 1159F MED LIST DOCD IN RCRD: CPT | Mod: HCNC,CPTII,S$GLB, | Performed by: NURSE PRACTITIONER

## 2022-02-01 PROCEDURE — 99999 PR PBB SHADOW E&M-EST. PATIENT-LVL III: ICD-10-PCS | Mod: PBBFAC,HCNC,, | Performed by: NURSE PRACTITIONER

## 2022-02-01 PROCEDURE — 1160F PR REVIEW ALL MEDS BY PRESCRIBER/CLIN PHARMACIST DOCUMENTED: ICD-10-PCS | Mod: HCNC,CPTII,S$GLB, | Performed by: NURSE PRACTITIONER

## 2022-02-01 PROCEDURE — 99499 UNLISTED E&M SERVICE: CPT | Mod: S$GLB,,, | Performed by: NURSE PRACTITIONER

## 2022-02-01 PROCEDURE — 1126F AMNT PAIN NOTED NONE PRSNT: CPT | Mod: HCNC,CPTII,S$GLB, | Performed by: NURSE PRACTITIONER

## 2022-02-01 PROCEDURE — 3078F PR MOST RECENT DIASTOLIC BLOOD PRESSURE < 80 MM HG: ICD-10-PCS | Mod: HCNC,CPTII,S$GLB, | Performed by: NURSE PRACTITIONER

## 2022-02-01 PROCEDURE — 93010 ELECTROCARDIOGRAM REPORT: CPT | Mod: HCNC,S$GLB,, | Performed by: INTERNAL MEDICINE

## 2022-02-01 PROCEDURE — 3288F PR FALLS RISK ASSESSMENT DOCUMENTED: ICD-10-PCS | Mod: HCNC,CPTII,S$GLB, | Performed by: NURSE PRACTITIONER

## 2022-02-01 PROCEDURE — 93005 RHYTHM STRIP: ICD-10-PCS | Mod: HCNC,S$GLB,, | Performed by: INTERNAL MEDICINE

## 2022-02-01 PROCEDURE — 99999 PR PBB SHADOW E&M-EST. PATIENT-LVL III: CPT | Mod: PBBFAC,HCNC,, | Performed by: NURSE PRACTITIONER

## 2022-02-01 PROCEDURE — 99499 RISK ADDL DX/OHS AUDIT: ICD-10-PCS | Mod: S$GLB,,, | Performed by: NURSE PRACTITIONER

## 2022-02-01 PROCEDURE — 3008F PR BODY MASS INDEX (BMI) DOCUMENTED: ICD-10-PCS | Mod: HCNC,CPTII,S$GLB, | Performed by: NURSE PRACTITIONER

## 2022-02-01 PROCEDURE — 3078F DIAST BP <80 MM HG: CPT | Mod: HCNC,CPTII,S$GLB, | Performed by: NURSE PRACTITIONER

## 2022-02-01 PROCEDURE — 1160F RVW MEDS BY RX/DR IN RCRD: CPT | Mod: HCNC,CPTII,S$GLB, | Performed by: NURSE PRACTITIONER

## 2022-02-01 PROCEDURE — 1101F PR PT FALLS ASSESS DOC 0-1 FALLS W/OUT INJ PAST YR: ICD-10-PCS | Mod: HCNC,CPTII,S$GLB, | Performed by: NURSE PRACTITIONER

## 2022-02-01 PROCEDURE — 93005 ELECTROCARDIOGRAM TRACING: CPT | Mod: HCNC,S$GLB,, | Performed by: INTERNAL MEDICINE

## 2022-02-01 PROCEDURE — 99214 OFFICE O/P EST MOD 30 MIN: CPT | Mod: HCNC,S$GLB,, | Performed by: NURSE PRACTITIONER

## 2022-02-01 PROCEDURE — 3075F PR MOST RECENT SYSTOLIC BLOOD PRESS GE 130-139MM HG: ICD-10-PCS | Mod: HCNC,CPTII,S$GLB, | Performed by: NURSE PRACTITIONER

## 2022-02-01 PROCEDURE — 99214 PR OFFICE/OUTPT VISIT, EST, LEVL IV, 30-39 MIN: ICD-10-PCS | Mod: HCNC,S$GLB,, | Performed by: NURSE PRACTITIONER

## 2022-02-01 PROCEDURE — 93010 RHYTHM STRIP: ICD-10-PCS | Mod: HCNC,S$GLB,, | Performed by: INTERNAL MEDICINE

## 2022-02-01 PROCEDURE — 1101F PT FALLS ASSESS-DOCD LE1/YR: CPT | Mod: HCNC,CPTII,S$GLB, | Performed by: NURSE PRACTITIONER

## 2022-02-01 PROCEDURE — 3008F BODY MASS INDEX DOCD: CPT | Mod: HCNC,CPTII,S$GLB, | Performed by: NURSE PRACTITIONER

## 2022-02-01 PROCEDURE — 3075F SYST BP GE 130 - 139MM HG: CPT | Mod: HCNC,CPTII,S$GLB, | Performed by: NURSE PRACTITIONER

## 2022-02-01 PROCEDURE — 1126F PR PAIN SEVERITY QUANTIFIED, NO PAIN PRESENT: ICD-10-PCS | Mod: HCNC,CPTII,S$GLB, | Performed by: NURSE PRACTITIONER

## 2022-02-01 PROCEDURE — 3288F FALL RISK ASSESSMENT DOCD: CPT | Mod: HCNC,CPTII,S$GLB, | Performed by: NURSE PRACTITIONER

## 2022-02-01 PROCEDURE — 1159F PR MEDICATION LIST DOCUMENTED IN MEDICAL RECORD: ICD-10-PCS | Mod: HCNC,CPTII,S$GLB, | Performed by: NURSE PRACTITIONER

## 2022-02-01 RX ORDER — DILTIAZEM HYDROCHLORIDE 120 MG/1
120 CAPSULE, COATED, EXTENDED RELEASE ORAL DAILY
Qty: 90 CAPSULE | Refills: 3 | Status: SHIPPED | OUTPATIENT
Start: 2022-02-01 | End: 2022-10-24 | Stop reason: SDUPTHER

## 2022-02-01 NOTE — Clinical Note
S/p ILR placement. No AF. Looks like she is still having intermittent preexcitation on rhythm strip. No sustained palpitations and she feels great. Are you ok leaving her on diltiazem and metoprolol? thx

## 2022-02-11 ENCOUNTER — OFFICE VISIT (OUTPATIENT)
Dept: DERMATOLOGY | Facility: CLINIC | Age: 72
End: 2022-02-11
Payer: MEDICARE

## 2022-02-11 DIAGNOSIS — Z12.83 SCREENING EXAM FOR SKIN CANCER: ICD-10-CM

## 2022-02-11 DIAGNOSIS — L30.9 ECZEMA, UNSPECIFIED TYPE: ICD-10-CM

## 2022-02-11 DIAGNOSIS — L85.3 XEROSIS CUTIS: Primary | ICD-10-CM

## 2022-02-11 PROCEDURE — 3288F PR FALLS RISK ASSESSMENT DOCUMENTED: ICD-10-PCS | Mod: HCNC,CPTII,S$GLB, | Performed by: DERMATOLOGY

## 2022-02-11 PROCEDURE — 1101F PT FALLS ASSESS-DOCD LE1/YR: CPT | Mod: HCNC,CPTII,S$GLB, | Performed by: DERMATOLOGY

## 2022-02-11 PROCEDURE — 1159F PR MEDICATION LIST DOCUMENTED IN MEDICAL RECORD: ICD-10-PCS | Mod: HCNC,CPTII,S$GLB, | Performed by: DERMATOLOGY

## 2022-02-11 PROCEDURE — 99214 OFFICE O/P EST MOD 30 MIN: CPT | Mod: HCNC,S$GLB,, | Performed by: DERMATOLOGY

## 2022-02-11 PROCEDURE — 3288F FALL RISK ASSESSMENT DOCD: CPT | Mod: HCNC,CPTII,S$GLB, | Performed by: DERMATOLOGY

## 2022-02-11 PROCEDURE — 1126F PR PAIN SEVERITY QUANTIFIED, NO PAIN PRESENT: ICD-10-PCS | Mod: HCNC,CPTII,S$GLB, | Performed by: DERMATOLOGY

## 2022-02-11 PROCEDURE — 1159F MED LIST DOCD IN RCRD: CPT | Mod: HCNC,CPTII,S$GLB, | Performed by: DERMATOLOGY

## 2022-02-11 PROCEDURE — 99999 PR PBB SHADOW E&M-EST. PATIENT-LVL II: CPT | Mod: PBBFAC,HCNC,, | Performed by: DERMATOLOGY

## 2022-02-11 PROCEDURE — 99214 PR OFFICE/OUTPT VISIT, EST, LEVL IV, 30-39 MIN: ICD-10-PCS | Mod: HCNC,S$GLB,, | Performed by: DERMATOLOGY

## 2022-02-11 PROCEDURE — 1126F AMNT PAIN NOTED NONE PRSNT: CPT | Mod: HCNC,CPTII,S$GLB, | Performed by: DERMATOLOGY

## 2022-02-11 PROCEDURE — 99999 PR PBB SHADOW E&M-EST. PATIENT-LVL II: ICD-10-PCS | Mod: PBBFAC,HCNC,, | Performed by: DERMATOLOGY

## 2022-02-11 PROCEDURE — 1101F PR PT FALLS ASSESS DOC 0-1 FALLS W/OUT INJ PAST YR: ICD-10-PCS | Mod: HCNC,CPTII,S$GLB, | Performed by: DERMATOLOGY

## 2022-02-11 RX ORDER — HYDROCORTISONE 25 MG/G
CREAM TOPICAL 2 TIMES DAILY
Qty: 28 G | Refills: 2 | Status: SHIPPED | OUTPATIENT
Start: 2022-02-11 | End: 2023-08-08

## 2022-02-11 NOTE — PROGRESS NOTES
Subjective:       Patient ID:  Flores Fuentes is a 71 y.o. female who presents for   Chief Complaint   Patient presents with    Skin Check     UBSE     Patient is a 72 yo female present for UBSE. Patient has complaints about Dry skin on forehead. Last visit was 12/19  hx recurrent rash on left breast, clinically and with path c/w LS&A, completely responded to clobetasol cream and is no longer an issue.  Pt also with hx of AK requiring Efudex in the past.  Today, The patient denies any moles or growths of the skin that are rapidly growing, hurting, itching, bleeding, or changing colors.    Review of Systems   Skin: Positive for dry skin. Negative for daily sunscreen use and wears hat.   Hematologic/Lymphatic: Bruises/bleeds easily.        Objective:    Physical Exam   Constitutional: She appears well-developed and well-nourished.   Neurological: She is alert and oriented to person, place, and time.   Psychiatric: She has a normal mood and affect.   Skin:   Areas Examined (abnormalities noted in diagram):   Scalp / Hair Palpated and Inspected  Head / Face Inspection Performed  Neck Inspection Performed  Chest / Axilla Inspection Performed  Abdomen Inspection Performed  Genitals / Buttocks / Groin Inspection Performed  Back Inspection Performed  RUE Inspected  LUE Inspection Performed  RLE Inspected  LLE Inspection Performed  Nails and Digits Inspection Performed                   Diagram Legend     Erythematous scaling macule/papule c/w actinic keratosis       Vascular papule c/w angioma      Pigmented verrucoid papule/plaque c/w seborrheic keratosis      Yellow umbilicated papule c/w sebaceous hyperplasia      Irregularly shaped tan macule c/w lentigo     1-2 mm smooth white papules consistent with Milia      Movable subcutaneous cyst with punctum c/w epidermal inclusion cyst      Subcutaneous movable cyst c/w pilar cyst      Firm pink to brown papule c/w dermatofibroma      Pedunculated fleshy papule(s) c/w  skin tag(s)      Evenly pigmented macule c/w junctional nevus     Mildly variegated pigmented, slightly irregular-bordered macule c/w mildly atypical nevus      Flesh colored to evenly pigmented papule c/w intradermal nevus       Pink pearly papule/plaque c/w basal cell carcinoma      Erythematous hyperkeratotic cursted plaque c/w SCC      Surgical scar with no sign of skin cancer recurrence      Open and closed comedones      Inflammatory papules and pustules      Verrucoid papule consistent consistent with wart     Erythematous eczematous patches and plaques     Dystrophic onycholytic nail with subungual debris c/w onychomycosis     Umbilicated papule    Erythematous-base heme-crusted tan verrucoid plaque consistent with inflamed seborrheic keratosis     Erythematous Silvery Scaling Plaque c/w Psoriasis     See annotation      Assessment / Plan:        Xerosis cutis  neutrogena norwegian formula hand cream    Screening exam for skin cancer    Upper body skin examination performed today including at least 6 points as noted in physical examination. No lesions suspicious for malignancy noted.    Recommend daily sun protection/avoidance and use of at least SPF 30, broad spectrum sunscreen (OTC drug).     Eczema, unspecified type  -     hydrocortisone 2.5 % cream; Apply topically 2 (two) times daily. To rash on face and neck x 2 weeks  Dispense: 28 g; Refill: 2             Follow up in about 1 year (around 2/11/2023) for for TBSE.

## 2022-03-01 ENCOUNTER — CLINICAL SUPPORT (OUTPATIENT)
Dept: CARDIOLOGY | Facility: HOSPITAL | Age: 72
End: 2022-03-01
Payer: MEDICARE

## 2022-03-01 DIAGNOSIS — Z95.818 PRESENCE OF OTHER CARDIAC IMPLANTS AND GRAFTS: ICD-10-CM

## 2022-03-01 PROCEDURE — G2066 INTER DEVC REMOTE 30D: HCPCS | Mod: HCNC | Performed by: INTERNAL MEDICINE

## 2022-03-24 ENCOUNTER — TELEPHONE (OUTPATIENT)
Dept: ELECTROPHYSIOLOGY | Facility: CLINIC | Age: 72
End: 2022-03-24
Payer: MEDICARE

## 2022-03-24 NOTE — TELEPHONE ENCOUNTER
[place] alert received for symptomatic AF w/ RVR lasting 1 hour and 52 minutes on 3/23/22 @ 9 AM. Median V rate in AF was 130 bpm. Patient stated she went to the dentist that morning and was already feeling a little apprehensive about going to dentist. She said when she was there she started to feel palpitations and felt she had to urinate a lot (which happens in the past when she gets AF). When she got home from the dentist's office she took her Flecainide PIP and the symptoms and arrhythmia eventually subsided on its own. Patient reports medication compliance with her Eliquis, Cardizem, and Toprol, and stated she only uses her Flecainide PIP for breakthrough episodes. Advised patient I would review with Dr. Servin and advise her of any new changes, if any.

## 2022-03-31 ENCOUNTER — CLINICAL SUPPORT (OUTPATIENT)
Dept: CARDIOLOGY | Facility: HOSPITAL | Age: 72
End: 2022-03-31
Payer: MEDICARE

## 2022-03-31 DIAGNOSIS — Z95.818 PRESENCE OF OTHER CARDIAC IMPLANTS AND GRAFTS: ICD-10-CM

## 2022-03-31 PROCEDURE — G2066 INTER DEVC REMOTE 30D: HCPCS | Performed by: INTERNAL MEDICINE

## 2022-03-31 PROCEDURE — 93298 CARDIAC DEVICE CHECK - REMOTE: ICD-10-PCS | Mod: ,,, | Performed by: INTERNAL MEDICINE

## 2022-03-31 PROCEDURE — 93298 REM INTERROG DEV EVAL SCRMS: CPT | Mod: ,,, | Performed by: INTERNAL MEDICINE

## 2022-04-30 ENCOUNTER — CLINICAL SUPPORT (OUTPATIENT)
Dept: CARDIOLOGY | Facility: HOSPITAL | Age: 72
End: 2022-04-30
Payer: MEDICARE

## 2022-04-30 DIAGNOSIS — Z95.818 PRESENCE OF OTHER CARDIAC IMPLANTS AND GRAFTS: ICD-10-CM

## 2022-04-30 PROCEDURE — G2066 INTER DEVC REMOTE 30D: HCPCS | Performed by: INTERNAL MEDICINE

## 2022-05-02 ENCOUNTER — PATIENT MESSAGE (OUTPATIENT)
Dept: CARDIOLOGY | Facility: CLINIC | Age: 72
End: 2022-05-02
Payer: MEDICARE

## 2022-05-02 ENCOUNTER — PATIENT MESSAGE (OUTPATIENT)
Dept: PRIMARY CARE CLINIC | Facility: CLINIC | Age: 72
End: 2022-05-02
Payer: MEDICARE

## 2022-05-03 ENCOUNTER — OFFICE VISIT (OUTPATIENT)
Dept: OPTOMETRY | Facility: CLINIC | Age: 72
End: 2022-05-03
Payer: COMMERCIAL

## 2022-05-03 DIAGNOSIS — H25.13 NS (NUCLEAR SCLEROSIS), BILATERAL: ICD-10-CM

## 2022-05-03 DIAGNOSIS — I10 ESSENTIAL HYPERTENSION: ICD-10-CM

## 2022-05-03 DIAGNOSIS — G47.33 OSA (OBSTRUCTIVE SLEEP APNEA): ICD-10-CM

## 2022-05-03 DIAGNOSIS — H52.4 PRESBYOPIA: Primary | ICD-10-CM

## 2022-05-03 DIAGNOSIS — H25.013 CORTICAL AGE-RELATED CATARACT OF BOTH EYES: ICD-10-CM

## 2022-05-03 PROCEDURE — 99999 PR PBB SHADOW E&M-EST. PATIENT-LVL III: ICD-10-PCS | Mod: PBBFAC,,, | Performed by: OPTOMETRIST

## 2022-05-03 PROCEDURE — 99999 PR PBB SHADOW E&M-EST. PATIENT-LVL III: CPT | Mod: PBBFAC,,, | Performed by: OPTOMETRIST

## 2022-05-03 PROCEDURE — 92015 DETERMINE REFRACTIVE STATE: CPT | Mod: S$GLB,,, | Performed by: OPTOMETRIST

## 2022-05-03 PROCEDURE — 92014 COMPRE OPH EXAM EST PT 1/>: CPT | Mod: S$GLB,,, | Performed by: OPTOMETRIST

## 2022-05-03 PROCEDURE — 92014 PR EYE EXAM, EST PATIENT,COMPREHESV: ICD-10-PCS | Mod: S$GLB,,, | Performed by: OPTOMETRIST

## 2022-05-03 PROCEDURE — 92015 PR REFRACTION: ICD-10-PCS | Mod: S$GLB,,, | Performed by: OPTOMETRIST

## 2022-05-03 RX ORDER — METOPROLOL SUCCINATE 25 MG/1
25 TABLET, EXTENDED RELEASE ORAL DAILY
Qty: 90 TABLET | Refills: 3 | Status: SHIPPED | OUTPATIENT
Start: 2022-05-03 | End: 2023-05-09

## 2022-05-03 RX ORDER — LEVOTHYROXINE SODIUM 25 UG/1
TABLET ORAL
Qty: 90 TABLET | Refills: 3 | Status: SHIPPED | OUTPATIENT
Start: 2022-05-03 | End: 2023-04-28 | Stop reason: SDUPTHER

## 2022-05-03 NOTE — PROGRESS NOTES
HPI     70 Y/o female is here for routine eye exam with no C/o about ocular   health   Pt denies pain and discomfort   No f/f    Eye med: no gtt    Last edited by Marcia Williamson MA on 5/3/2022 10:41 AM. (History)            Assessment /Plan     For exam results, see Encounter Report.    Presbyopia   Rx specs    MATTHEW (obstructive sleep apnea)  Essential hypertension   Good overall ocular health    NS (nuclear sclerosis), bilateral  Cortical age-related cataract of both eyes   Mild, monitor         RTC 1 year, sooner PRN

## 2022-05-03 NOTE — TELEPHONE ENCOUNTER
Refill Routing Note   Medication(s) are not appropriate for processing by [facility] for the following reason(s):      - Patient has been seen in the ED/Hospital since the last PCP visit    [facility] action(s):  Defer Medication-related problems identified: Requires labs        Medication reconciliation completed: No     Appointments  past 12m or future 3m with PCP    Date Provider   Last Visit   6/10/2021 Naz Condon MD   Next Visit   6/22/2022 Naz Condon MD   ED visits in past 90 days: 0        Note composed:11:03 AM 05/03/2022

## 2022-05-03 NOTE — TELEPHONE ENCOUNTER
Care Due:                  Date            Visit Type   Department     Provider  --------------------------------------------------------------------------------                                MYCHART                              ANNUAL                              CHECKUP/PHY  [facility]  Last Visit: 06-      S            REESE Condon                              EP -                              PRIMARY      [facility]  Next Visit: 06-      CARE ([facility])   [facility]           Naz Condon                                                            Last  Test          Frequency    Reason                     Performed    Due Date  --------------------------------------------------------------------------------    CMP.........  12 months..  pravastatin..............  06- 06-    Lipid Panel.  12 months..  pravastatin..............  06- 06-    Powered by [employer]. Reference number: [id number].   5/03/2022 4:57:28 AM CDT

## 2022-05-12 ENCOUNTER — IMMUNIZATION (OUTPATIENT)
Dept: INTERNAL MEDICINE | Facility: CLINIC | Age: 72
End: 2022-05-12
Payer: MEDICARE

## 2022-05-12 DIAGNOSIS — Z23 NEED FOR VACCINATION: Primary | ICD-10-CM

## 2022-05-12 PROCEDURE — 91305 COVID-19, MRNA, LNP-S, PF, 30 MCG/0.3 ML DOSE VACCINE (PFIZER): CPT | Mod: PBBFAC | Performed by: FAMILY MEDICINE

## 2022-05-17 ENCOUNTER — TELEPHONE (OUTPATIENT)
Dept: OPTOMETRY | Facility: CLINIC | Age: 72
End: 2022-05-17
Payer: MEDICARE

## 2022-05-17 NOTE — TELEPHONE ENCOUNTER
----- Message from Lynette France OD sent at 5/17/2022  1:33 PM CDT -----  Regarding: FW: Appt  Contact: Jabari  Bonnie her may 24 @ 4 pm thanks  ----- Message -----  From: Mikayla Mccauley  Sent: 5/17/2022   1:15 PM CDT  To: [facility] Staff  Subject: Appt                                             Pt is calling to schedule a recheck for her new Rx.      [phone]

## 2022-05-24 ENCOUNTER — OFFICE VISIT (OUTPATIENT)
Dept: OPTOMETRY | Facility: CLINIC | Age: 72
End: 2022-05-24
Payer: MEDICARE

## 2022-05-24 DIAGNOSIS — H52.4 PRESBYOPIA: Primary | ICD-10-CM

## 2022-05-24 PROCEDURE — 99499 NO LOS: ICD-10-PCS | Mod: S$GLB,,, | Performed by: OPTOMETRIST

## 2022-05-24 PROCEDURE — 99499 UNLISTED E&M SERVICE: CPT | Mod: S$GLB,,, | Performed by: OPTOMETRIST

## 2022-05-24 PROCEDURE — 1159F PR MEDICATION LIST DOCUMENTED IN MEDICAL RECORD: ICD-10-PCS | Mod: CPTII,S$GLB,, | Performed by: OPTOMETRIST

## 2022-05-24 PROCEDURE — 99999 PR PBB SHADOW E&M-EST. PATIENT-LVL II: CPT | Mod: PBBFAC,,, | Performed by: OPTOMETRIST

## 2022-05-24 PROCEDURE — 99999 PR PBB SHADOW E&M-EST. PATIENT-LVL II: ICD-10-PCS | Mod: PBBFAC,,, | Performed by: OPTOMETRIST

## 2022-05-24 PROCEDURE — 1160F RVW MEDS BY RX/DR IN RCRD: CPT | Mod: CPTII,S$GLB,, | Performed by: OPTOMETRIST

## 2022-05-24 PROCEDURE — 1160F PR REVIEW ALL MEDS BY PRESCRIBER/CLIN PHARMACIST DOCUMENTED: ICD-10-PCS | Mod: CPTII,S$GLB,, | Performed by: OPTOMETRIST

## 2022-05-24 PROCEDURE — 1159F MED LIST DOCD IN RCRD: CPT | Mod: CPTII,S$GLB,, | Performed by: OPTOMETRIST

## 2022-05-24 NOTE — PROGRESS NOTES
HPI     Blurred Vision      Additional comments: With new specs OS          Last edited by Lynette France, OD on 5/24/2022  3:53 PM. (History)            Assessment /Plan     For exam results, see Encounter Report.    Presbyopia      Remake OS lens, Rx change

## 2022-05-30 ENCOUNTER — CLINICAL SUPPORT (OUTPATIENT)
Dept: CARDIOLOGY | Facility: HOSPITAL | Age: 72
End: 2022-05-30
Payer: MEDICARE

## 2022-05-30 DIAGNOSIS — Z95.818 PRESENCE OF OTHER CARDIAC IMPLANTS AND GRAFTS: ICD-10-CM

## 2022-05-30 PROCEDURE — 93298 REM INTERROG DEV EVAL SCRMS: CPT | Mod: ,,, | Performed by: INTERNAL MEDICINE

## 2022-05-30 PROCEDURE — 93298 CARDIAC DEVICE CHECK - REMOTE: ICD-10-PCS | Mod: ,,, | Performed by: INTERNAL MEDICINE

## 2022-05-30 PROCEDURE — G2066 INTER DEVC REMOTE 30D: HCPCS | Performed by: INTERNAL MEDICINE

## 2022-06-09 ENCOUNTER — LAB VISIT (OUTPATIENT)
Dept: LAB | Facility: HOSPITAL | Age: 72
End: 2022-06-09
Attending: FAMILY MEDICINE
Payer: MEDICARE

## 2022-06-09 DIAGNOSIS — I10 ESSENTIAL HYPERTENSION: ICD-10-CM

## 2022-06-09 LAB
ALBUMIN SERPL BCP-MCNC: 4.4 G/DL (ref 3.5–5.2)
ALP SERPL-CCNC: 81 U/L (ref 38–126)
ALT SERPL W/O P-5'-P-CCNC: 16 U/L (ref 10–44)
ANION GAP SERPL CALC-SCNC: 8 MMOL/L (ref 8–16)
AST SERPL-CCNC: 22 U/L (ref 15–46)
BASOPHILS # BLD AUTO: 0.06 K/UL (ref 0–0.2)
BASOPHILS NFR BLD: 1.2 % (ref 0–1.9)
BILIRUB SERPL-MCNC: 0.7 MG/DL (ref 0.1–1)
CALCIUM SERPL-MCNC: 8.6 MG/DL (ref 8.7–10.5)
CHLORIDE SERPL-SCNC: 108 MMOL/L (ref 95–110)
CHOLEST SERPL-MCNC: 164 MG/DL (ref 120–199)
CHOLEST/HDLC SERPL: 3.6 {RATIO} (ref 2–5)
CO2 SERPL-SCNC: 24 MMOL/L (ref 23–29)
CREAT SERPL-MCNC: 0.72 MG/DL (ref 0.5–1.4)
DIFFERENTIAL METHOD: NORMAL
EOSINOPHIL # BLD AUTO: 0.2 K/UL (ref 0–0.5)
EOSINOPHIL NFR BLD: 3.4 % (ref 0–8)
ERYTHROCYTE [DISTWIDTH] IN BLOOD BY AUTOMATED COUNT: 13.9 % (ref 11.5–14.5)
EST. GFR  (AFRICAN AMERICAN): >60 ML/MIN/1.73 M^2
EST. GFR  (NON AFRICAN AMERICAN): >60 ML/MIN/1.73 M^2
GLUCOSE SERPL-MCNC: 102 MG/DL (ref 70–110)
HCT VFR BLD AUTO: 41.7 % (ref 37–48.5)
HDLC SERPL-MCNC: 45 MG/DL (ref 40–75)
HDLC SERPL: 27.4 % (ref 20–50)
HGB BLD-MCNC: 13.7 G/DL (ref 12–16)
IMM GRANULOCYTES # BLD AUTO: 0.01 K/UL (ref 0–0.04)
IMM GRANULOCYTES NFR BLD AUTO: 0.2 % (ref 0–0.5)
LDLC SERPL CALC-MCNC: 103.8 MG/DL (ref 63–159)
LYMPHOCYTES # BLD AUTO: 1.6 K/UL (ref 1–4.8)
LYMPHOCYTES NFR BLD: 32.3 % (ref 18–48)
MCH RBC QN AUTO: 29.7 PG (ref 27–31)
MCHC RBC AUTO-ENTMCNC: 32.9 G/DL (ref 32–36)
MCV RBC AUTO: 90 FL (ref 82–98)
MONOCYTES # BLD AUTO: 0.5 K/UL (ref 0.3–1)
MONOCYTES NFR BLD: 9.8 % (ref 4–15)
NEUTROPHILS # BLD AUTO: 2.7 K/UL (ref 1.8–7.7)
NEUTROPHILS NFR BLD: 53.1 % (ref 38–73)
NONHDLC SERPL-MCNC: 119 MG/DL
NRBC BLD-RTO: 0 /100 WBC
PLATELET # BLD AUTO: 258 K/UL (ref 150–450)
PMV BLD AUTO: 10.2 FL (ref 9.2–12.9)
POTASSIUM SERPL-SCNC: 4.2 MMOL/L (ref 3.5–5.1)
PROT SERPL-MCNC: 7.6 G/DL (ref 6–8.4)
RBC # BLD AUTO: 4.62 M/UL (ref 4–5.4)
SODIUM SERPL-SCNC: 140 MMOL/L (ref 136–145)
TRIGL SERPL-MCNC: 76 MG/DL (ref 30–150)
TSH SERPL DL<=0.005 MIU/L-ACNC: 3.35 UIU/ML (ref 0.4–4)
UUN UR-MCNC: 18 MG/DL (ref 7–17)
WBC # BLD AUTO: 4.99 K/UL (ref 3.9–12.7)

## 2022-06-09 PROCEDURE — 80053 COMPREHEN METABOLIC PANEL: CPT | Mod: PO | Performed by: FAMILY MEDICINE

## 2022-06-09 PROCEDURE — 85025 COMPLETE CBC W/AUTO DIFF WBC: CPT | Mod: PO | Performed by: FAMILY MEDICINE

## 2022-06-09 PROCEDURE — 36415 COLL VENOUS BLD VENIPUNCTURE: CPT | Mod: PO | Performed by: FAMILY MEDICINE

## 2022-06-09 PROCEDURE — 80061 LIPID PANEL: CPT | Performed by: FAMILY MEDICINE

## 2022-06-09 PROCEDURE — 84443 ASSAY THYROID STIM HORMONE: CPT | Mod: PO | Performed by: FAMILY MEDICINE

## 2022-06-10 ENCOUNTER — HOSPITAL ENCOUNTER (OUTPATIENT)
Dept: RADIOLOGY | Facility: HOSPITAL | Age: 72
Discharge: HOME | End: 2022-06-10
Attending: FAMILY MEDICINE
Payer: MEDICARE

## 2022-06-10 DIAGNOSIS — M85.80 OSTEOPENIA, UNSPECIFIED LOCATION: ICD-10-CM

## 2022-06-10 DIAGNOSIS — Z78.0 POSTMENOPAUSAL: ICD-10-CM

## 2022-06-10 PROCEDURE — 77080 DXA BONE DENSITY AXIAL: CPT | Mod: TC,PO

## 2022-06-10 NOTE — PROGRESS NOTES
Your Bone density test shows NORMAL bone strength     WEIGHT BEARING EXERCISE (carrying light weights) is the BEST way to strengthen the bone where your muscles insert & prevent future fractures.     Focus on 1200 mg of CALCIUM daily  - Which is the equivalent of 3 glasses of milk daily. If you cannot tolerate this, you can substitute yogurt or cheese for one of these servings.  Eat foods rich in calcium.Also you can take TUMS supplements or Viactiv chocolate chews calcium supplement.     Also, 800 units of VITAMIN D daily - This is the equivalent of 10 minutes of direct sunlight daily. If you are not in the sun, consider Foods rich in vitamin D and over the counter  supplement .     If you desire, we can  repeat this test in 2 years. This is a test that is easily done on the same day as your mammogram

## 2022-06-22 ENCOUNTER — OFFICE VISIT (OUTPATIENT)
Dept: PRIMARY CARE CLINIC | Facility: CLINIC | Age: 72
End: 2022-06-22
Payer: MEDICARE

## 2022-06-22 ENCOUNTER — HOSPITAL ENCOUNTER (OUTPATIENT)
Dept: RADIOLOGY | Facility: HOSPITAL | Age: 72
Discharge: HOME | End: 2022-06-22
Attending: FAMILY MEDICINE
Payer: MEDICARE

## 2022-06-22 VITALS
OXYGEN SATURATION: 96 % | BODY MASS INDEX: 41.11 KG/M2 | HEART RATE: 57 BPM | HEIGHT: 67 IN | TEMPERATURE: 98 F | WEIGHT: 261.94 LBS | SYSTOLIC BLOOD PRESSURE: 128 MMHG | DIASTOLIC BLOOD PRESSURE: 68 MMHG

## 2022-06-22 DIAGNOSIS — Z00.00 ROUTINE GENERAL MEDICAL EXAMINATION AT A HEALTH CARE FACILITY: Primary | ICD-10-CM

## 2022-06-22 DIAGNOSIS — E02 SUBCLINICAL IODINE-DEFICIENCY HYPOTHYROIDISM: ICD-10-CM

## 2022-06-22 DIAGNOSIS — R06.2 WHEEZING: ICD-10-CM

## 2022-06-22 DIAGNOSIS — G47.33 OSA (OBSTRUCTIVE SLEEP APNEA): ICD-10-CM

## 2022-06-22 DIAGNOSIS — I10 ESSENTIAL HYPERTENSION: ICD-10-CM

## 2022-06-22 DIAGNOSIS — Z79.01 CHRONIC ANTICOAGULATION: ICD-10-CM

## 2022-06-22 DIAGNOSIS — E78.2 MIXED HYPERLIPIDEMIA: ICD-10-CM

## 2022-06-22 DIAGNOSIS — K59.01 SLOW TRANSIT CONSTIPATION: ICD-10-CM

## 2022-06-22 DIAGNOSIS — I48.0 PAROXYSMAL A-FIB: ICD-10-CM

## 2022-06-22 DIAGNOSIS — J06.9 UPPER RESPIRATORY TRACT INFECTION, UNSPECIFIED TYPE: ICD-10-CM

## 2022-06-22 DIAGNOSIS — N39.3 STRESS INCONTINENCE, FEMALE: ICD-10-CM

## 2022-06-22 PROCEDURE — 1101F PT FALLS ASSESS-DOCD LE1/YR: CPT | Mod: CPTII,S$GLB,, | Performed by: FAMILY MEDICINE

## 2022-06-22 PROCEDURE — 99499 UNLISTED E&M SERVICE: CPT | Mod: S$GLB,,, | Performed by: FAMILY MEDICINE

## 2022-06-22 PROCEDURE — 99999 PR PBB SHADOW E&M-EST. PATIENT-LVL V: CPT | Mod: PBBFAC,,, | Performed by: FAMILY MEDICINE

## 2022-06-22 PROCEDURE — 1159F MED LIST DOCD IN RCRD: CPT | Mod: CPTII,S$GLB,, | Performed by: FAMILY MEDICINE

## 2022-06-22 PROCEDURE — 1160F PR REVIEW ALL MEDS BY PRESCRIBER/CLIN PHARMACIST DOCUMENTED: ICD-10-PCS | Mod: CPTII,S$GLB,, | Performed by: FAMILY MEDICINE

## 2022-06-22 PROCEDURE — 1125F PR PAIN SEVERITY QUANTIFIED, PAIN PRESENT: ICD-10-PCS | Mod: CPTII,S$GLB,, | Performed by: FAMILY MEDICINE

## 2022-06-22 PROCEDURE — 99999 PR PBB SHADOW E&M-EST. PATIENT-LVL V: ICD-10-PCS | Mod: PBBFAC,,, | Performed by: FAMILY MEDICINE

## 2022-06-22 PROCEDURE — 71046 XR CHEST PA AND LATERAL: ICD-10-PCS | Mod: 26,,, | Performed by: RADIOLOGY

## 2022-06-22 PROCEDURE — 3078F PR MOST RECENT DIASTOLIC BLOOD PRESSURE < 80 MM HG: ICD-10-PCS | Mod: CPTII,S$GLB,, | Performed by: FAMILY MEDICINE

## 2022-06-22 PROCEDURE — 3288F FALL RISK ASSESSMENT DOCD: CPT | Mod: CPTII,S$GLB,, | Performed by: FAMILY MEDICINE

## 2022-06-22 PROCEDURE — 3288F PR FALLS RISK ASSESSMENT DOCUMENTED: ICD-10-PCS | Mod: CPTII,S$GLB,, | Performed by: FAMILY MEDICINE

## 2022-06-22 PROCEDURE — 3074F PR MOST RECENT SYSTOLIC BLOOD PRESSURE < 130 MM HG: ICD-10-PCS | Mod: CPTII,S$GLB,, | Performed by: FAMILY MEDICINE

## 2022-06-22 PROCEDURE — 71046 X-RAY EXAM CHEST 2 VIEWS: CPT | Mod: 26,,, | Performed by: RADIOLOGY

## 2022-06-22 PROCEDURE — 99397 PER PM REEVAL EST PAT 65+ YR: CPT | Mod: S$GLB,,, | Performed by: FAMILY MEDICINE

## 2022-06-22 PROCEDURE — 3074F SYST BP LT 130 MM HG: CPT | Mod: CPTII,S$GLB,, | Performed by: FAMILY MEDICINE

## 2022-06-22 PROCEDURE — 99397 PR PREVENTIVE VISIT,EST,65 & OVER: ICD-10-PCS | Mod: S$GLB,,, | Performed by: FAMILY MEDICINE

## 2022-06-22 PROCEDURE — 3078F DIAST BP <80 MM HG: CPT | Mod: CPTII,S$GLB,, | Performed by: FAMILY MEDICINE

## 2022-06-22 PROCEDURE — 1160F RVW MEDS BY RX/DR IN RCRD: CPT | Mod: CPTII,S$GLB,, | Performed by: FAMILY MEDICINE

## 2022-06-22 PROCEDURE — 1101F PR PT FALLS ASSESS DOC 0-1 FALLS W/OUT INJ PAST YR: ICD-10-PCS | Mod: CPTII,S$GLB,, | Performed by: FAMILY MEDICINE

## 2022-06-22 PROCEDURE — 71046 X-RAY EXAM CHEST 2 VIEWS: CPT | Mod: TC,PN

## 2022-06-22 PROCEDURE — 3008F PR BODY MASS INDEX (BMI) DOCUMENTED: ICD-10-PCS | Mod: CPTII,S$GLB,, | Performed by: FAMILY MEDICINE

## 2022-06-22 PROCEDURE — 3008F BODY MASS INDEX DOCD: CPT | Mod: CPTII,S$GLB,, | Performed by: FAMILY MEDICINE

## 2022-06-22 PROCEDURE — 99499 RISK ADDL DX/OHS AUDIT: ICD-10-PCS | Mod: S$GLB,,, | Performed by: FAMILY MEDICINE

## 2022-06-22 PROCEDURE — 1125F AMNT PAIN NOTED PAIN PRSNT: CPT | Mod: CPTII,S$GLB,, | Performed by: FAMILY MEDICINE

## 2022-06-22 PROCEDURE — 1159F PR MEDICATION LIST DOCUMENTED IN MEDICAL RECORD: ICD-10-PCS | Mod: CPTII,S$GLB,, | Performed by: FAMILY MEDICINE

## 2022-06-22 RX ORDER — BENZONATATE 200 MG/1
200 CAPSULE ORAL 3 TIMES DAILY PRN
Qty: 90 CAPSULE | Refills: 0 | Status: SHIPPED | OUTPATIENT
Start: 2022-06-22 | End: 2022-07-22

## 2022-06-22 NOTE — PROGRESS NOTES
Subjective:      Patient ID: Flores Fuentes is a 71 y.o. female.    Chief Complaint: Annual Exam    Here today for general exam.     We reviewed labs - all nml    tsh nml with Levothyroxine 25    Hx constipation    Get a fib checked regularly through implantable loop recorder 11/2021 w Cardiologist BIANCA Guerrero & Dr Servin. It did flare up under stress at the dentist a few months ago, tx with her mesd & resolved in an hour    When I go outside, my sinuses fill up. Sore throat, then constant mucus for a few weeks with others living in the house. They are remodeling & lots of dust. . Neg COVID x 2. Sore throat gone. Tessalon helps. Would like refill.     recent DEXA nml    Has been evaluated & tx by Urogyn Dr Infante in 2021 for mixed urinary incontinence    CPAP works great, awakens rested    Constipation is better with change in foods & liquids    Denies any chest pain, shortness of breath, nausea vomiting  diarrhea, blood in stool, heartburn    Current Outpatient Medications   Medication Instructions    apixaban (ELIQUIS) 5 mg, Oral, 2 times daily    benzonatate (TESSALON) 200 mg, Oral, 3 times daily PRN    diltiaZEM (CARDIZEM CD) 120 mg, Oral, Daily    flecainide (TAMBOCOR) 150 MG Tab TAKE 2 TABS (300 MG TOTAL) UP TO ONCE PER DAY FOR ATRIAL FIBRILLATION.    fluticasone propionate (FLONASE) 50 mcg/actuation nasal spray USE 1 SPRAY IN EACH NOSTRIL EVERY DAY    hydrocortisone 2.5 % cream Topical (Top), 2 times daily, To rash on face and neck x 2 weeks    levothyroxine (SYNTHROID) 25 MCG tablet TAKE 1 TABLET EVERY DAY    metoprolol succinate (TOPROL-XL) 25 mg, Oral, Daily    pravastatin (PRAVACHOL) 40 MG tablet TAKE 1 TABLET EVERY DAY       Lab Results   Component Value Date    HGBA1C 5.7 10/07/2014     No results found for: MICALBCREAT  Lab Results   Component Value Date    LDLCALC 103.8 06/09/2022    LDLCALC 90.6 06/07/2021    CHOL 164 06/09/2022    HDL 45 06/09/2022    TRIG 76 06/09/2022       Lab  Results   Component Value Date     06/09/2022    K 4.2 06/09/2022     06/09/2022    CO2 24 06/09/2022     06/09/2022    BUN 18 (H) 06/09/2022    CREATININE 0.72 06/09/2022    CALCIUM 8.6 (L) 06/09/2022    PROT 7.6 06/09/2022    ALBUMIN 4.4 06/09/2022    BILITOT 0.7 06/09/2022    ALKPHOS 81 06/09/2022    AST 22 06/09/2022    ALT 16 06/09/2022    ANIONGAP 8 06/09/2022    ESTGFRAFRICA >60.0 06/09/2022    EGFRNONAA >60.0 06/09/2022    WBC 4.99 06/09/2022    HGB 13.7 06/09/2022    HGB 13.7 06/07/2021    HCT 41.7 06/09/2022    MCV 90 06/09/2022     06/09/2022    TSH 3.350 06/09/2022    HEPCAB Negative 01/23/2016       No results found for: LH, FSH, TOTALTESTOST, PROGESTERONE, ESTRADIOL, [id number], FACQVJCY25, FERRITIN, IRON, TRANSFERRIN, TIBC, FESATURATED, ZINC      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, l mandible, 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 Dr Viktor Toure    Osteopenia of neck of left femur 6/4/2020    Paroxysmal atrioventricular tachycardia     Plantar fasciitis     Right knee meniscal tear     Dr Mayfield, tx conservatively    Severe obesity (BMI 35.0-35.9 with comorbidity) 1/24/2017    Slow transit constipation 7/13/2021    Despite fruits & veg & 1200 dunia diet, try Colace daily    Stress incontinence, female 03/28/2018    hasn't tried oxybutynin, After HYST, Kegels & PT  didn't work, worse at night wearing pads, Dr Infante    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    Thyroid disease   "    Past Surgical History:   Procedure Laterality Date    APPENDECTOMY      COLONOSCOPY N/A 8/13/2020    Procedure: COLONOSCOPY;  Surgeon: Angus Ac MD;  [facility]  Service: Endoscopy;  Laterality: N/A;  ok to hold Eliquis 2 days per Dr Servin     COVID test at [place] on 8/10-GT    HYSTERECTOMY  1990    TAHBSO for fibroids    INSERTION OF IMPLANTABLE LOOP RECORDER Left 11/1/2021    Procedure: Insertion, Implantable Loop Recorder;  Surgeon: Ameya Servin MD;  Location: [facility] EP LAB;  Service: Cardiology;  Laterality: Left;  AF, ILR implant, MDT,  DM, 3 Prep    MANDIBLE SURGERY  2015    L mandible, Dr Toure    MANDIBLE SURGERY Left 8/27/2019    OOPHORECTOMY      TONSILLECTOMY       Social History     Social History Narrative    Not on file     Family History   Problem Relation Age of Onset    Cancer Mother         stomach, liver    Breast cancer Neg Hx     Ovarian cancer Neg Hx     Cervical cancer Neg Hx     Endometrial cancer Neg Hx     Vaginal cancer Neg Hx     Melanoma Neg Hx     Colon cancer Neg Hx     Esophageal cancer Neg Hx      Vitals:    06/22/22 1042   BP: 128/68   Pulse: (!) 57   Temp: 98 °F (36.7 °C)   SpO2: 96%   Weight: 118.8 kg (261 lb 14.5 oz)   Height: 5' 7" (1.702 m)   PainSc:   4     Objective:   Physical Exam  Constitutional:       Appearance: She is well-developed.   HENT:      Head: Normocephalic and atraumatic.      Nose:      Comments: Wearing mask due to current COVID 19 pandemic, Nose & mouth exam deferred  Eyes:      Pupils: Pupils are equal, round, and reactive to light.   Neck:      Thyroid: No thyromegaly.   Cardiovascular:      Rate and Rhythm: Normal rate and regular rhythm.      Heart sounds: Normal heart sounds. No murmur heard.  Pulmonary:      Effort: Pulmonary effort is normal.      Breath sounds: Wheezing present.      Comments: Wheezing bibasilar  Abdominal:      General: Bowel sounds are normal. There is no distension.      Palpations: " Abdomen is soft. There is no mass.      Tenderness: There is no abdominal tenderness. There is no guarding or rebound.   Musculoskeletal:      Cervical back: Neck supple.   Lymphadenopathy:      Cervical: No cervical adenopathy.   Skin:     General: Skin is warm and dry.      Comments: Breasts symmetrical, nontender, no mass, no supraclavicular or axillary lymphadenopathy     Neurological:      Mental Status: She is alert and oriented to person, place, and time.   Psychiatric:         Behavior: Behavior normal.       Assessment:     1. Routine general medical examination at a health care facility    2. Paroxysmal A-fib    3. Essential hypertension    4. Subclinical iodine-deficiency hypothyroidism    5. Mixed hyperlipidemia    6. Chronic anticoagulation    7. MATTHEW (obstructive sleep apnea)    8. Slow transit constipation    9. Stress incontinence, female    10. Wheezing    11. Upper respiratory tract infection, unspecified type      Plan:     Orders Placed This Encounter    X-Ray Chest PA And Lateral    benzonatate (TESSALON) 200 MG capsule     Continue CPAP, works well    Patient Instructions   Continue medications    Ok to use plain Guafenasin (Mucinex plain)   Plain Claritin  FLonase twice a day  Lots of water    Avoid albuterol & steroids w afib, consider abx if not better next week    Follow with Cardiology yearly    Follow up with Urogyn PRN    ==============================  RECOMMENDATIONS FOR FEMALES  ==============================  Your #1 MEDICINE is DAILY EXERCISE - 15-20 minutes of huffing & puffing EVERY DAY.     Prevent the #1 cause of death- cardiovascular disease (HEART ATTACK & STROKE) by checking for normal blood pressure, cholesterol, sugars, & by not smoking.     VACCINES: Yearly FLU shot, PNEUMONIA shot after 65,  SHINGLES shot after 50    COLON CANCER screening colonoscopy starting at 44 yo &  every 10 years (or Cologuard kit every 3 yrs) , repeat test sooner if POLYP is found    I recommend   high fiber (5 fresh fruits or vegetables daily), low fat diet and aerobic  exercise (huffing/ puffing/ sweating for 20 min straight at least 4 days a week)    Follow up yearly with mammogram, fasting lipids, CMP, CBC prior.   ==============================================================

## 2022-06-22 NOTE — PATIENT INSTRUCTIONS
Continue medications    Ok to use plain Guafenasin (Mucinex plain)   Plain Claritin  FLonase twice a day  Lots of water    Avoid albuterol & steroids w afib, consider abx if not better next week    Follow with Cardiology yearly    Follow up with Urogyn PRN    ==============================  RECOMMENDATIONS FOR FEMALES  ==============================  Your #1 MEDICINE is DAILY EXERCISE - 15-20 minutes of huffing & puffing EVERY DAY.     Prevent the #1 cause of death- cardiovascular disease (HEART ATTACK & STROKE) by checking for normal blood pressure, cholesterol, sugars, & by not smoking.     VACCINES: Yearly FLU shot, PNEUMONIA shot after 65,  SHINGLES shot after 50    COLON CANCER screening colonoscopy starting at 44 yo &  every 10 years (or Cologuard kit every 3 yrs) , repeat test sooner if POLYP is found    I recommend  high fiber (5 fresh fruits or vegetables daily), low fat diet and aerobic  exercise (huffing/ puffing/ sweating for 20 min straight at least 4 days a week)    Follow up yearly with mammogram, fasting lipids, CMP, CBC prior.   ==============================================================

## 2022-06-27 ENCOUNTER — PATIENT MESSAGE (OUTPATIENT)
Dept: SLEEP MEDICINE | Facility: CLINIC | Age: 72
End: 2022-06-27
Payer: MEDICARE

## 2022-06-27 DIAGNOSIS — G47.33 OSA (OBSTRUCTIVE SLEEP APNEA): Primary | ICD-10-CM

## 2022-06-29 ENCOUNTER — CLINICAL SUPPORT (OUTPATIENT)
Dept: CARDIOLOGY | Facility: HOSPITAL | Age: 72
End: 2022-06-29
Payer: MEDICARE

## 2022-06-29 DIAGNOSIS — Z95.818 PRESENCE OF OTHER CARDIAC IMPLANTS AND GRAFTS: ICD-10-CM

## 2022-06-29 PROCEDURE — G2066 INTER DEVC REMOTE 30D: HCPCS | Performed by: INTERNAL MEDICINE

## 2022-07-05 ENCOUNTER — PATIENT MESSAGE (OUTPATIENT)
Dept: PRIMARY CARE CLINIC | Facility: CLINIC | Age: 72
End: 2022-07-05
Payer: MEDICARE

## 2022-07-05 DIAGNOSIS — D49.2 ODONTOGENIC TUMOR: Primary | ICD-10-CM

## 2022-07-05 NOTE — TELEPHONE ENCOUNTER
LOV 06/22/2022 annual     Patient is requesting a back dated referral for DOS 06/08/2022 to Dr. Taj Melendez DDS, MD

## 2022-07-07 ENCOUNTER — HOSPITAL ENCOUNTER (OUTPATIENT)
Dept: RADIOLOGY | Facility: HOSPITAL | Age: 72
Discharge: HOME | End: 2022-07-07
Attending: FAMILY MEDICINE
Payer: MEDICARE

## 2022-07-07 DIAGNOSIS — Z12.31 SCREENING MAMMOGRAM, ENCOUNTER FOR: ICD-10-CM

## 2022-07-07 PROCEDURE — 77063 BREAST TOMOSYNTHESIS BI: CPT | Mod: TC,PO

## 2022-07-14 ENCOUNTER — PATIENT MESSAGE (OUTPATIENT)
Dept: PRIMARY CARE CLINIC | Facility: CLINIC | Age: 72
End: 2022-07-14
Payer: MEDICARE

## 2022-07-29 ENCOUNTER — CLINICAL SUPPORT (OUTPATIENT)
Dept: CARDIOLOGY | Facility: HOSPITAL | Age: 72
End: 2022-07-29
Payer: MEDICARE

## 2022-07-29 DIAGNOSIS — Z95.818 PRESENCE OF OTHER CARDIAC IMPLANTS AND GRAFTS: ICD-10-CM

## 2022-07-29 PROCEDURE — G2066 INTER DEVC REMOTE 30D: HCPCS | Performed by: INTERNAL MEDICINE

## 2022-07-29 PROCEDURE — 93298 REM INTERROG DEV EVAL SCRMS: CPT | Mod: ,,, | Performed by: INTERNAL MEDICINE

## 2022-07-29 PROCEDURE — 93298 CARDIAC DEVICE CHECK - REMOTE: ICD-10-PCS | Mod: ,,, | Performed by: INTERNAL MEDICINE

## 2022-08-28 ENCOUNTER — CLINICAL SUPPORT (OUTPATIENT)
Dept: CARDIOLOGY | Facility: HOSPITAL | Age: 72
End: 2022-08-28
Payer: MEDICARE

## 2022-08-28 DIAGNOSIS — Z95.818 PRESENCE OF OTHER CARDIAC IMPLANTS AND GRAFTS: ICD-10-CM

## 2022-08-28 PROCEDURE — G2066 INTER DEVC REMOTE 30D: HCPCS | Performed by: INTERNAL MEDICINE

## 2022-09-02 ENCOUNTER — TELEPHONE (OUTPATIENT)
Dept: CARDIOLOGY | Facility: HOSPITAL | Age: 72
End: 2022-09-02
Payer: MEDICARE

## 2022-09-02 NOTE — TELEPHONE ENCOUNTER
Called patient to have her send a manual download from her remote ILR monitor. Patient stated she will send this transmission now.  ----- Message from Charity Ibarra RN sent at 9/2/2022  1:17 PM CDT -----  Please request a [employer] ILR manual download for missing egms.      Following MD: Ameya Servin

## 2022-09-27 ENCOUNTER — CLINICAL SUPPORT (OUTPATIENT)
Dept: CARDIOLOGY | Facility: HOSPITAL | Age: 72
End: 2022-09-27
Payer: MEDICARE

## 2022-09-27 DIAGNOSIS — Z95.818 PRESENCE OF OTHER CARDIAC IMPLANTS AND GRAFTS: ICD-10-CM

## 2022-09-27 PROCEDURE — G2066 INTER DEVC REMOTE 30D: HCPCS | Performed by: INTERNAL MEDICINE

## 2022-09-27 PROCEDURE — 93298 REM INTERROG DEV EVAL SCRMS: CPT | Mod: ,,, | Performed by: INTERNAL MEDICINE

## 2022-09-27 PROCEDURE — 93298 CARDIAC DEVICE CHECK - REMOTE: ICD-10-PCS | Mod: ,,, | Performed by: INTERNAL MEDICINE

## 2022-10-01 ENCOUNTER — IMMUNIZATION (OUTPATIENT)
Dept: INTERNAL MEDICINE | Facility: CLINIC | Age: 72
End: 2022-10-01
Payer: MEDICARE

## 2022-10-01 DIAGNOSIS — Z23 NEED FOR VACCINATION: Primary | ICD-10-CM

## 2022-10-01 PROCEDURE — 0124A PR IMMUNIZ ADMIN, SARS-COV- 2 COVID-19 VACCINE, 30MCG/0.3ML, BIVALENT, BOOSTER DOSE: ICD-10-PCS | Mod: S$GLB,,, | Performed by: INTERNAL MEDICINE

## 2022-10-01 PROCEDURE — 0124A PR IMMUNIZ ADMIN, SARS-COV- 2 COVID-19 VACCINE, 30MCG/0.3ML, BIVALENT, BOOSTER DOSE: CPT | Mod: S$GLB,,, | Performed by: INTERNAL MEDICINE

## 2022-10-01 PROCEDURE — 0124A COVID-19, MRNA, LNP-S, BIVALENT BOOSTER, PF, 30 MCG/0.3 ML DOSE: CPT | Mod: CV19,PBBFAC | Performed by: INTERNAL MEDICINE

## 2022-10-01 PROCEDURE — 91312 COVID-19, MRNA, LNP-S, BIVALENT BOOSTER, PF, 30 MCG/0.3 ML DOSE: CPT | Mod: S$GLB,,, | Performed by: INTERNAL MEDICINE

## 2022-10-01 PROCEDURE — 91312 COVID-19, MRNA, LNP-S, BIVALENT BOOSTER, PF, 30 MCG/0.3 ML DOSE: ICD-10-PCS | Mod: S$GLB,,, | Performed by: INTERNAL MEDICINE

## 2022-10-20 ENCOUNTER — OFFICE VISIT (OUTPATIENT)
Dept: CARDIOLOGY | Facility: CLINIC | Age: 72
End: 2022-10-20
Payer: MEDICARE

## 2022-10-20 VITALS
HEIGHT: 67 IN | BODY MASS INDEX: 41.11 KG/M2 | SYSTOLIC BLOOD PRESSURE: 131 MMHG | DIASTOLIC BLOOD PRESSURE: 61 MMHG | WEIGHT: 261.94 LBS | HEART RATE: 50 BPM

## 2022-10-20 DIAGNOSIS — E78.2 MIXED HYPERLIPIDEMIA: ICD-10-CM

## 2022-10-20 DIAGNOSIS — I45.6 WOLFF-PARKINSON-WHITE (WPW) PATTERN: Primary | ICD-10-CM

## 2022-10-20 DIAGNOSIS — I48.0 PAROXYSMAL A-FIB: Chronic | ICD-10-CM

## 2022-10-20 DIAGNOSIS — G47.33 OSA (OBSTRUCTIVE SLEEP APNEA): ICD-10-CM

## 2022-10-20 DIAGNOSIS — I10 ESSENTIAL HYPERTENSION: ICD-10-CM

## 2022-10-20 PROCEDURE — 1159F PR MEDICATION LIST DOCUMENTED IN MEDICAL RECORD: ICD-10-PCS | Mod: CPTII,S$GLB,, | Performed by: INTERNAL MEDICINE

## 2022-10-20 PROCEDURE — 99214 PR OFFICE/OUTPT VISIT, EST, LEVL IV, 30-39 MIN: ICD-10-PCS | Mod: S$GLB,,, | Performed by: INTERNAL MEDICINE

## 2022-10-20 PROCEDURE — 93010 ELECTROCARDIOGRAM REPORT: CPT | Mod: S$GLB,,, | Performed by: INTERNAL MEDICINE

## 2022-10-20 PROCEDURE — 1101F PR PT FALLS ASSESS DOC 0-1 FALLS W/OUT INJ PAST YR: ICD-10-PCS | Mod: CPTII,S$GLB,, | Performed by: INTERNAL MEDICINE

## 2022-10-20 PROCEDURE — 99214 OFFICE O/P EST MOD 30 MIN: CPT | Mod: S$GLB,,, | Performed by: INTERNAL MEDICINE

## 2022-10-20 PROCEDURE — 3288F FALL RISK ASSESSMENT DOCD: CPT | Mod: CPTII,S$GLB,, | Performed by: INTERNAL MEDICINE

## 2022-10-20 PROCEDURE — 1101F PT FALLS ASSESS-DOCD LE1/YR: CPT | Mod: CPTII,S$GLB,, | Performed by: INTERNAL MEDICINE

## 2022-10-20 PROCEDURE — 1159F MED LIST DOCD IN RCRD: CPT | Mod: CPTII,S$GLB,, | Performed by: INTERNAL MEDICINE

## 2022-10-20 PROCEDURE — 1126F AMNT PAIN NOTED NONE PRSNT: CPT | Mod: CPTII,S$GLB,, | Performed by: INTERNAL MEDICINE

## 2022-10-20 PROCEDURE — 1160F RVW MEDS BY RX/DR IN RCRD: CPT | Mod: CPTII,S$GLB,, | Performed by: INTERNAL MEDICINE

## 2022-10-20 PROCEDURE — 93010 EKG 12-LEAD: ICD-10-PCS | Mod: S$GLB,,, | Performed by: INTERNAL MEDICINE

## 2022-10-20 PROCEDURE — 99999 PR PBB SHADOW E&M-EST. PATIENT-LVL III: ICD-10-PCS | Mod: PBBFAC,,, | Performed by: INTERNAL MEDICINE

## 2022-10-20 PROCEDURE — 1126F PR PAIN SEVERITY QUANTIFIED, NO PAIN PRESENT: ICD-10-PCS | Mod: CPTII,S$GLB,, | Performed by: INTERNAL MEDICINE

## 2022-10-20 PROCEDURE — 93005 EKG 12-LEAD: ICD-10-PCS | Mod: S$GLB,,, | Performed by: INTERNAL MEDICINE

## 2022-10-20 PROCEDURE — 3078F DIAST BP <80 MM HG: CPT | Mod: CPTII,S$GLB,, | Performed by: INTERNAL MEDICINE

## 2022-10-20 PROCEDURE — 93005 ELECTROCARDIOGRAM TRACING: CPT | Mod: S$GLB,,, | Performed by: INTERNAL MEDICINE

## 2022-10-20 PROCEDURE — 3078F PR MOST RECENT DIASTOLIC BLOOD PRESSURE < 80 MM HG: ICD-10-PCS | Mod: CPTII,S$GLB,, | Performed by: INTERNAL MEDICINE

## 2022-10-20 PROCEDURE — 3075F PR MOST RECENT SYSTOLIC BLOOD PRESS GE 130-139MM HG: ICD-10-PCS | Mod: CPTII,S$GLB,, | Performed by: INTERNAL MEDICINE

## 2022-10-20 PROCEDURE — 3288F PR FALLS RISK ASSESSMENT DOCUMENTED: ICD-10-PCS | Mod: CPTII,S$GLB,, | Performed by: INTERNAL MEDICINE

## 2022-10-20 PROCEDURE — 1160F PR REVIEW ALL MEDS BY PRESCRIBER/CLIN PHARMACIST DOCUMENTED: ICD-10-PCS | Mod: CPTII,S$GLB,, | Performed by: INTERNAL MEDICINE

## 2022-10-20 PROCEDURE — 3075F SYST BP GE 130 - 139MM HG: CPT | Mod: CPTII,S$GLB,, | Performed by: INTERNAL MEDICINE

## 2022-10-20 PROCEDURE — 99999 PR PBB SHADOW E&M-EST. PATIENT-LVL III: CPT | Mod: PBBFAC,,, | Performed by: INTERNAL MEDICINE

## 2022-10-20 NOTE — PROGRESS NOTES
Subjective:   Patient ID:  Flores Fuentes is a 72 y.o. female who presents for follow-up of palpitations.     Ms. Fuentes is a 72 y.o. female with pAF, HLD, obesity, hypothyroid here for follow up.   AF first dx 2006 after lots of caffeine. Few episodes since then. Pill in pocket strategy.  HL, on meds  obesity  hypothyroid, on med  intermittent WPW pattern  MATTHEW, on CPAP since early 2022    Went to ER 3/10 with palps. Underwent DCCV 3/13/17 after remaining in AF with RVR. Started on multaq, and BB d/c'd.  Since, feeling not quite as well as usually, and on 4/17, at which time HR was 120s and didn't feel same as prior AF episodes.  She'd been on BB for years w/o issues. Good exertion tolerance. No CP.    Due to rare PAF episodes, we adopted a pill-in-the-pocket strategy. She used it first in 11/17; went to the ER. AF resolved <30 min after taking dose. Had 2 other episodes, self-treated successfully, in 12/17 and 1/18. Since last seen, has had 4 such episodes, all aborted with PIP.     Holter showed NSR with about 1k PVCs.  She's been feeling better since last seen. Still taking low-dose BB.    ILR: no arrhythmia.    AF is under very good control. Only 1x in past year. Flecainide works well: terminates arrhythmia within 3 hours.    echo 5/21: 60%    My interpretation of today's ECG is NSR with preexcitation    Current Outpatient Medications   Medication Sig    CARTIA  mg Cp24 TAKE 1 CAPSULE EVERY DAY    clobetasol (TEMOVATE) 0.05 % cream Apply topically 2 (two) times daily. To rash on left breast area until resolved    ELIQUIS 5 mg Tab TAKE 1 TABLET TWICE DAILY    flecainide (TAMBOCOR) 150 MG Tab 2 tabs (300 mg total) up to once per day for atrial fibrillation.    fluocinonide 0.05% (LIDEX) 0.05 % cream Apply topically 2 (two) times daily.    fluticasone propionate (FLONASE) 50 mcg/actuation nasal spray USE 1 SPRAY IN EACH NOSTRIL ONE TIME DAILY    ketoconazole (NIZORAL) 2 % cream Apply topically 2 (two)  "times daily. To dry skin on forehead eyebrows and nose folds    levothyroxine (SYNTHROID) 25 MCG tablet TAKE 1 TABLET EVERY DAY    metoprolol succinate (TOPROL-XL) 50 MG 24 hr tablet Take 1 tablet (50 mg total) by mouth once daily.    pravastatin (PRAVACHOL) 40 MG tablet TAKE 1 TABLET ONE TIME DAILY     No current facility-administered medications for this visit.        Review of Systems   Constitutional: Negative. Negative for malaise/fatigue.   HENT: Negative.  Negative for ear pain and tinnitus.    Eyes:  Negative for blurred vision.   Cardiovascular:  Positive for palpitations. Negative for chest pain, dyspnea on exertion, leg swelling, near-syncope and syncope.   Respiratory:  Positive for snoring. Negative for shortness of breath.    Endocrine: Negative.  Negative for polyuria.   Hematologic/Lymphatic: Negative for bleeding problem. Does not bruise/bleed easily.   Skin: Negative.  Negative for rash.   Musculoskeletal: Negative.  Negative for joint pain, muscle weakness and myalgias.   Gastrointestinal: Negative.  Negative for abdominal pain, change in bowel habit, hematemesis, hematochezia and nausea.   Genitourinary:  Negative for frequency and hematuria.   Neurological: Negative.  Negative for dizziness, light-headedness and weakness.   Psychiatric/Behavioral: Negative.  Negative for altered mental status and depression. The patient is not nervous/anxious.    Allergic/Immunologic: Negative for environmental allergies and persistent infections.   Objective:        /61   Pulse (!) 50   Ht 5' 7" (1.702 m)   Wt 118.8 kg (261 lb 14.5 oz)   BMI 41.02 kg/m²     No distress.  Speaks in full sentences.  RRR  No wheeze  Abd not distended  no edema    Lab Results   Component Value Date     06/09/2022    K 4.2 06/09/2022    MG 2.5 08/12/2020    BUN 18 (H) 06/09/2022    CREATININE 0.72 06/09/2022    ALT 16 06/09/2022    AST 22 06/09/2022    HGB 13.7 06/09/2022    HCT 41.7 06/09/2022    HCT 50 08/12/2020 "    TSH 3.350 06/09/2022    LDLCALC 103.8 06/09/2022     Recent Labs   Lab 11/01/21  1226   INR 1.0         Assessment:     PAF  Likely MATTHEW  Intermittent WPW pattern    Plan:     In summary, Ms. Fuentes is a 69 y.o. female with pAF, HLD, obesity, hypothyroid here for follow up. Intermittent WPW pattern.    BB, dilt  DOAC for a/c  Continue PIP flecainide prn AF.

## 2022-10-21 DIAGNOSIS — I45.6 WOLFF-PARKINSON-WHITE (WPW) PATTERN: Primary | ICD-10-CM

## 2022-10-24 ENCOUNTER — PATIENT MESSAGE (OUTPATIENT)
Dept: CARDIOLOGY | Facility: CLINIC | Age: 72
End: 2022-10-24
Payer: MEDICARE

## 2022-10-24 DIAGNOSIS — I48.0 PAROXYSMAL A-FIB: Chronic | ICD-10-CM

## 2022-10-24 RX ORDER — DILTIAZEM HYDROCHLORIDE 120 MG/1
120 CAPSULE, COATED, EXTENDED RELEASE ORAL DAILY
Qty: 90 CAPSULE | Refills: 3 | Status: ON HOLD | OUTPATIENT
Start: 2022-10-24 | End: 2023-10-17 | Stop reason: SDUPTHER

## 2022-10-27 ENCOUNTER — CLINICAL SUPPORT (OUTPATIENT)
Dept: CARDIOLOGY | Facility: HOSPITAL | Age: 72
End: 2022-10-27
Payer: MEDICARE

## 2022-10-27 DIAGNOSIS — Z95.818 PRESENCE OF OTHER CARDIAC IMPLANTS AND GRAFTS: ICD-10-CM

## 2022-10-27 PROCEDURE — G2066 INTER DEVC REMOTE 30D: HCPCS | Performed by: INTERNAL MEDICINE

## 2022-11-18 ENCOUNTER — HOSPITAL ENCOUNTER (EMERGENCY)
Facility: HOSPITAL | Age: 72
Discharge: HOME | End: 2022-11-18
Attending: EMERGENCY MEDICINE
Payer: MEDICARE

## 2022-11-18 VITALS
HEART RATE: 57 BPM | WEIGHT: 250 LBS | HEIGHT: 67 IN | SYSTOLIC BLOOD PRESSURE: 151 MMHG | OXYGEN SATURATION: 100 % | RESPIRATION RATE: 20 BRPM | TEMPERATURE: 98 F | BODY MASS INDEX: 39.24 KG/M2 | DIASTOLIC BLOOD PRESSURE: 75 MMHG
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
## 2022-11-18 DIAGNOSIS — S61.452A DOG BITE OF LEFT HAND, INITIAL ENCOUNTER: Primary | ICD-10-CM

## 2022-11-18 PROCEDURE — 12001 RPR S/N/AX/GEN/TRNK 2.5CM/<: CPT | Mod: ER

## 2022-11-18 PROCEDURE — 99284 EMERGENCY DEPT VISIT MOD MDM: CPT | Mod: 25,ER

## 2022-11-18 PROCEDURE — 25000003 PHARM REV CODE 250: Mod: ER | Performed by: EMERGENCY MEDICINE

## 2022-11-18 PROCEDURE — 12041 INTMD RPR N-HF/GENIT 2.5CM/<: CPT | Mod: ER

## 2022-11-18 RX ORDER — DOXYCYCLINE HYCLATE 100 MG
100 TABLET ORAL
Status: COMPLETED | OUTPATIENT
Start: 2022-11-18 | End: 2022-11-18

## 2022-11-18 RX ORDER — CLINDAMYCIN HYDROCHLORIDE 150 MG/1
300 CAPSULE ORAL
Status: COMPLETED | OUTPATIENT
Start: 2022-11-18 | End: 2022-11-18

## 2022-11-18 RX ORDER — CLINDAMYCIN HYDROCHLORIDE 300 MG/1
300 CAPSULE ORAL 3 TIMES DAILY
Qty: 21 CAPSULE | Refills: 0 | Status: SHIPPED | OUTPATIENT
Start: 2022-11-18 | End: 2022-11-25

## 2022-11-18 RX ORDER — LIDOCAINE HYDROCHLORIDE 10 MG/ML
5 INJECTION, SOLUTION EPIDURAL; INFILTRATION; INTRACAUDAL; PERINEURAL
Status: COMPLETED | OUTPATIENT
Start: 2022-11-18 | End: 2022-11-18

## 2022-11-18 RX ORDER — SODIUM CHLORIDE 0.9 % (FLUSH) 0.9 %
10 SYRINGE (ML) INJECTION
Status: DISCONTINUED | OUTPATIENT
Start: 2022-11-18 | End: 2022-11-18 | Stop reason: HOSPADM

## 2022-11-18 RX ORDER — DOXYCYCLINE 100 MG/1
100 CAPSULE ORAL 2 TIMES DAILY
Qty: 20 CAPSULE | Refills: 0 | Status: SHIPPED | OUTPATIENT
Start: 2022-11-18 | End: 2022-11-28

## 2022-11-18 RX ADMIN — CLINDAMYCIN HYDROCHLORIDE 300 MG: 150 CAPSULE ORAL at 07:11

## 2022-11-18 RX ADMIN — LIDOCAINE HYDROCHLORIDE 50 MG: 10 INJECTION, SOLUTION EPIDURAL; INFILTRATION; INTRACAUDAL; PERINEURAL at 07:11

## 2022-11-18 RX ADMIN — DOXYCYCLINE HYCLATE 100 MG: 100 TABLET, COATED ORAL at 07:11

## 2022-11-19 NOTE — DISCHARGE INSTRUCTIONS
Mrs. Fuentes,    Thank you for letting me care for you today! It was nice meeting you, and I hope you feel better soon.   If you would like access to your chart and what was done today please utilize the [facility].   Please come back to [facility] for all of your future medical needs.    Our goal in the emergency department is to always give you outstanding care and exceptional service. You may receive a survey by mail or e-mail in the next week regarding your experience in our ED. We would greatly appreciate you completing and returning the survey. Your feedback provides us with a way to recognize our staff who give very good care and it helps us learn how to improve when your experience was below our aspiration of excellence.     Sincerely,    Max Tesfaye MD  Board Certified Emergency Physician

## 2022-11-19 NOTE — ED PROVIDER NOTES
Encounter Date: 11/18/2022       History     Chief Complaint   Patient presents with    Animal Bite     Dog bite to left ring finger from her own dog     This is a pleasant 72-year-old woman who presents for evaluation of a cut on the hand as result of being nipped by their dog.  The dog is currently up-to-date on all shots, she is unsure when her last tetanus was (2019 per chart review).  She not take anything to try and help with this, nothing in particular seems make any better worse, she did apply a bandage in noted some oozing thereafter.  She has intact range of motion she says in the finger without any injuries elsewhere.    Review of patient's allergies indicates:   Allergen Reactions    Decongestant d [pseudoephedrine-dm]      Atrial Fibrillation    Augmentin [amoxicillin-pot clavulanate]      palpitations      Amoxicillin Palpitations    Diphenhydramine-pseudoephed Palpitations     TACHYCARDIA    Povidone-iodine Rash     Mild erythema of skin     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 Dr Viktor Toure    Osteopenia of neck of left femur 6/4/2020    Paroxysmal atrioventricular tachycardia     Plantar fasciitis     Right knee meniscal tear     Dr Mayfield, tx conservatively    Severe obesity (BMI 35.0-35.9 with comorbidity) 1/24/2017    Slow transit constipation 7/13/2021    Despite fruits & veg & 1200 dunia diet, try Colace daily    Stress incontinence, female 03/28/2018    hasn't tried oxybutynin, After  HYST, Kegels & PT  didn't work, worse at night wearing pads, Dr Infante    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    Thyroid disease      Past Surgical History:   Procedure Laterality Date    APPENDECTOMY      COLONOSCOPY N/A 8/13/2020    Procedure: COLONOSCOPY;  Surgeon: Angus Ac MD;  [facility]  Service: Endoscopy;  Laterality: N/A;  ok to hold Eliquis 2 days per Dr Servin     COVID test at [place] on 8/10-GT    HYSTERECTOMY  1990    TAHBSO for fibroids    INSERTION OF IMPLANTABLE LOOP RECORDER Left 11/1/2021    Procedure: Insertion, Implantable Loop Recorder;  Surgeon: Ameya Servin MD;  Location: [facility] EP LAB;  Service: Cardiology;  Laterality: Left;  AF, ILR implant, MDT,  DM, 3 Prep    MANDIBLE SURGERY  2015    L mandible, Dr Toure    MANDIBLE SURGERY Left 8/27/2019    OOPHORECTOMY      TONSILLECTOMY       Family History   Problem Relation Age of Onset    Cancer Mother         stomach, liver    Breast cancer Neg Hx     Ovarian cancer Neg Hx     Cervical cancer Neg Hx     Endometrial cancer Neg Hx     Vaginal cancer Neg Hx     Melanoma Neg Hx     Colon cancer Neg Hx     Esophageal cancer Neg Hx      Social History     Tobacco Use    Smoking status: Never    Smokeless tobacco: Never   Substance Use Topics    Alcohol use: No    Drug use: No     Review of Systems  Constitutional-no fever  HEENT-no congestion  Eyes-no redness  Respiratory-no shortness of breath  Cardio-no chest pain  GI-no abdominal pain  Endocrine-no cold intolerance  -no difficulty urinating  MSK-positive finger injury  Skin-no rashes  Allergy-no environmental allergy  Neurologic-, no headache  Hematology-no swollen nodes  Behavioral-no confusion  Physical Exam     Initial Vitals [11/18/22 1743]   BP Pulse Resp Temp SpO2   (!) 183/83 64 18 97.7 °F (36.5 °C) 98 %      MAP       --         Physical Exam    Musculoskeletal:        Hands:        Constitutional: Well appearing, no distress.  Eyes: Conjunctivae  normal.  ENT       Head: Normocephalic, atraumatic.       Nose: Normal external appearance        Mouth/Throat: no strigulous respirations   Hematological/Lymphatic/Immunilogical: no visible lymphadenopathy   Cardiovascular: Normal rate,   Respiratory: Normal respiratory effort.   Gastrointestinal: non distended   Musculoskeletal: Normal range of motion in all extremities. No obvious deformities or swelling.  Intact range of motion in all 5 digits the left hand  Brisk capillary refill in all 5 digits the left hand  Neurologic: Alert, oriented. Normal speech and language. No gross focal neurologic deficits are appreciated.  Skin: Skin is warm, dry. No rash noted.  Psychiatric: Mood and affect are normal.   ED Course   Lac Repair    Date/Time: 11/18/2022 7:04 PM  Performed by: Max Tesfaye MD  Authorized by: Max Tesfaye MD     Consent:     Consent obtained:  Verbal    Consent given by:  Patient    Risks, benefits, and alternatives were discussed: yes      Risks discussed:  Infection, pain, poor cosmetic result and need for additional repair    Alternatives discussed:  No treatment and delayed treatment  Universal protocol:     Patient identity confirmed:  Arm band  Anesthesia:     Anesthesia method:  Local infiltration    Local anesthetic:  Lidocaine 1% w/o epi  Laceration details:     Location:  Finger    Finger location:  L ring finger    Length (cm):  2.5    Depth (mm):  3  Pre-procedure details:     Preparation:  Patient was prepped and draped in usual sterile fashion  Exploration:     Limited defect created (wound extended): no      Hemostasis achieved with:  Direct pressure    Wound exploration: wound explored through full range of motion and entire depth of wound visualized      Wound extent: no areolar tissue violation noted, no fascia violation noted, no foreign bodies/material noted, no muscle damage noted, no nerve damage noted, no tendon damage noted, no underlying fracture noted and no  vascular damage noted      Contaminated: yes    Treatment:     Area cleansed with:  Saline    Amount of cleaning:  Standard    Irrigation solution:  Sterile saline    Irrigation method:  Pressure wash    Visualized foreign bodies/material removed: no      Debridement:  Minimal    Undermining:  Minimal    Scar revision: no    Skin repair:     Repair method:  Sutures    Suture size:  5-0    Suture material:  Prolene    Suture technique:  Simple interrupted    Number of sutures:  2  Approximation:     Approximation:  Close  Repair type:     Repair type:  Simple  Post-procedure details:     Dressing:  Antibiotic ointment and non-adherent dressing    Procedure completion:  Tolerated  Labs Reviewed - No data to display       Imaging Results    None          Medications   sodium chloride 0.9% flush 10 mL (has no administration in time range)   LIDOcaine (PF) 10 mg/ml (1%) injection 50 mg (50 mg Infiltration Given by Provider 11/18/22 1920)   doxycycline tablet 100 mg (100 mg Oral Given 11/18/22 1919)   clindamycin capsule 300 mg (300 mg Oral Given 11/18/22 1919)     Medical Decision Making:   History:   Old Medical Records: I decided to obtain old medical records.  Old Records Summarized: records from clinic visits and records from previous admission(s).  Differential Diagnosis:   Laceration, tendon injury, dog bite  ED Management:  Intact range of motion, probed the wound, no obvious tendinous involvement.    Repaired wound primarily at the bedside, hemostasis achieved with direct pressure thereafter.    Will plan for return for suture removal, antibiotic prophylaxis, given her allergy to Augmentin will utilize alternative regimen.    enCouraged returning case of any worsening for wound check.                        Clinical Impression:   Final diagnoses:  [S61.452A, W54.0XXA] Dog bite of left hand, initial encounter (Primary)      ED Disposition Condition    Discharge Stable          ED Prescriptions       Medication Sig  Dispense Start Date End Date Auth. Provider    doxycycline (VIBRAMYCIN) 100 MG Cap Take 1 capsule (100 mg total) by mouth 2 (two) times daily. for 10 days 20 capsule 11/18/2022 11/28/2022 Max Tesfaye MD    clindamycin (CLEOCIN) 300 MG capsule Take 1 capsule (300 mg total) by mouth 3 (three) times daily. for 7 days 21 capsule 11/18/2022 11/25/2022 Max Tesfaye MD          Follow-up Information       Follow up With Specialties Details Why Contact Info    Naz Condon MD Family Medicine Go in 10 days For suture removal [address]  [address]  [facility] [address]  [phone]               Max Tesfaye MD  11/18/22 2126

## 2022-11-26 ENCOUNTER — CLINICAL SUPPORT (OUTPATIENT)
Dept: CARDIOLOGY | Facility: HOSPITAL | Age: 72
End: 2022-11-26
Payer: MEDICARE

## 2022-11-26 DIAGNOSIS — Z95.818 PRESENCE OF OTHER CARDIAC IMPLANTS AND GRAFTS: ICD-10-CM

## 2022-11-26 PROCEDURE — 93298 REM INTERROG DEV EVAL SCRMS: CPT | Mod: ,,, | Performed by: INTERNAL MEDICINE

## 2022-11-26 PROCEDURE — G2066 INTER DEVC REMOTE 30D: HCPCS | Performed by: INTERNAL MEDICINE

## 2022-11-26 PROCEDURE — 93298 CARDIAC DEVICE CHECK - REMOTE: ICD-10-PCS | Mod: ,,, | Performed by: INTERNAL MEDICINE

## 2022-12-26 ENCOUNTER — CLINICAL SUPPORT (OUTPATIENT)
Dept: CARDIOLOGY | Facility: HOSPITAL | Age: 72
End: 2022-12-26
Payer: MEDICARE

## 2022-12-26 DIAGNOSIS — Z95.818 PRESENCE OF OTHER CARDIAC IMPLANTS AND GRAFTS: ICD-10-CM

## 2022-12-26 PROCEDURE — G2066 INTER DEVC REMOTE 30D: HCPCS | Mod: HCNC | Performed by: INTERNAL MEDICINE

## 2023-01-25 ENCOUNTER — CLINICAL SUPPORT (OUTPATIENT)
Dept: CARDIOLOGY | Facility: HOSPITAL | Age: 73
End: 2023-01-25
Payer: MEDICARE

## 2023-01-25 DIAGNOSIS — Z95.818 PRESENCE OF OTHER CARDIAC IMPLANTS AND GRAFTS: ICD-10-CM

## 2023-01-25 PROCEDURE — 93298 CARDIAC DEVICE CHECK - REMOTE: ICD-10-PCS | Mod: ,,, | Performed by: INTERNAL MEDICINE

## 2023-01-25 PROCEDURE — G2066 INTER DEVC REMOTE 30D: HCPCS | Performed by: INTERNAL MEDICINE

## 2023-01-25 PROCEDURE — 93298 REM INTERROG DEV EVAL SCRMS: CPT | Mod: ,,, | Performed by: INTERNAL MEDICINE

## 2023-02-01 ENCOUNTER — PATIENT MESSAGE (OUTPATIENT)
Dept: PRIMARY CARE CLINIC | Facility: CLINIC | Age: 73
End: 2023-02-01
Payer: MEDICARE

## 2023-02-01 ENCOUNTER — PATIENT MESSAGE (OUTPATIENT)
Dept: SLEEP MEDICINE | Facility: CLINIC | Age: 73
End: 2023-02-01
Payer: MEDICARE

## 2023-02-01 DIAGNOSIS — Z12.31 SCREENING MAMMOGRAM, ENCOUNTER FOR: ICD-10-CM

## 2023-02-01 DIAGNOSIS — E02 SUBCLINICAL IODINE-DEFICIENCY HYPOTHYROIDISM: ICD-10-CM

## 2023-02-01 DIAGNOSIS — E78.2 MIXED HYPERLIPIDEMIA: ICD-10-CM

## 2023-02-01 DIAGNOSIS — Z00.00 ROUTINE GENERAL MEDICAL EXAMINATION AT A HEALTH CARE FACILITY: Primary | ICD-10-CM

## 2023-02-09 DIAGNOSIS — Z00.00 ENCOUNTER FOR MEDICARE ANNUAL WELLNESS EXAM: ICD-10-CM

## 2023-02-24 ENCOUNTER — CLINICAL SUPPORT (OUTPATIENT)
Dept: CARDIOLOGY | Facility: HOSPITAL | Age: 73
End: 2023-02-24
Payer: MEDICARE

## 2023-02-24 DIAGNOSIS — Z95.818 PRESENCE OF OTHER CARDIAC IMPLANTS AND GRAFTS: ICD-10-CM

## 2023-02-24 PROCEDURE — G2066 INTER DEVC REMOTE 30D: HCPCS | Mod: HCNC | Performed by: INTERNAL MEDICINE

## 2023-03-26 ENCOUNTER — CLINICAL SUPPORT (OUTPATIENT)
Dept: CARDIOLOGY | Facility: HOSPITAL | Age: 73
End: 2023-03-26
Payer: MEDICARE

## 2023-03-26 DIAGNOSIS — Z95.818 PRESENCE OF OTHER CARDIAC IMPLANTS AND GRAFTS: ICD-10-CM

## 2023-03-26 PROCEDURE — G2066 INTER DEVC REMOTE 30D: HCPCS | Mod: HCNC | Performed by: INTERNAL MEDICINE

## 2023-03-26 PROCEDURE — 93298 REM INTERROG DEV EVAL SCRMS: CPT | Mod: HCNC,,, | Performed by: INTERNAL MEDICINE

## 2023-03-26 PROCEDURE — 93298 CARDIAC DEVICE CHECK - REMOTE: ICD-10-PCS | Mod: HCNC,,, | Performed by: INTERNAL MEDICINE

## 2023-04-12 ENCOUNTER — OFFICE VISIT (OUTPATIENT)
Dept: SLEEP MEDICINE | Facility: CLINIC | Age: 73
End: 2023-04-12
Payer: MEDICARE

## 2023-04-12 DIAGNOSIS — F51.09 OTHER INSOMNIA NOT DUE TO A SUBSTANCE OR KNOWN PHYSIOLOGICAL CONDITION: ICD-10-CM

## 2023-04-12 DIAGNOSIS — G47.33 OSA (OBSTRUCTIVE SLEEP APNEA): Primary | ICD-10-CM

## 2023-04-12 PROCEDURE — 99213 OFFICE O/P EST LOW 20 MIN: CPT | Mod: HCNC,95,, | Performed by: INTERNAL MEDICINE

## 2023-04-12 PROCEDURE — 99213 PR OFFICE/OUTPT VISIT, EST, LEVL III, 20-29 MIN: ICD-10-PCS | Mod: HCNC,95,, | Performed by: INTERNAL MEDICINE

## 2023-04-12 NOTE — PROGRESS NOTES
The patient location is: [place]  The chief complaint leading to consultation is:     Visit type: audiovisual    15 minutes of total time spent on the encounter, which includes face to face time and non-face to face time preparing to see the patient (eg, review of tests), Obtaining and/or reviewing separately obtained history, Documenting clinical information in the electronic or other health record, Independently interpreting results (not separately reported) and communicating results to the patient/family/caregiver, or Care coordination (not separately reported).     Each patient to whom he or she provides medical services by telemedicine is:  (1) informed of the relationship between the physician and patient and the respective role of any other health care provider with respect to management of the patient; and (2) notified that he or she may decline to receive medical services by telemedicine and may withdraw from such care at any time.  ESTABLISHED PATIENT VISIT ([place] 5/12/21)    Flores Fuentes  is a pleasant 72 y.o. female  with history of WPW, A-fib, HLD, HTN, slow transit constipation, stress incontinence, subclinical iodine-def hypothyroidism, DDD cervical, osteopenia, odontogenic tumor, and MATTHEW dx 2020.    Here today for: CPAP follow-up    Plan last visit :      Since last visit:   Has had not episode of afib in the past year      PAP history   Problems    Mask nose   Pressure Feels pretty low   DME HME   Machine age DS2 12/15/22    Download 4.11.23. 30/30 x 9 hours 18 mins, 5-11cwp (7), mask fit 99%, AHI 2.5       SLEEP SCHEDULE   Environment    Bed Time 8PM   Sleep Latency Not long   Arousals 2 times   Nocturia 1   Back to sleep 1-2 hours   Wake time 5A-6AM   Naps    Work        Past Medical History:   Diagnosis Date    Asymptomatic microscopic hematuria 6/2/2020    Atrial fibrillation 2014    nerves tip off, cardioverted 2017, Eliquis, q 6 mo, Dr Servin, flecainide at home prn flare up 4/2019,  9/2018)    Cervical muscle strain 1/24/2017    Chronic anticoagulation 6/10/2021    DJD (degenerative joint disease) of cervical spine 1/24/2017    Essential hypertension 6/19/2019    Hyperlipidemia     Mitral valve disease     Mixed hyperlipidemia 11/07/2013    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, l mandible, 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 Dr Viktor Toure    Osteopenia of neck of left femur 6/4/2020    Paroxysmal atrioventricular tachycardia     Plantar fasciitis     Right knee meniscal tear     Dr Mayfield, tx conservatively    Severe obesity (BMI 35.0-35.9 with comorbidity) 1/24/2017    Slow transit constipation 7/13/2021    Despite fruits & veg & 1200 dunia diet, try Colace daily    Stress incontinence, female 03/28/2018    hasn't tried oxybutynin, After HYST, Kegels & PT  didn't work, worse at night wearing pads, Dr Infante    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    Thyroid disease      Patient Active Problem List   Diagnosis    Mixed hyperlipidemia    Subclinical iodine-deficiency hypothyroidism    Paroxysmal A-fib    Primary localized osteoarthrosis, lower leg    Cervical muscle strain    DJD (degenerative joint disease) of cervical spine    Stress incontinence, female    Essential hypertension    MATTHEW (obstructive sleep apnea)    Asymptomatic microscopic hematuria    Osteopenia of neck of left femur    Colon cancer screening    Chronic anticoagulation    Kym-Parkinson-White (WPW) pattern    Slow transit constipation    Morbid obesity       Current Outpatient Medications:     diltiaZEM (CARDIZEM CD) 120 MG Cp24, Take 1 capsule (120 mg total) by mouth once daily., Disp: 90 capsule, Rfl: 3    ELIQUIS 5 mg Tab, TAKE 1 TABLET TWICE DAILY, Disp: 180 tablet, Rfl: 3    flecainide (TAMBOCOR) 150 MG Tab, TAKE 2 TABS (300 MG TOTAL) UP TO ONCE PER DAY FOR ATRIAL FIBRILLATION., Disp: 10 tablet, Rfl: 3    fluticasone propionate (FLONASE) 50 mcg/actuation nasal spray, USE 1 SPRAY IN EACH NOSTRIL EVERY DAY, Disp: 32  g, Rfl: 2    hydrocortisone 2.5 % cream, Apply topically 2 (two) times daily. To rash on face and neck x 2 weeks, Disp: 28 g, Rfl: 2    levothyroxine (SYNTHROID) 25 MCG tablet, TAKE 1 TABLET EVERY DAY, Disp: 90 tablet, Rfl: 3    metoprolol succinate (TOPROL-XL) 25 MG 24 hr tablet, Take 1 tablet (25 mg total) by mouth once daily., Disp: 90 tablet, Rfl: 3    pravastatin (PRAVACHOL) 40 MG tablet, TAKE 1 TABLET EVERY DAY, Disp: 90 tablet, Rfl: 3  No current facility-administered medications for this visit.    Facility-Administered Medications Ordered in Other Visits:     sodium chloride 0.9% bolus 1,000 mL, 1,000 mL, Intravenous, Once, Carley Cabrera NP    vancomycin in dextrose 5 % 1 gram/250 mL IVPB 1,000 mg, 1,000 mg, Intravenous, On Call Procedure, Carley Cabrera NP, Last Rate: 166.7 mL/hr at 11/01/21 1249, 1,000 mg at 11/01/21 1249     There were no vitals filed for this visit.  Physical Exam:    GEN:   Well-appearing  Psych:  Appropriate affect, demonstrates insight  SKIN:  No rash on the face or bridge of the nose      LABS:  No results found for: HGB, CO2      RECORDS REVIEWED PREVIOUSLY:    HST 5/14/20 AHI 13      PROBLEM DESCRIPTION/ Sx on Presentation Interval Hx STATUS   Mild MATTHEW   AHI 13   Good usage and effiacy controlled   Daytime Sx   denies sleepiness when inactive   ESS 3/24 on intake N/a N/a   Insomnia   Trouble falling asleep:   Maintenance:         waking after 3-4 hours  Prior hypnotics:        Current hypnotics:    Up for an hour or two at night Not too distressing   Nocturia   x once per sleep period  Started with her hysterectomy Still once with PAP N/A   Other issues:     PLAN     -adjusted 6-11 with ramp + 5cwp  -using and benefitting from PAP therapy    RTC

## 2023-04-25 ENCOUNTER — CLINICAL SUPPORT (OUTPATIENT)
Dept: CARDIOLOGY | Facility: HOSPITAL | Age: 73
End: 2023-04-25
Payer: MEDICARE

## 2023-04-25 DIAGNOSIS — Z95.818 PRESENCE OF OTHER CARDIAC IMPLANTS AND GRAFTS: ICD-10-CM

## 2023-04-25 PROCEDURE — G2066 INTER DEVC REMOTE 30D: HCPCS | Mod: HCNC | Performed by: INTERNAL MEDICINE

## 2023-04-28 ENCOUNTER — PATIENT MESSAGE (OUTPATIENT)
Dept: PRIMARY CARE CLINIC | Facility: CLINIC | Age: 73
End: 2023-04-28
Payer: MEDICARE

## 2023-04-28 RX ORDER — LEVOTHYROXINE SODIUM 25 UG/1
25 TABLET ORAL DAILY
Qty: 90 TABLET | Refills: 1 | Status: SHIPPED | OUTPATIENT
Start: 2023-04-28 | End: 2023-10-10

## 2023-04-28 NOTE — TELEPHONE ENCOUNTER
Care Due:                  Date            Visit Type   Department     Provider  --------------------------------------------------------------------------------                                EP -                              PRIMARY      [facility]  Last Visit: 06-      CARE ([facility])   CARE           Naz Condon                              MYCHART                              FOLLOWUP/OF  [facility]  Next Visit: 08-      FICE VISIT   CARE           Naz Condon                                                            Last  Test          Frequency    Reason                     Performed    Due Date  --------------------------------------------------------------------------------    Office Visit  12 months..  pravastatin..............  06- 06-    CMP.........  12 months..  pravastatin..............  06- 06-    Lipid Panel.  12 months..  pravastatin..............  06- 06-    [employer] Embedded Care Due Messages. Reference number: [id number].   4/28/2023 11:18:04 AM CDT

## 2023-05-03 ENCOUNTER — PATIENT MESSAGE (OUTPATIENT)
Dept: CARDIOLOGY | Facility: CLINIC | Age: 73
End: 2023-05-03
Payer: MEDICARE

## 2023-05-09 RX ORDER — METOPROLOL SUCCINATE 25 MG/1
25 TABLET, EXTENDED RELEASE ORAL DAILY
Qty: 90 TABLET | Refills: 3 | Status: ON HOLD | OUTPATIENT
Start: 2023-05-09 | End: 2023-08-01 | Stop reason: HOSPADM

## 2023-05-25 ENCOUNTER — CLINICAL SUPPORT (OUTPATIENT)
Dept: CARDIOLOGY | Facility: HOSPITAL | Age: 73
End: 2023-05-25
Payer: MEDICARE

## 2023-05-25 DIAGNOSIS — Z95.818 PRESENCE OF OTHER CARDIAC IMPLANTS AND GRAFTS: ICD-10-CM

## 2023-05-25 PROCEDURE — 93298 CARDIAC DEVICE CHECK - REMOTE: ICD-10-PCS | Mod: ,,, | Performed by: INTERNAL MEDICINE

## 2023-05-25 PROCEDURE — 93298 REM INTERROG DEV EVAL SCRMS: CPT | Mod: ,,, | Performed by: INTERNAL MEDICINE

## 2023-05-25 PROCEDURE — G2066 INTER DEVC REMOTE 30D: HCPCS | Performed by: INTERNAL MEDICINE

## 2023-06-24 ENCOUNTER — CLINICAL SUPPORT (OUTPATIENT)
Dept: CARDIOLOGY | Facility: HOSPITAL | Age: 73
End: 2023-06-24
Payer: MEDICARE

## 2023-06-24 DIAGNOSIS — Z95.818 PRESENCE OF OTHER CARDIAC IMPLANTS AND GRAFTS: ICD-10-CM

## 2023-06-24 PROCEDURE — G2066 INTER DEVC REMOTE 30D: HCPCS | Performed by: INTERNAL MEDICINE

## 2023-07-14 ENCOUNTER — PATIENT MESSAGE (OUTPATIENT)
Dept: CARDIOLOGY | Facility: CLINIC | Age: 73
End: 2023-07-14
Payer: MEDICARE

## 2023-07-24 ENCOUNTER — CLINICAL SUPPORT (OUTPATIENT)
Dept: CARDIOLOGY | Facility: HOSPITAL | Age: 73
End: 2023-07-24
Payer: MEDICARE

## 2023-07-24 DIAGNOSIS — Z95.818 PRESENCE OF OTHER CARDIAC IMPLANTS AND GRAFTS: ICD-10-CM

## 2023-07-24 PROCEDURE — G2066 INTER DEVC REMOTE 30D: HCPCS | Mod: HCNC | Performed by: INTERNAL MEDICINE

## 2023-07-24 PROCEDURE — 93298 CARDIAC DEVICE CHECK - REMOTE: ICD-10-PCS | Mod: HCNC,,, | Performed by: INTERNAL MEDICINE

## 2023-07-24 PROCEDURE — 93298 REM INTERROG DEV EVAL SCRMS: CPT | Mod: HCNC,,, | Performed by: INTERNAL MEDICINE

## 2023-07-25 RX ORDER — PRAVASTATIN SODIUM 40 MG/1
TABLET ORAL
Qty: 90 TABLET | Refills: 0 | Status: SHIPPED | OUTPATIENT
Start: 2023-07-25 | End: 2023-12-19 | Stop reason: SDUPTHER

## 2023-07-25 NOTE — TELEPHONE ENCOUNTER
Care Due:                  Date            Visit Type   Department     Provider  --------------------------------------------------------------------------------                                EP -                              PRIMARY      [facility]  Last Visit: 06-      CARE ([facility])   CARE           Naz Condon                              MYCHART                              FOLLOWUP/OF  [facility]  Next Visit: 08-      FICE VISIT   CARE           Naz Condon                                                            Last  Test          Frequency    Reason                     Performed    Due Date  --------------------------------------------------------------------------------    CMP.........  12 months..  pravastatin..............  06- 06-    Lipid Panel.  12 months..  pravastatin..............  06- 06-    TSH.........  12 months..  levothyroxine............  06- 06-    [employer] Embedded Care Due Messages. Reference number: [id number].   7/25/2023 7:46:32 AM CDT

## 2023-07-28 ENCOUNTER — PATIENT MESSAGE (OUTPATIENT)
Dept: DERMATOLOGY | Facility: CLINIC | Age: 73
End: 2023-07-28
Payer: MEDICARE

## 2023-07-31 ENCOUNTER — PATIENT MESSAGE (OUTPATIENT)
Dept: CARDIOLOGY | Facility: CLINIC | Age: 73
End: 2023-07-31
Payer: MEDICARE

## 2023-08-01 ENCOUNTER — HOSPITAL ENCOUNTER (OUTPATIENT)
Facility: HOSPITAL | Age: 73
Discharge: HOME | End: 2023-08-01
Attending: EMERGENCY MEDICINE | Admitting: STUDENT IN AN ORGANIZED HEALTH CARE EDUCATION/TRAINING PROGRAM
Payer: MEDICARE

## 2023-08-01 ENCOUNTER — PATIENT MESSAGE (OUTPATIENT)
Dept: PRIMARY CARE CLINIC | Facility: CLINIC | Age: 73
End: 2023-08-01
Payer: MEDICARE

## 2023-08-01 ENCOUNTER — PATIENT MESSAGE (OUTPATIENT)
Dept: CARDIOLOGY | Facility: CLINIC | Age: 73
End: 2023-08-01
Payer: MEDICARE

## 2023-08-01 VITALS
OXYGEN SATURATION: 99 % | RESPIRATION RATE: 18 BRPM | DIASTOLIC BLOOD PRESSURE: 57 MMHG | BODY MASS INDEX: 41.75 KG/M2 | WEIGHT: 266 LBS | HEART RATE: 48 BPM | TEMPERATURE: 98 F | HEIGHT: 67 IN | SYSTOLIC BLOOD PRESSURE: 117 MMHG

## 2023-08-01 DIAGNOSIS — I48.91 A-FIB: ICD-10-CM

## 2023-08-01 DIAGNOSIS — I48.0 PAROXYSMAL A-FIB: Primary | Chronic | ICD-10-CM

## 2023-08-01 PROBLEM — I10 ESSENTIAL HYPERTENSION: Chronic | Status: ACTIVE | Noted: 2019-06-19

## 2023-08-01 PROBLEM — I45.6 WOLFF-PARKINSON-WHITE (WPW) PATTERN: Chronic | Status: ACTIVE | Noted: 2021-07-01

## 2023-08-01 PROBLEM — R00.2 PALPITATIONS: Status: ACTIVE | Noted: 2023-08-01

## 2023-08-01 PROBLEM — Z79.01 CHRONIC ANTICOAGULATION: Chronic | Status: ACTIVE | Noted: 2021-06-10

## 2023-08-01 LAB
ALBUMIN SERPL BCP-MCNC: 4.4 G/DL (ref 3.5–5.2)
ALP SERPL-CCNC: 92 U/L (ref 55–135)
ALT SERPL W/O P-5'-P-CCNC: 11 U/L (ref 10–44)
ANION GAP SERPL CALC-SCNC: 12 MMOL/L (ref 8–16)
ASCENDING AORTA: 3.06 CM
AST SERPL-CCNC: 17 U/L (ref 10–40)
AV INDEX (PROSTH): 0.93
AV MEAN GRADIENT: 3 MMHG
AV PEAK GRADIENT: 7 MMHG
AV VALVE AREA BY VELOCITY RATIO: 3.35 CM²
AV VALVE AREA: 3.41 CM²
AV VELOCITY RATIO: 0.92
BACTERIA #/AREA URNS AUTO: NORMAL /HPF
BASOPHILS # BLD AUTO: 0.04 K/UL (ref 0–0.2)
BASOPHILS NFR BLD: 0.7 % (ref 0–1.9)
BILIRUB SERPL-MCNC: 0.7 MG/DL (ref 0.1–1)
BILIRUB UR QL STRIP: NEGATIVE
BSA FOR ECHO PROCEDURE: 2.39 M2
BUN SERPL-MCNC: 16 MG/DL (ref 8–23)
CALCIUM SERPL-MCNC: 9.7 MG/DL (ref 8.7–10.5)
CHLORIDE SERPL-SCNC: 112 MMOL/L (ref 95–110)
CLARITY UR REFRACT.AUTO: CLEAR
CO2 SERPL-SCNC: 19 MMOL/L (ref 23–29)
COLOR UR AUTO: COLORLESS
CREAT SERPL-MCNC: 0.7 MG/DL (ref 0.5–1.4)
CV ECHO LV RWT: 0.32 CM
DIFFERENTIAL METHOD: NORMAL
DOP CALC AO PEAK VEL: 1.3 M/S
DOP CALC AO VTI: 34.79 CM
DOP CALC LVOT AREA: 3.7 CM2
DOP CALC LVOT DIAMETER: 2.16 CM
DOP CALC LVOT PEAK VEL: 1.19 M/S
DOP CALC LVOT STROKE VOLUME: 118.63 CM3
DOP CALCLVOT PEAK VEL VTI: 32.39 CM
E WAVE DECELERATION TIME: 270.8 MSEC
E/A RATIO: 1.25
E/E' RATIO: 8.18 M/S
ECHO LV POSTERIOR WALL: 0.8 CM (ref 0.6–1.1)
EJECTION FRACTION: 65 %
EOSINOPHIL # BLD AUTO: 0.2 K/UL (ref 0–0.5)
EOSINOPHIL NFR BLD: 3.1 % (ref 0–8)
ERYTHROCYTE [DISTWIDTH] IN BLOOD BY AUTOMATED COUNT: 14.4 % (ref 11.5–14.5)
EST. GFR  (NO RACE VARIABLE): >60 ML/MIN/1.73 M^2
FRACTIONAL SHORTENING: 34 % (ref 28–44)
GLUCOSE SERPL-MCNC: 108 MG/DL (ref 70–110)
GLUCOSE UR QL STRIP: NEGATIVE
HCT VFR BLD AUTO: 43.4 % (ref 37–48.5)
HCV AB SERPL QL IA: NORMAL
HGB BLD-MCNC: 14.5 G/DL (ref 12–16)
HGB UR QL STRIP: ABNORMAL
HIV 1+2 AB+HIV1 P24 AG SERPL QL IA: NORMAL
IMM GRANULOCYTES # BLD AUTO: 0.01 K/UL (ref 0–0.04)
IMM GRANULOCYTES NFR BLD AUTO: 0.2 % (ref 0–0.5)
INTERVENTRICULAR SEPTUM: 0.83 CM (ref 0.6–1.1)
IVRT: 87.54 MSEC
KETONES UR QL STRIP: NEGATIVE
LA MAJOR: 5.79 CM
LA MINOR: 5.6 CM
LA WIDTH: 4.19 CM
LEFT ATRIUM SIZE: 4 CM
LEFT ATRIUM VOLUME INDEX MOD: 25.5 ML/M2
LEFT ATRIUM VOLUME INDEX: 35.6 ML/M2
LEFT ATRIUM VOLUME MOD: 58.22 CM3
LEFT ATRIUM VOLUME: 81.11 CM3
LEFT INTERNAL DIMENSION IN SYSTOLE: 3.33 CM (ref 2.1–4)
LEFT VENTRICLE DIASTOLIC VOLUME INDEX: 52.06 ML/M2
LEFT VENTRICLE DIASTOLIC VOLUME: 118.7 ML
LEFT VENTRICLE MASS INDEX: 61 G/M2
LEFT VENTRICLE SYSTOLIC VOLUME INDEX: 19.8 ML/M2
LEFT VENTRICLE SYSTOLIC VOLUME: 45.04 ML
LEFT VENTRICULAR INTERNAL DIMENSION IN DIASTOLE: 5.01 CM (ref 3.5–6)
LEFT VENTRICULAR MASS: 139.55 G
LEUKOCYTE ESTERASE UR QL STRIP: ABNORMAL
LV LATERAL E/E' RATIO: 8.18 M/S
LV SEPTAL E/E' RATIO: 8.18 M/S
LYMPHOCYTES # BLD AUTO: 1.6 K/UL (ref 1–4.8)
LYMPHOCYTES NFR BLD: 29.5 % (ref 18–48)
MAGNESIUM SERPL-MCNC: 2.3 MG/DL (ref 1.6–2.6)
MCH RBC QN AUTO: 29.8 PG (ref 27–31)
MCHC RBC AUTO-ENTMCNC: 33.4 G/DL (ref 32–36)
MCV RBC AUTO: 89 FL (ref 82–98)
MICROSCOPIC COMMENT: NORMAL
MONOCYTES # BLD AUTO: 0.5 K/UL (ref 0.3–1)
MONOCYTES NFR BLD: 9.2 % (ref 4–15)
MV A" WAVE DURATION": 13.7 MSEC
MV PEAK A VEL: 0.72 M/S
MV PEAK E VEL: 0.9 M/S
MV STENOSIS PRESSURE HALF TIME: 78.53 MS
MV VALVE AREA P 1/2 METHOD: 2.8 CM2
NEUTROPHILS # BLD AUTO: 3.2 K/UL (ref 1.8–7.7)
NEUTROPHILS NFR BLD: 57.3 % (ref 38–73)
NITRITE UR QL STRIP: NEGATIVE
NRBC BLD-RTO: 0 /100 WBC
PH UR STRIP: 6 [PH] (ref 5–8)
PISA TR MAX VEL: 2.32 M/S
PLATELET # BLD AUTO: 249 K/UL (ref 150–450)
PMV BLD AUTO: 11.2 FL (ref 9.2–12.9)
POTASSIUM SERPL-SCNC: 3.7 MMOL/L (ref 3.5–5.1)
PROT SERPL-MCNC: 8.1 G/DL (ref 6–8.4)
PROT UR QL STRIP: NEGATIVE
PULM VEIN S/D RATIO: 0.75
PV PEAK D VEL: 0.44 M/S
PV PEAK S VEL: 0.33 M/S
RA MAJOR: 4.73 CM
RA PRESSURE ESTIMATED: 8 MMHG
RA WIDTH: 3.79 CM
RBC # BLD AUTO: 4.87 M/UL (ref 4–5.4)
RBC #/AREA URNS AUTO: 0 /HPF (ref 0–4)
RIGHT VENTRICULAR END-DIASTOLIC DIMENSION: 2.98 CM
RV TB RVSP: 10 MMHG
SINUS: 3.25 CM
SODIUM SERPL-SCNC: 143 MMOL/L (ref 136–145)
SP GR UR STRIP: 1 (ref 1–1.03)
SQUAMOUS #/AREA URNS AUTO: 2 /HPF
STJ: 2.69 CM
TDI LATERAL: 0.11 M/S
TDI SEPTAL: 0.11 M/S
TDI: 0.11 M/S
TR MAX PG: 22 MMHG
TRICUSPID ANNULAR PLANE SYSTOLIC EXCURSION: 1.88 CM
TROPONIN I SERPL DL<=0.01 NG/ML-MCNC: 0.01 NG/ML (ref 0–0.03)
TSH SERPL DL<=0.005 MIU/L-ACNC: 3.92 UIU/ML (ref 0.4–4)
URN SPEC COLLECT METH UR: ABNORMAL
WBC # BLD AUTO: 5.52 K/UL (ref 3.9–12.7)
WBC #/AREA URNS AUTO: 2 /HPF (ref 0–5)
Z-SCORE OF LEFT VENTRICULAR DIMENSION IN END DIASTOLE: -5.39
Z-SCORE OF LEFT VENTRICULAR DIMENSION IN END SYSTOLE: -3.51

## 2023-08-01 PROCEDURE — 84443 ASSAY THYROID STIM HORMONE: CPT | Mod: HCNC | Performed by: STUDENT IN AN ORGANIZED HEALTH CARE EDUCATION/TRAINING PROGRAM

## 2023-08-01 PROCEDURE — 99214 PR OFFICE/OUTPT VISIT, EST, LEVL IV, 30-39 MIN: ICD-10-PCS | Mod: HCNC,,, | Performed by: INTERNAL MEDICINE

## 2023-08-01 PROCEDURE — 99285 EMERGENCY DEPT VISIT HI MDM: CPT | Mod: 25,HCNC

## 2023-08-01 PROCEDURE — 83735 ASSAY OF MAGNESIUM: CPT | Mod: HCNC | Performed by: STUDENT IN AN ORGANIZED HEALTH CARE EDUCATION/TRAINING PROGRAM

## 2023-08-01 PROCEDURE — G0378 HOSPITAL OBSERVATION PER HR: HCPCS | Mod: HCNC

## 2023-08-01 PROCEDURE — 86803 HEPATITIS C AB TEST: CPT | Mod: HCNC | Performed by: PHYSICIAN ASSISTANT

## 2023-08-01 PROCEDURE — 84484 ASSAY OF TROPONIN QUANT: CPT | Mod: HCNC | Performed by: STUDENT IN AN ORGANIZED HEALTH CARE EDUCATION/TRAINING PROGRAM

## 2023-08-01 PROCEDURE — 85025 COMPLETE CBC W/AUTO DIFF WBC: CPT | Mod: HCNC | Performed by: STUDENT IN AN ORGANIZED HEALTH CARE EDUCATION/TRAINING PROGRAM

## 2023-08-01 PROCEDURE — 93010 ELECTROCARDIOGRAM REPORT: CPT | Mod: HCNC,,, | Performed by: INTERNAL MEDICINE

## 2023-08-01 PROCEDURE — 25000242 PHARM REV CODE 250 ALT 637 W/ HCPCS: Mod: HCNC

## 2023-08-01 PROCEDURE — 87389 HIV-1 AG W/HIV-1&-2 AB AG IA: CPT | Mod: HCNC | Performed by: PHYSICIAN ASSISTANT

## 2023-08-01 PROCEDURE — 96361 HYDRATE IV INFUSION ADD-ON: CPT

## 2023-08-01 PROCEDURE — 63600175 PHARM REV CODE 636 W HCPCS: Mod: HCNC | Performed by: STUDENT IN AN ORGANIZED HEALTH CARE EDUCATION/TRAINING PROGRAM

## 2023-08-01 PROCEDURE — 99214 OFFICE O/P EST MOD 30 MIN: CPT | Mod: HCNC,,, | Performed by: INTERNAL MEDICINE

## 2023-08-01 PROCEDURE — 25000003 PHARM REV CODE 250: Mod: HCNC

## 2023-08-01 PROCEDURE — 80053 COMPREHEN METABOLIC PANEL: CPT | Mod: HCNC | Performed by: STUDENT IN AN ORGANIZED HEALTH CARE EDUCATION/TRAINING PROGRAM

## 2023-08-01 PROCEDURE — 93005 ELECTROCARDIOGRAM TRACING: CPT | Mod: HCNC

## 2023-08-01 PROCEDURE — 99223 PR INITIAL HOSPITAL CARE,LEVL III: ICD-10-PCS | Mod: AI,HCNC,GC, | Performed by: STUDENT IN AN ORGANIZED HEALTH CARE EDUCATION/TRAINING PROGRAM

## 2023-08-01 PROCEDURE — 93010 EKG 12-LEAD: ICD-10-PCS | Mod: HCNC,,, | Performed by: INTERNAL MEDICINE

## 2023-08-01 PROCEDURE — 99223 1ST HOSP IP/OBS HIGH 75: CPT | Mod: AI,HCNC,GC, | Performed by: STUDENT IN AN ORGANIZED HEALTH CARE EDUCATION/TRAINING PROGRAM

## 2023-08-01 PROCEDURE — 81001 URINALYSIS AUTO W/SCOPE: CPT | Mod: HCNC | Performed by: STUDENT IN AN ORGANIZED HEALTH CARE EDUCATION/TRAINING PROGRAM

## 2023-08-01 PROCEDURE — 96360 HYDRATION IV INFUSION INIT: CPT | Mod: 59,HCNC

## 2023-08-01 RX ORDER — GLUCAGON 1 MG
1 KIT INJECTION
Status: DISCONTINUED | OUTPATIENT
Start: 2023-08-01 | End: 2023-08-01 | Stop reason: HOSPADM

## 2023-08-01 RX ORDER — FLUTICASONE PROPIONATE 50 MCG
1 SPRAY, SUSPENSION (ML) NASAL DAILY
Status: DISCONTINUED | OUTPATIENT
Start: 2023-08-01 | End: 2023-08-01 | Stop reason: HOSPADM

## 2023-08-01 RX ORDER — NALOXONE HCL 0.4 MG/ML
0.02 VIAL (ML) INJECTION
Status: DISCONTINUED | OUTPATIENT
Start: 2023-08-01 | End: 2023-08-01 | Stop reason: HOSPADM

## 2023-08-01 RX ORDER — PRAVASTATIN SODIUM 40 MG/1
40 TABLET ORAL DAILY
Status: DISCONTINUED | OUTPATIENT
Start: 2023-08-01 | End: 2023-08-01 | Stop reason: HOSPADM

## 2023-08-01 RX ORDER — ENOXAPARIN SODIUM 100 MG/ML
40 INJECTION SUBCUTANEOUS EVERY 24 HOURS
Status: DISCONTINUED | OUTPATIENT
Start: 2023-08-01 | End: 2023-08-01

## 2023-08-01 RX ORDER — POTASSIUM CHLORIDE 20 MEQ/1
20 TABLET, EXTENDED RELEASE ORAL ONCE
Status: COMPLETED | OUTPATIENT
Start: 2023-08-01 | End: 2023-08-01

## 2023-08-01 RX ORDER — IBUPROFEN 200 MG
24 TABLET ORAL
Status: DISCONTINUED | OUTPATIENT
Start: 2023-08-01 | End: 2023-08-01 | Stop reason: HOSPADM

## 2023-08-01 RX ORDER — FLECAINIDE ACETATE 100 MG/1
100 TABLET ORAL EVERY 12 HOURS
Status: DISCONTINUED | OUTPATIENT
Start: 2023-08-01 | End: 2023-08-01 | Stop reason: HOSPADM

## 2023-08-01 RX ORDER — DILTIAZEM HYDROCHLORIDE 120 MG/1
120 CAPSULE, COATED, EXTENDED RELEASE ORAL DAILY
Status: DISCONTINUED | OUTPATIENT
Start: 2023-08-01 | End: 2023-08-01 | Stop reason: HOSPADM

## 2023-08-01 RX ORDER — IBUPROFEN 200 MG
16 TABLET ORAL
Status: DISCONTINUED | OUTPATIENT
Start: 2023-08-01 | End: 2023-08-01 | Stop reason: HOSPADM

## 2023-08-01 RX ORDER — FLECAINIDE ACETATE 100 MG/1
100 TABLET ORAL EVERY 12 HOURS
Qty: 60 TABLET | Refills: 11 | Status: SHIPPED | OUTPATIENT
Start: 2023-08-01 | End: 2023-08-24 | Stop reason: SDUPTHER

## 2023-08-01 RX ORDER — LEVOTHYROXINE SODIUM 25 UG/1
25 TABLET ORAL
Status: DISCONTINUED | OUTPATIENT
Start: 2023-08-01 | End: 2023-08-01 | Stop reason: HOSPADM

## 2023-08-01 RX ORDER — SODIUM CHLORIDE 0.9 % (FLUSH) 0.9 %
10 SYRINGE (ML) INJECTION EVERY 12 HOURS PRN
Status: DISCONTINUED | OUTPATIENT
Start: 2023-08-01 | End: 2023-08-01 | Stop reason: HOSPADM

## 2023-08-01 RX ADMIN — SODIUM CHLORIDE 500 ML: 9 INJECTION, SOLUTION INTRAVENOUS at 01:08

## 2023-08-01 RX ADMIN — POTASSIUM CHLORIDE 20 MEQ: 1500 TABLET, EXTENDED RELEASE ORAL at 04:08

## 2023-08-01 RX ADMIN — DILTIAZEM HYDROCHLORIDE 120 MG: 120 CAPSULE, COATED, EXTENDED RELEASE ORAL at 11:08

## 2023-08-01 RX ADMIN — FLUTICASONE PROPIONATE 50 MCG: 50 SPRAY, METERED NASAL at 08:08

## 2023-08-01 RX ADMIN — FLECAINIDE ACETATE 100 MG: 100 TABLET ORAL at 11:08

## 2023-08-01 RX ADMIN — PRAVASTATIN SODIUM 40 MG: 40 TABLET ORAL at 08:08

## 2023-08-01 RX ADMIN — SODIUM CHLORIDE, SODIUM LACTATE, POTASSIUM CHLORIDE, AND CALCIUM CHLORIDE 1000 ML: .6; .31; .03; .02 INJECTION, SOLUTION INTRAVENOUS at 02:08

## 2023-08-01 RX ADMIN — LEVOTHYROXINE SODIUM 25 MCG: 25 TABLET ORAL at 07:08

## 2023-08-01 RX ADMIN — APIXABAN 5 MG: 5 TABLET, FILM COATED ORAL at 08:08

## 2023-08-01 NOTE — ED PROVIDER NOTES
Encounter Date: 8/1/2023       History     Chief Complaint   Patient presents with    Atrial Fibrillation     Pt arrives c/o afib. Hx of afib.     71 y.o. female with pAF, HLD, obesity, hypothyroid, intermittent preexcitation, paroxysmal a-fib  presenting to ED with chief complaint of symptomatic AFib with RVR.  Patient states that she takes diltiazem in the mornings, metoprolol at night and flecainide during a-fib episodes. She went a year and half without an episode until 3 weeks ago when she has been experiencing an episode each week. She has taken the Flecainide and come out of it in less than 2 hours. She felt herself going into a-fib shortly after waking. She knows because she feels palpitations when she goes into it.  She took her Flecainide and laid down. Within an hour and 50mins, she came out of it. She then went back into it approximately 2 hours ago at midnight. It's unusual for her to go into a-fib so often which concerned her and prompted her to come to the ED. She avoids caffeine, alcohol. She states that chocolate is typically a trigger but she hasn't had any for 3 wks. Her father is currently admitted to the hospital for falls which has been adding stress to her. They are currently adding an addition to their house so that they can move her father in. She has been intermittent fasting for the past 2 months but continues to drink water throughout the day. No fevers, nausea, vomiting, headaches, lightheadedness, syncope, chest pain or shortness of breath.       Review of patient's allergies indicates:   Allergen Reactions    Decongestant d [pseudoephedrine-dm]      Atrial Fibrillation    Augmentin [amoxicillin-pot clavulanate]      palpitations      Amoxicillin Palpitations    Diphenhydramine-pseudoephed Palpitations     TACHYCARDIA    Povidone-iodine Rash     Mild erythema of skin     Past Medical History:   Diagnosis Date    Asymptomatic microscopic hematuria 6/2/2020    Atrial fibrillation 2014     nerves tip off, cardioverted 2017, Eliquis, q 6 mo, Dr Servin, flecainide at home prn flare up 4/2019, 9/2018)    Cervical muscle strain 1/24/2017    Chronic anticoagulation 6/10/2021    DJD (degenerative joint disease) of cervical spine 1/24/2017    Essential hypertension 6/19/2019    Hyperlipidemia     Mitral valve disease     Mixed hyperlipidemia 11/07/2013    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, l mandible, 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 Dr Viktor Toure    Osteopenia of neck of left femur 6/4/2020    Paroxysmal atrioventricular tachycardia     Plantar fasciitis     Right knee meniscal tear     Dr Mayfield, tx conservatively    Severe obesity (BMI 35.0-35.9 with comorbidity) 1/24/2017    Slow transit constipation 7/13/2021    Despite fruits & veg & 1200 dunia diet, try Colace daily    Stress incontinence, female 03/28/2018    hasn't tried oxybutynin, After HYST, Kegels & PT  didn't work, worse at night wearing pads, Dr Infante    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    Thyroid disease      Past Surgical History:   Procedure Laterality Date    APPENDECTOMY      COLONOSCOPY N/A 8/13/2020    Procedure: COLONOSCOPY;  Surgeon: Angus Ac MD;  Location: [facility] ENDO ([address] [place] [address]);  Service: Endoscopy;  Laterality: N/A;  ok to hold Eliquis 2 days per Dr Servin     COVID test at [facility] on 8/10-GT    HYSTERECTOMY  1990    TAHBSO for fibroids    INSERTION OF IMPLANTABLE LOOP RECORDER Left 11/1/2021    Procedure: Insertion, Implantable Loop Recorder;  Surgeon: Ameya Servin MD;  Location: [facility] EP LAB;  Service: Cardiology;  Laterality: Left;  AF, ILR implant, MDT,  DM, 3 Prep    MANDIBLE SURGERY  2015    L mandible, Dr Toure    MANDIBLE SURGERY Left 8/27/2019    OOPHORECTOMY      TONSILLECTOMY       Family History   Problem Relation Age of Onset    Cancer Mother         stomach, liver    Breast cancer Neg Hx     Ovarian cancer Neg Hx     Cervical cancer Neg Hx     Endometrial cancer Neg Hx     Vaginal cancer Neg Hx      Melanoma Neg Hx     Colon cancer Neg Hx     Esophageal cancer Neg Hx      Social History     Tobacco Use    Smoking status: Never    Smokeless tobacco: Never   Substance Use Topics    Alcohol use: No    Drug use: No     Review of Systems   Constitutional:  Negative for chills and fever.   HENT:  Negative for congestion and rhinorrhea.    Eyes:  Negative for pain and visual disturbance.   Respiratory:  Negative for cough and shortness of breath.    Cardiovascular:  Positive for palpitations. Negative for chest pain.   Gastrointestinal:  Negative for abdominal pain and vomiting.   Genitourinary:  Negative for difficulty urinating and dysuria.   Musculoskeletal:  Negative for gait problem and joint swelling.   Skin:  Negative for rash and wound.   Neurological:  Negative for numbness and headaches.       Physical Exam     Initial Vitals [08/01/23 0104]   BP Pulse Resp Temp SpO2   119/87 60 18 97.9 °F (36.6 °C) 98 %      MAP       --         Physical Exam    Nursing note and vitals reviewed.  Constitutional: She is not diaphoretic. No distress.   HENT:   Head: Normocephalic and atraumatic.   Mouth/Throat: Oropharynx is clear and moist.   Eyes: Conjunctivae and EOM are normal.   Neck: Neck supple.   Normal range of motion.  Cardiovascular:  Normal rate, regular rhythm and normal heart sounds.           Pulmonary/Chest: Breath sounds normal. She has no wheezes. She has no rhonchi. She has no rales.   Abdominal: Abdomen is soft. She exhibits no distension. There is no abdominal tenderness.   Musculoskeletal:         General: No edema. Normal range of motion.      Cervical back: Normal range of motion and neck supple.     Neurological: She is alert and oriented to person, place, and time.   Skin: Skin is warm and dry. Capillary refill takes less than 2 seconds.         ED Course   Procedures  Labs Reviewed   COMPREHENSIVE METABOLIC PANEL - Abnormal; Notable for the following components:       Result Value    Chloride 112  (*)     CO2 19 (*)     All other components within normal limits   URINALYSIS, REFLEX TO URINE CULTURE - Abnormal; Notable for the following components:    Color, UA Colorless (*)     Specific [place], UA 1.000 (*)     Occult Blood UA 1+ (*)     Leukocytes, UA 1+ (*)     All other components within normal limits    Narrative:     Specimen Source->Urine   HIV 1 / 2 ANTIBODY    Narrative:     Release to patient->Immediate   HEPATITIS C ANTIBODY    Narrative:     Release to patient->Immediate   CBC W/ AUTO DIFFERENTIAL   MAGNESIUM   TROPONIN I   TSH   URINALYSIS MICROSCOPIC    Narrative:     Specimen Source->Urine          Imaging Results              X-Ray Chest AP Portable (Final result)  Result time 08/01/23 03:26:52      Final result by Ap Boyd MD (08/01/23 03:26:52)                   Impression:      Mild bibasilar edema.    No other significant change from prior study.      Electronically signed by: Ap Boyd MD  Date:    08/01/2023  Time:    03:26               Narrative:    EXAMINATION:  XR CHEST AP PORTABLE    CLINICAL HISTORY:  Unspecified atrial fibrillation    TECHNIQUE:  Single frontal view of the chest was performed.    COMPARISON:  06/20/2022.    FINDINGS:  Mild bibasilar edema.    Loop recorder device, similar to prior.    Heart and lungs otherwise appear unchanged when allowing for differences in technique and positioning.                                       Medications   sodium chloride 0.9% flush 10 mL (has no administration in time range)   naloxone 0.4 mg/mL injection 0.02 mg (has no administration in time range)   glucose chewable tablet 16 g (has no administration in time range)   glucose chewable tablet 24 g (has no administration in time range)   dextrose 10% bolus 125 mL 125 mL (has no administration in time range)   dextrose 10% bolus 250 mL 250 mL (has no administration in time range)   glucagon (human recombinant) injection 1 mg (has no administration in time range)    apixaban tablet 5 mg (5 mg Oral Given 8/1/23 0811)   fluticasone propionate 50 mcg/actuation nasal spray 50 mcg (50 mcg Each Nostril Given 8/1/23 0811)   levothyroxine tablet 25 mcg (25 mcg Oral Given 8/1/23 0708)   pravastatin tablet 40 mg (40 mg Oral Given 8/1/23 0811)   lactated ringers bolus 1,000 mL (0 mLs Intravenous Stopped 8/1/23 0525)     Medical Decision Making:   History:   Old Medical Records: I decided to obtain old medical records.  Differential Diagnosis:   Arrhythmia  ACS  Pneumonia  UTI  Electrolyte derangement  Clinical Tests:   Lab Tests: Ordered and Reviewed  Radiological Study: Ordered and Reviewed  Medical Tests: Ordered and Reviewed  ED Management:  Patient is afebrile, satting well on room air and nontoxic appearing.  She has been alternating between normal sinus rhythm and AFib with RVR with heart rates ranging from 60 to 128. Patient with normal WBC and Hb, slightly hyperchloremic at 112 and low HCO3 at 19. TSH, U/A, troponin wnl. CXR showing mild bibasilar edema. Patient was given 1 L LR. Patient admitted to  service for cardiology evaluation given persistence of PAF episodes 3 hours after pill-in-pocket strategy.                           Clinical Impression:   Final diagnoses:  [I48.91] A-fib        ED Disposition Condition    Observation                 Clau Bowie MD  Resident  08/01/23 3789

## 2023-08-01 NOTE — H&P
[facility] - Cardiology [facility] Medicine  History & Physical    Patient Name: Flores Fuentes  MRN: [id number]  Patient Class: OP- Observation  Admission Date: 8/1/2023  Attending Physician: Orion Pires MD   Primary Care Provider: Naz Condon MD         Patient information was obtained from patient, past medical records and ER records.     Subjective:     Principal Problem:Paroxysmal A-fib    Chief Complaint:   Chief Complaint   Patient presents with    Atrial Fibrillation     Pt arrives c/o afib. Hx of afib.        HPI: Ms. Fuentes is a 72 YOF with a history of intermittent WPW syndrome (loop recorder present, pill-in-pocket strategy), pAF, HLD, obesity, hypothyroid presenting to the ED for palpitations. Onset 3 weeks ago, intermittent, feels like tachycardia and skipping a beat. Patient had needed to take flecainide once a week for 3 weeks with latest instance 1 day prior to admission and remission of symptoms within 2 hours. Then the evening of admission, palpitations returned, every 2-3 minutes, and were not remitting. Given recent use of flecainide patient decide to come to the ED. The days she took flecainide she held her PO dilt and metoprolol. Of note 2 months ago patient started new intermittent fasting diet, otherwise no exposure to sick contacts and no recent travel. Recent stressor of father being diagnosed with alzheimer's. Compliant with metoprolol and eliquis. ROS negative for SOB, CP, fevers, dysuria.    In the ED the patient was afebrile, HR ranges 112 - 50s, RR 20s, O2 97 on RA. Patient with normal WBC and Hb, slightly hyperchloremic at 112 and low HCO3 at 19. TSH, U/A, troponin wnl. CXR showing mild bibasilar edema. In the ED the patient was given 1 L LR. Per the ED, the pt has continued to go back into RVR throughout the night. Patient admitted to  service for cardiology evaluation given persistence of PAF episodes 3 hours after pill-in-pocket strategy.           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, l mandible, 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 Dr Viktor Toure    Osteopenia of neck of left femur 6/4/2020    Paroxysmal atrioventricular tachycardia     Plantar fasciitis     Right knee meniscal tear     Dr Mayfield, tx conservatively    Severe obesity (BMI 35.0-35.9 with comorbidity) 1/24/2017    Slow transit constipation 7/13/2021    Despite fruits & veg & 1200 dunia diet, try Colace daily    Stress incontinence, female 03/28/2018    hasn't tried oxybutynin, After HYST, Kegels & PT  didn't work, worse at night wearing pads, Dr Infante    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    Thyroid disease        Past Surgical History:   Procedure Laterality Date    APPENDECTOMY      COLONOSCOPY N/A 8/13/2020    Procedure: COLONOSCOPY;  Surgeon: Angus Ac MD;  Location: [facility] ENDO ([address] [place] [address]);  Service: Endoscopy;  Laterality: N/A;  ok to hold Eliquis 2 days per Dr Servin     COVID test at [facility] on 8/10-GT    HYSTERECTOMY  1990    TAHBSO for fibroids    INSERTION OF IMPLANTABLE LOOP RECORDER Left 11/1/2021    Procedure: Insertion, Implantable Loop Recorder;  Surgeon: Ameya Servin MD;  Location: [facility] EP LAB;  Service: Cardiology;  Laterality: Left;  AF, ILR implant, MDT,  DM, 3 Prep    MANDIBLE SURGERY  2015    L mandible, Dr Toure    MANDIBLE SURGERY Left 8/27/2019    OOPHORECTOMY      TONSILLECTOMY         Review of patient's allergies indicates:   Allergen Reactions    Decongestant d [pseudoephedrine-dm]       Atrial Fibrillation    Augmentin [amoxicillin-pot clavulanate]      palpitations      Amoxicillin Palpitations    Diphenhydramine-pseudoephed Palpitations     TACHYCARDIA    Povidone-iodine Rash     Mild erythema of skin       Current Facility-Administered Medications on File Prior to Encounter   Medication    sodium chloride 0.9% bolus 1,000 mL    vancomycin in dextrose 5 % 1 gram/250 mL IVPB 1,000 mg     Current Outpatient Medications on File Prior to Encounter   Medication Sig    metoprolol succinate (TOPROL-XL) 25 MG 24 hr tablet Take 1 tablet (25 mg total) by mouth once daily.    diltiaZEM (CARDIZEM CD) 120 MG Cp24 Take 1 capsule (120 mg total) by mouth once daily.    ELIQUIS 5 mg Tab TAKE 1 TABLET TWICE DAILY    flecainide (TAMBOCOR) 150 MG Tab TAKE 2 TABS (300 MG TOTAL) UP TO ONCE PER DAY FOR ATRIAL FIBRILLATION.    fluticasone propionate (FLONASE) 50 mcg/actuation nasal spray USE 1 SPRAY IN EACH NOSTRIL EVERY DAY    hydrocortisone 2.5 % cream Apply topically 2 (two) times daily. To rash on face and neck x 2 weeks    levothyroxine (SYNTHROID) 25 MCG tablet Take 1 tablet (25 mcg total) by mouth once daily.    pravastatin (PRAVACHOL) 40 MG tablet TAKE 1 TABLET EVERY DAY     Family History       Problem Relation (Age of Onset)    Cancer Mother          Tobacco Use    Smoking status: Never    Smokeless tobacco: Never   Substance and Sexual Activity    Alcohol use: No    Drug use: No    Sexual activity: Not Currently     Review of Systems   Constitutional:  Negative for chills and fever.   HENT:  Negative for sore throat.    Respiratory:  Negative for cough and shortness of breath.    Cardiovascular:  Positive for palpitations. Negative for chest pain and leg swelling.   Genitourinary:  Negative for difficulty urinating.   Musculoskeletal:  Negative for back pain.   Neurological:  Negative for headaches.     Objective:     Vital Signs (Most Recent):  Temp: 97.7 °F (36.5 °C) (08/01/23 0652)  Pulse: (!) 56 (08/01/23  0652)  Resp: 18 (08/01/23 0652)  BP: (!) 145/67 (08/01/23 0652)  SpO2: 96 % (08/01/23 0652) Vital Signs (24h Range):  Temp:  [97.7 °F (36.5 °C)-97.9 °F (36.6 °C)] 97.7 °F (36.5 °C)  Pulse:  [] 56  Resp:  [10-22] 18  SpO2:  [95 %-99 %] 96 %  BP: (119-174)/(63-87) 145/67     Weight: 121 kg (266 lb 12.1 oz)  Body mass index is 41.78 kg/m².     Physical Exam  Vitals and nursing note reviewed.   Constitutional:       General: She is not in acute distress.     Appearance: Normal appearance. She is not ill-appearing.   HENT:      Head: Normocephalic and atraumatic.      Right Ear: External ear normal.      Left Ear: External ear normal.      Nose: Nose normal.      Mouth/Throat:      Mouth: Mucous membranes are moist.      Pharynx: Oropharynx is clear.   Eyes:      General:         Right eye: No discharge.         Left eye: No discharge.   Cardiovascular:      Rate and Rhythm: Normal rate. Rhythm irregular.      Pulses: Normal pulses.      Heart sounds: Normal heart sounds. No murmur heard.  Pulmonary:      Effort: Pulmonary effort is normal. No respiratory distress.      Breath sounds: Normal breath sounds.   Abdominal:      General: Bowel sounds are normal. There is no distension.      Palpations: Abdomen is soft. There is no mass.      Tenderness: There is no abdominal tenderness. There is no guarding or rebound.      Hernia: No hernia is present.   Musculoskeletal:         General: Normal range of motion.      Cervical back: Normal range of motion.      Right lower leg: No edema.      Left lower leg: No edema.   Skin:     General: Skin is warm and dry.      Capillary Refill: Capillary refill takes less than 2 seconds.   Neurological:      General: No focal deficit present.      Mental Status: She is alert and oriented to person, place, and time.   Psychiatric:         Mood and Affect: Mood normal.         Behavior: Behavior normal.                Significant Labs: All pertinent labs within the past 24 hours have  been reviewed.    Significant Imaging: I have reviewed all pertinent imaging results/findings within the past 24 hours.    Assessment/Plan:     * Paroxysmal A-fib  Patient with Persistent (7 days or more) atrial fibrillation with WPW pattern which is controlled currently with flecainide . Patient is currently in atrial fibrillation.[id number] Score: 2. HASBLED Score: 3. Anticoagulation indicated. Anticoagulation done with eliquis. Patient with use of flecainide 3 times in the past 3 weeks. During the evening of admission, palpitations returned, every 2-3 minutes, and were not remitting. Given recent use of flecainide patient decide to come to the ED. Patient going in and out of afib.  Recent stressors of father being diagnosed with alzheimer's and start of intermittent fasting for past 2 mos.     Plan:  - holding rate control: metoprolol succinate 25 and diltiazem 120 given recent flecainide use and current bradycardia  - continue home eliquis for anticoagulation   - EP consulted for ablation need, recs appreciated  - Telemetry  - Maintain K > 4, Mg > 2, Ca wnl      Kym-Parkinson-White (WPW) pattern  See afib plan      Subclinical iodine-deficiency hypothyroidism  - continue home levothyroxine       Mixed hyperlipidemia  Continue home statin        VTE Risk Mitigation (From admission, onward)           Ordered     enoxaparin injection 40 mg  Daily         08/01/23 0529     apixaban tablet 5 mg  2 times daily         08/01/23 0533     IP VTE HIGH RISK PATIENT  Once         08/01/23 0529     Place sequential compression device  Until discontinued         08/01/23 0529                           On 08/01/2023, patient should be placed in hospital observation services under my care in collaboration with Dr. Pires.    Sarita Guerrier DO  Department of Hospital Medicine  [facility] - Cardiology Stepdown

## 2023-08-01 NOTE — SUBJECTIVE & OBJECTIVE
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 Dr Viktor Toure    Osteopenia of neck of left femur 6/4/2020    Paroxysmal atrioventricular tachycardia     Plantar fasciitis     Right knee meniscal tear     Dr Mayfield, tx conservatively    Severe obesity (BMI 35.0-35.9 with comorbidity) 1/24/2017    Slow transit constipation 7/13/2021    Despite fruits & veg & 1200 dunia diet, try Colace daily    Stress incontinence, female 03/28/2018    hasn't tried oxybutynin, After HYST, Kegels & PT  didn't work, worse at night wearing pads, Dr Infante    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    Thyroid disease        Past Surgical History:   Procedure Laterality Date    APPENDECTOMY      COLONOSCOPY N/A 8/13/2020    Procedure: COLONOSCOPY;  Surgeon: Angus Ac MD;  Location: [facility] ENDO ([address] [place] [address]);  Service: Endoscopy;  Laterality: N/A;  ok to hold Eliquis 2 days per Dr Servin     COVID test at [facility] on 8/10-GT    HYSTERECTOMY  1990    TAHBSO for fibroids    INSERTION OF IMPLANTABLE LOOP RECORDER Left 11/1/2021    Procedure: Insertion, Implantable Loop Recorder;  Surgeon: Ameya Servin MD;  Location: [facility] EP LAB;  Service: Cardiology;  Laterality: Left;  AF, ILR implant, MDT,  DM, 3 Prep    MANDIBLE SURGERY  2015    L mandible, Dr Toure    MANDIBLE SURGERY Left 8/27/2019    OOPHORECTOMY      TONSILLECTOMY         Review of patient's allergies indicates:   Allergen Reactions    Decongestant d [pseudoephedrine-dm]       Atrial Fibrillation    Augmentin [amoxicillin-pot clavulanate]      palpitations      Amoxicillin Palpitations    Diphenhydramine-pseudoephed Palpitations     TACHYCARDIA    Povidone-iodine Rash     Mild erythema of skin       Current Facility-Administered Medications on File Prior to Encounter   Medication    sodium chloride 0.9% bolus 1,000 mL    vancomycin in dextrose 5 % 1 gram/250 mL IVPB 1,000 mg     Current Outpatient Medications on File Prior to Encounter   Medication Sig    metoprolol succinate (TOPROL-XL) 25 MG 24 hr tablet Take 1 tablet (25 mg total) by mouth once daily.    diltiaZEM (CARDIZEM CD) 120 MG Cp24 Take 1 capsule (120 mg total) by mouth once daily.    ELIQUIS 5 mg Tab TAKE 1 TABLET TWICE DAILY    flecainide (TAMBOCOR) 150 MG Tab TAKE 2 TABS (300 MG TOTAL) UP TO ONCE PER DAY FOR ATRIAL FIBRILLATION.    fluticasone propionate (FLONASE) 50 mcg/actuation nasal spray USE 1 SPRAY IN EACH NOSTRIL EVERY DAY    hydrocortisone 2.5 % cream Apply topically 2 (two) times daily. To rash on face and neck x 2 weeks    levothyroxine (SYNTHROID) 25 MCG tablet Take 1 tablet (25 mcg total) by mouth once daily.    pravastatin (PRAVACHOL) 40 MG tablet TAKE 1 TABLET EVERY DAY     Family History       Problem Relation (Age of Onset)    Cancer Mother          Tobacco Use    Smoking status: Never    Smokeless tobacco: Never   Substance and Sexual Activity    Alcohol use: No    Drug use: No    Sexual activity: Not Currently     Review of Systems   Constitutional:  Negative for chills and fever.   HENT:  Negative for sore throat.    Respiratory:  Negative for cough and shortness of breath.    Cardiovascular:  Positive for palpitations. Negative for chest pain and leg swelling.   Genitourinary:  Negative for difficulty urinating.   Musculoskeletal:  Negative for back pain.   Neurological:  Negative for headaches.     Objective:     Vital Signs (Most Recent):  Temp: 97.7 °F (36.5 °C) (08/01/23 0652)  Pulse: (!) 56 (08/01/23  0652)  Resp: 18 (08/01/23 0652)  BP: (!) 145/67 (08/01/23 0652)  SpO2: 96 % (08/01/23 0652) Vital Signs (24h Range):  Temp:  [97.7 °F (36.5 °C)-97.9 °F (36.6 °C)] 97.7 °F (36.5 °C)  Pulse:  [] 56  Resp:  [10-22] 18  SpO2:  [95 %-99 %] 96 %  BP: (119-174)/(63-87) 145/67     Weight: 121 kg (266 lb 12.1 oz)  Body mass index is 41.78 kg/m².     Physical Exam  Vitals and nursing note reviewed.   Constitutional:       General: She is not in acute distress.     Appearance: Normal appearance. She is not ill-appearing.   HENT:      Head: Normocephalic and atraumatic.      Right Ear: External ear normal.      Left Ear: External ear normal.      Nose: Nose normal.      Mouth/Throat:      Mouth: Mucous membranes are moist.      Pharynx: Oropharynx is clear.   Eyes:      General:         Right eye: No discharge.         Left eye: No discharge.   Cardiovascular:      Rate and Rhythm: Normal rate. Rhythm irregular.      Pulses: Normal pulses.      Heart sounds: Normal heart sounds. No murmur heard.  Pulmonary:      Effort: Pulmonary effort is normal. No respiratory distress.      Breath sounds: Normal breath sounds.   Abdominal:      General: Bowel sounds are normal. There is no distension.      Palpations: Abdomen is soft. There is no mass.      Tenderness: There is no abdominal tenderness. There is no guarding or rebound.      Hernia: No hernia is present.   Musculoskeletal:         General: Normal range of motion.      Cervical back: Normal range of motion.      Right lower leg: No edema.      Left lower leg: No edema.   Skin:     General: Skin is warm and dry.      Capillary Refill: Capillary refill takes less than 2 seconds.   Neurological:      General: No focal deficit present.      Mental Status: She is alert and oriented to person, place, and time.   Psychiatric:         Mood and Affect: Mood normal.         Behavior: Behavior normal.                Significant Labs: All pertinent labs within the past 24 hours have  been reviewed.    Significant Imaging: I have reviewed all pertinent imaging results/findings within the past 24 hours.

## 2023-08-01 NOTE — ASSESSMENT & PLAN NOTE
Patient with Persistent (7 days or more) atrial fibrillation with WPW pattern which is controlled currently with flecainide . Patient is currently in atrial fibrillation.[id number] Score: 2. HASBLED Score: 3. Anticoagulation indicated. Anticoagulation done with eliquis. Patient with use of flecainide 3 times in the past 3 weeks. During the evening of admission, palpitations returned, every 2-3 minutes, and were not remitting. Given recent use of flecainide patient decide to come to the ED. Patient going in and out of afib.  Recent stressors of father being diagnosed with alzheimer's and start of intermittent fasting for past 2 mos.     Plan:  - holding rate control: metoprolol succinate 25 and diltiazem 120 given recent flecainide use and current bradycardia  - continue home eliquis for anticoagulation   - EP consulted for ablation need, recs appreciated  - Telemetry  - Maintain K > 4, Mg > 2, Ca wnl

## 2023-08-01 NOTE — ASSESSMENT & PLAN NOTE
Patient with more recurrent episodes of atrial fibrillation. Discussed with patient options and will switch flecainide from pill in pocket approach to suppressive treatment  -Start Flecainide 100mg bid  -Stop BB.   -Continue Diltiazem 120mg daily.  -Will arrange follow up with Dr. Servin to discuss possible ablation as an outpatient.

## 2023-08-01 NOTE — PLAN OF CARE
[facility] - Cardiology Stepdown  Initial Discharge Assessment       Primary Care Provider: Naz Condon MD    Admission Diagnosis: A-fib [I48.91]    Admission Date: 8/1/2023  Expected Discharge Date: 8/1/2023    Transition of Care Barriers: None    Payor: HU[employer] MANAGED MEDICARE / Plan: HU[employer] MEDICARE HMO / Product Type: Capitation /     Extended Emergency Contact Information  Primary Emergency Contact: Dorian Fuentes  Address: [address] [place] [address]            [place] [address] [place]  Mobile Phone: [phone]  Relation: Spouse    Discharge Plan A: Home with family  Discharge Plan B: Home      [id number] [place] [place] [facility] [address] [facility]  [address] [facility] [place] [address]  Phone: [phone] Fax: [phone]    [facility] - [facility] [address] [facility] [facility] [address]  [address] [facility] [address]  Phone: [phone] Fax: [phone]      Initial Assessment (most recent)       Adult Discharge Assessment - 08/01/23 1309          Discharge Assessment    Assessment Type Discharge Planning Assessment     Confirmed/corrected address, phone number and insurance Yes     Confirmed Demographics Correct on Facesheet     Source of Information patient     Communicated TIMA with patient/caregiver Date not available/Unable to determine     Reason For Admission atrial fibrillation     People in Home spouse     Facility Arrived From: home     Do you expect to return to your current living situation? Yes     Do you have help at home or someone to help you manage your care at home? Yes     Who are your caregiver(s) and their phone number(s)? Dorian Fuentes (spouse) [phone]     Prior to hospitilization cognitive status: Alert/Oriented     Current cognitive status: Alert/Oriented     Equipment Currently Used at Home CPAP   [place] CPAP thru [facility]    Patient currently being followed by outpatient case management? No     Do you currently  have service(s) that help you manage your care at home? No     Do you take prescription medications? Yes     Do you have prescription coverage? Yes     Coverage [employer] Man[employer] Medicare     Do you have any problems affording any of your prescribed medications? No     Is the patient taking medications as prescribed? yes     Who is going to help you get home at discharge? Dorian Fuentes (spouse) [phone]     How do you get to doctors appointments? car, drives self;family or friend will provide     Are you on dialysis? No     Do you take coumadin? No     Discharge Plan A Home with family     Discharge Plan B Home     DME Needed Upon Discharge  none     Discharge Plan discussed with: Patient     Transition of Care Barriers None        Physical Activity    On average, how many days per week do you engage in moderate to strenuous exercise (like a brisk walk)? 0 days     On average, how many minutes do you engage in exercise at this level? 0 min        Financial Resource Strain    How hard is it for you to pay for the very basics like food, housing, medical care, and heating? Not hard at all        Housing Stability    In the last 12 months, was there a time when you were not able to pay the mortgage or rent on time? No     In the last 12 months, how many places have you lived? 1     In the last 12 months, was there a time when you did not have a steady place to sleep or slept in a shelter (including now)? No        Transportation Needs    In the past 12 months, has lack of transportation kept you from medical appointments or from getting medications? No     In the past 12 months, has lack of transportation kept you from meetings, work, or from getting things needed for daily living? No        Food Insecurity    Within the past 12 months, you worried that your food would run out before you got the money to buy more. Never true     Within the past 12 months, the food you bought just didn't last and you didn't have money  to get more. Never true        Stress    Do you feel stress - tense, restless, nervous, or anxious, or unable to sleep at night because your mind is troubled all the time - these days? To some extent   she stated because her father on the 11th floor at this time.       Social Connections    In a typical week, how many times do you talk on the phone with family, friends, or neighbors? More than three times a week     How often do you get together with friends or relatives? Once a week     How often do you attend Lutheran or Sabianist services? More than 4 times per year     Do you belong to any clubs or organizations such as Lutheran groups, unions, fraternal or athletic groups, or school groups? Yes     How often do you attend meetings of the clubs or organizations you belong to? More than 4 times per year     Are you , , , , never , or living with a partner?         Alcohol Use    Q1: How often do you have a drink containing alcohol? Never     Q2: How many drinks containing alcohol do you have on a typical day when you are drinking? Patient does not drink     Q3: How often do you have six or more drinks on one occasion? Never        OTHER    Name(s) of People in Home Dorian Fuentes (spouse) [phone] along with her daughter and son in law and 3 minor children                      CM met with the patient at the bedside and she was up in a chair. She stated she is independent with her ADL's . She verified her name, , PCP, and insurance. Discussed the discharge process with her and gave her the discharge hand book and put contact number on the white board as well as in the front cover of the booklet. Patient lives withDorian Fuentes (spouse) [phone] and her daughter , son in law and 3 minor children in a single story house with one step to enter . She has lived there greater than one year. She has a CPAP and gets her supplies from [facility] . She is not on coumadin and  is not dialysis. Patient wants bedside delivery. Will monitor patient for discharge needs.     Lorna Cabrera RN    [phone]

## 2023-08-01 NOTE — NURSING
Pt is being discharged. Discharge teaching on follow up appointments, new meds to take and stop taking, signs and symptoms to look for, and when to follow up with the md. Pt verbalized understanding. Iv taken out, telemetry taken off, pt tolerated well. Pt stable at time of discharge. No signs or symptoms of distress noted nor any complaints thereof. Pt being discharged home, in private vehicle. Discharge successful.

## 2023-08-01 NOTE — ED TRIAGE NOTES
"Pt arrives to ED stating that she is in Afib. Pt reports feeling palpitations and fluttering to her chest. Pt has hx of afib, but states she's been in NSR for around a year and a half. Pt reports Afib began on and off over the last three weeks, and that she took her Tambocor, which was working for a while, but states "I've never been in Afib this long." Pt states she's been compliant with her other meds including Metoprolol and Eliquis. Pt denies taking Tambocor today. Pt also reports feeling extremely thirsty and urination frequently. Denies CP, SOB, fever, dysuria.   "

## 2023-08-01 NOTE — ED NOTES
Telemetry Verification   Patient placed on Telemetry Box  Verified with War Room  Box # [id number]   Rate    Rhythm

## 2023-08-01 NOTE — CONSULTS
[facility] - Cardiology Stepdown  Cardiac Electrophysiology  Consult Note    Admission Date: 8/1/2023  Code Status: Full Code   Attending Provider: Orion Pires MD  Consulting Provider: Dana Simms MD  Principal Problem:Paroxysmal A-fib    Inpatient consult to Electrophysiology  Consult performed by: Dana Simms MD  Consult ordered by: Sarita Guerrier DO        Subjective:     Chief Complaint:  Palpitations     HPI:   Ms. Fuentes is a 72 y.o. female with pAF,intermittent WPW pattern, HLD, MATTHEW, hypothyroid here for complains palpitations and feeling like she is in atrial fibrillation.  Patient with known history of paroxysmal atrial fibrillation, taking metoprolol and diltiazem as well as telling the pocket strategy with flecainide.  Patient reports she did not have any episodes of atrial fibrillation for urine have and for the last 3 weeks she developed 1 episode weekly.  Yesterday patient notice symptoms submitted a into flecainide with successful relief of symptoms.  Patient at midnight developed palpitations only lasting seconds and was going in and of atrial fibrillation which is unusual for her.  Dizziness patient had already taking flecainide mother today she did not take any medications and came to the emergency department for evaluation.  On admission she was hemodynamically stable but EKG with atrial fibrillation in rate of 108.  EP consulted for further management.  By the time of examination patient was in normal sinus rhythm. Patient reports compliance with all her medications and has not missed any doses of Eliquis       EP Background:     AF first dx 2006 after lots of caffeine. Few episodes since then. Pill in pocket strategy.  Underwent DCCV 3/13/17  and started on multaq. Due to rare PAF episodes, she was transitioned to pill-in-the-pocket strategy.         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, l mandible, 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 Dr Viktor Toure    Osteopenia of neck of left femur 6/4/2020    Paroxysmal atrioventricular tachycardia     Plantar fasciitis     Right knee meniscal tear     Dr Mayfield, tx conservatively    Severe obesity (BMI 35.0-35.9 with comorbidity) 1/24/2017    Slow transit constipation 7/13/2021    Despite fruits & veg & 1200 dunia diet, try Colace daily    Stress incontinence, female 03/28/2018    hasn't tried oxybutynin, After HYST, Kegels & PT  didn't work, worse at night wearing pads, Dr Infante    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    Thyroid disease        Past Surgical History:   Procedure Laterality Date    APPENDECTOMY      COLONOSCOPY N/A 8/13/2020    Procedure: COLONOSCOPY;  Surgeon: Angus Ac MD;  Location: [facility] ENDO ([address] [place] [address]);  Service: Endoscopy;  Laterality: N/A;  ok to hold Eliquis 2 days per Dr Servin     COVID test at [place] on 8/10-GT    HYSTERECTOMY  1990    TAHBSO for fibroids    INSERTION OF IMPLANTABLE LOOP RECORDER Left 11/1/2021    Procedure: Insertion, Implantable Loop Recorder;  Surgeon: Ameya Servin MD;  Location: [facility] EP LAB;  Service: Cardiology;  Laterality: Left;  AF, ILR implant, MDT,  DM, 3 Prep    MANDIBLE SURGERY  2015    L mandible, Dr Toure    MANDIBLE SURGERY Left 8/27/2019    OOPHORECTOMY      TONSILLECTOMY         Review of patient's allergies indicates:   Allergen Reactions    Decongestant d [pseudoephedrine-dm]      Atrial Fibrillation    Augmentin [amoxicillin-pot clavulanate]       palpitations      Amoxicillin Palpitations    Diphenhydramine-pseudoephed Palpitations     TACHYCARDIA    Povidone-iodine Rash     Mild erythema of skin       Current Facility-Administered Medications on File Prior to Encounter   Medication    sodium chloride 0.9% bolus 1,000 mL    vancomycin in dextrose 5 % 1 gram/250 mL IVPB 1,000 mg     Current Outpatient Medications on File Prior to Encounter   Medication Sig    metoprolol succinate (TOPROL-XL) 25 MG 24 hr tablet Take 1 tablet (25 mg total) by mouth once daily.    diltiaZEM (CARDIZEM CD) 120 MG Cp24 Take 1 capsule (120 mg total) by mouth once daily.    ELIQUIS 5 mg Tab TAKE 1 TABLET TWICE DAILY    flecainide (TAMBOCOR) 150 MG Tab TAKE 2 TABS (300 MG TOTAL) UP TO ONCE PER DAY FOR ATRIAL FIBRILLATION.    fluticasone propionate (FLONASE) 50 mcg/actuation nasal spray USE 1 SPRAY IN EACH NOSTRIL EVERY DAY    hydrocortisone 2.5 % cream Apply topically 2 (two) times daily. To rash on face and neck x 2 weeks    levothyroxine (SYNTHROID) 25 MCG tablet Take 1 tablet (25 mcg total) by mouth once daily.    pravastatin (PRAVACHOL) 40 MG tablet TAKE 1 TABLET EVERY DAY     Family History       Problem Relation (Age of Onset)    Cancer Mother          Tobacco Use    Smoking status: Never    Smokeless tobacco: Never   Substance and Sexual Activity    Alcohol use: No    Drug use: No    Sexual activity: Not Currently     Review of Systems   Constitutional: Negative.   HENT: Negative.     Eyes: Negative.    Cardiovascular:  Positive for irregular heartbeat. Negative for chest pain and dyspnea on exertion.   Respiratory: Negative.     Endocrine: Negative.    Hematologic/Lymphatic: Negative.    Skin: Negative.    Musculoskeletal: Negative.    Gastrointestinal: Negative.    Genitourinary: Negative.    Neurological: Negative.    Allergic/Immunologic: Negative.      Objective:     Vital Signs (Most Recent):  Temp: 97.7 °F (36.5 °C) (08/01/23 0652)  Pulse: (!) 56  (23)  Resp: 18 (23)  BP: (!) 145/67 (23)  SpO2: 96 % (23) Vital Signs (24h Range):  Temp:  [97.7 °F (36.5 °C)-97.9 °F (36.6 °C)] 97.7 °F (36.5 °C)  Pulse:  [] 56  Resp:  [10-22] 18  SpO2:  [95 %-99 %] 96 %  BP: (119-174)/(63-87) 145/67       Weight: 121 kg (266 lb 12.1 oz)  Body mass index is 41.78 kg/m².    SpO2: 96 %        Physical Exam  Constitutional:       Appearance: Normal appearance.   Cardiovascular:      Rate and Rhythm: Normal rate and regular rhythm.      Pulses: Normal pulses.      Heart sounds: Normal heart sounds.   Pulmonary:      Effort: Pulmonary effort is normal.      Breath sounds: Normal breath sounds.   Abdominal:      Palpations: Abdomen is soft.   Musculoskeletal:      Right lower leg: No edema.      Left lower leg: No edema.   Neurological:      General: No focal deficit present.      Mental Status: She is alert and oriented to person, place, and time.            Significant Labs: All pertinent lab results from the last 24 hours have been reviewed.    Significant ImaginD Echo (2021):   The left ventricle is mildly enlarged with eccentric hypertrophy and normal systolic function.   The estimated ejection fraction is 60%.   Normal left ventricular diastolic function.   Normal right ventricular size with normal right ventricular systolic function.   Mild tricuspid regurgitation.   Intermediate central venous pressure (8 mmHg).  The estimated PA systolic pressure is 36 mmHg.              Assessment and Plan:     * Paroxysmal A-fib  Patient with more recurrent episodes of atrial fibrillation. Discussed with patient options and will switch flecainide from pill in pocket approach to suppressive treatment  -Start Flecainide 100mg bid  -Stop BB.   -Continue Diltiazem 120mg daily.  -Will arrange follow up with Dr. Servin to discuss possible ablation as an outpatient.           Thank you for your consult. I will sign off. Please contact  us if you have any additional questions.    Dana Simms MD  Cardiac Electrophysiology  [facility] - Cardiology Stepdown

## 2023-08-01 NOTE — SUBJECTIVE & OBJECTIVE
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 Dr Viktor Toure    Osteopenia of neck of left femur 6/4/2020    Paroxysmal atrioventricular tachycardia     Plantar fasciitis     Right knee meniscal tear     Dr Mayfield, tx conservatively    Severe obesity (BMI 35.0-35.9 with comorbidity) 1/24/2017    Slow transit constipation 7/13/2021    Despite fruits & veg & 1200 dunia diet, try Colace daily    Stress incontinence, female 03/28/2018    hasn't tried oxybutynin, After HYST, Kegels & PT  didn't work, worse at night wearing pads, Dr Infante    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    Thyroid disease        Past Surgical History:   Procedure Laterality Date    APPENDECTOMY      COLONOSCOPY N/A 8/13/2020    Procedure: COLONOSCOPY;  Surgeon: Angus Ac MD;  Location: [facility] ENDO ([address] [place] [address]);  Service: Endoscopy;  Laterality: N/A;  ok to hold Eliquis 2 days per Dr Servin     COVID test at [place] on 8/10-GT    HYSTERECTOMY  1990    TAHBSO for fibroids    INSERTION OF IMPLANTABLE LOOP RECORDER Left 11/1/2021    Procedure: Insertion, Implantable Loop Recorder;  Surgeon: Ameya Servin MD;  Location: [facility] EP LAB;  Service: Cardiology;  Laterality: Left;  AF, ILR implant, MDT,  DM, 3 Prep    MANDIBLE SURGERY  2015    L mandible, Dr Toure    MANDIBLE SURGERY Left 8/27/2019    OOPHORECTOMY      TONSILLECTOMY         Review of patient's allergies indicates:   Allergen Reactions    Decongestant d [pseudoephedrine-dm]       Atrial Fibrillation    Augmentin [amoxicillin-pot clavulanate]      palpitations      Amoxicillin Palpitations    Diphenhydramine-pseudoephed Palpitations     TACHYCARDIA    Povidone-iodine Rash     Mild erythema of skin       Current Facility-Administered Medications on File Prior to Encounter   Medication    sodium chloride 0.9% bolus 1,000 mL    vancomycin in dextrose 5 % 1 gram/250 mL IVPB 1,000 mg     Current Outpatient Medications on File Prior to Encounter   Medication Sig    metoprolol succinate (TOPROL-XL) 25 MG 24 hr tablet Take 1 tablet (25 mg total) by mouth once daily.    diltiaZEM (CARDIZEM CD) 120 MG Cp24 Take 1 capsule (120 mg total) by mouth once daily.    ELIQUIS 5 mg Tab TAKE 1 TABLET TWICE DAILY    flecainide (TAMBOCOR) 150 MG Tab TAKE 2 TABS (300 MG TOTAL) UP TO ONCE PER DAY FOR ATRIAL FIBRILLATION.    fluticasone propionate (FLONASE) 50 mcg/actuation nasal spray USE 1 SPRAY IN EACH NOSTRIL EVERY DAY    hydrocortisone 2.5 % cream Apply topically 2 (two) times daily. To rash on face and neck x 2 weeks    levothyroxine (SYNTHROID) 25 MCG tablet Take 1 tablet (25 mcg total) by mouth once daily.    pravastatin (PRAVACHOL) 40 MG tablet TAKE 1 TABLET EVERY DAY     Family History       Problem Relation (Age of Onset)    Cancer Mother          Tobacco Use    Smoking status: Never    Smokeless tobacco: Never   Substance and Sexual Activity    Alcohol use: No    Drug use: No    Sexual activity: Not Currently     Review of Systems   Constitutional: Negative.   HENT: Negative.     Eyes: Negative.    Cardiovascular:  Positive for irregular heartbeat. Negative for chest pain and dyspnea on exertion.   Respiratory: Negative.     Endocrine: Negative.    Hematologic/Lymphatic: Negative.    Skin: Negative.    Musculoskeletal: Negative.    Gastrointestinal: Negative.    Genitourinary: Negative.    Neurological: Negative.    Allergic/Immunologic: Negative.      Objective:     Vital Signs (Most Recent):  Temp: 97.7  °F (36.5 °C) (23)  Pulse: (!) 56 (23)  Resp: 18 (23)  BP: (!) 145/67 (23)  SpO2: 96 % (23) Vital Signs (24h Range):  Temp:  [97.7 °F (36.5 °C)-97.9 °F (36.6 °C)] 97.7 °F (36.5 °C)  Pulse:  [] 56  Resp:  [10-22] 18  SpO2:  [95 %-99 %] 96 %  BP: (119-174)/(63-87) 145/67       Weight: 121 kg (266 lb 12.1 oz)  Body mass index is 41.78 kg/m².    SpO2: 96 %        Physical Exam  Constitutional:       Appearance: Normal appearance.   Cardiovascular:      Rate and Rhythm: Normal rate and regular rhythm.      Pulses: Normal pulses.      Heart sounds: Normal heart sounds.   Pulmonary:      Effort: Pulmonary effort is normal.      Breath sounds: Normal breath sounds.   Abdominal:      Palpations: Abdomen is soft.   Musculoskeletal:      Right lower leg: No edema.      Left lower leg: No edema.   Neurological:      General: No focal deficit present.      Mental Status: She is alert and oriented to person, place, and time.            Significant Labs: All pertinent lab results from the last 24 hours have been reviewed.    Significant ImaginD Echo (2021):  The left ventricle is mildly enlarged with eccentric hypertrophy and normal systolic function.  The estimated ejection fraction is 60%.  Normal left ventricular diastolic function.  Normal right ventricular size with normal right ventricular systolic function.  Mild tricuspid regurgitation.  Intermediate central venous pressure (8 mmHg).  The estimated PA systolic pressure is 36 mmHg.

## 2023-08-01 NOTE — HPI
Ms. Fuentes is a 72 YOF with a history of intermittent WPW syndrome (loop recorder present, pill-in-pocket strategy), pAF, HLD, obesity, hypothyroid presenting to the ED for palpitations. Onset 3 weeks ago, intermittent, feels like tachycardia and skipping a beat. Patient had needed to take flecainide once a week for 3 weeks with latest instance 1 day prior to admission and remission of symptoms within 2 hours. Then the evening of admission, palpitations returned, every 2-3 minutes, and were not remitting. Given recent use of flecainide patient decide to come to the ED. The days she took flecainide she held her PO dilt and metoprolol. Of note 2 months ago patient started new intermittent fasting diet, otherwise no exposure to sick contacts and no recent travel. Recent stressor of father being diagnosed with alzheimer's. Compliant with metoprolol and eliquis. ROS negative for SOB, CP, fevers, dysuria.    In the ED the patient was afebrile, HR ranges 112 - 50s, RR 20s, O2 97 on RA. Patient with normal WBC and Hb, slightly hyperchloremic at 112 and low HCO3 at 19. TSH, U/A, troponin wnl. CXR showing mild bibasilar edema. In the ED the patient was given 1 L LR. Per the ED, the pt has continued to go back into RVR throughout the night. Patient admitted to  service for cardiology evaluation given persistence of PAF episodes 3 hours after pill-in-pocket strategy.

## 2023-08-01 NOTE — HPI
Ms. Fuentes is a 72 y.o. female with pAF,intermittent WPW pattern, HLD, MATTHEW, hypothyroid here for complains palpitations and feeling like she is in atrial fibrillation.  Patient with known history of paroxysmal atrial fibrillation, taking metoprolol and diltiazem as well as telling the pocket strategy with flecainide.  Patient reports she did not have any episodes of atrial fibrillation for urine have and for the last 3 weeks she developed 1 episode weekly.  Yesterday patient notice symptoms submitted a into flecainide with successful relief of symptoms.  Patient at midnight developed palpitations only lasting seconds and was going in and of atrial fibrillation which is unusual for her.  Dizziness patient had already taking flecainide mother today she did not take any medications and came to the emergency department for evaluation.  On admission she was hemodynamically stable but EKG with atrial fibrillation in rate of 108.  EP consulted for further management.  By the time of examination patient was in normal sinus rhythm. Patient reports compliance with all her medications and has not missed any doses of Eliquis       EP Background:     AF first dx 2006 after lots of caffeine. Few episodes since then. Pill in pocket strategy.  Underwent DCCV 3/13/17  and started on multaq. Due to rare PAF episodes, she was transitioned to pill-in-the-pocket strategy.

## 2023-08-02 NOTE — HOSPITAL COURSE
Patient with history of arrhythmias presented with palpitations and was found to be in atrial fibrillation with RVR.  Subsequently converted to normal sinus rhythm and then went into atrial fibrillation with RVR per telemetry.  Evaluated by electrophysiology and started on flecainide and diltiazem with discontinuation of home beta-blocker.  Patient returned to normal sinus rhythm with resolution of symptoms.  Echocardiogram without significant abnormalities.  No further complaints.  Stable for discharge and will follow up with electrophysiology as outpatient

## 2023-08-02 NOTE — PLAN OF CARE
[facility] - Cardiology Stepdown  Discharge Final Note    Primary Care Provider: Naz Condon MD    Expected Discharge Date: 8/1/2023    Final Discharge Note (most recent)       Final Note - 08/02/23 1113          Final Note    Assessment Type Final Discharge Note     Anticipated Discharge Disposition Home or Self Care                 Saira Clark LMSW [facility]  [id number]      Future Appointments   Date Time Provider Department Center   8/3/2023  7:00 AM LAB, [place] [facility]   8/3/2023  7:30 AM [facility] MAMMO1 [facility]MO [facility]   8/8/2023 11:20 AM Naz Condon MD [facility]   8/23/2023 11:00 [facility]   8/24/2023 10:00 AM Simone Shannon MD [facility] CARDIO [facility]

## 2023-08-02 NOTE — DISCHARGE SUMMARY
Braulio Zaldivar - Cardiology [facility] Medicine  Discharge Summary      Patient Name: Flores Fuentes  MRN: [id number]  PATRICK: [id number]  Patient Class: OP- Observation  Admission Date: 8/1/2023  Hospital Length of Stay: 0 days  Discharge Date and Time: 8/1/2023  5:17 PM  Attending Physician: Shanna att. providers found   Discharging Provider: Leonardo Carver MD  Primary Care Provider: Naz Condon MD  [facility] Medicine Team: [facility] MED J Leonardo Carver MD  Primary Care Team: [facility] MED     HPI:   Ms. Fuentes is a 72 YOF with a history of intermittent WPW syndrome (loop recorder present, pill-in-pocket strategy), pAF, HLD, obesity, hypothyroid presenting to the ED for palpitations. Onset 3 weeks ago, intermittent, feels like tachycardia and skipping a beat. Patient had needed to take flecainide once a week for 3 weeks with latest instance 1 day prior to admission and remission of symptoms within 2 hours. Then the evening of admission, palpitations returned, every 2-3 minutes, and were not remitting. Given recent use of flecainide patient decide to come to the ED. The days she took flecainide she held her PO dilt and metoprolol. Of note 2 months ago patient started new intermittent fasting diet, otherwise no exposure to sick contacts and no recent travel. Recent stressor of father being diagnosed with alzheimer's. Compliant with metoprolol and eliquis. ROS negative for SOB, CP, fevers, dysuria.    In the ED the patient was afebrile, HR ranges 112 - 50s, RR 20s, O2 97 on RA. Patient with normal WBC and Hb, slightly hyperchloremic at 112 and low HCO3 at 19. TSH, U/A, troponin wnl. CXR showing mild bibasilar edema. In the ED the patient was given 1 L LR. Per the ED, the pt has continued to go back into RVR throughout the night. Patient admitted to  service for cardiology evaluation given persistence of PAF episodes 3 hours after pill-in-pocket strategy.             Hospital Course:   Patient with history of arrhythmias  presented with palpitations and was found to be in atrial fibrillation with RVR.  Subsequently converted to normal sinus rhythm and then went into atrial fibrillation with RVR per telemetry.  Evaluated by electrophysiology and started on flecainide and diltiazem with discontinuation of home beta-blocker.  Patient returned to normal sinus rhythm with resolution of symptoms.  Echocardiogram without significant abnormalities.  No further complaints.  Stable for discharge and will follow up with electrophysiology as outpatient       Goals of Care Treatment Preferences:  Code Status: Full Code      Consults:   Consults (From admission, onward)        Status Ordering Provider     Inpatient consult to Electrophysiology  Once        Provider:  (Not yet assigned)    Completed KEENAN MILLER        Vitals:    08/01/23 1124 08/01/23 1150 08/01/23 1459 08/01/23 1514   BP:  (!) 140/64  (!) 117/57   BP Location:       Patient Position:  Lying  Lying   Pulse: 104 (!) 52 (!) 48 (!) 48   Resp:  18  18   Temp:  97.8 °F (36.6 °C)  98.1 °F (36.7 °C)   TempSrc:  Oral  Oral   SpO2:  98%  99%   Weight:       Height:         Physical Exam  Vitals and nursing note reviewed.   Constitutional:       General: She is not in acute distress.     Appearance: Normal appearance. She is not ill-appearing.   HENT:      Head: Normocephalic and atraumatic.      Right Ear: External ear normal.      Left Ear: External ear normal.      Nose: Nose normal.      Mouth/Throat:      Mouth: Mucous membranes are moist.      Pharynx: Oropharynx is clear.   Eyes:      General:         Right eye: No discharge.         Left eye: No discharge.   Cardiovascular:      Rate and Rhythm: Normal rate. Rhythm irregular.      Pulses: Normal pulses.      Heart sounds: Normal heart sounds. No murmur heard.  Pulmonary:      Effort: Pulmonary effort is normal. No respiratory distress.      Breath sounds: Normal breath sounds.   Abdominal:      General: Bowel sounds are normal.  There is no distension.      Palpations: Abdomen is soft. There is no mass.      Tenderness: There is no abdominal tenderness. There is no guarding or rebound.      Hernia: No hernia is present.   Musculoskeletal:         General: Normal range of motion.      Cervical back: Normal range of motion.      Right lower leg: No edema.      Left lower leg: No edema.   Skin:     General: Skin is warm and dry.      Capillary Refill: Capillary refill takes less than 2 seconds.   Neurological:      General: No focal deficit present.      Mental Status: She is alert and oriented to person, place, and time.   Psychiatric:         Mood and Affect: Mood normal.         Behavior: Behavior normal.     Final Active Diagnoses:    Diagnosis Date Noted POA    PRINCIPAL PROBLEM:  Palpitations [R00.2] 08/01/2023 Yes    Morbid obesity [E66.01] 07/22/2021 Yes     Chronic    Kym-Parkinson-White (WPW) pattern [I45.6] 07/01/2021 Yes     Chronic    Chronic anticoagulation [Z79.01] 06/10/2021 Not Applicable     Chronic    MATTHEW (obstructive sleep apnea) [G47.33]  Yes     Chronic    Essential hypertension [I10] 06/19/2019 Yes     Chronic    Paroxysmal A-fib [I48.0] 10/14/2014 Yes     Chronic    Mixed hyperlipidemia [E78.2] 11/07/2013 Yes     Chronic    Subclinical iodine-deficiency hypothyroidism [E02] 11/07/2013 Yes     Chronic      Problems Resolved During this Admission:       Discharged Condition: fair    Disposition: Home or Self Care    Follow Up:    Patient Instructions:      Ambulatory referral/consult to Cardiac Electrophysiology   Standing Status: Future   Referral Priority: Routine Referral Type: Consultation   Referral Reason: Specialty Services Required   Requested Specialty: Cardiology   Number of Visits Requested: 1       Significant Diagnostic Studies: N/A    Pending Diagnostic Studies:     None         Medications:  Reconciled Home Medications:      Medication List      CHANGE how you take these medications    flecainide  100 MG Tab  Commonly known as: TAMBOCOR  Take 1 tablet (100 mg total) by mouth every 12 (twelve) hours.  What changed:   · medication strength  · See the new instructions.        CONTINUE taking these medications    diltiaZEM 120 MG Cp24  Commonly known as: CARDIZEM CD  Take 1 capsule (120 mg total) by mouth once daily.     ELIQUIS 5 mg Tab  Generic drug: apixaban  TAKE 1 TABLET TWICE DAILY     fluticasone propionate 50 mcg/actuation nasal spray  Commonly known as: FLONASE  USE 1 SPRAY IN EACH NOSTRIL EVERY DAY     hydrocortisone 2.5 % cream  Apply topically 2 (two) times daily. To rash on face and neck x 2 weeks     levothyroxine 25 MCG tablet  Commonly known as: SYNTHROID  Take 1 tablet (25 mcg total) by mouth once daily.     pravastatin 40 MG tablet  Commonly known as: PRAVACHOL  TAKE 1 TABLET EVERY DAY        STOP taking these medications    metoprolol succinate 25 MG 24 hr tablet  Commonly known as: TOPROL-XL            Indwelling Lines/Drains at time of discharge:   Lines/Drains/Airways     None                 Time spent on the discharge of patient: 40 minutes       Leonardo Carver MD  Internal Medicine, PGY-2  [facility]

## 2023-08-03 ENCOUNTER — HOSPITAL ENCOUNTER (OUTPATIENT)
Dept: RADIOLOGY | Facility: HOSPITAL | Age: 73
Discharge: HOME | End: 2023-08-03
Attending: FAMILY MEDICINE
Payer: MEDICARE

## 2023-08-03 ENCOUNTER — PATIENT MESSAGE (OUTPATIENT)
Dept: CARDIOLOGY | Facility: CLINIC | Age: 73
End: 2023-08-03
Payer: MEDICARE

## 2023-08-03 DIAGNOSIS — Z12.31 SCREENING MAMMOGRAM, ENCOUNTER FOR: ICD-10-CM

## 2023-08-03 PROCEDURE — 77067 SCR MAMMO BI INCL CAD: CPT | Mod: 26,HCNC,, | Performed by: RADIOLOGY

## 2023-08-03 PROCEDURE — 77067 SCR MAMMO BI INCL CAD: CPT | Mod: TC,HCNC,PO

## 2023-08-03 PROCEDURE — 77063 BREAST TOMOSYNTHESIS BI: CPT | Mod: 26,HCNC,, | Performed by: RADIOLOGY

## 2023-08-03 PROCEDURE — 77067 MAMMO DIGITAL SCREENING BILAT WITH TOMO: ICD-10-PCS | Mod: 26,HCNC,, | Performed by: RADIOLOGY

## 2023-08-03 PROCEDURE — 77063 MAMMO DIGITAL SCREENING BILAT WITH TOMO: ICD-10-PCS | Mod: 26,HCNC,, | Performed by: RADIOLOGY

## 2023-08-08 ENCOUNTER — OFFICE VISIT (OUTPATIENT)
Dept: PRIMARY CARE CLINIC | Facility: CLINIC | Age: 73
End: 2023-08-08
Payer: MEDICARE

## 2023-08-08 VITALS
OXYGEN SATURATION: 97 % | SYSTOLIC BLOOD PRESSURE: 130 MMHG | HEIGHT: 67 IN | BODY MASS INDEX: 41.77 KG/M2 | WEIGHT: 266.13 LBS | HEART RATE: 58 BPM | DIASTOLIC BLOOD PRESSURE: 70 MMHG | TEMPERATURE: 98 F

## 2023-08-08 DIAGNOSIS — E02 SUBCLINICAL IODINE-DEFICIENCY HYPOTHYROIDISM: Chronic | ICD-10-CM

## 2023-08-08 DIAGNOSIS — I48.0 PAROXYSMAL A-FIB: Chronic | ICD-10-CM

## 2023-08-08 DIAGNOSIS — R63.4 WEIGHT LOSS: ICD-10-CM

## 2023-08-08 DIAGNOSIS — F43.9 STRESS AT HOME: ICD-10-CM

## 2023-08-08 DIAGNOSIS — Z00.00 ROUTINE GENERAL MEDICAL EXAMINATION AT A HEALTH CARE FACILITY: Primary | ICD-10-CM

## 2023-08-08 PROCEDURE — 1101F PT FALLS ASSESS-DOCD LE1/YR: CPT | Mod: HCNC,CPTII,S$GLB, | Performed by: FAMILY MEDICINE

## 2023-08-08 PROCEDURE — 1126F PR PAIN SEVERITY QUANTIFIED, NO PAIN PRESENT: ICD-10-PCS | Mod: HCNC,CPTII,S$GLB, | Performed by: FAMILY MEDICINE

## 2023-08-08 PROCEDURE — 3075F PR MOST RECENT SYSTOLIC BLOOD PRESS GE 130-139MM HG: ICD-10-PCS | Mod: HCNC,CPTII,S$GLB, | Performed by: FAMILY MEDICINE

## 2023-08-08 PROCEDURE — 3288F FALL RISK ASSESSMENT DOCD: CPT | Mod: HCNC,CPTII,S$GLB, | Performed by: FAMILY MEDICINE

## 2023-08-08 PROCEDURE — 99214 PR OFFICE/OUTPT VISIT, EST, LEVL IV, 30-39 MIN: ICD-10-PCS | Mod: HCNC,S$GLB,, | Performed by: FAMILY MEDICINE

## 2023-08-08 PROCEDURE — 99999 PR PBB SHADOW E&M-EST. PATIENT-LVL III: ICD-10-PCS | Mod: PBBFAC,HCNC,, | Performed by: FAMILY MEDICINE

## 2023-08-08 PROCEDURE — 3075F SYST BP GE 130 - 139MM HG: CPT | Mod: HCNC,CPTII,S$GLB, | Performed by: FAMILY MEDICINE

## 2023-08-08 PROCEDURE — 99214 OFFICE O/P EST MOD 30 MIN: CPT | Mod: HCNC,S$GLB,, | Performed by: FAMILY MEDICINE

## 2023-08-08 PROCEDURE — 1159F MED LIST DOCD IN RCRD: CPT | Mod: HCNC,CPTII,S$GLB, | Performed by: FAMILY MEDICINE

## 2023-08-08 PROCEDURE — 3008F BODY MASS INDEX DOCD: CPT | Mod: HCNC,CPTII,S$GLB, | Performed by: FAMILY MEDICINE

## 2023-08-08 PROCEDURE — 1101F PR PT FALLS ASSESS DOC 0-1 FALLS W/OUT INJ PAST YR: ICD-10-PCS | Mod: HCNC,CPTII,S$GLB, | Performed by: FAMILY MEDICINE

## 2023-08-08 PROCEDURE — 1159F PR MEDICATION LIST DOCUMENTED IN MEDICAL RECORD: ICD-10-PCS | Mod: HCNC,CPTII,S$GLB, | Performed by: FAMILY MEDICINE

## 2023-08-08 PROCEDURE — 1126F AMNT PAIN NOTED NONE PRSNT: CPT | Mod: HCNC,CPTII,S$GLB, | Performed by: FAMILY MEDICINE

## 2023-08-08 PROCEDURE — 3078F DIAST BP <80 MM HG: CPT | Mod: HCNC,CPTII,S$GLB, | Performed by: FAMILY MEDICINE

## 2023-08-08 PROCEDURE — 3288F PR FALLS RISK ASSESSMENT DOCUMENTED: ICD-10-PCS | Mod: HCNC,CPTII,S$GLB, | Performed by: FAMILY MEDICINE

## 2023-08-08 PROCEDURE — 1160F RVW MEDS BY RX/DR IN RCRD: CPT | Mod: HCNC,CPTII,S$GLB, | Performed by: FAMILY MEDICINE

## 2023-08-08 PROCEDURE — 3078F PR MOST RECENT DIASTOLIC BLOOD PRESSURE < 80 MM HG: ICD-10-PCS | Mod: HCNC,CPTII,S$GLB, | Performed by: FAMILY MEDICINE

## 2023-08-08 PROCEDURE — 99999 PR PBB SHADOW E&M-EST. PATIENT-LVL III: CPT | Mod: PBBFAC,HCNC,, | Performed by: FAMILY MEDICINE

## 2023-08-08 PROCEDURE — 3008F PR BODY MASS INDEX (BMI) DOCUMENTED: ICD-10-PCS | Mod: HCNC,CPTII,S$GLB, | Performed by: FAMILY MEDICINE

## 2023-08-08 PROCEDURE — 1160F PR REVIEW ALL MEDS BY PRESCRIBER/CLIN PHARMACIST DOCUMENTED: ICD-10-PCS | Mod: HCNC,CPTII,S$GLB, | Performed by: FAMILY MEDICINE

## 2023-08-08 NOTE — ASSESSMENT & PLAN NOTE
In ER last week, med changed to Flecainaide  She feels flared by 20# wt loss w intermittent fasting  Has appt with Cardiologist & EP this month  Never had ablation

## 2023-08-08 NOTE — PROGRESS NOTES
"      Assessment:     1. Routine general medical examination at a health care facility    2. Subclinical iodine-deficiency hypothyroidism    3. Paroxysmal A-fib    4. Stress at home    5. Weight loss      Plan:     Routine general medical examination at a health care facility  Move more, low fat, high fiber foods  Eat more food grown from the earth (picked from trees or out of the ground)  Follow up yearly with LABS ONE WEEK PRIOR so we can discuss at your visit  BREAST CANCER screening Mammogram every 2 years      Subclinical iodine-deficiency hypothyroidism  TSH normal , continue LEvothyroxine 25 qd    Paroxysmal A-fib  In ER last week, med changed to Flecainaide  She feels flared by 20# wt loss w intermittent fasting  Has appt with Cardiologist & EP this month  Never had ablation    Stress at home  Dad w dementia moved into her home 2023    Weight loss  Congrats w intermittent fasting, but weight loss could have flared up A Fib      CHIEF COMPLAINT: follow up labs    HPI: Flores Fuentes is a 72 y.o. female with is here today for follow up labs    Was in ER 8/1 for a fib. Did lose 20# w intermittent fasting & more fresh foods    Jaw cyst removed, no reoccurance 4 yrs, Dr Taj Melendez [facility] oral surg    Denies chest pain, shortness of breath    Current Outpatient Medications   Medication Instructions    diltiaZEM (CARDIZEM CD) 120 mg, Oral, Daily    ELIQUIS 5 mg Tab TAKE 1 TABLET TWICE DAILY    flecainide (TAMBOCOR) 100 mg, Oral, Every 12 hours    fluticasone propionate (FLONASE) 50 mcg/actuation nasal spray USE 1 SPRAY IN EACH NOSTRIL EVERY DAY    levothyroxine (SYNTHROID) 25 mcg, Oral, Daily    pravastatin (PRAVACHOL) 40 MG tablet TAKE 1 TABLET EVERY DAY       Lab Results   Component Value Date    HGBA1C 5.7 10/07/2014     No results found for: "MICALBCREAT"  Lab Results   Component Value Date    LDLCALC 70.6 08/03/2023    LDLCALC 103.8 06/09/2022    CHOL 126 08/03/2023    HDL 41 08/03/2023    TRIG 72 " "08/03/2023       Lab Results   Component Value Date     08/01/2023    K 3.7 08/01/2023     (H) 08/01/2023    CO2 19 (L) 08/01/2023     08/01/2023    BUN 16 08/01/2023    CREATININE 0.7 08/01/2023    CALCIUM 9.7 08/01/2023    PROT 8.1 08/01/2023    ALBUMIN 4.4 08/01/2023    BILITOT 0.7 08/01/2023    ALKPHOS 92 08/01/2023    AST 17 08/01/2023    ALT 11 08/01/2023    ANIONGAP 12 08/01/2023    ESTGFRAFRICA >60.0 06/09/2022    EGFRNONAA >60.0 06/09/2022    WBC 5.52 08/01/2023    HGB 14.5 08/01/2023    HGB 13.7 06/09/2022    HCT 43.4 08/01/2023    MCV 89 08/01/2023     08/01/2023    TSH 3.916 08/01/2023    HEPCAB Non-reactive 08/01/2023       No results found for: "LH", "FSH", "TOTALTESTOST", "PROGESTERONE", "ESTRADIOL", "MNFTCSEF02RV", "KCQCQEUQ83", "FERRITIN", "IRON", "TRANSFERRIN", "TIBC", "FESATURATED", "ZINC"      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 Dr Viktor Toure    Osteopenia of neck of left femur 6/4/2020    Paroxysmal atrioventricular tachycardia     Plantar fasciitis     Right knee meniscal tear     Dr Mayfield, tx conservatively    Severe obesity (BMI 35.0-35.9 with comorbidity) 1/24/2017    Slow transit constipation 7/13/2021    Despite fruits & veg & 1200 dunia diet, try Colace daily    Stress incontinence, female 03/28/2018    hasn't tried oxybutynin, After HYST, Kegels & PT  didn't work, worse at night wearing pads, Dr Infante    Subclinical iodine-deficiency hypothyroidism " "11/7/2013    Thyroid disease      Past Surgical History:   Procedure Laterality Date    APPENDECTOMY      COLONOSCOPY N/A 8/13/2020    Procedure: COLONOSCOPY;  Surgeon: Angus Ac MD;  Location: [facility] END[facility] ([address] [place] [address]);  Service: Endoscopy;  Laterality: N/A;  ok to hold Eliquis 2 days per Dr Servin     COVID test at [place] on 8/10-GT    HYSTERECTOMY  1990    TAHBSO for fibroids    INSERTION OF IMPLANTABLE LOOP RECORDER Left 11/1/2021    Procedure: Insertion, Implantable Loop Recorder;  Surgeon: Ameya Servin MD;  Location: [facility] EP LAB;  Service: Cardiology;  Laterality: Left;  AF, ILR implant, MDT,  DM, 3 Prep    MANDIBLE SURGERY  2015    L mandible, Dr Toure    MANDIBLE SURGERY Left 8/27/2019    OOPHORECTOMY      TONSILLECTOMY       Vitals:    08/08/23 1134 08/08/23 1210   BP: (!) 142/58 130/70   Pulse: (!) 58    Temp: 97.8 °F (36.6 °C)    TempSrc: Oral    SpO2: 97%    Weight: 120.7 kg (266 lb 1.5 oz)    Height: 5' 7" (1.702 m)    PainSc: 0-No pain      Objective:   Physical Exam  Constitutional:       Appearance: She is well-developed.   Eyes:      Pupils: Pupils are equal, round, and reactive to light.   Cardiovascular:      Rate and Rhythm: Normal rate and regular rhythm.      Heart sounds: Normal heart sounds. No murmur heard.  Pulmonary:      Effort: Pulmonary effort is normal.      Breath sounds: Normal breath sounds. No wheezing.   Abdominal:      General: Bowel sounds are normal. There is no distension.      Palpations: Abdomen is soft. There is no mass.      Tenderness: There is no abdominal tenderness. There is no guarding or rebound.   Musculoskeletal:         General: Swelling present.      Cervical back: Neck supple.      Comments: Bilateral ankles   Skin:     General: Skin is warm and dry.      Comments: Breasts symmetrical, nontender, no mass, no supraclavicular or axillary lymphadenopathy     Neurological:      Mental Status: She is alert.   Psychiatric:         Behavior: Behavior normal. "

## 2023-08-08 NOTE — ASSESSMENT & PLAN NOTE
Move more, low fat, high fiber foods  Eat more food grown from the earth (picked from trees or out of the ground)  Follow up yearly with LABS ONE WEEK PRIOR so we can discuss at your visit  BREAST CANCER screening Mammogram every 2 years

## 2023-08-10 ENCOUNTER — PATIENT MESSAGE (OUTPATIENT)
Dept: CARDIOLOGY | Facility: CLINIC | Age: 73
End: 2023-08-10
Payer: MEDICARE

## 2023-08-23 ENCOUNTER — CLINICAL SUPPORT (OUTPATIENT)
Dept: CARDIOLOGY | Facility: HOSPITAL | Age: 73
End: 2023-08-23
Payer: MEDICARE

## 2023-08-23 DIAGNOSIS — Z95.818 PRESENCE OF OTHER CARDIAC IMPLANTS AND GRAFTS: ICD-10-CM

## 2023-08-23 PROCEDURE — G2066 INTER DEVC REMOTE 30D: HCPCS | Mod: HCNC | Performed by: INTERNAL MEDICINE

## 2023-08-24 ENCOUNTER — OFFICE VISIT (OUTPATIENT)
Dept: CARDIOLOGY | Facility: CLINIC | Age: 73
End: 2023-08-24
Payer: MEDICARE

## 2023-08-24 VITALS
HEART RATE: 57 BPM | WEIGHT: 260.13 LBS | SYSTOLIC BLOOD PRESSURE: 146 MMHG | BODY MASS INDEX: 40.83 KG/M2 | DIASTOLIC BLOOD PRESSURE: 72 MMHG | HEIGHT: 67 IN

## 2023-08-24 VITALS
SYSTOLIC BLOOD PRESSURE: 130 MMHG | DIASTOLIC BLOOD PRESSURE: 72 MMHG | WEIGHT: 260.13 LBS | OXYGEN SATURATION: 98 % | HEIGHT: 67 IN | HEART RATE: 58 BPM | BODY MASS INDEX: 40.83 KG/M2

## 2023-08-24 DIAGNOSIS — I48.0 PAROXYSMAL A-FIB: Chronic | ICD-10-CM

## 2023-08-24 DIAGNOSIS — G47.33 OSA (OBSTRUCTIVE SLEEP APNEA): Chronic | ICD-10-CM

## 2023-08-24 DIAGNOSIS — I45.6 WOLFF-PARKINSON-WHITE (WPW) PATTERN: Primary | Chronic | ICD-10-CM

## 2023-08-24 DIAGNOSIS — E78.2 MIXED HYPERLIPIDEMIA: Chronic | ICD-10-CM

## 2023-08-24 DIAGNOSIS — E78.2 MIXED HYPERLIPIDEMIA: Primary | Chronic | ICD-10-CM

## 2023-08-24 DIAGNOSIS — I10 ESSENTIAL HYPERTENSION: Chronic | ICD-10-CM

## 2023-08-24 DIAGNOSIS — I45.6 WOLFF-PARKINSON-WHITE (WPW) PATTERN: Chronic | ICD-10-CM

## 2023-08-24 DIAGNOSIS — I87.2 VENOUS INSUFFICIENCY: ICD-10-CM

## 2023-08-24 PROCEDURE — 3075F PR MOST RECENT SYSTOLIC BLOOD PRESS GE 130-139MM HG: ICD-10-PCS | Mod: CPTII,S$GLB,, | Performed by: INTERNAL MEDICINE

## 2023-08-24 PROCEDURE — 99214 OFFICE O/P EST MOD 30 MIN: CPT | Mod: S$GLB,,, | Performed by: INTERNAL MEDICINE

## 2023-08-24 PROCEDURE — 93005 EKG 12-LEAD: ICD-10-PCS | Mod: S$GLB,,, | Performed by: INTERNAL MEDICINE

## 2023-08-24 PROCEDURE — 3008F BODY MASS INDEX DOCD: CPT | Mod: CPTII,S$GLB,, | Performed by: INTERNAL MEDICINE

## 2023-08-24 PROCEDURE — 99215 OFFICE O/P EST HI 40 MIN: CPT | Mod: S$GLB,,, | Performed by: INTERNAL MEDICINE

## 2023-08-24 PROCEDURE — 1126F AMNT PAIN NOTED NONE PRSNT: CPT | Mod: CPTII,S$GLB,, | Performed by: INTERNAL MEDICINE

## 2023-08-24 PROCEDURE — 3288F FALL RISK ASSESSMENT DOCD: CPT | Mod: CPTII,S$GLB,, | Performed by: INTERNAL MEDICINE

## 2023-08-24 PROCEDURE — 3078F DIAST BP <80 MM HG: CPT | Mod: CPTII,S$GLB,, | Performed by: INTERNAL MEDICINE

## 2023-08-24 PROCEDURE — 3077F SYST BP >= 140 MM HG: CPT | Mod: CPTII,S$GLB,, | Performed by: INTERNAL MEDICINE

## 2023-08-24 PROCEDURE — 3077F PR MOST RECENT SYSTOLIC BLOOD PRESSURE >= 140 MM HG: ICD-10-PCS | Mod: CPTII,S$GLB,, | Performed by: INTERNAL MEDICINE

## 2023-08-24 PROCEDURE — 3075F SYST BP GE 130 - 139MM HG: CPT | Mod: CPTII,S$GLB,, | Performed by: INTERNAL MEDICINE

## 2023-08-24 PROCEDURE — 3008F PR BODY MASS INDEX (BMI) DOCUMENTED: ICD-10-PCS | Mod: CPTII,S$GLB,, | Performed by: INTERNAL MEDICINE

## 2023-08-24 PROCEDURE — 93005 ELECTROCARDIOGRAM TRACING: CPT | Mod: S$GLB,,, | Performed by: INTERNAL MEDICINE

## 2023-08-24 PROCEDURE — 1126F PR PAIN SEVERITY QUANTIFIED, NO PAIN PRESENT: ICD-10-PCS | Mod: CPTII,S$GLB,, | Performed by: INTERNAL MEDICINE

## 2023-08-24 PROCEDURE — 99215 PR OFFICE/OUTPT VISIT, EST, LEVL V, 40-54 MIN: ICD-10-PCS | Mod: S$GLB,,, | Performed by: INTERNAL MEDICINE

## 2023-08-24 PROCEDURE — 1101F PT FALLS ASSESS-DOCD LE1/YR: CPT | Mod: CPTII,S$GLB,, | Performed by: INTERNAL MEDICINE

## 2023-08-24 PROCEDURE — 3078F PR MOST RECENT DIASTOLIC BLOOD PRESSURE < 80 MM HG: ICD-10-PCS | Mod: CPTII,S$GLB,, | Performed by: INTERNAL MEDICINE

## 2023-08-24 PROCEDURE — 1159F MED LIST DOCD IN RCRD: CPT | Mod: CPTII,S$GLB,, | Performed by: INTERNAL MEDICINE

## 2023-08-24 PROCEDURE — 1101F PR PT FALLS ASSESS DOC 0-1 FALLS W/OUT INJ PAST YR: ICD-10-PCS | Mod: CPTII,S$GLB,, | Performed by: INTERNAL MEDICINE

## 2023-08-24 PROCEDURE — 99999 PR PBB SHADOW E&M-EST. PATIENT-LVL III: CPT | Mod: PBBFAC,HCNC,, | Performed by: INTERNAL MEDICINE

## 2023-08-24 PROCEDURE — 1159F PR MEDICATION LIST DOCUMENTED IN MEDICAL RECORD: ICD-10-PCS | Mod: CPTII,S$GLB,, | Performed by: INTERNAL MEDICINE

## 2023-08-24 PROCEDURE — 99999 PR PBB SHADOW E&M-EST. PATIENT-LVL III: ICD-10-PCS | Mod: PBBFAC,HCNC,, | Performed by: INTERNAL MEDICINE

## 2023-08-24 PROCEDURE — 99214 PR OFFICE/OUTPT VISIT, EST, LEVL IV, 30-39 MIN: ICD-10-PCS | Mod: S$GLB,,, | Performed by: INTERNAL MEDICINE

## 2023-08-24 PROCEDURE — 1160F RVW MEDS BY RX/DR IN RCRD: CPT | Mod: CPTII,S$GLB,, | Performed by: INTERNAL MEDICINE

## 2023-08-24 PROCEDURE — 1160F PR REVIEW ALL MEDS BY PRESCRIBER/CLIN PHARMACIST DOCUMENTED: ICD-10-PCS | Mod: CPTII,S$GLB,, | Performed by: INTERNAL MEDICINE

## 2023-08-24 PROCEDURE — 3288F PR FALLS RISK ASSESSMENT DOCUMENTED: ICD-10-PCS | Mod: CPTII,S$GLB,, | Performed by: INTERNAL MEDICINE

## 2023-08-24 PROCEDURE — 93010 EKG 12-LEAD: ICD-10-PCS | Mod: S$GLB,,, | Performed by: INTERNAL MEDICINE

## 2023-08-24 PROCEDURE — 93010 ELECTROCARDIOGRAM REPORT: CPT | Mod: S$GLB,,, | Performed by: INTERNAL MEDICINE

## 2023-08-24 RX ORDER — METOPROLOL SUCCINATE 25 MG/1
12.5 TABLET, EXTENDED RELEASE ORAL DAILY
Qty: 45 TABLET | Refills: 3 | Status: ON HOLD | OUTPATIENT
Start: 2023-08-24 | End: 2023-09-20 | Stop reason: HOSPADM

## 2023-08-24 RX ORDER — FLECAINIDE ACETATE 100 MG/1
100 TABLET ORAL EVERY 12 HOURS
Qty: 180 TABLET | Refills: 3 | Status: ON HOLD | OUTPATIENT
Start: 2023-08-24 | End: 2023-09-20 | Stop reason: SDUPTHER

## 2023-08-24 NOTE — PROGRESS NOTES
Subjective:   @Patient ID:  Flores Fuentes is a 72 y.o. female who presents for evaluation of Pre-op risk stratification.   HPI:   8/24/2023: F/U. She was hospitalized with a.fib, wasn't responding to pill in the pocket.  Now she is taking flecainide along with the Cardizem.  Has an appointment with Dr. Servin.  She does have significant symptomatic right lower extremity varicose vein since the age of 12 that has been getting worse.  Right lower extremity edema along with varicosity and heaviness.  Not consistently wearing compression stockings    7/22/2021: Patient is here for follow up. She saw Dr. Servin in 7/2021, EKg with intermittent wpw pattern, plan for ILR to evaluate for possible SVT episodes.     She stated that she has been ok since last visit.  Last a.fib episode was in 5/2021 and she took flecainide which helped. She gets different  tachypalpitations  that are different from her a.fib     Tolerating OAC well.     No prior CAD. No Tobacco. No DM.    HTN: BP is well controlled.   Paroxysmal A.fib: Currently SR and stable on AAD/ CCB/BB    She has congestion and cold symptoms, along with cough..     Pertinent cardiac hx:    Paroxysmal A.fib, F/U with Dr. Servin. On rhythm control strategy with flecainide (PIP). On Eliquis for anticoagulation.     2D Echo (6/3/2019):  Normal left ventricular systolic function. The estimated ejection fraction is 55%  Normal LV diastolic function.  Normal right ventricular systolic function.  Mild left atrial enlargement.  The estimated PA systolic pressure is 31 mm Hg  Normal central venous pressure (3 mm Hg).    Patient Active Problem List    Diagnosis Date Noted    Stress at home 08/08/2023    Weight loss 08/08/2023    Palpitations 08/01/2023    Morbid obesity 07/22/2021    Slow transit constipation 07/13/2021     Despite fruits & veg & 1200 dunia diet, try Colace daily      Kym-Parkinson-White (WPW) pattern 07/01/2021    Chronic anticoagulation 06/10/2021    Routine  general medical examination at a health care facility 2020    Osteopenia of neck of left femur 2020    Asymptomatic microscopic hematuria 2020    MATTHEW (obstructive sleep apnea)     Essential hypertension 2019    Stress incontinence, female 2018     After HYST, Try Kegels      Cervical muscle strain 2017    DJD (degenerative joint disease) of cervical spine 2017    Primary localized osteoarthrosis, lower leg 2014    Paroxysmal A-fib 10/14/2014    Mixed hyperlipidemia 2013    Subclinical iodine-deficiency hypothyroidism 2013           Right Arm BP - Sittin/63  Left Arm BP - Sittin/72            Lipid panel  Lab Results   Component Value Date    CHOL 126 2023    CHOL 164 2022    CHOL 157 2021     Lab Results   Component Value Date    HDL 41 2023    HDL 45 2022    HDL 46 2021     Lab Results   Component Value Date    LDLCALC 70.6 2023    LDLCALC 103.8 2022    LDLCALC 90.6 2021     Lab Results   Component Value Date    TRIG 72 2023    TRIG 76 2022    TRIG 102 2021     Lab Results   Component Value Date    CHOLHDL 32.5 2023    CHOLHDL 27.4 2022    CHOLHDL 29.3 2021            Review of Systems   Constitutional: Negative for chills and fever.   HENT:  Negative for congestion, hearing loss and nosebleeds.    Eyes:  Negative for blurred vision.   Cardiovascular:  Positive for leg swelling (RT side , chronic lymphedema. ). Negative for chest pain and palpitations.   Respiratory:  Negative for cough, hemoptysis and shortness of breath.    Hematologic/Lymphatic: Negative for bleeding problem.   Skin:  Negative for itching.   Musculoskeletal:  Positive for arthritis.   Gastrointestinal:  Negative for abdominal pain and hematochezia.   Genitourinary:  Negative for hematuria.   Neurological:  Negative for dizziness.   Psychiatric/Behavioral:  Negative for altered mental  status and depression.        Objective:   Physical Exam  Constitutional:       Appearance: She is well-developed.   HENT:      Head: Normocephalic and atraumatic.   Eyes:      Conjunctiva/sclera: Conjunctivae normal.   Neck:      Vascular: No JVD.   Cardiovascular:      Rate and Rhythm: Normal rate and regular rhythm.      Heart sounds: Normal heart sounds. No murmur heard.     No friction rub. No gallop.   Pulmonary:      Effort: Pulmonary effort is normal. No respiratory distress.      Breath sounds: Normal breath sounds. No stridor. No wheezing.   Abdominal:      General: There is no distension.      Palpations: Abdomen is soft.      Tenderness: There is no abdominal tenderness.   Musculoskeletal:      Cervical back: Neck supple.   Skin:     General: Skin is warm and dry.   Neurological:      Mental Status: She is alert and oriented to person, place, and time.   Psychiatric:         Behavior: Behavior normal.         Assessment:     1. Mixed hyperlipidemia    2. Paroxysmal A-fib    3. Essential hypertension    4. Kym-Parkinson-White (WPW) pattern    5. Venous insufficiency        Plan:   Continue flecainide along with the Cardizem.  Follow up with Dr. Servin to discuss possible ablation if felt necessary  Continue oral anticoagulation.  Check venous ultrasound insufficiency protocol  Start compression stocking 20-30 mm Hg  Leg elevation  Follow-up after 5 months to discuss response to conservative approach of the venous insufficiency.  If she still have significant limiting symptoms then may consider ablative therapy if warranted  Weight loss encouraged  Statin        - Return sooner for concerns or questions. If symptoms persist go to the ED  I have reviewed all pertinent data on this patient   I have reviewed the patient's medical history in detail and updated the computerized patient record.  Follow up as scheduled. Return sooner for concerns or questions  She expressed verbal understanding and agreed with  the plan      It was my please seeing Ms. Fuentes. Please don't hesitate to contact us with any questions. Than you.     Patient's Medications   New Prescriptions    No medications on file   Previous Medications    DILTIAZEM (CARDIZEM CD) 120 MG CP24    Take 1 capsule (120 mg total) by mouth once daily.    ELIQUIS 5 MG TAB    TAKE 1 TABLET TWICE DAILY    FLECAINIDE (TAMBOCOR) 100 MG TAB    Take 1 tablet (100 mg total) by mouth every 12 (twelve) hours.    FLUTICASONE PROPIONATE (FLONASE) 50 MCG/ACTUATION NASAL SPRAY    USE 1 SPRAY IN EACH NOSTRIL EVERY DAY    LEVOTHYROXINE (SYNTHROID) 25 MCG TABLET    Take 1 tablet (25 mcg total) by mouth once daily.    PRAVASTATIN (PRAVACHOL) 40 MG TABLET    TAKE 1 TABLET EVERY DAY   Modified Medications    No medications on file   Discontinued Medications    No medications on file

## 2023-08-24 NOTE — PROGRESS NOTES
Subjective:   Patient ID:  Flores Fuentes is a 72 y.o. female who presents for follow-up of palpitations.     Ms. Fuentes is a 72 y.o. female with pAF, HLD, obesity, hypothyroid here for follow up.   AF first dx 2006 after lots of caffeine. Few episodes since then. Pill in pocket strategy.  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 Underwent DCCV 3/13/17 after remaining in AF with RVR. Started on multaq, and BB d/c'd.  Since, feeling not quite as well as usually, and on 4/17, at which time HR was 120s and didn't feel same as prior AF episodes.  She'd been on BB for years w/o issues. Good exertion tolerance. No CP.    Due to rare PAF episodes, we adopted a pill-in-the-pocket strategy. She used it first in 11/17; went to the ER. AF resolved <30 min after taking dose. Had 2 other episodes, self-treated successfully, in 12/17 and 1/18. Since last seen, has had 4 such episodes, all aborted with PIP.     Had AF episode 8/2023 without termination from PIP flec. Standing-dose flec started. Has had 2 episodes of AF since.    echo 5/21: 60%    My interpretation of today's ECG is NSR without preexcitation    Current Outpatient Medications   Medication Sig    CARTIA  mg Cp24 TAKE 1 CAPSULE EVERY DAY    clobetasol (TEMOVATE) 0.05 % cream Apply topically 2 (two) times daily. To rash on left breast area until resolved    ELIQUIS 5 mg Tab TAKE 1 TABLET TWICE DAILY    flecainide (TAMBOCOR) 150 MG Tab 2 tabs (300 mg total) up to once per day for atrial fibrillation.    fluocinonide 0.05% (LIDEX) 0.05 % cream Apply topically 2 (two) times daily.    fluticasone propionate (FLONASE) 50 mcg/actuation nasal spray USE 1 SPRAY IN EACH NOSTRIL ONE TIME DAILY    ketoconazole (NIZORAL) 2 % cream Apply topically 2 (two) times daily. To dry skin on forehead eyebrows and nose folds    levothyroxine (SYNTHROID) 25 MCG tablet TAKE  "1 TABLET EVERY DAY    metoprolol succinate (TOPROL-XL) 50 MG 24 hr tablet Take 1 tablet (50 mg total) by mouth once daily.    pravastatin (PRAVACHOL) 40 MG tablet TAKE 1 TABLET ONE TIME DAILY     No current facility-administered medications for this visit.        Review of Systems   Constitutional: Negative. Negative for malaise/fatigue.   HENT: Negative.  Negative for ear pain and tinnitus.    Eyes:  Negative for blurred vision.   Cardiovascular:  Positive for palpitations. Negative for chest pain, dyspnea on exertion, leg swelling, near-syncope and syncope.   Respiratory:  Positive for snoring. Negative for shortness of breath.    Endocrine: Negative.  Negative for polyuria.   Hematologic/Lymphatic: Negative for bleeding problem. Does not bruise/bleed easily.   Skin: Negative.  Negative for rash.   Musculoskeletal: Negative.  Negative for joint pain, muscle weakness and myalgias.   Gastrointestinal: Negative.  Negative for abdominal pain, change in bowel habit, hematemesis, hematochezia and nausea.   Genitourinary:  Negative for frequency and hematuria.   Neurological: Negative.  Negative for dizziness, light-headedness and weakness.   Psychiatric/Behavioral: Negative.  Negative for altered mental status and depression. The patient is not nervous/anxious.    Allergic/Immunologic: Negative for environmental allergies and persistent infections.     Objective:        BP (!) 146/72   Pulse (!) 57   Ht 5' 7" (1.702 m)   Wt 118 kg (260 lb 2.3 oz)   BMI 40.74 kg/m²     No distress.  Speaks in full sentences.  RRR  No wheeze  Abd not distended  no edema    Lab Results   Component Value Date     08/01/2023    K 3.7 08/01/2023    MG 2.3 08/01/2023    BUN 16 08/01/2023    CREATININE 0.7 08/01/2023    ALT 11 08/01/2023    AST 17 08/01/2023    HGB 14.5 08/01/2023    HCT 43.4 08/01/2023    HCT 50 08/12/2020    TSH 3.916 08/01/2023    LDLCALC 70.6 08/03/2023     Recent Labs   Lab 11/01/21  1226   INR 1.0       "   Assessment:     PAF  Likely MATTHEW  Intermittent WPW pattern    Plan:     In summary, Ms. Fuentes is a 72 y.o. female with pAF, HLD, obesity, hypothyroid here for follow up. Intermittent WPW pattern.    Now having PAF despite standing-dose flecainide.  Discussed options: no change, increase flec, change AAD, ablation.  Informed consent was obtained, we discussed the risks and benefits of  the procedure (pulmonary vein isolation) which included (but was not limited to) the possibility of bleeding or injury to the vessels involved, embolism, cardiac perforation, cardiac tamponade requiring emergent drainage with a pericardial drain or surgery, esophageal injury or fistula formation, phrenic nerve injury, pulmonary vein stenosis, injury to the native conduction system of the heart requiring a PPM, renal injury if contrast used, MI, stroke, and death. We also reviewed indications, and alternatives of the planned procedure. All questions were answered. Patient wishes to proceed.  I discussed with patient risks, indications, benefits, and alternatives of the planned procedure. All questions were answered. Patient understands and wishes to proceed.    Will perform RF PVI and likely also ablate the AP.  Continue dilt, toprol (low-dose), flecanide until then. No flec x 3 days preablation.

## 2023-08-25 ENCOUNTER — PATIENT MESSAGE (OUTPATIENT)
Dept: CARDIOLOGY | Facility: CLINIC | Age: 73
End: 2023-08-25
Payer: MEDICARE

## 2023-08-28 ENCOUNTER — PATIENT MESSAGE (OUTPATIENT)
Dept: CARDIOLOGY | Facility: CLINIC | Age: 73
End: 2023-08-28
Payer: MEDICARE

## 2023-08-29 DIAGNOSIS — I87.2 VENOUS INSUFFICIENCY: Primary | ICD-10-CM

## 2023-08-29 DIAGNOSIS — I48.0 PAROXYSMAL A-FIB: Primary | Chronic | ICD-10-CM

## 2023-08-29 DIAGNOSIS — I45.6 WOLFF-PARKINSON-WHITE (WPW) PATTERN: Chronic | ICD-10-CM

## 2023-09-01 ENCOUNTER — PATIENT MESSAGE (OUTPATIENT)
Dept: ELECTROPHYSIOLOGY | Facility: CLINIC | Age: 73
End: 2023-09-01
Payer: MEDICARE

## 2023-09-05 ENCOUNTER — HOSPITAL ENCOUNTER (OUTPATIENT)
Dept: CARDIOLOGY | Facility: HOSPITAL | Age: 73
Discharge: HOME | End: 2023-09-05
Attending: INTERNAL MEDICINE
Payer: MEDICARE

## 2023-09-05 DIAGNOSIS — I87.2 VENOUS INSUFFICIENCY: ICD-10-CM

## 2023-09-05 PROCEDURE — 93970 EXTREMITY STUDY: CPT | Mod: TC,HCNC

## 2023-09-05 PROCEDURE — 93970 CV US LOWER VENOUS INSUFFICIENCY BILATERAL (CUPID ONLY): ICD-10-PCS | Mod: 26,HCNC,, | Performed by: INTERNAL MEDICINE

## 2023-09-05 PROCEDURE — 93970 EXTREMITY STUDY: CPT | Mod: 26,HCNC,, | Performed by: INTERNAL MEDICINE

## 2023-09-06 ENCOUNTER — OFFICE VISIT (OUTPATIENT)
Dept: UROGYNECOLOGY | Facility: CLINIC | Age: 73
End: 2023-09-06
Payer: MEDICARE

## 2023-09-06 VITALS
SYSTOLIC BLOOD PRESSURE: 120 MMHG | HEIGHT: 67 IN | WEIGHT: 256.81 LBS | BODY MASS INDEX: 40.31 KG/M2 | DIASTOLIC BLOOD PRESSURE: 80 MMHG

## 2023-09-06 DIAGNOSIS — R32 ENURESIS: ICD-10-CM

## 2023-09-06 DIAGNOSIS — N81.11 MIDLINE CYSTOCELE: ICD-10-CM

## 2023-09-06 DIAGNOSIS — B37.2 YEAST DERMATITIS: ICD-10-CM

## 2023-09-06 DIAGNOSIS — N95.2 VAGINAL ATROPHY: ICD-10-CM

## 2023-09-06 DIAGNOSIS — Z01.419 WELL WOMAN EXAM: Primary | ICD-10-CM

## 2023-09-06 DIAGNOSIS — R35.1 NOCTURIA: ICD-10-CM

## 2023-09-06 LAB
LEFT GREAT SAPHENOUS DISTAL THIGH DIA: 0.54 CM
LEFT GREAT SAPHENOUS DISTAL THIGH REFLUX: 3363 MS
LEFT GREAT SAPHENOUS JUNCTION DIA: 0.85 CM
LEFT GREAT SAPHENOUS JUNCTION REFLUX: 2598 MS
LEFT GREAT SAPHENOUS MIDDLE THIGH DIA: 0.43 CM
LEFT GREAT SAPHENOUS MIDDLE THIGH REFLUX: 2820 MS
LEFT GREAT SAPHENOUS PROXIMAL CALF DIA: 0.81 CM
LEFT GREAT SAPHENOUS PROXIMAL CALF REFLUX: 5109 MS
LEFT SMALL SAPHENOUS KNEE DIA: 0.45 CM
RIGHT GREAT SAPHENOUS DISTAL THIGH DIA: 1.04 CM
RIGHT GREAT SAPHENOUS DISTAL THIGH REFLUX: 1610 MS
RIGHT GREAT SAPHENOUS JUNCTION DIA: 0.94 CM
RIGHT GREAT SAPHENOUS JUNCTION REFLUX: 2500 MS
RIGHT GREAT SAPHENOUS MIDDLE THIGH DIA: 0.9 CM
RIGHT GREAT SAPHENOUS MIDDLE THIGH REFLUX: 1735 MS
RIGHT GREAT SAPHENOUS PROXIMAL CALF DIA: 0.88 CM
RIGHT GREAT SAPHENOUS PROXIMAL CALF REFLUX: 1947 MS
RIGHT SMALL SAPHENOUS KNEE DIA: 0.27 CM

## 2023-09-06 PROCEDURE — G0101 CA SCREEN;PELVIC/BREAST EXAM: HCPCS | Mod: HCNC,S$GLB,, | Performed by: NURSE PRACTITIONER

## 2023-09-06 PROCEDURE — 99999 PR PBB SHADOW E&M-EST. PATIENT-LVL IV: CPT | Mod: PBBFAC,HCNC,, | Performed by: NURSE PRACTITIONER

## 2023-09-06 PROCEDURE — 1126F AMNT PAIN NOTED NONE PRSNT: CPT | Mod: HCNC,CPTII,S$GLB, | Performed by: NURSE PRACTITIONER

## 2023-09-06 PROCEDURE — 3008F BODY MASS INDEX DOCD: CPT | Mod: HCNC,CPTII,S$GLB, | Performed by: NURSE PRACTITIONER

## 2023-09-06 PROCEDURE — 3074F PR MOST RECENT SYSTOLIC BLOOD PRESSURE < 130 MM HG: ICD-10-PCS | Mod: HCNC,CPTII,S$GLB, | Performed by: NURSE PRACTITIONER

## 2023-09-06 PROCEDURE — 1160F PR REVIEW ALL MEDS BY PRESCRIBER/CLIN PHARMACIST DOCUMENTED: ICD-10-PCS | Mod: HCNC,CPTII,S$GLB, | Performed by: NURSE PRACTITIONER

## 2023-09-06 PROCEDURE — 3074F SYST BP LT 130 MM HG: CPT | Mod: HCNC,CPTII,S$GLB, | Performed by: NURSE PRACTITIONER

## 2023-09-06 PROCEDURE — G0101 PR CA SCREEN;PELVIC/BREAST EXAM: ICD-10-PCS | Mod: HCNC,S$GLB,, | Performed by: NURSE PRACTITIONER

## 2023-09-06 PROCEDURE — 1159F MED LIST DOCD IN RCRD: CPT | Mod: HCNC,CPTII,S$GLB, | Performed by: NURSE PRACTITIONER

## 2023-09-06 PROCEDURE — 3079F DIAST BP 80-89 MM HG: CPT | Mod: HCNC,CPTII,S$GLB, | Performed by: NURSE PRACTITIONER

## 2023-09-06 PROCEDURE — 1159F PR MEDICATION LIST DOCUMENTED IN MEDICAL RECORD: ICD-10-PCS | Mod: HCNC,CPTII,S$GLB, | Performed by: NURSE PRACTITIONER

## 2023-09-06 PROCEDURE — 99999 PR PBB SHADOW E&M-EST. PATIENT-LVL IV: ICD-10-PCS | Mod: PBBFAC,HCNC,, | Performed by: NURSE PRACTITIONER

## 2023-09-06 PROCEDURE — 1160F RVW MEDS BY RX/DR IN RCRD: CPT | Mod: HCNC,CPTII,S$GLB, | Performed by: NURSE PRACTITIONER

## 2023-09-06 PROCEDURE — 1126F PR PAIN SEVERITY QUANTIFIED, NO PAIN PRESENT: ICD-10-PCS | Mod: HCNC,CPTII,S$GLB, | Performed by: NURSE PRACTITIONER

## 2023-09-06 PROCEDURE — 3008F PR BODY MASS INDEX (BMI) DOCUMENTED: ICD-10-PCS | Mod: HCNC,CPTII,S$GLB, | Performed by: NURSE PRACTITIONER

## 2023-09-06 PROCEDURE — 3079F PR MOST RECENT DIASTOLIC BLOOD PRESSURE 80-89 MM HG: ICD-10-PCS | Mod: HCNC,CPTII,S$GLB, | Performed by: NURSE PRACTITIONER

## 2023-09-06 RX ORDER — NYSTATIN 100000 U/G
CREAM TOPICAL 2 TIMES DAILY
Qty: 30 G | Refills: 11 | Status: SHIPPED | OUTPATIENT
Start: 2023-09-06

## 2023-09-06 NOTE — PATIENT INSTRUCTIONS
Well woman  --up to date    Vaginal atrophy  --use coconut oil/ olive oil just inside vagina    Yeast dermatitis  --use nystatin powder twice daily in groin folds--wash/dry well in between applications    Midline cystocele stage 2  --stable/ asymptomatic    Nocturia  --[employer]    [employer] [phone]    Cost 2 units  $ 329  3 year warranty  $399 battery back up unit 3 year warranty  Catheters $195/30 pads--lasts one month     RTC 1 year for follow up

## 2023-09-06 NOTE — PROGRESS NOTES
09/06/2023    SUBJECTIVE:   72 y.o. female for annual exam.    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 Dr Viktor Toure    Osteopenia of neck of left femur 6/4/2020    Paroxysmal atrioventricular tachycardia     Plantar fasciitis     Right knee meniscal tear     Dr Mayfield, tx conservatively    Severe obesity (BMI 35.0-35.9 with comorbidity) 1/24/2017    Slow transit constipation 7/13/2021    Despite fruits & veg & 1200 dunia diet, try Colace daily    Stress incontinence, female 03/28/2018    hasn't tried oxybutynin, After HYST, Kegels & PT  didn't work, worse at night wearing pads, Dr Infante    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    Thyroid disease        Past Surgical History:   Procedure Laterality Date    APPENDECTOMY      COLONOSCOPY N/A 8/13/2020    Procedure: COLONOSCOPY;  Surgeon: Angus Ac MD;  Location: [facility] ENDO ([address] [place] [address]);  Service: Endoscopy;  Laterality: N/A;  ok to hold Eliquis 2 days per Dr Servin     COVID test at [place] on 8/10-GT    HYSTERECTOMY  1990    TAHBSO for fibroids    INSERTION OF IMPLANTABLE LOOP RECORDER Left 11/1/2021    Procedure: Insertion, Implantable Loop Recorder;  Surgeon: Ameya Servin MD;  Location: [facility] EP LAB;  Service: Cardiology;  Laterality: Left;  AF, ILR implant, MDT,  DM, 3 Prep    MANDIBLE SURGERY  2015    L mandible, Dr Toure    MANDIBLE SURGERY Left 8/27/2019    OOPHORECTOMY      TONSILLECTOMY         Family History   Problem Relation Age of  Onset    Cancer Mother         stomach, liver    Breast cancer Neg Hx     Ovarian cancer Neg Hx     Cervical cancer Neg Hx     Endometrial cancer Neg Hx     Vaginal cancer Neg Hx     Melanoma Neg Hx     Colon cancer Neg Hx     Esophageal cancer Neg Hx        Social History     Socioeconomic History    Marital status:    Tobacco Use    Smoking status: Never    Smokeless tobacco: Never   Substance and Sexual Activity    Alcohol use: No    Drug use: No    Sexual activity: Not Currently   Other Topics Concern    Are you pregnant or think you may be? No     Social Determinants of Health     Financial Resource Strain: Low Risk  (8/1/2023)    Overall Financial Resource Strain (CARDIA)     Difficulty of Paying Living Expenses: Not hard at all   Food Insecurity: No Food Insecurity (8/1/2023)    Hunger Vital Sign     Worried About Running Out of Food in the Last Year: Never true     Ran Out of Food in the Last Year: Never true   Transportation Needs: No Transportation Needs (8/1/2023)    PRAPARE - Transportation     Lack of Transportation (Medical): No     Lack of Transportation (Non-Medical): No   Physical Activity: Inactive (8/1/2023)    Exercise Vital Sign     Days of Exercise per Week: 0 days     Minutes of Exercise per Session: 0 min   Stress: Stress Concern Present (8/1/2023)    [employer] - Occupational Stress Questionnaire     Feeling of Stress : To some extent   Social Connections: Socially Integrated (8/1/2023)    Social Connection and Isolation Panel [NHANES]     Frequency of Communication with Friends and Family: More than three times a week     Frequency of Social Gatherings with Friends and Family: Once a week     Attends Alevism Services: More than 4 times per year     Active Member of Clubs or Organizations: Yes     Attends Club or Organization Meetings: More than 4 times per year     Marital Status:    Housing Stability: Low Risk  (8/1/2023)    Housing Stability  Vital Sign     Unable to Pay for Housing in the Last Year: No     Number of Places Lived in the Last Year: 1     Unstable Housing in the Last Year: No       Current Outpatient Medications   Medication Sig Dispense Refill    diltiaZEM (CARDIZEM CD) 120 MG Cp24 Take 1 capsule (120 mg total) by mouth once daily. 90 capsule 3    ELIQUIS 5 mg Tab TAKE 1 TABLET TWICE DAILY 180 tablet 3    flecainide (TAMBOCOR) 100 MG Tab Take 1 tablet (100 mg total) by mouth every 12 (twelve) hours. 180 tablet 3    fluticasone propionate (FLONASE) 50 mcg/actuation nasal spray USE 1 SPRAY IN EACH NOSTRIL EVERY DAY 32 g 2    levothyroxine (SYNTHROID) 25 MCG tablet Take 1 tablet (25 mcg total) by mouth once daily. 90 tablet 1    metoprolol succinate (TOPROL-XL) 25 MG 24 hr tablet Take 0.5 tablets (12.5 mg total) by mouth once daily. 45 tablet 3    pravastatin (PRAVACHOL) 40 MG tablet TAKE 1 TABLET EVERY DAY 90 tablet 0    nystatin (MYCOSTATIN) cream Apply topically 2 (two) times daily. 30 g 11     No current facility-administered medications for this visit.     Facility-Administered Medications Ordered in Other Visits   Medication Dose Route Frequency Provider Last Rate Last Admin    sodium chloride 0.9% bolus 1,000 mL  1,000 mL Intravenous Once Carley Cabrera NP        vancomycin in dextrose 5 % 1 gram/250 mL IVPB 1,000 mg  1,000 mg Intravenous On Call Procedure Carley Cabrera .7 mL/hr at 11/01/21 1249 1,000 mg at 11/01/21 1249       Review of patient's allergies indicates:   Allergen Reactions    Decongestant d [pseudoephedrine-dm]      Atrial Fibrillation    Augmentin [amoxicillin-pot clavulanate]      palpitations      Amoxicillin Palpitations    Diphenhydramine-pseudoephed Palpitations     TACHYCARDIA    Povidone-iodine Rash     Mild erythema of skin       No LMP recorded. Patient has had a hysterectomy.    Well Woman:  Pap test: post hyst History of abnormal paps: No.  History of STIs:  No  Mammogram: Date of last: 08/2023    "Result: Normal  Colonoscopy: Date of last: 2018.  Result:  External hemorrhoids.  Repeat due:  10 years.  --doing cologuard now  DEXA:  Date of last:2020  Result:  normal.       OB History          2    Para   2    Term   2            AB        Living   2         SAB        IAB        Ectopic        Multiple        Live Births   2                   ROS:  Feeling well.   No dyspnea or chest pain on exertion.    No abdominal pain, change in bowel habits, black or bloody stools.    1.Mixed urinary incontinence, urge > stress:    --voiding every 1 1/2 hours  --using cpap machine--does have enuresis--using 2 pads/ night--does limit fluids  --UUI if she ignores the urge.   --using one pad / day with minimal wetness  --does not want to take medication-- worried that they will increase her chance of an arrhythmia     2. Vaginal atrophy (dryness):     --Using REPLENS or REFRESH OTC: 1/2 applicator full in vagina once/ week      .   GYN ROS: no breast pain or new or enlarging lumps on self exam, no vaginal bleeding. No neurological complaints.    OBJECTIVE:   The patient appears well, alert, oriented x 3, in no distress.  /80 (BP Location: Right arm, Patient Position: Sitting, BP Method: Medium (Manual))   Ht 5' 7" (1.702 m)   Wt 116.5 kg (256 lb 13.4 oz)   BMI 40.23 kg/m²   ENT normal.  Neck supple. No adenopathy or thyromegaly. NANY.   Normal respiratory effort  Pulse with  regular rate and rhythm.   Abdomen soft without tenderness, guarding, mass or organomegaly.   Extremities show no edema, normal peripheral pulses.   Neurological is normal, no focal findings.    BREAST EXAM: patient declines to have breast exam    PELVIC EXAM:   VULVA: normal appearing vulva with no masses, tenderness or lesions, fine, red, raised, maculopapular rash in groin folds  VAGINA: normal appearing vagina with normal color and discharge, no lesions, atrophic,Dictation #1  MRN:[id number]  CSN:[id number]   CERVIX: " surgically absent,   UTERUS: absent,   ADNEXA: no masses,   RECTAL: normal rectal, no masses    Aa/Ba  0    ASSESSMENT:   1. Well woman exam        2. Yeast dermatitis  nystatin (MYCOSTATIN) cream      3. Vaginal atrophy        4. Midline cystocele        5. Nocturia        6. Enuresis            PLAN:     Well woman  --up to date    Vaginal atrophy  --use coconut oil/ olive oil just inside vagina    Yeast dermatitis  --use nystatin powder twice daily in groin folds--wash/dry well in between applications    Midline cystocele stage 2  --stable/ asymptomatic    Nocturia  --[employer]    [employer] [phone]    Cost 2 units  $ 329  3 year warranty  $399 battery back up unit 3 year warranty  Catheters $195/30 pads--lasts one month     RTC 1 year for follow up      20 minutes were spent in face to face time with this patient  90 % of this time was spent in counseling and/or coordination of care    Anette VILLARREAL [facility]  Division of Female Pelvic Medicine and Reconstructive Surgery  Department of Obstetrics & Gynecology

## 2023-09-08 NOTE — PROGRESS NOTES
Reviewed the ultrasound which confirmed the reflux. Will discuss during next visit.     Sincerely,  Simone Shannon MD.   Interventional Cardiologist  [facility] [place]

## 2023-09-11 ENCOUNTER — PATIENT MESSAGE (OUTPATIENT)
Dept: CARDIOLOGY | Facility: CLINIC | Age: 73
End: 2023-09-11
Payer: MEDICARE

## 2023-09-11 ENCOUNTER — PATIENT MESSAGE (OUTPATIENT)
Dept: PRIMARY CARE CLINIC | Facility: CLINIC | Age: 73
End: 2023-09-11
Payer: MEDICARE

## 2023-09-11 NOTE — TELEPHONE ENCOUNTER
No, it should not interfere with your ablation procedure.     Sincerely,  Simone Shannon MD.   Interventional Cardiologist  [facility] [place]

## 2023-09-12 ENCOUNTER — TELEPHONE (OUTPATIENT)
Dept: PRIMARY CARE CLINIC | Facility: CLINIC | Age: 73
End: 2023-09-12
Payer: MEDICARE

## 2023-09-12 NOTE — TELEPHONE ENCOUNTER
----- Message from Alanna Farias sent at 9/12/2023 11:28 AM CDT -----  Contact: pt [phone]  Patient stated that they missed a call from this office.    Please call and advise.    Thank You        
Pt states the call was from the [employer] office not Dr Condon's office.  
6

## 2023-09-15 ENCOUNTER — PATIENT MESSAGE (OUTPATIENT)
Dept: MEDSURG UNIT | Facility: HOSPITAL | Age: 73
End: 2023-09-15
Payer: MEDICARE

## 2023-09-19 ENCOUNTER — HOSPITAL ENCOUNTER (OUTPATIENT)
Facility: HOSPITAL | Age: 73
Discharge: HOME | End: 2023-09-20
Attending: EMERGENCY MEDICINE | Admitting: STUDENT IN AN ORGANIZED HEALTH CARE EDUCATION/TRAINING PROGRAM
Payer: MEDICARE

## 2023-09-19 ENCOUNTER — PATIENT MESSAGE (OUTPATIENT)
Dept: MEDSURG UNIT | Facility: HOSPITAL | Age: 73
End: 2023-09-19
Payer: MEDICARE

## 2023-09-19 ENCOUNTER — TELEPHONE (OUTPATIENT)
Dept: ELECTROPHYSIOLOGY | Facility: CLINIC | Age: 73
End: 2023-09-19
Payer: MEDICARE

## 2023-09-19 DIAGNOSIS — I45.6 WOLFF-PARKINSON-WHITE (WPW) SYNDROME: ICD-10-CM

## 2023-09-19 DIAGNOSIS — I48.91 ATRIAL FIBRILLATION WITH RVR: Primary | ICD-10-CM

## 2023-09-19 DIAGNOSIS — R00.0 TACHYCARDIA: ICD-10-CM

## 2023-09-19 DIAGNOSIS — R07.9 CHEST PAIN: ICD-10-CM

## 2023-09-19 DIAGNOSIS — I48.0 PAROXYSMAL A-FIB: Chronic | ICD-10-CM

## 2023-09-19 DIAGNOSIS — I49.9 ARRHYTHMIA: ICD-10-CM

## 2023-09-19 DIAGNOSIS — R94.31 EKG ABNORMALITIES: ICD-10-CM

## 2023-09-19 LAB
ALBUMIN SERPL BCP-MCNC: 4.3 G/DL (ref 3.5–5.2)
ALP SERPL-CCNC: 87 U/L (ref 55–135)
ALT SERPL W/O P-5'-P-CCNC: 10 U/L (ref 10–44)
ANION GAP SERPL CALC-SCNC: 8 MMOL/L (ref 8–16)
AST SERPL-CCNC: 16 U/L (ref 10–40)
BASOPHILS # BLD AUTO: 0.06 K/UL (ref 0–0.2)
BASOPHILS NFR BLD: 1 % (ref 0–1.9)
BILIRUB SERPL-MCNC: 0.8 MG/DL (ref 0.1–1)
BNP SERPL-MCNC: 123 PG/ML (ref 0–99)
BUN SERPL-MCNC: 12 MG/DL (ref 8–23)
CALCIUM SERPL-MCNC: 9.9 MG/DL (ref 8.7–10.5)
CHLORIDE SERPL-SCNC: 108 MMOL/L (ref 95–110)
CO2 SERPL-SCNC: 23 MMOL/L (ref 23–29)
CREAT SERPL-MCNC: 0.7 MG/DL (ref 0.5–1.4)
DIFFERENTIAL METHOD: NORMAL
EOSINOPHIL # BLD AUTO: 0.1 K/UL (ref 0–0.5)
EOSINOPHIL NFR BLD: 1.7 % (ref 0–8)
ERYTHROCYTE [DISTWIDTH] IN BLOOD BY AUTOMATED COUNT: 14.4 % (ref 11.5–14.5)
EST. GFR  (NO RACE VARIABLE): >60 ML/MIN/1.73 M^2
GLUCOSE SERPL-MCNC: 85 MG/DL (ref 70–110)
HCT VFR BLD AUTO: 45.9 % (ref 37–48.5)
HGB BLD-MCNC: 15.4 G/DL (ref 12–16)
IMM GRANULOCYTES # BLD AUTO: 0.01 K/UL (ref 0–0.04)
IMM GRANULOCYTES NFR BLD AUTO: 0.2 % (ref 0–0.5)
LYMPHOCYTES # BLD AUTO: 1.6 K/UL (ref 1–4.8)
LYMPHOCYTES NFR BLD: 25.7 % (ref 18–48)
MCH RBC QN AUTO: 30 PG (ref 27–31)
MCHC RBC AUTO-ENTMCNC: 33.6 G/DL (ref 32–36)
MCV RBC AUTO: 89 FL (ref 82–98)
MONOCYTES # BLD AUTO: 0.6 K/UL (ref 0.3–1)
MONOCYTES NFR BLD: 10.6 % (ref 4–15)
NEUTROPHILS # BLD AUTO: 3.7 K/UL (ref 1.8–7.7)
NEUTROPHILS NFR BLD: 60.8 % (ref 38–73)
NRBC BLD-RTO: 0 /100 WBC
PLATELET # BLD AUTO: 270 K/UL (ref 150–450)
PMV BLD AUTO: 10.8 FL (ref 9.2–12.9)
POTASSIUM SERPL-SCNC: 3.8 MMOL/L (ref 3.5–5.1)
PROT SERPL-MCNC: 8.1 G/DL (ref 6–8.4)
RBC # BLD AUTO: 5.14 M/UL (ref 4–5.4)
SODIUM SERPL-SCNC: 139 MMOL/L (ref 136–145)
TROPONIN I SERPL DL<=0.01 NG/ML-MCNC: <0.006 NG/ML (ref 0–0.03)
WBC # BLD AUTO: 6.03 K/UL (ref 3.9–12.7)

## 2023-09-19 PROCEDURE — 84484 ASSAY OF TROPONIN QUANT: CPT | Performed by: PHYSICIAN ASSISTANT

## 2023-09-19 PROCEDURE — 99214 PR OFFICE/OUTPT VISIT, EST, LEVL IV, 30-39 MIN: ICD-10-PCS | Mod: ,,, | Performed by: STUDENT IN AN ORGANIZED HEALTH CARE EDUCATION/TRAINING PROGRAM

## 2023-09-19 PROCEDURE — 80053 COMPREHEN METABOLIC PANEL: CPT | Performed by: PHYSICIAN ASSISTANT

## 2023-09-19 PROCEDURE — 25000003 PHARM REV CODE 250: Performed by: STUDENT IN AN ORGANIZED HEALTH CARE EDUCATION/TRAINING PROGRAM

## 2023-09-19 PROCEDURE — 93005 ELECTROCARDIOGRAM TRACING: CPT

## 2023-09-19 PROCEDURE — 96365 THER/PROPH/DIAG IV INF INIT: CPT

## 2023-09-19 PROCEDURE — 96376 TX/PRO/DX INJ SAME DRUG ADON: CPT

## 2023-09-19 PROCEDURE — G0378 HOSPITAL OBSERVATION PER HR: HCPCS

## 2023-09-19 PROCEDURE — 99285 EMERGENCY DEPT VISIT HI MDM: CPT | Mod: 25

## 2023-09-19 PROCEDURE — 96375 TX/PRO/DX INJ NEW DRUG ADDON: CPT

## 2023-09-19 PROCEDURE — 99222 1ST HOSP IP/OBS MODERATE 55: CPT | Mod: AI,,, | Performed by: STUDENT IN AN ORGANIZED HEALTH CARE EDUCATION/TRAINING PROGRAM

## 2023-09-19 PROCEDURE — 85025 COMPLETE CBC W/AUTO DIFF WBC: CPT | Performed by: PHYSICIAN ASSISTANT

## 2023-09-19 PROCEDURE — 99222 PR INITIAL HOSPITAL CARE,LEVL II: ICD-10-PCS | Mod: AI,,, | Performed by: STUDENT IN AN ORGANIZED HEALTH CARE EDUCATION/TRAINING PROGRAM

## 2023-09-19 PROCEDURE — 63600175 PHARM REV CODE 636 W HCPCS: Performed by: STUDENT IN AN ORGANIZED HEALTH CARE EDUCATION/TRAINING PROGRAM

## 2023-09-19 PROCEDURE — 93010 EKG 12-LEAD: ICD-10-PCS | Mod: ,,, | Performed by: INTERNAL MEDICINE

## 2023-09-19 PROCEDURE — 99214 OFFICE O/P EST MOD 30 MIN: CPT | Mod: ,,, | Performed by: STUDENT IN AN ORGANIZED HEALTH CARE EDUCATION/TRAINING PROGRAM

## 2023-09-19 PROCEDURE — 93010 ELECTROCARDIOGRAM REPORT: CPT | Mod: ,,, | Performed by: INTERNAL MEDICINE

## 2023-09-19 PROCEDURE — 83880 ASSAY OF NATRIURETIC PEPTIDE: CPT | Performed by: PHYSICIAN ASSISTANT

## 2023-09-19 PROCEDURE — 25000003 PHARM REV CODE 250: Performed by: EMERGENCY MEDICINE

## 2023-09-19 RX ORDER — NALOXONE HCL 0.4 MG/ML
0.02 VIAL (ML) INJECTION
Status: DISCONTINUED | OUTPATIENT
Start: 2023-09-19 | End: 2023-09-20 | Stop reason: HOSPADM

## 2023-09-19 RX ORDER — DILTIAZEM HYDROCHLORIDE 5 MG/ML
15 INJECTION INTRAVENOUS
Status: COMPLETED | OUTPATIENT
Start: 2023-09-19 | End: 2023-09-19

## 2023-09-19 RX ORDER — MAGNESIUM SULFATE HEPTAHYDRATE 40 MG/ML
2 INJECTION, SOLUTION INTRAVENOUS ONCE
Status: COMPLETED | OUTPATIENT
Start: 2023-09-19 | End: 2023-09-19

## 2023-09-19 RX ORDER — DILTIAZEM HYDROCHLORIDE 120 MG/1
120 CAPSULE, COATED, EXTENDED RELEASE ORAL DAILY
Status: DISCONTINUED | OUTPATIENT
Start: 2023-09-20 | End: 2023-09-20 | Stop reason: HOSPADM

## 2023-09-19 RX ORDER — IBUPROFEN 200 MG
24 TABLET ORAL
Status: DISCONTINUED | OUTPATIENT
Start: 2023-09-19 | End: 2023-09-20 | Stop reason: HOSPADM

## 2023-09-19 RX ORDER — SODIUM CHLORIDE 0.9 % (FLUSH) 0.9 %
10 SYRINGE (ML) INJECTION EVERY 12 HOURS PRN
Status: DISCONTINUED | OUTPATIENT
Start: 2023-09-19 | End: 2023-09-20 | Stop reason: HOSPADM

## 2023-09-19 RX ORDER — LEVOTHYROXINE SODIUM 25 UG/1
25 TABLET ORAL DAILY
Status: DISCONTINUED | OUTPATIENT
Start: 2023-09-20 | End: 2023-09-20 | Stop reason: HOSPADM

## 2023-09-19 RX ORDER — PRAVASTATIN SODIUM 40 MG/1
40 TABLET ORAL DAILY
Status: DISCONTINUED | OUTPATIENT
Start: 2023-09-20 | End: 2023-09-20 | Stop reason: HOSPADM

## 2023-09-19 RX ORDER — POTASSIUM CHLORIDE 750 MG/1
30 CAPSULE, EXTENDED RELEASE ORAL ONCE
Status: COMPLETED | OUTPATIENT
Start: 2023-09-19 | End: 2023-09-19

## 2023-09-19 RX ORDER — GLUCAGON 1 MG
1 KIT INJECTION
Status: DISCONTINUED | OUTPATIENT
Start: 2023-09-19 | End: 2023-09-20 | Stop reason: HOSPADM

## 2023-09-19 RX ORDER — FLUTICASONE PROPIONATE 50 MCG
1 SPRAY, SUSPENSION (ML) NASAL DAILY
Status: DISCONTINUED | OUTPATIENT
Start: 2023-09-20 | End: 2023-09-20 | Stop reason: HOSPADM

## 2023-09-19 RX ORDER — FLECAINIDE ACETATE 100 MG/1
100 TABLET ORAL EVERY 12 HOURS
Status: DISCONTINUED | OUTPATIENT
Start: 2023-09-19 | End: 2023-09-20

## 2023-09-19 RX ORDER — IBUPROFEN 200 MG
16 TABLET ORAL
Status: DISCONTINUED | OUTPATIENT
Start: 2023-09-19 | End: 2023-09-20 | Stop reason: HOSPADM

## 2023-09-19 RX ADMIN — MAGNESIUM SULFATE HEPTAHYDRATE 2 G: 40 INJECTION, SOLUTION INTRAVENOUS at 06:09

## 2023-09-19 RX ADMIN — DILTIAZEM HYDROCHLORIDE 15 MG: 5 INJECTION INTRAVENOUS at 04:09

## 2023-09-19 RX ADMIN — APIXABAN 5 MG: 5 TABLET, FILM COATED ORAL at 08:09

## 2023-09-19 RX ADMIN — DILTIAZEM HYDROCHLORIDE 15 MG: 5 INJECTION INTRAVENOUS at 02:09

## 2023-09-19 RX ADMIN — POTASSIUM CHLORIDE 30 MEQ: 10 CAPSULE, COATED, EXTENDED RELEASE ORAL at 04:09

## 2023-09-19 RX ADMIN — FLECAINIDE ACETATE 100 MG: 100 TABLET ORAL at 08:09

## 2023-09-19 NOTE — HPI
72 year old female with pAF (initially diagnosed in 2006), Intermittent WPW pattern (seen on ECG 10/22/2022), HLD, Hypothyroid, Obesity, MATTHEW (CPAP) who presented to the ED and found to be in AF with RVR. Patient states that she was in her usual state of health but last night after watching the [employer] game she started to experience palpitations and she felt she was back in AF. She took her evening flecainide and went to bed but was not able to fall asleep all night. This morning, she took her morning flecainide and diltiazem without improvement which led to her presenting to the ED. In the ED, she was found to be in AF with RVR and was given IV Diltiazem 15 mg x 1; heart rate now stable around 100-110. BP: 160/60, Afebrile, Labs WNL, BNP slightly elevated to 123. Patient reports compliance with her medication    She has a prior history of DCCV 3/13/2017 and was started on Multaq. She had also previously tried Pill in a pocket strategy which initially was helpful but than had episodes in 8/2023 without termination. Was started on standing dose flecainide and had 2 further episodes since.

## 2023-09-19 NOTE — NURSING
Patient admitted to CSU. Patient arrived to floor from Ed. No evidence of distress; patient AAO x4 at this time. Patient placed on tele. Vital signs obtained. Patient voices no complaints at this time. Plan of care initiated with patient. Bed in lowest position, locked, SR up x2, call bell in reach. Will continue to monitor patient.     Nurses Note -- 4 Eyes      9/19/2023   6:07 PM      Skin assessed during: Admit      [x] No Altered Skin Integrity Present    []Prevention Measures Documented      [] Yes- Altered Skin Integrity Present or Discovered   [] LDA Added if Not in Epic (Describe Wound)   [] New Altered Skin Integrity was Present on Admit and Documented in LDA   [] Wound Image Taken    Wound Care Consulted? No    Attending Nurse:  Ingris Hutton RN/Staff Member:  EDUARDO Joseph

## 2023-09-19 NOTE — HPI
72F w/ hx of WPW, paroxysmal atrial fibrillation, hypothyroidism, HLD who follows with Dr. Servin and is planned to have an ablation in 10/2023 presents with sudden onset afib that is not improved with home medications.    Patients reports yesterday evening she was dozing off while watching the [employer] game and felt palpitations and knew she had flipped into afib, she took her evening flecainide and went to bed, the morning of admission she took her morning flecainide and diltiazem without improvement so she presented to the ED.    In the ED patient was tachycardic in afib, BP 160s/60s, afebrile on RA, CBC and BMP unremarkable, trop negative, BNP slightly elevated at 123, she was given 15mg IV dilt without improvement.

## 2023-09-19 NOTE — H&P
[facility] Emergency Dept  Hospital Medicine  History & Physical    Patient Name: Flores Fuentes  MRN: [id number]  Patient Class: OP- Observation  Admission Date: 9/19/2023  Attending Physician: Minh Godoy MD  Primary Care Provider: Naz Condon MD         Patient information was obtained from patient, past medical records and ER records.     Subjective:     Principal Problem:<principal problem not specified>    Chief Complaint:   Chief Complaint   Patient presents with    Tachycardia     Pt states she is in a-fib, denies CP but endorses SOB.         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 Servin and is planned to have an ablation in 10/2023 presents with sudden onset afib that is not improved with home medications.    Patients reports yesterday evening she was dozing off while watching the [employer] game and felt palpitations and knew she had flipped into afib, she took her evening flecainide and went to bed, the morning of admission she took her morning flecainide and diltiazem without improvement so she presented to the ED.    In the ED patient was tachycardic in afib, BP 160s/60s, afebrile on RA, CBC and BMP unremarkable, trop negative, BNP slightly elevated at 123, she was given 15mg IV dilt without improvement.           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, l mandible, 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 Dr Viktor Toure    Osteopenia of neck of left femur 6/4/2020     Paroxysmal atrioventricular tachycardia     Plantar fasciitis     Right knee meniscal tear     Dr Mayfield, tx conservatively    Severe obesity (BMI 35.0-35.9 with comorbidity) 1/24/2017    Slow transit constipation 7/13/2021    Despite fruits & veg & 1200 dunia diet, try Colace daily    Stress incontinence, female 03/28/2018    hasn't tried oxybutynin, After HYST, Kegels & PT  didn't work, worse at night wearing pads, Dr Infante    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    Thyroid disease        Past Surgical History:   Procedure Laterality Date    APPENDECTOMY      COLONOSCOPY N/A 8/13/2020    Procedure: COLONOSCOPY;  Surgeon: Angus Ac MD;  Location: [facility] ENDO ([address] [place] [address]);  Service: Endoscopy;  Laterality: N/A;  ok to hold Eliquis 2 days per Dr Servin     COVID test at [facility] on 8/10-GT    HYSTERECTOMY  1990    TAHBSO for fibroids    INSERTION OF IMPLANTABLE LOOP RECORDER Left 11/1/2021    Procedure: Insertion, Implantable Loop Recorder;  Surgeon: Ameya Servin MD;  Location: [facility] EP LAB;  Service: Cardiology;  Laterality: Left;  AF, ILR implant, MDT,  DM, 3 Prep    MANDIBLE SURGERY  2015    L mandible, Dr Toure    MANDIBLE SURGERY Left 8/27/2019    OOPHORECTOMY      TONSILLECTOMY         Review of patient's allergies indicates:   Allergen Reactions    Decongestant d [pseudoephedrine-dm]      Atrial Fibrillation    Augmentin [amoxicillin-pot clavulanate]      palpitations      Amoxicillin Palpitations    Diphenhydramine-pseudoephed Palpitations     TACHYCARDIA    Povidone-iodine Rash     Mild erythema of skin       Current Facility-Administered Medications on File Prior to Encounter   Medication    sodium chloride 0.9% bolus 1,000 mL    vancomycin in dextrose 5 % 1 gram/250 mL IVPB 1,000 mg     Current Outpatient Medications on File Prior to Encounter   Medication Sig    diltiaZEM (CARDIZEM CD) 120 MG Cp24 Take 1 capsule (120 mg total) by mouth once daily.    ELIQUIS 5 mg  Tab TAKE 1 TABLET TWICE DAILY    flecainide (TAMBOCOR) 100 MG Tab Take 1 tablet (100 mg total) by mouth every 12 (twelve) hours.    levothyroxine (SYNTHROID) 25 MCG tablet Take 1 tablet (25 mcg total) by mouth once daily.    fluticasone propionate (FLONASE) 50 mcg/actuation nasal spray USE 1 SPRAY IN EACH NOSTRIL EVERY DAY    metoprolol succinate (TOPROL-XL) 25 MG 24 hr tablet Take 0.5 tablets (12.5 mg total) by mouth once daily.    nystatin (MYCOSTATIN) cream Apply topically 2 (two) times daily.    pravastatin (PRAVACHOL) 40 MG tablet TAKE 1 TABLET EVERY DAY     Family History       Problem Relation (Age of Onset)    Cancer Mother          Tobacco Use    Smoking status: Never    Smokeless tobacco: Never   Substance and Sexual Activity    Alcohol use: No    Drug use: No    Sexual activity: Not Currently     Review of Systems   Constitutional:  Negative for diaphoresis, fatigue and fever.   Respiratory:  Negative for shortness of breath.    Cardiovascular:  Positive for palpitations and leg swelling. Negative for chest pain.   Gastrointestinal:  Negative for abdominal distention and abdominal pain.   Genitourinary:  Negative for dysuria and frequency.     Objective:     Vital Signs (Most Recent):  Temp: 98.1 °F (36.7 °C) (09/19/23 1416)  Pulse: 102 (09/19/23 1630)  Resp: 13 (09/19/23 1630)  BP: (!) 154/67 (09/19/23 1630)  SpO2: 97 % (09/19/23 1630) Vital Signs (24h Range):  Temp:  [98.1 °F (36.7 °C)-98.4 °F (36.9 °C)] 98.1 °F (36.7 °C)  Pulse:  [] 102  Resp:  [13-20] 13  SpO2:  [97 %-99 %] 97 %  BP: (154-194)/(67-88) 154/67     Weight: 112.5 kg (248 lb)  Body mass index is 38.84 kg/m².     Physical Exam  Constitutional:       Appearance: Normal appearance.   Cardiovascular:      Rate and Rhythm: Tachycardia present. Rhythm irregular.      Pulses: Normal pulses.      Heart sounds: No murmur heard.  Pulmonary:      Effort: Pulmonary effort is normal. No respiratory distress.      Breath sounds: No  "wheezing.   Abdominal:      General: Abdomen is flat.   Musculoskeletal:      Right lower leg: Edema present.      Left lower leg: Edema present.   Skin:     General: Skin is warm.      Capillary Refill: Capillary refill takes less than 2 seconds.   Neurological:      General: No focal deficit present.      Mental Status: She is alert and oriented to person, place, and time.   Psychiatric:         Behavior: Behavior normal.                Significant Labs: All pertinent labs within the past 24 hours have been reviewed.  BMP:   Recent Labs   Lab 09/19/23  1246   GLU 85      K 3.8      CO2 23   BUN 12   CREATININE 0.7   CALCIUM 9.9     CBC:   Recent Labs   Lab 09/19/23  1246   WBC 6.03   HGB 15.4   HCT 45.9        Cardiac Markers:   Recent Labs   Lab 09/19/23  1246   *     Lactic Acid: No results for input(s): "LACTATE" in the last 48 hours.  Lipid Panel: No results for input(s): "CHOL", "HDL", "LDLCALC", "TRIG", "CHOLHDL" in the last 48 hours.  Troponin:   Recent Labs   Lab 09/19/23  1246   TROPONINI <0.006     TSH:   Recent Labs   Lab 08/01/23  0249   TSH 3.916     Urine Culture: No results for input(s): "LABURIN" in the last 48 hours.  Urine Studies: No results for input(s): "COLORU", "APPEARANCEUA", "PHUR", "SPECGRAV", "PROTEINUA", "GLUCUA", "KETONESU", "BILIRUBINUA", "OCCULTUA", "NITRITE", "UROBILINOGEN", "LEUKOCYTESUR", "RBCUA", "WBCUA", "BACTERIA", "SQUAMEPITHEL", "HYALINECASTS" in the last 48 hours.    Invalid input(s): "WRIGHTSUR"    Significant Imaging: I have reviewed all pertinent imaging results/findings within the past 24 hours.    Assessment/Plan:     * Paroxysmal A-fib  Patient with Paroxysmal (<7 days) atrial fibrillation which is uncontrolled currently with Calcium Channel Blocker and and flecainide. Patient is currently in atrial fibrillation.[id number] Score: 2. Anticoagulation indicated. Anticoagulation done with apixaban   - EP consulted, appreciate recs  - Goal HR " <110  - NPO at [place] for DCCV/PERICO  - Continue home diltiazem and flecainide  - PRN Diltiazem for HR >110, can consider diltiazem gtt as well overnight    Kym-Parkinson-White (WPW) pattern  Noted, EP consulted      Essential hypertension  Continue home diltiazem      Subclinical iodine-deficiency hypothyroidism  Continue synthroid      Mixed hyperlipidemia  Contineu home statin        VTE Risk Mitigation (From admission, onward)         Ordered     apixaban tablet 5 mg  2 times daily         09/19/23 1545     Reason for No Pharmacological VTE Prophylaxis  Once        Question:  Reasons:  Answer:  Already adequately anticoagulated on oral Anticoagulants    09/19/23 1545     IP VTE HIGH RISK PATIENT  Once         09/19/23 1545     Place sequential compression device  Until discontinued         09/19/23 1545                   On 09/19/2023, patient should be placed in hospital observation services under my care.        Minh Godoy MD  Department of Hospital Medicine  [facility] Dept

## 2023-09-19 NOTE — ASSESSMENT & PLAN NOTE
- Please ensure patient remains NPO tonight for PERICO/DCCV tomorrow  - Continue with anticoagulation with Eliquis 5 mg, BID  - Please increase Flecainide to 150 mg, BID  - Continue with Diltiazem  - Please monitor electrolytes closely. Maintain Mg > 2 and K > 4  - Telemetry monitoring

## 2023-09-19 NOTE — ED NOTES
Felt her heart going into afib last night around 8pm; HR was in 130s; scheduled for ablation in October. Denies CP; reports some SOB with exertion and lightheadedness.

## 2023-09-19 NOTE — ED NOTES
Telemetry Verification   Patient placed on Telemetry Box  Verified with War Room  Box # [id number]   Monitor Tech Killian   Rate A-Fib   Rhythm 101

## 2023-09-19 NOTE — FIRST PROVIDER EVALUATION
Emergency Department TeleTriage Encounter Note      CHIEF COMPLAINT    Chief Complaint   Patient presents with    Tachycardia     Pt states she is in a-fib, denies CP but endorses SOB.        VITAL SIGNS   Initial Vitals [09/19/23 1221]   BP Pulse Resp Temp SpO2   (!) 188/83 109 20 98.4 °F (36.9 °C) 97 %      MAP       --            ALLERGIES    Review of patient's allergies indicates:   Allergen Reactions    Decongestant d [pseudoephedrine-dm]      Atrial Fibrillation    Augmentin [amoxicillin-pot clavulanate]      palpitations      Amoxicillin Palpitations    Diphenhydramine-pseudoephed Palpitations     TACHYCARDIA    Povidone-iodine Rash     Mild erythema of skin       PROVIDER TRIAGE NOTE  Patient presents with complaint of being in afib. Reports SOB only with exertion. No CP      ORDERS  Labs Reviewed - No data to display    ED Orders (720h ago, onward)      Start Ordered     Status Ordering Provider    09/19/23 1224 09/19/23 1223  EKG 12-lead  Once         Completed by SHERIDAN DUNCAN on 9/19/2023 at 12:30 PM KATIE DUBOSE              Virtual Visit Note: The provider triage portion of this emergency department evaluation and documentation was performed via [employer], a HIPAA-compliant telemedicine application, in concert with a tele-presenter in the room. A face to face patient evaluation with one of my colleagues will occur once the patient is placed in an emergency department room.      DISCLAIMER: This note was prepared with [employer] voice recognition transcription software. Garbled syntax, mangled pronouns, and other bizarre constructions may be attributed to that software system.

## 2023-09-19 NOTE — TELEPHONE ENCOUNTER
Pt reports she is currently in the ER here at [facility], reports she felt it when she went into AF last night around 8pm reports she has been taking her meds, reports she started to get SOB today and remained in afib since last night so she went to ER, will f/u after her er evaluation

## 2023-09-19 NOTE — TELEPHONE ENCOUNTER
----- Message from Syeda Pratt MA sent at 9/19/2023 12:55 PM CDT -----  Regarding: FW: Afib  Same pt from this morning. If they should be scheduled just let me know.   ----- Message -----  From: Ashu Nieto  Sent: 9/19/2023  11:38 AM CDT  To: [facility] Staff  Subject: Afib                                             Pt was in Afib last night and no one called about it. Please return call.    Contac t [phone]

## 2023-09-19 NOTE — ED PROVIDER NOTES
Encounter Date: 9/19/2023       History     Chief Complaint   Patient presents with    Tachycardia     Pt states she is in a-fib, denies CP but endorses SOB.      Flores Fuentes is a 72-year-old female past medical history of hypertension, WPW, paroxysmal AFib presenting today for palpitations.  She was watching the [employer] game, fell asleep and woke up abruptly around 8:00 p.m. when she started feeling palpitations, typical which she is goes into AFib.  She took her nighttime medications and went to sleep.  She woke up this morning, still feeling palpitations, took flecainide and Cardizem and did not convert back to sinus.  She started feeling lightheaded with shortness of breath which prompted the visit to the ED. No alcohol or caffeine use.       Review of patient's allergies indicates:   Allergen Reactions    Decongestant d [pseudoephedrine-dm]      Atrial Fibrillation    Augmentin [amoxicillin-pot clavulanate]      palpitations      Amoxicillin Palpitations    Diphenhydramine-pseudoephed Palpitations     TACHYCARDIA    Povidone-iodine Rash     Mild erythema of skin     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 Dr Viktor Toure    Osteopenia of neck of left femur 6/4/2020    Paroxysmal atrioventricular tachycardia     Plantar fasciitis     Right knee meniscal tear     Dr Mayfield, tx conservatively    Severe obesity (BMI 35.0-35.9 with comorbidity) 1/24/2017    Slow transit constipation 7/13/2021    Despite fruits  & veg & 1200 dunia diet, try Colace daily    Stress incontinence, female 03/28/2018    hasn't tried oxybutynin, After HYST, Kegels & PT  didn't work, worse at night wearing pads, Dr Infante    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    Thyroid disease      Past Surgical History:   Procedure Laterality Date    APPENDECTOMY      COLONOSCOPY N/A 8/13/2020    Procedure: COLONOSCOPY;  Surgeon: Angus Ac MD;  Location: [facility] ENDO ([address] [place] [address]);  Service: Endoscopy;  Laterality: N/A;  ok to hold Eliquis 2 days per Dr Servin     COVID test at [place] on 8/10-GT    HYSTERECTOMY  1990    TAHBSO for fibroids    INSERTION OF IMPLANTABLE LOOP RECORDER Left 11/1/2021    Procedure: Insertion, Implantable Loop Recorder;  Surgeon: Ameya Servin MD;  Location: [facility] EP LAB;  Service: Cardiology;  Laterality: Left;  AF, ILR implant, MDT,  DM, 3 Prep    MANDIBLE SURGERY  2015    L mandible, Dr Toure    MANDIBLE SURGERY Left 8/27/2019    OOPHORECTOMY      TONSILLECTOMY       Family History   Problem Relation Age of Onset    Cancer Mother         stomach, liver    Breast cancer Neg Hx     Ovarian cancer Neg Hx     Cervical cancer Neg Hx     Endometrial cancer Neg Hx     Vaginal cancer Neg Hx     Melanoma Neg Hx     Colon cancer Neg Hx     Esophageal cancer Neg Hx      Social History     Tobacco Use    Smoking status: Never    Smokeless tobacco: Never   Substance Use Topics    Alcohol use: No    Drug use: No     Review of Systems    Physical Exam     Initial Vitals [09/19/23 1221]   BP Pulse Resp Temp SpO2   (!) 188/83 109 20 98.4 °F (36.9 °C) 97 %      MAP       --         Physical Exam    Nursing note and vitals reviewed.  Constitutional: She appears well-developed and well-nourished. No distress.   HENT:   Mouth/Throat: Oropharynx is clear and moist.   Eyes: Conjunctivae are normal. No scleral icterus.   Neck: No JVD present.   Cardiovascular:  Intact distal pulses.           + irregularly irregular   Pulmonary/Chest: Breath  sounds normal. No respiratory distress. She has no wheezes. She has no rales.   Abdominal: Abdomen is soft. Bowel sounds are normal. She exhibits no distension. There is no abdominal tenderness. There is no rebound.   Musculoskeletal:         General: No edema.     Lymphadenopathy:     She has no cervical adenopathy.   Neurological: She is alert and oriented to person, place, and time.   Skin: Skin is warm. No rash noted.         ED Course   Procedures  Labs Reviewed   B-TYPE NATRIURETIC PEPTIDE - Abnormal; Notable for the following components:       Result Value     (*)     All other components within normal limits   CBC W/ AUTO DIFFERENTIAL   COMPREHENSIVE METABOLIC PANEL   TROPONIN I     EKG Readings: (Independently Interpreted)   EKG:  AFib with , left axis, no ischemic changes     ECG Results              EKG 12-lead (Final result)  Result time 09/19/23 15:04:10      Final result by Interface, Lab In [facility] (09/19/23 15:04:10)                   Narrative:    Test Reason : R94.31,    Vent. Rate : 093 BPM     Atrial Rate : 077 BPM     P-R Int : 000 ms          QRS Dur : 090 ms      QT Int : 394 ms       P-R-T Axes : 000 -37 033 degrees     QTc Int : 489 ms    Atrial fibrillation  Left axis deviation  Right ventricular conduction delay  Cannot exclude Inferior infarct (cited on or before 20-OCT-2022)  Abnormal ECG  When compared with ECG of 19-SEP-2023 12:25,  No significant change was found  Confirmed by Chepe YARBROUGH, Dorian KU ([id number]) on 9/19/2023 3:03:57 PM    Referred By: [email or web address]   SELF           Confirmed By:Dorian Mo MD                                     EKG 12-lead (Final result)  Result time 09/19/23 14:25:40      Final result by Interface, Lab In [facility] (09/19/23 14:25:40)                   Narrative:    Test Reason : R00.0,    Vent. Rate : 114 BPM     Atrial Rate : 000 BPM     P-R Int : 000 ms          QRS Dur : 090 ms      QT Int : 368 ms       P-R-T Axes : 000 -34 041 degrees     QTc  Int : 507 ms    Atrial fibrillation with rapid ventricular response  Left axis deviation  Right ventricular conduction delay  Possible  Anterior infarct (cited on or before vs lead placement  Abnormal ECG  When compared with ECG of 24-AUG-2023 11:50,  Atrial fibrillation has replaced Sinus rhythm  Vent. rate has increased BY  57 BPM  Questionable change in initial forces of Lateral leads  Confirmed by Alvin ROBERTS MD ([id number]) on 9/19/2023 2:25:32 PM    Referred By: [email or web address]   SELF           Confirmed By:Alvin ROBERTS MD                                  Imaging Results    None          Medications   diltiaZEM injection 15 mg (15 mg Intravenous Given 9/19/23 1418)     Medical Decision Making  Emergent evaluation a 72-year-old female presenting today for palpitations, shortness of breath sudden onset AFib since 8:00 p.m. last night.  Tried flecainide, Cardizem without improvement    Vitals reviewed, Tachycardic to 121 on arrival, hypertension noted.    Differential diagnosis includes but not limited to AFib, electrolyte derangement, ACS, CHF    Patient placed on a cardiac monitor, IV line established.  Diltiazem 10 mg IV given with improvement in heart rate to the 80s.  BP improved as well.  Repeat EKG with persistent AFib.    Plan for labs, continuous cardiac monitoring, possible repeat IV medication      Problems Addressed:  Atrial fibrillation with RVR: acute illness or injury with systemic symptoms that poses a threat to life or bodily functions    Amount and/or Complexity of Data Reviewed  Independent Historian: spouse  Labs: ordered. Decision-making details documented in ED Course.  ECG/medicine tests: ordered and independent interpretation performed. Decision-making details documented in ED Course.  Discussion of management or test interpretation with external provider(s): Hospital medicine      Risk  Prescription drug management.  Decision regarding hospitalization.    Critical Care  Total time providing critical  care: 35 minutes               ED Course as of 09/19/23 1535   Tue Sep 19, 2023   1425 Cardizem 10 mg IV given, heart rate now in the 80s.  Patient remains in AFib [GM]   1530 Patient re-evaluated, still remains symptomatic with palpitations.  Her heart rate is better controlled, now 109.  Labs overall unremarkable.  I have decided to admit the patient to observation for continued cardiac monitoring and cardiology consult for possible expedited ablation.     [GM]      ED Course User Index  [GM] Rakel Tucker MD                    Clinical Impression:   Final diagnoses:  [R00.0] Tachycardia  [R94.31] EKG abnormalities  [I48.91] Atrial fibrillation with RVR (Primary)               Rakel Tucker MD  09/19/23 1542       Rakel Tucker MD  09/19/23 1612

## 2023-09-19 NOTE — CONSULTS
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 Patient states that she was in her usual state of health but last night after watching the [employer] game she started to experience palpitations and she felt she was back in AF. She took her evening flecainide and went to bed but was not able to fall asleep all night. This morning, she took her morning flecainide and diltiazem without improvement which led to her presenting to the ED. In the ED, she was found to be in AF with RVR and was given IV Diltiazem 15 mg x 1; heart rate now stable around 100-110. BP: 160/60, Afebrile, Labs WNL, BNP slightly elevated to 123. Patient reports compliance with her medication    She has a prior history of DCCV 3/13/2017 and was started on Multaq. She had also previously tried Pill in a pocket strategy which initially was helpful but than had episodes in 8/2023 without termination. Was started on standing dose flecainide and had 2 further episodes since.     Home Meds: Flecainide: 100 mg, q12h, Diltiazem 120 mg, dialy and Eliquis 5 mg, BID    ILR Report  (Monitoring period: 8/04/2023 - 9/03/2023)  AF Events:  4   EGMs illustrate noise oversensing, AF/AFL, AF/AFL with RVR , longest available 3 hours 28 minutes in duration.   AF burden: 1.2%     AF with RVR >120 bpm >2 hours on 8/22/2023 at 7:02am escalated to follow up tab on September 8th, 2023, 1:53  pm EDT.     Planned PVI 10/17/23-DG    Echocardiogram (8/1/2023):    Left Ventricle: The left ventricle is normal in size. Ventricular mass is normal. Normal wall thickness. Normal wall motion. There is normal systolic function. Ejection fraction by visual approximation is 65%. There is normal diastolic function.    Left Atrium: Left atrium is mildly dilated. The left atrium volume index is 35.6 mL/m2.    Right Ventricle: Normal right ventricular cavity size. There is hypertrophy. Systolic function is normal.    Right Atrium: Normal right atrial size.    Aortic Valve: The aortic valve is structurally normal. There is normal leaflet mobility. There is trace aortic regurgitation.    Mitral Valve: The mitral valve is structurally normal. There is normal leaflet mobility. There is no significant regurgitation.    Tricuspid Valve: The tricuspid valve is structurally normal. There is normal leaflet mobility. There is mild transvalvular regurgitation.    Pulmonic Valve: The pulmonic valve is structurally normal. There is normal leaflet mobility. There is no significant regurgitation.    Aorta: Aortic root is normal in size measuring 3.25 cm. Ascending aorta is normal measuring 3.06 cm.    Pulmonary Artery: No pulmonary hypertension. Estimated tricuspid valve max pressure gradient is at least 22 mmHg.    IVC/SVC: Intermediate venous pressure at 8 mmHg.    Pericardium: Epicardial fat appears visualized. There is no pericardial effusion.       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, l mandible, 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 Dr Viktor Toure    Osteopenia of neck of left femur 6/4/2020    Paroxysmal atrioventricular tachycardia     Plantar fasciitis     Right knee meniscal tear     Dr Mayfield, tx conservatively    Severe obesity (BMI 35.0-35.9 with comorbidity) 1/24/2017    Slow transit constipation 7/13/2021    Despite fruits & veg & 1200 dunia diet, try Colace daily    Stress incontinence, female 03/28/2018    hasn't tried oxybutynin, After HYST, Kegels & PT  didn't work, worse at night wearing pads, Dr Infante    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    Thyroid disease        Past Surgical History:   Procedure Laterality Date    APPENDECTOMY      COLONOSCOPY N/A 8/13/2020    Procedure: COLONOSCOPY;  Surgeon: Angus Ac MD;  [facility]  Service: Endoscopy;  Laterality: N/A;  ok to hold Eliquis 2 days per Dr Servin     COVID test at [place] on 8/10-GT    HYSTERECTOMY  1990    TAHBSO for fibroids    INSERTION OF IMPLANTABLE LOOP RECORDER Left 11/1/2021    Procedure: Insertion, Implantable Loop Recorder;  Surgeon: Ameya Servin MD;  Location: [facility] EP LAB;  Service: Cardiology;  Laterality: Left;  AF, ILR implant, MDT,  DM, 3 Prep    MANDIBLE SURGERY  2015    L mandible, Dr Toure    MANDIBLE SURGERY Left 8/27/2019    OOPHORECTOMY      TONSILLECTOMY         Review of patient's allergies indicates:   Allergen Reactions    Decongestant d [pseudoephedrine-dm]      Atrial Fibrillation    Augmentin [amoxicillin-pot clavulanate]      palpitations      Amoxicillin Palpitations    Diphenhydramine-pseudoephed Palpitations     TACHYCARDIA    Povidone-iodine Rash     Mild erythema of skin       Current Facility-Administered Medications on File Prior to Encounter   Medication    sodium chloride 0.9% bolus 1,000 mL    vancomycin in dextrose 5 % 1 gram/250 mL IVPB 1,000 mg     Current Outpatient Medications on File Prior to Encounter   Medication  Sig    diltiaZEM (CARDIZEM CD) 120 MG Cp24 Take 1 capsule (120 mg total) by mouth once daily.    ELIQUIS 5 mg Tab TAKE 1 TABLET TWICE DAILY    flecainide (TAMBOCOR) 100 MG Tab Take 1 tablet (100 mg total) by mouth every 12 (twelve) hours.    levothyroxine (SYNTHROID) 25 MCG tablet Take 1 tablet (25 mcg total) by mouth once daily.    fluticasone propionate (FLONASE) 50 mcg/actuation nasal spray USE 1 SPRAY IN EACH NOSTRIL EVERY DAY    metoprolol succinate (TOPROL-XL) 25 MG 24 hr tablet Take 0.5 tablets (12.5 mg total) by mouth once daily.    nystatin (MYCOSTATIN) cream Apply topically 2 (two) times daily.    pravastatin (PRAVACHOL) 40 MG tablet TAKE 1 TABLET EVERY DAY     Family History       Problem Relation (Age of Onset)    Cancer Mother          Tobacco Use    Smoking status: Never    Smokeless tobacco: Never   Substance and Sexual Activity    Alcohol use: No    Drug use: No    Sexual activity: Not Currently       Objective:     Vital Signs (Most Recent):  Temp: 98.1 °F (36.7 °C) (09/19/23 1416)  Pulse: 83 (09/19/23 1703)  Resp: 16 (09/19/23 1703)  BP: (S) (!) 147/63 (after diltiazem) (09/19/23 1703)  SpO2: 99 % (09/19/23 1703) Vital Signs (24h Range):  Temp:  [98.1 °F (36.7 °C)-98.4 °F (36.9 °C)] 98.1 °F (36.7 °C)  Pulse:  [] 83  Resp:  [13-20] 16  SpO2:  [97 %-99 %] 99 %  BP: (131-194)/(63-88) 147/63       Weight: 112.5 kg (248 lb)  Body mass index is 38.84 kg/m².    SpO2: 99 %        Physical Exam  Vitals and nursing note reviewed.   Constitutional:       Appearance: Normal appearance. She is obese.   HENT:      Head: Atraumatic.   Cardiovascular:      Rate and Rhythm: Tachycardia present. Rhythm irregular.      Heart sounds: No murmur heard.  Pulmonary:      Effort: Pulmonary effort is normal.      Breath sounds: Normal breath sounds. No wheezing.   Abdominal:      General: There is no distension.      Palpations: Abdomen is soft.      Tenderness: There is no abdominal tenderness.   Musculoskeletal:          General: Swelling present.      Right lower leg: No edema.      Left lower leg: No edema.   Skin:     General: Skin is warm.   Neurological:      Mental Status: She is alert.            Significant Labs: All pertinent lab results from the last 24 hours have been reviewed.      Assessment and Plan:     Atrial fibrillation with RVR  - Please ensure patient remains NPO tonight for PERICO/DCCV tomorrow  - Continue with anticoagulation with Eliquis 5 mg, BID  - Please increase Flecainide to 150 mg, BID  - Continue with Diltiazem  - Please monitor electrolytes closely. Maintain Mg > 2 and K > 4  - Telemetry monitoring    Thank you for this consult.Please follow up Attending Attestation for further recommendations. Please call if any questions.       Magdalene Bergman MD  Cardiac Electrophysiology  [facility] Dept

## 2023-09-19 NOTE — ASSESSMENT & PLAN NOTE
Patient with Paroxysmal (<7 days) atrial fibrillation which is uncontrolled currently with Calcium Channel Blocker and and flecainide. Patient is currently in atrial fibrillation.[id number] Score: 2. Anticoagulation indicated. Anticoagulation done with apixaban   - EP consulted, appreciate recs  - Goal HR <110  - NPO at MN for DCCV/PERICO  - Continue home diltiazem and flecainide  - PRN Diltiazem for HR >110, can consider diltiazem gtt as well overnight

## 2023-09-19 NOTE — SUBJECTIVE & OBJECTIVE
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 Dr Viktor Toure    Osteopenia of neck of left femur 6/4/2020    Paroxysmal atrioventricular tachycardia     Plantar fasciitis     Right knee meniscal tear     Dr Mayfield, tx conservatively    Severe obesity (BMI 35.0-35.9 with comorbidity) 1/24/2017    Slow transit constipation 7/13/2021    Despite fruits & veg & 1200 dunia diet, try Colace daily    Stress incontinence, female 03/28/2018    hasn't tried oxybutynin, After HYST, Kegels & PT  didn't work, worse at night wearing pads, Dr Infante    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    Thyroid disease        Past Surgical History:   Procedure Laterality Date    APPENDECTOMY      COLONOSCOPY N/A 8/13/2020    Procedure: COLONOSCOPY;  Surgeon: Angus Ac MD;  Location: [facility] ENDO ([address] [place] [address]);  Service: Endoscopy;  Laterality: N/A;  ok to hold Eliquis 2 days per Dr Servin     COVID test at [place] on 8/10-GT    HYSTERECTOMY  1990    TAHBSO for fibroids    INSERTION OF IMPLANTABLE LOOP RECORDER Left 11/1/2021    Procedure: Insertion, Implantable Loop Recorder;  Surgeon: Ameya Servin MD;  Location: [facility] EP LAB;  Service: Cardiology;  Laterality: Left;  AF, ILR implant, MDT,  DM, 3 Prep    MANDIBLE SURGERY  2015    L mandible, Dr Toure    MANDIBLE SURGERY Left 8/27/2019    OOPHORECTOMY      TONSILLECTOMY         Review of patient's allergies indicates:   Allergen Reactions    Decongestant d [pseudoephedrine-dm]       Atrial Fibrillation    Augmentin [amoxicillin-pot clavulanate]      palpitations      Amoxicillin Palpitations    Diphenhydramine-pseudoephed Palpitations     TACHYCARDIA    Povidone-iodine Rash     Mild erythema of skin       Current Facility-Administered Medications on File Prior to Encounter   Medication    sodium chloride 0.9% bolus 1,000 mL    vancomycin in dextrose 5 % 1 gram/250 mL IVPB 1,000 mg     Current Outpatient Medications on File Prior to Encounter   Medication Sig    diltiaZEM (CARDIZEM CD) 120 MG Cp24 Take 1 capsule (120 mg total) by mouth once daily.    ELIQUIS 5 mg Tab TAKE 1 TABLET TWICE DAILY    flecainide (TAMBOCOR) 100 MG Tab Take 1 tablet (100 mg total) by mouth every 12 (twelve) hours.    levothyroxine (SYNTHROID) 25 MCG tablet Take 1 tablet (25 mcg total) by mouth once daily.    fluticasone propionate (FLONASE) 50 mcg/actuation nasal spray USE 1 SPRAY IN EACH NOSTRIL EVERY DAY    metoprolol succinate (TOPROL-XL) 25 MG 24 hr tablet Take 0.5 tablets (12.5 mg total) by mouth once daily.    nystatin (MYCOSTATIN) cream Apply topically 2 (two) times daily.    pravastatin (PRAVACHOL) 40 MG tablet TAKE 1 TABLET EVERY DAY     Family History       Problem Relation (Age of Onset)    Cancer Mother          Tobacco Use    Smoking status: Never    Smokeless tobacco: Never   Substance and Sexual Activity    Alcohol use: No    Drug use: No    Sexual activity: Not Currently     Review of Systems   Constitutional:  Negative for diaphoresis, fatigue and fever.   Respiratory:  Negative for shortness of breath.    Cardiovascular:  Positive for palpitations and leg swelling. Negative for chest pain.   Gastrointestinal:  Negative for abdominal distention and abdominal pain.   Genitourinary:  Negative for dysuria and frequency.     Objective:     Vital Signs (Most Recent):  Temp: 98.1 °F (36.7 °C) (09/19/23 1416)  Pulse: 102 (09/19/23 1630)  Resp: 13 (09/19/23 1630)  BP: (!) 154/67 (09/19/23 1630)  SpO2: 97 %  "(09/19/23 1630) Vital Signs (24h Range):  Temp:  [98.1 °F (36.7 °C)-98.4 °F (36.9 °C)] 98.1 °F (36.7 °C)  Pulse:  [] 102  Resp:  [13-20] 13  SpO2:  [97 %-99 %] 97 %  BP: (154-194)/(67-88) 154/67     Weight: 112.5 kg (248 lb)  Body mass index is 38.84 kg/m².     Physical Exam  Constitutional:       Appearance: Normal appearance.   Cardiovascular:      Rate and Rhythm: Tachycardia present. Rhythm irregular.      Pulses: Normal pulses.      Heart sounds: No murmur heard.  Pulmonary:      Effort: Pulmonary effort is normal. No respiratory distress.      Breath sounds: No wheezing.   Abdominal:      General: Abdomen is flat.   Musculoskeletal:      Right lower leg: Edema present.      Left lower leg: Edema present.   Skin:     General: Skin is warm.      Capillary Refill: Capillary refill takes less than 2 seconds.   Neurological:      General: No focal deficit present.      Mental Status: She is alert and oriented to person, place, and time.   Psychiatric:         Behavior: Behavior normal.                Significant Labs: All pertinent labs within the past 24 hours have been reviewed.  BMP:   Recent Labs   Lab 09/19/23  1246   GLU 85      K 3.8      CO2 23   BUN 12   CREATININE 0.7   CALCIUM 9.9     CBC:   Recent Labs   Lab 09/19/23  1246   WBC 6.03   HGB 15.4   HCT 45.9        Cardiac Markers:   Recent Labs   Lab 09/19/23  1246   *     Lactic Acid: No results for input(s): "LACTATE" in the last 48 hours.  Lipid Panel: No results for input(s): "CHOL", "HDL", "LDLCALC", "TRIG", "CHOLHDL" in the last 48 hours.  Troponin:   Recent Labs   Lab 09/19/23  1246   TROPONINI <0.006     TSH:   Recent Labs   Lab 08/01/23  0249   TSH 3.916     Urine Culture: No results for input(s): "LABURIN" in the last 48 hours.  Urine Studies: No results for input(s): "COLORU", "APPEARANCEUA", "PHUR", "SPECGRAV", "PROTEINUA", "GLUCUA", "KETONESU", "BILIRUBINUA", "OCCULTUA", "NITRITE", "UROBILINOGEN", " ""LEUKOCYTESUR", "RBCUA", "WBCUA", "BACTERIA", "SQUAMEPITHEL", "HYALINECASTS" in the last 48 hours.    Invalid input(s): "WRIGHTSUR"    Significant Imaging: I have reviewed all pertinent imaging results/findings within the past 24 hours.  "

## 2023-09-20 ENCOUNTER — ANESTHESIA (OUTPATIENT)
Dept: MEDSURG UNIT | Facility: HOSPITAL | Age: 73
End: 2023-09-20
Payer: MEDICARE

## 2023-09-20 ENCOUNTER — ANESTHESIA EVENT (OUTPATIENT)
Dept: MEDSURG UNIT | Facility: HOSPITAL | Age: 73
End: 2023-09-20
Payer: MEDICARE

## 2023-09-20 VITALS
HEIGHT: 67 IN | RESPIRATION RATE: 18 BRPM | TEMPERATURE: 98 F | SYSTOLIC BLOOD PRESSURE: 121 MMHG | WEIGHT: 249 LBS | DIASTOLIC BLOOD PRESSURE: 54 MMHG | HEART RATE: 88 BPM | BODY MASS INDEX: 39.08 KG/M2 | OXYGEN SATURATION: 95 %

## 2023-09-20 LAB
ANION GAP SERPL CALC-SCNC: 8 MMOL/L (ref 8–16)
ASCENDING AORTA: 3.61 CM
ASCENDING AORTA: 3.7 CM
AV INDEX (PROSTH): 0.96
AV MEAN GRADIENT: 3 MMHG
AV PEAK GRADIENT: 6 MMHG
AV VALVE AREA BY VELOCITY RATIO: 3.91 CM²
AV VALVE AREA: 4.01 CM²
AV VELOCITY RATIO: 0.93
BASOPHILS # BLD AUTO: 0.05 K/UL (ref 0–0.2)
BASOPHILS NFR BLD: 0.9 % (ref 0–1.9)
BSA FOR ECHO PROCEDURE: 2.31 M2
BSA FOR ECHO PROCEDURE: 2.31 M2
BUN SERPL-MCNC: 13 MG/DL (ref 8–23)
CALCIUM SERPL-MCNC: 9.6 MG/DL (ref 8.7–10.5)
CHLORIDE SERPL-SCNC: 108 MMOL/L (ref 95–110)
CO2 SERPL-SCNC: 24 MMOL/L (ref 23–29)
CREAT SERPL-MCNC: 0.8 MG/DL (ref 0.5–1.4)
CV ECHO LV RWT: 0.51 CM
DIFFERENTIAL METHOD: NORMAL
DOP CALC AO PEAK VEL: 1.19 M/S
DOP CALC AO VTI: 25.42 CM
DOP CALC LVOT AREA: 4.2 CM2
DOP CALC LVOT DIAMETER: 2.31 CM
DOP CALC LVOT PEAK VEL: 1.11 M/S
DOP CALC LVOT STROKE VOLUME: 101.83 CM3
DOP CALCLVOT PEAK VEL VTI: 24.31 CM
E WAVE DECELERATION TIME: 221.24 MSEC
E/A RATIO: 1.87
E/E' RATIO: 10.75 M/S
ECHO LV POSTERIOR WALL: 1.1 CM (ref 0.6–1.1)
EOSINOPHIL # BLD AUTO: 0.2 K/UL (ref 0–0.5)
EOSINOPHIL NFR BLD: 3.4 % (ref 0–8)
ERYTHROCYTE [DISTWIDTH] IN BLOOD BY AUTOMATED COUNT: 14.1 % (ref 11.5–14.5)
EST. GFR  (NO RACE VARIABLE): >60 ML/MIN/1.73 M^2
FRACTIONAL SHORTENING: 6 % (ref 28–44)
GLUCOSE SERPL-MCNC: 96 MG/DL (ref 70–110)
HCT VFR BLD AUTO: 47.3 % (ref 37–48.5)
HGB BLD-MCNC: 15.2 G/DL (ref 12–16)
IMM GRANULOCYTES # BLD AUTO: 0.02 K/UL (ref 0–0.04)
IMM GRANULOCYTES NFR BLD AUTO: 0.3 % (ref 0–0.5)
INTERVENTRICULAR SEPTUM: 1.2 CM (ref 0.6–1.1)
LA MAJOR: 6.29 CM
LA MINOR: 6.54 CM
LA WIDTH: 4.01 CM
LEFT ATRIUM SIZE: 3.75 CM
LEFT ATRIUM VOLUME INDEX MOD: 32.7 ML/M2
LEFT ATRIUM VOLUME INDEX: 36.9 ML/M2
LEFT ATRIUM VOLUME MOD: 72.5 CM3
LEFT ATRIUM VOLUME: 81.96 CM3
LEFT INTERNAL DIMENSION IN SYSTOLE: 4.05 CM (ref 2.1–4)
LEFT VENTRICLE DIASTOLIC VOLUME INDEX: 77.48 ML/M2
LEFT VENTRICLE DIASTOLIC VOLUME: 172.01 ML
LEFT VENTRICLE MASS INDEX: 78 G/M2
LEFT VENTRICLE SYSTOLIC VOLUME INDEX: 32.5 ML/M2
LEFT VENTRICLE SYSTOLIC VOLUME: 72.16 ML
LEFT VENTRICULAR INTERNAL DIMENSION IN DIASTOLE: 4.3 CM (ref 3.5–6)
LEFT VENTRICULAR MASS: 173.65 G
LV LATERAL E/E' RATIO: 12.29 M/S
LV SEPTAL E/E' RATIO: 9.56 M/S
LYMPHOCYTES # BLD AUTO: 1.7 K/UL (ref 1–4.8)
LYMPHOCYTES NFR BLD: 28.6 % (ref 18–48)
MAGNESIUM SERPL-MCNC: 2.5 MG/DL (ref 1.6–2.6)
MCH RBC QN AUTO: 29.6 PG (ref 27–31)
MCHC RBC AUTO-ENTMCNC: 32.1 G/DL (ref 32–36)
MCV RBC AUTO: 92 FL (ref 82–98)
MONOCYTES # BLD AUTO: 0.6 K/UL (ref 0.3–1)
MONOCYTES NFR BLD: 10.7 % (ref 4–15)
MV PEAK A VEL: 0.46 M/S
MV PEAK E VEL: 0.86 M/S
NEUTROPHILS # BLD AUTO: 3.3 K/UL (ref 1.8–7.7)
NEUTROPHILS NFR BLD: 56.1 % (ref 38–73)
NRBC BLD-RTO: 0 /100 WBC
PISA TR MAX VEL: 1.78 M/S
PLATELET # BLD AUTO: 255 K/UL (ref 150–450)
PMV BLD AUTO: 10.5 FL (ref 9.2–12.9)
POTASSIUM SERPL-SCNC: 4 MMOL/L (ref 3.5–5.1)
RA MAJOR: 5.29 CM
RA WIDTH: 3.42 CM
RBC # BLD AUTO: 5.13 M/UL (ref 4–5.4)
RIGHT VENTRICULAR END-DIASTOLIC DIMENSION: 2.93 CM
SINUS: 3.25 CM
SINUS: 3.7 CM
SODIUM SERPL-SCNC: 140 MMOL/L (ref 136–145)
STJ: 3.1 CM
STJ: 3.19 CM
TDI LATERAL: 0.07 M/S
TDI SEPTAL: 0.09 M/S
TDI: 0.08 M/S
TR MAX PG: 13 MMHG
TRICUSPID ANNULAR PLANE SYSTOLIC EXCURSION: 2 CM
WBC # BLD AUTO: 5.8 K/UL (ref 3.9–12.7)
Z-SCORE OF LEFT VENTRICULAR DIMENSION IN END DIASTOLE: -5.9
Z-SCORE OF LEFT VENTRICULAR DIMENSION IN END SYSTOLE: -1.18

## 2023-09-20 PROCEDURE — G0378 HOSPITAL OBSERVATION PER HR: HCPCS

## 2023-09-20 PROCEDURE — 99214 OFFICE O/P EST MOD 30 MIN: CPT | Mod: 25,,, | Performed by: STUDENT IN AN ORGANIZED HEALTH CARE EDUCATION/TRAINING PROGRAM

## 2023-09-20 PROCEDURE — 85025 COMPLETE CBC W/AUTO DIFF WBC: CPT | Performed by: STUDENT IN AN ORGANIZED HEALTH CARE EDUCATION/TRAINING PROGRAM

## 2023-09-20 PROCEDURE — D9220A PRA ANESTHESIA: Mod: ANES,,, | Performed by: ANESTHESIOLOGY

## 2023-09-20 PROCEDURE — D9220A PRA ANESTHESIA: Mod: CRNA,,, | Performed by: NURSE ANESTHETIST, CERTIFIED REGISTERED

## 2023-09-20 PROCEDURE — 99214 PR OFFICE/OUTPT VISIT, EST, LEVL IV, 30-39 MIN: ICD-10-PCS | Mod: 25,,, | Performed by: STUDENT IN AN ORGANIZED HEALTH CARE EDUCATION/TRAINING PROGRAM

## 2023-09-20 PROCEDURE — 92960 CARDIOVERSION ELECTRIC EXT: CPT | Mod: ,,, | Performed by: STUDENT IN AN ORGANIZED HEALTH CARE EDUCATION/TRAINING PROGRAM

## 2023-09-20 PROCEDURE — 25000003 PHARM REV CODE 250: Performed by: NURSE ANESTHETIST, CERTIFIED REGISTERED

## 2023-09-20 PROCEDURE — 25000003 PHARM REV CODE 250: Performed by: STUDENT IN AN ORGANIZED HEALTH CARE EDUCATION/TRAINING PROGRAM

## 2023-09-20 PROCEDURE — 36415 COLL VENOUS BLD VENIPUNCTURE: CPT | Performed by: STUDENT IN AN ORGANIZED HEALTH CARE EDUCATION/TRAINING PROGRAM

## 2023-09-20 PROCEDURE — 83735 ASSAY OF MAGNESIUM: CPT | Performed by: STUDENT IN AN ORGANIZED HEALTH CARE EDUCATION/TRAINING PROGRAM

## 2023-09-20 PROCEDURE — 63600175 PHARM REV CODE 636 W HCPCS: Performed by: NURSE ANESTHETIST, CERTIFIED REGISTERED

## 2023-09-20 PROCEDURE — 93010 EKG 12-LEAD: ICD-10-PCS | Mod: ,,, | Performed by: INTERNAL MEDICINE

## 2023-09-20 PROCEDURE — 93010 ELECTROCARDIOGRAM REPORT: CPT | Mod: ,,, | Performed by: INTERNAL MEDICINE

## 2023-09-20 PROCEDURE — 80048 BASIC METABOLIC PNL TOTAL CA: CPT | Performed by: STUDENT IN AN ORGANIZED HEALTH CARE EDUCATION/TRAINING PROGRAM

## 2023-09-20 PROCEDURE — 93005 ELECTROCARDIOGRAM TRACING: CPT

## 2023-09-20 PROCEDURE — 92960 PR CARDIOVERSION, ELECTIVE;EXTERN: ICD-10-PCS | Mod: ,,, | Performed by: STUDENT IN AN ORGANIZED HEALTH CARE EDUCATION/TRAINING PROGRAM

## 2023-09-20 PROCEDURE — 37000009 HC ANESTHESIA EA ADD 15 MINS: Performed by: STUDENT IN AN ORGANIZED HEALTH CARE EDUCATION/TRAINING PROGRAM

## 2023-09-20 PROCEDURE — 99239 HOSP IP/OBS DSCHRG MGMT >30: CPT | Mod: ,,, | Performed by: STUDENT IN AN ORGANIZED HEALTH CARE EDUCATION/TRAINING PROGRAM

## 2023-09-20 PROCEDURE — 37000008 HC ANESTHESIA 1ST 15 MINUTES: Performed by: STUDENT IN AN ORGANIZED HEALTH CARE EDUCATION/TRAINING PROGRAM

## 2023-09-20 PROCEDURE — D9220A PRA ANESTHESIA: ICD-10-PCS | Mod: ANES,,, | Performed by: ANESTHESIOLOGY

## 2023-09-20 PROCEDURE — D9220A PRA ANESTHESIA: ICD-10-PCS | Mod: CRNA,,, | Performed by: NURSE ANESTHETIST, CERTIFIED REGISTERED

## 2023-09-20 PROCEDURE — 99239 PR HOSPITAL DISCHARGE DAY,>30 MIN: ICD-10-PCS | Mod: ,,, | Performed by: STUDENT IN AN ORGANIZED HEALTH CARE EDUCATION/TRAINING PROGRAM

## 2023-09-20 PROCEDURE — 92960 CARDIOVERSION ELECTRIC EXT: CPT | Performed by: STUDENT IN AN ORGANIZED HEALTH CARE EDUCATION/TRAINING PROGRAM

## 2023-09-20 RX ORDER — FLECAINIDE ACETATE 150 MG/1
150 TABLET ORAL EVERY 12 HOURS
Qty: 180 TABLET | Refills: 3 | Status: ON HOLD | OUTPATIENT
Start: 2023-09-20 | End: 2023-10-17 | Stop reason: HOSPADM

## 2023-09-20 RX ORDER — LIDOCAINE HYDROCHLORIDE 20 MG/ML
INJECTION INTRAVENOUS
Status: DISCONTINUED | OUTPATIENT
Start: 2023-09-20 | End: 2023-09-20

## 2023-09-20 RX ORDER — SILVER SULFADIAZINE 10 G/1000G
CREAM TOPICAL
Status: DISCONTINUED | OUTPATIENT
Start: 2023-09-20 | End: 2023-09-20 | Stop reason: HOSPADM

## 2023-09-20 RX ORDER — SODIUM CHLORIDE 0.9 % (FLUSH) 0.9 %
10 SYRINGE (ML) INJECTION
Status: DISCONTINUED | OUTPATIENT
Start: 2023-09-20 | End: 2023-09-20 | Stop reason: HOSPADM

## 2023-09-20 RX ORDER — LIDOCAINE HYDROCHLORIDE 20 MG/ML
SOLUTION OROPHARYNGEAL
Status: DISCONTINUED | OUTPATIENT
Start: 2023-09-20 | End: 2023-09-20

## 2023-09-20 RX ORDER — FLECAINIDE ACETATE 150 MG/1
150 TABLET ORAL EVERY 12 HOURS
Qty: 180 TABLET | Refills: 3 | Status: SHIPPED | OUTPATIENT
Start: 2023-09-20 | End: 2023-09-20 | Stop reason: SDUPTHER

## 2023-09-20 RX ORDER — PROPOFOL 10 MG/ML
VIAL (ML) INTRAVENOUS
Status: DISCONTINUED | OUTPATIENT
Start: 2023-09-20 | End: 2023-09-20

## 2023-09-20 RX ADMIN — APIXABAN 5 MG: 5 TABLET, FILM COATED ORAL at 08:09

## 2023-09-20 RX ADMIN — LIDOCAINE HYDROCHLORIDE 15 ML: 20 SOLUTION ORAL at 11:09

## 2023-09-20 RX ADMIN — SODIUM CHLORIDE: 0.9 INJECTION, SOLUTION INTRAVENOUS at 11:09

## 2023-09-20 RX ADMIN — PRAVASTATIN SODIUM 40 MG: 40 TABLET ORAL at 08:09

## 2023-09-20 RX ADMIN — FLECAINIDE ACETATE 150 MG: 100 TABLET ORAL at 08:09

## 2023-09-20 RX ADMIN — DILTIAZEM HYDROCHLORIDE 120 MG: 120 CAPSULE, COATED, EXTENDED RELEASE ORAL at 08:09

## 2023-09-20 RX ADMIN — LIDOCAINE HYDROCHLORIDE 100 MG: 20 INJECTION INTRAVENOUS at 11:09

## 2023-09-20 RX ADMIN — LEVOTHYROXINE SODIUM 25 MCG: 25 TABLET ORAL at 08:09

## 2023-09-20 RX ADMIN — PROPOFOL 70 MG: 10 INJECTION, EMULSION INTRAVENOUS at 11:09

## 2023-09-20 RX ADMIN — PROPOFOL 30 MG: 10 INJECTION, EMULSION INTRAVENOUS at 11:09

## 2023-09-20 NOTE — PLAN OF CARE
Received report from Max dowell rn and Melissa hamilton. Patient s/p cardioversion only, chemically sedated. Oral airway in place and removed at @1209 per crna.  VSS, no c/o pain or discomfort at this time, resp even and unlabored on 10L mask. Post procedure protocol reviewed with patient. Nurse at bedside. Monitoring per post procedure protocol.

## 2023-09-20 NOTE — DISCHARGE INSTRUCTIONS
Dear Flores Fuentes,    You were admitted for uncontrolled atrial fibrillation and underwent a cardioversion, your flecainide was increased to 150mg twice daily, you will follow up as scheduled with your electrophysiologist for your ablation next month.

## 2023-09-20 NOTE — ANESTHESIA POSTPROCEDURE EVALUATION
Anesthesia Post Evaluation    Patient: Flores Fuentes    Procedure(s) Performed: Procedure(s) (LRB):  Cardioversion or Defibrillation (N/A)  Transesophageal echo (PERICO) intra-procedure log documentation (N/A)    Final Anesthesia Type: general (Natural airway)      Patient location during evaluation: PACU  Patient participation: Yes- Able to Participate  Level of consciousness: awake and alert  Post-procedure vital signs: reviewed and stable  Pain management: adequate  Airway patency: patent    PONV status at discharge: No PONV  Anesthetic complications: no      Cardiovascular status: hemodynamically stable  Respiratory status: unassisted  Hydration status: euvolemic  Follow-up not needed.          Vitals Value Taken Time   /54 09/20/23 1630   Temp  09/20/23 1727   Pulse 88 09/20/23 1630   Resp 5 09/20/23 1334   SpO2 95 % 09/20/23 1630   Vitals shown include unvalidated device data.      No case tracking events are documented in the log.      Pain/Dalia Score: Dalia Score: 5 (9/20/2023 12:10 PM)

## 2023-09-20 NOTE — PLAN OF CARE
Attempted to go assess patient and she was off the unit at this time. Will attempt again later.     Lorna Cabrera RN    [phone]

## 2023-09-20 NOTE — CONSULTS
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 Patient states that she was in her usual state of health until Monday night when she started to experience palpitations and she felt she was back in AF. She took her evening flecainide and went to bed but was not able to fall asleep all night. Tuesday morning, she took her morning flecainide and diltiazem without improvement which led to her presenting to the ED. In the ED, she was found to be in AF with RVR and was given IV Diltiazem 15 mg.     Sees Dr. Servin in clinic. She has a prior history of DCCV 3/13/2017 and was started on Multaq. She had also previously tried as-needed flecainide which initially was helpful but than had episodes in 8/2023 without termination. Was started on standing dose flecainide and had 2 further episodes since. Had ablation scheduled for 10/13.    Today, in good spirits      Anticoagulant/antiplatelets: eliquis  ECG: AF (on tele)    History of stroke: no  Dysphagia or odynophagia:  no  Liver Disease, esophageal disease, or known varices:  no  Upper GI Bleeding: no  Snoring:  yes  Sleep Apnea:  yes  Prior neck surgery or radiation:  no  History of anesthetic difficulties:  no  Family history of anesthetic difficulties:  no  Last oral intake: last pm   Able to move neck in all directions: yes  Platelet count: 255  INR: pending        Medications - Outpatient  Prior to Admission medications    Medication Sig Start Date End Date Taking? Authorizing Provider   diltiaZEM (CARDIZEM CD) 120  MG Cp24 Take 1 capsule (120 mg total) by mouth once daily. 10/24/22   Saira Guerrero NP   ELIQUIS 5 mg Tab TAKE 1 TABLET TWICE DAILY 2/3/23   Ameya Servin MD   flecainide (TAMBOCOR) 100 MG Tab Take 1 tablet (100 mg total) by mouth every 12 (twelve) hours. 8/24/23 8/23/24  Ameya Servin MD   fluticasone propionate (FLONASE) 50 mcg/actuation nasal spray USE 1 SPRAY IN EACH NOSTRIL EVERY DAY 10/25/22   Naz Condon MD   levothyroxine (SYNTHROID) 25 MCG tablet Take 1 tablet (25 mcg total) by mouth once daily. 4/28/23   Naz Condon MD   metoprolol succinate (TOPROL-XL) 25 MG 24 hr tablet Take 0.5 tablets (12.5 mg total) by mouth once daily. 8/24/23 8/23/24  Ameya Servin MD   nystatin (MYCOSTATIN) cream Apply topically 2 (two) times daily. 9/6/23   Anette Infante NP   pravastatin (PRAVACHOL) 40 MG tablet TAKE 1 TABLET EVERY DAY 7/25/23   Naz Condon MD         Medications - Current  Scheduled Meds:  Continuous Infusions:  PRN Meds:.      Allergies  Review of patient's allergies indicates:   Allergen Reactions    Decongestant d [pseudoephedrine-dm]      Atrial Fibrillation    Augmentin [amoxicillin-pot clavulanate]      palpitations      Amoxicillin Palpitations    Diphenhydramine-pseudoephed Palpitations     TACHYCARDIA    Povidone-iodine Rash     Mild erythema of skin         Past Medical History  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 Dr Viktor Toure    Osteopenia of neck of  left femur 6/4/2020    Paroxysmal atrioventricular tachycardia     Plantar fasciitis     Right knee meniscal tear     Dr Mayfield, tx conservatively    Severe obesity (BMI 35.0-35.9 with comorbidity) 1/24/2017    Slow transit constipation 7/13/2021    Despite fruits & veg & 1200 dunia diet, try Colace daily    Stress incontinence, female 03/28/2018    hasn't tried oxybutynin, After HYST, Kegels & PT  didn't work, worse at night wearing pads, Dr Infante    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    Thyroid disease          Past Surgical History  Past Surgical History:   Procedure Laterality Date    APPENDECTOMY      COLONOSCOPY N/A 8/13/2020    Procedure: COLONOSCOPY;  Surgeon: Angus Ac MD;  [facility]  Service: Endoscopy;  Laterality: N/A;  ok to hold Eliquis 2 days per Dr Servin     COVID test at [place] on 8/10-GT    HYSTERECTOMY  1990    TAHBSO for fibroids    INSERTION OF IMPLANTABLE LOOP RECORDER Left 11/1/2021    Procedure: Insertion, Implantable Loop Recorder;  Surgeon: Ameya Servin MD;  Location: [facility] EP LAB;  Service: Cardiology;  Laterality: Left;  AF, ILR implant, MDT,  DM, 3 Prep    MANDIBLE SURGERY  2015    L mandible, Dr Toure    MANDIBLE SURGERY Left 8/27/2019    OOPHORECTOMY      TONSILLECTOMY           Social History  Social History     Socioeconomic History    Marital status:    Tobacco Use    Smoking status: Never    Smokeless tobacco: Never   Substance and Sexual Activity    Alcohol use: No    Drug use: No    Sexual activity: Not Currently   Other Topics Concern    Are you pregnant or think you may be? No     Social Determinants of Health     Financial Resource Strain: Low Risk  (8/1/2023)    Overall Financial Resource Strain (CARDIA)     Difficulty of Paying Living Expenses: Not hard at all   Food Insecurity: No Food Insecurity (8/1/2023)    Hunger Vital Sign     Worried About Running Out of Food in the Last Year: Never true     Ran Out of Food in the  "Last Year: Never true   Transportation Needs: No Transportation Needs (8/1/2023)    PRAPARE - Transportation     Lack of Transportation (Medical): No     Lack of Transportation (Non-Medical): No   Physical Activity: Inactive (8/1/2023)    Exercise Vital Sign     Days of Exercise per Week: 0 days     Minutes of Exercise per Session: 0 min   Stress: Stress Concern Present (8/1/2023)    [place] [employer] - Occupational Stress Questionnaire     Feeling of Stress : To some extent   Social Connections: Socially Integrated (8/1/2023)    Social Connection and Isolation Panel [NHANES]     Frequency of Communication with Friends and Family: More than three times a week     Frequency of Social Gatherings with Friends and Family: Once a week     Attends Judaism Services: More than 4 times per year     Active Member of Clubs or Organizations: Yes     Attends Club or Organization Meetings: More than 4 times per year     Marital Status:    Housing Stability: Low Risk  (8/1/2023)    Housing Stability Vital Sign     Unable to Pay for Housing in the Last Year: No     Number of Places Lived in the Last Year: 1     Unstable Housing in the Last Year: No         ROS  10 point ROS performed and negative except as stated in HPI     Physical Examination         Vital Signs             24 Hour VS Range    BP: ()/()   Arterial Line BP: ()/()   No intake or output data in the 24 hours ending 09/20/23 1039      Physical Exam:   Constitutional: no acute distress  HEENT: NCAT, EOMI, no scleral icterus  Cardiovascular: irregular rate and rhythm   Pulmonary: Normal respiratory effort   Abdomen: nontender, non-distended   Neuro: alert and oriented, no focal deficits  Extremities: warm, no edema   MSK: no deformities  Integument: intact, no rashes         Data       Recent Labs   Lab 09/19/23  1246 09/20/23  0217   WBC 6.03 5.80   HGB 15.4 15.2   HCT 45.9 47.3    255        No results for input(s): "PROTIME", " ""INR" in the last 168 hours.     Recent Labs   Lab 09/19/23  1246 09/20/23  0217    140   K 3.8 4.0    108   CO2 23 24   BUN 12 13   CREATININE 0.7 0.8   ANIONGAP 8 8   CALCIUM 9.9 9.6        Recent Labs   Lab 09/19/23  1246   PROT 8.1   ALBUMIN 4.3   BILITOT 0.8   ALKPHOS 87   AST 16   ALT 10        Recent Labs   Lab 09/19/23  1246   TROPONINI <0.006        BNP (pg/mL)   Date Value   09/19/2023 123 (H)   07/13/2019 79   11/30/2017 95   10/06/2014 85   08/27/2014 104 (H)       No results for input(s): "LABBLOO" in the last 168 hours.         Assessment & Plan     #paroxysmal afib  - pt presented in AF with RVR Monday night, afib has persisted despite flecainide and diltiazem  - now rate controlled    TTE 8/1/23:    Left Ventricle: The left ventricle is normal in size. Ventricular mass is normal. Normal wall thickness. Normal wall motion. There is normal systolic function. Ejection fraction by visual approximation is 65%. There is normal diastolic function.    Left Atrium: Left atrium is mildly dilated. The left atrium volume index is 35.6 mL/m2.    Right Ventricle: Normal right ventricular cavity size. There is hypertrophy. Systolic function is normal.    Right Atrium: Normal right atrial size.    Aortic Valve: The aortic valve is structurally normal. There is normal leaflet mobility. There is trace aortic regurgitation.    Mitral Valve: The mitral valve is structurally normal. There is normal leaflet mobility. There is no significant regurgitation.    Tricuspid Valve: The tricuspid valve is structurally normal. There is normal leaflet mobility. There is mild transvalvular regurgitation.    Pulmonic Valve: The pulmonic valve is structurally normal. There is normal leaflet mobility. There is no significant regurgitation.    Aorta: Aortic root is normal in size measuring 3.25 cm. Ascending aorta is normal measuring 3.06 cm.    Pulmonary Artery: No pulmonary hypertension. Estimated tricuspid valve max " pressure gradient is at least 22 mmHg.    IVC/SVC: Intermediate venous pressure at 8 mmHg.    Pericardium: Epicardial fat appears visualized. There is no pericardial effusion.    -No absolute contraindications of esophageal stricture, tumor, perforation, laceration,or diverticulum and/or active GI bleed.  -The risks, benefits & alternatives of the procedure were explained to the patient.   -The risks of transesophageal echo include but are not limited to:  Dental trauma, esophageal trauma/perforation, bleeding, laryngospasm/brochospasm, aspiration, sore throat/hoarseness, & dislodgement of the endotracheal tube/nasogastric tube (where applicable).    -The risks of moderate sedation include hypotension, respiratory depression, arrhythmias, bronchospasm, & death.    -Informed consent was obtained. The patient is agreeable to proceed with the procedure and all questions and concerns addressed.    Pati Ring PA-C  [facility]

## 2023-09-20 NOTE — PROGRESS NOTES
[facility] Stepdown  Cardiac Electrophysiology  Progress Note    Admission Date: 9/19/2023  Code Status: Full Code   Attending Physician: Minh Godoy MD   Expected Discharge Date: 9/20/2023  Principal Problem:Paroxysmal A-fib    Subjective:     Interval History: No overnight acute events. Denies any complaints. Patient remains in AF on telemetry, rate controlled. Plan for DCCV      Objective:     Vital Signs (Most Recent):  Temp: 97.5 °F (36.4 °C) (09/19/23 2313)  Pulse: 73 (09/20/23 0320)  Resp: 18 (09/19/23 2313)  BP: 132/70 (09/19/23 2313)  SpO2: 98 % (09/19/23 2313) Vital Signs (24h Range):  Temp:  [97.5 °F (36.4 °C)-98.4 °F (36.9 °C)] 97.5 °F (36.4 °C)  Pulse:  [] 73  Resp:  [13-20] 18  SpO2:  [97 %-99 %] 98 %  BP: (131-194)/(63-88) 132/70     Weight: 113.1 kg (249 lb 5.4 oz)  Body mass index is 39.05 kg/m².     SpO2: 98 %        Physical Exam  Vitals reviewed.   Constitutional:       Appearance: Normal appearance.   HENT:      Head: Atraumatic.   Eyes:      Conjunctiva/sclera: Conjunctivae normal.   Cardiovascular:      Rate and Rhythm: Normal rate. Rhythm irregular.      Heart sounds: No murmur heard.  Pulmonary:      Effort: Pulmonary effort is normal.      Breath sounds: Normal breath sounds. No wheezing.   Abdominal:      General: There is no distension.      Palpations: Abdomen is soft.      Tenderness: There is no abdominal tenderness.   Musculoskeletal:      Right lower leg: No edema.      Left lower leg: No edema.   Skin:     General: Skin is warm.   Neurological:      General: No focal deficit present.       Significant Labs: All pertinent lab results from the last 24 hours have been reviewed.    Assessment and Plan:     Atrial fibrillation with RVR  - Please ensure patient remains NPO for PERICO/DCCV today  - Continue with anticoagulation with Eliquis 5 mg, BID  - Continue with Flecainide to 150 mg, BID  - Continue with Diltiazem  - Please monitor electrolytes closely. Maintain Mg >  2 and K > 4  - Telemetry monitoring        Magdalene Bergman MD  Cardiac Electrophysiology  [facility] - Cardiology Stepdown

## 2023-09-20 NOTE — SUBJECTIVE & OBJECTIVE
Interval History: No overnight acute events. Denies any complaints. Patient remains in AF on telemetry, rate controlled. Plan for DCCV      Objective:     Vital Signs (Most Recent):  Temp: 97.5 °F (36.4 °C) (09/19/23 2313)  Pulse: 73 (09/20/23 0320)  Resp: 18 (09/19/23 2313)  BP: 132/70 (09/19/23 2313)  SpO2: 98 % (09/19/23 2313) Vital Signs (24h Range):  Temp:  [97.5 °F (36.4 °C)-98.4 °F (36.9 °C)] 97.5 °F (36.4 °C)  Pulse:  [] 73  Resp:  [13-20] 18  SpO2:  [97 %-99 %] 98 %  BP: (131-194)/(63-88) 132/70     Weight: 113.1 kg (249 lb 5.4 oz)  Body mass index is 39.05 kg/m².     SpO2: 98 %        Physical Exam  Vitals reviewed.   Constitutional:       Appearance: Normal appearance.   HENT:      Head: Atraumatic.   Eyes:      Conjunctiva/sclera: Conjunctivae normal.   Cardiovascular:      Rate and Rhythm: Normal rate. Rhythm irregular.      Heart sounds: No murmur heard.  Pulmonary:      Effort: Pulmonary effort is normal.      Breath sounds: Normal breath sounds. No wheezing.   Abdominal:      General: There is no distension.      Palpations: Abdomen is soft.      Tenderness: There is no abdominal tenderness.   Musculoskeletal:      Right lower leg: No edema.      Left lower leg: No edema.   Skin:     General: Skin is warm.   Neurological:      General: No focal deficit present.       Significant Labs: All pertinent lab results from the last 24 hours have been reviewed.

## 2023-09-20 NOTE — ANESTHESIA PREPROCEDURE EVALUATION
09/20/2023  Flores Fuentes is a 72 y.o., female.    Pre-operative evaluation for Procedure(s) (LRB):  Cardioversion or Defibrillation (N/A)  Transesophageal echo (PERICO) intra-procedure log documentation (N/A)    Flores Fuentes is a 72 y.o. female     Patient Active Problem List   Diagnosis    Mixed hyperlipidemia    Subclinical iodine-deficiency hypothyroidism    Paroxysmal A-fib    Primary localized osteoarthrosis, lower leg    Atrial fibrillation with RVR    Cervical muscle strain    DJD (degenerative joint disease) of cervical spine    Stress incontinence, female    Essential hypertension    MATTHEW (obstructive sleep apnea)    Asymptomatic microscopic hematuria    Osteopenia of neck of left femur    Routine general medical examination at a health care facility    Chronic anticoagulation    Kym-Parkinson-White (WPW) pattern    Slow transit constipation    Morbid obesity    Palpitations    Stress at home    Weight loss    Kym-Parkinson-White (WPW) syndrome       Review of patient's allergies indicates:   Allergen Reactions    Decongestant d [pseudoephedrine-dm]      Atrial Fibrillation    Augmentin [amoxicillin-pot clavulanate]      palpitations      Amoxicillin Palpitations    Diphenhydramine-pseudoephed Palpitations     TACHYCARDIA    Povidone-iodine Rash     Mild erythema of skin       Current Facility-Administered Medications on File Prior to Encounter   Medication Dose Route Frequency Provider Last Rate Last Admin    sodium chloride 0.9% bolus 1,000 mL  1,000 mL Intravenous Once Carley Cabrera NP        vancomycin in dextrose 5 % 1 gram/250 mL IVPB 1,000 mg  1,000 mg Intravenous On Call Procedure Carley Cabrera .7 mL/hr at 11/01/21 1249 1,000 mg at 11/01/21 1249     Current Outpatient Medications on File Prior to Encounter   Medication Sig Dispense  Refill    diltiaZEM (CARDIZEM CD) 120 MG Cp24 Take 1 capsule (120 mg total) by mouth once daily. 90 capsule 3    ELIQUIS 5 mg Tab TAKE 1 TABLET TWICE DAILY 180 tablet 3    flecainide (TAMBOCOR) 100 MG Tab Take 1 tablet (100 mg total) by mouth every 12 (twelve) hours. 180 tablet 3    levothyroxine (SYNTHROID) 25 MCG tablet Take 1 tablet (25 mcg total) by mouth once daily. 90 tablet 1    fluticasone propionate (FLONASE) 50 mcg/actuation nasal spray USE 1 SPRAY IN EACH NOSTRIL EVERY DAY 32 g 2    metoprolol succinate (TOPROL-XL) 25 MG 24 hr tablet Take 0.5 tablets (12.5 mg total) by mouth once daily. 45 tablet 3    nystatin (MYCOSTATIN) cream Apply topically 2 (two) times daily. 30 g 11    pravastatin (PRAVACHOL) 40 MG tablet TAKE 1 TABLET EVERY DAY 90 tablet 0       Past Surgical History:   Procedure Laterality Date    APPENDECTOMY      COLONOSCOPY N/A 8/13/2020    Procedure: COLONOSCOPY;  Surgeon: Angus Ac MD;  Location: [facility] ENDO [address]);  Service: Endoscopy;  Laterality: N/A;  ok to hold Eliquis 2 days per Dr Servin     COVID test at [place] on 8/10-GT    HYSTERECTOMY  1990    TAHBSO for fibroids    INSERTION OF IMPLANTABLE LOOP RECORDER Left 11/1/2021    Procedure: Insertion, Implantable Loop Recorder;  Surgeon: Ameya Servin MD;  Location: [facility] EP LAB;  Service: Cardiology;  Laterality: Left;  AF, ILR implant, MDT,  DM, 3 Prep    MANDIBLE SURGERY  2015    L mandible, Dr Toure    MANDIBLE SURGERY Left 8/27/2019    OOPHORECTOMY      TONSILLECTOMY         Social History     Socioeconomic History    Marital status:    Tobacco Use    Smoking status: Never    Smokeless tobacco: Never   Substance and Sexual Activity    Alcohol use: No    Drug use: No    Sexual activity: Not Currently   Other Topics Concern    Are you pregnant or think you may be? No     Social Determinants of Health     Financial Resource Strain: Low Risk  (8/1/2023)    Overall Financial Resource Strain (CARDIA)      "Difficulty of Paying Living Expenses: Not hard at all   Food Insecurity: No Food Insecurity (8/1/2023)    Hunger Vital Sign     Worried About Running Out of Food in the Last Year: Never true     Ran Out of Food in the Last Year: Never true   Transportation Needs: No Transportation Needs (8/1/2023)    PRAPARE - Transportation     Lack of Transportation (Medical): No     Lack of Transportation (Non-Medical): No   Physical Activity: Inactive (8/1/2023)    Exercise Vital Sign     Days of Exercise per Week: 0 days     Minutes of Exercise per Session: 0 min   Stress: Stress Concern Present (8/1/2023)    [place] [employer] - Occupational Stress Questionnaire     Feeling of Stress : To some extent   Social Connections: Socially Integrated (8/1/2023)    Social Connection and Isolation Panel [NHANES]     Frequency of Communication with Friends and Family: More than three times a week     Frequency of Social Gatherings with Friends and Family: Once a week     Attends Yazidi Services: More than 4 times per year     Active Member of Clubs or Organizations: Yes     Attends Club or Organization Meetings: More than 4 times per year     Marital Status:    Housing Stability: Low Risk  (8/1/2023)    Housing Stability Vital Sign     Unable to Pay for Housing in the Last Year: No     Number of Places Lived in the Last Year: 1     Unstable Housing in the Last Year: No         CBC:   Recent Labs     09/19/23  1246 09/20/23  0217   WBC 6.03 5.80   RBC 5.14 5.13   HGB 15.4 15.2   HCT 45.9 47.3    255   MCV 89 92   MCH 30.0 29.6   MCHC 33.6 32.1       CMP:   Recent Labs     09/19/23  1246 09/20/23  0217    140   K 3.8 4.0    108   CO2 23 24   BUN 12 13   CREATININE 0.7 0.8   GLU 85 96   MG  --  2.5   CALCIUM 9.9 9.6   ALBUMIN 4.3  --    PROT 8.1  --    ALKPHOS 87  --    ALT 10  --    AST 16  --    BILITOT 0.8  --        INR  No results for input(s): "PT", "INR", "PROTIME", " ""APTT" in the last 72 hours.        Diagnostic Studies:      EKD Echo:  Results for orders placed or performed during the hospital encounter of 18   2D echo with color flow doppler   Result Value Ref Range    EF + QEF 65 55 - 65    Diastolic Dysfunction No     Est. PA Systolic Pressure 21.66     Mitral Valve Mobility NORMAL     Tricuspid Valve Regurgitation TRIVIAL            Pre-op Assessment    I have reviewed the Patient Summary Reports.    I have reviewed the NPO Status.   I have reviewed the Medications.     Review of Systems  Anesthesia Hx:  No problems with previous Anesthesia   Denies Personal Hx of Anesthesia complications.   Cardiovascular:   Exercise tolerance: poor Hypertension    Pulmonary:   Sleep Apnea, CPAP    Renal/:  Renal/ Normal     Hepatic/GI:  Hepatic/GI Normal    Neurological:   Denies CVA. Denies Seizures.    Endocrine:  Obesity / BMI > 30      Physical Exam  General: Cooperative, Alert and Oriented    Airway:  Mallampati: III   Mouth Opening: Normal  TM Distance: 4 - 6 cm  Tongue: Normal  Neck ROM: Normal ROM    Dental:  Intact        Anesthesia Plan  Type of Anesthesia, risks & benefits discussed:    Anesthesia Type: Gen Natural Airway  Intra-op Monitoring Plan: Standard ASA Monitors  Induction:  IV  Informed Consent: Informed consent signed with the Patient and all parties understand the risks and agree with anesthesia plan.  All questions answered.   ASA Score: 3  Day of Surgery Review of History & Physical: H&P Update referred to the surgeon/provider.    Ready For Surgery From Anesthesia Perspective.     .      "

## 2023-09-20 NOTE — PLAN OF CARE
[facility] - Cardiology Stepdown  Discharge Final Note    Primary Care Provider: Naz Condon MD    Expected Discharge Date: 9/20/2023    Final Discharge Note (most recent)       Final Note - 09/20/23 1600          Final Note    Assessment Type Final Discharge Note     Anticipated Discharge Disposition Home or Self Care     Hospital Resources/Appts/Education Provided Appointments scheduled and added to AVS                   Saira Clark LMSW [facility]  [id number]      Future Appointments   Date Time Provider Department Center   9/21/2023 11:30 AM Dejon mSart MD [facility]   9/22/2023 11:00 AM HOME MONITOR DEVICE CHECK, [facility] ARRHPRO [facility]   10/10/2023  7:45 AM LAB, APPOINTMENT [facility] INTMED [facility] LAB IM [facility]   10/10/2023  8:00 AM [facility] OIC-CT1 500 LB LIMIT [facility]   10/17/2023  5:15 AM 3PREP, [facility] SSCU [facility]   10/17/2023  8:00 AM INPATIENT, PERICO [facility] ECHOSTR [facility]   11/9/2023 10:20 AM Simone Shannon MD [facility] CARDIO [facility]

## 2023-09-20 NOTE — TRANSFER OF CARE
"Anesthesia Transfer of Care Note    Patient: Flores Fuentes    Procedure(s) Performed: Procedure(s) (LRB):  Cardioversion or Defibrillation (N/A)  Transesophageal echo (PERICO) intra-procedure log documentation (N/A)    Patient location: Cath Lab    Anesthesia Type: general    Transport from OR: Transported from OR on 6-10 L/min O2 by face mask with adequate spontaneous ventilation    Post pain: adequate analgesia    Post assessment: no apparent anesthetic complications and tolerated procedure well    Post vital signs: stable    Level of consciousness: sedated and responds to stimulation    Nausea/Vomiting: no nausea/vomiting    Complications: none    Transfer of care protocol was followed      Last vitals:   Visit Vitals  BP 95/57   Pulse 90   Temp 36.3 °C (97.4 °F) (Oral)   Resp 18   Ht 5' 7" (1.702 m)   Wt 112.9 kg (249 lb)   SpO2 97%   Breastfeeding No   BMI 39.00 kg/m²     "

## 2023-09-20 NOTE — NURSING
1630 - IV and tele removed. Discharge teaching and paperwork reviewed and given to pt and spouse. Verbalizes understanding. No questions.    1750 - One medication delivered via bedside pharmacy.    1800 - Pt escorted off unit via wheelchair with transport and spouse.

## 2023-09-20 NOTE — ASSESSMENT & PLAN NOTE
- Please ensure patient remains NPO for PERICO/DCCV today  - Continue with anticoagulation with Eliquis 5 mg, BID  - Continue with Flecainide to 150 mg, BID  - Continue with Diltiazem  - Please monitor electrolytes closely. Maintain Mg > 2 and K > 4  - Telemetry monitoring

## 2023-09-20 NOTE — PLAN OF CARE
[facility] Cardiology Stepdown  Initial Discharge Assessment       Primary Care Provider: Naz Condon MD    Admission Diagnosis: Arrhythmia [I49.9]  Tachycardia [R00.0]  EKG abnormalities [R94.31]  Chest pain [R07.9]  Atrial fibrillation with RVR [I48.91]    Admission Date: 9/19/2023  Expected Discharge Date: 9/20/2023    Transition of Care Barriers: None    Payor: HU[employer] MANAGED MEDICARE / Plan: HU[employer] MEDICARE HMO / Product Type: Capitation /     Extended Emergency Contact Information  Primary Emergency Contact: GinaDorian  Address: [address] [place] [address]            [place] [address] [place]  Mobile Phone: [phone]  Relation: Spouse    Discharge Plan A: Home with family  Discharge Plan B: Home with family      [id number] [address] OF [address] [address]  Phone: [phone] Fax: [phone]    [facility] - [place] [id number] [facility]  [address] [facility] [address]  Phone: [phone] Fax: [phone]      Initial Assessment (most recent)       Adult Discharge Assessment - 09/20/23 1446          Discharge Assessment    Assessment Type Discharge Planning Assessment     Confirmed/corrected address, phone number and insurance Yes     Confirmed Demographics Correct on Facesheet     Source of Information patient;family     Communicated TIMA with patient/caregiver Date not available/Unable to determine     Reason For Admission Paroxymal A-fib     People in Home spouse     Facility Arrived From: home     Do you expect to return to your current living situation? Yes     Do you have help at home or someone to help you manage your care at home? Yes     Who are your caregiver(s) and their phone number(s)? Dorian Fuentes (spouse) [phone]     Prior to hospitilization cognitive status: Alert/Oriented     Current cognitive status: Alert/Oriented     Walking or Climbing  Stairs --   none    Dressing/Bathing --   none    Equipment Currently Used at Home CPAP   supplies via [facility]    Readmission within 30 days? No     Patient currently being followed by outpatient case management? No     Do you currently have service(s) that help you manage your care at home? No     Do you take prescription medications? Yes     Do you have prescription coverage? Yes     Coverage [employer] Man[employer] Medicare     Do you have any problems affording any of your prescribed medications? No     Is the patient taking medications as prescribed? yes     Who is going to help you get home at discharge? Dorian Fuentes (spouse) [phone]     How do you get to doctors appointments? family or friend will provide;car, drives self     Are you on dialysis? No     Do you take coumadin? No     DME Needed Upon Discharge  none     Discharge Plan discussed with: Spouse/sig other;Patient     Name(s) and Number(s) Dorian Fuentes (spouse) [phone]     Transition of Care Barriers None     Discharge Plan A Home with family     Discharge Plan B Home with family        OTHER    Name(s) of People in Home Dorian Fuentes (spouse) [phone] along with daughter and son in law and 2 adult children and 1 minor child and patient' father now lives with them.                      PALOMA met with the patient and Dorian Fuentes (spouse) [phone] at the bedside and disscussed the discharge process with them. Gave them the discharge handbook and placed contact numbers in book for her. She lives with her spouse and family in a single story house with one step for port of entry . Spouse will bring home at time of disdcharge. She owns a C-PAP machine and is not on coumadin and not dialysis. She is interested in BSD. She is independent with her adl's and is able to drive. Will continue to monitor for discharge needs.     Lorna Cabrera RN, CM   [phone]

## 2023-09-20 NOTE — NURSING
1117 - Pt taken for cardioversion via bed with transport. Pt AO x4. Glasses on. IV saline locked. Tele in place.    1340 - Pt returned to room via bed with transport. Vitals taken. Tele in place. Bedside echo being done.

## 2023-09-21 NOTE — DISCHARGE SUMMARY
Discharge Summary  Hospital Medicine  [facility]      Attending Physician on Discharge: Minh Godoy MD  Hospital Medicine Team: [facility]OS[facility] MED C  Date of Admission:  9/19/2023     Date of Discharge:  9/20/2023  Code status: Full Code    Active Hospital Problems    Diagnosis  POA    *Paroxysmal A-fib [I48.0]  Yes     Chronic    Kym-Parkinson-White (WPW) syndrome [I45.6]  Yes    Kym-Parkinson-White (WPW) pattern [I45.6]  Yes     Chronic    Essential hypertension [I10]  Yes     Chronic    Atrial fibrillation with RVR [I48.91]  Unknown    Mixed hyperlipidemia [E78.2]  Yes     Chronic    Subclinical iodine-deficiency hypothyroidism [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       Consults: EP     History of Present Illness:  72F w/ hx of WPW, paroxysmal atrial fibrillation, hypothyroidism, HLD who follows with Dr. Servin and is planned to have an ablation in 10/2023 presents with sudden onset afib that is not improved with home medications.     Patients reports yesterday evening she was dozing off while watching the [employer] game and felt palpitations and knew she had flipped into afib, she took her evening flecainide and went to bed, the morning of admission she took her morning flecainide and diltiazem without improvement so she presented to the ED.     In the ED patient was tachycardic in afib, BP 160s/60s, afebrile on RA, CBC and BMP unremarkable, trop negative, BNP slightly elevated at 123, she was given 15mg IV dilt without improvement.     Hospital Course:  Underwent PERICO/DCCV on 9/20 successfully, flecainide increased to 150mg BID at the recommendation of EP, plan to follow up outpatient for ablation next month.         Laboratory Values:  Recent Labs   Lab 09/19/23  1246 09/20/23  0217   WBC 6.03 5.80   HGB 15.4 15.2   HCT 45.9 47.3    255     Recent Labs   Lab 09/19/23  1246 09/20/23  0217    140   K 3.8 4.0    108  "  CO2 23 24   BUN 12 13   CREATININE 0.7 0.8   GLU 85 96   CALCIUM 9.9 9.6   MG  --  2.5     Recent Labs   Lab 09/19/23  1246   ALKPHOS 87   ALT 10   AST 16   ALBUMIN 4.3   PROT 8.1   BILITOT 0.8      No results for input(s): "POCTGLUCOSE" in the last 168 hours.  Recent Labs     09/19/23  1246   TROPONINI <0.006         Microbiology:  Microbiology Results (last 7 days)       ** No results found for the last 168 hours. **            Imaging:  Imaging Results              X-Ray Chest AP Portable (Final result)  Result time 09/19/23 16:56:07      Final result by Ameya Dominique Jr., MD (09/19/23 16:56:07)                   Impression:      No acute abnormality seen and no detrimental change.      Electronically signed by: Ameya Dominique MD  Date:    09/19/2023  Time:    16:56               Narrative:    EXAMINATION:  XR CHEST AP PORTABLE    CLINICAL HISTORY:  c/f volume overload;    TECHNIQUE:  Single frontal view of the chest was performed.    COMPARISON:  August 1, 2023    FINDINGS:  Loop recorder a, monitoring leads and presumed clothing clips noted.  Heart is normal in size.  Accentuation interstitial markings likely related to technique.  No confluent consolidation.  There is some blunting of the left costophrenic angle though similar.                                        Cardiac:  ECG Results              EKG 12-lead (Final result)  Result time 09/19/23 15:04:10      Final result by Interface, Lab In [facility] (09/19/23 15:04:10)                   Narrative:    Test Reason : R94.31,    Vent. Rate : 093 BPM     Atrial Rate : 077 BPM     P-R Int : 000 ms          QRS Dur : 090 ms      QT Int : 394 ms       P-R-T Axes : 000 -37 033 degrees     QTc Int : 489 ms    Atrial fibrillation  Left axis deviation  Right ventricular conduction delay  Cannot exclude Inferior infarct (cited on or before 20-OCT-2022)  Abnormal ECG  When compared with ECG of 19-SEP-2023 12:25,  No significant change was found  Confirmed by Mo " Dorian YARBROUGH ([id number]) on 9/19/2023 3:03:57 PM    Referred By: [email or web address]   SELF           Confirmed By:Dorian Mo MD                                     EKG 12-lead (Final result)  Result time 09/19/23 14:25:40      Final result by Interface, Lab In [facility] (09/19/23 14:25:40)                   Narrative:    Test Reason : R00.0,    Vent. Rate : 114 BPM     Atrial Rate : 000 BPM     P-R Int : 000 ms          QRS Dur : 090 ms      QT Int : 368 ms       P-R-T Axes : 000 -34 041 degrees     QTc Int : 507 ms    Atrial fibrillation with rapid ventricular response  Left axis deviation  Right ventricular conduction delay  Possible  Anterior infarct (cited on or before vs lead placement  Abnormal ECG  When compared with ECG of 24-AUG-2023 11:50,  Atrial fibrillation has replaced Sinus rhythm  Vent. rate has increased BY  57 BPM  Questionable change in initial forces of Lateral leads  Confirmed by Alvin ROBERTS MD ([id number]) on 9/19/2023 2:25:32 PM    Referred By: [email or web address]   SELF           Confirmed By:Alvin ROBERTS MD                                    Results for orders placed during the hospital encounter of 09/19/23    Echo    Interpretation Summary    Left Ventricle: The left ventricle is normal in size. Increased wall thickness. There is concentric remodeling. Normal wall motion. There is normal systolic function with a visually estimated ejection fraction of 60 - 65%. There is indeterminate diastolic function.    Right Ventricle: Normal right ventricular cavity size. There is hypertrophy. Right ventricle wall motion  is normal. Systolic function is normal.    Left Atrium: Left atrium is mildly dilated.        Procedures:  Procedure(s) (LRB):  Cardioversion or Defibrillation (N/A)  Transesophageal echo (PERICO) intra-procedure log documentation (N/A)        Discharge Medication List as of 9/20/2023  3:40 PM        CONTINUE these medications which have CHANGED    Details   flecainide (TAMBOCOR) 150 MG Tab Take 1 tablet (150 mg  total) by mouth every 12 (twelve) hours., Starting Wed 9/20/2023, Until Thu 9/19/2024, Normal           CONTINUE these medications which have NOT CHANGED    Details   diltiaZEM (CARDIZEM CD) 120 MG Cp24 Take 1 capsule (120 mg total) by mouth once daily., Starting Mon 10/24/2022, Normal      ELIQUIS 5 mg Tab TAKE 1 TABLET TWICE DAILY, Normal      levothyroxine (SYNTHROID) 25 MCG tablet Take 1 tablet (25 mcg total) by mouth once daily., Starting Fri 4/28/2023, Normal      fluticasone propionate (FLONASE) 50 mcg/actuation nasal spray USE 1 SPRAY IN EACH NOSTRIL EVERY DAY, Normal      nystatin (MYCOSTATIN) cream Apply topically 2 (two) times daily., Starting Wed 9/6/2023, Normal      pravastatin (PRAVACHOL) 40 MG tablet TAKE 1 TABLET EVERY DAY, Normal           STOP taking these medications       metoprolol succinate (TOPROL-XL) 25 MG 24 hr tablet Comments:   Reason for Stopping:                 Discharge Diet:cardiac diet with Normal Fluid intake of 1500 - 2000 mL per day    Activity: activity as tolerated    Discharge Condition: Good    Disposition: Home or Self Care    Follow up:        Tests pending at the time of discharge: none        Time spent  on the discharge of the patient including review of hospital course with the patient. reviewing discharge medications and arranging follow-up care: >30 mins    Discharge examination: Patient was seen and examined on the date of discharge and determined to be suitable for discharge.        Minh Godoy M.D.  Department of Hospital Medicine  [facility]

## 2023-09-22 ENCOUNTER — CLINICAL SUPPORT (OUTPATIENT)
Dept: CARDIOLOGY | Facility: HOSPITAL | Age: 73
End: 2023-09-22
Payer: MEDICARE

## 2023-09-22 DIAGNOSIS — Z95.818 PRESENCE OF OTHER CARDIAC IMPLANTS AND GRAFTS: ICD-10-CM

## 2023-09-22 DIAGNOSIS — I49.8 OTHER SPECIFIED CARDIAC ARRHYTHMIAS: ICD-10-CM

## 2023-09-22 PROCEDURE — 93298 CARDIAC DEVICE CHECK - REMOTE: ICD-10-PCS | Mod: ,,, | Performed by: INTERNAL MEDICINE

## 2023-09-22 PROCEDURE — 93298 REM INTERROG DEV EVAL SCRMS: CPT | Mod: ,,, | Performed by: INTERNAL MEDICINE

## 2023-09-22 PROCEDURE — G2066 INTER DEVC REMOTE 30D: HCPCS | Performed by: INTERNAL MEDICINE

## 2023-09-26 ENCOUNTER — PATIENT MESSAGE (OUTPATIENT)
Dept: MEDSURG UNIT | Facility: HOSPITAL | Age: 73
End: 2023-09-26
Payer: MEDICARE

## 2023-10-03 ENCOUNTER — CLINICAL SUPPORT (OUTPATIENT)
Dept: CARDIOLOGY | Facility: HOSPITAL | Age: 73
End: 2023-10-03
Attending: INTERNAL MEDICINE
Payer: MEDICARE

## 2023-10-03 DIAGNOSIS — I48.0 PAROXYSMAL A-FIB: Primary | Chronic | ICD-10-CM

## 2023-10-03 DIAGNOSIS — I49.8 OTHER SPECIFIED CARDIAC ARRHYTHMIAS: ICD-10-CM

## 2023-10-03 DIAGNOSIS — Z95.818 PRESENCE OF OTHER CARDIAC IMPLANTS AND GRAFTS: ICD-10-CM

## 2023-10-04 LAB
OHS CV AF BURDEN PERCENT: 5.5
OHS CV DC REMOTE DEVICE TYPE: NORMAL
OHS CV ICD SHOCK: NO

## 2023-10-09 ENCOUNTER — PATIENT MESSAGE (OUTPATIENT)
Dept: MEDSURG UNIT | Facility: HOSPITAL | Age: 73
End: 2023-10-09
Payer: MEDICARE

## 2023-10-09 NOTE — TELEPHONE ENCOUNTER
No care due was identified.  [facility] Embedded Care Due Messages. Reference number: [id number].   10/09/2023 3:22:22 PM CDT

## 2023-10-10 ENCOUNTER — HOSPITAL ENCOUNTER (OUTPATIENT)
Dept: RADIOLOGY | Facility: HOSPITAL | Age: 73
Discharge: HOME | End: 2023-10-10
Attending: INTERNAL MEDICINE
Payer: MEDICARE

## 2023-10-10 DIAGNOSIS — I48.0 PAROXYSMAL A-FIB: ICD-10-CM

## 2023-10-10 DIAGNOSIS — I45.6 WOLFF-PARKINSON-WHITE (WPW) PATTERN: ICD-10-CM

## 2023-10-10 PROCEDURE — 25500020 PHARM REV CODE 255: Performed by: INTERNAL MEDICINE

## 2023-10-10 PROCEDURE — 71275 CT ANGIOGRAPHY CHEST: CPT | Mod: 26,,, | Performed by: RADIOLOGY

## 2023-10-10 PROCEDURE — 71275 CT ANGIOGRAPHY CHEST: CPT | Mod: TC

## 2023-10-10 PROCEDURE — 71275 CTA CHEST NON CORONARY (PE STUDIES): ICD-10-PCS | Mod: 26,,, | Performed by: RADIOLOGY

## 2023-10-10 RX ORDER — FLUTICASONE PROPIONATE 50 MCG
SPRAY, SUSPENSION (ML) NASAL
Qty: 32 G | Refills: 2 | Status: SHIPPED | OUTPATIENT
Start: 2023-10-10

## 2023-10-10 RX ORDER — LEVOTHYROXINE SODIUM 25 UG/1
25 TABLET ORAL
Qty: 90 TABLET | Refills: 2 | Status: SHIPPED | OUTPATIENT
Start: 2023-10-10

## 2023-10-10 RX ADMIN — IOHEXOL 100 ML: 350 INJECTION, SOLUTION INTRAVENOUS at 08:10

## 2023-10-16 NOTE — H&P
[facility] - Short Stay Cardiac Unit  Cardiac Electrophysiology  History and Physical     Admission Date: 10/17/2023  Code Status: Prior   Attending Provider: Ameya Servin MD   Principal Problem:Paroxysmal A-fib    Subjective:     Chief Complaint: Atrial Fibrillation     HPI:   Ms. Fuentes is a 73 y.o. female with pAF, HLD, obesity, hypothyroid, MATTHEW on CPAP here for ablation   AF first diagnosed in 2006 after lots of caffeine. Few episodes since then. Pill in pocket strategy.  Also, intermittent WPW pattern (see ECG 10/22/22)     Went to ER 3/10 with palps. Underwent DCCV 3/13/17 after remaining in AF with RVR. Started on multaq, and BB d/c'd.  Since, feeling not quite as well as usually, and on 4/17, at which time HR was 120s and didn't feel same as prior AF episodes. She'd been on BB for years w/o issues. Good exertion tolerance. No CP.     Due to rare PAF episodes, we adopted a pill-in-the-pocket strategy. She used it first in 11/17; went to the ER. AF resolved <30 min after taking dose. Had 2 other episodes, self-treated successfully, in 12/17 and 1/18. Since last seen, has had 4 such episodes, all aborted with PIP.      Had AF episode 8/2023 without termination from PIP flec. Standing-dose flec started. Has had 2 episodes of AF since.    Continue dilt, toprol (low-dose)  No flec x 3 days preablation.     TTE 9/2023 EF 60-65%     My interpretation of today's ECG is NSR with 1st degree AVB and iRBBB, without preexcitation     Past Medical History:   Diagnosis Date    Asymptomatic microscopic hematuria 6/2/2020    Atrial fibrillation 2014    nerves tip off, cardioverted 2017, Eliquis, q 6 mo, Dr Servin, flecainide at home prn flare up 4/2019, 9/2018)    Cervical muscle strain 1/24/2017    Chronic anticoagulation 6/10/2021    DJD (degenerative joint disease) of cervical spine 1/24/2017    Essential hypertension 6/19/2019    Hyperlipidemia     Mitral valve disease     Mixed hyperlipidemia 11/07/2013    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, l mandible, 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 Dr Viktor Toure    Osteopenia of neck of left femur 6/4/2020    Paroxysmal atrioventricular tachycardia     Plantar fasciitis     Right knee meniscal tear     Dr Mayfield, tx conservatively    Severe obesity (BMI 35.0-35.9 with comorbidity) 1/24/2017    Slow transit constipation 7/13/2021    Despite fruits & veg & 1200 dunia diet, try Colace daily    Stress incontinence, female 03/28/2018    hasn't tried oxybutynin, After HYST, Kegels & PT  didn't work, worse at night wearing pads, Dr Infante    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    Thyroid disease      Past Surgical History:   Procedure Laterality Date    APPENDECTOMY      COLONOSCOPY N/A 8/13/2020    Procedure: COLONOSCOPY;  Surgeon: Angus Ac MD;  Location: [facility] ENDO ([address] [place] [address]);  Service: Endoscopy;  Laterality: N/A;  ok to hold Eliquis 2 days per Dr Servin     COVID test at [facility] on 8/10-GT    ECHOCARDIOGRAM,TRANSESOPHAGEAL N/A 9/20/2023    Procedure: Transesophageal echo (PERICO) intra-procedure log documentation;  Surgeon: Provider, [facility];  Location: [facility] EP LAB;  Service: Cardiology;  Laterality: N/A;    HYSTERECTOMY  1990    TAHBSO for fibroids    INSERTION OF IMPLANTABLE LOOP RECORDER Left 11/1/2021    Procedure: Insertion, Implantable Loop Recorder;  Surgeon: Ameya Servin MD;  Location: [facility] EP LAB;  Service: Cardiology;  Laterality: Left;  AF, ILR implant, MDT,  DM, 3 Prep    MANDIBLE SURGERY  2015    L mandible, Dr Toure    MANDIBLE SURGERY Left 8/27/2019    OOPHORECTOMY      TONSILLECTOMY      TREATMENT OF CARDIAC ARRHYTHMIA N/A 9/20/2023    Procedure: Cardioversion or Defibrillation;  Surgeon: JV Rosenthal MD;  Location: [facility] EP LAB;  Service: Cardiology;  Laterality: N/A;  AF, DCCV/PERICO, ANES, EH,      Review of patient's allergies indicates:   Allergen Reactions    Decongestant d [pseudoephedrine-dm]      Atrial Fibrillation    Augmentin  [amoxicillin-pot clavulanate]      palpitations      Amoxicillin Palpitations    Diphenhydramine-pseudoephed Palpitations     TACHYCARDIA    Povidone-iodine Rash     Mild erythema of skin     Current Facility-Administered Medications on File Prior to Encounter   Medication    sodium chloride 0.9% bolus 1,000 mL    vancomycin in dextrose 5 % 1 gram/250 mL IVPB 1,000 mg     Current Outpatient Medications on File Prior to Encounter   Medication Sig    diltiaZEM (CARDIZEM CD) 120 MG Cp24 Take 1 capsule (120 mg total) by mouth once daily.    ELIQUIS 5 mg Tab TAKE 1 TABLET TWICE DAILY    pravastatin (PRAVACHOL) 40 MG tablet TAKE 1 TABLET EVERY DAY     Tobacco Use    Smoking status: Never    Smokeless tobacco: Never   Substance and Sexual Activity    Alcohol use: No    Drug use: No    Sexual activity: Not Currently     Review of Systems   Constitutional: Negative for chills, diaphoresis and night sweats.   Cardiovascular:  Positive for irregular heartbeat. Negative for chest pain, dyspnea on exertion, leg swelling, near-syncope and palpitations.   Respiratory:  Negative for cough, shortness of breath and wheezing.    Skin:  Negative for color change and dry skin.   Musculoskeletal:  Negative for joint pain and joint swelling.   Gastrointestinal:  Negative for abdominal pain, diarrhea, nausea and vomiting.   Genitourinary:  Negative for dysuria.   Neurological:  Negative for dizziness, headaches, light-headedness and weakness.   All other systems reviewed and are negative.    Objective:     Vital Signs (Most Recent):  Temp: 98.6 °F (37 °C) (10/17/23 0615)  Pulse: 61 (10/17/23 0615)  Resp: 18 (10/17/23 0615)  BP: (!) 160/70 (10/17/23 0616)  SpO2: 98 % (10/17/23 0615) Vital Signs (24h Range):  Temp:  [98.6 °F (37 °C)] 98.6 °F (37 °C)  Pulse:  [61] 61  Resp:  [18] 18  SpO2:  [98 %] 98 %  BP: (160-180)/(70-78) 160/70     Body mass index is 37.9 kg/m².    Physical Exam  Constitutional:       General: She is not in acute  distress.     Appearance: Normal appearance. She is not diaphoretic.   HENT:      Head: Normocephalic and atraumatic.   Eyes:      General: No scleral icterus.     Conjunctiva/sclera: Conjunctivae normal.      Pupils: Pupils are equal, round, and reactive to light.   Cardiovascular:      Rate and Rhythm: Normal rate and regular rhythm.      Pulses: Normal pulses.      Heart sounds: Normal heart sounds. No murmur heard.     No friction rub. No gallop.   Pulmonary:      Effort: Pulmonary effort is normal.      Breath sounds: No wheezing, rhonchi or rales.   Chest:      Chest wall: No tenderness.   Abdominal:      General: Bowel sounds are normal. There is no distension.      Palpations: Abdomen is soft.      Tenderness: There is no abdominal tenderness.   Musculoskeletal:      Right lower leg: No edema.      Left lower leg: No edema.   Skin:     General: Skin is warm and dry.      Capillary Refill: Capillary refill takes less than 2 seconds.      Findings: No rash.   Neurological:      General: No focal deficit present.      Mental Status: She is alert and oriented to person, place, and time.   Psychiatric:         Mood and Affect: Mood normal.         Thought Content: Thought content normal.     Significant Labs: Reviewed    Significant Imaging: Reviewed    Assessment and Plan:     Active Diagnoses:    Diagnosis Date Noted POA    PRINCIPAL PROBLEM:  Paroxysmal A-fib [I48.0] 10/14/2014 Yes     Chronic    Morbid obesity [E66.01] 07/22/2021 Yes     Chronic    Kym-Parkinson-White (WPW) pattern [I45.6] 07/01/2021 Yes     Chronic    MATTHEW (obstructive sleep apnea) [G47.33]  Yes     Chronic      Problems Resolved During this Admission:     In summary, Ms. Fuentes is a 73 y.o. female with pAF, HLD, obesity, hypothyroid here for follow up. Intermittent WPW pattern.     Now having PAF despite standing-dose flecainide.  Discussed options: no change, increase flec, change AAD, ablation.  Informed consent was obtained, we  discussed the risks and benefits of  the procedure (pulmonary vein isolation) which included (but was not limited to) the possibility of bleeding or injury to the vessels involved, embolism, cardiac perforation, cardiac tamponade requiring emergent drainage with a pericardial drain or surgery, esophageal injury or fistula formation, phrenic nerve injury, pulmonary vein stenosis, injury to the native conduction system of the heart requiring a PPM, renal injury if contrast used, MI, stroke, and death. We also reviewed indications, and alternatives of the planned procedure. All questions were answered. Patient wishes to proceed.  I discussed with patient risks, indications, benefits, and alternatives of the planned procedure. All questions were answered. Patient understands and wishes to proceed.     Plan for RF PVI and likely also ablate the AP.  PERICO prior  Off flecainide past 3 days  Compliant with eliquis 5mg BID, last dose evening prior.    Case discussed with Dr. Servin.       Thomas Chavez MD  Cardiac Electrophysiology  [facility] - Short Stay Cardiac Unit

## 2023-10-17 ENCOUNTER — ANESTHESIA (OUTPATIENT)
Dept: MEDSURG UNIT | Facility: HOSPITAL | Age: 73
End: 2023-10-17
Payer: MEDICARE

## 2023-10-17 ENCOUNTER — HOSPITAL ENCOUNTER (OUTPATIENT)
Facility: HOSPITAL | Age: 73
Discharge: HOME | End: 2023-10-17
Attending: INTERNAL MEDICINE | Admitting: INTERNAL MEDICINE
Payer: MEDICARE

## 2023-10-17 ENCOUNTER — ANESTHESIA EVENT (OUTPATIENT)
Dept: MEDSURG UNIT | Facility: HOSPITAL | Age: 73
End: 2023-10-17
Payer: MEDICARE

## 2023-10-17 ENCOUNTER — HOSPITAL ENCOUNTER (OUTPATIENT)
Dept: CARDIOLOGY | Facility: HOSPITAL | Age: 73
Discharge: HOME | End: 2023-10-17
Attending: INTERNAL MEDICINE | Admitting: INTERNAL MEDICINE
Payer: MEDICARE

## 2023-10-17 VITALS
SYSTOLIC BLOOD PRESSURE: 119 MMHG | BODY MASS INDEX: 37.98 KG/M2 | HEIGHT: 67 IN | OXYGEN SATURATION: 96 % | WEIGHT: 242 LBS | DIASTOLIC BLOOD PRESSURE: 58 MMHG | DIASTOLIC BLOOD PRESSURE: 70 MMHG | RESPIRATION RATE: 18 BRPM | WEIGHT: 242 LBS | HEIGHT: 67 IN | TEMPERATURE: 97 F | SYSTOLIC BLOOD PRESSURE: 160 MMHG | HEART RATE: 61 BPM | BODY MASS INDEX: 37.98 KG/M2

## 2023-10-17 DIAGNOSIS — Z98.890 STATUS POST RADIOFREQUENCY ABLATION (RFA) OPERATION FOR ARRHYTHMIA: ICD-10-CM

## 2023-10-17 DIAGNOSIS — I49.9 ARRHYTHMIA: ICD-10-CM

## 2023-10-17 DIAGNOSIS — I45.6 WOLFF-PARKINSON-WHITE (WPW) PATTERN: ICD-10-CM

## 2023-10-17 DIAGNOSIS — I48.0 PAROXYSMAL A-FIB: ICD-10-CM

## 2023-10-17 DIAGNOSIS — I45.6 WOLFF-PARKINSON-WHITE (WPW) PATTERN: Chronic | ICD-10-CM

## 2023-10-17 DIAGNOSIS — I48.91 ATRIAL FIBRILLATION: ICD-10-CM

## 2023-10-17 DIAGNOSIS — I48.0 PAROXYSMAL A-FIB: Chronic | ICD-10-CM

## 2023-10-17 DIAGNOSIS — Z86.79 STATUS POST RADIOFREQUENCY ABLATION (RFA) OPERATION FOR ARRHYTHMIA: ICD-10-CM

## 2023-10-17 LAB
ASCENDING AORTA: 3.8 CM
BSA FOR ECHO PROCEDURE: 2.28 M2
SINUS: 3.4 CM
STJ: 2.8 CM

## 2023-10-17 PROCEDURE — 99234 HOSP IP/OBS SM DT SF/LOW 45: CPT | Mod: HCNC,,, | Performed by: INTERNAL MEDICINE

## 2023-10-17 PROCEDURE — 93010 EKG 12-LEAD: ICD-10-PCS | Mod: HCNC,,, | Performed by: INTERNAL MEDICINE

## 2023-10-17 PROCEDURE — 93320 DOPPLER ECHO COMPLETE: CPT | Mod: 26,HCNC,, | Performed by: INTERNAL MEDICINE

## 2023-10-17 PROCEDURE — 37000009 HC ANESTHESIA EA ADD 15 MINS: Mod: HCNC | Performed by: INTERNAL MEDICINE

## 2023-10-17 PROCEDURE — C2630 CATH, EP, COOL-TIP: HCPCS | Mod: HCNC | Performed by: INTERNAL MEDICINE

## 2023-10-17 PROCEDURE — C1759 CATH, INTRA ECHOCARDIOGRAPHY: HCPCS | Mod: HCNC | Performed by: INTERNAL MEDICINE

## 2023-10-17 PROCEDURE — 93325 TRANSESOPHAGEAL ECHO (TEE) (CUPID ONLY): ICD-10-PCS | Mod: 26,HCNC,, | Performed by: INTERNAL MEDICINE

## 2023-10-17 PROCEDURE — C1730 CATH, EP, 19 OR FEW ELECT: HCPCS | Mod: HCNC | Performed by: INTERNAL MEDICINE

## 2023-10-17 PROCEDURE — 93656 COMPRE EP EVAL ABLTJ ATR FIB: CPT | Mod: HCNC,,, | Performed by: INTERNAL MEDICINE

## 2023-10-17 PROCEDURE — 93325 DOPPLER ECHO COLOR FLOW MAPG: CPT | Mod: 26,HCNC,, | Performed by: INTERNAL MEDICINE

## 2023-10-17 PROCEDURE — 25000003 PHARM REV CODE 250: Mod: HCNC | Performed by: NURSE ANESTHETIST, CERTIFIED REGISTERED

## 2023-10-17 PROCEDURE — C1769 GUIDE WIRE: HCPCS | Mod: HCNC | Performed by: INTERNAL MEDICINE

## 2023-10-17 PROCEDURE — 27201037 HC PRESSURE MONITORING SET UP: Mod: HCNC

## 2023-10-17 PROCEDURE — C1894 INTRO/SHEATH, NON-LASER: HCPCS | Mod: HCNC | Performed by: INTERNAL MEDICINE

## 2023-10-17 PROCEDURE — 93656 PR ELECTROPHYS EVAL, COMPREHEN, W/ABLATION OF ATRIAL FIBR: ICD-10-PCS | Mod: HCNC,,, | Performed by: INTERNAL MEDICINE

## 2023-10-17 PROCEDURE — 27201423 OPTIME MED/SURG SUP & DEVICES STERILE SUPPLY: Mod: HCNC | Performed by: INTERNAL MEDICINE

## 2023-10-17 PROCEDURE — 93320 TRANSESOPHAGEAL ECHO (TEE) (CUPID ONLY): ICD-10-PCS | Mod: 26,HCNC,, | Performed by: INTERNAL MEDICINE

## 2023-10-17 PROCEDURE — 36620 INSERTION CATHETER ARTERY: CPT | Mod: 59,HCNC,, | Performed by: ANESTHESIOLOGY

## 2023-10-17 PROCEDURE — 99234 PR OBSERV/HOSP SAME DATE,LEVL III: ICD-10-PCS | Mod: HCNC,,, | Performed by: INTERNAL MEDICINE

## 2023-10-17 PROCEDURE — 37000008 HC ANESTHESIA 1ST 15 MINUTES: Mod: HCNC | Performed by: INTERNAL MEDICINE

## 2023-10-17 PROCEDURE — 93005 ELECTROCARDIOGRAM TRACING: CPT | Mod: HCNC

## 2023-10-17 PROCEDURE — 93656 COMPRE EP EVAL ABLTJ ATR FIB: CPT | Mod: HCNC | Performed by: INTERNAL MEDICINE

## 2023-10-17 PROCEDURE — 63600175 PHARM REV CODE 636 W HCPCS: Mod: HCNC | Performed by: NURSE ANESTHETIST, CERTIFIED REGISTERED

## 2023-10-17 PROCEDURE — 93312 TRANSESOPHAGEAL ECHO (TEE) (CUPID ONLY): ICD-10-PCS | Mod: 26,HCNC,, | Performed by: INTERNAL MEDICINE

## 2023-10-17 PROCEDURE — D9220A PRA ANESTHESIA: Mod: HCNC,CRNA,, | Performed by: NURSE ANESTHETIST, CERTIFIED REGISTERED

## 2023-10-17 PROCEDURE — 93312 ECHO TRANSESOPHAGEAL: CPT | Mod: 26,HCNC,, | Performed by: INTERNAL MEDICINE

## 2023-10-17 PROCEDURE — 93010 ELECTROCARDIOGRAM REPORT: CPT | Mod: HCNC,,, | Performed by: INTERNAL MEDICINE

## 2023-10-17 PROCEDURE — 36620 ARTERIAL: ICD-10-PCS | Mod: 59,HCNC,, | Performed by: ANESTHESIOLOGY

## 2023-10-17 PROCEDURE — 93325 DOPPLER ECHO COLOR FLOW MAPG: CPT | Mod: HCNC

## 2023-10-17 PROCEDURE — C1766 INTRO/SHEATH,STRBLE,NON-PEEL: HCPCS | Mod: HCNC | Performed by: INTERNAL MEDICINE

## 2023-10-17 PROCEDURE — 51702 INSERT TEMP BLADDER CATH: CPT | Mod: HCNC

## 2023-10-17 PROCEDURE — 25000003 PHARM REV CODE 250: Mod: HCNC | Performed by: STUDENT IN AN ORGANIZED HEALTH CARE EDUCATION/TRAINING PROGRAM

## 2023-10-17 PROCEDURE — D9220A PRA ANESTHESIA: ICD-10-PCS | Mod: HCNC,ANES,, | Performed by: ANESTHESIOLOGY

## 2023-10-17 PROCEDURE — 25000003 PHARM REV CODE 250: Mod: HCNC | Performed by: INTERNAL MEDICINE

## 2023-10-17 PROCEDURE — 63600175 PHARM REV CODE 636 W HCPCS: Mod: HCNC | Performed by: INTERNAL MEDICINE

## 2023-10-17 PROCEDURE — D9220A PRA ANESTHESIA: ICD-10-PCS | Mod: HCNC,CRNA,, | Performed by: NURSE ANESTHETIST, CERTIFIED REGISTERED

## 2023-10-17 PROCEDURE — D9220A PRA ANESTHESIA: Mod: HCNC,ANES,, | Performed by: ANESTHESIOLOGY

## 2023-10-17 RX ORDER — HEPARIN SODIUM 10000 [USP'U]/100ML
INJECTION, SOLUTION INTRAVENOUS CONTINUOUS PRN
Status: DISCONTINUED | OUTPATIENT
Start: 2023-10-17 | End: 2023-10-17

## 2023-10-17 RX ORDER — ONDANSETRON 2 MG/ML
INJECTION INTRAMUSCULAR; INTRAVENOUS
Status: DISCONTINUED | OUTPATIENT
Start: 2023-10-17 | End: 2023-10-17

## 2023-10-17 RX ORDER — AMIODARONE HYDROCHLORIDE 200 MG/1
TABLET ORAL
Qty: 444 TABLET | Refills: 0 | Status: SHIPPED | OUTPATIENT
Start: 2023-10-17 | End: 2023-11-16

## 2023-10-17 RX ORDER — DILTIAZEM HYDROCHLORIDE 180 MG/1
180 CAPSULE, COATED, EXTENDED RELEASE ORAL DAILY
Qty: 30 CAPSULE | Refills: 11 | Status: SHIPPED | OUTPATIENT
Start: 2023-10-17 | End: 2023-11-16

## 2023-10-17 RX ORDER — LIDOCAINE HYDROCHLORIDE 20 MG/ML
INJECTION, SOLUTION INFILTRATION; PERINEURAL
Status: DISCONTINUED | OUTPATIENT
Start: 2023-10-17 | End: 2023-10-17

## 2023-10-17 RX ORDER — LIDOCAINE HYDROCHLORIDE 20 MG/ML
INJECTION, SOLUTION EPIDURAL; INFILTRATION; INTRACAUDAL; PERINEURAL
Status: DISCONTINUED | OUTPATIENT
Start: 2023-10-17 | End: 2023-10-17

## 2023-10-17 RX ORDER — HALOPERIDOL 5 MG/ML
0.5 INJECTION INTRAMUSCULAR EVERY 10 MIN PRN
Status: CANCELLED | OUTPATIENT
Start: 2023-10-17

## 2023-10-17 RX ORDER — SODIUM CHLORIDE 0.9 % (FLUSH) 0.9 %
3 SYRINGE (ML) INJECTION
Status: CANCELLED | OUTPATIENT
Start: 2023-10-17

## 2023-10-17 RX ORDER — FUROSEMIDE 10 MG/ML
INJECTION INTRAMUSCULAR; INTRAVENOUS
Status: DISCONTINUED | OUTPATIENT
Start: 2023-10-17 | End: 2023-10-17

## 2023-10-17 RX ORDER — AMIODARONE HYDROCHLORIDE 200 MG/1
400 TABLET ORAL ONCE
Status: DISCONTINUED | OUTPATIENT
Start: 2023-10-17 | End: 2023-10-17

## 2023-10-17 RX ORDER — FENTANYL CITRATE 50 UG/ML
INJECTION, SOLUTION INTRAMUSCULAR; INTRAVENOUS
Status: DISCONTINUED | OUTPATIENT
Start: 2023-10-17 | End: 2023-10-17

## 2023-10-17 RX ORDER — ADENOSINE 3 MG/ML
INJECTION, SOLUTION INTRAVENOUS
Status: DISCONTINUED | OUTPATIENT
Start: 2023-10-17 | End: 2023-10-17

## 2023-10-17 RX ORDER — PROTAMINE SULFATE 10 MG/ML
INJECTION, SOLUTION INTRAVENOUS
Status: DISCONTINUED | OUTPATIENT
Start: 2023-10-17 | End: 2023-10-17

## 2023-10-17 RX ORDER — FENTANYL CITRATE 50 UG/ML
25 INJECTION, SOLUTION INTRAMUSCULAR; INTRAVENOUS EVERY 5 MIN PRN
Status: CANCELLED | OUTPATIENT
Start: 2023-10-17

## 2023-10-17 RX ORDER — MIDAZOLAM HYDROCHLORIDE 1 MG/ML
INJECTION, SOLUTION INTRAMUSCULAR; INTRAVENOUS
Status: DISCONTINUED | OUTPATIENT
Start: 2023-10-17 | End: 2023-10-17

## 2023-10-17 RX ORDER — ACETAMINOPHEN 325 MG/1
650 TABLET ORAL EVERY 4 HOURS PRN
Status: DISCONTINUED | OUTPATIENT
Start: 2023-10-17 | End: 2023-10-17 | Stop reason: HOSPADM

## 2023-10-17 RX ORDER — HEPARIN SOD,PORCINE/0.9 % NACL 1000/500ML
INTRAVENOUS SOLUTION INTRAVENOUS
Status: DISCONTINUED | OUTPATIENT
Start: 2023-10-17 | End: 2023-10-17

## 2023-10-17 RX ORDER — PROPOFOL 10 MG/ML
VIAL (ML) INTRAVENOUS
Status: DISCONTINUED | OUTPATIENT
Start: 2023-10-17 | End: 2023-10-17

## 2023-10-17 RX ORDER — SUCCINYLCHOLINE CHLORIDE 20 MG/ML
INJECTION INTRAMUSCULAR; INTRAVENOUS
Status: DISCONTINUED | OUTPATIENT
Start: 2023-10-17 | End: 2023-10-17

## 2023-10-17 RX ORDER — DEXAMETHASONE SODIUM PHOSPHATE 4 MG/ML
INJECTION, SOLUTION INTRA-ARTICULAR; INTRALESIONAL; INTRAMUSCULAR; INTRAVENOUS; SOFT TISSUE
Status: DISCONTINUED | OUTPATIENT
Start: 2023-10-17 | End: 2023-10-17

## 2023-10-17 RX ORDER — ROCURONIUM BROMIDE 10 MG/ML
INJECTION, SOLUTION INTRAVENOUS
Status: DISCONTINUED | OUTPATIENT
Start: 2023-10-17 | End: 2023-10-17

## 2023-10-17 RX ORDER — HEPARIN SODIUM 1000 [USP'U]/ML
INJECTION, SOLUTION INTRAVENOUS; SUBCUTANEOUS
Status: DISCONTINUED | OUTPATIENT
Start: 2023-10-17 | End: 2023-10-17

## 2023-10-17 RX ADMIN — ONDANSETRON 4 MG: 2 INJECTION INTRAMUSCULAR; INTRAVENOUS at 12:10

## 2023-10-17 RX ADMIN — MIDAZOLAM HYDROCHLORIDE 2 MG: 1 INJECTION, SOLUTION INTRAMUSCULAR; INTRAVENOUS at 07:10

## 2023-10-17 RX ADMIN — PROTAMINE SULFATE 20 MG: 10 INJECTION, SOLUTION INTRAVENOUS at 12:10

## 2023-10-17 RX ADMIN — FENTANYL CITRATE 50 MCG: 50 INJECTION, SOLUTION INTRAMUSCULAR; INTRAVENOUS at 10:10

## 2023-10-17 RX ADMIN — ROCURONIUM BROMIDE 5 MG: 10 INJECTION INTRAVENOUS at 07:10

## 2023-10-17 RX ADMIN — HEPARIN SODIUM 3000 UNITS: 1000 INJECTION, SOLUTION INTRAVENOUS; SUBCUTANEOUS at 09:10

## 2023-10-17 RX ADMIN — HEPARIN SODIUM AND DEXTROSE 12 UNITS/KG/HR: 10000; 5 INJECTION INTRAVENOUS at 09:10

## 2023-10-17 RX ADMIN — SODIUM CHLORIDE 0.2 MCG/KG/MIN: 9 INJECTION, SOLUTION INTRAVENOUS at 08:10

## 2023-10-17 RX ADMIN — FENTANYL CITRATE 100 MCG: 50 INJECTION, SOLUTION INTRAMUSCULAR; INTRAVENOUS at 07:10

## 2023-10-17 RX ADMIN — FUROSEMIDE 20 MG: 10 INJECTION, SOLUTION INTRAMUSCULAR; INTRAVENOUS at 12:10

## 2023-10-17 RX ADMIN — PROTAMINE SULFATE 15 MG: 10 INJECTION, SOLUTION INTRAVENOUS at 12:10

## 2023-10-17 RX ADMIN — HEPARIN SODIUM 13000 UNITS: 1000 INJECTION, SOLUTION INTRAVENOUS; SUBCUTANEOUS at 09:10

## 2023-10-17 RX ADMIN — SUCCINYLCHOLINE CHLORIDE 120 MG: 20 INJECTION, SOLUTION INTRAMUSCULAR; INTRAVENOUS at 07:10

## 2023-10-17 RX ADMIN — SODIUM CHLORIDE: 9 INJECTION, SOLUTION INTRAVENOUS at 07:10

## 2023-10-17 RX ADMIN — FENTANYL CITRATE 50 MCG: 50 INJECTION, SOLUTION INTRAMUSCULAR; INTRAVENOUS at 08:10

## 2023-10-17 RX ADMIN — ACETAMINOPHEN 650 MG: 325 TABLET ORAL at 01:10

## 2023-10-17 RX ADMIN — DEXAMETHASONE SODIUM PHOSPHATE 4 MG: 4 INJECTION, SOLUTION INTRAMUSCULAR; INTRAVENOUS at 08:10

## 2023-10-17 RX ADMIN — LIDOCAINE HYDROCHLORIDE 100 MG: 20 INJECTION, SOLUTION EPIDURAL; INFILTRATION; INTRACAUDAL; PERINEURAL at 07:10

## 2023-10-17 RX ADMIN — PROTAMINE SULFATE 10 MG: 10 INJECTION, SOLUTION INTRAVENOUS at 12:10

## 2023-10-17 RX ADMIN — PROPOFOL 150 MG: 10 INJECTION, EMULSION INTRAVENOUS at 07:10

## 2023-10-17 RX ADMIN — HEPARIN SODIUM 3000 UNITS: 1000 INJECTION, SOLUTION INTRAVENOUS; SUBCUTANEOUS at 10:10

## 2023-10-17 NOTE — PROGRESS NOTES
Patient admitted to recovery see [facility] for complete assessment pacu bcg's maintained safety measures verified patient instructed on pain scale and patient verbalized understanding. Called for ekg and it was done and placed in chart. And called patient's  and updated on patient location with the permission of cesar.

## 2023-10-17 NOTE — NURSING TRANSFER
Nursing Transfer Note      10/17/2023   3:42 PM    Nurse giving handoff:JOSSE DORADO   Nurse receiving handoff:DAVIDE DORADO     Reason patient is being transferred: D/C DELLA LION     Transfer To: SSCU 9    Transfer via stretcher    Transfer with cardiac monitoring AND 2LITERS NC AND REGISTERED PATIENT'S BOX     Transported by JOSSE DORADO     Transfer Vital Signs:  Blood Pressure:SEE Epic   Heart Rate:SEE Epic   O2:2 LITERS NC   Temperature:SEE Epic   Respirations:SEE Epic     Telemetry: REGISTERED PATIENT ON BOX   Order for Tele Monitor? Yes    Additional Lines: GREER INTACT     4eyes on Skin: YES IN PACU     Medicines sent: HOME MEDICATION DELIEVERED FROM [facility]ARM[facility] AND DILTIAZ[facility] PO     Any special needs or follow-up needed: GROIN CHECKS AND WENT OVER SUTURE REMOVAL TIME     Patient belongings transferred with patient:  N/A    Chart send with patient: Yes    Notified: REPORTED TO DAVIDE DORADO     Patient reassessed at: SEE Epic  (date, time)  1  Upon arrival to floor: TO ROOM NO COMPLAINTS NO DISTRESS NOTED.

## 2023-10-17 NOTE — BRIEF OP NOTE
Attending: Amyea Servin MD  Date of Procedure: 10/17/2023    Post-operative Diagnosis: AF    Procedure Performed: Pulmonary vein isolation of all 4 pulmonary veins via radiofrequency ablation.     Description of Procedure: The patient was brought to the EP lab in the fasting state. Prepped and draped in sterile fashion. Safety timeout was performed. Sedation administered by anesthesia staff. Ultrasound guided venous access of the bilateral femoral veins was performed. ICE and CS catheters placed via left femoral vein access. Long sheaths via right femoral venous access. Heparin bolus and continuous infusion per protocol. Two transseptal punctures performed using combination of fluoroscopic and ICE guidance. Map created. RFA to isolate all pulmonary veins. ICE confirmed no significant pericardial effusion.     Adenosine given with no VA or AV conduction, accessory pathway not present.    EBL: <10 mL    Specimens: none  Complications: no immediate    Plan:  Bedrest x 6 hrs  Remove sutures at 4 hours post-procedure  Ambulation at 6 hours post-procedure if there is no evidence of access site complications  Close monitoring of hemodynamics, access site, and neurologic status  ECG upon arrival to PACU  Patient to resume OAC this evening. If bleeding or hematoma formation, please notify EP service before holding OAC  Medication changes:   start amiodarone 400mg BID x14d, 400mg daily x14d, then 200mg daily  Increase diltiazem to 180mg daily  Recommend ibuprofen 800 mg TID x 3 days for pericarditis post-procedure  Plan to discharge after discharge criteria met today     Thomas Chavez MD  Cardiovascular Disease PGY-V  [facility]

## 2023-10-17 NOTE — ANESTHESIA PREPROCEDURE EVALUATION
10/17/2023  Pre-operative evaluation for Procedure(s) (LRB):  Ablation atrial fibrillation (N/A)  Ablation (N/A)  ECHOCARDIOGRAM, TRANSESOPHAGEAL (N/A)    Flores Fuentes is a 73 y.o. female c hx/o PAF, MATTHEW, BMI 37, intermittent WPW here for PVI.     Patient Active Problem List   Diagnosis    Mixed hyperlipidemia    Subclinical iodine-deficiency hypothyroidism    Paroxysmal A-fib    Primary localized osteoarthrosis, lower leg    Atrial fibrillation with RVR    Cervical muscle strain    DJD (degenerative joint disease) of cervical spine    Stress incontinence, female    Essential hypertension    MATTHEW (obstructive sleep apnea)    Asymptomatic microscopic hematuria    Osteopenia of neck of left femur    Routine general medical examination at a health care facility    Chronic anticoagulation    Kym-Parkinson-White (WPW) pattern    Slow transit constipation    Morbid obesity    Palpitations    Stress at home    Weight loss    Kym-Parkinson-White (WPW) syndrome       Review of patient's allergies indicates:   Allergen Reactions    Decongestant d [pseudoephedrine-dm]      Atrial Fibrillation    Augmentin [amoxicillin-pot clavulanate]      palpitations      Amoxicillin Palpitations    Diphenhydramine-pseudoephed Palpitations     TACHYCARDIA    Povidone-iodine Rash     Mild erythema of skin       Current Facility-Administered Medications on File Prior to Encounter   Medication Dose Route Frequency Provider Last Rate Last Admin    sodium chloride 0.9% bolus 1,000 mL  1,000 mL Intravenous Once Carley Cabrera NP        vancomycin in dextrose 5 % 1 gram/250 mL IVPB 1,000 mg  1,000 mg Intravenous On Call Procedure Carley Cabrera .7 mL/hr at 11/01/21 1249 1,000 mg at 11/01/21 1249     Current Outpatient Medications on File Prior to Encounter   Medication Sig Dispense Refill     "diltiaZEM (CARDIZEM CD) 120 MG Cp24 Take 1 capsule (120 mg total) by mouth once daily. 90 capsule 3    ELIQUIS 5 mg Tab TAKE 1 TABLET TWICE DAILY 180 tablet 3    pravastatin (PRAVACHOL) 40 MG tablet TAKE 1 TABLET EVERY DAY 90 tablet 0       Past Surgical History:   Procedure Laterality Date    APPENDECTOMY      COLONOSCOPY N/A 2020    Procedure: COLONOSCOPY;  Surgeon: Angus Ac MD;  [facility]  Service: Endoscopy;  Laterality: N/A;  ok to hold Eliquis 2 days per Dr Malathi HERNANDEZ test at [place] on 8/10-    ECHOCARDIOGRAM,TRANSESOPHAGEAL N/A 2023    Procedure: Transesophageal echo (PERICO) intra-procedure log documentation;  Surgeon: Provider, [facility];  Location: [facility] EP LAB;  Service: Cardiology;  Laterality: N/A;    HYSTERECTOMY      TAHBSO for fibroids    INSERTION OF IMPLANTABLE LOOP RECORDER Left 2021    Procedure: Insertion, Implantable Loop Recorder;  Surgeon: Ameya Servin MD;  Location: [facility] EP LAB;  Service: Cardiology;  Laterality: Left;  AF, ILR implant, MDT,  DM, 3 Prep    MANDIBLE SURGERY      L mandible, Dr Toure    MANDIBLE SURGERY Left 2019    OOPHORECTOMY      TONSILLECTOMY      TREATMENT OF CARDIAC ARRHYTHMIA N/A 2023    Procedure: Cardioversion or Defibrillation;  Surgeon: JV Rosenthal MD;  Location: [facility] EP LAB;  Service: Cardiology;  Laterality: N/A;  AF, DCCV/PERICO, ANES, EH,              CBC: No results for input(s): "WBC", "RBC", "HGB", "HCT", "PLT", "MCV", "MCH", "MCHC" in the last 72 hours.    CMP: No results for input(s): "NA", "K", "CL", "CO2", "BUN", "CREATININE", "GLU", "MG", "PHOS", "CALCIUM", "ALBUMIN", "PROT", "ALKPHOS", "ALT", "AST", "BILITOT" in the last 72 hours.    INR  No results for input(s): "PT", "INR", "PROTIME", "APTT" in the last 72 hours.        Diagnostic Studies:      EKD Echo:  Results for orders placed or performed during the hospital encounter of 18   2D echo with " color flow doppler   Result Value Ref Range    EF + QEF 65 55 - 65    Diastolic Dysfunction No     Est. PA Systolic Pressure 21.66     Mitral Valve Mobility NORMAL     Tricuspid Valve Regurgitation TRIVIAL            Pre-op Assessment    I have reviewed the Patient Summary Reports.    I have reviewed the NPO Status.   I have reviewed the Medications.     Review of Systems  Anesthesia Hx:  History of prior surgery of interest to airway management or planning: Denies Family Hx of Anesthesia complications.   Denies Personal Hx of Anesthesia complications.       Physical Exam  General: Cooperative, Well nourished, Alert and Oriented    Airway:  Mallampati: II   Mouth Opening: Normal  TM Distance: Normal  Tongue: Normal  Neck ROM: Normal ROM    Dental:  Intact    Chest/Lungs:  Clear to auscultation, Normal Respiratory Rate    Heart:  Rate: Normal  Rhythm: Irregularly Irregular        Anesthesia Plan  Type of Anesthesia, risks & benefits discussed:    Anesthesia Type: Gen ETT  Intra-op Monitoring Plan: Standard ASA Monitors and Art Line  Post Op Pain Control Plan: multimodal analgesia and IV/PO Opioids PRN  Induction:  IV  Airway Plan: Video  Informed Consent: Informed consent signed with the Patient and all parties understand the risks and agree with anesthesia plan.  All questions answered.   ASA Score: 3  Day of Surgery Review of History & Physical: H&P Update referred to the surgeon/provider.    Ready For Surgery From Anesthesia Perspective.     .

## 2023-10-17 NOTE — PROGRESS NOTES
Pt brought to room 9 on SSCU. Christi RN at bedside for handoff. VS taken and wnl. Get groin sites are wnl and pt free from s/s of bleeding.  of pt is at bedside. Safety measures in place.

## 2023-10-17 NOTE — ANESTHESIA PROCEDURE NOTES
Arterial    Diagnosis: pAF    Patient location during procedure: done in OR    Staffing  Authorizing Provider: Filiberto Alcantar MD  Performing Provider: Filiberto Alcantar MD    Staffing  Performed by: Filiberto Alcantar MD  Authorized by: Filiberto Alcantar MD    Anesthesiologist was present at the time of the procedure.    Preanesthetic Checklist  Completed: patient identified, IV checked, site marked, risks and benefits discussed, surgical consent, monitors and equipment checked, pre-op evaluation, timeout performed and anesthesia consent givenArterial  Skin Prep: chlorhexidine gluconate  Local Infiltration: none  Orientation: right  Location: radial    Catheter Size: 20 G  Catheter placement by Anatomical landmarks. Heme positive aspiration all ports. Insertion Attempts: 1  Assessment  Dressing: secured with tape and tegaderm  Patient: Tolerated well

## 2023-10-17 NOTE — DISCHARGE INSTRUCTIONS
"Medication changes:   start amiodarone 400mg BID x14d, 400mg daily x14d, then 200mg daily  Increase diltiazem to 180mg daily  Stop taking flecainide  Follow Up:  Dr. Serivn in 4 months.     Patient Instructions:   Medications:  Make sure to continue taking your blood thinner apixiban (trade name: Eliquis) after your procedure as you would normally take  Take pantoprazole (trade name: Protonix) nightly for at least 30 days after your procedure. This is to protect your esophagus during the post-operative period.  If given a prescription of furosemide (trade name: Lasix), which is a diuretic (fluid pill), you can take it daily or twice daily as needed for fluid retention or shortness of breath following your ablation  You may experience chest discomfort (also known as "pericarditis") with deep breathes, coughing, and/or laying down which is typically normal following your procedure. If this occurs, you can take ibuprofen (Motrin) 800 mg every 8 hours for 2-3 days. If the chest pain is persistent or severe please visit the nearest emergency department.     Groin site management, precautions, and restrictions:  Remove the bandages over your groin area the morning after your procedure. You can shower after you remove these bandages. Keep the groin sites clean and dry. You do not need to apply ointments or bandages to the area.   If oozing from groin site occurs, apply pressure without letting up for 15 minutes and lay flat for 1 hour. If bleeding has resolved, you can continue to monitor. If the bleeding continues or there is significant swelling or pain in the groin area, please visit the nearest ER for evaluation and treatment. DO NOT STOP TAKING YOUR BLOOD THINNER UNLESS INSTRUCTED BY A PHYSICIAN.   Do not take baths or submerge your groin area or at least 1 week or when the puncture sites in your groin have completely healed  Do not lift anything over 10 lbs for the first week after your procedure, and avoid strenuous " activity during this time to allow for the groin sites to heal. After 1 week, there are no activity restrictions.  Please contact the electrophysiology clinic or go to the ER if you experience: severe chest pain, shortness of breath, bleeding or swelling of the groin sites, or any other concerns.

## 2023-10-17 NOTE — CONSULTS
[facility]  PERICO  Consult Note       HPI: Flores Fuentes is a 73 y.o. female with past medical history of:  pAF  Intermittent WPW pattern  MATTHEW    Here for RFA with Dr. Servin. No history of dysphagia, upper GI bleeding, varices, loose teeth, neck mobility issues, esophageal strictures or prior esophageal procedures.      ROS: Patient denies angina, JOHANSEN, lower extremity edema, orthopnea, PND, palpitations, presyncope or syncope. All other ROS negative except as stated above.    PMHx:  As above    PSHx:   As above    Family Hx:  Family History   Problem Relation Age of Onset    Cancer Mother         stomach, liver    Breast cancer Neg Hx     Ovarian cancer Neg Hx     Cervical cancer Neg Hx     Endometrial cancer Neg Hx     Vaginal cancer Neg Hx     Melanoma Neg Hx     Colon cancer Neg Hx     Esophageal cancer Neg Hx        Social Hx:   Social History     Tobacco Use    Smoking status: Never    Smokeless tobacco: Never   Substance Use Topics    Alcohol use: No       Allergies:  Review of patient's allergies indicates:   Allergen Reactions    Decongestant d [pseudoephedrine-dm]      Atrial Fibrillation    Augmentin [amoxicillin-pot clavulanate]      palpitations      Amoxicillin Palpitations    Diphenhydramine-pseudoephed Palpitations     TACHYCARDIA    Povidone-iodine Rash     Mild erythema of skin       Home Meds:  Current Outpatient Medications   Medication Instructions    diltiaZEM (CARDIZEM CD) 120 mg, Oral, Daily    ELIQUIS 5 mg Tab TAKE 1 TABLET TWICE DAILY    flecainide (TAMBOCOR) 150 mg, Oral, Every 12 hours    fluticasone propionate (FLONASE) 50 mcg/actuation nasal spray USE 1 SPRAY IN EACH NOSTRIL EVERY DAY    levothyroxine (SYNTHROID) 25 mcg, Oral    nystatin (MYCOSTATIN) cream Topical (Top), 2 times daily    pravastatin (PRAVACHOL) 40 MG tablet TAKE 1 TABLET EVERY DAY       Vitals:  Temp:  [98.6 °F (37 °C)] 98.6 °F (37 °C)  Pulse:  [61] 61  Resp:  [18] 18  SpO2:  [98 %] 98 %  BP:  (160-180)/(70-78) 160/70    Physical Exam  Gen: No apparent distress, resting comfortably  HEENT: Pupils equal and reactive to light  Cardio: Regular rate, point of maximal impulse not displaced, no murmur noted, 2+ radial pulses bilaterally, 2+ DP pulses bilaterally  Resp: CTAB, no wheezing  Abd: Soft, non-tender, non-distended  Skin: Warm, dry, no peripheral edema noted  Neuro: Alert and oriented x3  Psych: Normal mood and affect    ASA: 2  Mallampati: 3    Labs:  Recent Labs   Lab 10/10/23  0720   WBC 4.72   HGB 13.8   HCT 42.7        Recent Labs   Lab 10/10/23  0720      K 4.2      CO2 24   BUN 10   CREATININE 0.7   GLU 99   CALCIUM 9.3         Current Meds:      Assessment and Plan:    1. PERICO for evaluation of STEF prior to ablation  -No absolute contraindications of esophageal stricture, tumor, perforation, laceration,or diverticulum and/or active GI bleed  -The risks, benefits & alternatives of the procedure were explained to the patient.   -The risks of transesophageal echo include but are not limited to:  Dental trauma, esophageal trauma/perforation, bleeding, laryngospasm/brochospasm, aspiration, sore throat/hoarseness, & dislodgement of the endotracheal tube/nasogastric tube (where applicable).    -The risks of moderate sedation include hypotension, respiratory depression, arrhythmias, bronchospasm, & death.    -Informed consent was obtained. The patient is agreeable to proceed with the procedure and all questions and concerns addressed.    Case discussed with an attending in echocardiography lab.     Further recommendations per attending addendum          Cedric Soto MD  [facility] PGY-6    Staff attestation to follow.    This note was prepared using voice recognition system and may have sound alike errors that may have been overlooked even with proof reading.

## 2023-10-17 NOTE — TRANSFER OF CARE
"Anesthesia Transfer of Care Note    Patient: Flores Fuentes    Procedure(s) Performed: Procedure(s) (LRB):  Ablation atrial fibrillation (N/A)  ECHOCARDIOGRAM, TRANSESOPHAGEAL (N/A)    Patient location: PACU    Anesthesia Type: general    Transport from OR: Transported from OR on 6-10 L/min O2 by face mask with adequate spontaneous ventilation    Post pain: adequate analgesia    Post assessment: no apparent anesthetic complications    Post vital signs: stable    Level of consciousness: awake    Nausea/Vomiting: no nausea/vomiting    Complications: none    Transfer of care protocol was followed      Last vitals:   Visit Vitals  BP (!) 160/70 (BP Location: Right arm, Patient Position: Lying)   Pulse 61   Temp 37 °C (98.6 °F) (Temporal)   Resp 18   Ht 5' 7" (1.702 m)   Wt 109.8 kg (242 lb)   SpO2 98%   Breastfeeding No   BMI 37.90 kg/m²     "

## 2023-10-17 NOTE — DISCHARGE SUMMARY
"      [facility]  Electrophysiology  Discharge Summary      Patient Name: Flores Fuentes  MRN: [id number]  Admission Date: 10/17/2023  Hospital Length of Stay: 0 days  Discharge Date and Time:  10/17/2023 1:05 PM  Attending Physician: Ameya Servin MD    Discharging Provider: Thomas TREJO [facility] Course:  Ms. Fuentes is a 73 y.o. female with pAF, HLD, obesity, hypothyroid, MATTHEW on CPAP here for RF PVI ablation for atrial fibrillation.    Successful PVI ablation of all 4 pulmonary veins via RFA. Adenosine given with no VA or AV conduction, accessory pathway not present.    Patient to resume eliquis 5mg BID this evening  Medication changes:   start amiodarone 400mg BID x14d, 400mg daily x14d, then 200mg daily  Increase diltiazem to 180mg daily  Stop taking flecainide  If BP elevated as an outpatient after increased diltiazem, consider HCTZ or losartan    Discharged Condition: good    Disposition:   Home    Follow Up:  Dr. Servin in 4 months.    Patient Instructions:   Medications:  Make sure to continue taking your blood thinner apixiban (trade name: Eliquis) after your procedure as you would normally take  Take pantoprazole (trade name: Protonix) nightly for at least 30 days after your procedure. This is to protect your esophagus during the post-operative period.  If given a prescription of furosemide (trade name: Lasix), which is a diuretic (fluid pill), you can take it daily or twice daily as needed for fluid retention or shortness of breath following your ablation  You may experience chest discomfort (also known as "pericarditis") with deep breathes, coughing, and/or laying down which is typically normal following your procedure. If this occurs, you can take ibuprofen (Motrin) 800 mg every 8 hours for 2-3 days. If the chest pain is persistent or severe please visit the nearest emergency department.    Groin site management, precautions, and restrictions:  Remove the bandages over " your groin area the morning after your procedure. You can shower after you remove these bandages. Keep the groin sites clean and dry. You do not need to apply ointments or bandages to the area.   If oozing from groin site occurs, apply pressure without letting up for 15 minutes and lay flat for 1 hour. If bleeding has resolved, you can continue to monitor. If the bleeding continues or there is significant swelling or pain in the groin area, please visit the nearest ER for evaluation and treatment. DO NOT STOP TAKING YOUR BLOOD THINNER UNLESS INSTRUCTED BY A PHYSICIAN.   Do not take baths or submerge your groin area or at least 1 week or when the puncture sites in your groin have completely healed  Do not lift anything over 10 lbs for the first week after your procedure, and avoid strenuous activity during this time to allow for the groin sites to heal. After 1 week, there are no activity restrictions.  Please contact the electrophysiology clinic or go to the ER if you experience: severe chest pain, shortness of breath, bleeding or swelling of the groin sites, or any other concerns.     Flores Fuentes was given education on their disease process and medications.    Medications:  Reconciled Home Medications:      Medication List        START taking these medications      amiodarone 200 MG Tab  Commonly known as: PACERONE  Take 2 tablets (400 mg total) by mouth 2 (two) times daily for 14 days, THEN 2 tablets (400 mg total) once daily for 14 days, THEN 1 tablet (200 mg total) once daily.  Start taking on: October 17, 2023            CHANGE how you take these medications      diltiaZEM 180 MG 24 hr capsule  Commonly known as: CARDIZEM CD  Take 1 capsule (180 mg total) by mouth once daily.  What changed:   medication strength  how much to take            CONTINUE taking these medications      ELIQUIS 5 mg Tab  Generic drug: apixaban  TAKE 1 TABLET TWICE DAILY     fluticasone propionate 50 mcg/actuation nasal  spray  Commonly known as: FLONASE  USE 1 SPRAY IN EACH NOSTRIL EVERY DAY     levothyroxine 25 MCG tablet  Commonly known as: SYNTHROID  TAKE 1 TABLET ONE TIME DAILY     nystatin cream  Commonly known as: MYCOSTATIN  Apply topically 2 (two) times daily.     pravastatin 40 MG tablet  Commonly known as: PRAVACHOL  TAKE 1 TABLET EVERY DAY            STOP taking these medications      flecainide 150 MG Tab  Commonly known as: TAMBOCOR              Signed:  Thomas Chavez MD  Cardiovascular Disease PGY-V  [facility]

## 2023-10-17 NOTE — ANESTHESIA PROCEDURE NOTES
Intubation    Date/Time: 10/17/2023 7:44 AM    Performed by: Lorrie Milan CRNA  Authorized by: Filiberto Alcantar MD    Intubation:     Induction:  Intravenous    Intubated:  Postinduction    Mask Ventilation:  Easy with oral airway    Attempts:  2    Attempted By:  Student    Method of Intubation:  Direct    Blade:  Roberts 2    Laryngeal View Grade: Grade IIb - only the arytenoids and epiglottis seen      Attempted By (2nd Attempt):  Student    Method of Intubation (2nd Attempt):  Video laryngoscopy    Blade (2nd Attempt):  [place] 3    Laryngeal View Grade (2nd Attempt): Grade I - full view of cords      Difficult Airway Encountered?: No      Complications:  None    Airway Device:  Oral endotracheal tube    Airway Device Size:  7.0    Style/Cuff Inflation:  Cuffed (inflated to minimal occlusive pressure)    Tube secured:  21    Secured at:  The teeth    Placement Verified By:  Capnometry    Complicating Factors:  None    Findings Post-Intubation:  BS equal bilateral and atraumatic/condition of teeth unchanged

## 2023-10-17 NOTE — PLAN OF CARE
Patient arrived to room. PIV placed. Admit assessment completed. Plan of care discussed with patient. Mepilex dressings placed to sacrum and bilateral heels per protocol

## 2023-10-18 ENCOUNTER — TELEPHONE (OUTPATIENT)
Dept: ELECTROPHYSIOLOGY | Facility: CLINIC | Age: 73
End: 2023-10-18
Payer: MEDICARE

## 2023-10-18 DIAGNOSIS — I48.0 PAROXYSMAL A-FIB: Primary | Chronic | ICD-10-CM

## 2023-10-18 DIAGNOSIS — I45.6 WOLFF-PARKINSON-WHITE (WPW) SYNDROME: ICD-10-CM

## 2023-10-18 RX ORDER — LANSOPRAZOLE 30 MG/1
30 CAPSULE, DELAYED RELEASE ORAL DAILY
Qty: 30 CAPSULE | Refills: 0 | Status: SHIPPED | OUTPATIENT
Start: 2023-10-18 | End: 2024-02-23

## 2023-10-18 NOTE — PLAN OF CARE
Pt ambulated when bed rest period was over to the bathroom and was able to void post wynn removal. Pt tolerated it well. Bilat groin sites are CDI, no bleeding or hematoma present. Reached out to Dr. Bergman to see if Pt needed to go home on Protonix, since none was ordered. Dr. Bergman said Protonix was not ordered d/t her allergy to Povidone-iodine. Reviewed d/c instructions with Pt and Pt's . Both verbalizes understanding. Tele & Ivs removed.Written instructions given to Pt. Pt already received amio and dilt scripts from bedside delivery.

## 2023-10-18 NOTE — ANESTHESIA POSTPROCEDURE EVALUATION
Anesthesia Post Evaluation    Patient: Flores Fuentes    Procedure(s) Performed: Procedure(s) (LRB):  Ablation atrial fibrillation (N/A)  ECHOCARDIOGRAM, TRANSESOPHAGEAL (N/A)    Final Anesthesia Type: general      Patient location during evaluation: PACU  Patient participation: Yes- Able to Participate  Level of consciousness: awake and alert and oriented  Post-procedure vital signs: reviewed and stable  Pain management: adequate  Airway patency: patent  MATTHEW mitigation strategies: Intraoperative administration of CPAP, nasopharyngeal airway, or oral appliance during sedation, Multimodal analgesia, Extubation while patient is awake, Verification of full reversal of neuromuscular block and Extubation and recovery carried out in lateral, semiupright, or other nonsupine position  PONV status at discharge: No PONV  Anesthetic complications: no      Cardiovascular status: hemodynamically stable  Respiratory status: unassisted  Hydration status: euvolemic  Follow-up not needed.          Vitals Value Taken Time   /58 10/17/23 1830   Temp 36.2 °C (97.2 °F) 10/17/23 1540   Pulse 61 10/17/23 1830   Resp 18 10/17/23 1540   SpO2 96 % 10/17/23 1540         No case tracking events are documented in the log.      Pain/Dalia Score: Pain Rating Prior to Med Admin: 2 (10/17/2023  1:45 PM)  Pain Rating Post Med Admin: 1 (10/17/2023  2:00 PM)  Dalia Score: 10 (10/17/2023  4:00 PM)

## 2023-10-18 NOTE — TELEPHONE ENCOUNTER
----- Message from Syeda Pratt MA sent at 10/18/2023  9:14 AM CDT -----  Regarding: FW: Procedure    ----- Message -----  From: Joselyn Fierro  Sent: 10/18/2023   8:14 AM CDT  To: Malathi BELL Staff  Subject: Procedure                                        Patient need to speak with staff regarding her procedure on yesterday. Please call back @ [phone]. Thank you Joselyn

## 2023-10-18 NOTE — TELEPHONE ENCOUNTER
Spoke with pt, she reports that she was not prescribed protonix at discharge due to her iodine allergy and told to call us for an alternative today at her discharge last night, spoke with Dr Servin order prevacid daily for 30 days, erx sent, informed pt

## 2023-10-18 NOTE — TELEPHONE ENCOUNTER
----- Message from Syeda Pratt MA sent at 10/18/2023  9:14 AM CDT -----  Regarding: FW: Procedure    ----- Message -----  From: Joselyn Fierro  Sent: 10/18/2023   8:14 AM CDT  To: [facility] Staff  Subject: Procedure                                        Patient need to speak with staff regarding her procedure on yesterday. Please call back @ [phone]. Thank you Joselyn

## 2023-10-18 NOTE — PLAN OF CARE
Sutures and wynn removed. Pt tolerated it well. Bilat groin sites are CDI, no bleeding or hematoma present. WCTM.

## 2023-10-25 ENCOUNTER — PATIENT MESSAGE (OUTPATIENT)
Dept: CARDIOLOGY | Facility: CLINIC | Age: 73
End: 2023-10-25
Payer: MEDICARE

## 2023-10-26 LAB
POC ACTIVATED CLOTTING TIME K: 149 SEC (ref 74–137)
POC ACTIVATED CLOTTING TIME K: 149 SEC (ref 74–137)
POC ACTIVATED CLOTTING TIME K: 311 SEC (ref 74–137)
POC ACTIVATED CLOTTING TIME K: 335 SEC (ref 74–137)
POC ACTIVATED CLOTTING TIME K: 354 SEC (ref 74–137)
POC ACTIVATED CLOTTING TIME K: 354 SEC (ref 74–137)
POC ACTIVATED CLOTTING TIME K: 359 SEC (ref 74–137)
SAMPLE: ABNORMAL

## 2023-11-02 ENCOUNTER — CLINICAL SUPPORT (OUTPATIENT)
Dept: CARDIOLOGY | Facility: HOSPITAL | Age: 73
End: 2023-11-02
Attending: INTERNAL MEDICINE
Payer: MEDICARE

## 2023-11-02 DIAGNOSIS — I49.8 OTHER SPECIFIED CARDIAC ARRHYTHMIAS: ICD-10-CM

## 2023-11-02 DIAGNOSIS — Z95.818 PRESENCE OF OTHER CARDIAC IMPLANTS AND GRAFTS: ICD-10-CM

## 2023-11-02 PROCEDURE — 93298 CARDIAC DEVICE CHECK CHECK - REMOTE ALERT: ICD-10-PCS | Mod: HCNC,,, | Performed by: INTERNAL MEDICINE

## 2023-11-02 PROCEDURE — G2066 INTER DEVC REMOTE 30D: HCPCS | Mod: HCNC | Performed by: INTERNAL MEDICINE

## 2023-11-02 PROCEDURE — 93298 REM INTERROG DEV EVAL SCRMS: CPT | Mod: HCNC,,, | Performed by: INTERNAL MEDICINE

## 2023-11-04 LAB
OHS CV AF BURDEN PERCENT: < 1
OHS CV DC REMOTE DEVICE TYPE: NORMAL
OHS CV ICD SHOCK: NO

## 2023-11-09 ENCOUNTER — OFFICE VISIT (OUTPATIENT)
Dept: CARDIOLOGY | Facility: CLINIC | Age: 73
End: 2023-11-09
Payer: MEDICARE

## 2023-11-09 VITALS
HEIGHT: 67 IN | BODY MASS INDEX: 38.39 KG/M2 | HEART RATE: 60 BPM | WEIGHT: 244.63 LBS | SYSTOLIC BLOOD PRESSURE: 153 MMHG | DIASTOLIC BLOOD PRESSURE: 70 MMHG | OXYGEN SATURATION: 98 %

## 2023-11-09 DIAGNOSIS — I87.2 VENOUS INSUFFICIENCY OF BOTH LOWER EXTREMITIES: ICD-10-CM

## 2023-11-09 DIAGNOSIS — G47.33 OSA (OBSTRUCTIVE SLEEP APNEA): Chronic | ICD-10-CM

## 2023-11-09 DIAGNOSIS — I48.0 PAROXYSMAL A-FIB: Chronic | ICD-10-CM

## 2023-11-09 DIAGNOSIS — E66.01 MORBID OBESITY: Chronic | ICD-10-CM

## 2023-11-09 DIAGNOSIS — E78.2 MIXED HYPERLIPIDEMIA: Chronic | ICD-10-CM

## 2023-11-09 DIAGNOSIS — I10 ESSENTIAL HYPERTENSION: Primary | Chronic | ICD-10-CM

## 2023-11-09 PROBLEM — R00.2 PALPITATIONS: Status: RESOLVED | Noted: 2023-08-01 | Resolved: 2023-11-09

## 2023-11-09 PROCEDURE — 99999 PR PBB SHADOW E&M-EST. PATIENT-LVL III: ICD-10-PCS | Mod: PBBFAC,HCNC,, | Performed by: INTERNAL MEDICINE

## 2023-11-09 PROCEDURE — 99999 PR PBB SHADOW E&M-EST. PATIENT-LVL III: CPT | Mod: PBBFAC,HCNC,, | Performed by: INTERNAL MEDICINE

## 2023-11-09 PROCEDURE — 3078F DIAST BP <80 MM HG: CPT | Mod: HCNC,CPTII,S$GLB, | Performed by: INTERNAL MEDICINE

## 2023-11-09 PROCEDURE — 3077F SYST BP >= 140 MM HG: CPT | Mod: HCNC,CPTII,S$GLB, | Performed by: INTERNAL MEDICINE

## 2023-11-09 PROCEDURE — 1126F AMNT PAIN NOTED NONE PRSNT: CPT | Mod: HCNC,CPTII,S$GLB, | Performed by: INTERNAL MEDICINE

## 2023-11-09 PROCEDURE — 1126F PR PAIN SEVERITY QUANTIFIED, NO PAIN PRESENT: ICD-10-PCS | Mod: HCNC,CPTII,S$GLB, | Performed by: INTERNAL MEDICINE

## 2023-11-09 PROCEDURE — 3078F PR MOST RECENT DIASTOLIC BLOOD PRESSURE < 80 MM HG: ICD-10-PCS | Mod: HCNC,CPTII,S$GLB, | Performed by: INTERNAL MEDICINE

## 2023-11-09 PROCEDURE — 3008F PR BODY MASS INDEX (BMI) DOCUMENTED: ICD-10-PCS | Mod: HCNC,CPTII,S$GLB, | Performed by: INTERNAL MEDICINE

## 2023-11-09 PROCEDURE — 99214 OFFICE O/P EST MOD 30 MIN: CPT | Mod: HCNC,S$GLB,, | Performed by: INTERNAL MEDICINE

## 2023-11-09 PROCEDURE — 3077F PR MOST RECENT SYSTOLIC BLOOD PRESSURE >= 140 MM HG: ICD-10-PCS | Mod: HCNC,CPTII,S$GLB, | Performed by: INTERNAL MEDICINE

## 2023-11-09 PROCEDURE — 99214 PR OFFICE/OUTPT VISIT, EST, LEVL IV, 30-39 MIN: ICD-10-PCS | Mod: HCNC,S$GLB,, | Performed by: INTERNAL MEDICINE

## 2023-11-09 PROCEDURE — 3008F BODY MASS INDEX DOCD: CPT | Mod: HCNC,CPTII,S$GLB, | Performed by: INTERNAL MEDICINE

## 2023-11-09 NOTE — H&P (VIEW-ONLY)
Subjective:   @Patient ID:  Flores Fuentes is a 73 y.o. female who presents for evaluation of Pre-op risk stratification.   HPI:   11/2023: F/U. Post PVI, there was not accessory pathway identified during EPS. U/S venous with b/l GSV reflux and Rt pop reflux.  Rt LE edema>> LT. Heaviness, itching and pain. She is compliant with compression stocking which helps some.     CEAP 4S  VCCC score is 11      8/24/2023: F/U. She was hospitalized with a.fib, wasn't responding to pill in the pocket.  Now she is taking flecainide along with the Cardizem.  Has an appointment with Dr. Servin.  She does have significant symptomatic right lower extremity varicose vein since the age of 12 that has been getting worse.  Right lower extremity edema along with varicosity and heaviness.  Not consistently wearing compression stockings    7/22/2021: Patient is here for follow up. She saw Dr. Servin in 7/2021, EKg with intermittent wpw pattern, plan for ILR to evaluate for possible SVT episodes.     She stated that she has been ok since last visit.  Last a.fib episode was in 5/2021 and she took flecainide which helped. She gets different  tachypalpitations  that are different from her a.fib     Tolerating OAC well.     No prior CAD. No Tobacco. No DM.    HTN: BP is well controlled.   Paroxysmal A.fib: Currently SR and stable on AAD/ CCB/BB    She has congestion and cold symptoms, along with cough..     Pertinent cardiac hx:    Paroxysmal A.fib, F/U with Dr. Servin. On rhythm control strategy with flecainide (PIP). On Eliquis for anticoagulation.     Venous U/S 9/2023:    There is no evidence of a right lower extremity DVT.    There is no evidence of a left lower extremity DVT.    The right greater saphenous vein has reflux.    The right popliteal vein is has reflux.    The left greater saphenous vein has reflux.    2D Echo (6/3/2019):  Normal left ventricular systolic function. The estimated ejection fraction is 55%  Normal LV diastolic  function.  Normal right ventricular systolic function.  Mild left atrial enlargement.  The estimated PA systolic pressure is 31 mm Hg  Normal central venous pressure (3 mm Hg).    Patient Active Problem List    Diagnosis Date Noted    Kym-Parkinson-White (WPW) syndrome 2023    Stress at home 2023    Weight loss 2023    Morbid obesity 2021    Slow transit constipation 2021     Despite fruits & veg & 1200 dunia diet, try Colace daily      Kym-Parkinson-White (WPW) pattern 2021    Chronic anticoagulation 06/10/2021    Routine general medical examination at a health care facility 2020    Osteopenia of neck of left femur 2020    Asymptomatic microscopic hematuria 2020    MATTHEW (obstructive sleep apnea)     Essential hypertension 2019    Stress incontinence, female 2018     After HYST, Try Kegels      Cervical muscle strain 2017    DJD (degenerative joint disease) of cervical spine 2017    Primary localized osteoarthrosis, lower leg 2014    Paroxysmal A-fib 10/14/2014    Mixed hyperlipidemia 2013    Subclinical iodine-deficiency hypothyroidism 2013           Right Arm BP - Sittin/71  Left Arm BP - Sittin/70            Lipid panel  Lab Results   Component Value Date    CHOL 126 2023    CHOL 164 2022    CHOL 157 2021     Lab Results   Component Value Date    HDL 41 2023    HDL 45 2022    HDL 46 2021     Lab Results   Component Value Date    LDLCALC 70.6 2023    LDLCALC 103.8 2022    LDLCALC 90.6 2021     Lab Results   Component Value Date    TRIG 72 2023    TRIG 76 2022    TRIG 102 2021     Lab Results   Component Value Date    CHOLHDL 32.5 2023    CHOLHDL 27.4 2022    CHOLHDL 29.3 2021            Review of Systems   Constitutional: Negative for chills and fever.   HENT:  Negative for congestion, hearing loss and nosebleeds.     Eyes:  Negative for blurred vision.   Cardiovascular:  Positive for leg swelling (RT side , chronic lymphedema. ). Negative for chest pain and palpitations.   Respiratory:  Negative for cough, hemoptysis and shortness of breath.    Hematologic/Lymphatic: Negative for bleeding problem.   Skin:  Negative for itching.   Musculoskeletal:  Positive for arthritis.   Gastrointestinal:  Negative for abdominal pain and hematochezia.   Genitourinary:  Negative for hematuria.   Neurological:  Negative for dizziness.   Psychiatric/Behavioral:  Negative for altered mental status and depression.        Objective:   Physical Exam  Constitutional:       Appearance: She is well-developed.   HENT:      Head: Normocephalic and atraumatic.   Eyes:      Conjunctiva/sclera: Conjunctivae normal.   Neck:      Vascular: No JVD.   Cardiovascular:      Rate and Rhythm: Normal rate and regular rhythm.      Heart sounds: Normal heart sounds. No murmur heard.     No friction rub. No gallop.   Pulmonary:      Effort: Pulmonary effort is normal. No respiratory distress.      Breath sounds: Normal breath sounds. No stridor. No wheezing.   Abdominal:      General: There is no distension.      Palpations: Abdomen is soft.      Tenderness: There is no abdominal tenderness.   Musculoskeletal:      Cervical back: Neck supple.   Skin:     General: Skin is warm and dry.   Neurological:      Mental Status: She is alert and oriented to person, place, and time.   Psychiatric:         Behavior: Behavior normal.         Assessment:     1. Essential hypertension    2. Mixed hyperlipidemia    3. Paroxysmal A-fib    4. Venous insufficiency of both lower extremities    5. Morbid obesity    6. MATTHEW (obstructive sleep apnea)        Plan:   Continue Amio  Continue cardizem   Continue oral anticoagulation.  Significant superficial and deep reflux. She is very symptomatic. CEAP 4S  Compliant with compression stocking 20-30 mm Hg      We had a long discussion regarding  the pathophysiology of venous insufficiency.  We discussed both superficial and deep venous reflux disease.  Initial treatment is usually conservative with compression stockings, exercise, weight loss, and calf muscle compressive therapy.  If conservative measures fail then we will proceed with superficial venous reflux disease management with ablative therapy.  If symptoms persist then we will proceed with evaluation of deep venous reflux disease.  This requires a venogram with intravascular ultrasound for guidance of intervention if clinically indicated.  The patient expressed verbal understanding of the plan.  Weight loss.  Low-salt diet.  Exercise.   Leg elevation    Will proceed with Rt GSV with Varithena     Weight loss encouraged  Statin        - Return sooner for concerns or questions. If symptoms persist go to the ED  I have reviewed all pertinent data on this patient   I have reviewed the patient's medical history in detail and updated the computerized patient record.  Follow up as scheduled. Return sooner for concerns or questions  She expressed verbal understanding and agreed with the plan      It was my please seeing Ms. Fuentes. Please don't hesitate to contact us with any questions. Than you.     Patient's Medications   New Prescriptions    No medications on file   Previous Medications    AMIODARONE (PACERONE) 200 MG TAB    Take 2 tablets (400 mg total) by mouth 2 (two) times daily for 14 days, THEN 2 tablets (400 mg total) once daily for 14 days, THEN 1 tablet (200 mg total) once daily.    DILTIAZEM (CARDIZEM CD) 180 MG 24 HR CAPSULE    Take 1 capsule (180 mg total) by mouth once daily.    ELIQUIS 5 MG TAB    TAKE 1 TABLET TWICE DAILY    FLUTICASONE PROPIONATE (FLONASE) 50 MCG/ACTUATION NASAL SPRAY    USE 1 SPRAY IN EACH NOSTRIL EVERY DAY    LANSOPRAZOLE (PREVACID) 30 MG CAPSULE    Take 1 capsule (30 mg total) by mouth once daily.    LEVOTHYROXINE (SYNTHROID) 25 MCG TABLET    TAKE 1 TABLET ONE TIME  DAILY    NYSTATIN (MYCOSTATIN) CREAM    Apply topically 2 (two) times daily.    PRAVASTATIN (PRAVACHOL) 40 MG TABLET    TAKE 1 TABLET EVERY DAY   Modified Medications    No medications on file   Discontinued Medications    No medications on file           98

## 2023-11-09 NOTE — PROGRESS NOTES
Subjective:   @Patient ID:  Flores Fuentes is a 73 y.o. female who presents for evaluation of Pre-op risk stratification.   HPI:   11/2023: F/U. Post PVI, there was not accessory pathway identified during EPS. U/S venous with b/l GSV reflux and Rt pop reflux.  Rt LE edema>> LT. Heaviness, itching and pain. She is compliant with compression stocking which helps some.     CEAP 4S  VCCC score is 11      8/24/2023: F/U. She was hospitalized with a.fib, wasn't responding to pill in the pocket.  Now she is taking flecainide along with the Cardizem.  Has an appointment with Dr. Servin.  She does have significant symptomatic right lower extremity varicose vein since the age of 12 that has been getting worse.  Right lower extremity edema along with varicosity and heaviness.  Not consistently wearing compression stockings    7/22/2021: Patient is here for follow up. She saw Dr. Servin in 7/2021, EKg with intermittent wpw pattern, plan for ILR to evaluate for possible SVT episodes.     She stated that she has been ok since last visit.  Last a.fib episode was in 5/2021 and she took flecainide which helped. She gets different  tachypalpitations  that are different from her a.fib     Tolerating OAC well.     No prior CAD. No Tobacco. No DM.    HTN: BP is well controlled.   Paroxysmal A.fib: Currently SR and stable on AAD/ CCB/BB    She has congestion and cold symptoms, along with cough..     Pertinent cardiac hx:    Paroxysmal A.fib, F/U with Dr. Servin. On rhythm control strategy with flecainide (PIP). On Eliquis for anticoagulation.     Venous U/S 9/2023:    There is no evidence of a right lower extremity DVT.    There is no evidence of a left lower extremity DVT.    The right greater saphenous vein has reflux.    The right popliteal vein is has reflux.    The left greater saphenous vein has reflux.    2D Echo (6/3/2019):  Normal left ventricular systolic function. The estimated ejection fraction is 55%  Normal LV diastolic  function.  Normal right ventricular systolic function.  Mild left atrial enlargement.  The estimated PA systolic pressure is 31 mm Hg  Normal central venous pressure (3 mm Hg).    Patient Active Problem List    Diagnosis Date Noted    Kym-Parkinson-White (WPW) syndrome 2023    Stress at home 2023    Weight loss 2023    Morbid obesity 2021    Slow transit constipation 2021     Despite fruits & veg & 1200 dunia diet, try Colace daily      Kym-Parkinson-White (WPW) pattern 2021    Chronic anticoagulation 06/10/2021    Routine general medical examination at a health care facility 2020    Osteopenia of neck of left femur 2020    Asymptomatic microscopic hematuria 2020    MATTHEW (obstructive sleep apnea)     Essential hypertension 2019    Stress incontinence, female 2018     After HYST, Try Kegels      Cervical muscle strain 2017    DJD (degenerative joint disease) of cervical spine 2017    Primary localized osteoarthrosis, lower leg 2014    Paroxysmal A-fib 10/14/2014    Mixed hyperlipidemia 2013    Subclinical iodine-deficiency hypothyroidism 2013           Right Arm BP - Sittin/71  Left Arm BP - Sittin/70            Lipid panel  Lab Results   Component Value Date    CHOL 126 2023    CHOL 164 2022    CHOL 157 2021     Lab Results   Component Value Date    HDL 41 2023    HDL 45 2022    HDL 46 2021     Lab Results   Component Value Date    LDLCALC 70.6 2023    LDLCALC 103.8 2022    LDLCALC 90.6 2021     Lab Results   Component Value Date    TRIG 72 2023    TRIG 76 2022    TRIG 102 2021     Lab Results   Component Value Date    CHOLHDL 32.5 2023    CHOLHDL 27.4 2022    CHOLHDL 29.3 2021            Review of Systems   Constitutional: Negative for chills and fever.   HENT:  Negative for congestion, hearing loss and nosebleeds.     Eyes:  Negative for blurred vision.   Cardiovascular:  Positive for leg swelling (RT side , chronic lymphedema. ). Negative for chest pain and palpitations.   Respiratory:  Negative for cough, hemoptysis and shortness of breath.    Hematologic/Lymphatic: Negative for bleeding problem.   Skin:  Negative for itching.   Musculoskeletal:  Positive for arthritis.   Gastrointestinal:  Negative for abdominal pain and hematochezia.   Genitourinary:  Negative for hematuria.   Neurological:  Negative for dizziness.   Psychiatric/Behavioral:  Negative for altered mental status and depression.        Objective:   Physical Exam  Constitutional:       Appearance: She is well-developed.   HENT:      Head: Normocephalic and atraumatic.   Eyes:      Conjunctiva/sclera: Conjunctivae normal.   Neck:      Vascular: No JVD.   Cardiovascular:      Rate and Rhythm: Normal rate and regular rhythm.      Heart sounds: Normal heart sounds. No murmur heard.     No friction rub. No gallop.   Pulmonary:      Effort: Pulmonary effort is normal. No respiratory distress.      Breath sounds: Normal breath sounds. No stridor. No wheezing.   Abdominal:      General: There is no distension.      Palpations: Abdomen is soft.      Tenderness: There is no abdominal tenderness.   Musculoskeletal:      Cervical back: Neck supple.   Skin:     General: Skin is warm and dry.   Neurological:      Mental Status: She is alert and oriented to person, place, and time.   Psychiatric:         Behavior: Behavior normal.         Assessment:     1. Essential hypertension    2. Mixed hyperlipidemia    3. Paroxysmal A-fib    4. Venous insufficiency of both lower extremities    5. Morbid obesity    6. MATTHEW (obstructive sleep apnea)        Plan:   Continue Amio  Continue cardizem   Continue oral anticoagulation.  Significant superficial and deep reflux. She is very symptomatic. CEAP 4S  Compliant with compression stocking 20-30 mm Hg      We had a long discussion regarding  the pathophysiology of venous insufficiency.  We discussed both superficial and deep venous reflux disease.  Initial treatment is usually conservative with compression stockings, exercise, weight loss, and calf muscle compressive therapy.  If conservative measures fail then we will proceed with superficial venous reflux disease management with ablative therapy.  If symptoms persist then we will proceed with evaluation of deep venous reflux disease.  This requires a venogram with intravascular ultrasound for guidance of intervention if clinically indicated.  The patient expressed verbal understanding of the plan.  Weight loss.  Low-salt diet.  Exercise.   Leg elevation    Will proceed with Rt GSV with Varithena     Weight loss encouraged  Statin        - Return sooner for concerns or questions. If symptoms persist go to the ED  I have reviewed all pertinent data on this patient   I have reviewed the patient's medical history in detail and updated the computerized patient record.  Follow up as scheduled. Return sooner for concerns or questions  She expressed verbal understanding and agreed with the plan      It was my please seeing Ms. Fuentes. Please don't hesitate to contact us with any questions. Than you.     Patient's Medications   New Prescriptions    No medications on file   Previous Medications    AMIODARONE (PACERONE) 200 MG TAB    Take 2 tablets (400 mg total) by mouth 2 (two) times daily for 14 days, THEN 2 tablets (400 mg total) once daily for 14 days, THEN 1 tablet (200 mg total) once daily.    DILTIAZEM (CARDIZEM CD) 180 MG 24 HR CAPSULE    Take 1 capsule (180 mg total) by mouth once daily.    ELIQUIS 5 MG TAB    TAKE 1 TABLET TWICE DAILY    FLUTICASONE PROPIONATE (FLONASE) 50 MCG/ACTUATION NASAL SPRAY    USE 1 SPRAY IN EACH NOSTRIL EVERY DAY    LANSOPRAZOLE (PREVACID) 30 MG CAPSULE    Take 1 capsule (30 mg total) by mouth once daily.    LEVOTHYROXINE (SYNTHROID) 25 MCG TABLET    TAKE 1 TABLET ONE TIME  DAILY    NYSTATIN (MYCOSTATIN) CREAM    Apply topically 2 (two) times daily.    PRAVASTATIN (PRAVACHOL) 40 MG TABLET    TAKE 1 TABLET EVERY DAY   Modified Medications    No medications on file   Discontinued Medications    No medications on file

## 2023-11-10 ENCOUNTER — PATIENT MESSAGE (OUTPATIENT)
Dept: CARDIOLOGY | Facility: CLINIC | Age: 73
End: 2023-11-10
Payer: MEDICARE

## 2023-11-10 DIAGNOSIS — I48.0 PAROXYSMAL A-FIB: ICD-10-CM

## 2023-11-10 RX ORDER — DILTIAZEM HYDROCHLORIDE 180 MG/1
180 CAPSULE, COATED, EXTENDED RELEASE ORAL DAILY
Qty: 30 CAPSULE | Refills: 11 | Status: CANCELLED | OUTPATIENT
Start: 2023-11-10 | End: 2024-11-09

## 2023-11-10 RX ORDER — AMIODARONE HYDROCHLORIDE 200 MG/1
TABLET ORAL
Qty: 444 TABLET | Refills: 0 | Status: CANCELLED | OUTPATIENT
Start: 2023-11-10 | End: 2024-12-07

## 2023-11-13 PROBLEM — Z00.00 ROUTINE GENERAL MEDICAL EXAMINATION AT A HEALTH CARE FACILITY: Status: RESOLVED | Noted: 2020-08-13 | Resolved: 2023-11-13

## 2023-11-16 DIAGNOSIS — I48.0 PAROXYSMAL A-FIB: ICD-10-CM

## 2023-11-16 RX ORDER — DILTIAZEM HYDROCHLORIDE 180 MG/1
180 CAPSULE, COATED, EXTENDED RELEASE ORAL DAILY
Qty: 90 CAPSULE | Refills: 3 | Status: SHIPPED | OUTPATIENT
Start: 2023-11-16 | End: 2024-03-20 | Stop reason: SDUPTHER

## 2023-11-16 RX ORDER — AMIODARONE HYDROCHLORIDE 200 MG/1
200 TABLET ORAL DAILY
Qty: 90 TABLET | Refills: 3 | Status: SHIPPED | OUTPATIENT
Start: 2023-11-16 | End: 2024-01-17

## 2023-11-20 ENCOUNTER — PATIENT MESSAGE (OUTPATIENT)
Dept: CARDIOLOGY | Facility: CLINIC | Age: 73
End: 2023-11-20
Payer: MEDICARE

## 2023-12-05 ENCOUNTER — PATIENT MESSAGE (OUTPATIENT)
Dept: CARDIOLOGY | Facility: CLINIC | Age: 73
End: 2023-12-05
Payer: MEDICARE

## 2023-12-08 ENCOUNTER — HOSPITAL ENCOUNTER (OUTPATIENT)
Facility: HOSPITAL | Age: 73
Discharge: HOME | End: 2023-12-08
Attending: INTERNAL MEDICINE | Admitting: INTERNAL MEDICINE
Payer: MEDICARE

## 2023-12-08 VITALS
TEMPERATURE: 96 F | HEART RATE: 62 BPM | OXYGEN SATURATION: 99 % | RESPIRATION RATE: 18 BRPM | SYSTOLIC BLOOD PRESSURE: 149 MMHG | DIASTOLIC BLOOD PRESSURE: 70 MMHG | HEIGHT: 67 IN | WEIGHT: 230 LBS | BODY MASS INDEX: 36.1 KG/M2

## 2023-12-08 DIAGNOSIS — I87.2 VENOUS INSUFFICIENCY OF BOTH LOWER EXTREMITIES: ICD-10-CM

## 2023-12-08 PROCEDURE — C9399 UNCLASSIFIED DRUGS OR BIOLOG: HCPCS | Mod: HCNC | Performed by: INTERNAL MEDICINE

## 2023-12-08 PROCEDURE — 36465 NJX NONCMPND SCLRSNT 1 VEIN: CPT | Mod: HCNC,RT,, | Performed by: INTERNAL MEDICINE

## 2023-12-08 PROCEDURE — 25000003 PHARM REV CODE 250: Mod: HCNC | Performed by: INTERNAL MEDICINE

## 2023-12-08 PROCEDURE — 36465 NJX NONCMPND SCLRSNT 1 VEIN: CPT | Mod: HCNC,RT | Performed by: INTERNAL MEDICINE

## 2023-12-08 PROCEDURE — 36465 PR INJ, NONCMPND FOAM SCLEROSANT, 1 VEIN: ICD-10-PCS | Mod: HCNC,RT,, | Performed by: INTERNAL MEDICINE

## 2023-12-08 PROCEDURE — 63600175 PHARM REV CODE 636 W HCPCS: Mod: HCNC | Performed by: INTERNAL MEDICINE

## 2023-12-08 PROCEDURE — C1894 INTRO/SHEATH, NON-LASER: HCPCS | Mod: HCNC | Performed by: INTERNAL MEDICINE

## 2023-12-08 RX ORDER — HEPARIN SODIUM 200 [USP'U]/100ML
INJECTION, SOLUTION INTRAVENOUS
Status: DISCONTINUED | OUTPATIENT
Start: 2023-12-08 | End: 2023-12-08 | Stop reason: HOSPADM

## 2023-12-08 RX ORDER — LIDOCAINE HYDROCHLORIDE 10 MG/ML
INJECTION INFILTRATION; PERINEURAL
Status: DISCONTINUED | OUTPATIENT
Start: 2023-12-08 | End: 2023-12-08 | Stop reason: HOSPADM

## 2023-12-08 NOTE — PLAN OF CARE
Recovering at home  Once at home, follow all the instructions youve been given. Be sure to:  Take all medicines as directed  Care for the catheter insertion site as directed  Check for signs of infection at the catheter insertion site: check for warmth, redness, yellow/green discharge from access site  Wear elastic stockings or bandages as directed: first 2 days  Keep your legs raised (elevated) as directed  Walk a few times a day. Walk for at least 5 minutes every hours, while awake.  Avoid heavy exercise, lifting, and standing for long periods as advised  Avoid air travel, hot baths, saunas, or whirlpools for the next 3 week  Avoid natural bodies of water for 3 weeks

## 2023-12-08 NOTE — BRIEF OP NOTE
S/P Successful Rt GSV chemical ablation with 15 cc Varithena     Patient entered procedure room then procedure time out was performed to confirm correct patient, correct extremity, correct procedure and room set including supplies , devices, and drugs needed.The scope of treatment for the LSV, GSV, accessory and the associated varicosities was determined through ultrasound imaging; all necessary veins were marked including perforators.  The patient was evaluated in reverse trendelenburg position. The leg was prepped , and sterile drape applied.  1% lidocaine was administered at the chosen puncture site to numb the access site. The vein was accessed using a micro puncture needle followed by a guidewire through the punctured needle and dilator under ultrasound guidance. Vein access was established and confirmed by aspiration of dark low pressure blood. After gaining access, the leg was positioned at 45 degrees of elevation in relation to the torso.       The Varithena canister was primed and purged as required by the instructions. A 15 ml aliquot was drawn into a sterile syringe then attached to the access device.     PROXIMAL GSV: Varithena was slowly administered at 1 cc/second with close observation by ultrasound of its course in the GSV. When delivery arrived at approx 3-5cm from the SFJ, the GSV was compressed to prevent flow in the common femoral vein. Further administration of Varithena was stopped at this point and the treated GSV was observed for spasm with ultrasound over a period of 3-5 minutes.    DISTAL GSV: Varithena was administered at 1 cc per second with close observation under ultrasound and its course of the GSV. The vein was observed for spasm under ultrasound, once vein was in full spasm the administration of Varithenia was stopped.     After the administration of Varithena the patient was asked to dorsiflex the ankle to limit flow of foam into perforating veins. Once appropriate spasm had been  confirmed int he treated veins, the access device was removed from the leg and light pressure was applied to the puncture site for hemostasis.     The common femoral and deep superficial veins were then evaluated for flow and compressibility prior to dressing placement. The lower extremity was kept elevated at a 45 degree angle and a multi layer dressing was applied including an under layer, compression pad, and thigh high 20-30 mmHg compression stocking to the patient. The leg was lowered only after compression had been applied.     The patient ambulated 10 minutes under supervision and was without concern at the time of release. Patient acre instructions include walking, wearing compression 24 hours per day, taking off only to shower, and then reapplying for 2 weeks. The patient was instructed to take antiinflammatory medications as needed and to follow up for duplex scan of treated veins.     The total length of time to complete Percutaneous Endovenous Ablation with Varithenia was 60 minutes.

## 2023-12-08 NOTE — DISCHARGE SUMMARY
[facility] ([facility])  Discharge Note  Short Stay    Procedure(s) (LRB):  ABLATION, MECHANOCHEMICAL, VARICOSE VEIN (Right)      OUTCOME: Patient tolerated treatment/procedure well without complication and is now ready for discharge.  S/P Successful Rt GSV chemical ablation with 15 cc Varithena      Patient entered procedure room then procedure time out was performed to confirm correct patient, correct extremity, correct procedure and room set including supplies , devices, and drugs needed.The scope of treatment for the LSV, GSV, accessory and the associated varicosities was determined through ultrasound imaging; all necessary veins were marked including perforators.  The patient was evaluated in reverse trendelenburg position. The leg was prepped , and sterile drape applied.  1% lidocaine was administered at the chosen puncture site to numb the access site. The vein was accessed using a micro puncture needle followed by a guidewire through the punctured needle and dilator under ultrasound guidance. Vein access was established and confirmed by aspiration of dark low pressure blood. After gaining access, the leg was positioned at 45 degrees of elevation in relation to the torso.         The Varithena canister was primed and purged as required by the instructions. A 15 ml aliquot was drawn into a sterile syringe then attached to the access device.      PROXIMAL GSV: Varithena was slowly administered at 1 cc/second with close observation by ultrasound of its course in the GSV. When delivery arrived at approx 3-5cm from the SFJ, the GSV was compressed to prevent flow in the common femoral vein. Further administration of Varithena was stopped at this point and the treated GSV was observed for spasm with ultrasound over a period of 3-5 minutes.     DISTAL GSV: Varithena was administered at 1 cc per second with close observation under ultrasound and its course of the GSV. The vein was observed for spasm under  ultrasound, once vein was in full spasm the administration of Varithenia was stopped.      After the administration of Varithena the patient was asked to dorsiflex the ankle to limit flow of foam into perforating veins. Once appropriate spasm had been confirmed int he treated veins, the access device was removed from the leg and light pressure was applied to the puncture site for hemostasis.      The common femoral and deep superficial veins were then evaluated for flow and compressibility prior to dressing placement. The lower extremity was kept elevated at a 45 degree angle and a multi layer dressing was applied including an under layer, compression pad, and thigh high 20-30 mmHg compression stocking to the patient. The leg was lowered only after compression had been applied.      The patient ambulated 10 minutes under supervision and was without concern at the time of release. Patient acre instructions include walking, wearing compression 24 hours per day, taking off only to shower, and then reapplying for 2 weeks. The patient was instructed to take antiinflammatory medications as needed and to follow up for duplex scan of treated veins.      The total length of time to complete Percutaneous Endovenous Ablation with Varithenia was 60 minutes.              DISPOSITION: Home or Self Care    FINAL DIAGNOSIS:  <principal problem not specified>    FOLLOWUP: In clinic    DISCHARGE INSTRUCTIONS:  No discharge procedures on file.     TIME SPENT ON DISCHARGE: 20 minutes

## 2023-12-08 NOTE — Clinical Note
The site was marked. The site was prepped with ChloraPrep. The patient was draped. Right leg prepped.

## 2023-12-08 NOTE — PLAN OF CARE
Pt arrived independently from home, ambulates with/out assistance. Patient sitting on side of bed with no complaints of pain or distress noted. Stool provided to place feet on for comfort, instructed to call for help prior to getting up. Monitors applied. VSS, AAOx4.  Fall risk reviewed with patient. Procedure and recovery process explained to patient and all questions answered.  Patient verbalized understanding, denies questions. Will continue to monitor for safety and needs.

## 2023-12-13 ENCOUNTER — PATIENT MESSAGE (OUTPATIENT)
Dept: CARDIOLOGY | Facility: CLINIC | Age: 73
End: 2023-12-13
Payer: MEDICARE

## 2023-12-16 ENCOUNTER — CLINICAL SUPPORT (OUTPATIENT)
Dept: CARDIOLOGY | Facility: HOSPITAL | Age: 73
End: 2023-12-16
Payer: MEDICARE

## 2023-12-16 ENCOUNTER — CLINICAL SUPPORT (OUTPATIENT)
Dept: CARDIOLOGY | Facility: HOSPITAL | Age: 73
End: 2023-12-16
Attending: INTERNAL MEDICINE
Payer: MEDICARE

## 2023-12-16 DIAGNOSIS — Z95.818 PRESENCE OF OTHER CARDIAC IMPLANTS AND GRAFTS: ICD-10-CM

## 2023-12-16 DIAGNOSIS — I49.8 OTHER SPECIFIED CARDIAC ARRHYTHMIAS: ICD-10-CM

## 2023-12-16 PROCEDURE — G2066 INTER DEVC REMOTE 30D: HCPCS | Mod: HCNC | Performed by: INTERNAL MEDICINE

## 2023-12-16 PROCEDURE — 93298 REM INTERROG DEV EVAL SCRMS: CPT | Mod: HCNC,,, | Performed by: INTERNAL MEDICINE

## 2023-12-18 ENCOUNTER — PATIENT MESSAGE (OUTPATIENT)
Dept: PRIMARY CARE CLINIC | Facility: CLINIC | Age: 73
End: 2023-12-18
Payer: MEDICARE

## 2023-12-19 RX ORDER — PRAVASTATIN SODIUM 40 MG/1
40 TABLET ORAL DAILY
Qty: 90 TABLET | Refills: 0 | Status: SHIPPED | OUTPATIENT
Start: 2023-12-19 | End: 2024-02-29

## 2023-12-19 NOTE — TELEPHONE ENCOUNTER
No care due was identified.  [facility] Embedded Care Due Messages. Reference number: [id number].   12/19/2023 12:16:08 PM CST

## 2024-01-01 LAB
OHS CV AF BURDEN PERCENT: < 1
OHS CV DC REMOTE DEVICE TYPE: NORMAL

## 2024-01-03 ENCOUNTER — PATIENT MESSAGE (OUTPATIENT)
Dept: CARDIOLOGY | Facility: CLINIC | Age: 74
End: 2024-01-03
Payer: MEDICARE

## 2024-01-04 ENCOUNTER — HOSPITAL ENCOUNTER (OUTPATIENT)
Dept: CARDIOLOGY | Facility: HOSPITAL | Age: 74
Discharge: HOME | End: 2024-01-04
Attending: INTERNAL MEDICINE
Payer: MEDICARE

## 2024-01-04 DIAGNOSIS — I87.2 VENOUS INSUFFICIENCY: ICD-10-CM

## 2024-01-04 LAB
RIGHT GREAT SAPHENOUS DISTAL THIGH DIA: 0.86 CM
RIGHT GREAT SAPHENOUS DISTAL THIGH REFLUX: 2546 MS
RIGHT GREAT SAPHENOUS JUNCTION DIA: 0.81 CM
RIGHT GREAT SAPHENOUS MIDDLE THIGH DIA: 1.45 CM
RIGHT GREAT SAPHENOUS MIDDLE THIGH REFLUX: 2592 MS
RIGHT GREAT SAPHENOUS PROXIMAL CALF DIA: 0.84 CM
RIGHT GREAT SAPHENOUS PROXIMAL CALF REFLUX: 1387 MS
RIGHT SMALL SAPHENOUS KNEE DIA: 0.25 CM

## 2024-01-04 PROCEDURE — 93971 EXTREMITY STUDY: CPT | Mod: 26,HCNC,RT, | Performed by: INTERNAL MEDICINE

## 2024-01-04 PROCEDURE — 93971 EXTREMITY STUDY: CPT | Mod: TC,HCNC,RT

## 2024-01-09 ENCOUNTER — PATIENT MESSAGE (OUTPATIENT)
Dept: CARDIOLOGY | Facility: CLINIC | Age: 74
End: 2024-01-09
Payer: MEDICARE

## 2024-01-16 ENCOUNTER — CLINICAL SUPPORT (OUTPATIENT)
Dept: CARDIOLOGY | Facility: HOSPITAL | Age: 74
End: 2024-01-16
Attending: INTERNAL MEDICINE
Payer: MEDICARE

## 2024-01-16 ENCOUNTER — CLINICAL SUPPORT (OUTPATIENT)
Dept: CARDIOLOGY | Facility: HOSPITAL | Age: 74
End: 2024-01-16
Payer: MEDICARE

## 2024-01-16 DIAGNOSIS — I49.8 OTHER SPECIFIED CARDIAC ARRHYTHMIAS: ICD-10-CM

## 2024-01-16 DIAGNOSIS — Z95.818 PRESENCE OF OTHER CARDIAC IMPLANTS AND GRAFTS: ICD-10-CM

## 2024-01-16 LAB
OHS CV AF BURDEN PERCENT: < 1
OHS CV DC REMOTE DEVICE TYPE: NORMAL

## 2024-01-16 PROCEDURE — 93298 REM INTERROG DEV EVAL SCRMS: CPT | Mod: 26,HCNC,, | Performed by: INTERNAL MEDICINE

## 2024-01-17 ENCOUNTER — OFFICE VISIT (OUTPATIENT)
Dept: ELECTROPHYSIOLOGY | Facility: CLINIC | Age: 74
End: 2024-01-17
Payer: MEDICARE

## 2024-01-17 ENCOUNTER — HOSPITAL ENCOUNTER (OUTPATIENT)
Dept: CARDIOLOGY | Facility: CLINIC | Age: 74
Discharge: HOME | End: 2024-01-17
Payer: MEDICARE

## 2024-01-17 VITALS
WEIGHT: 231.69 LBS | DIASTOLIC BLOOD PRESSURE: 78 MMHG | HEART RATE: 59 BPM | BODY MASS INDEX: 36.36 KG/M2 | HEIGHT: 67 IN | SYSTOLIC BLOOD PRESSURE: 147 MMHG

## 2024-01-17 DIAGNOSIS — I48.0 PAROXYSMAL A-FIB: Chronic | ICD-10-CM

## 2024-01-17 DIAGNOSIS — G47.33 OSA (OBSTRUCTIVE SLEEP APNEA): Chronic | ICD-10-CM

## 2024-01-17 DIAGNOSIS — I10 ESSENTIAL HYPERTENSION: Chronic | ICD-10-CM

## 2024-01-17 DIAGNOSIS — E66.01 MORBID OBESITY: ICD-10-CM

## 2024-01-17 DIAGNOSIS — I48.0 PAROXYSMAL A-FIB: Primary | Chronic | ICD-10-CM

## 2024-01-17 PROCEDURE — 93005 ELECTROCARDIOGRAM TRACING: CPT | Mod: HCNC,S$GLB,, | Performed by: STUDENT IN AN ORGANIZED HEALTH CARE EDUCATION/TRAINING PROGRAM

## 2024-01-17 PROCEDURE — 1101F PT FALLS ASSESS-DOCD LE1/YR: CPT | Mod: HCNC,CPTII,S$GLB, | Performed by: INTERNAL MEDICINE

## 2024-01-17 PROCEDURE — 3288F FALL RISK ASSESSMENT DOCD: CPT | Mod: HCNC,CPTII,S$GLB, | Performed by: INTERNAL MEDICINE

## 2024-01-17 PROCEDURE — 3078F DIAST BP <80 MM HG: CPT | Mod: HCNC,CPTII,S$GLB, | Performed by: INTERNAL MEDICINE

## 2024-01-17 PROCEDURE — 99214 OFFICE O/P EST MOD 30 MIN: CPT | Mod: HCNC,S$GLB,, | Performed by: INTERNAL MEDICINE

## 2024-01-17 PROCEDURE — 1126F AMNT PAIN NOTED NONE PRSNT: CPT | Mod: HCNC,CPTII,S$GLB, | Performed by: INTERNAL MEDICINE

## 2024-01-17 PROCEDURE — 93010 ELECTROCARDIOGRAM REPORT: CPT | Mod: HCNC,S$GLB,, | Performed by: INTERNAL MEDICINE

## 2024-01-17 PROCEDURE — 1159F MED LIST DOCD IN RCRD: CPT | Mod: HCNC,CPTII,S$GLB, | Performed by: INTERNAL MEDICINE

## 2024-01-17 PROCEDURE — 3077F SYST BP >= 140 MM HG: CPT | Mod: HCNC,CPTII,S$GLB, | Performed by: INTERNAL MEDICINE

## 2024-01-17 PROCEDURE — 3008F BODY MASS INDEX DOCD: CPT | Mod: HCNC,CPTII,S$GLB, | Performed by: INTERNAL MEDICINE

## 2024-01-17 PROCEDURE — 1160F RVW MEDS BY RX/DR IN RCRD: CPT | Mod: HCNC,CPTII,S$GLB, | Performed by: INTERNAL MEDICINE

## 2024-01-17 PROCEDURE — 99999 PR PBB SHADOW E&M-EST. PATIENT-LVL III: CPT | Mod: PBBFAC,HCNC,, | Performed by: INTERNAL MEDICINE

## 2024-01-17 NOTE — PROGRESS NOTES
PCP - Naz Condon MD  Subjective:     Ms. Fuentes is a 72 y.o. female with pAF, HLD, obesity, hypothyroid here for follow up. Last visit 8/2023    AF first dx 2006 after lots of caffeine. Few episodes since then. Pill in pocket strategy.  HL, on meds  obesity  hypothyroid, on med  MATTHEW, on CPAP since early 2022     Went to ER 3/10 with palps. Underwent DCCV 3/13/17 after remaining in AF with RVR. Started on multaq, and BB d/c'd.  Since, feeling not quite as well as usually, and on 4/17, at which time HR was 120s and didn't feel same as prior AF episodes.  She'd been on BB for years w/o issues. Good exertion tolerance. No CP.     Due to rare PAF episodes, we adopted a pill-in-the-pocket strategy. She used it first in 11/17; went to the ER. AF resolved <30 min after taking dose. Had 2 other episodes, self-treated successfully, in 12/17 and 1/18. Since last seen, has had 4 such episodes, all aborted with PIP.      Had AF episode 8/2023 without termination from PIP flec. Standing-dose flec started. Has had 2 episodes of AF since.     Update 1/17/24:  PVI with no evidence of AP during EPS (including ADO use)  ILR without any AF/AF  EF 60-65%  Rt GSV chemical ablation 12/23  Tolerating medications    I personally reviewed the ECG/telemetry strip today which shows NSR without ectopy    History:     Social History     Tobacco Use    Smoking status: Never    Smokeless tobacco: Never   Substance Use Topics    Alcohol use: No     Family History   Problem Relation Age of Onset    Cancer Mother         stomach, liver    Breast cancer Neg Hx     Ovarian cancer Neg Hx     Cervical cancer Neg Hx     Endometrial cancer Neg Hx     Vaginal cancer Neg Hx     Melanoma Neg Hx     Colon cancer Neg Hx     Esophageal cancer Neg Hx        Meds:     Review of patient's allergies indicates:   Allergen Reactions    Decongestant d [pseudoephedrine-dm]      Atrial Fibrillation    Augmentin [amoxicillin-pot clavulanate]      palpitations       Amoxicillin Palpitations    Diphenhydramine-pseudoephed Palpitations     TACHYCARDIA    Povidone-iodine Rash     Mild erythema of skin       Current Outpatient Medications:     amiodarone (PACERONE) 200 MG Tab, Take 1 tablet (200 mg total) by mouth once daily., Disp: 90 tablet, Rfl: 3    diltiaZEM (CARDIZEM CD) 180 MG 24 hr capsule, Take 1 capsule (180 mg total) by mouth once daily., Disp: 90 capsule, Rfl: 3    ELIQUIS 5 mg Tab, TAKE 1 TABLET TWICE DAILY, Disp: 180 tablet, Rfl: 3    fluticasone propionate (FLONASE) 50 mcg/actuation nasal spray, USE 1 SPRAY IN EACH NOSTRIL EVERY DAY, Disp: 32 g, Rfl: 2    lansoprazole (PREVACID) 30 MG capsule, Take 1 capsule (30 mg total) by mouth once daily., Disp: 30 capsule, Rfl: 0    levothyroxine (SYNTHROID) 25 MCG tablet, TAKE 1 TABLET ONE TIME DAILY, Disp: 90 tablet, Rfl: 2    nystatin (MYCOSTATIN) cream, Apply topically 2 (two) times daily., Disp: 30 g, Rfl: 11    pravastatin (PRAVACHOL) 40 MG tablet, Take 1 tablet (40 mg total) by mouth once daily., Disp: 90 tablet, Rfl: 0  No current facility-administered medications for this visit.    Facility-Administered Medications Ordered in Other Visits:     sodium chloride 0.9% bolus 1,000 mL, 1,000 mL, Intravenous, Once, Carley Cabrera, NP    vancomycin in dextrose 5 % 1 gram/250 mL IVPB 1,000 mg, 1,000 mg, Intravenous, On Call Procedure, Carley Cabrera, BIANCA, Last Rate: 166.7 mL/hr at 11/01/21 1249, 1,000 mg at 11/01/21 1249    Constitution: Negative for fever or chills. Negative for weight loss or gain.   HENT: Negative for sore throat or headaches. Negative for rhinorrhea.  Eyes: Negative for blurred or double vision.   Cardiovascular: See above  Pulmonary: Negative for SOB. Negative for cough.   Gastrointestinal: Negative for abdominal pain. Negative for nausea/ vomiting. Negative for diarrhea.   : Negative for dysuria.   Neurological: Negative for focal weakness or sensory changes.    Objective:   BP (!) 147/78 (BP  "Location: Left arm, Patient Position: Sitting)   Pulse (!) 59   Ht 5' 7" (1.702 m)   Wt 105.1 kg (231 lb 11.3 oz)   BMI 36.29 kg/m²     General: NAD. AAO.  HENT: No scleral icterus. Extraocular movements intact.  Neck: No JVD  Cardiovascular: Regular heart rate and rhythm. S1/S2 appreciated.  Respiratory: CTAB. No increased work of breathing.  Extremities: Warm. No edema.  Skin: no ulceration or wounds present    Labs & Imaging   Reviewed in clinic today    Assessment:   Flores Fuentes is a 73 y.o. y.o. female who presents today for 3 month post-PVI assessment. She underwent a PVI 10/17/23 without complication. Discharged on amiodarone. No recurrent of AF with ILR in situ.     1. Paroxysmal A-fib        2. Essential hypertension        3. MATTHEW (obstructive sleep apnea)            Plan:     - Stop amiodarone  - Continue medical regimen otheriwse  - Routine ILR checks    Brett Richmond MD, PGY8  Electrophysiology      "

## 2024-01-18 ENCOUNTER — CLINICAL SUPPORT (OUTPATIENT)
Dept: CARDIOLOGY | Facility: HOSPITAL | Age: 74
End: 2024-01-18
Attending: INTERNAL MEDICINE
Payer: MEDICARE

## 2024-01-18 ENCOUNTER — PATIENT MESSAGE (OUTPATIENT)
Dept: ELECTROPHYSIOLOGY | Facility: CLINIC | Age: 74
End: 2024-01-18
Payer: MEDICARE

## 2024-01-18 ENCOUNTER — HOSPITAL ENCOUNTER (OUTPATIENT)
Facility: HOSPITAL | Age: 74
Discharge: HOME | End: 2024-01-19
Attending: STUDENT IN AN ORGANIZED HEALTH CARE EDUCATION/TRAINING PROGRAM | Admitting: STUDENT IN AN ORGANIZED HEALTH CARE EDUCATION/TRAINING PROGRAM
Payer: MEDICARE

## 2024-01-18 ENCOUNTER — CLINICAL SUPPORT (OUTPATIENT)
Dept: CARDIOLOGY | Facility: HOSPITAL | Age: 74
End: 2024-01-18
Payer: MEDICARE

## 2024-01-18 DIAGNOSIS — I48.91 ATRIAL FIBRILLATION: ICD-10-CM

## 2024-01-18 DIAGNOSIS — Z95.818 PRESENCE OF OTHER CARDIAC IMPLANTS AND GRAFTS: ICD-10-CM

## 2024-01-18 DIAGNOSIS — I49.8 OTHER SPECIFIED CARDIAC ARRHYTHMIAS: ICD-10-CM

## 2024-01-18 DIAGNOSIS — R42 ORTHOSTATIC LIGHTHEADEDNESS: ICD-10-CM

## 2024-01-18 DIAGNOSIS — R00.2 PALPITATIONS: ICD-10-CM

## 2024-01-18 DIAGNOSIS — I48.91 ATRIAL FIBRILLATION WITH RVR: Primary | ICD-10-CM

## 2024-01-18 DIAGNOSIS — I48.92 ATRIAL FLUTTER: ICD-10-CM

## 2024-01-18 PROBLEM — E03.9 HYPOTHYROIDISM: Status: ACTIVE | Noted: 2024-01-18

## 2024-01-18 LAB
ALBUMIN SERPL BCP-MCNC: 4.3 G/DL (ref 3.5–5.2)
ALP SERPL-CCNC: 90 U/L (ref 55–135)
ALT SERPL W/O P-5'-P-CCNC: 12 U/L (ref 10–44)
ANION GAP SERPL CALC-SCNC: 10 MMOL/L (ref 8–16)
AST SERPL-CCNC: 16 U/L (ref 10–40)
BASOPHILS # BLD AUTO: 0.05 K/UL (ref 0–0.2)
BASOPHILS NFR BLD: 0.9 % (ref 0–1.9)
BILIRUB SERPL-MCNC: 0.6 MG/DL (ref 0.1–1)
BNP SERPL-MCNC: 138 PG/ML (ref 0–99)
BUN SERPL-MCNC: 15 MG/DL (ref 8–23)
CALCIUM SERPL-MCNC: 9.8 MG/DL (ref 8.7–10.5)
CHLORIDE SERPL-SCNC: 109 MMOL/L (ref 95–110)
CO2 SERPL-SCNC: 23 MMOL/L (ref 23–29)
CREAT SERPL-MCNC: 0.8 MG/DL (ref 0.5–1.4)
DIFFERENTIAL METHOD BLD: NORMAL
EOSINOPHIL # BLD AUTO: 0.1 K/UL (ref 0–0.5)
EOSINOPHIL NFR BLD: 1.5 % (ref 0–8)
ERYTHROCYTE [DISTWIDTH] IN BLOOD BY AUTOMATED COUNT: 14.1 % (ref 11.5–14.5)
EST. GFR  (NO RACE VARIABLE): >60 ML/MIN/1.73 M^2
GLUCOSE SERPL-MCNC: 101 MG/DL (ref 70–110)
HCT VFR BLD AUTO: 46.7 % (ref 37–48.5)
HGB BLD-MCNC: 15.3 G/DL (ref 12–16)
IMM GRANULOCYTES # BLD AUTO: 0.02 K/UL (ref 0–0.04)
IMM GRANULOCYTES NFR BLD AUTO: 0.3 % (ref 0–0.5)
LYMPHOCYTES # BLD AUTO: 1.4 K/UL (ref 1–4.8)
LYMPHOCYTES NFR BLD: 24.6 % (ref 18–48)
MAGNESIUM SERPL-MCNC: 2.3 MG/DL (ref 1.6–2.6)
MCH RBC QN AUTO: 29.8 PG (ref 27–31)
MCHC RBC AUTO-ENTMCNC: 32.8 G/DL (ref 32–36)
MCV RBC AUTO: 91 FL (ref 82–98)
MONOCYTES # BLD AUTO: 0.5 K/UL (ref 0.3–1)
MONOCYTES NFR BLD: 8.9 % (ref 4–15)
NEUTROPHILS # BLD AUTO: 3.7 K/UL (ref 1.8–7.7)
NEUTROPHILS NFR BLD: 63.8 % (ref 38–73)
NRBC BLD-RTO: 0 /100 WBC
PLATELET # BLD AUTO: 268 K/UL (ref 150–450)
PMV BLD AUTO: 11 FL (ref 9.2–12.9)
POTASSIUM SERPL-SCNC: 3.8 MMOL/L (ref 3.5–5.1)
PROT SERPL-MCNC: 8.1 G/DL (ref 6–8.4)
RBC # BLD AUTO: 5.14 M/UL (ref 4–5.4)
SODIUM SERPL-SCNC: 142 MMOL/L (ref 136–145)
TROPONIN I SERPL DL<=0.01 NG/ML-MCNC: 0.01 NG/ML (ref 0–0.03)
TROPONIN I SERPL DL<=0.01 NG/ML-MCNC: 0.01 NG/ML (ref 0–0.03)
WBC # BLD AUTO: 5.82 K/UL (ref 3.9–12.7)

## 2024-01-18 PROCEDURE — 83880 ASSAY OF NATRIURETIC PEPTIDE: CPT | Mod: HCNC | Performed by: PHYSICIAN ASSISTANT

## 2024-01-18 PROCEDURE — 83735 ASSAY OF MAGNESIUM: CPT | Mod: HCNC | Performed by: PHYSICIAN ASSISTANT

## 2024-01-18 PROCEDURE — 99285 EMERGENCY DEPT VISIT HI MDM: CPT | Mod: HCNC,25

## 2024-01-18 PROCEDURE — 84484 ASSAY OF TROPONIN QUANT: CPT | Mod: HCNC | Performed by: PHYSICIAN ASSISTANT

## 2024-01-18 PROCEDURE — 93010 ELECTROCARDIOGRAM REPORT: CPT | Mod: HCNC,,, | Performed by: INTERNAL MEDICINE

## 2024-01-18 PROCEDURE — 25000003 PHARM REV CODE 250: Mod: HCNC

## 2024-01-18 PROCEDURE — 85025 COMPLETE CBC W/AUTO DIFF WBC: CPT | Mod: HCNC | Performed by: PHYSICIAN ASSISTANT

## 2024-01-18 PROCEDURE — 93010 ELECTROCARDIOGRAM REPORT: CPT | Mod: HCNC,76,, | Performed by: INTERNAL MEDICINE

## 2024-01-18 PROCEDURE — G0378 HOSPITAL OBSERVATION PER HR: HCPCS | Mod: HCNC

## 2024-01-18 PROCEDURE — 93005 ELECTROCARDIOGRAM TRACING: CPT | Mod: HCNC

## 2024-01-18 PROCEDURE — 96374 THER/PROPH/DIAG INJ IV PUSH: CPT | Mod: HCNC

## 2024-01-18 PROCEDURE — 93005 ELECTROCARDIOGRAM TRACING: CPT | Mod: HCNC,59

## 2024-01-18 PROCEDURE — 80053 COMPREHEN METABOLIC PANEL: CPT | Mod: HCNC | Performed by: PHYSICIAN ASSISTANT

## 2024-01-18 RX ORDER — ONDANSETRON HYDROCHLORIDE 2 MG/ML
4 INJECTION, SOLUTION INTRAVENOUS EVERY 8 HOURS PRN
Status: DISCONTINUED | OUTPATIENT
Start: 2024-01-18 | End: 2024-01-19 | Stop reason: HOSPADM

## 2024-01-18 RX ORDER — METOPROLOL TARTRATE 1 MG/ML
5 INJECTION, SOLUTION INTRAVENOUS
Status: COMPLETED | OUTPATIENT
Start: 2024-01-18 | End: 2024-01-18

## 2024-01-18 RX ORDER — PROCHLORPERAZINE EDISYLATE 5 MG/ML
5 INJECTION INTRAMUSCULAR; INTRAVENOUS EVERY 6 HOURS PRN
Status: DISCONTINUED | OUTPATIENT
Start: 2024-01-18 | End: 2024-01-19 | Stop reason: HOSPADM

## 2024-01-18 RX ORDER — TALC
6 POWDER (GRAM) TOPICAL NIGHTLY PRN
Status: DISCONTINUED | OUTPATIENT
Start: 2024-01-18 | End: 2024-01-19 | Stop reason: HOSPADM

## 2024-01-18 RX ORDER — SODIUM CHLORIDE 0.9 % (FLUSH) 0.9 %
10 SYRINGE (ML) INJECTION
Status: DISCONTINUED | OUTPATIENT
Start: 2024-01-18 | End: 2024-01-19 | Stop reason: HOSPADM

## 2024-01-18 RX ORDER — IBUPROFEN 200 MG
24 TABLET ORAL
Status: DISCONTINUED | OUTPATIENT
Start: 2024-01-18 | End: 2024-01-19 | Stop reason: HOSPADM

## 2024-01-18 RX ORDER — NALOXONE HCL 0.4 MG/ML
0.02 VIAL (ML) INJECTION
Status: DISCONTINUED | OUTPATIENT
Start: 2024-01-18 | End: 2024-01-19 | Stop reason: HOSPADM

## 2024-01-18 RX ORDER — ACETAMINOPHEN 325 MG/1
650 TABLET ORAL EVERY 4 HOURS PRN
Status: DISCONTINUED | OUTPATIENT
Start: 2024-01-18 | End: 2024-01-19 | Stop reason: HOSPADM

## 2024-01-18 RX ORDER — METOPROLOL TARTRATE 50 MG/1
50 TABLET ORAL ONCE
Status: COMPLETED | OUTPATIENT
Start: 2024-01-18 | End: 2024-01-18

## 2024-01-18 RX ORDER — IBUPROFEN 200 MG
16 TABLET ORAL
Status: DISCONTINUED | OUTPATIENT
Start: 2024-01-18 | End: 2024-01-19 | Stop reason: HOSPADM

## 2024-01-18 RX ADMIN — METOROPROLOL TARTRATE 5 MG: 5 INJECTION, SOLUTION INTRAVENOUS at 05:01

## 2024-01-18 RX ADMIN — METOPROLOL TARTRATE 50 MG: 50 TABLET, FILM COATED ORAL at 06:01

## 2024-01-18 NOTE — ED PROVIDER NOTES
Encounter Date: 1/18/2024       History     Chief Complaint   Patient presents with    Dizziness     -130s. Hx of afib. Denies chest pain, SOB, or syncope     Flores Fuentes is 73 y.o. F with Mhx of pAF (s/p cardioverts and ablation), HLD, obesity, hypothyroid, MATTHEW  who presents to the ED for lightheadedness since this morning.  at bedside. Reports that she feels when she goes into RVR. Reports going to EP yesterday (Dr. Servin) who took her off the amiodarone since she has been without afib since her last ablation in October 2023. She was instructed to take diltiazem if she goes into Afib with RVR. Today morning after going into Afib with RVR she took diltiazem. A few moments later she started feel lightheaded and not herself; this has never happened before. She remained in Afib with RVR and was concerned thus presented to the ED. Normally takes BP readings at home (130s/80s). ROS below.       Review of patient's allergies indicates:   Allergen Reactions    Decongestant d [pseudoephedrine-dm]      Atrial Fibrillation    Augmentin [amoxicillin-pot clavulanate]      palpitations      Amoxicillin Palpitations    Diphenhydramine-pseudoephed Palpitations     TACHYCARDIA    Povidone-iodine Rash     Mild erythema of skin     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 Dr Viktor Toure    Osteopenia of neck of left femur 6/4/2020    Paroxysmal atrioventricular tachycardia     Plantar fasciitis     Right  knee meniscal tear     Dr Mayfield, tx conservatively    Severe obesity (BMI 35.0-35.9 with comorbidity) 1/24/2017    Slow transit constipation 7/13/2021    Despite fruits & veg & 1200 dunia diet, try Colace daily    Stress incontinence, female 03/28/2018    hasn't tried oxybutynin, After HYST, Kegels & PT  didn't work, worse at night wearing pads, Dr Infante    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    Thyroid disease      Past Surgical History:   Procedure Laterality Date    ABLATION OF ARRHYTHMOGENIC FOCUS FOR ATRIAL FIBRILLATION N/A 10/17/2023    Procedure: Ablation atrial fibrillation;  Surgeon: Ameya Servin MD;  Location: [facility] EP LAB;  Service: Cardiology;  Laterality: N/A;  AF, PERICO, PVI, WPW, RFA, POPEYE, Gen, DM, 3 Prep *MDT ILR*    ABLATION, MECHANOCHEMICAL, VARICOSE VEIN Right 12/8/2023    Procedure: ABLATION, MECHANOCHEMICAL, VARICOSE VEIN;  Surgeon: Simone Shannon MD;  Location: [facility] CATH LAB/EP;  Service: Cardiology;  Laterality: Right;    APPENDECTOMY      COLONOSCOPY N/A 8/13/2020    Procedure: COLONOSCOPY;  Surgeon: Angus Ac MD;  Location: [facility] ENDO ([facility] FLR);  Service: Endoscopy;  Laterality: N/A;  ok to hold Eliquis 2 days per Dr Servin     COVID test at [place] on 8/10-GT    ECHOCARDIOGRAM,TRANSESOPHAGEAL N/A 9/20/2023    Procedure: Transesophageal echo (PERICO) intra-procedure log documentation;  Surgeon: Provider, [facility];  Location: [facility] EP LAB;  Service: Cardiology;  Laterality: N/A;    HYSTERECTOMY  1990    TAHBSO for fibroids    INSERTION OF IMPLANTABLE LOOP RECORDER Left 11/1/2021    Procedure: Insertion, Implantable Loop Recorder;  Surgeon: Ameya Servin MD;  Location: [facility] EP LAB;  Service: Cardiology;  Laterality: Left;  AF, ILR implant, MDT,  DM, 3 Prep    MANDIBLE SURGERY  2015    L mandible, Dr Toure    MANDIBLE SURGERY Left 8/27/2019    OOPHORECTOMY      TONSILLECTOMY      TRANSESOPHAGEAL ECHOCARDIOGRAPHY N/A 10/17/2023    Procedure: ECHOCARDIOGRAM,  TRANSESOPHAGEAL;  Surgeon: Kathy Malik MD;  Location: [facility] EP LAB;  Service: Cardiology;  Laterality: N/A;    TREATMENT OF CARDIAC ARRHYTHMIA N/A 9/20/2023    Procedure: Cardioversion or Defibrillation;  Surgeon: JV Rosenthal MD;  Location: [facility] EP LAB;  Service: Cardiology;  Laterality: N/A;  AF, DCCV/PERICO, ANES, EH,      Family History   Problem Relation Age of Onset    Cancer Mother         stomach, liver    Breast cancer Neg Hx     Ovarian cancer Neg Hx     Cervical cancer Neg Hx     Endometrial cancer Neg Hx     Vaginal cancer Neg Hx     Melanoma Neg Hx     Colon cancer Neg Hx     Esophageal cancer Neg Hx      Social History     Tobacco Use    Smoking status: Never    Smokeless tobacco: Never   Substance Use Topics    Alcohol use: No    Drug use: No     Review of Systems   Constitutional:  Negative for fever.   Respiratory:  Negative for shortness of breath and wheezing.    Cardiovascular:  Positive for palpitations. Negative for chest pain.   Gastrointestinal:  Negative for abdominal pain, constipation, diarrhea, nausea and vomiting.   Genitourinary:  Positive for frequency (reports happening while in RVR). Negative for dysuria.   Neurological:  Positive for light-headedness.       Physical Exam     Initial Vitals [01/18/24 1353]   BP Pulse Resp Temp SpO2   132/81 (!) 122 17 97.7 °F (36.5 °C) 97 %      MAP       --         Physical Exam    Vitals reviewed.  Constitutional: She appears well-developed and well-nourished. No distress.   HENT:   Head: Normocephalic and atraumatic.   Eyes: EOM are normal. Pupils are equal, round, and reactive to light.   Cardiovascular:  An irregular rhythm present.   Tachycardia present.         Pulmonary/Chest: Breath sounds normal. No respiratory distress. She has no wheezes.   Abdominal: Abdomen is soft. She exhibits no distension. There is no abdominal tenderness.   Musculoskeletal:         General: No edema.     Neurological: She is alert and oriented to  person, place, and time. GCS score is 15. GCS eye subscore is 4. GCS verbal subscore is 5. GCS motor subscore is 6.   Skin: Skin is warm. Capillary refill takes less than 2 seconds.   LLE mid shin with bruising due to dog bite. Last tetanus shot was in 2019.   Psychiatric: She has a normal mood and affect. Thought content normal.         ED Course   Procedures  Labs Reviewed   B-TYPE NATRIURETIC PEPTIDE - Abnormal; Notable for the following components:       Result Value     (*)     All other components within normal limits   CBC W/ AUTO DIFFERENTIAL   COMPREHENSIVE METABOLIC PANEL   TROPONIN I   TROPONIN I    Narrative:     add on mg per  /order#[id number] @ 01/18/2024  18:15    MAGNESIUM   MAGNESIUM    Narrative:     add on mg per  /order#[id number] @ 01/18/2024  18:15    POCT TROPONIN   POCT TROPONIN        ECG Results              EKG 12-lead (Final result)  Result time 01/18/24 16:47:04      Final result by Interface, Lab In [facility] (01/18/24 16:47:04)                   Narrative:    Test Reason : R00.2,    Vent. Rate : 120 BPM     Atrial Rate : 000 BPM     P-R Int : 000 ms          QRS Dur : 088 ms      QT Int : 340 ms       P-R-T Axes : 000 -11 087 degrees     QTc Int : 480 ms    Atrial fibrillation with rapid ventricular response vs SVT  Low anterior forces with abnormal R wave progression  Abnormal ECG  When compared with ECG of 17-JAN-2024 08:43,  Rhythm change has occured  Vent. rate has increased BY  61 BPM  ST now depressed in Inferior leads  Nonspecific T wave abnormality now evident in Lateral leads  Confirmed by Alvin ROBERTS MD ([id number]) on 1/18/2024 4:46:58 PM    Referred By: LEXX   SELF           Confirmed By:Alvin ROBERTS MD                                  Imaging Results    None          Medications   metoprolol injection 5 mg (5 mg Intravenous Given 1/18/24 1733)   metoprolol tartrate (LOPRESSOR) tablet 50 mg (50 mg Oral Given 1/18/24 1838)     Medical Decision Making  73  y.o. F in NAD who presents for lightheadedness and Afib with RVR.     Differential diagnosis include orthostasis, drug-induced BP changes secondary to dilt use, autonomic instability. Further workup with CBC, CMP, trop, BNP, Mg, EKG. Given 5mg metoprolol tartrate IV, followed by 50mg PO. Orthostatic vitals completed with following results: laying /71 ; standing /62 with RVR.     Workup has been grossly unremarkable except for orthostatic vitals. Currently rate controlled with metoprolol tartrate however still experiencing lightheadedness and orthostatics with rate controlling meds; unable to ambulate safely. Spoke to Cardiology, they recommend admission for the further workup by EP due to concerns of persistent symptoms (return of Afib) given her hx and consequences of orthostatics (ie fall).     Amount and/or Complexity of Data Reviewed  Labs:  Decision-making details documented in ED Course.  ECG/medicine tests:  Decision-making details documented in ED Course.    Risk  Prescription drug management.               ED Course as of 01/18/24 1943   Thu Jan 18, 2024   1737 Orthostatic vitals taken. Laying down /71, Standing /62. Patient goes into RVR with standing. [HR]   1738 Comprehensive metabolic panel  Unremarkable [HR]   1739 CBC auto differential  Unremarkable [HR]   1739 BNP(!): 138 [HR]   1739 Troponin I #1  Wnl [HR]   1854 Magnesium [HR]   1854 Troponin I #2  wnl [HR]   1943 Afib with RVR on initial EKG [HR]   1943 EKG 12-lead [HR]      ED Course User Index  [HR] Janina Lockwood MD                           Clinical Impression:  Final diagnoses:  [R00.2] Palpitations  [I48.91] Atrial fibrillation with RVR (Primary)  [R42] Orthostatic lightheadedness          ED Disposition Condition    Observation                 Janina Lockwood MD  Resident  01/18/24 1943

## 2024-01-18 NOTE — FIRST PROVIDER EVALUATION
Emergency Department TeleTriage Encounter Note      CHIEF COMPLAINT    Chief Complaint   Patient presents with    Dizziness     -130s. Hx of afib. Denies chest pain, SOB, or syncope       VITAL SIGNS   Initial Vitals [01/18/24 1353]   BP Pulse Resp Temp SpO2   132/81 (!) 122 17 97.7 °F (36.5 °C) 97 %      MAP       --            ALLERGIES    Review of patient's allergies indicates:   Allergen Reactions    Decongestant d [pseudoephedrine-dm]      Atrial Fibrillation    Augmentin [amoxicillin-pot clavulanate]      palpitations      Amoxicillin Palpitations    Diphenhydramine-pseudoephed Palpitations     TACHYCARDIA    Povidone-iodine Rash     Mild erythema of skin       PROVIDER TRIAGE NOTE  Patient presents with complaint of AFib.  Reports erratic and fast heartbeat.  History of similar.  Reports feeling lightheaded.  Denies dizziness.      Phy:   Constitutional: well nourished, well developed, appearing stated age, NAD        Initial orders will be placed and care will be transferred to an alternate provider when patient is roomed for a full evaluation. Any additional orders and the final disposition will be determined by that provider.        ORDERS  Labs Reviewed - No data to display    ED Orders (720h ago, onward)      Start Ordered     Status Ordering Provider    01/18/24 1355 01/18/24 1355  EKG 12-lead  Once         Completed by KATIE MERCHANT on 1/18/2024 at  2:01 PM BRENDA MANRIQUEZ              Virtual Visit Note: The provider triage portion of this emergency department evaluation and documentation was performed via [employer], a HIPAA-compliant telemedicine application, in concert with a tele-presenter in the room. A face to face patient evaluation with one of my colleagues will occur once the patient is placed in an emergency department room.      DISCLAIMER: This note was prepared with [employer] voice recognition transcription software. Garbled syntax, mangled pronouns, and other bizarre  constructions may be attributed to that software system.

## 2024-01-18 NOTE — ED NOTES
..Patient identifiers for Flores Fuentes 73 y.o. female checked and correct.  Chief Complaint   Patient presents with    Dizziness     -130s. Hx of afib. 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 Dr Viktor Toure    Osteopenia of neck of left femur 6/4/2020    Paroxysmal atrioventricular tachycardia     Plantar fasciitis     Right knee meniscal tear     Dr Mayfield, tx conservatively    Severe obesity (BMI 35.0-35.9 with comorbidity) 1/24/2017    Slow transit constipation 7/13/2021    Despite fruits & veg & 1200 dunia diet, try Colace daily    Stress incontinence, female 03/28/2018    hasn't tried oxybutynin, After HYST, Kegels & PT  didn't work, worse at night wearing pads, Dr Infante    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    Thyroid disease      Allergies reported:   Review of patient's allergies indicates:   Allergen Reactions    Decongestant d [pseudoephedrine-dm]      Atrial Fibrillation    Augmentin [amoxicillin-pot clavulanate]      palpitations      Amoxicillin Palpitations    Diphenhydramine-pseudoephed Palpitations     TACHYCARDIA    Povidone-iodine Rash     Mild erythema of skin         LOC: Patient is awake, alert, and aware of environment with an appropriate affect. Patient is oriented x 3 and speaking appropriately.  APPEARANCE: Patient resting comfortably and in no acute distress. Patient is clean and well groomed, patient's clothing  "is properly fastened.  HEENT: **AAO--State's " she could tell she was in atrial fib because it woke her in the middle of the night "   SKIN: The skin is warm and dry. Patient has normal skin turgor and moist mucus membranes. Skin is intact; no bruising or breakdown noted.  MUSKULOSKELETAL: Patient is moving all extremities well, no obvious deformities noted. Pulses intact.   RESPIRATORY: Airway is open and patent. Respirations are spontaneous and non-labored with normal effort and rate, BBS=clear  CARDIAC: Patient has a normal rate and rhythm. Atrial fib on cardiac monitor,No peripheral edema noted.   ABDOMEN: No distention noted. Bowel sounds active in all 4 quadrants. Soft and non-tender upon palpation.  NEUROLOGICAL: Facial expression is symmetrical. Hand grasps are equal bilaterally. Normal sensation in all extremities when touched with finger.          "

## 2024-01-18 NOTE — Clinical Note
The defib pads were placed on the patient's chest and back. Quinolones Counseling:  I discussed with the patient the risks of fluoroquinolones including but not limited to GI upset, allergic reaction, drug rash, diarrhea, dizziness, photosensitivity, yeast infections, liver function test abnormalities, tendonitis/tendon rupture.

## 2024-01-19 ENCOUNTER — CLINICAL SUPPORT (OUTPATIENT)
Dept: CARDIOLOGY | Facility: HOSPITAL | Age: 74
End: 2024-01-19
Payer: MEDICARE

## 2024-01-19 ENCOUNTER — ANESTHESIA EVENT (OUTPATIENT)
Dept: MEDSURG UNIT | Facility: HOSPITAL | Age: 74
End: 2024-01-19
Payer: MEDICARE

## 2024-01-19 ENCOUNTER — CLINICAL SUPPORT (OUTPATIENT)
Dept: CARDIOLOGY | Facility: HOSPITAL | Age: 74
End: 2024-01-19
Attending: INTERNAL MEDICINE
Payer: MEDICARE

## 2024-01-19 ENCOUNTER — ANESTHESIA (OUTPATIENT)
Dept: MEDSURG UNIT | Facility: HOSPITAL | Age: 74
End: 2024-01-19
Payer: MEDICARE

## 2024-01-19 ENCOUNTER — DOCUMENTATION ONLY (OUTPATIENT)
Dept: CARDIOLOGY | Facility: HOSPITAL | Age: 74
End: 2024-01-19
Payer: MEDICARE

## 2024-01-19 VITALS
HEIGHT: 67 IN | DIASTOLIC BLOOD PRESSURE: 58 MMHG | RESPIRATION RATE: 18 BRPM | BODY MASS INDEX: 35.33 KG/M2 | TEMPERATURE: 98 F | WEIGHT: 225.06 LBS | OXYGEN SATURATION: 99 % | HEART RATE: 55 BPM | SYSTOLIC BLOOD PRESSURE: 99 MMHG

## 2024-01-19 DIAGNOSIS — Z95.818 PRESENCE OF OTHER CARDIAC IMPLANTS AND GRAFTS: ICD-10-CM

## 2024-01-19 DIAGNOSIS — I49.8 OTHER SPECIFIED CARDIAC ARRHYTHMIAS: ICD-10-CM

## 2024-01-19 PROBLEM — I48.92 ATRIAL FLUTTER: Status: ACTIVE | Noted: 2024-01-19

## 2024-01-19 LAB
ALBUMIN SERPL BCP-MCNC: 3.9 G/DL (ref 3.5–5.2)
ALP SERPL-CCNC: 81 U/L (ref 55–135)
ALT SERPL W/O P-5'-P-CCNC: 11 U/L (ref 10–44)
ANION GAP SERPL CALC-SCNC: 8 MMOL/L (ref 8–16)
AST SERPL-CCNC: 17 U/L (ref 10–40)
BASOPHILS # BLD AUTO: 0.06 K/UL (ref 0–0.2)
BASOPHILS NFR BLD: 1 % (ref 0–1.9)
BILIRUB SERPL-MCNC: 0.8 MG/DL (ref 0.1–1)
BUN SERPL-MCNC: 16 MG/DL (ref 8–23)
CALCIUM SERPL-MCNC: 9.5 MG/DL (ref 8.7–10.5)
CHLORIDE SERPL-SCNC: 111 MMOL/L (ref 95–110)
CO2 SERPL-SCNC: 18 MMOL/L (ref 23–29)
CREAT SERPL-MCNC: 0.7 MG/DL (ref 0.5–1.4)
DIFFERENTIAL METHOD BLD: NORMAL
EOSINOPHIL # BLD AUTO: 0.1 K/UL (ref 0–0.5)
EOSINOPHIL NFR BLD: 2.2 % (ref 0–8)
ERYTHROCYTE [DISTWIDTH] IN BLOOD BY AUTOMATED COUNT: 14.3 % (ref 11.5–14.5)
EST. GFR  (NO RACE VARIABLE): >60 ML/MIN/1.73 M^2
GLUCOSE SERPL-MCNC: 89 MG/DL (ref 70–110)
HCT VFR BLD AUTO: 47.4 % (ref 37–48.5)
HGB BLD-MCNC: 15.3 G/DL (ref 12–16)
IMM GRANULOCYTES # BLD AUTO: 0.02 K/UL (ref 0–0.04)
IMM GRANULOCYTES NFR BLD AUTO: 0.3 % (ref 0–0.5)
LYMPHOCYTES # BLD AUTO: 1.7 K/UL (ref 1–4.8)
LYMPHOCYTES NFR BLD: 27.9 % (ref 18–48)
MCH RBC QN AUTO: 30 PG (ref 27–31)
MCHC RBC AUTO-ENTMCNC: 32.3 G/DL (ref 32–36)
MCV RBC AUTO: 93 FL (ref 82–98)
MONOCYTES # BLD AUTO: 0.6 K/UL (ref 0.3–1)
MONOCYTES NFR BLD: 9.2 % (ref 4–15)
NEUTROPHILS # BLD AUTO: 3.6 K/UL (ref 1.8–7.7)
NEUTROPHILS NFR BLD: 59.4 % (ref 38–73)
NRBC BLD-RTO: 0 /100 WBC
PLATELET # BLD AUTO: 269 K/UL (ref 150–450)
PMV BLD AUTO: 11.3 FL (ref 9.2–12.9)
POTASSIUM SERPL-SCNC: 3.8 MMOL/L (ref 3.5–5.1)
PROT SERPL-MCNC: 7.5 G/DL (ref 6–8.4)
RBC # BLD AUTO: 5.1 M/UL (ref 4–5.4)
SODIUM SERPL-SCNC: 137 MMOL/L (ref 136–145)
TSH SERPL DL<=0.005 MIU/L-ACNC: 3.41 UIU/ML (ref 0.4–4)
WBC # BLD AUTO: 5.98 K/UL (ref 3.9–12.7)

## 2024-01-19 PROCEDURE — 25000003 PHARM REV CODE 250: Mod: HCNC | Performed by: NURSE ANESTHETIST, CERTIFIED REGISTERED

## 2024-01-19 PROCEDURE — 63600175 PHARM REV CODE 636 W HCPCS: Mod: HCNC | Performed by: NURSE ANESTHETIST, CERTIFIED REGISTERED

## 2024-01-19 PROCEDURE — 93010 ELECTROCARDIOGRAM REPORT: CPT | Mod: HCNC,,, | Performed by: INTERNAL MEDICINE

## 2024-01-19 PROCEDURE — 92960 CARDIOVERSION ELECTRIC EXT: CPT | Mod: HCNC | Performed by: STUDENT IN AN ORGANIZED HEALTH CARE EDUCATION/TRAINING PROGRAM

## 2024-01-19 PROCEDURE — 84443 ASSAY THYROID STIM HORMONE: CPT | Mod: HCNC | Performed by: HOSPITALIST

## 2024-01-19 PROCEDURE — 85025 COMPLETE CBC W/AUTO DIFF WBC: CPT | Mod: HCNC | Performed by: HOSPITALIST

## 2024-01-19 PROCEDURE — 99215 OFFICE O/P EST HI 40 MIN: CPT | Mod: 25,HCNC,GC, | Performed by: STUDENT IN AN ORGANIZED HEALTH CARE EDUCATION/TRAINING PROGRAM

## 2024-01-19 PROCEDURE — 80053 COMPREHEN METABOLIC PANEL: CPT | Mod: HCNC | Performed by: HOSPITALIST

## 2024-01-19 PROCEDURE — 37000008 HC ANESTHESIA 1ST 15 MINUTES: Mod: HCNC | Performed by: STUDENT IN AN ORGANIZED HEALTH CARE EDUCATION/TRAINING PROGRAM

## 2024-01-19 PROCEDURE — 36415 COLL VENOUS BLD VENIPUNCTURE: CPT | Mod: HCNC | Performed by: HOSPITALIST

## 2024-01-19 PROCEDURE — 37000009 HC ANESTHESIA EA ADD 15 MINS: Mod: HCNC | Performed by: INTERNAL MEDICINE

## 2024-01-19 PROCEDURE — G0378 HOSPITAL OBSERVATION PER HR: HCPCS | Mod: HCNC

## 2024-01-19 PROCEDURE — 37000008 HC ANESTHESIA 1ST 15 MINUTES: Mod: HCNC | Performed by: INTERNAL MEDICINE

## 2024-01-19 PROCEDURE — 25000003 PHARM REV CODE 250: Mod: HCNC | Performed by: INTERNAL MEDICINE

## 2024-01-19 PROCEDURE — D9220A PRA ANESTHESIA: Mod: HCNC,,, | Performed by: STUDENT IN AN ORGANIZED HEALTH CARE EDUCATION/TRAINING PROGRAM

## 2024-01-19 PROCEDURE — 93005 ELECTROCARDIOGRAM TRACING: CPT | Mod: HCNC,59

## 2024-01-19 PROCEDURE — 92960 CARDIOVERSION ELECTRIC EXT: CPT | Mod: HCNC,,, | Performed by: INTERNAL MEDICINE

## 2024-01-19 PROCEDURE — 92960 CARDIOVERSION ELECTRIC EXT: CPT | Mod: HCNC | Performed by: INTERNAL MEDICINE

## 2024-01-19 PROCEDURE — 37000009 HC ANESTHESIA EA ADD 15 MINS: Mod: HCNC | Performed by: STUDENT IN AN ORGANIZED HEALTH CARE EDUCATION/TRAINING PROGRAM

## 2024-01-19 PROCEDURE — 25000003 PHARM REV CODE 250: Mod: HCNC | Performed by: HOSPITALIST

## 2024-01-19 RX ORDER — PRAVASTATIN SODIUM 40 MG/1
40 TABLET ORAL DAILY
Status: DISCONTINUED | OUTPATIENT
Start: 2024-01-19 | End: 2024-01-19 | Stop reason: HOSPADM

## 2024-01-19 RX ORDER — LIDOCAINE HYDROCHLORIDE 20 MG/ML
INJECTION INTRAVENOUS
Status: DISCONTINUED | OUTPATIENT
Start: 2024-01-19 | End: 2024-01-19

## 2024-01-19 RX ORDER — SODIUM CHLORIDE 0.9 % (FLUSH) 0.9 %
3 SYRINGE (ML) INJECTION EVERY 4 HOURS PRN
Status: CANCELLED | OUTPATIENT
Start: 2024-01-19

## 2024-01-19 RX ORDER — FENTANYL CITRATE 50 UG/ML
25 INJECTION, SOLUTION INTRAMUSCULAR; INTRAVENOUS EVERY 5 MIN PRN
Status: CANCELLED | OUTPATIENT
Start: 2024-01-19

## 2024-01-19 RX ORDER — AMIODARONE HYDROCHLORIDE 200 MG/1
200 TABLET ORAL DAILY
Qty: 30 TABLET | Refills: 11 | Status: SHIPPED | OUTPATIENT
Start: 2024-01-20 | End: 2024-01-19

## 2024-01-19 RX ORDER — AMIODARONE HYDROCHLORIDE 200 MG/1
200 TABLET ORAL DAILY
Status: DISCONTINUED | OUTPATIENT
Start: 2024-01-19 | End: 2024-01-19 | Stop reason: HOSPADM

## 2024-01-19 RX ORDER — LIDOCAINE HYDROCHLORIDE 20 MG/ML
INJECTION, SOLUTION EPIDURAL; INFILTRATION; INTRACAUDAL; PERINEURAL
Status: DISCONTINUED | OUTPATIENT
Start: 2024-01-19 | End: 2024-01-22

## 2024-01-19 RX ORDER — LEVOTHYROXINE SODIUM 25 UG/1
25 TABLET ORAL
Status: DISCONTINUED | OUTPATIENT
Start: 2024-01-19 | End: 2024-01-19 | Stop reason: HOSPADM

## 2024-01-19 RX ORDER — DILTIAZEM HYDROCHLORIDE 180 MG/1
180 CAPSULE, COATED, EXTENDED RELEASE ORAL DAILY
Status: DISCONTINUED | OUTPATIENT
Start: 2024-01-19 | End: 2024-01-19 | Stop reason: HOSPADM

## 2024-01-19 RX ORDER — SILVER SULFADIAZINE 10 G/1000G
CREAM TOPICAL 2 TIMES DAILY
Status: DISCONTINUED | OUTPATIENT
Start: 2024-01-19 | End: 2024-01-19 | Stop reason: HOSPADM

## 2024-01-19 RX ORDER — PROPOFOL 10 MG/ML
VIAL (ML) INTRAVENOUS
Status: DISCONTINUED | OUTPATIENT
Start: 2024-01-19 | End: 2024-01-19

## 2024-01-19 RX ORDER — PROPOFOL 10 MG/ML
VIAL (ML) INTRAVENOUS
Status: DISCONTINUED | OUTPATIENT
Start: 2024-01-19 | End: 2024-01-22

## 2024-01-19 RX ORDER — AMIODARONE HYDROCHLORIDE 200 MG/1
200 TABLET ORAL DAILY
Qty: 30 TABLET | Refills: 11 | Status: SHIPPED | OUTPATIENT
Start: 2024-01-20 | End: 2024-02-23

## 2024-01-19 RX ADMIN — LIDOCAINE HYDROCHLORIDE 50 MG: 20 INJECTION INTRAVENOUS at 12:01

## 2024-01-19 RX ADMIN — AMIODARONE HYDROCHLORIDE 200 MG: 200 TABLET ORAL at 08:01

## 2024-01-19 RX ADMIN — LEVOTHYROXINE SODIUM 25 MCG: 25 TABLET ORAL at 05:01

## 2024-01-19 RX ADMIN — PROPOFOL 100 MG: 10 INJECTION, EMULSION INTRAVENOUS at 12:01

## 2024-01-19 RX ADMIN — APIXABAN 5 MG: 5 TABLET, FILM COATED ORAL at 12:01

## 2024-01-19 RX ADMIN — AMIODARONE HYDROCHLORIDE 200 MG: 200 TABLET ORAL at 12:01

## 2024-01-19 RX ADMIN — PRAVASTATIN SODIUM 40 MG: 40 TABLET ORAL at 08:01

## 2024-01-19 RX ADMIN — APIXABAN 5 MG: 5 TABLET, FILM COATED ORAL at 08:01

## 2024-01-19 RX ADMIN — SILVER SULFADIAZINE: 10 CREAM TOPICAL at 01:01

## 2024-01-19 RX ADMIN — DILTIAZEM HYDROCHLORIDE 180 MG: 180 CAPSULE, COATED, EXTENDED RELEASE ORAL at 08:01

## 2024-01-19 RX ADMIN — SODIUM CHLORIDE: 0.9 INJECTION, SOLUTION INTRAVENOUS at 12:01

## 2024-01-19 NOTE — SUBJECTIVE & OBJECTIVE
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 Dr Viktor Toure    Osteopenia of neck of left femur 6/4/2020    Paroxysmal atrioventricular tachycardia     Plantar fasciitis     Right knee meniscal tear     Dr Mayfield, tx conservatively    Severe obesity (BMI 35.0-35.9 with comorbidity) 1/24/2017    Slow transit constipation 7/13/2021    Despite fruits & veg & 1200 dunia diet, try Colace daily    Stress incontinence, female 03/28/2018    hasn't tried oxybutynin, After HYST, Kegels & PT  didn't work, worse at night wearing pads, Dr Infante    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    Thyroid disease        Past Surgical History:   Procedure Laterality Date    ABLATION OF ARRHYTHMOGENIC FOCUS FOR ATRIAL FIBRILLATION N/A 10/17/2023    Procedure: Ablation atrial fibrillation;  Surgeon: Amyea Servin MD;  Location: [facility] EP LAB;  Service: Cardiology;  Laterality: N/A;  AF, PERICO, PVI, WPW, RFA, POPEYE, Gen, DM, 3 Prep *MDT ILR*    ABLATION, MECHANOCHEMICAL, VARICOSE VEIN Right 12/8/2023    Procedure: ABLATION, MECHANOCHEMICAL, VARICOSE VEIN;  Surgeon: Simone Shannon MD;  Location: [facility] CATH LAB/EP;  Service: Cardiology;  Laterality: Right;    APPENDECTOMY      COLONOSCOPY N/A 8/13/2020    Procedure: COLONOSCOPY;  Surgeon: Angus Ac MD;  Location: [facility] ENDO ([address] [place] [address]);  Service: Endoscopy;  Laterality: N/A;  ok to hold Eliquis 2 days per Dr Servin     COVID test at [facility] on 8/10-GT     ECHOCARDIOGRAM,TRANSESOPHAGEAL N/A 9/20/2023    Procedure: Transesophageal echo (PERICO) intra-procedure log documentation;  Surgeon: Provider, Dosc Diagnostic;  Location: [facility] EP LAB;  Service: Cardiology;  Laterality: N/A;    HYSTERECTOMY  1990    TAHBSO for fibroids    INSERTION OF IMPLANTABLE LOOP RECORDER Left 11/1/2021    Procedure: Insertion, Implantable Loop Recorder;  Surgeon: Ameya Servin MD;  Location: [facility] EP LAB;  Service: Cardiology;  Laterality: Left;  AF, ILR implant, MDT,  DM, 3 Prep    MANDIBLE SURGERY  2015    L mandible, Dr Toure    MANDIBLE SURGERY Left 8/27/2019    OOPHORECTOMY      TONSILLECTOMY      TRANSESOPHAGEAL ECHOCARDIOGRAPHY N/A 10/17/2023    Procedure: ECHOCARDIOGRAM, TRANSESOPHAGEAL;  Surgeon: Kathy Malik MD;  Location: [facility] EP LAB;  Service: Cardiology;  Laterality: N/A;    TREATMENT OF CARDIAC ARRHYTHMIA N/A 9/20/2023    Procedure: Cardioversion or Defibrillation;  Surgeon: JV Rosenthal MD;  Location: [facility] EP LAB;  Service: Cardiology;  Laterality: N/A;  AF, DCCV/PERICO, ANES, EH,        Review of patient's allergies indicates:   Allergen Reactions    Decongestant d [pseudoephedrine-dm]      Atrial Fibrillation    Augmentin [amoxicillin-pot clavulanate]      palpitations      Amoxicillin Palpitations    Diphenhydramine-pseudoephed Palpitations     TACHYCARDIA    Povidone-iodine Rash     Mild erythema of skin       Current Facility-Administered Medications on File Prior to Encounter   Medication    sodium chloride 0.9% bolus 1,000 mL    vancomycin in dextrose 5 % 1 gram/250 mL IVPB 1,000 mg     Current Outpatient Medications on File Prior to Encounter   Medication Sig    diltiaZEM (CARDIZEM CD) 180 MG 24 hr capsule Take 1 capsule (180 mg total) by mouth once daily.    ELIQUIS 5 mg Tab TAKE 1 TABLET TWICE DAILY    fluticasone propionate (FLONASE) 50 mcg/actuation nasal spray USE 1 SPRAY IN EACH NOSTRIL EVERY DAY    levothyroxine (SYNTHROID) 25 MCG tablet TAKE 1  TABLET ONE TIME DAILY    pravastatin (PRAVACHOL) 40 MG tablet Take 1 tablet (40 mg total) by mouth once daily.    lansoprazole (PREVACID) 30 MG capsule Take 1 capsule (30 mg total) by mouth once daily.    nystatin (MYCOSTATIN) cream Apply topically 2 (two) times daily.     Family History       Problem Relation (Age of Onset)    Cancer Mother          Tobacco Use    Smoking status: Never    Smokeless tobacco: Never   Substance and Sexual Activity    Alcohol use: No    Drug use: No    Sexual activity: Not Currently     Review of Systems   Constitutional: Negative for diaphoresis and fever.   Cardiovascular:  Negative for chest pain, dyspnea on exertion, leg swelling, near-syncope, orthopnea, palpitations, paroxysmal nocturnal dyspnea and syncope.   Respiratory:  Negative for cough and shortness of breath.    Gastrointestinal:  Negative for abdominal pain, diarrhea, nausea and vomiting.   Neurological:  Negative for light-headedness.   Psychiatric/Behavioral:  Negative for altered mental status and substance abuse.      Objective:     Vital Signs (Most Recent):  Temp: 97.7 °F (36.5 °C) (01/19/24 1320)  Pulse: (!) 52 (01/19/24 1420)  Resp: 16 (01/19/24 1320)  BP: (!) 106/58 (01/19/24 1345)  SpO2: 98 % (01/19/24 1320) Vital Signs (24h Range):  Temp:  [97.3 °F (36.3 °C)-98.6 °F (37 °C)] 97.7 °F (36.5 °C)  Pulse:  [] 52  Resp:  [14-20] 16  SpO2:  [95 %-100 %] 98 %  BP: ()/(52-87) 106/58       Weight: 102.1 kg (225 lb 1.4 oz)  Body mass index is 35.25 kg/m².    SpO2: 98 %        Physical Exam  Constitutional:       Appearance: Normal appearance.   Cardiovascular:      Rate and Rhythm: Normal rate and regular rhythm.      Pulses: Normal pulses.      Heart sounds: Normal heart sounds.   Pulmonary:      Effort: Pulmonary effort is normal.      Breath sounds: Normal breath sounds. No wheezing or rales.   Abdominal:      General: Abdomen is flat. There is no distension.      Palpations: Abdomen is soft.       Tenderness: There is no abdominal tenderness.   Musculoskeletal:      Right lower leg: No edema.      Left lower leg: No edema.   Skin:     General: Skin is warm and dry.   Neurological:      Mental Status: She is alert and oriented to person, place, and time. Mental status is at baseline.   Psychiatric:         Mood and Affect: Mood normal.         Behavior: Behavior normal.            Significant Labs: All pertinent lab results from the last 24 hours have been reviewed.    Significant Imaging:  Reviewed

## 2024-01-19 NOTE — H&P
[facility] - Cardiology [facility] Medicine  History & Physical    Patient Name: Flores Fuentes  MRN: [id number]  Patient Class: OP- Observation  Admission Date: 1/18/2024  Attending Physician: Katiana Solano MD   Primary Care Provider: Naz Condon MD         Patient information was obtained from patient, past medical records, and ER records.     Subjective:     Principal Problem:Atrial fibrillation with RVR    Chief Complaint:   Chief Complaint   Patient presents with    Dizziness     -130s. Hx of afib. Denies chest pain, SOB, or syncope        HPI: 72 yo F with PMHx pAfib s/p PVI 10/2023, intermittent WPW, HLD,  hypothyroidism, and MATTHEW on CPAP who presents to the ED complaining of lightheadedness and palpitations since this morning around 4 am. Pt. Reports that she was in her usual state of health until early this monring when she started having lightheadedness and noted that her heart was racing. Pt. Reports that she had recently been taken off of her rate/rhtyhm controlling medication amiodarone after seeing her EP doctor yesterday and having a PVI procedure in October with no recurring episodes of Afib since then. She only skipped one dose of the amiodarone, however because it was stopped yesterday. This morning she did take her usual scheduled diltiazem, but her lightheadedness worsened after taking the medication. On presentation, pt. Noted to be in Afib with RVR with rate 120. She denies any chest pain or SOB.    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, l mandible, 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 Dr Viktor Toure    Osteopenia of neck of left femur 6/4/2020    Paroxysmal atrioventricular tachycardia     Plantar fasciitis     Right knee meniscal tear     Dr Mayfield, tx conservatively    Severe obesity (BMI 35.0-35.9 with comorbidity) 1/24/2017    Slow transit constipation 7/13/2021    Despite fruits & veg & 1200 dunia diet, try Colace daily    Stress incontinence, female 03/28/2018    hasn't tried oxybutynin, After HYST, Kegels & PT  didn't work, worse at night wearing pads, Dr Infante    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    Thyroid disease        Past Surgical History:   Procedure Laterality Date    ABLATION OF ARRHYTHMOGENIC FOCUS FOR ATRIAL FIBRILLATION N/A 10/17/2023    Procedure: Ablation atrial fibrillation;  Surgeon: Ameya Servin MD;  Location: [facility] EP LAB;  Service: Cardiology;  Laterality: N/A;  AF, PERICO, PVI, WPW, RFA, POPEYE, Gen, DM, 3 Prep *MDT ILR*    ABLATION, MECHANOCHEMICAL, VARICOSE VEIN Right 12/8/2023    Procedure: ABLATION, MECHANOCHEMICAL, VARICOSE VEIN;  Surgeon: Simone Shannon MD;  Location: [facility] CATH LAB/EP;  Service: Cardiology;  Laterality: Right;    APPENDECTOMY      COLONOSCOPY N/A 8/13/2020    Procedure: COLONOSCOPY;  Surgeon: Angus Ac MD;  Location: [facility] ENDO ([address] [place] [address]);  Service: Endoscopy;  Laterality: N/A;  ok to hold Eliquis 2 days per Dr Servin     COVID test at [place] on 8/10-GT    ECHOCARDIOGRAM,TRANSESOPHAGEAL N/A 9/20/2023    Procedure: Transesophageal echo (PERICO) intra-procedure log documentation;  Surgeon: Provider, Dosc Diagnostic;  Location: [facility] EP LAB;  Service: Cardiology;  Laterality: N/A;    HYSTERECTOMY  1990    TAHBSO for fibroids    INSERTION OF IMPLANTABLE LOOP RECORDER Left 11/1/2021    Procedure: Insertion, Implantable Loop Recorder;  Surgeon: Ameya Servin MD;  Location: [facility] EP LAB;  Service: Cardiology;  Laterality: Left;  AF, ILR implant, MDT,  DM, 3 Prep     MANDIBLE SURGERY  2015    L jamie, Dr Toure    MANDIBLE SURGERY Left 8/27/2019    OOPHORECTOMY      TONSILLECTOMY      TRANSESOPHAGEAL ECHOCARDIOGRAPHY N/A 10/17/2023    Procedure: ECHOCARDIOGRAM, TRANSESOPHAGEAL;  Surgeon: Kathy Malik MD;  Location: [facility] EP LAB;  Service: Cardiology;  Laterality: N/A;    TREATMENT OF CARDIAC ARRHYTHMIA N/A 9/20/2023    Procedure: Cardioversion or Defibrillation;  Surgeon: JV Rsoenthal MD;  Location: [facility] EP LAB;  Service: Cardiology;  Laterality: N/A;  AF, DCCV/PERICO, ANES, EH,        Review of patient's allergies indicates:   Allergen Reactions    Decongestant d [pseudoephedrine-dm]      Atrial Fibrillation    Augmentin [amoxicillin-pot clavulanate]      palpitations      Amoxicillin Palpitations    Diphenhydramine-pseudoephed Palpitations     TACHYCARDIA    Povidone-iodine Rash     Mild erythema of skin       Current Facility-Administered Medications on File Prior to Encounter   Medication    sodium chloride 0.9% bolus 1,000 mL    vancomycin in dextrose 5 % 1 gram/250 mL IVPB 1,000 mg     Current Outpatient Medications on File Prior to Encounter   Medication Sig    diltiaZEM (CARDIZEM CD) 180 MG 24 hr capsule Take 1 capsule (180 mg total) by mouth once daily.    ELIQUIS 5 mg Tab TAKE 1 TABLET TWICE DAILY    fluticasone propionate (FLONASE) 50 mcg/actuation nasal spray USE 1 SPRAY IN EACH NOSTRIL EVERY DAY    lansoprazole (PREVACID) 30 MG capsule Take 1 capsule (30 mg total) by mouth once daily.    levothyroxine (SYNTHROID) 25 MCG tablet TAKE 1 TABLET ONE TIME DAILY    nystatin (MYCOSTATIN) cream Apply topically 2 (two) times daily.    pravastatin (PRAVACHOL) 40 MG tablet Take 1 tablet (40 mg total) by mouth once daily.     Family History       Problem Relation (Age of Onset)    Cancer Mother          Tobacco Use    Smoking status: Never    Smokeless tobacco: Never   Substance and Sexual Activity    Alcohol use: No    Drug use: No    Sexual activity: Not  Currently     Review of Systems   Constitutional:  Negative for activity change, appetite change, chills, fever and unexpected weight change.   HENT:  Negative for congestion and sore throat.    Respiratory:  Negative for cough and shortness of breath.    Cardiovascular:  Positive for palpitations. Negative for chest pain and leg swelling.   Gastrointestinal:  Negative for abdominal distention, abdominal pain, blood in stool, constipation, diarrhea, nausea and vomiting.   Genitourinary:  Negative for difficulty urinating, dysuria and hematuria.   Musculoskeletal:  Positive for arthralgias. Negative for myalgias.   Skin:  Negative for color change and rash.   Neurological:  Positive for light-headedness. Negative for dizziness, tremors and seizures.     Objective:     Vital Signs (Most Recent):  Temp: 97.7 °F (36.5 °C) (01/18/24 1915)  Pulse: 78 (01/18/24 2100)  Resp: 14 (01/18/24 2100)  BP: 126/87 (01/18/24 2100)  SpO2: 100 % (01/18/24 2100) Vital Signs (24h Range):  Temp:  [97.7 °F (36.5 °C)] 97.7 °F (36.5 °C)  Pulse:  [] 78  Resp:  [14-20] 14  SpO2:  [96 %-100 %] 100 %  BP: (124-159)/(63-87) 126/87     Weight: 104.8 kg (231 lb)  Body mass index is 36.18 kg/m².     Physical Exam  Vitals reviewed.   Constitutional:       General: She is not in acute distress.     Appearance: She is well-developed.   HENT:      Head: Normocephalic and atraumatic.   Eyes:      Extraocular Movements: Extraocular movements intact.      Pupils: Pupils are equal, round, and reactive to light.   Neck:      Vascular: No JVD.      Trachea: No tracheal deviation.   Cardiovascular:      Rate and Rhythm: Normal rate. Rhythm irregular.      Heart sounds: No murmur heard.     No friction rub. No gallop.   Pulmonary:      Effort: No respiratory distress.      Breath sounds: Normal breath sounds. No wheezing or rales.   Abdominal:      General: Bowel sounds are normal. There is no distension.      Palpations: Abdomen is soft. There is no  mass.      Tenderness: There is no abdominal tenderness.   Musculoskeletal:         General: No deformity.      Cervical back: Neck supple.   Lymphadenopathy:      Cervical: No cervical adenopathy.   Skin:     General: Skin is warm and dry.      Findings: No rash.   Neurological:      Mental Status: She is alert and oriented to person, place, and time.              CRANIAL NERVES     CN III, IV, VI   Pupils are equal, round, and reactive to light.       Significant Labs: All pertinent labs within the past 24 hours have been reviewed.    Significant Imaging: I have reviewed all pertinent imaging results/findings within the past 24 hours.  Assessment/Plan:     * Atrial fibrillation with RVR  -Pt. With hx of Afib s/p PVI now presenting with Afib recurrence. Pt. Given lopressor 50 mg x1 with rate control. Will restart home diltiazem and recently discontinued amiodarone and continue to monitor on telemetry  -EP consult in am for further guidance regarding medication adjustment and possible cardioversion if pt. Remains in Afib (repeat EKG ordered for am)  -Continue home eliquis for AC    Hypothyroidism  -Continue home synthroid. TSH ordered given Afib recurrence      MATTHEW (obstructive sleep apnea)  -CPAP ordered QHS      Mixed hyperlipidemia  -Continue home statin        VTE Risk Mitigation (From admission, onward)           Ordered     apixaban tablet 5 mg  2 times daily         01/19/24 0012     IP VTE HIGH RISK PATIENT  Once         01/18/24 2129     Place sequential compression device  Until discontinued         01/18/24 2129                       On 01/19/2024, patient should be placed in hospital observation services under my care.             Lonnie Casper MD  Department of Hospital Medicine  [facility] - Cardiology Stepdown

## 2024-01-19 NOTE — HOSPITAL COURSE
Admitted with symptomatic AF with RVR in 120-130's. She underwent PERICO/DCCV successfully with restoration of sinus rhythm and symptomatic improvement. EP recommends continuing previous amiodarone as prescribed as well as diltiazem and apixaban. Follow up with EP physician. Has upcoming hospital f/u visit.

## 2024-01-19 NOTE — TRANSFER OF CARE
"Anesthesia Transfer of Care Note    Patient: Flores Fuentes    Procedure(s) Performed: Procedure(s) (LRB):  Cardioversion or Defibrillation (N/A)    Patient location: PACU    Anesthesia Type: general    Transport from OR: Transported from OR on room air with adequate spontaneous ventilation    Post pain: adequate analgesia    Post assessment: no apparent anesthetic complications and tolerated procedure well    Post vital signs: stable    Level of consciousness: awake and alert    Nausea/Vomiting: no nausea/vomiting    Complications: none    Transfer of care protocol was followed      Last vitals: Visit Vitals  /64 (BP Location: Left arm, Patient Position: Lying)   Pulse 102   Temp 37 °C (98.6 °F) (Oral)   Resp 20   Ht 5' 7" (1.702 m)   Wt 102.1 kg (225 lb 1.4 oz)   SpO2 96%   BMI 35.25 kg/m²     "

## 2024-01-19 NOTE — NURSING
Received report from [facility]. Patient s/p DCCV, AAOx3. VSS, no c/o pain or discomfort at this time, resp even and unlabored. Post procedure protocol reviewed with patient and patient's family. Understanding verbalized. Will continue to monitor per post procedure protocol.

## 2024-01-19 NOTE — ANESTHESIA PREPROCEDURE EVALUATION
Pre-operative evaluation for Procedure(s) (LRB):  Cardioversion or Defibrillation (N/A)    Flores Fuentes is a 73 y.o. female with pmh of persistent A-fib despite ablation 10/17/23, MATTHEW, obesity, HTN. Plan for the above procedure.     2D Echo:  9/2023:    Left Ventricle: The left ventricle is normal in size. Increased wall thickness. There is concentric remodeling. Normal wall motion. There is normal systolic function with a visually estimated ejection fraction of 60 - 65%. There is indeterminate diastolic function.    Right Ventricle: Normal right ventricular cavity size. There is hypertrophy. Right ventricle wall motion  is normal. Systolic function is normal.    Left Atrium: Left atrium is mildly dilated.    Patient Active Problem List   Diagnosis    Mixed hyperlipidemia    Subclinical iodine-deficiency hypothyroidism    Paroxysmal A-fib    Primary localized osteoarthrosis, lower leg    Atrial fibrillation with RVR    Cervical muscle strain    DJD (degenerative joint disease) of cervical spine    Stress incontinence, female    Essential hypertension    MATTHEW (obstructive sleep apnea)    Asymptomatic microscopic hematuria    Osteopenia of neck of left femur    Chronic anticoagulation    Kym-Parkinson-White (WPW) pattern    Slow transit constipation    Morbid obesity    Stress at home    Weight loss    Kym-Parkinson-White (WPW) syndrome    Hypothyroidism        Current Facility-Administered Medications on File Prior to Encounter   Medication Dose Route Frequency Provider Last Rate Last Admin    sodium chloride 0.9% bolus 1,000 mL  1,000 mL Intravenous Once Carley Cabrera NP        vancomycin in dextrose 5 % 1 gram/250 mL IVPB 1,000 mg  1,000 mg Intravenous On Call Procedure Carley Cabrera, BIANCA 166.7 mL/hr at 11/01/21 1249 1,000 mg at 11/01/21 1249     Current Outpatient Medications on File Prior to Encounter   Medication Sig Dispense Refill    diltiaZEM (CARDIZEM CD) 180 MG 24 hr capsule Take 1  capsule (180 mg total) by mouth once daily. 90 capsule 3    ELIQUIS 5 mg Tab TAKE 1 TABLET TWICE DAILY 180 tablet 3    fluticasone propionate (FLONASE) 50 mcg/actuation nasal spray USE 1 SPRAY IN EACH NOSTRIL EVERY DAY 32 g 2    levothyroxine (SYNTHROID) 25 MCG tablet TAKE 1 TABLET ONE TIME DAILY 90 tablet 2    pravastatin (PRAVACHOL) 40 MG tablet Take 1 tablet (40 mg total) by mouth once daily. 90 tablet 0    lansoprazole (PREVACID) 30 MG capsule Take 1 capsule (30 mg total) by mouth once daily. 30 capsule 0    nystatin (MYCOSTATIN) cream Apply topically 2 (two) times daily. 30 g 11       Past Surgical History:   Procedure Laterality Date    ABLATION OF ARRHYTHMOGENIC FOCUS FOR ATRIAL FIBRILLATION N/A 10/17/2023    Procedure: Ablation atrial fibrillation;  Surgeon: Ameya Servin MD;  Location: [facility] EP LAB;  Service: Cardiology;  Laterality: N/A;  AF, PERICO, PVI, WPW, RFA, POPEYE, Gen, DM, 3 Prep *MDT ILR*    ABLATION, MECHANOCHEMICAL, VARICOSE VEIN Right 12/8/2023    Procedure: ABLATION, MECHANOCHEMICAL, VARICOSE VEIN;  Surgeon: Simone Shannon MD;  Location: [facility] CATH LAB/EP;  Service: Cardiology;  Laterality: Right;    APPENDECTOMY      COLONOSCOPY N/A 8/13/2020    Procedure: COLONOSCOPY;  Surgeon: Angus Ac MD;  [facility]  Service: Endoscopy;  Laterality: N/A;  ok to hold Eliquis 2 days per Dr Servin     COVID test at [place] on 8/10-GT    ECHOCARDIOGRAM,TRANSESOPHAGEAL N/A 9/20/2023    Procedure: Transesophageal echo (PERICO) intra-procedure log documentation;  Surgeon: Provider, Dosc Diagnostic;  Location: [facility] EP LAB;  Service: Cardiology;  Laterality: N/A;    HYSTERECTOMY  1990    TAHBSO for fibroids    INSERTION OF IMPLANTABLE LOOP RECORDER Left 11/1/2021    Procedure: Insertion, Implantable Loop Recorder;  Surgeon: Ameya Servin MD;  Location: [facility] EP LAB;  Service: Cardiology;  Laterality: Left;  AF, ILR implant, MDT,  DM, 3 Prep    MANDIBLE SURGERY  2015    L mandible, Dr Toure     MANDIBLE SURGERY Left 8/27/2019    OOPHORECTOMY      TONSILLECTOMY      TRANSESOPHAGEAL ECHOCARDIOGRAPHY N/A 10/17/2023    Procedure: ECHOCARDIOGRAM, TRANSESOPHAGEAL;  Surgeon: Kathy Malik MD;  Location: [facility] EP LAB;  Service: Cardiology;  Laterality: N/A;    TREATMENT OF CARDIAC ARRHYTHMIA N/A 9/20/2023    Procedure: Cardioversion or Defibrillation;  Surgeon: JV Rosenthal MD;  Location: [facility] EP LAB;  Service: Cardiology;  Laterality: N/A;  AF, DCCV/PERICO, ANES, EH,            Pre-op Assessment    I have reviewed the Patient Summary Reports.     I have reviewed the Nursing Notes. I have reviewed the NPO Status.   I have reviewed the Medications.     Review of Systems  Anesthesia Hx:    System negative unless otherwise specified in the HPI or problem list above           Denies Family Hx of Anesthesia complications.    Denies Personal Hx of Anesthesia complications.                    Hematology/Oncology:                   Hematology Comments: System negative unless otherwise specified in the HPI or problem list above                Oncology Comments: System negative unless otherwise specified in the HPI or problem list above     EENT/Dental:   System negative unless otherwise specified in the HPI or problem list above          Cardiovascular:                    System negative unless otherwise specified in the HPI or problem list above                         Pulmonary:         System negative unless otherwise specified in the HPI or problem list above               Renal/:     System negative unless otherwise specified in the HPI or problem list above             Hepatic/GI:        System negative unless otherwise specified in the HPI or problem list above          Musculoskeletal:     System negative unless otherwise specified in the HPI or problem list above            OB/GYN/PEDS:          System negative unless otherwise specified in the HPI or problem list above   Neurological:            System negative unless otherwise specified in the HPI or problem list above                            Endocrine:     System negative unless otherwise specified in the HPI or problem list above        Dermatological:  System negative unless otherwise specified in the HPI or problem list above   Psych:     System negative unless otherwise specified in the HPI or problem list above               Physical Exam  General: Well nourished, Cooperative, Alert and Oriented    Airway:  Mouth Opening: Normal  TM Distance: Normal  Tongue: Normal  Neck ROM: Normal ROM    Chest/Lungs:  Clear to auscultation, Normal Respiratory Rate    Heart:  Rate: Normal  Rhythm: Regular Rhythm  Sounds: Normal        Anesthesia Plan  Type of Anesthesia, risks & benefits discussed:    Anesthesia Type: MAC, Gen Natural Airway, Gen ETT  Intra-op Monitoring Plan: Standard ASA Monitors  Post Op Pain Control Plan: multimodal analgesia  Induction:  IV  Informed Consent: Informed consent signed with the Patient and all parties understand the risks and agree with anesthesia plan.  All questions answered.   ASA Score: 3  Day of Surgery Review of History & Physical: H&P Update referred to the surgeon/provider.    Ready For Surgery From Anesthesia Perspective.     .

## 2024-01-19 NOTE — NURSING
Nurses Note -- 4 Eyes      1/19/2024   10:48 PM      Skin assessed during: Admit      [] No Altered Skin Integrity Present    []Prevention Measures Documented      [x] Yes- Altered Skin Integrity Present or Discovered   [x] LDA Added if Not in Epic (Describe Wound)   [x] New Altered Skin Integrity was Present on Admit and Documented in LDA   [x] Wound Image Taken    Wound Care Consulted? No    Attending Nurse:  Evie Nix RN    Second RN/Staff Member:   Scott Park RN

## 2024-01-19 NOTE — CONSULTS
[facility] - Cardiology Stepdown  Cardiac Electrophysiology  Consult Note    Admission Date: 1/18/2024  Code Status: Full Code   Attending Provider: Katiana Solano MD  Consulting Provider: Connor M Gillies, MD  Principal Problem:Atrial fibrillation with RVR    Inpatient consult to Electrophysiology  Consult performed by: Gillies, Connor M, MD  Consult ordered by: Katiana Solano MD        Subjective:     Chief Complaint:  Lightheadedness     HPI:   Flores Fuentes is a 73yoF with a hx of AF s/p PVI 10/2023 with ILR in place, intermittent WPW, HLD, hypothyroid, and MATTHEW who is admitted to the hospital for lightheadedness and was found to have atrial fibrillation with RVR on presentation. She was seen in Dr. Servin's clinic on 1/17 for 3 month post PVI follow-up and her ILR was interrogated, showing no AF/AFL since her PVI. On interrogation of the ILR, she entered atrial fibrillation at around 2AM on 1/18/23. The ILR reports different episodes since this started, initially appearing paroxysmal, but each of the recorded events starts and ends with atrial fibrillation, showing no initiation of the fibrillation. This appears to be persistent since yesterday morning at 2AM. At midnight last night while on telemetry, she went directly from fib to flutter. She has been in atrial flutter since then, so no evidence of cardioversion to sinus rhythm since the episode started. She has been on amiodarone until yesterday when it was discontinued. She is anticoagulated with Eliquis and has not missed any doses. She was NPO this morning when we saw her.     EP consulted for evaluation of this patient's atrial fibrillation.    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 Dr Viktor Toure    Osteopenia of neck of left femur 6/4/2020    Paroxysmal atrioventricular tachycardia     Plantar fasciitis     Right knee meniscal tear     Dr Mayfield, tx conservatively    Severe obesity (BMI 35.0-35.9 with comorbidity) 1/24/2017    Slow transit constipation 7/13/2021    Despite fruits & veg & 1200 dunia diet, try Colace daily    Stress incontinence, female 03/28/2018    hasn't tried oxybutynin, After HYST, Kegels & PT  didn't work, worse at night wearing pads, Dr Infante    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    Thyroid disease        Past Surgical History:   Procedure Laterality Date    ABLATION OF ARRHYTHMOGENIC FOCUS FOR ATRIAL FIBRILLATION N/A 10/17/2023    Procedure: Ablation atrial fibrillation;  Surgeon: Ameya Servin MD;  Location: [facility] EP LAB;  Service: Cardiology;  Laterality: N/A;  AF, PERICO, PVI, WPW, RFA, POPEYE, Gen, DM, 3 Prep *MDT ILR*    ABLATION, MECHANOCHEMICAL, VARICOSE VEIN Right 12/8/2023    Procedure: ABLATION, MECHANOCHEMICAL, VARICOSE VEIN;  Surgeon: Simone Shannon MD;  Location: [facility] CATH LAB/EP;  Service: Cardiology;  Laterality: Right;    APPENDECTOMY      COLONOSCOPY N/A 8/13/2020    Procedure: COLONOSCOPY;  Surgeon: Angus Ac MD;  Location: [facility] ENDO ([facility]);  Service: Endoscopy;  Laterality: N/A;  ok to hold Eliquis 2 days per Dr Malathi HERNANDEZ test at [facility] on 8/10-GT    ECHOCARDIOGRAM,TRANSESOPHAGEAL N/A 9/20/2023    Procedure: Transesophageal echo (PERICO) intra-procedure log documentation;  Surgeon: Provider, Dosc Diagnostic;  Location: [facility] EP LAB;  Service: Cardiology;  Laterality: N/A;    HYSTERECTOMY  1990    TAHBSO for fibroids    INSERTION OF IMPLANTABLE LOOP RECORDER Left 11/1/2021     Procedure: Insertion, Implantable Loop Recorder;  Surgeon: Ameya Servin MD;  Location: [facility] EP LAB;  Service: Cardiology;  Laterality: Left;  AF, ILR implant, MDT,  DM, 3 Prep    MANDIBLE SURGERY  2015    L mandible, Dr Torue    MANDIBLE SURGERY Left 8/27/2019    OOPHORECTOMY      TONSILLECTOMY      TRANSESOPHAGEAL ECHOCARDIOGRAPHY N/A 10/17/2023    Procedure: ECHOCARDIOGRAM, TRANSESOPHAGEAL;  Surgeon: Kathy Malik MD;  Location: [facility] EP LAB;  Service: Cardiology;  Laterality: N/A;    TREATMENT OF CARDIAC ARRHYTHMIA N/A 9/20/2023    Procedure: Cardioversion or Defibrillation;  Surgeon: JV Rosenthal MD;  Location: [facility] EP LAB;  Service: Cardiology;  Laterality: N/A;  AF, DCCV/PERICO, ANES, EH,        Review of patient's allergies indicates:   Allergen Reactions    Decongestant d [pseudoephedrine-dm]      Atrial Fibrillation    Augmentin [amoxicillin-pot clavulanate]      palpitations      Amoxicillin Palpitations    Diphenhydramine-pseudoephed Palpitations     TACHYCARDIA    Povidone-iodine Rash     Mild erythema of skin       Current Facility-Administered Medications on File Prior to Encounter   Medication    sodium chloride 0.9% bolus 1,000 mL    vancomycin in dextrose 5 % 1 gram/250 mL IVPB 1,000 mg     Current Outpatient Medications on File Prior to Encounter   Medication Sig    diltiaZEM (CARDIZEM CD) 180 MG 24 hr capsule Take 1 capsule (180 mg total) by mouth once daily.    ELIQUIS 5 mg Tab TAKE 1 TABLET TWICE DAILY    fluticasone propionate (FLONASE) 50 mcg/actuation nasal spray USE 1 SPRAY IN EACH NOSTRIL EVERY DAY    levothyroxine (SYNTHROID) 25 MCG tablet TAKE 1 TABLET ONE TIME DAILY    pravastatin (PRAVACHOL) 40 MG tablet Take 1 tablet (40 mg total) by mouth once daily.    lansoprazole (PREVACID) 30 MG capsule Take 1 capsule (30 mg total) by mouth once daily.    nystatin (MYCOSTATIN) cream Apply topically 2 (two) times daily.     Family History       Problem Relation (Age of Onset)     Cancer Mother          Tobacco Use    Smoking status: Never    Smokeless tobacco: Never   Substance and Sexual Activity    Alcohol use: No    Drug use: No    Sexual activity: Not Currently     Review of Systems   Constitutional: Negative for diaphoresis and fever.   Cardiovascular:  Negative for chest pain, dyspnea on exertion, leg swelling, near-syncope, orthopnea, palpitations, paroxysmal nocturnal dyspnea and syncope.   Respiratory:  Negative for cough and shortness of breath.    Gastrointestinal:  Negative for abdominal pain, diarrhea, nausea and vomiting.   Neurological:  Negative for light-headedness.   Psychiatric/Behavioral:  Negative for altered mental status and substance abuse.      Objective:     Vital Signs (Most Recent):  Temp: 97.7 °F (36.5 °C) (01/19/24 1320)  Pulse: (!) 52 (01/19/24 1420)  Resp: 16 (01/19/24 1320)  BP: (!) 106/58 (01/19/24 1345)  SpO2: 98 % (01/19/24 1320) Vital Signs (24h Range):  Temp:  [97.3 °F (36.3 °C)-98.6 °F (37 °C)] 97.7 °F (36.5 °C)  Pulse:  [] 52  Resp:  [14-20] 16  SpO2:  [95 %-100 %] 98 %  BP: ()/(52-87) 106/58       Weight: 102.1 kg (225 lb 1.4 oz)  Body mass index is 35.25 kg/m².    SpO2: 98 %        Physical Exam  Constitutional:       Appearance: Normal appearance.   Cardiovascular:      Rate and Rhythm: Normal rate and regular rhythm.      Pulses: Normal pulses.      Heart sounds: Normal heart sounds.   Pulmonary:      Effort: Pulmonary effort is normal.      Breath sounds: Normal breath sounds. No wheezing or rales.   Abdominal:      General: Abdomen is flat. There is no distension.      Palpations: Abdomen is soft.      Tenderness: There is no abdominal tenderness.   Musculoskeletal:      Right lower leg: No edema.      Left lower leg: No edema.   Skin:     General: Skin is warm and dry.   Neurological:      Mental Status: She is alert and oriented to person, place, and time. Mental status is at baseline.   Psychiatric:         Mood and Affect: Mood  normal.         Behavior: Behavior normal.            Significant Labs: All pertinent lab results from the last 24 hours have been reviewed.    Significant Imaging:  Reviewed     CHADS2 for A-FIB Stroke Risk  Age?: 65-74 years old  CHF history: No  HTN history: Yes  Previous Stroke Sx or TIA: No  Vascular Disease History?: No  Diabetes Mellitus History: No  Female?: Yes  [id number] Total Score: 3      Assessment and Plan:     Atrial flutter  73yoF with a hx of pAF s/p PVI 10/2023 without recurrence of atrial fibrillation on ILR on 1/17/24 here with AF/AFL that started on 1/18/24 at 2AM and appears to be persistent until admission per interrogation.    No interruption in DOAC  Plan was to stop amiodarone yesterday  NPO this morning    Recommendations:  - Direct DCCV today (no PERICO), given < 48 hours since AF onset  - Continue amiodarone 200mg qd  - Continue Eliquis        Thank you for your consult. I will follow-up with patient. Please contact us if you have any additional questions.    Connor M Gillies, MD  Cardiac Electrophysiology  [facility] - Cardiology Stepdown

## 2024-01-19 NOTE — PLAN OF CARE
01/19/24 1525   Final Note   Assessment Type Final Discharge Note   Anticipated Discharge Disposition Home   What phone number can be called within the next 1-3 days to see how you are doing after discharge? [phone]   Hospital Resources/Appts/Education Provided Provided patient/caregiver with written discharge plan information;Provided education on problems/symptoms using teachback;Appointments scheduled and added to AVS   Post-Acute Status   Discharge Delays None known at this time     Patient discharging home self care . Patient independent with care.     Lorna Cabrera RN    [phone]    Future Appointments   Date Time Provider Department Center   1/25/2024  9:20 AM Naz Condon MD [facility]   2/1/2024  9:20 AM Simone Shannon MD [facility] CARDIO [facility]        no

## 2024-01-19 NOTE — ASSESSMENT & PLAN NOTE
-Pt. With hx of Afib s/p PVI now presenting with Afib recurrence. Pt. Given lopressor 50 mg x1 with rate control. Will restart home diltiazem and recently discontinued amiodarone and continue to monitor on telemetry  -EP consult in am for further guidance regarding medication adjustment and possible cardioversion if pt. Remains in Afib (repeat EKG ordered for am)  -Continue home eliquis for AC

## 2024-01-19 NOTE — ED NOTES
Received report from AVE Perez. Assumed care of pt.    Pt [facility] gown at this time.   The patient is resting quietly in ED stretcher, and is AAOx4 at this time. Respirations are even and unlabored, with no distress noted. The patient remains on continuous cardiac monitor, automated BP cuff cycling Q30 minutes, and continuous pulse oximeter. The patient is aware of POC and all questions and concerns addressed at this time. The patient was offered restroom assistance and denies need to void. The patient denies further needs and has no complaints at this time. SR raised x2, bed locked and in low position with brake engaged. Call bell within reach and the patient verbalized she would call for assistance if needed. Personal belongings are at bedside within reach. Patient has a visitor at bedside.

## 2024-01-19 NOTE — PLAN OF CARE
Problem: Adult Inpatient Plan of Care  Goal: Patient-Specific Goal (Individualized)  Outcome: Ongoing, Progressing  Flowsheets (Taken 1/19/2024 1108)  Anxieties, Fears or Concerns: None taken  Individualized Care Needs: Monitor rhythm and HR. Monitor electrolytes. Cardioversion performed today; rhythm SB-NSR.         Plan of care discussed with patient. Patient is free of fall/trauma/injury. Denies CP, SOB, or pain/discomfort. All questions addressed.

## 2024-01-19 NOTE — HPI
Flores Fuentes is a 73yoF with a hx of AF s/p PVI 10/2023 with ILR in place, intermittent WPW, HLD, hypothyroid, and MATTHEW who is admitted to the hospital for lightheadedness and was found to have atrial fibrillation with RVR on presentation. She was seen in Dr. Servin's clinic on 1/17 for 3 month post PVI follow-up and her ILR was interrogated, showing no AF/AFL since her PVI. On interrogation of the ILR, she entered atrial fibrillation at around 2AM on 1/18/23. The ILR reports different episodes since this started, initially appearing paroxysmal, but each of the recorded events starts and ends with atrial fibrillation, showing no initiation of the fibrillation. This appears to be persistent since yesterday morning at 2AM. At midnight last night while on telemetry, she went directly from fib to flutter. She has been in atrial flutter since then, so no evidence of cardioversion to sinus rhythm since the episode started. She has been on amiodarone until yesterday when it was discontinued. She is anticoagulated with Eliquis and has not missed any doses. She was NPO this morning when we saw her.     EP consulted for evaluation of this patient's atrial fibrillation.

## 2024-01-19 NOTE — PLAN OF CARE
Patient hospital f/u appointment scheduled 1/25 at 9:20am          Cecy Whitaker [facility]  Case Management   [phone]

## 2024-01-19 NOTE — HPI
72 yo F with PMHx pAfib s/p PVI 10/2023, intermittent WPW, HLD,  hypothyroidism, and MATTHEW on CPAP who presents to the ED complaining of lightheadedness and palpitations since this morning around 4 am. Pt. Reports that she was in her usual state of health until early this monring when she started having lightheadedness and noted that her heart was racing. Pt. Reports that she had recently been taken off of her rate/rhtyhm controlling medication amiodarone after seeing her EP doctor yesterday and having a PVI procedure in October with no recurring episodes of Afib since then. She only skipped one dose of the amiodarone, however because it was stopped yesterday. This morning she did take her usual scheduled diltiazem, but her lightheadedness worsened after taking the medication. On presentation, pt. Noted to be in Afib with RVR with rate 120. She denies any chest pain or SOB.

## 2024-01-19 NOTE — DISCHARGE SUMMARY
[facility] - Cardiology [facility] Medicine  Discharge Summary      Patient Name: Flores Fuentes  MRN: [id number]  PATRICK: [id number]  Patient Class: OP- Observation  Admission Date: 1/18/2024  Hospital Length of Stay: 0 days  Discharge Date and Time:  01/19/2024 3:26 PM  Attending Physician: Katiana Solano MD   Discharging Provider: Katiana Solano MD  Primary Care Provider: Naz Condon MD  [facility] Medicine Team: [facility] HOSP MED  Katiana Solano MD  Primary Care Team: [facility]    HPI:   74 yo F with PMHx pAfib s/p PVI 10/2023, intermittent WPW, HLD,  hypothyroidism, and MATTHEW on CPAP who presents to the ED complaining of lightheadedness and palpitations since this morning around 4 am. Pt. Reports that she was in her usual state of health until early this monring when she started having lightheadedness and noted that her heart was racing. Pt. Reports that she had recently been taken off of her rate/rhtyhm controlling medication amiodarone after seeing her EP doctor yesterday and having a PVI procedure in October with no recurring episodes of Afib since then. She only skipped one dose of the amiodarone, however because it was stopped yesterday. This morning she did take her usual scheduled diltiazem, but her lightheadedness worsened after taking the medication. On presentation, pt. Noted to be in Afib with RVR with rate 120. She denies any chest pain or SOB.    Procedure(s) (LRB):  Transesophageal echo (PERICO) intra-procedure log documentation (N/A)  Cardioversion or Defibrillation (N/A)      Hospital Course:   Admitted with symptomatic AF with RVR in 120-130's. She underwent PERICO/DCCV successfully with restoration of sinus rhythm and symptomatic improvement. EP recommends continuing previous amiodarone as prescribed as well as diltiazem and apixaban. Follow up with EP physician. Has upcoming hospital f/u visit.      Goals of Care Treatment Preferences:  Code Status: Full Code      Consults:   Consults (From  admission, onward)          Status Ordering Provider     Inpatient consult to Electrophysiology  Once        Provider:  (Not yet assigned)    Completed LANCE GALLEGOS            No new Assessment & Plan notes have been filed under this hospital service since the last note was generated.  Service: Hospital Medicine    Final Active Diagnoses:    Diagnosis Date Noted POA    PRINCIPAL PROBLEM:  Atrial fibrillation with RVR [I48.91]  Yes    Atrial flutter [I48.92] 01/19/2024 No    Hypothyroidism [E03.9] 01/18/2024 Unknown    MATTHEW (obstructive sleep apnea) [G47.33]  Yes     Chronic    Mixed hyperlipidemia [E78.2] 11/07/2013 Yes     Chronic      Problems Resolved During this Admission:       Discharged Condition: stable    Disposition: Home or Self Care    Follow Up:   Follow-up Information       [facility] - Cardiology. Schedule an appointment as soon as possible for a visit.    Specialty: Cardiology  Why: ELECTROPHYSIOLOGY  Contact information:  Concepcion Johnnie Zaldivar  [facility] [id number] [phone]                         Patient Instructions:      Ambulatory referral/consult to Electrophysiology   Standing Status: Future   Referral Priority: Routine Referral Type: Consultation   Referral Reason: Specialty Services Required   Requested Specialty: Electrophysiology   Number of Visits Requested: 1     Diet Cardiac     Notify your health care provider if you experience any of the following:  increased confusion or weakness     Notify your health care provider if you experience any of the following:  persistent dizziness, light-headedness, or visual disturbances     Notify your health care provider if you experience any of the following:  difficulty breathing or increased cough     Activity as tolerated       Significant Diagnostic Studies: N/A    Pending Diagnostic Studies:       None           Medications:  Reconciled Home Medications:      Medication List        START taking these medications      amiodarone 200  MG Tab  Commonly known as: PACERONE  Take 1 tablet (200 mg total) by mouth once daily.  Start taking on: January 20, 2024            CONTINUE taking these medications      diltiaZEM 180 MG 24 hr capsule  Commonly known as: CARDIZEM CD  Take 1 capsule (180 mg total) by mouth once daily.     ELIQUIS 5 mg Tab  Generic drug: apixaban  TAKE 1 TABLET TWICE DAILY     fluticasone propionate 50 mcg/actuation nasal spray  Commonly known as: FLONASE  USE 1 SPRAY IN EACH NOSTRIL EVERY DAY     lansoprazole 30 MG capsule  Commonly known as: PREVACID  Take 1 capsule (30 mg total) by mouth once daily.     levothyroxine 25 MCG tablet  Commonly known as: SYNTHROID  TAKE 1 TABLET ONE TIME DAILY     nystatin cream  Commonly known as: MYCOSTATIN  Apply topically 2 (two) times daily.     pravastatin 40 MG tablet  Commonly known as: PRAVACHOL  Take 1 tablet (40 mg total) by mouth once daily.              Indwelling Lines/Drains at time of discharge:   Lines/Drains/Airways       None                   Time spent on the discharge of patient: 35 minutes         Katiana Solano MD  Department of Hospital Medicine  [facility] - Cardiology Stepdown

## 2024-01-19 NOTE — PLAN OF CARE
[facility] - Cardiology Stepdown  Initial Discharge Assessment       Primary Care Provider: Naz Condon MD    Admission Diagnosis: Atrial fibrillation [I48.91]  Palpitations [R00.2]  Orthostatic lightheadedness [R42]  Atrial fibrillation with RVR [I48.91]    Admission Date: 1/18/2024  Expected Discharge Date: 1/19/2024    Transition of Care Barriers: None    Payor: [employer] MANAGED MEDICARE / Plan: HU[employer] MEDICARE HMO / Product Type: Capitation /     Extended Emergency Contact Information  Primary Emergency Contact: GinaDorian  Address: [address] [place] [address] [place] [address] [place]  Mobile Phone: [phone]  Relation: Spouse    Discharge Plan A: Home with family  Discharge Plan B: Home      [id number] - [place], [place] - [address] AT CORNER OF [address] [place] [address]  Phone: [phone] Fax: [phone]    [facility] - [address]  [address] [facility] [address]  Phone: [phone] Fax: [phone]      Initial Assessment (most recent)       Adult Discharge Assessment - 01/19/24 0856          Discharge Assessment    Assessment Type Discharge Planning Assessment     Confirmed/corrected address, phone number and insurance Yes     Confirmed Demographics Correct on Facesheet     Source of Information patient     Communicated TIMA with patient/caregiver Date not available/Unable to determine     Reason For Admission A-fib with RVR     People in Home spouse     Facility Arrived From: home     Do you expect to return to your current living situation? Yes     Do you have help at home or someone to help you manage your care at home? Yes     Who are your caregiver(s) and their phone number(s)? Dorian Fuentes (spouse ) [phone]     Prior to hospitilization cognitive status: Alert/Oriented     Current cognitive status: Alert/Oriented     Walking or Climbing Stairs Difficulty no      Dressing/Bathing Difficulty no     Equipment Currently Used at Home CPAP     Readmission within 30 days? No     Patient currently being followed by outpatient case management? No     Do you currently have service(s) that help you manage your care at home? No     Do you take prescription medications? Yes     Do you have prescription coverage? Yes     Coverage [employer]     Do you have any problems affording any of your prescribed medications? No     Is the patient taking medications as prescribed? yes     Who is going to help you get home at discharge? Dorian Fuentes (spouse ) [phone]     How do you get to doctors appointments? car, drives self;family or friend will provide     Are you on dialysis? No     Do you take coumadin? No     Discharge Plan A Home with family     Discharge Plan B Home     DME Needed Upon Discharge  none     Discharge Plan discussed with: Patient     Transition of Care Barriers None        Physical Activity    On average, how many days per week do you engage in moderate to strenuous exercise (like a brisk walk)? 0 days     On average, how many minutes do you engage in exercise at this level? 0 min        Financial Resource Strain    How hard is it for you to pay for the very basics like food, housing, medical care, and heating? Not hard at all        Housing Stability    In the last 12 months, was there a time when you were not able to pay the mortgage or rent on time? No     In the last 12 months, how many places have you lived? 1     In the last 12 months, was there a time when you did not have a steady place to sleep or slept in a shelter (including now)? No        Transportation Needs    In the past 12 months, has lack of transportation kept you from medical appointments or from getting medications? No     In the past 12 months, has lack of transportation kept you from meetings, work, or from getting things needed for daily living? No        Food Insecurity    Within the  past 12 months, you worried that your food would run out before you got the money to buy more. Never true     Within the past 12 months, the food you bought just didn't last and you didn't have money to get more. Never true        Stress    Do you feel stress - tense, restless, nervous, or anxious, or unable to sleep at night because your mind is troubled all the time - these days? To some extent        Social Connections    In a typical week, how many times do you talk on the phone with family, friends, or neighbors? More than three times a week     How often do you get together with friends or relatives? Once a week     How often do you attend Protestant or Taoist services? More than 4 times per year     Do you belong to any clubs or organizations such as Protestant groups, unions, fraternal or athletic groups, or school groups? No     How often do you attend meetings of the clubs or organizations you belong to? Never     Are you , , , , never , or living with a partner?         Alcohol Use    Q1: How often do you have a drink containing alcohol? Never     Q2: How many drinks containing alcohol do you have on a typical day when you are drinking? Patient does not drink     Q3: How often do you have six or more drinks on one occasion? Never        OTHER    Name(s) of People in Home Dorian Fuentes (spouse ) [phone]                          CM met with the patient at the bedside and discussed the discharge plan. Gave her the discharge booklet and placed contact numbers on the white board in the room. Patient alert and sitting up in bed.  Patient verified her name , , PCP, Insurance and Pharmacy . Stated she lives with Dorian Fuentes (spouse ) [phone] in a single story house and has 1 steps to point of entry . She is not on coumadin nor is she a dialysis patient . DME's include:a C-PAP.  She takes  medication as prescribed and has no problems getting  medication.   She has not had home health . She is interested in bed side delivery for her medications.     Discharge Plan A and Plan B have been determined by review of patient's clinical status, future medical and therapeutic needs, and coverage/benefits for post-acute care in coordination with multidisciplinary team members.    Lorna Cabrera RN         [phone]

## 2024-01-19 NOTE — ED NOTES
Telemetry Verification   Patient placed on Telemetry Box  Verified with War Room  Box # [id number]   Monitor Tech [place]   Rate 78   Rhythm Afib

## 2024-01-19 NOTE — NURSING
Patient is ready for discharge. Patient stable alert and oriented. IVs and TELE removed. No complaints of pain. Discussed discharge plan. Reviewed medications and side effects, appointments, and answered questions with patient and family.RX sent to pt's preferred pharmacy.  Pt left unit via wheelchair with RODGER galindo.

## 2024-01-19 NOTE — SUBJECTIVE & OBJECTIVE
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 Dr Viktor Toure    Osteopenia of neck of left femur 6/4/2020    Paroxysmal atrioventricular tachycardia     Plantar fasciitis     Right knee meniscal tear     Dr Mayfield, tx conservatively    Severe obesity (BMI 35.0-35.9 with comorbidity) 1/24/2017    Slow transit constipation 7/13/2021    Despite fruits & veg & 1200 dunia diet, try Colace daily    Stress incontinence, female 03/28/2018    hasn't tried oxybutynin, After HYST, Kegels & PT  didn't work, worse at night wearing pads, Dr Infante    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    Thyroid disease        Past Surgical History:   Procedure Laterality Date    ABLATION OF ARRHYTHMOGENIC FOCUS FOR ATRIAL FIBRILLATION N/A 10/17/2023    Procedure: Ablation atrial fibrillation;  Surgeon: Ameya Servin MD;  Location: [facility] EP LAB;  Service: Cardiology;  Laterality: N/A;  AF, PERICO, PVI, WPW, RFA, POPEYE, Gen, DM, 3 Prep *MDT ILR*    ABLATION, MECHANOCHEMICAL, VARICOSE VEIN Right 12/8/2023    Procedure: ABLATION, MECHANOCHEMICAL, VARICOSE VEIN;  Surgeon: Simone Shannon MD;  Location: [facility] CATH LAB/EP;  Service: Cardiology;  Laterality: Right;    APPENDECTOMY      COLONOSCOPY N/A 8/13/2020    Procedure: COLONOSCOPY;  Surgeon: Angus Ac MD;  Location: [facility] ENDO ([address] [place] [address]);  Service: Endoscopy;  Laterality: N/A;  ok to hold Eliquis 2 days per Dr Servin     COVID test at [facility] on 8/10-GT     ECHOCARDIOGRAM,TRANSESOPHAGEAL N/A 9/20/2023    Procedure: Transesophageal echo (PERICO) intra-procedure log documentation;  Surgeon: Provider, [facility];  Location: [facility] EP LAB;  Service: Cardiology;  Laterality: N/A;    HYSTERECTOMY  1990    TAHBSO for fibroids    INSERTION OF IMPLANTABLE LOOP RECORDER Left 11/1/2021    Procedure: Insertion, Implantable Loop Recorder;  Surgeon: Ameya Servin MD;  Location: [facility] EP LAB;  Service: Cardiology;  Laterality: Left;  AF, ILR implant, MDT,  DM, 3 Prep    MANDIBLE SURGERY  2015    L mandible, Dr Toure    MANDIBLE SURGERY Left 8/27/2019    OOPHORECTOMY      TONSILLECTOMY      TRANSESOPHAGEAL ECHOCARDIOGRAPHY N/A 10/17/2023    Procedure: ECHOCARDIOGRAM, TRANSESOPHAGEAL;  Surgeon: Kathy Malik MD;  Location: [facility] EP LAB;  Service: Cardiology;  Laterality: N/A;    TREATMENT OF CARDIAC ARRHYTHMIA N/A 9/20/2023    Procedure: Cardioversion or Defibrillation;  Surgeon: JV Rosenthal MD;  Location: [facility] EP LAB;  Service: Cardiology;  Laterality: N/A;  AF, DCCV/PERICO, ANES, EH,        Review of patient's allergies indicates:   Allergen Reactions    Decongestant d [pseudoephedrine-dm]      Atrial Fibrillation    Augmentin [amoxicillin-pot clavulanate]      palpitations      Amoxicillin Palpitations    Diphenhydramine-pseudoephed Palpitations     TACHYCARDIA    Povidone-iodine Rash     Mild erythema of skin       Current Facility-Administered Medications on File Prior to Encounter   Medication    sodium chloride 0.9% bolus 1,000 mL    vancomycin in dextrose 5 % 1 gram/250 mL IVPB 1,000 mg     Current Outpatient Medications on File Prior to Encounter   Medication Sig    diltiaZEM (CARDIZEM CD) 180 MG 24 hr capsule Take 1 capsule (180 mg total) by mouth once daily.    ELIQUIS 5 mg Tab TAKE 1 TABLET TWICE DAILY    fluticasone propionate (FLONASE) 50 mcg/actuation nasal spray USE 1 SPRAY IN EACH NOSTRIL EVERY DAY    lansoprazole (PREVACID) 30 MG capsule Take 1  capsule (30 mg total) by mouth once daily.    levothyroxine (SYNTHROID) 25 MCG tablet TAKE 1 TABLET ONE TIME DAILY    nystatin (MYCOSTATIN) cream Apply topically 2 (two) times daily.    pravastatin (PRAVACHOL) 40 MG tablet Take 1 tablet (40 mg total) by mouth once daily.     Family History       Problem Relation (Age of Onset)    Cancer Mother          Tobacco Use    Smoking status: Never    Smokeless tobacco: Never   Substance and Sexual Activity    Alcohol use: No    Drug use: No    Sexual activity: Not Currently     Review of Systems   Constitutional:  Negative for activity change, appetite change, chills, fever and unexpected weight change.   HENT:  Negative for congestion and sore throat.    Respiratory:  Negative for cough and shortness of breath.    Cardiovascular:  Positive for palpitations. Negative for chest pain and leg swelling.   Gastrointestinal:  Negative for abdominal distention, abdominal pain, blood in stool, constipation, diarrhea, nausea and vomiting.   Genitourinary:  Negative for difficulty urinating, dysuria and hematuria.   Musculoskeletal:  Positive for arthralgias. Negative for myalgias.   Skin:  Negative for color change and rash.   Neurological:  Positive for light-headedness. Negative for dizziness, tremors and seizures.     Objective:     Vital Signs (Most Recent):  Temp: 97.7 °F (36.5 °C) (01/18/24 1915)  Pulse: 78 (01/18/24 2100)  Resp: 14 (01/18/24 2100)  BP: 126/87 (01/18/24 2100)  SpO2: 100 % (01/18/24 2100) Vital Signs (24h Range):  Temp:  [97.7 °F (36.5 °C)] 97.7 °F (36.5 °C)  Pulse:  [] 78  Resp:  [14-20] 14  SpO2:  [96 %-100 %] 100 %  BP: (124-159)/(63-87) 126/87     Weight: 104.8 kg (231 lb)  Body mass index is 36.18 kg/m².     Physical Exam  Vitals reviewed.   Constitutional:       General: She is not in acute distress.     Appearance: She is well-developed.   HENT:      Head: Normocephalic and atraumatic.   Eyes:      Extraocular Movements: Extraocular movements  intact.      Pupils: Pupils are equal, round, and reactive to light.   Neck:      Vascular: No JVD.      Trachea: No tracheal deviation.   Cardiovascular:      Rate and Rhythm: Normal rate. Rhythm irregular.      Heart sounds: No murmur heard.     No friction rub. No gallop.   Pulmonary:      Effort: No respiratory distress.      Breath sounds: Normal breath sounds. No wheezing or rales.   Abdominal:      General: Bowel sounds are normal. There is no distension.      Palpations: Abdomen is soft. There is no mass.      Tenderness: There is no abdominal tenderness.   Musculoskeletal:         General: No deformity.      Cervical back: Neck supple.   Lymphadenopathy:      Cervical: No cervical adenopathy.   Skin:     General: Skin is warm and dry.      Findings: No rash.   Neurological:      Mental Status: She is alert and oriented to person, place, and time.              CRANIAL NERVES     CN III, IV, VI   Pupils are equal, round, and reactive to light.       Significant Labs: All pertinent labs within the past 24 hours have been reviewed.    Significant Imaging: I have reviewed all pertinent imaging results/findings within the past 24 hours.

## 2024-01-19 NOTE — ASSESSMENT & PLAN NOTE
73yoF with a hx of pAF s/p PVI 10/2023 without recurrence of atrial fibrillation on ILR on 1/17/24 here with AF/AFL that started on 1/18/24 at 2AM and appears to be persistent until admission per interrogation.    No interruption in DOAC  Plan was to stop amiodarone yesterday  NPO this morning    Recommendations:  - Direct DCCV today (no PERICO), given < 48 hours since AF onset  - Continue amiodarone 200mg qd  - Continue Eliquis

## 2024-01-22 NOTE — TRANSFER OF CARE
"Anesthesia Transfer of Care Note    Patient: Flores Fuentes    Procedure(s) Performed: Procedure(s) (LRB):  Transesophageal echo (PERICO) intra-procedure log documentation (N/A)  Cardioversion or Defibrillation (N/A)    Patient location: PACU    Anesthesia Type: general    Transport from OR: Transported from OR on room air with adequate spontaneous ventilation    Post pain: adequate analgesia    Post assessment: no apparent anesthetic complications and tolerated procedure well    Post vital signs: stable    Level of consciousness: awake    Nausea/Vomiting: no nausea/vomiting    Complications: none    Transfer of care protocol was followed      Last vitals: Visit Vitals  BP (!) 99/58 (BP Location: Left arm, Patient Position: Sitting)   Pulse (!) 55   Temp 36.4 °C (97.5 °F) (Oral)   Resp 18   Ht 5' 7" (1.702 m)   Wt 102.1 kg (225 lb 1.4 oz)   SpO2 99%   BMI 35.25 kg/m²     "

## 2024-01-22 NOTE — ANESTHESIA POSTPROCEDURE EVALUATION
Anesthesia Post Evaluation    Patient: Flores Fuentes    Procedure(s) Performed: Procedure(s) (LRB):  Cardioversion or Defibrillation (N/A)    Final Anesthesia Type: general      Patient location during evaluation: PACU  Patient participation: Yes- Able to Participate  Level of consciousness: awake and alert  Post-procedure vital signs: reviewed and stable  Pain management: adequate  Airway patency: patent    PONV status at discharge: No PONV  Anesthetic complications: no      Cardiovascular status: blood pressure returned to baseline  Respiratory status: unassisted  Hydration status: euvolemic  Follow-up not needed.              Vitals Value Taken Time   BP 99/58 01/19/24 1540   Temp 36.4 °C (97.5 °F) 01/19/24 1540   Pulse 55 01/19/24 1540   Resp 18 01/19/24 1540   SpO2 99 % 01/19/24 1540         No case tracking events are documented in the log.      Pain/Dalia Score: No data recorded

## 2024-01-23 LAB
OHS CV AF BURDEN PERCENT: 1.4
OHS CV AF BURDEN PERCENT: 96.6
OHS CV AF BURDEN PERCENT: < 1
OHS CV DC REMOTE DEVICE TYPE: NORMAL
OHS CV ICD SHOCK: NO

## 2024-01-23 NOTE — ANESTHESIA POSTPROCEDURE EVALUATION
Anesthesia Post Evaluation    Patient: Flores Fuentes    Procedure(s) Performed: Procedure(s) (LRB):  Transesophageal echo (PERICO) intra-procedure log documentation (N/A)  Cardioversion or Defibrillation (N/A)    Final Anesthesia Type: general      Patient location during evaluation: PACU  Patient participation: Yes- Able to Participate  Level of consciousness: awake and alert  Post-procedure vital signs: reviewed and stable  Pain management: adequate  Airway patency: patent    PONV status at discharge: No PONV  Anesthetic complications: no      Cardiovascular status: blood pressure returned to baseline  Respiratory status: unassisted  Hydration status: euvolemic  Follow-up not needed.              Vitals Value Taken Time   BP 99/58 01/19/24 1540   Temp 36.4 °C (97.5 °F) 01/19/24 1540   Pulse 55 01/19/24 1540   Resp 18 01/19/24 1540   SpO2 99 % 01/19/24 1540         No case tracking events are documented in the log.      Pain/Dalia Score: No data recorded

## 2024-01-23 NOTE — ANESTHESIA PREPROCEDURE EVALUATION
Pre-operative evaluation for Procedure(s) (LRB):  Transesophageal echo (PERICO) intra-procedure log documentation (N/A)  Cardioversion or Defibrillation (N/A)    Flores Fuentes is a 73 y.o. female with pmh of persistent A-fib despite ablation 10/17/23, MATTHEW, obesity, HTN. Plan for the above procedure.      2D Echo:  9/2023:    Left Ventricle: The left ventricle is normal in size. Increased wall thickness. There is concentric remodeling. Normal wall motion. There is normal systolic function with a visually estimated ejection fraction of 60 - 65%. There is indeterminate diastolic function.    Right Ventricle: Normal right ventricular cavity size. There is hypertrophy. Right ventricle wall motion  is normal. Systolic function is normal.    Left Atrium: Left atrium is mildly dilated.    Patient Active Problem List   Diagnosis    Mixed hyperlipidemia    Subclinical iodine-deficiency hypothyroidism    Paroxysmal A-fib    Primary localized osteoarthrosis, lower leg    Atrial fibrillation with RVR    Cervical muscle strain    DJD (degenerative joint disease) of cervical spine    Stress incontinence, female    Essential hypertension    MATTHEW (obstructive sleep apnea)    Asymptomatic microscopic hematuria    Osteopenia of neck of left femur    Chronic anticoagulation    Kym-Parkinson-White (WPW) pattern    Slow transit constipation    Morbid obesity    Stress at home    Weight loss    Kym-Parkinson-White (WPW) syndrome    Hypothyroidism    Atrial flutter        Current Facility-Administered Medications on File Prior to Encounter   Medication Dose Route Frequency Provider Last Rate Last Admin    sodium chloride 0.9% bolus 1,000 mL  1,000 mL Intravenous Once Carley Cabrera NP        vancomycin in dextrose 5 % 1 gram/250 mL IVPB 1,000 mg  1,000 mg Intravenous On Call Procedure Carley Cabrera .7 mL/hr at 11/01/21 1249 1,000 mg at 11/01/21 1249     Current Outpatient Medications on File Prior to  Encounter   Medication Sig Dispense Refill    diltiaZEM (CARDIZEM CD) 180 MG 24 hr capsule Take 1 capsule (180 mg total) by mouth once daily. 90 capsule 3    ELIQUIS 5 mg Tab TAKE 1 TABLET TWICE DAILY 180 tablet 3    fluticasone propionate (FLONASE) 50 mcg/actuation nasal spray USE 1 SPRAY IN EACH NOSTRIL EVERY DAY 32 g 2    levothyroxine (SYNTHROID) 25 MCG tablet TAKE 1 TABLET ONE TIME DAILY 90 tablet 2    pravastatin (PRAVACHOL) 40 MG tablet Take 1 tablet (40 mg total) by mouth once daily. 90 tablet 0    lansoprazole (PREVACID) 30 MG capsule Take 1 capsule (30 mg total) by mouth once daily. 30 capsule 0    nystatin (MYCOSTATIN) cream Apply topically 2 (two) times daily. 30 g 11       Past Surgical History:   Procedure Laterality Date    ABLATION OF ARRHYTHMOGENIC FOCUS FOR ATRIAL FIBRILLATION N/A 10/17/2023    Procedure: Ablation atrial fibrillation;  Surgeon: Ameya Servin MD;  Location: [facility] EP LAB;  Service: Cardiology;  Laterality: N/A;  AF, PERICO, PVI, WPW, RFA, POPEYE, Gen, DM, 3 Prep *MDT ILR*    ABLATION, MECHANOCHEMICAL, VARICOSE VEIN Right 12/8/2023    Procedure: ABLATION, MECHANOCHEMICAL, VARICOSE VEIN;  Surgeon: Simone Shannon MD;  Location: [facility] CATH LAB/EP;  Service: Cardiology;  Laterality: Right;    APPENDECTOMY      COLONOSCOPY N/A 8/13/2020    Procedure: COLONOSCOPY;  Surgeon: Angus Ac MD;  [facility]O [facility]  Service: Endoscopy;  Laterality: N/A;  ok to hold Eliquis 2 days per Dr Malathi HERNANDEZ test at [place] on 8/10-GT    ECHOCARDIOGRAM,TRANSESOPHAGEAL N/A 9/20/2023    Procedure: Transesophageal echo (PERICO) intra-procedure log documentation;  Surgeon: Provider, [facility];  Location: [facility] EP LAB;  Service: Cardiology;  Laterality: N/A;    HYSTERECTOMY  1990    TAHBSO for fibroids    INSERTION OF IMPLANTABLE LOOP RECORDER Left 11/1/2021    Procedure: Insertion, Implantable Loop Recorder;  Surgeon: Ameya Servin MD;  Location: [facility] EP LAB;  Service: Cardiology;   Laterality: Left;  AF, ILR implant, MDT,  DM, 3 Prep    MANDIBLE SURGERY  2015    L mandible, Dr Toure    MANDIBLE SURGERY Left 8/27/2019    OOPHORECTOMY      TONSILLECTOMY      TRANSESOPHAGEAL ECHOCARDIOGRAM WITH POSSIBLE CARDIOVERSION (PERICO W/ POSS CARDIOVERSION) N/A 1/19/2024    Procedure: Transesophageal echo (PERICO) intra-procedure log documentation;  Surgeon: JV Rosenthal MD;  Location: [facility] EP LAB;  Service: Cardiology;  Laterality: N/A;    TRANSESOPHAGEAL ECHOCARDIOGRAPHY N/A 10/17/2023    Procedure: ECHOCARDIOGRAM, TRANSESOPHAGEAL;  Surgeon: Kathy Malik MD;  Location: [facility] EP LAB;  Service: Cardiology;  Laterality: N/A;    TREATMENT OF CARDIAC ARRHYTHMIA N/A 9/20/2023    Procedure: Cardioversion or Defibrillation;  Surgeon: JV Rosenthal MD;  Location: [facility] EP LAB;  Service: Cardiology;  Laterality: N/A;  AF, DCCV/PERICO, ANES, EH,     TREATMENT OF CARDIAC ARRHYTHMIA N/A 1/19/2024    Procedure: Cardioversion or Defibrillation;  Surgeon: Luis Joiner MD;  Location: [facility] EP LAB;  Service: Cardiology;  Laterality: N/A;  AFL, DCCV ONLY, ANES, EH,     TREATMENT OF CARDIAC ARRHYTHMIA N/A 1/19/2024    Procedure: Cardioversion or Defibrillation;  Surgeon: JV Rosenthal MD;  Location: [facility] EP LAB;  Service: Cardiology;  Laterality: N/A;  AFL, PERICO/DCCV, ANES, EH,                I have reviewed the Patient Summary Reports.     I have reviewed the Nursing Notes. I have reviewed the NPO Status.   I have reviewed the Medications.     Review of Systems  Anesthesia Hx:    System negative unless otherwise specified in the HPI or problem list above           Denies Family Hx of Anesthesia complications.    Denies Personal Hx of Anesthesia complications.                    Hematology/Oncology:                   Hematology Comments: System negative unless otherwise specified in the HPI or problem list above                Oncology Comments: System negative unless otherwise specified  in the HPI or problem list above     EENT/Dental:   System negative unless otherwise specified in the HPI or problem list above          Cardiovascular:                    System negative unless otherwise specified in the HPI or problem list above                         Pulmonary:         System negative unless otherwise specified in the HPI or problem list above               Renal/:     System negative unless otherwise specified in the HPI or problem list above             Hepatic/GI:        System negative unless otherwise specified in the HPI or problem list above          Musculoskeletal:     System negative unless otherwise specified in the HPI or problem list above            OB/GYN/PEDS:          System negative unless otherwise specified in the HPI or problem list above   Neurological:           System negative unless otherwise specified in the HPI or problem list above                            Endocrine:     System negative unless otherwise specified in the HPI or problem list above        Dermatological:  System negative unless otherwise specified in the HPI or problem list above   Psych:     System negative unless otherwise specified in the HPI or problem list above               Physical Exam  General: Well nourished, Cooperative, Alert and Oriented    Airway:  Mouth Opening: Normal  TM Distance: Normal  Tongue: Normal  Neck ROM: Normal ROM        Anesthesia Plan  Type of Anesthesia, risks & benefits discussed:    Anesthesia Type: MAC, Gen Natural Airway, Gen ETT  Intra-op Monitoring Plan: Standard ASA Monitors  Post Op Pain Control Plan: multimodal analgesia  Induction:  IV  Informed Consent: Informed consent signed with the Patient and all parties understand the risks and agree with anesthesia plan.  All questions answered.   ASA Score: 3  Day of Surgery Review of History & Physical: H&P Update referred to the surgeon/provider.    Ready For Surgery From Anesthesia Perspective.     .

## 2024-02-01 ENCOUNTER — OFFICE VISIT (OUTPATIENT)
Dept: CARDIOLOGY | Facility: CLINIC | Age: 74
End: 2024-02-01
Payer: MEDICARE

## 2024-02-01 VITALS
SYSTOLIC BLOOD PRESSURE: 134 MMHG | BODY MASS INDEX: 35.88 KG/M2 | HEART RATE: 56 BPM | OXYGEN SATURATION: 98 % | HEIGHT: 67 IN | WEIGHT: 228.63 LBS | DIASTOLIC BLOOD PRESSURE: 73 MMHG

## 2024-02-01 DIAGNOSIS — I87.2 VENOUS INSUFFICIENCY: ICD-10-CM

## 2024-02-01 DIAGNOSIS — Z79.01 CHRONIC ANTICOAGULATION: Chronic | ICD-10-CM

## 2024-02-01 DIAGNOSIS — E66.01 MORBID OBESITY: Chronic | ICD-10-CM

## 2024-02-01 DIAGNOSIS — I87.2 VENOUS INSUFFICIENCY OF BOTH LOWER EXTREMITIES: Primary | ICD-10-CM

## 2024-02-01 DIAGNOSIS — G47.33 OSA (OBSTRUCTIVE SLEEP APNEA): Chronic | ICD-10-CM

## 2024-02-01 DIAGNOSIS — I10 ESSENTIAL HYPERTENSION: Chronic | ICD-10-CM

## 2024-02-01 DIAGNOSIS — I48.0 PAROXYSMAL A-FIB: Chronic | ICD-10-CM

## 2024-02-01 DIAGNOSIS — E78.2 MIXED HYPERLIPIDEMIA: Chronic | ICD-10-CM

## 2024-02-01 PROCEDURE — 1101F PT FALLS ASSESS-DOCD LE1/YR: CPT | Mod: HCNC,CPTII,S$GLB, | Performed by: INTERNAL MEDICINE

## 2024-02-01 PROCEDURE — 99214 OFFICE O/P EST MOD 30 MIN: CPT | Mod: HCNC,S$GLB,, | Performed by: INTERNAL MEDICINE

## 2024-02-01 PROCEDURE — 1126F AMNT PAIN NOTED NONE PRSNT: CPT | Mod: HCNC,CPTII,S$GLB, | Performed by: INTERNAL MEDICINE

## 2024-02-01 PROCEDURE — 3075F SYST BP GE 130 - 139MM HG: CPT | Mod: HCNC,CPTII,S$GLB, | Performed by: INTERNAL MEDICINE

## 2024-02-01 PROCEDURE — 3078F DIAST BP <80 MM HG: CPT | Mod: HCNC,CPTII,S$GLB, | Performed by: INTERNAL MEDICINE

## 2024-02-01 PROCEDURE — 3288F FALL RISK ASSESSMENT DOCD: CPT | Mod: HCNC,CPTII,S$GLB, | Performed by: INTERNAL MEDICINE

## 2024-02-01 PROCEDURE — 3008F BODY MASS INDEX DOCD: CPT | Mod: HCNC,CPTII,S$GLB, | Performed by: INTERNAL MEDICINE

## 2024-02-01 PROCEDURE — 99999 PR PBB SHADOW E&M-EST. PATIENT-LVL III: CPT | Mod: PBBFAC,HCNC,, | Performed by: INTERNAL MEDICINE

## 2024-02-01 PROCEDURE — 1159F MED LIST DOCD IN RCRD: CPT | Mod: HCNC,CPTII,S$GLB, | Performed by: INTERNAL MEDICINE

## 2024-02-01 PROCEDURE — 1160F RVW MEDS BY RX/DR IN RCRD: CPT | Mod: HCNC,CPTII,S$GLB, | Performed by: INTERNAL MEDICINE

## 2024-02-01 RX ORDER — SODIUM CHLORIDE 0.9 % (FLUSH) 0.9 %
10 SYRINGE (ML) INJECTION
Status: SHIPPED | OUTPATIENT
Start: 2024-02-01

## 2024-02-01 NOTE — ADDENDUM NOTE
Addendum  created 02/01/24 1439 by Lombardi, Nicole A., CRNA    Flowsheet accepted, Intraprocedure Event edited, Intraprocedure Flowsheets edited, Intraprocedure Staff edited

## 2024-02-01 NOTE — ADDENDUM NOTE
Addendum  created 02/01/24 1604 by Lombardi, Nicole A., CRNA    Flowsheet accepted, Intraprocedure Event deleted, Intraprocedure Event edited, Intraprocedure Flowsheets edited

## 2024-02-01 NOTE — PROGRESS NOTES
Subjective:   @Patient ID:  Flores Fuentes is a 73 y.o. female who presents for evaluation of Pre-op risk stratification.   HPI:     February 2024:  follow up post right GSV chemical ablation with Varithena.  On repeat ultrasound GSV remains open with reflux.  Admitted in January 2024 with recurrent Afib with RVR/ flutter post cardioversion.  She is doing okay today.  Compliant with compression stocking        11/2023: F/U. Post PVI, there was not accessory pathway identified during EPS. U/S venous with b/l GSV reflux and Rt pop reflux.  Rt LE edema>> LT. Heaviness, itching and pain. She is compliant with compression stocking which helps some.     CEAP 4S  VCCC score is 11      8/24/2023: F/U. She was hospitalized with a.fib, wasn't responding to pill in the pocket.  Now she is taking flecainide along with the Cardizem.  Has an appointment with Dr. Servin.  She does have significant symptomatic right lower extremity varicose vein since the age of 12 that has been getting worse.  Right lower extremity edema along with varicosity and heaviness.  Not consistently wearing compression stockings    7/22/2021: Patient is here for follow up. She saw Dr. Servin in 7/2021, EKg with intermittent wpw pattern, plan for ILR to evaluate for possible SVT episodes.     She stated that she has been ok since last visit.  Last a.fib episode was in 5/2021 and she took flecainide which helped. She gets different  tachypalpitations  that are different from her a.fib     Tolerating OAC well.     No prior CAD. No Tobacco. No DM.    HTN: BP is well controlled.   Paroxysmal A.fib: Currently SR and stable on AAD/ CCB/BB    She has congestion and cold symptoms, along with cough..     Pertinent cardiac hx:    Paroxysmal A.fib, F/U with Dr. Servin. On rhythm control strategy with flecainide (PIP). On Eliquis for anticoagulation.     Venous U/S 9/2023:    There is no evidence of a right lower extremity DVT.    There is no evidence of a left  lower extremity DVT.    The right greater saphenous vein has reflux.    The right popliteal vein is has reflux.    The left greater saphenous vein has reflux.    2D Echo (6/3/2019):  Normal left ventricular systolic function. The estimated ejection fraction is 55%  Normal LV diastolic function.  Normal right ventricular systolic function.  Mild left atrial enlargement.  The estimated PA systolic pressure is 31 mm Hg  Normal central venous pressure (3 mm Hg).    Patient Active Problem List    Diagnosis Date Noted    Atrial flutter 01/19/2024    Hypothyroidism 01/18/2024    Kym-Parkinson-White (WPW) syndrome 09/19/2023    Stress at home 08/08/2023    Weight loss 08/08/2023    Morbid obesity 07/22/2021    Slow transit constipation 07/13/2021     Despite fruits & veg & 1200 dunia diet, try Colace daily      Kym-Parkinson-White (WPW) pattern 07/01/2021    Chronic anticoagulation 06/10/2021    Osteopenia of neck of left femur 06/04/2020    Asymptomatic microscopic hematuria 06/02/2020    MATTHEW (obstructive sleep apnea)     Essential hypertension 06/19/2019    Stress incontinence, female 03/28/2018     After HYST, Try Kegels      Cervical muscle strain 01/24/2017    DJD (degenerative joint disease) of cervical spine 01/24/2017    Atrial fibrillation with RVR     Primary localized osteoarthrosis, lower leg 11/17/2014    Paroxysmal A-fib 10/14/2014    Mixed hyperlipidemia 11/07/2013    Subclinical iodine-deficiency hypothyroidism 11/07/2013                        Lipid panel  Lab Results   Component Value Date    CHOL 126 08/03/2023    CHOL 164 06/09/2022    CHOL 157 06/07/2021     Lab Results   Component Value Date    HDL 41 08/03/2023    HDL 45 06/09/2022    HDL 46 06/07/2021     Lab Results   Component Value Date    LDLCALC 70.6 08/03/2023    LDLCALC 103.8 06/09/2022    LDLCALC 90.6 06/07/2021     Lab Results   Component Value Date    TRIG 72 08/03/2023    TRIG 76 06/09/2022    TRIG 102 06/07/2021     Lab Results    Component Value Date    CHOLHDL 32.5 08/03/2023    CHOLHDL 27.4 06/09/2022    CHOLHDL 29.3 06/07/2021            Review of Systems   Constitutional: Negative for chills and fever.   HENT:  Negative for congestion, hearing loss and nosebleeds.    Eyes:  Negative for blurred vision.   Cardiovascular:  Positive for leg swelling (RT side , chronic lymphedema. ). Negative for chest pain and palpitations.   Respiratory:  Negative for cough, hemoptysis and shortness of breath.    Hematologic/Lymphatic: Negative for bleeding problem.   Skin:  Negative for itching.   Musculoskeletal:  Positive for arthritis.   Gastrointestinal:  Negative for abdominal pain and hematochezia.   Genitourinary:  Negative for hematuria.   Neurological:  Negative for dizziness.   Psychiatric/Behavioral:  Negative for altered mental status and depression.        Objective:   Physical Exam  Constitutional:       Appearance: She is well-developed.   HENT:      Head: Normocephalic and atraumatic.   Eyes:      Conjunctiva/sclera: Conjunctivae normal.   Neck:      Vascular: No JVD.   Cardiovascular:      Rate and Rhythm: Normal rate and regular rhythm.      Heart sounds: Normal heart sounds. No murmur heard.     No friction rub. No gallop.   Pulmonary:      Effort: Pulmonary effort is normal. No respiratory distress.      Breath sounds: Normal breath sounds. No stridor. No wheezing.   Abdominal:      General: There is no distension.      Palpations: Abdomen is soft.      Tenderness: There is no abdominal tenderness.   Musculoskeletal:      Cervical back: Neck supple.   Skin:     General: Skin is warm and dry.   Neurological:      Mental Status: She is alert and oriented to person, place, and time.   Psychiatric:         Behavior: Behavior normal.         Assessment:     1. Venous insufficiency of both lower extremities    2. Venous insufficiency          Plan:   Continue Amio  Continue cardizem   Continue oral anticoagulation.  Significant superficial  and deep reflux. She is very symptomatic. CEAP 4S  Compliant with compression stocking 20-30 mm Hg.       We had a long discussion regarding the pathophysiology of venous insufficiency.  We discussed both superficial and deep venous reflux disease.  Initial treatment is usually conservative with compression stockings, exercise, weight loss, and calf muscle compressive therapy.  If conservative measures fail then we will proceed with superficial venous reflux disease management with ablative therapy.  If symptoms persist then we will proceed with evaluation of deep venous reflux disease.  This requires a venogram with intravascular ultrasound for guidance of intervention if clinically indicated.  The patient expressed verbal understanding of the plan.  Weight loss.  Low-salt diet.  Exercise.   Leg elevation    Will proceed with  repeat Rt GSV ablation with  Venaseal     Weight loss encouraged  Statin        - Return sooner for concerns or questions. If symptoms persist go to the ED  I have reviewed all pertinent data on this patient   I have reviewed the patient's medical history in detail and updated the computerized patient record.  Follow up as scheduled. Return sooner for concerns or questions  She expressed verbal understanding and agreed with the plan      It was my please seeing Ms. Fuentes. Please don't hesitate to contact us with any questions. Than you.     Patient's Medications   New Prescriptions    No medications on file   Previous Medications    AMIODARONE (PACERONE) 200 MG TAB    Take 1 tablet (200 mg total) by mouth once daily.    DILTIAZEM (CARDIZEM CD) 180 MG 24 HR CAPSULE    Take 1 capsule (180 mg total) by mouth once daily.    ELIQUIS 5 MG TAB    TAKE 1 TABLET TWICE DAILY    FLUTICASONE PROPIONATE (FLONASE) 50 MCG/ACTUATION NASAL SPRAY    USE 1 SPRAY IN EACH NOSTRIL EVERY DAY    LANSOPRAZOLE (PREVACID) 30 MG CAPSULE    Take 1 capsule (30 mg total) by mouth once daily.    LEVOTHYROXINE (SYNTHROID)  25 MCG TABLET    TAKE 1 TABLET ONE TIME DAILY    NYSTATIN (MYCOSTATIN) CREAM    Apply topically 2 (two) times daily.    PRAVASTATIN (PRAVACHOL) 40 MG TABLET    Take 1 tablet (40 mg total) by mouth once daily.   Modified Medications    No medications on file   Discontinued Medications    No medications on file

## 2024-02-01 NOTE — ADDENDUM NOTE
Addendum  created 02/01/24 1615 by Lombardi, Nicole A., CRNA    Clinical Note Signed, Flowsheet accepted, Intraprocedure Flowsheets edited

## 2024-02-08 ENCOUNTER — PATIENT MESSAGE (OUTPATIENT)
Dept: CARDIOLOGY | Facility: CLINIC | Age: 74
End: 2024-02-08
Payer: MEDICARE

## 2024-02-10 ENCOUNTER — CLINICAL SUPPORT (OUTPATIENT)
Dept: CARDIOLOGY | Facility: HOSPITAL | Age: 74
End: 2024-02-10
Payer: MEDICARE

## 2024-02-10 ENCOUNTER — HOSPITAL ENCOUNTER (EMERGENCY)
Facility: HOSPITAL | Age: 74
Discharge: HOME | End: 2024-02-10
Attending: EMERGENCY MEDICINE
Payer: MEDICARE

## 2024-02-10 ENCOUNTER — CLINICAL SUPPORT (OUTPATIENT)
Dept: CARDIOLOGY | Facility: HOSPITAL | Age: 74
End: 2024-02-10
Attending: INTERNAL MEDICINE
Payer: MEDICARE

## 2024-02-10 VITALS
OXYGEN SATURATION: 99 % | DIASTOLIC BLOOD PRESSURE: 60 MMHG | BODY MASS INDEX: 35.24 KG/M2 | HEART RATE: 52 BPM | SYSTOLIC BLOOD PRESSURE: 132 MMHG | TEMPERATURE: 98 F | WEIGHT: 225 LBS | RESPIRATION RATE: 16 BRPM

## 2024-02-10 DIAGNOSIS — I49.8 OTHER SPECIFIED CARDIAC ARRHYTHMIAS: ICD-10-CM

## 2024-02-10 DIAGNOSIS — R00.2 PALPITATIONS: ICD-10-CM

## 2024-02-10 DIAGNOSIS — I48.91 ATRIAL FIBRILLATION WITH RVR: Primary | ICD-10-CM

## 2024-02-10 DIAGNOSIS — Z95.818 PRESENCE OF OTHER CARDIAC IMPLANTS AND GRAFTS: ICD-10-CM

## 2024-02-10 DIAGNOSIS — I48.91 A-FIB: ICD-10-CM

## 2024-02-10 LAB
ALBUMIN SERPL BCP-MCNC: 4.3 G/DL (ref 3.5–5.2)
ALP SERPL-CCNC: 83 U/L (ref 55–135)
ALT SERPL W/O P-5'-P-CCNC: 11 U/L (ref 10–44)
ANION GAP SERPL CALC-SCNC: 10 MMOL/L (ref 8–16)
AST SERPL-CCNC: 17 U/L (ref 10–40)
BASOPHILS # BLD AUTO: 0.04 K/UL (ref 0–0.2)
BASOPHILS NFR BLD: 0.8 % (ref 0–1.9)
BILIRUB SERPL-MCNC: 0.7 MG/DL (ref 0.1–1)
BUN SERPL-MCNC: 16 MG/DL (ref 8–23)
CALCIUM SERPL-MCNC: 9.7 MG/DL (ref 8.7–10.5)
CHLORIDE SERPL-SCNC: 109 MMOL/L (ref 95–110)
CO2 SERPL-SCNC: 23 MMOL/L (ref 23–29)
CREAT SERPL-MCNC: 0.8 MG/DL (ref 0.5–1.4)
DIFFERENTIAL METHOD BLD: NORMAL
EOSINOPHIL # BLD AUTO: 0.1 K/UL (ref 0–0.5)
EOSINOPHIL NFR BLD: 1.6 % (ref 0–8)
ERYTHROCYTE [DISTWIDTH] IN BLOOD BY AUTOMATED COUNT: 14.3 % (ref 11.5–14.5)
EST. GFR  (NO RACE VARIABLE): >60 ML/MIN/1.73 M^2
GLUCOSE SERPL-MCNC: 84 MG/DL (ref 70–110)
HCT VFR BLD AUTO: 43.5 % (ref 37–48.5)
HGB BLD-MCNC: 14.5 G/DL (ref 12–16)
IMM GRANULOCYTES # BLD AUTO: 0.01 K/UL (ref 0–0.04)
IMM GRANULOCYTES NFR BLD AUTO: 0.2 % (ref 0–0.5)
LYMPHOCYTES # BLD AUTO: 1.2 K/UL (ref 1–4.8)
LYMPHOCYTES NFR BLD: 22.5 % (ref 18–48)
MAGNESIUM SERPL-MCNC: 2.3 MG/DL (ref 1.6–2.6)
MCH RBC QN AUTO: 30.3 PG (ref 27–31)
MCHC RBC AUTO-ENTMCNC: 33.3 G/DL (ref 32–36)
MCV RBC AUTO: 91 FL (ref 82–98)
MONOCYTES # BLD AUTO: 0.4 K/UL (ref 0.3–1)
MONOCYTES NFR BLD: 8.2 % (ref 4–15)
NEUTROPHILS # BLD AUTO: 3.4 K/UL (ref 1.8–7.7)
NEUTROPHILS NFR BLD: 66.7 % (ref 38–73)
NRBC BLD-RTO: 0 /100 WBC
OHS QRS DURATION: 72 MS
OHS QRS DURATION: 82 MS
OHS QTC CALCULATION: 445 MS
OHS QTC CALCULATION: 492 MS
PLATELET # BLD AUTO: 242 K/UL (ref 150–450)
PMV BLD AUTO: 10.3 FL (ref 9.2–12.9)
POTASSIUM SERPL-SCNC: 3.8 MMOL/L (ref 3.5–5.1)
PROT SERPL-MCNC: 7.9 G/DL (ref 6–8.4)
RBC # BLD AUTO: 4.78 M/UL (ref 4–5.4)
SODIUM SERPL-SCNC: 142 MMOL/L (ref 136–145)
TSH SERPL DL<=0.005 MIU/L-ACNC: 3.44 UIU/ML (ref 0.4–4)
WBC # BLD AUTO: 5.11 K/UL (ref 3.9–12.7)

## 2024-02-10 PROCEDURE — 93010 ELECTROCARDIOGRAM REPORT: CPT | Mod: HCNC,,, | Performed by: INTERNAL MEDICINE

## 2024-02-10 PROCEDURE — 93010 ELECTROCARDIOGRAM REPORT: CPT | Mod: HCNC,76,, | Performed by: INTERNAL MEDICINE

## 2024-02-10 PROCEDURE — 83735 ASSAY OF MAGNESIUM: CPT | Mod: HCNC | Performed by: EMERGENCY MEDICINE

## 2024-02-10 PROCEDURE — 93005 ELECTROCARDIOGRAM TRACING: CPT | Mod: HCNC

## 2024-02-10 PROCEDURE — 85025 COMPLETE CBC W/AUTO DIFF WBC: CPT | Mod: HCNC | Performed by: EMERGENCY MEDICINE

## 2024-02-10 PROCEDURE — 80053 COMPREHEN METABOLIC PANEL: CPT | Mod: HCNC | Performed by: EMERGENCY MEDICINE

## 2024-02-10 PROCEDURE — 84443 ASSAY THYROID STIM HORMONE: CPT | Mod: HCNC | Performed by: EMERGENCY MEDICINE

## 2024-02-10 PROCEDURE — 99284 EMERGENCY DEPT VISIT MOD MDM: CPT | Mod: 25,HCNC

## 2024-02-10 NOTE — ED PROVIDER NOTES
Encounter Date: 2/10/2024       History     Chief Complaint   Patient presents with    Irregular Heart Beat     Hx of A-fib +SOB +dizziness     HPI    73-year-old female with a history of atrial fibrillation on Eliquis, status post ablation.  She also has a history of essential hypertension, hyperlipidemia, osteopenia.  She presents with palpitations this morning.  She actually felt well and then went to get into her vehicle which was very high up.  She had to jump in the 2nd she did it she felt herself go into AFib.  When she goes into it she feels a racing heart and lightheadedness.  Some shortness of breath as well.  No chest pain.  She denies any recent fevers or chills.  She was felt well yesterday.  No vomiting or diarrhea.  No urinary symptoms.  No coughing.  She was actually waiting in the waiting room when she felt her heart rate slowed down and now she feels significantly better.  She has no complaints at this time.  She has been taking all her medications as prescribed .  No recent changes to her medications.      Review of patient's allergies indicates:   Allergen Reactions    Decongestant d [pseudoephedrine-dm]      Atrial Fibrillation    Augmentin [amoxicillin-pot clavulanate]      palpitations      Amoxicillin Palpitations    Diphenhydramine-pseudoephed Palpitations     TACHYCARDIA    Povidone-iodine Rash     Mild erythema of skin     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 Dr Viktor Toure     Osteopenia of neck of left femur 6/4/2020    Paroxysmal atrioventricular tachycardia     Plantar fasciitis     Right knee meniscal tear     Dr Mayfield, tx conservatively    Severe obesity (BMI 35.0-35.9 with comorbidity) 1/24/2017    Slow transit constipation 7/13/2021    Despite fruits & veg & 1200 dunia diet, try Colace daily    Stress incontinence, female 03/28/2018    hasn't tried oxybutynin, After HYST, Kegels & PT  didn't work, worse at night wearing pads, Dr Infante    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    Thyroid disease      Past Surgical History:   Procedure Laterality Date    ABLATION OF ARRHYTHMOGENIC FOCUS FOR ATRIAL FIBRILLATION N/A 10/17/2023    Procedure: Ablation atrial fibrillation;  Surgeon: Ameya Servin MD;  Location: [facility] EP LAB;  Service: Cardiology;  Laterality: N/A;  AF, PERICO, PVI, WPW, RFA, POPEYE, Gen, DM, 3 Prep *MDT ILR*    ABLATION, MECHANOCHEMICAL, VARICOSE VEIN Right 12/8/2023    Procedure: ABLATION, MECHANOCHEMICAL, VARICOSE VEIN;  Surgeon: Simone Shannon MD;  Location: [facility] CATH LAB/EP;  Service: Cardiology;  Laterality: Right;    APPENDECTOMY      COLONOSCOPY N/A 8/13/2020    Procedure: COLONOSCOPY;  Surgeon: Angus Ac MD;  Location: [facility] ENDO ([address] [place] [address]);  Service: Endoscopy;  Laterality: N/A;  ok to hold Eliquis 2 days per Dr Servin     COVID test at [place] on 8/10-GT    ECHOCARDIOGRAM,TRANSESOPHAGEAL N/A 9/20/2023    Procedure: Transesophageal echo (PERICO) intra-procedure log documentation;  Surgeon: Provider, [facility];  Location: [facility] EP LAB;  Service: Cardiology;  Laterality: N/A;    HYSTERECTOMY  1990    TAHBSO for fibroids    INSERTION OF IMPLANTABLE LOOP RECORDER Left 11/1/2021    Procedure: Insertion, Implantable Loop Recorder;  Surgeon: Ameya Servin MD;  Location: [facility] EP LAB;  Service: Cardiology;  Laterality: Left;  AF, ILR implant, MDT,  DM, 3 Prep    MANDIBLE SURGERY  2015    JANI mandible, Dr Toure    MANDIBLE SURGERY Left 8/27/2019     OOPHORECTOMY      TONSILLECTOMY      TRANSESOPHAGEAL ECHOCARDIOGRAM WITH POSSIBLE CARDIOVERSION (PERICO W/ POSS CARDIOVERSION) N/A 1/19/2024    Procedure: Transesophageal echo (PERICO) intra-procedure log documentation;  Surgeon: JV Rosenthal MD;  Location: [facility] EP LAB;  Service: Cardiology;  Laterality: N/A;    TRANSESOPHAGEAL ECHOCARDIOGRAPHY N/A 10/17/2023    Procedure: ECHOCARDIOGRAM, TRANSESOPHAGEAL;  Surgeon: Kathy Malik MD;  Location: [facility] EP LAB;  Service: Cardiology;  Laterality: N/A;    TREATMENT OF CARDIAC ARRHYTHMIA N/A 9/20/2023    Procedure: Cardioversion or Defibrillation;  Surgeon: VJ Rosenthal MD;  Location: [facility] EP LAB;  Service: Cardiology;  Laterality: N/A;  AF, DCCV/PERICO, ANES, EH,     TREATMENT OF CARDIAC ARRHYTHMIA N/A 1/19/2024    Procedure: Cardioversion or Defibrillation;  Surgeon: JV Rosenthal MD;  Location: [facility] EP LAB;  Service: Cardiology;  Laterality: N/A;  AFL, PERICO/DCCV, ANES, EH,     TREATMENT OF CARDIAC ARRHYTHMIA N/A 1/19/2024    Procedure: Cardioversion or Defibrillation;  Surgeon: Luis Joiner MD;  Location: [facility] EP LAB;  Service: Cardiology;  Laterality: N/A;  AFL, DCCV ONLY, ANES, EH,      Family History   Problem Relation Age of Onset    Cancer Mother         stomach, liver    Breast cancer Neg Hx     Ovarian cancer Neg Hx     Cervical cancer Neg Hx     Endometrial cancer Neg Hx     Vaginal cancer Neg Hx     Melanoma Neg Hx     Colon cancer Neg Hx     Esophageal cancer Neg Hx      Social History     Tobacco Use    Smoking status: Never    Smokeless tobacco: Never   Substance Use Topics    Alcohol use: No    Drug use: No     Review of Systems    Physical Exam     Initial Vitals [02/10/24 1335]   BP Pulse Resp Temp SpO2   (!) 150/81 (!) 114 16 97.9 °F (36.6 °C) 98 %      MAP       --         Physical Exam    Nursing note and vitals reviewed.  Constitutional: She appears well-developed and well-nourished. No distress.   HENT:   Head:  Normocephalic and atraumatic.   Eyes: Conjunctivae and EOM are normal. Pupils are equal, round, and reactive to light.   Neck:   Normal range of motion.  Cardiovascular:  Normal rate, regular rhythm, normal heart sounds and intact distal pulses.     Exam reveals no gallop and no friction rub.       No murmur heard.  Pulmonary/Chest: Breath sounds normal. No respiratory distress. She has no wheezes. She has no rhonchi. She has no rales.   Abdominal: Abdomen is soft. She exhibits no distension. There is no abdominal tenderness.   Musculoskeletal:         General: Edema present. Normal range of motion.      Cervical back: Normal range of motion.     Neurological: She is alert and oriented to person, place, and time.   Skin: Skin is warm and dry.   Psychiatric: She has a normal mood and affect.         ED Course   Procedures  Labs Reviewed   CBC W/ AUTO DIFFERENTIAL   COMPREHENSIVE METABOLIC PANEL   MAGNESIUM   TSH          Imaging Results    None          Medications - No data to display  Medical Decision Making  73-year-old female who presents with in AFib.  On arrival her EKG did show atrial fibrillation with RVR.  Symptoms included dizziness/lightheadedness and palpitations and some shortness of breath.  Now she was asymptomatic.  No clear trigger to go into it such as blood loss, dehydration, infection, drug use, or medication changes or missed medications.  She now feels much better and her rhythm on the monitor is sinus with a rate of 50.  We will get basic labs but other than that, I feel like she will likely be stable for discharge home with continue medications at home and follow up with Cardiology outpatient    Amount and/or Complexity of Data Reviewed  Labs: ordered.  ECG/medicine tests: ordered and independent interpretation performed.     Details: First EKG irregularly irregular, no P waves, rate 120, ST elevation in V1 and AVR with depression in 2, aVL, V3 and 4 and 5 likely rate-related ischemia.  Repeat  EKG-sinus rhythm, rate 50s, normal intervals, normal ST segment                                      Clinical Impression:  Final diagnoses:  [I48.91] A-fib  [R00.2] Palpitations  [I48.91] Atrial fibrillation with RVR (Primary)          ED Disposition Condition    Discharge Stable          ED Prescriptions    None       Follow-up Information    None          Asiya Rodriges MD  02/10/24 3722

## 2024-02-10 NOTE — ED TRIAGE NOTES
Pt has a Hx of A-fib +SOB +dizziness, pt stated since waiting in the waiting room, she feels better

## 2024-02-10 NOTE — DISCHARGE INSTRUCTIONS
Continue to take her medications as prescribed.  Please call your cardiologist when they open this week to discuss if you need it make any changes her medications

## 2024-02-14 ENCOUNTER — PATIENT MESSAGE (OUTPATIENT)
Dept: ELECTROPHYSIOLOGY | Facility: CLINIC | Age: 74
End: 2024-02-14
Payer: MEDICARE

## 2024-02-14 ENCOUNTER — TELEPHONE (OUTPATIENT)
Dept: PRIMARY CARE CLINIC | Facility: CLINIC | Age: 74
End: 2024-02-14
Payer: MEDICARE

## 2024-02-14 DIAGNOSIS — E78.2 MIXED HYPERLIPIDEMIA: Primary | Chronic | ICD-10-CM

## 2024-02-14 DIAGNOSIS — Z12.31 SCREENING MAMMOGRAM, ENCOUNTER FOR: ICD-10-CM

## 2024-02-14 DIAGNOSIS — I10 ESSENTIAL HYPERTENSION: Chronic | ICD-10-CM

## 2024-02-14 NOTE — TELEPHONE ENCOUNTER
----- Message from Татьяна Sharma sent at 2024 10:59 AM CST -----  Contact: Self  [phone]  type: Lab    Caller is requesting to schedule their Lab appointment prior to annual appointment.  Order is not listed in EPIC.  Please enter order and contact patient to schedule.    Name of Caller: Self     Preferred Date and Time of Labs: 24 / 8:00am    Date of Annual Physical Appointment:2024    Where would they like the lab performed? [facility]    Would the patient rather a call back or a response via My [facility]? portal    Best Call Back Number:[phone]    Additional Information: Current lab order will  April. Pt is requesting an order for a mammogram.

## 2024-02-15 ENCOUNTER — CLINICAL SUPPORT (OUTPATIENT)
Dept: CARDIOLOGY | Facility: HOSPITAL | Age: 74
End: 2024-02-15
Payer: MEDICARE

## 2024-02-15 DIAGNOSIS — I49.8 OTHER SPECIFIED CARDIAC ARRHYTHMIAS: ICD-10-CM

## 2024-02-15 DIAGNOSIS — Z95.818 PRESENCE OF OTHER CARDIAC IMPLANTS AND GRAFTS: ICD-10-CM

## 2024-02-16 ENCOUNTER — CLINICAL SUPPORT (OUTPATIENT)
Dept: CARDIOLOGY | Facility: HOSPITAL | Age: 74
End: 2024-02-16
Attending: INTERNAL MEDICINE
Payer: MEDICARE

## 2024-02-16 DIAGNOSIS — Z95.818 PRESENCE OF OTHER CARDIAC IMPLANTS AND GRAFTS: ICD-10-CM

## 2024-02-16 DIAGNOSIS — I49.8 OTHER SPECIFIED CARDIAC ARRHYTHMIAS: ICD-10-CM

## 2024-02-16 PROCEDURE — 93298 REM INTERROG DEV EVAL SCRMS: CPT | Mod: 26,HCNC,, | Performed by: INTERNAL MEDICINE

## 2024-02-17 LAB
OHS CV AF BURDEN PERCENT: 3.6
OHS CV DC REMOTE DEVICE TYPE: NORMAL

## 2024-02-21 ENCOUNTER — TELEPHONE (OUTPATIENT)
Dept: ELECTROPHYSIOLOGY | Facility: CLINIC | Age: 74
End: 2024-02-21
Payer: MEDICARE

## 2024-02-21 NOTE — TELEPHONE ENCOUNTER
Called the pt and assisted her in sending a manual transmission via her remote ILR home monitor to allow Dr. Servin to have the most up to date information of her already scheduled appt on 2/23/24.  Transmission was received and message sent to Dr. Servin's MA to prepare for pt's appt. Pt appreciated the call and assistance.

## 2024-02-21 NOTE — TELEPHONE ENCOUNTER
----- Message from Robi Salas MA sent at 1/23/2024  4:10 PM CST -----  Dr. Servin would like a remote sent today for her virtual appt with him 2/23/24. The pt has a loop.    ----- Message -----  From: Ameya Servin MD  Sent: 1/23/2024   3:49 PM CST  To: Robi Salas MA    She should send a loop recorder transmission a few days before that visit, and that would be OK.    ----- Message -----  From: Robi Salas MA  Sent: 1/23/2024   2:38 PM CST  To: Ameya Servin MD    This pt has a virtual appt with you 2/23 for 840a. The pt would like to know if you can use the info from her loop or will you need another EKG before the appt?

## 2024-02-22 ENCOUNTER — TELEPHONE (OUTPATIENT)
Dept: ELECTROPHYSIOLOGY | Facility: CLINIC | Age: 74
End: 2024-02-22
Payer: MEDICARE

## 2024-02-23 ENCOUNTER — OFFICE VISIT (OUTPATIENT)
Dept: ELECTROPHYSIOLOGY | Facility: CLINIC | Age: 74
End: 2024-02-23
Payer: MEDICARE

## 2024-02-23 VITALS — DIASTOLIC BLOOD PRESSURE: 72 MMHG | HEART RATE: 52 BPM | SYSTOLIC BLOOD PRESSURE: 125 MMHG

## 2024-02-23 DIAGNOSIS — E78.2 MIXED HYPERLIPIDEMIA: Chronic | ICD-10-CM

## 2024-02-23 DIAGNOSIS — I48.0 PAROXYSMAL A-FIB: Chronic | ICD-10-CM

## 2024-02-23 DIAGNOSIS — I10 ESSENTIAL HYPERTENSION: Chronic | ICD-10-CM

## 2024-02-23 DIAGNOSIS — I48.4 ATYPICAL ATRIAL FLUTTER: Primary | ICD-10-CM

## 2024-02-23 PROCEDURE — 3074F SYST BP LT 130 MM HG: CPT | Mod: HCNC,CPTII,95, | Performed by: INTERNAL MEDICINE

## 2024-02-23 PROCEDURE — 1159F MED LIST DOCD IN RCRD: CPT | Mod: HCNC,CPTII,95, | Performed by: INTERNAL MEDICINE

## 2024-02-23 PROCEDURE — 1160F RVW MEDS BY RX/DR IN RCRD: CPT | Mod: HCNC,CPTII,95, | Performed by: INTERNAL MEDICINE

## 2024-02-23 PROCEDURE — 1126F AMNT PAIN NOTED NONE PRSNT: CPT | Mod: HCNC,CPTII,95, | Performed by: INTERNAL MEDICINE

## 2024-02-23 PROCEDURE — 1101F PT FALLS ASSESS-DOCD LE1/YR: CPT | Mod: HCNC,CPTII,95, | Performed by: INTERNAL MEDICINE

## 2024-02-23 PROCEDURE — 3288F FALL RISK ASSESSMENT DOCD: CPT | Mod: HCNC,CPTII,95, | Performed by: INTERNAL MEDICINE

## 2024-02-23 PROCEDURE — 99215 OFFICE O/P EST HI 40 MIN: CPT | Mod: HCNC,95,, | Performed by: INTERNAL MEDICINE

## 2024-02-23 PROCEDURE — 3078F DIAST BP <80 MM HG: CPT | Mod: HCNC,CPTII,95, | Performed by: INTERNAL MEDICINE

## 2024-02-23 NOTE — PROGRESS NOTES
The patient location is: home  The chief complaint leading to consultation is: AF  Visit type: audiovisual  Each patient to whom he or she provides medical services by telemedicine is:  (1) informed of the relationship between the physician and patient and the respective role of any other health care provider with respect to management of the patient; and (2) notified that he or she may decline to receive medical services by telemedicine and may withdraw from such care at any time.        PCP - Naz Condon MD  Subjective:     Ms. Fuentes is a 73 y.o. female with pAF, HLD, obesity, hypothyroid here for follow up. Last visit 8/2023    AF first dx 2006 after lots of caffeine. Few episodes since then. Pill in pocket strategy.  HL, on meds  obesity  hypothyroid, on med  MATTHEW, on CPAP since early 2022     Went to ER 3/10 with palps. Underwent DCCV 3/13/17 after remaining in AF with RVR. Started on multaq, and BB d/c'd.  Since, feeling not quite as well as usually, and on 4/17, at which time HR was 120s and didn't feel same as prior AF episodes.  She'd been on BB for years w/o issues. Good exertion tolerance. No CP.     Due to rare PAF episodes, we adopted a pill-in-the-pocket strategy. She used it first in 11/17; went to the ER. AF resolved <30 min after taking dose. Had 2 other episodes, self-treated successfully, in 12/17 and 1/18. Since last seen, has had 4 such episodes, all aborted with PIP.      Had AF episode 8/2023 without termination from PIP flec. Standing-dose flec started. Has had 2 episodes of AF since.     Update 1/17/24:  PVI with no evidence of AP during EPS (including ADO use)  ILR without any AF/AF  EF 60-65%  Rt GSV chemical ablation 12/23  Tolerating medications    I personally reviewed the ECG/telemetry strip today which shows NSR without ectopy    History:     Social History     Tobacco Use    Smoking status: Never    Smokeless tobacco: Never   Substance Use Topics    Alcohol use: No     Family  History   Problem Relation Age of Onset    Cancer Mother         stomach, liver    Breast cancer Neg Hx     Ovarian cancer Neg Hx     Cervical cancer Neg Hx     Endometrial cancer Neg Hx     Vaginal cancer Neg Hx     Melanoma Neg Hx     Colon cancer Neg Hx     Esophageal cancer Neg Hx        Meds:     Review of patient's allergies indicates:   Allergen Reactions    Decongestant d [pseudoephedrine-dm]      Atrial Fibrillation    Augmentin [amoxicillin-pot clavulanate]      palpitations      Amoxicillin Palpitations    Diphenhydramine-pseudoephed Palpitations     TACHYCARDIA    Povidone-iodine Rash     Mild erythema of skin       Current Outpatient Medications:     diltiaZEM (CARDIZEM CD) 180 MG 24 hr capsule, Take 1 capsule (180 mg total) by mouth once daily., Disp: 90 capsule, Rfl: 3    ELIQUIS 5 mg Tab, TAKE 1 TABLET TWICE DAILY, Disp: 180 tablet, Rfl: 3    fluticasone propionate (FLONASE) 50 mcg/actuation nasal spray, USE 1 SPRAY IN EACH NOSTRIL EVERY DAY, Disp: 32 g, Rfl: 2    levothyroxine (SYNTHROID) 25 MCG tablet, TAKE 1 TABLET ONE TIME DAILY, Disp: 90 tablet, Rfl: 2    nystatin (MYCOSTATIN) cream, Apply topically 2 (two) times daily., Disp: 30 g, Rfl: 11    pravastatin (PRAVACHOL) 40 MG tablet, Take 1 tablet (40 mg total) by mouth once daily., Disp: 90 tablet, Rfl: 0    Current Facility-Administered Medications:     sodium chloride 0.9% flush 10 mL, 10 mL, Intravenous, PRN, Simone Shannon MD    Facility-Administered Medications Ordered in Other Visits:     sodium chloride 0.9% bolus 1,000 mL, 1,000 mL, Intravenous, Once, Carley Cabrera NP    vancomycin in dextrose 5 % 1 gram/250 mL IVPB 1,000 mg, 1,000 mg, Intravenous, On Call Procedure, Carley Cabrera NP, Last Rate: 166.7 mL/hr at 11/01/21 1249, 1,000 mg at 11/01/21 1249    Constitution: Negative for fever or chills. Negative for weight loss or gain.   HENT: Negative for sore throat or headaches. Negative for rhinorrhea.  Eyes: Negative for blurred  or double vision.   Cardiovascular: See above  Pulmonary: Negative for SOB. Negative for cough.   Gastrointestinal: Negative for abdominal pain. Negative for nausea/ vomiting. Negative for diarrhea.   : Negative for dysuria.   Neurological: Negative for focal weakness or sensory changes.    Objective:   /72   Pulse (!) 52     General: NAD. AAO.  HENT: No scleral icterus. Extraocular movements intact.  Neck: No JVD  Cardiovascular: Regular heart rate and rhythm. S1/S2 appreciated.  Respiratory: CTAB. No increased work of breathing.  Extremities: Warm. No edema.  Skin: no ulceration or wounds present    Labs & Imaging   Reviewed in clinic today    Assessment:   Flores Fuentes is a 73 y.o. y.o. female who presents today for 3 month post-PVI assessment. She underwent a PVI 10/17/23 without complication. Discharged on amiodarone. No recurrent of AF with ILR in situ.     1. Atypical atrial flutter        2. Essential hypertension        3. Mixed hyperlipidemia        4. Paroxysmal A-fib            Plan:     - Stop amiodarone  - Continue medical regimen otheriwse  - Routine ILR checks    Brett Richmond MD, PGY8  Electrophysiology    Seen with fellow. I agree with note above and the plan devised in concert with the fellow.  Ameya Servin MD MPH

## 2024-02-25 ENCOUNTER — PATIENT MESSAGE (OUTPATIENT)
Dept: CARDIOLOGY | Facility: CLINIC | Age: 74
End: 2024-02-25
Payer: MEDICARE

## 2024-02-26 DIAGNOSIS — I48.0 PAROXYSMAL A-FIB: Primary | Chronic | ICD-10-CM

## 2024-02-27 ENCOUNTER — PATIENT MESSAGE (OUTPATIENT)
Dept: ELECTROPHYSIOLOGY | Facility: CLINIC | Age: 74
End: 2024-02-27
Payer: MEDICARE

## 2024-02-29 DIAGNOSIS — I48.0 PAROXYSMAL A-FIB: Chronic | ICD-10-CM

## 2024-02-29 RX ORDER — APIXABAN 5 MG/1
TABLET, FILM COATED ORAL
Qty: 180 TABLET | Refills: 3 | Status: SHIPPED | OUTPATIENT
Start: 2024-02-29

## 2024-02-29 RX ORDER — PRAVASTATIN SODIUM 40 MG/1
40 TABLET ORAL
Qty: 90 TABLET | Refills: 3 | Status: SHIPPED | OUTPATIENT
Start: 2024-02-29

## 2024-02-29 NOTE — TELEPHONE ENCOUNTER
No care due was identified.  [facility] Embedded Care Due Messages. Reference number: [id number].   2/29/2024 2:24:23 AM CST

## 2024-03-04 LAB
OHS CV AF BURDEN PERCENT: 2.4
OHS CV DC REMOTE DEVICE TYPE: NORMAL
OHS CV ICD SHOCK: NO

## 2024-03-13 ENCOUNTER — PATIENT MESSAGE (OUTPATIENT)
Dept: ELECTROPHYSIOLOGY | Facility: CLINIC | Age: 74
End: 2024-03-13
Payer: MEDICARE

## 2024-03-13 ENCOUNTER — TELEPHONE (OUTPATIENT)
Dept: ELECTROPHYSIOLOGY | Facility: CLINIC | Age: 74
End: 2024-03-13
Payer: MEDICARE

## 2024-03-13 NOTE — TELEPHONE ENCOUNTER
----- Message from Joselyn Fierro sent at 3/13/2024  1:20 PM CDT -----  Regarding: Afib  Patient calling to report she's in afib. Please dunia back @ [phone]. Thank you Joselyn

## 2024-03-18 ENCOUNTER — CLINICAL SUPPORT (OUTPATIENT)
Dept: CARDIOLOGY | Facility: HOSPITAL | Age: 74
End: 2024-03-18
Attending: INTERNAL MEDICINE
Payer: MEDICARE

## 2024-03-18 DIAGNOSIS — Z95.818 PRESENCE OF OTHER CARDIAC IMPLANTS AND GRAFTS: ICD-10-CM

## 2024-03-18 DIAGNOSIS — I49.8 OTHER SPECIFIED CARDIAC ARRHYTHMIAS: ICD-10-CM

## 2024-03-18 PROCEDURE — 93298 REM INTERROG DEV EVAL SCRMS: CPT | Mod: 26,HCNC,, | Performed by: INTERNAL MEDICINE

## 2024-03-20 ENCOUNTER — PATIENT MESSAGE (OUTPATIENT)
Dept: ELECTROPHYSIOLOGY | Facility: CLINIC | Age: 74
End: 2024-03-20
Payer: MEDICARE

## 2024-03-20 ENCOUNTER — TELEPHONE (OUTPATIENT)
Dept: ELECTROPHYSIOLOGY | Facility: CLINIC | Age: 74
End: 2024-03-20
Payer: MEDICARE

## 2024-03-20 DIAGNOSIS — I48.0 PAROXYSMAL A-FIB: ICD-10-CM

## 2024-03-20 LAB
OHS CV AF BURDEN PERCENT: < 1
OHS CV DC REMOTE DEVICE TYPE: NORMAL

## 2024-03-20 RX ORDER — DILTIAZEM HYDROCHLORIDE 240 MG/1
240 CAPSULE, COATED, EXTENDED RELEASE ORAL DAILY
Qty: 90 CAPSULE | Refills: 3 | Status: SHIPPED | OUTPATIENT
Start: 2024-03-20

## 2024-03-20 NOTE — TELEPHONE ENCOUNTER
----- Message from Ameya Servin MD sent at 3/20/2024  2:50 PM CDT -----  Increase cardizem to 240 qd  Yes she could use PIP flecainide, but not within 3 days of ablation  DPM    ----- Message -----  From: Nan Jean RN  Sent: 3/20/2024  12:05 PM CDT  To: Ameya Servin MD    Last clinic visit 2/2024-stopped amiodarone in preparation for re-do PVI scheduled on 4/16. She has been having some more freq episodes of AF-2 last night per ILR transmission, reports still today heart rate has been 100-110s, she has had SOB on exertion and LH today with the elevated rates.     Was on toprol 12.5mg daily previously, was stopped after PVI/WPW 10/2023. Last dose of amiodarone 2/24/2024. Currently on cardizem 180mg daily.   B/p 144/92    Would you want to put her back on toprol 12.5mg for rate control? Increase her cardizem? She also wanted to know, if while awaiting her procedure and off the amiodarone, can she take PIP flecainide like she did previously for any AF episodes?

## 2024-03-22 ENCOUNTER — HOSPITAL ENCOUNTER (INPATIENT)
Facility: HOSPITAL | Age: 74
LOS: 1 days | Discharge: HOME | DRG: 309 | End: 2024-03-25
Attending: STUDENT IN AN ORGANIZED HEALTH CARE EDUCATION/TRAINING PROGRAM | Admitting: STUDENT IN AN ORGANIZED HEALTH CARE EDUCATION/TRAINING PROGRAM
Payer: MEDICARE

## 2024-03-22 DIAGNOSIS — R00.0 TACHYCARDIA: ICD-10-CM

## 2024-03-22 DIAGNOSIS — I48.92 ATRIAL FLUTTER: ICD-10-CM

## 2024-03-22 DIAGNOSIS — R00.2 PALPITATIONS: ICD-10-CM

## 2024-03-22 DIAGNOSIS — R07.9 CHEST PAIN: ICD-10-CM

## 2024-03-22 DIAGNOSIS — I48.91 ATRIAL FIBRILLATION WITH RVR: Primary | ICD-10-CM

## 2024-03-22 DIAGNOSIS — I49.9 DYSRHYTHMIA: ICD-10-CM

## 2024-03-22 PROBLEM — R55 NEAR SYNCOPE: Status: ACTIVE | Noted: 2024-03-22

## 2024-03-22 LAB
ALBUMIN SERPL BCP-MCNC: 4.3 G/DL (ref 3.5–5.2)
ALP SERPL-CCNC: 88 U/L (ref 55–135)
ALT SERPL W/O P-5'-P-CCNC: 12 U/L (ref 10–44)
ANION GAP SERPL CALC-SCNC: 10 MMOL/L (ref 8–16)
AST SERPL-CCNC: 17 U/L (ref 10–40)
BASOPHILS # BLD AUTO: 0.06 K/UL (ref 0–0.2)
BASOPHILS NFR BLD: 1 % (ref 0–1.9)
BILIRUB SERPL-MCNC: 0.6 MG/DL (ref 0.1–1)
BUN SERPL-MCNC: 22 MG/DL (ref 8–23)
CALCIUM SERPL-MCNC: 9.8 MG/DL (ref 8.7–10.5)
CHLORIDE SERPL-SCNC: 109 MMOL/L (ref 95–110)
CO2 SERPL-SCNC: 25 MMOL/L (ref 23–29)
CREAT SERPL-MCNC: 0.8 MG/DL (ref 0.5–1.4)
DIFFERENTIAL METHOD BLD: NORMAL
EOSINOPHIL # BLD AUTO: 0.2 K/UL (ref 0–0.5)
EOSINOPHIL NFR BLD: 2.8 % (ref 0–8)
ERYTHROCYTE [DISTWIDTH] IN BLOOD BY AUTOMATED COUNT: 14.2 % (ref 11.5–14.5)
EST. GFR  (NO RACE VARIABLE): >60 ML/MIN/1.73 M^2
GLUCOSE SERPL-MCNC: 86 MG/DL (ref 70–110)
HCT VFR BLD AUTO: 44.4 % (ref 37–48.5)
HGB BLD-MCNC: 14.7 G/DL (ref 12–16)
IMM GRANULOCYTES # BLD AUTO: 0.01 K/UL (ref 0–0.04)
IMM GRANULOCYTES NFR BLD AUTO: 0.2 % (ref 0–0.5)
LYMPHOCYTES # BLD AUTO: 1.7 K/UL (ref 1–4.8)
LYMPHOCYTES NFR BLD: 29.8 % (ref 18–48)
MAGNESIUM SERPL-MCNC: 2.3 MG/DL (ref 1.6–2.6)
MCH RBC QN AUTO: 31 PG (ref 27–31)
MCHC RBC AUTO-ENTMCNC: 33.1 G/DL (ref 32–36)
MCV RBC AUTO: 94 FL (ref 82–98)
MONOCYTES # BLD AUTO: 0.6 K/UL (ref 0.3–1)
MONOCYTES NFR BLD: 9.7 % (ref 4–15)
NEUTROPHILS # BLD AUTO: 3.3 K/UL (ref 1.8–7.7)
NEUTROPHILS NFR BLD: 56.5 % (ref 38–73)
NRBC BLD-RTO: 0 /100 WBC
PLATELET # BLD AUTO: 284 K/UL (ref 150–450)
PMV BLD AUTO: 10.6 FL (ref 9.2–12.9)
POTASSIUM SERPL-SCNC: 3.9 MMOL/L (ref 3.5–5.1)
PROT SERPL-MCNC: 8.1 G/DL (ref 6–8.4)
RBC # BLD AUTO: 4.74 M/UL (ref 4–5.4)
SODIUM SERPL-SCNC: 144 MMOL/L (ref 136–145)
TROPONIN I SERPL DL<=0.01 NG/ML-MCNC: <0.006 NG/ML (ref 0–0.03)
TSH SERPL DL<=0.005 MIU/L-ACNC: 2.76 UIU/ML (ref 0.4–4)
WBC # BLD AUTO: 5.77 K/UL (ref 3.9–12.7)

## 2024-03-22 PROCEDURE — 93005 ELECTROCARDIOGRAM TRACING: CPT

## 2024-03-22 PROCEDURE — 93010 ELECTROCARDIOGRAM REPORT: CPT | Mod: ,,, | Performed by: INTERNAL MEDICINE

## 2024-03-22 PROCEDURE — 84484 ASSAY OF TROPONIN QUANT: CPT | Performed by: NURSE PRACTITIONER

## 2024-03-22 PROCEDURE — 80053 COMPREHEN METABOLIC PANEL: CPT | Performed by: NURSE PRACTITIONER

## 2024-03-22 PROCEDURE — G0378 HOSPITAL OBSERVATION PER HR: HCPCS

## 2024-03-22 PROCEDURE — 85025 COMPLETE CBC W/AUTO DIFF WBC: CPT | Performed by: NURSE PRACTITIONER

## 2024-03-22 PROCEDURE — 84443 ASSAY THYROID STIM HORMONE: CPT | Performed by: NURSE PRACTITIONER

## 2024-03-22 PROCEDURE — 83735 ASSAY OF MAGNESIUM: CPT | Performed by: NURSE PRACTITIONER

## 2024-03-22 PROCEDURE — 99285 EMERGENCY DEPT VISIT HI MDM: CPT | Mod: 25

## 2024-03-22 RX ORDER — IBUPROFEN 200 MG
24 TABLET ORAL
Status: DISCONTINUED | OUTPATIENT
Start: 2024-03-22 | End: 2024-03-25 | Stop reason: HOSPADM

## 2024-03-22 RX ORDER — ONDANSETRON 4 MG/1
8 TABLET, ORALLY DISINTEGRATING ORAL EVERY 8 HOURS PRN
Status: DISCONTINUED | OUTPATIENT
Start: 2024-03-22 | End: 2024-03-25 | Stop reason: HOSPADM

## 2024-03-22 RX ORDER — PROMETHAZINE HYDROCHLORIDE 25 MG/1
25 TABLET ORAL EVERY 6 HOURS PRN
Status: DISCONTINUED | OUTPATIENT
Start: 2024-03-22 | End: 2024-03-25 | Stop reason: HOSPADM

## 2024-03-22 RX ORDER — TALC
6 POWDER (GRAM) TOPICAL NIGHTLY PRN
Status: DISCONTINUED | OUTPATIENT
Start: 2024-03-22 | End: 2024-03-25 | Stop reason: HOSPADM

## 2024-03-22 RX ORDER — GLUCAGON 1 MG
1 KIT INJECTION
Status: DISCONTINUED | OUTPATIENT
Start: 2024-03-22 | End: 2024-03-25 | Stop reason: HOSPADM

## 2024-03-22 RX ORDER — IBUPROFEN 200 MG
16 TABLET ORAL
Status: DISCONTINUED | OUTPATIENT
Start: 2024-03-22 | End: 2024-03-25 | Stop reason: HOSPADM

## 2024-03-22 RX ORDER — SODIUM CHLORIDE 0.9 % (FLUSH) 0.9 %
10 SYRINGE (ML) INJECTION
Status: DISCONTINUED | OUTPATIENT
Start: 2024-03-22 | End: 2024-03-25 | Stop reason: HOSPADM

## 2024-03-22 RX ORDER — IPRATROPIUM BROMIDE AND ALBUTEROL SULFATE 2.5; .5 MG/3ML; MG/3ML
3 SOLUTION RESPIRATORY (INHALATION) EVERY 4 HOURS PRN
Status: DISCONTINUED | OUTPATIENT
Start: 2024-03-22 | End: 2024-03-25 | Stop reason: HOSPADM

## 2024-03-22 RX ORDER — ACETAMINOPHEN 325 MG/1
650 TABLET ORAL EVERY 4 HOURS PRN
Status: DISCONTINUED | OUTPATIENT
Start: 2024-03-22 | End: 2024-03-25 | Stop reason: HOSPADM

## 2024-03-22 RX ORDER — BISACODYL 10 MG/1
10 SUPPOSITORY RECTAL DAILY PRN
Status: DISCONTINUED | OUTPATIENT
Start: 2024-03-22 | End: 2024-03-25 | Stop reason: HOSPADM

## 2024-03-22 RX ORDER — POLYETHYLENE GLYCOL 3350 17 G/17G
17 POWDER, FOR SOLUTION ORAL DAILY PRN
Status: DISCONTINUED | OUTPATIENT
Start: 2024-03-22 | End: 2024-03-25 | Stop reason: HOSPADM

## 2024-03-22 NOTE — FIRST PROVIDER EVALUATION
Emergency Department TeleTriage Encounter Note      CHIEF COMPLAINT    Chief Complaint   Patient presents with    Dizziness     Hx afib hr in 90's, has ablation sched soon       VITAL SIGNS   Initial Vitals [03/22/24 1645]   BP Pulse Resp Temp SpO2   130/61 93 20 98.1 °F (36.7 °C) 98 %      MAP       --            ALLERGIES    Review of patient's allergies indicates:   Allergen Reactions    Decongestant d [pseudoephedrine-dm]      Atrial Fibrillation    Augmentin [amoxicillin-pot clavulanate]      palpitations      Amoxicillin Palpitations    Diphenhydramine-pseudoephed Palpitations     TACHYCARDIA    Povidone-iodine Rash     Mild erythema of skin       PROVIDER TRIAGE NOTE  73-year-old female presents to the ER today with complaints of palpitations intermittently since Monday night.  She states she has a history AFib, has an ablation scheduled in the near future, she states Monday night she woke up with palpitation and having heart rate in the 90s which is abnormal for her she used to have a resting heart rate in the 50s.  She states today her heart rate has done same in the upper 90s and also reports it increases with certain movements.  She denies any chest pain or shortness of breath.  No lightheadedness or dizziness.    AAOx3, respirations even and non- labored, stable vitals, normal coloration of skin, sitting upright in triage chair, appears in no acute distress.          ORDERS  Labs Reviewed - No data to display    ED Orders (720h ago, onward)      Start Ordered     Status Ordering Provider    03/22/24 1647 03/22/24 1647  EKG 12-lead  Once         Completed by RAFAELA TAVAREZ on 3/22/2024 at  4:52 PM TRA MORE JR              Virtual Visit Note: The provider triage portion of this emergency department evaluation and documentation was performed via [employer], a HIPAA-compliant telemedicine application, in concert with a tele-presenter in the room. A face to face patient evaluation with one of my  colleagues will occur once the patient is placed in an emergency department room.      DISCLAIMER: This note was prepared with "[employer] voice recognition transcription software. Garbled syntax, mangled pronouns, and other bizarre constructions may be attributed to that software system.

## 2024-03-22 NOTE — Clinical Note
Dr. Servin discussed case with patient. Patient reports missing dose of Eliquis within the last 3 weeks. Dr. Servin requesting PERICO be done prior to DCCV. Will coordinate this with Dr. Chavez and AVE Chen.

## 2024-03-22 NOTE — TELEPHONE ENCOUNTER
The transmission was received.  There were 3 AF events on between 6 & 7 pm on yesterday, max duration 20 mins.  V rates 105 bpm.  The presenting rhythm on this transmission SR ~ 100 bpm.

## 2024-03-23 PROBLEM — E66.811 CLASS 1 OBESITY DUE TO EXCESS CALORIES WITH SERIOUS COMORBIDITY AND BODY MASS INDEX (BMI) OF 33.0 TO 33.9 IN ADULT: Status: ACTIVE | Noted: 2024-03-23

## 2024-03-23 PROBLEM — R00.2 PALPITATIONS: Status: RESOLVED | Noted: 2023-08-01 | Resolved: 2024-03-23

## 2024-03-23 PROBLEM — R00.2 PALPITATIONS: Status: ACTIVE | Noted: 2024-03-22

## 2024-03-23 PROBLEM — E66.09 CLASS 1 OBESITY DUE TO EXCESS CALORIES WITH SERIOUS COMORBIDITY AND BODY MASS INDEX (BMI) OF 33.0 TO 33.9 IN ADULT: Status: ACTIVE | Noted: 2024-03-23

## 2024-03-23 PROBLEM — R00.2 PALPITATIONS: Status: RESOLVED | Noted: 2024-03-22 | Resolved: 2024-03-23

## 2024-03-23 LAB
ANION GAP SERPL CALC-SCNC: 10 MMOL/L (ref 8–16)
BACTERIA #/AREA URNS AUTO: NORMAL /HPF
BASOPHILS # BLD AUTO: 0.06 K/UL (ref 0–0.2)
BASOPHILS NFR BLD: 1.1 % (ref 0–1.9)
BILIRUB UR QL STRIP: NEGATIVE
BILIRUB UR QL STRIP: NEGATIVE
BUN SERPL-MCNC: 15 MG/DL (ref 8–23)
CALCIUM SERPL-MCNC: 9.1 MG/DL (ref 8.7–10.5)
CHLORIDE SERPL-SCNC: 110 MMOL/L (ref 95–110)
CLARITY UR REFRACT.AUTO: CLEAR
CLARITY UR REFRACT.AUTO: CLEAR
CO2 SERPL-SCNC: 20 MMOL/L (ref 23–29)
COLOR UR AUTO: YELLOW
COLOR UR AUTO: YELLOW
CREAT SERPL-MCNC: 0.6 MG/DL (ref 0.5–1.4)
DIFFERENTIAL METHOD BLD: NORMAL
EOSINOPHIL # BLD AUTO: 0.2 K/UL (ref 0–0.5)
EOSINOPHIL NFR BLD: 3.2 % (ref 0–8)
ERYTHROCYTE [DISTWIDTH] IN BLOOD BY AUTOMATED COUNT: 14.1 % (ref 11.5–14.5)
EST. GFR  (NO RACE VARIABLE): >60 ML/MIN/1.73 M^2
ESTIMATED AVG GLUCOSE: 105 MG/DL (ref 68–131)
GLUCOSE SERPL-MCNC: 85 MG/DL (ref 70–110)
GLUCOSE UR QL STRIP: NEGATIVE
GLUCOSE UR QL STRIP: NEGATIVE
HBA1C MFR BLD: 5.3 % (ref 4–5.6)
HCT VFR BLD AUTO: 42.9 % (ref 37–48.5)
HGB BLD-MCNC: 14.2 G/DL (ref 12–16)
HGB UR QL STRIP: ABNORMAL
HGB UR QL STRIP: NEGATIVE
IMM GRANULOCYTES # BLD AUTO: 0.01 K/UL (ref 0–0.04)
IMM GRANULOCYTES NFR BLD AUTO: 0.2 % (ref 0–0.5)
KETONES UR QL STRIP: ABNORMAL
KETONES UR QL STRIP: NEGATIVE
LEUKOCYTE ESTERASE UR QL STRIP: ABNORMAL
LEUKOCYTE ESTERASE UR QL STRIP: NEGATIVE
LYMPHOCYTES # BLD AUTO: 1.4 K/UL (ref 1–4.8)
LYMPHOCYTES NFR BLD: 26 % (ref 18–48)
MAGNESIUM SERPL-MCNC: 2.1 MG/DL (ref 1.6–2.6)
MCH RBC QN AUTO: 30.7 PG (ref 27–31)
MCHC RBC AUTO-ENTMCNC: 33.1 G/DL (ref 32–36)
MCV RBC AUTO: 93 FL (ref 82–98)
MICROSCOPIC COMMENT: NORMAL
MONOCYTES # BLD AUTO: 0.5 K/UL (ref 0.3–1)
MONOCYTES NFR BLD: 10 % (ref 4–15)
NEUTROPHILS # BLD AUTO: 3.2 K/UL (ref 1.8–7.7)
NEUTROPHILS NFR BLD: 59.5 % (ref 38–73)
NITRITE UR QL STRIP: NEGATIVE
NITRITE UR QL STRIP: POSITIVE
NRBC BLD-RTO: 0 /100 WBC
OHS QRS DURATION: 98 MS
OHS QTC CALCULATION: 485 MS
PH UR STRIP: 6 [PH] (ref 5–8)
PH UR STRIP: 7 [PH] (ref 5–8)
PHOSPHATE SERPL-MCNC: 2.7 MG/DL (ref 2.7–4.5)
PLATELET # BLD AUTO: 232 K/UL (ref 150–450)
PMV BLD AUTO: 10.3 FL (ref 9.2–12.9)
POTASSIUM SERPL-SCNC: 3.3 MMOL/L (ref 3.5–5.1)
PROT UR QL STRIP: NEGATIVE
PROT UR QL STRIP: NEGATIVE
RBC # BLD AUTO: 4.63 M/UL (ref 4–5.4)
RBC #/AREA URNS AUTO: 4 /HPF (ref 0–4)
SODIUM SERPL-SCNC: 140 MMOL/L (ref 136–145)
SP GR UR STRIP: 1.01 (ref 1–1.03)
SP GR UR STRIP: 1.02 (ref 1–1.03)
SQUAMOUS #/AREA URNS AUTO: 0 /HPF
URN SPEC COLLECT METH UR: ABNORMAL
URN SPEC COLLECT METH UR: NORMAL
WBC # BLD AUTO: 5.39 K/UL (ref 3.9–12.7)
WBC #/AREA URNS AUTO: 4 /HPF (ref 0–5)

## 2024-03-23 PROCEDURE — 87086 URINE CULTURE/COLONY COUNT: CPT | Performed by: STUDENT IN AN ORGANIZED HEALTH CARE EDUCATION/TRAINING PROGRAM

## 2024-03-23 PROCEDURE — 63600175 PHARM REV CODE 636 W HCPCS: Performed by: STUDENT IN AN ORGANIZED HEALTH CARE EDUCATION/TRAINING PROGRAM

## 2024-03-23 PROCEDURE — 36415 COLL VENOUS BLD VENIPUNCTURE: CPT | Performed by: PHYSICIAN ASSISTANT

## 2024-03-23 PROCEDURE — 84100 ASSAY OF PHOSPHORUS: CPT | Performed by: PHYSICIAN ASSISTANT

## 2024-03-23 PROCEDURE — 81001 URINALYSIS AUTO W/SCOPE: CPT

## 2024-03-23 PROCEDURE — G0378 HOSPITAL OBSERVATION PER HR: HCPCS

## 2024-03-23 PROCEDURE — 83735 ASSAY OF MAGNESIUM: CPT | Performed by: PHYSICIAN ASSISTANT

## 2024-03-23 PROCEDURE — 25000003 PHARM REV CODE 250: Performed by: STUDENT IN AN ORGANIZED HEALTH CARE EDUCATION/TRAINING PROGRAM

## 2024-03-23 PROCEDURE — 87088 URINE BACTERIA CULTURE: CPT | Performed by: STUDENT IN AN ORGANIZED HEALTH CARE EDUCATION/TRAINING PROGRAM

## 2024-03-23 PROCEDURE — 87077 CULTURE AEROBIC IDENTIFY: CPT | Performed by: STUDENT IN AN ORGANIZED HEALTH CARE EDUCATION/TRAINING PROGRAM

## 2024-03-23 PROCEDURE — 81003 URINALYSIS AUTO W/O SCOPE: CPT | Performed by: PHYSICIAN ASSISTANT

## 2024-03-23 PROCEDURE — 96365 THER/PROPH/DIAG IV INF INIT: CPT

## 2024-03-23 PROCEDURE — 83036 HEMOGLOBIN GLYCOSYLATED A1C: CPT | Performed by: PHYSICIAN ASSISTANT

## 2024-03-23 PROCEDURE — 99232 SBSQ HOSP IP/OBS MODERATE 35: CPT | Mod: GC,,, | Performed by: INTERNAL MEDICINE

## 2024-03-23 PROCEDURE — 87186 SC STD MICRODIL/AGAR DIL: CPT | Performed by: STUDENT IN AN ORGANIZED HEALTH CARE EDUCATION/TRAINING PROGRAM

## 2024-03-23 PROCEDURE — 80048 BASIC METABOLIC PNL TOTAL CA: CPT | Performed by: PHYSICIAN ASSISTANT

## 2024-03-23 PROCEDURE — 85025 COMPLETE CBC W/AUTO DIFF WBC: CPT | Performed by: PHYSICIAN ASSISTANT

## 2024-03-23 PROCEDURE — 25000003 PHARM REV CODE 250: Performed by: PHYSICIAN ASSISTANT

## 2024-03-23 RX ORDER — POTASSIUM CHLORIDE 20 MEQ/1
40 TABLET, EXTENDED RELEASE ORAL ONCE
Status: COMPLETED | OUTPATIENT
Start: 2024-03-23 | End: 2024-03-23

## 2024-03-23 RX ORDER — LEVOTHYROXINE SODIUM 25 UG/1
25 TABLET ORAL
Status: DISCONTINUED | OUTPATIENT
Start: 2024-03-23 | End: 2024-03-25 | Stop reason: HOSPADM

## 2024-03-23 RX ORDER — PRAVASTATIN SODIUM 40 MG/1
40 TABLET ORAL DAILY
Status: DISCONTINUED | OUTPATIENT
Start: 2024-03-23 | End: 2024-03-25 | Stop reason: HOSPADM

## 2024-03-23 RX ORDER — DILTIAZEM HYDROCHLORIDE 120 MG/1
240 CAPSULE, COATED, EXTENDED RELEASE ORAL DAILY
Status: DISCONTINUED | OUTPATIENT
Start: 2024-03-23 | End: 2024-03-25 | Stop reason: HOSPADM

## 2024-03-23 RX ADMIN — POTASSIUM CHLORIDE 40 MEQ: 1500 TABLET, EXTENDED RELEASE ORAL at 12:03

## 2024-03-23 RX ADMIN — DILTIAZEM HYDROCHLORIDE 240 MG: 120 CAPSULE, COATED, EXTENDED RELEASE ORAL at 08:03

## 2024-03-23 RX ADMIN — PRAVASTATIN SODIUM 40 MG: 40 TABLET ORAL at 08:03

## 2024-03-23 RX ADMIN — CEFTRIAXONE 1 G: 1 INJECTION, POWDER, FOR SOLUTION INTRAMUSCULAR; INTRAVENOUS at 12:03

## 2024-03-23 RX ADMIN — APIXABAN 5 MG: 5 TABLET, FILM COATED ORAL at 08:03

## 2024-03-23 RX ADMIN — LEVOTHYROXINE SODIUM 25 MCG: 25 TABLET ORAL at 05:03

## 2024-03-23 NOTE — SUBJECTIVE & OBJECTIVE
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 Dr Viktor Toure    Osteopenia of neck of left femur 6/4/2020    Paroxysmal atrioventricular tachycardia     Plantar fasciitis     Right knee meniscal tear     Dr Mayfield, tx conservatively    Severe obesity (BMI 35.0-35.9 with comorbidity) 1/24/2017    Slow transit constipation 7/13/2021    Despite fruits & veg & 1200 dunia diet, try Colace daily    Stress incontinence, female 03/28/2018    hasn't tried oxybutynin, After HYST, Kegels & PT  didn't work, worse at night wearing pads, Dr Infante    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    Thyroid disease        Past Surgical History:   Procedure Laterality Date    ABLATION OF ARRHYTHMOGENIC FOCUS FOR ATRIAL FIBRILLATION N/A 10/17/2023    Procedure: Ablation atrial fibrillation;  Surgeon: Ameya Servin MD;  Location: [facility] EP LAB;  Service: Cardiology;  Laterality: N/A;  AF, PERICO, PVI, WPW, RFA, POPEYE, Gen, DM, 3 Prep *MDT ILR*    ABLATION, MECHANOCHEMICAL, VARICOSE VEIN Right 12/8/2023    Procedure: ABLATION, MECHANOCHEMICAL, VARICOSE VEIN;  Surgeon: Simone Shannon MD;  Location: [facility] CATH LAB/EP;  Service: Cardiology;  Laterality: Right;    APPENDECTOMY      COLONOSCOPY N/A 8/13/2020    Procedure: COLONOSCOPY;  Surgeon: Angus Ac MD;  Location: [facility] ENDO ([address] [place] [address]);  Service: Endoscopy;  Laterality: N/A;  ok to hold Eliquis 2 days per Dr Servin     COVID test at [place] on 8/10-GT     ECHOCARDIOGRAM,TRANSESOPHAGEAL N/A 9/20/2023    Procedure: Transesophageal echo (PERICO) intra-procedure log documentation;  Surgeon: Provider, Dosc Diagnostic;  Location: [facility] EP LAB;  Service: Cardiology;  Laterality: N/A;    HYSTERECTOMY  1990    TAHBSO for fibroids    INSERTION OF IMPLANTABLE LOOP RECORDER Left 11/1/2021    Procedure: Insertion, Implantable Loop Recorder;  Surgeon: Ameya Servin MD;  Location: [facility] EP LAB;  Service: Cardiology;  Laterality: Left;  AF, ILR implant, MDT,  DM, 3 Prep    MANDIBLE SURGERY  2015    L mandible, Dr Toure    MANDIBLE SURGERY Left 8/27/2019    OOPHORECTOMY      TONSILLECTOMY      TRANSESOPHAGEAL ECHOCARDIOGRAM WITH POSSIBLE CARDIOVERSION (PERICO W/ POSS CARDIOVERSION) N/A 1/19/2024    Procedure: Transesophageal echo (PERICO) intra-procedure log documentation;  Surgeon: JV Rosenthal MD;  Location: [facility] EP LAB;  Service: Cardiology;  Laterality: N/A;    TRANSESOPHAGEAL ECHOCARDIOGRAPHY N/A 10/17/2023    Procedure: ECHOCARDIOGRAM, TRANSESOPHAGEAL;  Surgeon: Kathy Malik MD;  Location: [facility] EP LAB;  Service: Cardiology;  Laterality: N/A;    TREATMENT OF CARDIAC ARRHYTHMIA N/A 9/20/2023    Procedure: Cardioversion or Defibrillation;  Surgeon: JV Rosenthal MD;  Location: [facility] EP LAB;  Service: Cardiology;  Laterality: N/A;  AF, DCCV/PERICO, ANES, EH,     TREATMENT OF CARDIAC ARRHYTHMIA N/A 1/19/2024    Procedure: Cardioversion or Defibrillation;  Surgeon: JV Rosenthal MD;  Location: [facility] EP LAB;  Service: Cardiology;  Laterality: N/A;  AFL, PERICO/DCCV, ANES, EH,     TREATMENT OF CARDIAC ARRHYTHMIA N/A 1/19/2024    Procedure: Cardioversion or Defibrillation;  Surgeon: Luis Joiner MD;  Location: [facility] EP LAB;  Service: Cardiology;  Laterality: N/A;  AFL, DCCV ONLY, ANES, EH,        Review of patient's allergies indicates:   Allergen Reactions    Decongestant d [pseudoephedrine-dm]      Atrial Fibrillation    Augmentin [amoxicillin-pot  clavulanate]      palpitations      Amoxicillin Palpitations    Diphenhydramine-pseudoephed Palpitations     TACHYCARDIA    Povidone-iodine Rash     Mild erythema of skin       Current Facility-Administered Medications on File Prior to Encounter   Medication    sodium chloride 0.9% bolus 1,000 mL    sodium chloride 0.9% flush 10 mL    vancomycin in dextrose 5 % 1 gram/250 mL IVPB 1,000 mg     Current Outpatient Medications on File Prior to Encounter   Medication Sig    diltiaZEM (CARDIZEM CD) 240 MG 24 hr capsule Take 1 capsule (240 mg total) by mouth once daily.    ELIQUIS 5 mg Tab TAKE 1 TABLET TWICE DAILY    fluticasone propionate (FLONASE) 50 mcg/actuation nasal spray USE 1 SPRAY IN EACH NOSTRIL EVERY DAY    levothyroxine (SYNTHROID) 25 MCG tablet TAKE 1 TABLET ONE TIME DAILY    nystatin (MYCOSTATIN) cream Apply topically 2 (two) times daily.    pravastatin (PRAVACHOL) 40 MG tablet TAKE 1 TABLET ONE TIME DAILY     Family History       Problem Relation (Age of Onset)    Cancer Mother          Tobacco Use    Smoking status: Never    Smokeless tobacco: Never   Substance and Sexual Activity    Alcohol use: No    Drug use: No    Sexual activity: Not Currently     Review of Systems   Constitutional:  Negative for appetite change, chills and fever.   HENT:  Negative for trouble swallowing and voice change.    Eyes:  Negative for photophobia and visual disturbance.   Respiratory:  Negative for cough, chest tightness, shortness of breath and wheezing.    Cardiovascular:  Positive for palpitations. Negative for chest pain and leg swelling.   Gastrointestinal:  Negative for abdominal distention, abdominal pain, diarrhea, nausea and vomiting.   Genitourinary:  Negative for decreased urine volume, difficulty urinating, flank pain and hematuria.   Musculoskeletal:  Negative for arthralgias, back pain and gait problem.   Skin:  Negative for color change and wound.   Neurological:  Positive for dizziness and light-headedness.  Negative for seizures, syncope and weakness.   Psychiatric/Behavioral:  Negative for behavioral problems, confusion and decreased concentration.      Objective:     Vital Signs (Most Recent):  Temp: 97.9 °F (36.6 °C) (03/23/24 0023)  Pulse: 82 (03/23/24 0023)  Resp: 16 (03/23/24 0023)  BP: 132/67 (03/23/24 0023)  SpO2: 99 % (03/23/24 0023) Vital Signs (24h Range):  Temp:  [97.9 °F (36.6 °C)-98.1 °F (36.7 °C)] 97.9 °F (36.6 °C)  Pulse:  [82-95] 82  Resp:  [16-20] 16  SpO2:  [98 %-100 %] 99 %  BP: (130-132)/(61-68) 132/67     Weight: 95.7 kg (211 lb)  Body mass index is 33.05 kg/m².     Physical Exam  Vitals and nursing note reviewed.   Constitutional:       General: She is not in acute distress.     Appearance: She is well-developed.   HENT:      Head: Normocephalic and atraumatic.      Mouth/Throat:      Pharynx: No oropharyngeal exudate.   Eyes:      General: Scleral icterus present.      Conjunctiva/sclera: Conjunctivae normal.   Cardiovascular:      Rate and Rhythm: Regular rhythm. Tachycardia present.      Heart sounds: Normal heart sounds.   Pulmonary:      Effort: Pulmonary effort is normal. No respiratory distress.      Breath sounds: Normal breath sounds. No wheezing.   Abdominal:      General: Bowel sounds are normal. There is no distension.      Palpations: Abdomen is soft.      Tenderness: There is no abdominal tenderness.   Musculoskeletal:         General: Swelling (BLEs, chronic) present. No tenderness. Normal range of motion.   Skin:     General: Skin is warm and dry.      Capillary Refill: Capillary refill takes less than 2 seconds.      Findings: No rash.   Neurological:      General: No focal deficit present.      Mental Status: She is alert and oriented to person, place, and time.      Motor: No weakness.   Psychiatric:         Behavior: Behavior normal.         Thought Content: Thought content normal.         Judgment: Judgment normal.                Significant Labs: All pertinent labs within  the past 24 hours have been reviewed.  CBC:   Recent Labs   Lab 03/22/24  1835   WBC 5.77   HGB 14.7   HCT 44.4        CMP:   Recent Labs   Lab 03/22/24  1835      K 3.9      CO2 25   GLU 86   BUN 22   CREATININE 0.8   CALCIUM 9.8   PROT 8.1   ALBUMIN 4.3   BILITOT 0.6   ALKPHOS 88   AST 17   ALT 12   ANIONGAP 10       Significant Imaging: I have reviewed all pertinent imaging results/findings within the past 24 hours.  X-Ray Chest 1 View  Narrative: EXAMINATION:  XR CHEST 1 VIEW    CLINICAL HISTORY:  Palpitations    TECHNIQUE:  Single frontal view of the chest was performed.    COMPARISON:  Chest radiograph 09/19/2023    FINDINGS:  Patient is slightly rotated.  Resolution is limited by body habitus with underpenetration.    Loop recorder device projects over the medial left midlung zone.  Cardiomediastinal silhouette is midline and within normal limits for age noting calcification at the arch.  Pulmonary vasculature and hilar contours are within normal limits.  Bibasilar mild platelike scarring versus atelectasis.  Overall improved aeration of the left lung base from prior.  The lungs are otherwise well expanded without consolidation, pleural effusion or pneumothorax definitively seen.  No acute osseous process seen.  PA and lateral views can be obtained.  Impression: No detrimental change or radiographic acute intrathoracic process seen on this single view.    Electronically signed by: Taj Shen MD  Date:    03/22/2024  Time:    20:22

## 2024-03-23 NOTE — ED PROVIDER NOTES
Source of History  patient, family, and EMR    Chief Complaint    Dizziness (Hx afib hr in 90's, has ablation sched soon)      History of Present Illness    Flores Fuentes is a 73 y.o. female presenting with concerns for palpitations and near-syncope.  She has a history of paroxysmal atrial fibrillation and she is currently on diltiazem and anticoagulation.  No chest pain.  No dyspnea.  No actual syncope.  She was concerned because she can feel every change in heartbeat.  She used to be on flecainide but has been off because of the scheduled ablation (which she has had previously).  She recently had an increase in her calcium channel blocker diltiazem a few days ago, for elevated heart rate but is still noticing that she has a tachycardia.  She checks in on pulse ox, and notes that it goes up to about 130 and then go down to the 90s.  Typical heart rate for this patient is in the 60s.  History of Kym-Parkinson-White    Review of Systems    As per HPI and below:  Constitutional symptoms:  No chills, no sweats, no fever , no weakness  Skin symptoms:  No rash    Eye symptoms:  No blurred vision  ENMT symptoms:  No sore throat    Respiratory symptoms:  No shortness of breath  Cardiovascular symptoms:  No chest pain , near-syncope and palpitations but no actual syncope  Gastrointestinal symptoms:  No abdominal pain, no nausea, no vomiting, no diarrhea  Genitourinary symptoms:  No dysuria  Musculoskeletal symptoms:  No back pain, No joint pains or aches    Neurologic symptoms:  No headache, no weakness  Endocrine symptoms:  None except as in HPI     Past History    As per HPI and below:  Past Medical History:   Diagnosis Date    Asymptomatic microscopic hematuria 6/2/2020    Atrial fibrillation 2014    nerves tip off, cardioverted 2017, Eliquis, q 6 mo, Dr Servin, flecainide at home prn flare up 4/2019, 9/2018)    Cervical muscle strain 1/24/2017    Chronic anticoagulation 6/10/2021    DJD (degenerative joint  disease) of cervical spine 1/24/2017    Essential hypertension 6/19/2019    Hyperlipidemia     Mitral valve disease     Mixed hyperlipidemia 11/07/2013    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, l mandible, 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 Dr Viktor Toure    Osteopenia of neck of left femur 6/4/2020    Paroxysmal atrioventricular tachycardia     Plantar fasciitis     Right knee meniscal tear     Dr Mayfield, tx conservatively    Severe obesity (BMI 35.0-35.9 with comorbidity) 1/24/2017    Slow transit constipation 7/13/2021    Despite fruits & veg & 1200 dunia diet, try Colace daily    Stress incontinence, female 03/28/2018    hasn't tried oxybutynin, After HYST, Kegels & PT  didn't work, worse at night wearing pads, Dr Infante    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    Thyroid disease        Past Surgical History:   Procedure Laterality Date    ABLATION OF ARRHYTHMOGENIC FOCUS FOR ATRIAL FIBRILLATION N/A 10/17/2023    Procedure: Ablation atrial fibrillation;  Surgeon: Ameya Servin MD;  Location: [facility] EP LAB;  Service: Cardiology;  Laterality: N/A;  AF, PERICO, PVI, WPW, RFA, POPEYE, Gen, DM, 3 Prep *MDT ILR*    ABLATION, MECHANOCHEMICAL, VARICOSE VEIN Right 12/8/2023    Procedure: ABLATION, MECHANOCHEMICAL, VARICOSE VEIN;  Surgeon: Simone Shannon MD;  Location: [facility] CATH LAB/EP;  Service: Cardiology;  Laterality: Right;    APPENDECTOMY      COLONOSCOPY N/A 8/13/2020    Procedure: COLONOSCOPY;  Surgeon: Angus Ac MD;  Location: [facility] ENDO ([facility] FLR);  Service: Endoscopy;  Laterality: N/A;  ok to hold Eliquis 2 days per Dr Malathi HERNANDEZ test at [place] on 8/10-GT    ECHOCARDIOGRAM,TRANSESOPHAGEAL N/A 9/20/2023    Procedure: Transesophageal echo (PERICO) intra-procedure log documentation;  Surgeon: Provider, Dosc Diagnostic;  Location: [facility] EP LAB;  Service: Cardiology;  Laterality: N/A;    HYSTERECTOMY  1990    TAHBSO for fibroids    INSERTION OF IMPLANTABLE LOOP RECORDER Left 11/1/2021    Procedure: Insertion,  Implantable Loop Recorder;  Surgeon: Ameya Servin MD;  Location: [facility] EP LAB;  Service: Cardiology;  Laterality: Left;  AF, ILR implant, MDT,  DM, 3 Prep    MANDIBLE SURGERY  2015    L mandible, Dr Toure    MANDIBLE SURGERY Left 8/27/2019    OOPHORECTOMY      TONSILLECTOMY      TRANSESOPHAGEAL ECHOCARDIOGRAM WITH POSSIBLE CARDIOVERSION (PERICO W/ POSS CARDIOVERSION) N/A 1/19/2024    Procedure: Transesophageal echo (PERICO) intra-procedure log documentation;  Surgeon: JV Rosenthal MD;  Location: [facility] EP LAB;  Service: Cardiology;  Laterality: N/A;    TRANSESOPHAGEAL ECHOCARDIOGRAPHY N/A 10/17/2023    Procedure: ECHOCARDIOGRAM, TRANSESOPHAGEAL;  Surgeon: Kathy Malik MD;  Location: [facility] EP LAB;  Service: Cardiology;  Laterality: N/A;    TREATMENT OF CARDIAC ARRHYTHMIA N/A 9/20/2023    Procedure: Cardioversion or Defibrillation;  Surgeon: JV Rosenthal MD;  Location: [facility] EP LAB;  Service: Cardiology;  Laterality: N/A;  AF, DCCV/PERICO, ANES, EH,     TREATMENT OF CARDIAC ARRHYTHMIA N/A 1/19/2024    Procedure: Cardioversion or Defibrillation;  Surgeon: JV Rosenthal MD;  Location: [facility] EP LAB;  Service: Cardiology;  Laterality: N/A;  AFL, PERICO/DCCV, ANES, EH,     TREATMENT OF CARDIAC ARRHYTHMIA N/A 1/19/2024    Procedure: Cardioversion or Defibrillation;  Surgeon: Luis Joiner MD;  Location: [facility] EP LAB;  Service: Cardiology;  Laterality: N/A;  AFL, DCCV ONLY, ANES, EH,        Social History     Socioeconomic History    Marital status:    Tobacco Use    Smoking status: Never    Smokeless tobacco: Never   Substance and Sexual Activity    Alcohol use: No    Drug use: No    Sexual activity: Not Currently   Other Topics Concern    Are you pregnant or think you may be? No     Social Determinants of Health     Financial Resource Strain: Low Risk  (1/19/2024)    Overall Financial Resource Strain (CARDIA)     Difficulty of Paying Living Expenses: Not hard at all   Food  Insecurity: No Food Insecurity (1/19/2024)    Hunger Vital Sign     Worried About Running Out of Food in the Last Year: Never true     Ran Out of Food in the Last Year: Never true   Transportation Needs: No Transportation Needs (1/19/2024)    PRAPARE - Transportation     Lack of Transportation (Medical): No     Lack of Transportation (Non-Medical): No   Physical Activity: Inactive (1/19/2024)    Exercise Vital Sign     Days of Exercise per Week: 0 days     Minutes of Exercise per Session: 0 min   Stress: Stress Concern Present (1/19/2024)    [facility] of Occupational Health - Occupational Stress Questionnaire     Feeling of Stress : To some extent   Social Connections: Moderately Integrated (1/19/2024)    Social Connection and Isolation Panel [NHANES]     Frequency of Communication with Friends and Family: More than three times a week     Frequency of Social Gatherings with Friends and Family: Once a week     Attends Cheondoism Services: More than 4 times per year     Active Member of Clubs or Organizations: No     Attends Club or Organization Meetings: Never     Marital Status:    Housing Stability: Low Risk  (1/19/2024)    Housing Stability Vital Sign     Unable to Pay for Housing in the Last Year: No     Number of Places Lived in the Last Year: 1     Unstable Housing in the Last Year: No       Family History   Problem Relation Age of Onset    Cancer Mother         stomach, liver    Breast cancer Neg Hx     Ovarian cancer Neg Hx     Cervical cancer Neg Hx     Endometrial cancer Neg Hx     Vaginal cancer Neg Hx     Melanoma Neg Hx     Colon cancer Neg Hx     Esophageal cancer Neg Hx        Review of patient's allergies indicates:   Allergen Reactions    Decongestant d [pseudoephedrine-dm]      Atrial Fibrillation    Augmentin [amoxicillin-pot clavulanate]      palpitations      Amoxicillin Palpitations    Diphenhydramine-pseudoephed Palpitations     TACHYCARDIA    Povidone-iodine Rash     Mild  "erythema of skin       Current Facility-Administered Medications on File Prior to Encounter   Medication Dose Route Frequency Provider Last Rate Last Admin    sodium chloride 0.9% bolus 1,000 mL  1,000 mL Intravenous Once Carley Cabrera, BIANCA        sodium chloride 0.9% flush 10 mL  10 mL Intravenous PRN Simone Shannon MD        vancomycin in dextrose 5 % 1 gram/250 mL IVPB 1,000 mg  1,000 mg Intravenous On Call Procedure Carley Cabrera .7 mL/hr at 11/01/21 1249 1,000 mg at 11/01/21 1249     Current Outpatient Medications on File Prior to Encounter   Medication Sig Dispense Refill    diltiaZEM (CARDIZEM CD) 240 MG 24 hr capsule Take 1 capsule (240 mg total) by mouth once daily. 90 capsule 3    ELIQUIS 5 mg Tab TAKE 1 TABLET TWICE DAILY 180 tablet 3    fluticasone propionate (FLONASE) 50 mcg/actuation nasal spray USE 1 SPRAY IN EACH NOSTRIL EVERY DAY 32 g 2    levothyroxine (SYNTHROID) 25 MCG tablet TAKE 1 TABLET ONE TIME DAILY 90 tablet 2    nystatin (MYCOSTATIN) cream Apply topically 2 (two) times daily. 30 g 11    pravastatin (PRAVACHOL) 40 MG tablet TAKE 1 TABLET ONE TIME DAILY 90 tablet 3       Physical Exam    Reviewed nursing notes.  Vitals:    03/22/24 1645 03/22/24 1831   BP: 130/61 131/62   Pulse: 93 95   Resp: 20 16   Temp: 98.1 °F (36.7 °C)    TempSrc: Oral    SpO2: 98% 100%   Weight: 95.7 kg (211 lb)    Height: 5' 7" (1.702 m)      General:  Alert, no acute distress.    Skin:  Warm, dry, intact.  No rash.  Head:  Normocephalic, atraumatic.    Neck:  Supple.   HEENT:  Pupils are equal and round, appropriate for room, extraocular movements are intact.  Normal phonation.  Moist mucous membranes.  Cardiovascular:  Regular rate and rhythm, Normal peripheral perfusion, No edema.    Respiratory:  Lungs are clear to auscultation, respirations are non-labored, breath sounds are equal.    Gastrointestinal:  Soft, Nontender, Non distended.   Back:  Nontender. Normal gait.  Ambulatory.  Musculoskeletal:  " Normal range of motion observed.  Neurological:  Alert and oriented to person, place, time, and situation.  No focal deficits observed.   Psychiatric:  Cooperative, appropriate mood & affect.       Initial MDM and Data Review    73 y.o. female presenting for evaluation of palpitations in the setting of known dysrhythmias (mostly atrial fibrillation, but also has documented history of Kym-Parkinson-White pattern)    Differential includes but is not limited to:  Paroxysmal atrial fibrillation, electrolyte disturbance, thyroid dysfunction, possible ventricular dysrhythmia given that she has newly prolonged QTC and near syncopal symptoms    Work-up includes:  CBC, CMP, TSH go troponin, chest x-ray    Interventions include:  Cardiac device check will be needed, put on telemetry    The patient has significant medical comorbidities that influence decision making for this acute process, such as:  Atrial fibrillation hypertension hyperlipidemia and Kym-Parkinson-White    I decided to obtain the patient's medical records and review relevant documentation from hospital records.  Pertinent information is noted.  I reviewed prior EKGs and prior notes    Medications - No data to display    Results and ED Course    Labs Reviewed   CBC W/ AUTO DIFFERENTIAL   COMPREHENSIVE METABOLIC PANEL   TROPONIN I   TSH    Narrative:     ADD ON MAGNESIUM PER DR MELCHOR NOLASCO/ORDER# [id number] @ 20:32   MAGNESIUM   MAGNESIUM    Narrative:     ADD ON MAGNESIUM PER DR MELCHOR NOLASCO/ORDER# [id number] @ 20:32       Imaging Results              X-Ray Chest 1 View (Final result)  Result time 03/22/24 20:22:05      Final result by Taj Shen MD (03/22/24 20:22:05)                   Impression:      No detrimental change or radiographic acute intrathoracic process seen on this single view.      Electronically signed by: Taj Shen MD  Date:    03/22/2024  Time:    20:22               Narrative:    EXAMINATION:  XR CHEST 1 VIEW    CLINICAL  HISTORY:  Palpitations    TECHNIQUE:  Single frontal view of the chest was performed.    COMPARISON:  Chest radiograph 09/19/2023    FINDINGS:  Patient is slightly rotated.  Resolution is limited by body habitus with underpenetration.    Loop recorder device projects over the medial left midlung zone.  Cardiomediastinal silhouette is midline and within normal limits for age noting calcification at the arch.  Pulmonary vasculature and hilar contours are within normal limits.  Bibasilar mild platelike scarring versus atelectasis.  Overall improved aeration of the left lung base from prior.  The lungs are otherwise well expanded without consolidation, pleural effusion or pneumothorax definitively seen.  No acute osseous process seen.  PA and lateral views can be obtained.                                      ED Course as of 03/22/24 2108   Fri Mar 22, 2024   2005 Troponin I: <0.006 [AC]   2005 Potassium: 3.9 [AC]   2005 Creatinine: 0.8 [AC]   2005 WBC: 5.77 [AC]   2005 Hemoglobin: 14.7 [AC]   2052 TSH: 2.757 [AC]   2107 Magnesium : 2.3 [AC]      ED Course User Index  [AC] Dimitry Tolentino,        EKG interpreted by myself    EKG  Performed: 03/22/2024   Rate/Rhythm/Axis: 91 bpm, sinus rhythm, L axis   ms  Qtc 501 ms  Impression:  First-degree AV block, and QTC prolongation at 5:01 a.m..  Report incomplete right bundle branch block.  Some artifact is present.    I reviewed prior EKGs.  They were much lower than this.  I do not see any with QT prolongation either.      Relevant imaging interpreted by myself  Chest x-ray no acute findings    Impression and Plan    73 y.o. female with findings of near-syncope with dysrhythmia concerns (QT prolongation on EKG as well) based on the work up in the emergency department as above.    Important lab/imaging findings include:  Electrolytes appear to be within normal limits    Admit to Hospital Medicine on telemetry for continuous monitoring, and loop recorder evaluation,  possible EP consult in the morning given new QT prolongation, and patient reports of near-syncope with palpitations    All tests, treatment options and disposition options were discussed with the patient.  The decision was made to admit the patient to the hospital.    The patient was admitted in stable condition and all further questions/concerns by patient and/or family were addressed.             Final diagnoses:  [R00.0] Tachycardia  [R07.9] Chest pain  [R00.2] Palpitations  [I49.9] Dysrhythmia        ED Disposition Condition    Observation Stable                  Dimitry Tolentino, DO  03/23/24 0032

## 2024-03-23 NOTE — SUBJECTIVE & OBJECTIVE
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 Dr Viktor Toure    Osteopenia of neck of left femur 6/4/2020    Paroxysmal atrioventricular tachycardia     Plantar fasciitis     Right knee meniscal tear     Dr Mayfield, tx conservatively    Severe obesity (BMI 35.0-35.9 with comorbidity) 1/24/2017    Slow transit constipation 7/13/2021    Despite fruits & veg & 1200 dunia diet, try Colace daily    Stress incontinence, female 03/28/2018    hasn't tried oxybutynin, After HYST, Kegels & PT  didn't work, worse at night wearing pads, Dr Infante    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    Thyroid disease        Past Surgical History:   Procedure Laterality Date    ABLATION OF ARRHYTHMOGENIC FOCUS FOR ATRIAL FIBRILLATION N/A 10/17/2023    Procedure: Ablation atrial fibrillation;  Surgeon: Ameya Servin MD;  Location: [facility] EP LAB;  Service: Cardiology;  Laterality: N/A;  AF, PERICO, PVI, WPW, RFA, POPEYE, Gen, DM, 3 Prep *MDT ILR*    ABLATION, MECHANOCHEMICAL, VARICOSE VEIN Right 12/8/2023    Procedure: ABLATION, MECHANOCHEMICAL, VARICOSE VEIN;  Surgeon: Simone Shannon MD;  Location: [facility] CATH LAB/EP;  Service: Cardiology;  Laterality: Right;    APPENDECTOMY      COLONOSCOPY N/A 8/13/2020    Procedure: COLONOSCOPY;  Surgeon: Angus Ac MD;  Location: [facility] ENDO ([address] [place] [address]);  Service: Endoscopy;  Laterality: N/A;  ok to hold Eliquis 2 days per Dr Servin     COVID test at [place] on 8/10-GT     ECHOCARDIOGRAM,TRANSESOPHAGEAL N/A 9/20/2023    Procedure: Transesophageal echo (PERICO) intra-procedure log documentation;  Surgeon: Provider, Dosc Diagnostic;  Location: [facility] EP LAB;  Service: Cardiology;  Laterality: N/A;    HYSTERECTOMY  1990    TAHBSO for fibroids    INSERTION OF IMPLANTABLE LOOP RECORDER Left 11/1/2021    Procedure: Insertion, Implantable Loop Recorder;  Surgeon: Ameya Servin MD;  Location: [facility] EP LAB;  Service: Cardiology;  Laterality: Left;  AF, ILR implant, MDT,  DM, 3 Prep    MANDIBLE SURGERY  2015    L mandible, Dr Toure    MANDIBLE SURGERY Left 8/27/2019    OOPHORECTOMY      TONSILLECTOMY      TRANSESOPHAGEAL ECHOCARDIOGRAM WITH POSSIBLE CARDIOVERSION (PERICO W/ POSS CARDIOVERSION) N/A 1/19/2024    Procedure: Transesophageal echo (PERICO) intra-procedure log documentation;  Surgeon: JV Rosenthal MD;  Location: [facility] EP LAB;  Service: Cardiology;  Laterality: N/A;    TRANSESOPHAGEAL ECHOCARDIOGRAPHY N/A 10/17/2023    Procedure: ECHOCARDIOGRAM, TRANSESOPHAGEAL;  Surgeon: Kathy Malik MD;  Location: [facility] EP LAB;  Service: Cardiology;  Laterality: N/A;    TREATMENT OF CARDIAC ARRHYTHMIA N/A 9/20/2023    Procedure: Cardioversion or Defibrillation;  Surgeon: JV Rosenthal MD;  Location: [facility] EP LAB;  Service: Cardiology;  Laterality: N/A;  AF, DCCV/PERICO, ANES, EH,     TREATMENT OF CARDIAC ARRHYTHMIA N/A 1/19/2024    Procedure: Cardioversion or Defibrillation;  Surgeon: JV Rosenthal MD;  Location: [facility] EP LAB;  Service: Cardiology;  Laterality: N/A;  AFL, PERICO/DCCV, ANES, EH,     TREATMENT OF CARDIAC ARRHYTHMIA N/A 1/19/2024    Procedure: Cardioversion or Defibrillation;  Surgeon: Luis Joiner MD;  Location: [facility] EP LAB;  Service: Cardiology;  Laterality: N/A;  AFL, DCCV ONLY, ANES, EH,        Review of patient's allergies indicates:   Allergen Reactions    Decongestant d [pseudoephedrine-dm]      Atrial Fibrillation    Augmentin [amoxicillin-pot  clavulanate]      palpitations      Amoxicillin Palpitations    Diphenhydramine-pseudoephed Palpitations     TACHYCARDIA    Povidone-iodine Rash     Mild erythema of skin       Current Facility-Administered Medications on File Prior to Encounter   Medication    sodium chloride 0.9% bolus 1,000 mL    vancomycin in dextrose 5 % 1 gram/250 mL IVPB 1,000 mg     Current Outpatient Medications on File Prior to Encounter   Medication Sig    diltiaZEM (CARDIZEM CD) 240 MG 24 hr capsule Take 1 capsule (240 mg total) by mouth once daily.    ELIQUIS 5 mg Tab TAKE 1 TABLET TWICE DAILY    fluticasone propionate (FLONASE) 50 mcg/actuation nasal spray USE 1 SPRAY IN EACH NOSTRIL EVERY DAY    levothyroxine (SYNTHROID) 25 MCG tablet TAKE 1 TABLET ONE TIME DAILY    nystatin (MYCOSTATIN) cream Apply topically 2 (two) times daily.    pravastatin (PRAVACHOL) 40 MG tablet TAKE 1 TABLET ONE TIME DAILY     Family History       Problem Relation (Age of Onset)    Cancer Mother          Tobacco Use    Smoking status: Never    Smokeless tobacco: Never   Substance and Sexual Activity    Alcohol use: No    Drug use: No    Sexual activity: Not Currently     Review of Systems   Constitutional: Negative for chills, fever, malaise/fatigue and night sweats.   HENT:  Negative for congestion, nosebleeds, sore throat, stridor and tinnitus.    Eyes:  Negative for blurred vision, discharge, double vision, pain, vision loss in left eye, vision loss in right eye, visual disturbance and visual halos.   Cardiovascular:  Negative for chest pain, dyspnea on exertion, leg swelling, near-syncope, orthopnea, palpitations and syncope.   Respiratory:  Negative for cough, hemoptysis, shortness of breath, snoring, sputum production and wheezing.    Endocrine: Negative for cold intolerance, heat intolerance and polydipsia.   Hematologic/Lymphatic: Negative for adenopathy and bleeding problem. Does not bruise/bleed easily.   Skin:  Negative for color change, dry skin,  flushing, poor wound healing and suspicious lesions.   Musculoskeletal:  Negative for arthritis, back pain, gout, joint pain and joint swelling.   Gastrointestinal:  Negative for bloating, abdominal pain, constipation and diarrhea.   Genitourinary:  Negative for dysuria, frequency and hematuria.   Neurological:  Negative for dizziness, focal weakness, headaches, light-headedness, loss of balance, numbness and weakness.   Psychiatric/Behavioral:  Negative for altered mental status, hallucinations and hypervigilance. The patient is not nervous/anxious.      Objective:     Vital Signs (Most Recent):  Temp: 97.5 °F (36.4 °C) (03/23/24 0814)  Pulse: 95 (03/23/24 0814)  Resp: 20 (03/23/24 0814)  BP: 132/74 (03/23/24 0814)  SpO2: 98 % (03/23/24 0814) Vital Signs (24h Range):  Temp:  [97.5 °F (36.4 °C)-98.1 °F (36.7 °C)] 97.5 °F (36.4 °C)  Pulse:  [] 95  Resp:  [16-20] 20  SpO2:  [98 %-100 %] 98 %  BP: (123-140)/(61-74) 132/74       Weight: 95.8 kg (211 lb 3.2 oz)  Body mass index is 33.08 kg/m².    SpO2: 98 %        Physical Exam  Constitutional:       General: She is not in acute distress.     Appearance: Normal appearance. She is normal weight. She is not ill-appearing or toxic-appearing.   HENT:      Head: Normocephalic.   Eyes:      General:         Right eye: No discharge.         Left eye: No discharge.      Extraocular Movements: Extraocular movements intact.      Conjunctiva/sclera: Conjunctivae normal.      Pupils: Pupils are equal, round, and reactive to light.   Cardiovascular:      Rate and Rhythm: Normal rate and regular rhythm.      Pulses: Normal pulses.      Heart sounds: Normal heart sounds. No murmur heard.     No friction rub.   Pulmonary:      Effort: Pulmonary effort is normal. No respiratory distress.      Breath sounds: Normal breath sounds. No wheezing or rales.   Abdominal:      General: Abdomen is flat. Bowel sounds are normal. There is no distension.      Palpations: Abdomen is soft.       Tenderness: There is no abdominal tenderness.   Musculoskeletal:         General: No swelling, deformity or signs of injury. Normal range of motion.      Cervical back: Normal range of motion and neck supple. No rigidity.      Right lower leg: No edema.      Left lower leg: No edema.   Lymphadenopathy:      Cervical: No cervical adenopathy.   Skin:     General: Skin is warm and dry.      Capillary Refill: Capillary refill takes less than 2 seconds.      Coloration: Skin is not jaundiced.      Findings: No bruising or lesion.   Neurological:      General: No focal deficit present.      Mental Status: She is oriented to person, place, and time. Mental status is at baseline.      Cranial Nerves: No cranial nerve deficit.      Motor: No weakness.   Psychiatric:         Mood and Affect: Mood normal.         Behavior: Behavior normal.         Thought Content: Thought content normal.         Judgment: Judgment normal.            Significant Labs: None    Significant Imaging:  None

## 2024-03-23 NOTE — HPI
73F pmhx pAfib (PVI 10/2024), MATTHEW, patient of Dr. Servin, admitted for palpitations.    2017 dx with afib and on dronaderone.  Not tolerating well so switched to Flec  2017-8/2023 was a PIP flec, then changed to standing Flec  10/17/24 went for PVI ablation with dilt and amiodarone started after  10/17-2/23 took amiodarone during this period, then instructed to stop  Having short afib episodes, so plans to ablate on 4/16/24 and taking PIP flec.  3/20 [place] 12:30AM had 31 minutes of afib, then 2am had 1.5hr afib. Dilt increased to 240mg daily  3/21 6Pm had 20 minutes of afib  3/22 felt dizzy from standing too long. Came to ED. Tele shows continuous atrial flutter.    Review of telemetry shows rate controlled continuous atypical flutter. Patient reports she feels nauseous and palpitations when her HR increases. Did not syncopize, but felt lightheaded when she stood for 10 minutes by her father's bedside. Otherwise denies cp, sob, extremity swelling, syncope.

## 2024-03-23 NOTE — H&P
[facility] - Emergency Dept  Hospital Medicine  History & Physical    Patient Name: Flores Fuentes  MRN: [id number]  Patient Class: OP- Observation  Admission Date: 3/22/2024  Attending Physician: Oliver Mckeon MD   Primary Care Provider: Naz Condon MD         Patient information was obtained from patient, past medical records, and ER records.     Subjective:     Principal Problem:Palpitations    Chief Complaint:   Chief Complaint   Patient presents with    Dizziness     Hx afib hr in 90's, has ablation sched soon        HPI: Flores Fuentes is a 73 y.o. female with a PMHx of paroxysmal Afib on eliquis, hypothyroidism, HLD, hx WPW who presents to [facility] for evaluation of palpitations. Patient reports feeling herself go into Afib on Tuesday night. Since then, she's been having intermittent palpitations and tachycardia with her HR randing from . She had an episode of lightheadedness/dizziness earlier today when she felt like she was going to pass out. She sat down to rest with resolution of symptoms. No actual LOC. She has a loop recorder in place and was told by her outpatient cardiologist that she went into afib 3 times over the last few days. She is scheduled to get another ablation done on 4/14. Recently taken off amiodarone about 30 days ago. Has chronic BLE edema due to venous insufficiency without recent worsening. Denies fever, chills, chest pain, SOB, N/V, abdominal pain, calf pain, vision changes, or syncope.    ED: AFVSS. No leukocytosis or electrolyte abnormalities. EKG shows NSR. CXR without acute process. Admitted to hospital medicine.     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, l mandible, 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 Dr Viktor Toure    Osteopenia of neck of left femur 6/4/2020    Paroxysmal atrioventricular tachycardia     Plantar fasciitis     Right knee meniscal tear     Dr Mayfield, tx conservatively    Severe obesity (BMI 35.0-35.9 with comorbidity) 1/24/2017    Slow transit constipation 7/13/2021    Despite fruits & veg & 1200 dunia diet, try Colace daily    Stress incontinence, female 03/28/2018    hasn't tried oxybutynin, After HYST, Kegels & PT  didn't work, worse at night wearing pads, Dr Infante    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    Thyroid disease        Past Surgical History:   Procedure Laterality Date    ABLATION OF ARRHYTHMOGENIC FOCUS FOR ATRIAL FIBRILLATION N/A 10/17/2023    Procedure: Ablation atrial fibrillation;  Surgeon: Ameya Servin MD;  Location: [facility] EP LAB;  Service: Cardiology;  Laterality: N/A;  AF, PERICO, PVI, WPW, RFA, POPEYE, Gen, DM, 3 Prep *MDT ILR*    ABLATION, MECHANOCHEMICAL, VARICOSE VEIN Right 12/8/2023    Procedure: ABLATION, MECHANOCHEMICAL, VARICOSE VEIN;  Surgeon: Simone Shannon MD;  Location: [facility] CATH LAB/EP;  Service: Cardiology;  Laterality: Right;    APPENDECTOMY      COLONOSCOPY N/A 8/13/2020    Procedure: COLONOSCOPY;  Surgeon: Angus Ac MD;  [facility]  Service: Endoscopy;  Laterality: N/A;  ok to hold Eliquis 2 days per Dr Malathi HERNANDEZ test at [place] on 8/10-GT    ECHOCARDIOGRAM,TRANSESOPHAGEAL N/A 9/20/2023    Procedure: Transesophageal echo (PERICO) intra-procedure log documentation;  Surgeon: Provider, Dosc Diagnostic;  Location: [facility] EP LAB;  Service: Cardiology;  Laterality: N/A;    HYSTERECTOMY  1990    TAHBSO for fibroids    INSERTION OF IMPLANTABLE LOOP RECORDER Left 11/1/2021    Procedure: Insertion, Implantable Loop  Recorder;  Surgeon: Ameya Servin MD;  Location: [facility] EP LAB;  Service: Cardiology;  Laterality: Left;  AF, ILR implant, MDT,  DM, 3 Prep    MANDIBLE SURGERY  2015    L mandible, Dr Toure    MANDIBLE SURGERY Left 8/27/2019    OOPHORECTOMY      TONSILLECTOMY      TRANSESOPHAGEAL ECHOCARDIOGRAM WITH POSSIBLE CARDIOVERSION (PERICO W/ POSS CARDIOVERSION) N/A 1/19/2024    Procedure: Transesophageal echo (PERICO) intra-procedure log documentation;  Surgeon: JV Rosenthal MD;  Location: [facility] EP LAB;  Service: Cardiology;  Laterality: N/A;    TRANSESOPHAGEAL ECHOCARDIOGRAPHY N/A 10/17/2023    Procedure: ECHOCARDIOGRAM, TRANSESOPHAGEAL;  Surgeon: Kathy Malik MD;  Location: [facility] EP LAB;  Service: Cardiology;  Laterality: N/A;    TREATMENT OF CARDIAC ARRHYTHMIA N/A 9/20/2023    Procedure: Cardioversion or Defibrillation;  Surgeon: JV Rosenthal MD;  Location: [facility] EP LAB;  Service: Cardiology;  Laterality: N/A;  AF, DCCV/PERICO, ANES, EH,     TREATMENT OF CARDIAC ARRHYTHMIA N/A 1/19/2024    Procedure: Cardioversion or Defibrillation;  Surgeon: JV Rosenthal MD;  Location: [facility] EP LAB;  Service: Cardiology;  Laterality: N/A;  AFL, PERICO/DCCV, ANES, EH,     TREATMENT OF CARDIAC ARRHYTHMIA N/A 1/19/2024    Procedure: Cardioversion or Defibrillation;  Surgeon: Luis Joiner MD;  Location: [facility] EP LAB;  Service: Cardiology;  Laterality: N/A;  AFL, DCCV ONLY, ANES, EH,        Review of patient's allergies indicates:   Allergen Reactions    Decongestant d [pseudoephedrine-dm]      Atrial Fibrillation    Augmentin [amoxicillin-pot clavulanate]      palpitations      Amoxicillin Palpitations    Diphenhydramine-pseudoephed Palpitations     TACHYCARDIA    Povidone-iodine Rash     Mild erythema of skin       Current Facility-Administered Medications on File Prior to Encounter   Medication    sodium chloride 0.9% bolus 1,000 mL    sodium chloride 0.9% flush 10 mL    vancomycin in dextrose 5 % 1  gram/250 mL IVPB 1,000 mg     Current Outpatient Medications on File Prior to Encounter   Medication Sig    diltiaZEM (CARDIZEM CD) 240 MG 24 hr capsule Take 1 capsule (240 mg total) by mouth once daily.    ELIQUIS 5 mg Tab TAKE 1 TABLET TWICE DAILY    fluticasone propionate (FLONASE) 50 mcg/actuation nasal spray USE 1 SPRAY IN EACH NOSTRIL EVERY DAY    levothyroxine (SYNTHROID) 25 MCG tablet TAKE 1 TABLET ONE TIME DAILY    nystatin (MYCOSTATIN) cream Apply topically 2 (two) times daily.    pravastatin (PRAVACHOL) 40 MG tablet TAKE 1 TABLET ONE TIME DAILY     Family History       Problem Relation (Age of Onset)    Cancer Mother          Tobacco Use    Smoking status: Never    Smokeless tobacco: Never   Substance and Sexual Activity    Alcohol use: No    Drug use: No    Sexual activity: Not Currently     Review of Systems   Constitutional:  Negative for appetite change, chills and fever.   HENT:  Negative for trouble swallowing and voice change.    Eyes:  Negative for photophobia and visual disturbance.   Respiratory:  Negative for cough, chest tightness, shortness of breath and wheezing.    Cardiovascular:  Positive for palpitations. Negative for chest pain and leg swelling.   Gastrointestinal:  Negative for abdominal distention, abdominal pain, diarrhea, nausea and vomiting.   Genitourinary:  Negative for decreased urine volume, difficulty urinating, flank pain and hematuria.   Musculoskeletal:  Negative for arthralgias, back pain and gait problem.   Skin:  Negative for color change and wound.   Neurological:  Positive for dizziness and light-headedness. Negative for seizures, syncope and weakness.   Psychiatric/Behavioral:  Negative for behavioral problems, confusion and decreased concentration.      Objective:     Vital Signs (Most Recent):  Temp: 97.9 °F (36.6 °C) (03/23/24 0023)  Pulse: 82 (03/23/24 0023)  Resp: 16 (03/23/24 0023)  BP: 132/67 (03/23/24 0023)  SpO2: 99 % (03/23/24 0023) Vital Signs (24h  Range):  Temp:  [97.9 °F (36.6 °C)-98.1 °F (36.7 °C)] 97.9 °F (36.6 °C)  Pulse:  [82-95] 82  Resp:  [16-20] 16  SpO2:  [98 %-100 %] 99 %  BP: (130-132)/(61-68) 132/67     Weight: 95.7 kg (211 lb)  Body mass index is 33.05 kg/m².     Physical Exam  Vitals and nursing note reviewed.   Constitutional:       General: She is not in acute distress.     Appearance: She is well-developed.   HENT:      Head: Normocephalic and atraumatic.      Mouth/Throat:      Pharynx: No oropharyngeal exudate.   Eyes:      General: Scleral icterus present.      Conjunctiva/sclera: Conjunctivae normal.   Cardiovascular:      Rate and Rhythm: Regular rhythm. Tachycardia present.      Heart sounds: Normal heart sounds.   Pulmonary:      Effort: Pulmonary effort is normal. No respiratory distress.      Breath sounds: Normal breath sounds. No wheezing.   Abdominal:      General: Bowel sounds are normal. There is no distension.      Palpations: Abdomen is soft.      Tenderness: There is no abdominal tenderness.   Musculoskeletal:         General: Swelling (BLEs, chronic) present. No tenderness. Normal range of motion.   Skin:     General: Skin is warm and dry.      Capillary Refill: Capillary refill takes less than 2 seconds.      Findings: No rash.   Neurological:      General: No focal deficit present.      Mental Status: She is alert and oriented to person, place, and time.      Motor: No weakness.   Psychiatric:         Behavior: Behavior normal.         Thought Content: Thought content normal.         Judgment: Judgment normal.                Significant Labs: All pertinent labs within the past 24 hours have been reviewed.  CBC:   Recent Labs   Lab 03/22/24  1835   WBC 5.77   HGB 14.7   HCT 44.4        CMP:   Recent Labs   Lab 03/22/24  1835      K 3.9      CO2 25   GLU 86   BUN 22   CREATININE 0.8   CALCIUM 9.8   PROT 8.1   ALBUMIN 4.3   BILITOT 0.6   ALKPHOS 88   AST 17   ALT 12   ANIONGAP 10       Significant Imaging:  I have reviewed all pertinent imaging results/findings within the past 24 hours.  X-Ray Chest 1 View  Narrative: EXAMINATION:  XR CHEST 1 VIEW    CLINICAL HISTORY:  Palpitations    TECHNIQUE:  Single frontal view of the chest was performed.    COMPARISON:  Chest radiograph 09/19/2023    FINDINGS:  Patient is slightly rotated.  Resolution is limited by body habitus with underpenetration.    Loop recorder device projects over the medial left midlung zone.  Cardiomediastinal silhouette is midline and within normal limits for age noting calcification at the arch.  Pulmonary vasculature and hilar contours are within normal limits.  Bibasilar mild platelike scarring versus atelectasis.  Overall improved aeration of the left lung base from prior.  The lungs are otherwise well expanded without consolidation, pleural effusion or pneumothorax definitively seen.  No acute osseous process seen.  PA and lateral views can be obtained.  Impression: No detrimental change or radiographic acute intrathoracic process seen on this single view.    Electronically signed by: Taj Shen MD  Date:    03/22/2024  Time:    20:22      Assessment/Plan:     * Palpitations  Near syncope  - loop recorder interrogated as OP and showed 3 AF events   - EKG shows NSR  - infectious work up unrevealing thus far  - UA pending   - EP consulted for medication adjustment vs cardiac ablation consideration (scheduled for OP ablation on 4/16)  - NPO at [facility]  - echo ordered  - monitor tele     Hypothyroidism  - TSH WNL  - continue synthroid     Kym-Parkinson-White (WPW) pattern  - reports being told she didn't actually have WPW after her ablation     MATTHEW (obstructive sleep apnea)  - use home CPAP    Essential hypertension  - chronic, controlled    Paroxysmal A-fib  Atrial flutter  - hx multiple cardioversion and ablation   - continue eliquis and diltiazem     Mixed hyperlipidemia  - continue statin       VTE Risk Mitigation (From admission, onward)            Ordered     apixaban tablet 5 mg  2 times daily         03/23/24 0008     Reason for No Pharmacological VTE Prophylaxis  Once        Question:  Reasons:  Answer:  Already adequately anticoagulated on oral Anticoagulants    03/22/24 2121     IP VTE HIGH RISK PATIENT  Once         03/22/24 2121     Place sequential compression device  Until discontinued         03/22/24 2114                         On 03/23/2024, patient should be placed in hospital observation services under my care in collaboration with Dr. Taj Calabrese.           Deb Lynne PA-C  Department of Hospital Medicine  [facility] - Emergency Dept

## 2024-03-23 NOTE — NURSING
[facility]      3/23/2024       Skin assessed on: Admit      [x] No Pressure Injuries Present    []Prevention Measures Documented    [] Yes LDA  for Pressure Injury Previously documented     [] Yes New Pressure Injury Discovered   [] LDA for New Pressure Injury Added      Attending RN:  Clau Mcgrath RN     Second RN:  Mamadou DORADO

## 2024-03-23 NOTE — NURSING
Nurses Note -- 4 Eyes      3/23/2024   2:50 AM      Skin assessed during: Admit      [x] No Altered Skin Integrity Present    [x]Prevention Measures Documented      [] Yes- Altered Skin Integrity Present or Discovered   [] LDA Added if Not in Epic (Describe Wound)   [] New Altered Skin Integrity was Present on Admit and Documented in LDA   [] Wound Image Taken    Wound Care Consulted? No    Attending Nurse:  Brenda Hutton RN/Staff Member:   Myriam

## 2024-03-23 NOTE — ED NOTES
Patient arrived to the ED complaining of dizziness and light headedness,palpitations and chest pain.  Hx of afib. Repeat EKG showed NS. MD at bedside. Alert x 4.     Triage note:  Chief Complaint   Patient presents with    Dizziness     Hx afib hr in 90's, has ablation sched soon     Review of patient's allergies indicates:   Allergen Reactions    Decongestant d [pseudoephedrine-dm]      Atrial Fibrillation    Augmentin [amoxicillin-pot clavulanate]      palpitations      Amoxicillin Palpitations    Diphenhydramine-pseudoephed Palpitations     TACHYCARDIA    Povidone-iodine Rash     Mild erythema of skin     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 Dr Viktor Toure    Osteopenia of neck of left femur 6/4/2020    Paroxysmal atrioventricular tachycardia     Plantar fasciitis     Right knee meniscal tear     Dr Mayfield, tx conservatively    Severe obesity (BMI 35.0-35.9 with comorbidity) 1/24/2017    Slow transit constipation 7/13/2021    Despite fruits & veg & 1200 dunia diet, try Colace daily    Stress incontinence, female 03/28/2018    hasn't tried oxybutynin, After HYST, Kegels & PT  didn't work, worse at night wearing pads, Dr Infante    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    Thyroid disease

## 2024-03-23 NOTE — SUBJECTIVE & OBJECTIVE
Interval History: Pt seen and examined this morning on julien CANCINO. Feeling well today, awaiting EP procedure on Monday. Care plan reviewed. Otherwise, doing well and with no further complaints at this time.      Objective:     Vital Signs (Most Recent):  Temp: 97.5 °F (36.4 °C) (03/23/24 0814)  Pulse: 95 (03/23/24 0814)  Resp: 20 (03/23/24 0814)  BP: 132/74 (03/23/24 0814)  SpO2: 98 % (03/23/24 0814) Vital Signs (24h Range):  Temp:  [97.5 °F (36.4 °C)-98.1 °F (36.7 °C)] 97.5 °F (36.4 °C)  Pulse:  [] 95  Resp:  [16-20] 20  SpO2:  [98 %-100 %] 98 %  BP: (123-140)/(61-74) 132/74     Weight: 95.8 kg (211 lb 3.2 oz)  Body mass index is 33.08 kg/m².  No intake or output data in the 24 hours ending 03/23/24 1101        Physical Exam  Gen: in NAD, appears stated age; pt is obese  Neuro: AAOx4, CN2-12 grossly intact BL; motor, sensory, and strength grossly intact BL  HEENT: NTNC, EOMI, PERRLA, MMM; no thyromegaly or lymphadenopathy; no JVD appreciated  CVS: RRR, no m/r/g; S1/S2 auscultated with no S3 or S4; capillary refill < 2 sec  Resp: lungs CTAB, no w/r/r; no belabored breathing or accessory muscle use appreciated   Abd: BS+ in all 4 quadrants; NTND, soft to palpation; no organomegaly appreciated   Extrem: pulses full, equal, and regular over all 4 extremities; no UE or LE edema BL        Significant Labs: All pertinent labs within the past 24 hours have been reviewed.    Significant Imaging: I have reviewed all pertinent imaging results/findings within the past 24 hours.

## 2024-03-23 NOTE — PROGRESS NOTES
[facility]ation [address] [place] [address] Medicine  Progress Note    Patient Name: Flores Fuentes  MRN: [id number]  Patient Class: OP- Observation   Admission Date: 3/22/2024  Length of Stay: 0 days  Attending Physician: Oliver Mckeon MD  Primary Care Provider: Naz Condon MD        Subjective:     Principal Problem:Atrial flutter        HPI:  Flores Fuentes is a 73 y.o. female with a PMHx of paroxysmal Afib on eliquis, hypothyroidism, HLD, hx WPW who presents to [facility] for evaluation of palpitations. Patient reports feeling herself go into Afib on Tuesday night. Since then, she's been having intermittent palpitations and tachycardia with her HR randing from . She had an episode of lightheadedness/dizziness earlier today when she felt like she was going to pass out. She sat down to rest with resolution of symptoms. No actual LOC. She has a loop recorder in place and was told by her outpatient cardiologist that she went into afib 3 times over the last few days. She is scheduled to get another ablation done on 4/14. Recently taken off amiodarone about 30 days ago. Has chronic BLE edema due to venous insufficiency without recent worsening. Denies fever, chills, chest pain, SOB, N/V, abdominal pain, calf pain, vision changes, or syncope.    ED: AFVSS. No leukocytosis or electrolyte abnormalities. EKG shows NSR. CXR without acute process. Admitted to hospital medicine.     Overview/Hospital Course:  Pt admitted to [facility] and remained stable overnight and into 3/23/2024. No acute events on tele, remained grossly asymptomatic. EP consulted: plans for PERICO/DCCV on 3/25.    Interval History: Pt seen and examined this morning on julien CANCINO. Feeling well today, awaiting EP procedure on Monday. Care plan reviewed. Otherwise, doing well and with no further complaints at this time.      Objective:     Vital Signs (Most Recent):  Temp: 97.5 °F (36.4 °C) (03/23/24 0814)  Pulse: 95 (03/23/24 0814)  Resp: 20 (03/23/24  0814)  BP: 132/74 (03/23/24 0814)  SpO2: 98 % (03/23/24 0814) Vital Signs (24h Range):  Temp:  [97.5 °F (36.4 °C)-98.1 °F (36.7 °C)] 97.5 °F (36.4 °C)  Pulse:  [] 95  Resp:  [16-20] 20  SpO2:  [98 %-100 %] 98 %  BP: (123-140)/(61-74) 132/74     Weight: 95.8 kg (211 lb 3.2 oz)  Body mass index is 33.08 kg/m².  No intake or output data in the 24 hours ending 03/23/24 1101        Physical Exam  Gen: in NAD, appears stated age; pt is obese  Neuro: AAOx4, CN2-12 grossly intact BL; motor, sensory, and strength grossly intact BL  HEENT: NTNC, EOMI, PERRLA, MMM; no thyromegaly or lymphadenopathy; no JVD appreciated  CVS: RRR, no m/r/g; S1/S2 auscultated with no S3 or S4; capillary refill < 2 sec  Resp: lungs CTAB, no w/r/r; no belabored breathing or accessory muscle use appreciated   Abd: BS+ in all 4 quadrants; NTND, soft to palpation; no organomegaly appreciated   Extrem: pulses full, equal, and regular over all 4 extremities; no UE or LE edema BL        Significant Labs: All pertinent labs within the past 24 hours have been reviewed.    Significant Imaging: I have reviewed all pertinent imaging results/findings within the past 24 hours.    Assessment/Plan:      * Atrial flutter  - Interval history and physical exam findings as described above  - Hx multiple cardioversion and ablation: mixed Afib/Aflutter  - Continue eliquis and diltiazem  - EP consulted: plans for PERICO/DCCV on 3/25  - Monitoring on tele    Paroxysmal A-fib  - As above    Class 1 obesity due to excess calories with serious comorbidity and body mass index (BMI) of 33.0 to 33.9 in adult  - Body mass index is 33.08 kg/m².  - Needs dietary and lifestyle modifications in relation to health  - Consider dietary/nutrition consult outpatient    Hypothyroidism  - TSH WNL  - continue synthroid     Kym-Parkinson-White (WPW) pattern  - reports being told she didn't actually have WPW after her ablation     MATTHEW (obstructive sleep apnea)  - use home  CPAP    Essential hypertension  - chronic, controlled    Mixed hyperlipidemia  - continue statin       VTE Risk Mitigation (From admission, onward)           Ordered     apixaban tablet 5 mg  2 times daily         03/23/24 0008     Reason for No Pharmacological VTE Prophylaxis  Once        Question:  Reasons:  Answer:  Already adequately anticoagulated on oral Anticoagulants    03/22/24 2121     IP VTE HIGH RISK PATIENT  Once         03/22/24 2121     Place sequential compression device  Until discontinued         03/22/24 2114                    Discharge Planning   TIMA: 3/25/2024     Code Status: Full Code   Is the patient medically ready for discharge?: No    Reason for patient still in hospital (select all that apply): Patient trending condition             Oliver Mckeon MD  Attending Physician  Medical Director - [facility]ation Unit  Department of Hospital Medicine  3/23/2024

## 2024-03-23 NOTE — HPI
Flores Fuentes is a 73 y.o. female with a PMHx of paroxysmal Afib on eliquis, hypothyroidism, HLD, hx WPW who presents to [facility] [place] for evaluation of palpitations. Patient reports feeling herself go into Afib on Tuesday night. Since then, she's been having intermittent palpitations and tachycardia with her HR randing from . She had an episode of lightheadedness/dizziness earlier today when she felt like she was going to pass out. She sat down to rest with resolution of symptoms. No actual LOC. She has a loop recorder in place and was told by her outpatient cardiologist that she went into afib 3 times over the last few days. She is scheduled to get another ablation done on 4/14. Recently taken off amiodarone about 30 days ago. Has chronic BLE edema due to venous insufficiency without recent worsening. Denies fever, chills, chest pain, SOB, N/V, abdominal pain, calf pain, vision changes, or syncope.    ED: AFVSS. No leukocytosis or electrolyte abnormalities. EKG shows NSR. CXR without acute process. Admitted to hospital medicine.

## 2024-03-23 NOTE — ED NOTES
Telemetry Verification   Patient placed on Telemetry Box  Verified with War Room  Box # [id number]       Rate 89   Rhythm NS

## 2024-03-23 NOTE — ED NOTES
Bed: [facility]  Expected date:   Expected time:   Means of arrival:   Comments:  [place]

## 2024-03-23 NOTE — CONSULTS
[facility] 11H  Cardiac Electrophysiology  Consult Note    Admission Date: 3/22/2024  Code Status: Full Code   Attending Provider: Oliver Mckeon MD  Consulting Provider: Efra Villatoro MD  Principal Problem:Palpitations    Inpatient consult to Electrophysiology  Consult performed by: Efra Villatoro MD  Consult ordered by: Deb Lynne PA-C  Reason for consult: atrial flutter        Subjective:     Chief Complaint:  Palpitations     HPI:   73F pmhx pAfib (PVI 10/2024), MATTHEW, patient of Dr. Servin, admitted for palpitations.    2017 dx with afib and on dronaderone.  Not tolerating well so switched to Flec  2017-8/2023 was a PIP flec, then changed to standing Flec  10/17/24 went for PVI ablation with dilt and amiodarone started after  10/17-2/23 took amiodarone during this period, then instructed to stop  Having short afib episodes, so plans to ablate on 4/16/24 and taking PIP flec.  3/20 [place] 12:30AM had 31 minutes of afib, then 2am had 1.5hr afib. Dilt increased to 240mg daily  3/21 6Pm had 20 minutes of afib  3/22 felt dizzy from standing too long. Came to ED. Tele shows continuous atrial flutter.    Review of telemetry shows rate controlled continuous atypical flutter. Patient reports she feels nauseous and palpitations when her HR increases. Did not syncopize, but felt lightheaded when she stood for 10 minutes by her father's bedside. Otherwise denies cp, sob, extremity swelling, syncope.      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, l mandible, 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 Dr Viktor Toure    Osteopenia of neck of left femur 6/4/2020    Paroxysmal atrioventricular tachycardia     Plantar fasciitis     Right knee meniscal tear     Dr Mayfield, tx conservatively    Severe obesity (BMI 35.0-35.9 with comorbidity) 1/24/2017    Slow transit constipation 7/13/2021    Despite fruits & veg & 1200 dunia diet, try Colace daily    Stress incontinence, female 03/28/2018    hasn't tried oxybutynin, After HYST, Kegels & PT  didn't work, worse at night wearing pads, Dr Infante    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    Thyroid disease        Past Surgical History:   Procedure Laterality Date    ABLATION OF ARRHYTHMOGENIC FOCUS FOR ATRIAL FIBRILLATION N/A 10/17/2023    Procedure: Ablation atrial fibrillation;  Surgeon: Ameya Servin MD;  Location: [facility] EP LAB;  Service: Cardiology;  Laterality: N/A;  AF, PERICO, PVI, WPW, RFA, POPEYE, Gen, DM, 3 Prep *MDT ILR*    ABLATION, MECHANOCHEMICAL, VARICOSE VEIN Right 12/8/2023    Procedure: ABLATION, MECHANOCHEMICAL, VARICOSE VEIN;  Surgeon: Simone Shannon MD;  Location: [facility] CATH LAB/EP;  Service: Cardiology;  Laterality: Right;    APPENDECTOMY      COLONOSCOPY N/A 8/13/2020    Procedure: COLONOSCOPY;  Surgeon: Angus Ac MD;  Location: [facility] ENDO ([address] [place] [address]);  Service: Endoscopy;  Laterality: N/A;  ok to hold Eliquis 2 days per Dr Servin     COVID test at [facility] on 8/10-GT    ECHOCARDIOGRAM,TRANSESOPHAGEAL N/A 9/20/2023    Procedure: Transesophageal echo (PERICO) intra-procedure log documentation;  Surgeon: Provider, [facility];  Location: [facility] EP LAB;  Service: Cardiology;  Laterality: N/A;    HYSTERECTOMY  1990    TAHBSO for fibroids    INSERTION OF IMPLANTABLE LOOP RECORDER Left 11/1/2021    Procedure: Insertion, Implantable Loop Recorder;  Surgeon: Ameya Servin MD;  Location: [facility] EP LAB;  Service: Cardiology;  Laterality: Left;  AF, ILR implant, MDT,  DM, 3 Prep    MANDIBLE SURGERY  2015    L  Dr Mesfin ayala    MANDIBLE SURGERY Left 8/27/2019    OOPHORECTOMY      TONSILLECTOMY      TRANSESOPHAGEAL ECHOCARDIOGRAM WITH POSSIBLE CARDIOVERSION (PERICO W/ POSS CARDIOVERSION) N/A 1/19/2024    Procedure: Transesophageal echo (PERICO) intra-procedure log documentation;  Surgeon: JV Rosenthal MD;  Location: [facility] EP LAB;  Service: Cardiology;  Laterality: N/A;    TRANSESOPHAGEAL ECHOCARDIOGRAPHY N/A 10/17/2023    Procedure: ECHOCARDIOGRAM, TRANSESOPHAGEAL;  Surgeon: Kathy Malik MD;  Location: [facility] EP LAB;  Service: Cardiology;  Laterality: N/A;    TREATMENT OF CARDIAC ARRHYTHMIA N/A 9/20/2023    Procedure: Cardioversion or Defibrillation;  Surgeon: JV Rosenthal MD;  Location: [facility] EP LAB;  Service: Cardiology;  Laterality: N/A;  AF, DCCV/PERICO, ANES, EH,     TREATMENT OF CARDIAC ARRHYTHMIA N/A 1/19/2024    Procedure: Cardioversion or Defibrillation;  Surgeon: JV Rosenthal MD;  Location: [facility] EP LAB;  Service: Cardiology;  Laterality: N/A;  AFL, PERICO/DCCV, ANES, EH,     TREATMENT OF CARDIAC ARRHYTHMIA N/A 1/19/2024    Procedure: Cardioversion or Defibrillation;  Surgeon: Luis Joiner MD;  Location: [facility] EP LAB;  Service: Cardiology;  Laterality: N/A;  AFL, DCCV ONLY, ANES, EH,        Review of patient's allergies indicates:   Allergen Reactions    Decongestant d [pseudoephedrine-dm]      Atrial Fibrillation    Augmentin [amoxicillin-pot clavulanate]      palpitations      Amoxicillin Palpitations    Diphenhydramine-pseudoephed Palpitations     TACHYCARDIA    Povidone-iodine Rash     Mild erythema of skin       Current Facility-Administered Medications on File Prior to Encounter   Medication    sodium chloride 0.9% bolus 1,000 mL    vancomycin in dextrose 5 % 1 gram/250 mL IVPB 1,000 mg     Current Outpatient Medications on File Prior to Encounter   Medication Sig    diltiaZEM (CARDIZEM CD) 240 MG 24 hr capsule Take 1 capsule (240 mg total) by mouth once daily.     ELIQUIS 5 mg Tab TAKE 1 TABLET TWICE DAILY    fluticasone propionate (FLONASE) 50 mcg/actuation nasal spray USE 1 SPRAY IN EACH NOSTRIL EVERY DAY    levothyroxine (SYNTHROID) 25 MCG tablet TAKE 1 TABLET ONE TIME DAILY    nystatin (MYCOSTATIN) cream Apply topically 2 (two) times daily.    pravastatin (PRAVACHOL) 40 MG tablet TAKE 1 TABLET ONE TIME DAILY     Family History       Problem Relation (Age of Onset)    Cancer Mother          Tobacco Use    Smoking status: Never    Smokeless tobacco: Never   Substance and Sexual Activity    Alcohol use: No    Drug use: No    Sexual activity: Not Currently     Review of Systems   Constitutional: Negative for chills, fever, malaise/fatigue and night sweats.   HENT:  Negative for congestion, nosebleeds, sore throat, stridor and tinnitus.    Eyes:  Negative for blurred vision, discharge, double vision, pain, vision loss in left eye, vision loss in right eye, visual disturbance and visual halos.   Cardiovascular:  Negative for chest pain, dyspnea on exertion, leg swelling, near-syncope, orthopnea, palpitations and syncope.   Respiratory:  Negative for cough, hemoptysis, shortness of breath, snoring, sputum production and wheezing.    Endocrine: Negative for cold intolerance, heat intolerance and polydipsia.   Hematologic/Lymphatic: Negative for adenopathy and bleeding problem. Does not bruise/bleed easily.   Skin:  Negative for color change, dry skin, flushing, poor wound healing and suspicious lesions.   Musculoskeletal:  Negative for arthritis, back pain, gout, joint pain and joint swelling.   Gastrointestinal:  Negative for bloating, abdominal pain, constipation and diarrhea.   Genitourinary:  Negative for dysuria, frequency and hematuria.   Neurological:  Negative for dizziness, focal weakness, headaches, light-headedness, loss of balance, numbness and weakness.   Psychiatric/Behavioral:  Negative for altered mental status, hallucinations and hypervigilance. The patient is  not nervous/anxious.      Objective:     Vital Signs (Most Recent):  Temp: 97.5 °F (36.4 °C) (03/23/24 0814)  Pulse: 95 (03/23/24 0814)  Resp: 20 (03/23/24 0814)  BP: 132/74 (03/23/24 0814)  SpO2: 98 % (03/23/24 0814) Vital Signs (24h Range):  Temp:  [97.5 °F (36.4 °C)-98.1 °F (36.7 °C)] 97.5 °F (36.4 °C)  Pulse:  [] 95  Resp:  [16-20] 20  SpO2:  [98 %-100 %] 98 %  BP: (123-140)/(61-74) 132/74       Weight: 95.8 kg (211 lb 3.2 oz)  Body mass index is 33.08 kg/m².    SpO2: 98 %        Physical Exam  Constitutional:       General: She is not in acute distress.     Appearance: Normal appearance. She is normal weight. She is not ill-appearing or toxic-appearing.   HENT:      Head: Normocephalic.   Eyes:      General:         Right eye: No discharge.         Left eye: No discharge.      Extraocular Movements: Extraocular movements intact.      Conjunctiva/sclera: Conjunctivae normal.      Pupils: Pupils are equal, round, and reactive to light.   Cardiovascular:      Rate and Rhythm: Normal rate and regular rhythm.      Pulses: Normal pulses.      Heart sounds: Normal heart sounds. No murmur heard.     No friction rub.   Pulmonary:      Effort: Pulmonary effort is normal. No respiratory distress.      Breath sounds: Normal breath sounds. No wheezing or rales.   Abdominal:      General: Abdomen is flat. Bowel sounds are normal. There is no distension.      Palpations: Abdomen is soft.      Tenderness: There is no abdominal tenderness.   Musculoskeletal:         General: No swelling, deformity or signs of injury. Normal range of motion.      Cervical back: Normal range of motion and neck supple. No rigidity.      Right lower leg: No edema.      Left lower leg: No edema.   Lymphadenopathy:      Cervical: No cervical adenopathy.   Skin:     General: Skin is warm and dry.      Capillary Refill: Capillary refill takes less than 2 seconds.      Coloration: Skin is not jaundiced.      Findings: No bruising or lesion.    Neurological:      General: No focal deficit present.      Mental Status: She is oriented to person, place, and time. Mental status is at baseline.      Cranial Nerves: No cranial nerve deficit.      Motor: No weakness.   Psychiatric:         Mood and Affect: Mood normal.         Behavior: Behavior normal.         Thought Content: Thought content normal.         Judgment: Judgment normal.            Significant Labs: None    Significant Imaging:  None              Assessment and Plan:     Atrial flutter  73F pmhx pAfib (PVI 10/2024), MATTHEW, patient of Dr. Servin, admitted for palpitations, found to be in atypical atrial flutter. Atrial flutter is rate controlled. At home she is on dilt 240mg and PIP flecainide. Last took amiodarone in February for 3 months after her 10/2023 ablation.  -Continue diltiazem 240mg   -continue eliquis  -Will take for PERICO/DCCV on Monday if continuous        Thank you for your consult. I will follow-up with patient. Please contact us if you have any additional questions.    Efra Villatoro MD  Cardiac Electrophysiology  [facility] - Observation [facility]

## 2024-03-24 PROBLEM — N30.00 ACUTE CYSTITIS WITHOUT HEMATURIA: Status: ACTIVE | Noted: 2024-03-24

## 2024-03-24 LAB
ANION GAP SERPL CALC-SCNC: 10 MMOL/L (ref 8–16)
ASCENDING AORTA: 3.49 CM
AV INDEX (PROSTH): 0.97
AV MEAN GRADIENT: 3 MMHG
AV PEAK GRADIENT: 5 MMHG
AV VALVE AREA BY VELOCITY RATIO: 2.91 CM²
AV VALVE AREA: 2.92 CM²
AV VELOCITY RATIO: 0.96
BASOPHILS # BLD AUTO: 0.05 K/UL (ref 0–0.2)
BASOPHILS NFR BLD: 1 % (ref 0–1.9)
BSA FOR ECHO PROCEDURE: 2.13 M2
BUN SERPL-MCNC: 12 MG/DL (ref 8–23)
CALCIUM SERPL-MCNC: 9.3 MG/DL (ref 8.7–10.5)
CHLORIDE SERPL-SCNC: 109 MMOL/L (ref 95–110)
CO2 SERPL-SCNC: 19 MMOL/L (ref 23–29)
CREAT SERPL-MCNC: 0.7 MG/DL (ref 0.5–1.4)
CV ECHO LV RWT: 0.32 CM
DIFFERENTIAL METHOD BLD: NORMAL
DOP CALC AO PEAK VEL: 1.1 M/S
DOP CALC AO VTI: 21.59 CM
DOP CALC LVOT AREA: 3 CM2
DOP CALC LVOT DIAMETER: 1.96 CM
DOP CALC LVOT PEAK VEL: 1.06 M/S
DOP CALC LVOT STROKE VOLUME: 62.97 CM3
DOP CALCLVOT PEAK VEL VTI: 20.88 CM
E/E' RATIO: 6.41 M/S
ECHO LV POSTERIOR WALL: 0.9 CM (ref 0.6–1.1)
EOSINOPHIL # BLD AUTO: 0.2 K/UL (ref 0–0.5)
EOSINOPHIL NFR BLD: 4.1 % (ref 0–8)
ERYTHROCYTE [DISTWIDTH] IN BLOOD BY AUTOMATED COUNT: 14 % (ref 11.5–14.5)
EST. GFR  (NO RACE VARIABLE): >60 ML/MIN/1.73 M^2
FRACTIONAL SHORTENING: 54 % (ref 28–44)
GLUCOSE SERPL-MCNC: 87 MG/DL (ref 70–110)
HCT VFR BLD AUTO: 43.9 % (ref 37–48.5)
HGB BLD-MCNC: 14.7 G/DL (ref 12–16)
IMM GRANULOCYTES # BLD AUTO: 0.01 K/UL (ref 0–0.04)
IMM GRANULOCYTES NFR BLD AUTO: 0.2 % (ref 0–0.5)
INTERVENTRICULAR SEPTUM: 0.9 CM (ref 0.6–1.1)
IVRT: 78.02 MSEC
LA MAJOR: 6.22 CM
LA MINOR: 6.15 CM
LA WIDTH: 4.02 CM
LEFT ATRIUM SIZE: 4 CM
LEFT ATRIUM VOLUME INDEX MOD: 27.3 ML/M2
LEFT ATRIUM VOLUME INDEX: 40.8 ML/M2
LEFT ATRIUM VOLUME MOD: 56.53 CM3
LEFT ATRIUM VOLUME: 84.53 CM3
LEFT INTERNAL DIMENSION IN SYSTOLE: 2.64 CM (ref 2.1–4)
LEFT VENTRICLE DIASTOLIC VOLUME INDEX: 40.68 ML/M2
LEFT VENTRICLE DIASTOLIC VOLUME: 84.21 ML
LEFT VENTRICLE MASS INDEX: 95 G/M2
LEFT VENTRICLE SYSTOLIC VOLUME INDEX: 12.4 ML/M2
LEFT VENTRICLE SYSTOLIC VOLUME: 25.64 ML
LEFT VENTRICULAR INTERNAL DIMENSION IN DIASTOLE: 5.7 CM (ref 3.5–6)
LEFT VENTRICULAR MASS: 197.52 G
LV LATERAL E/E' RATIO: 6.81 M/S
LV SEPTAL E/E' RATIO: 6.06 M/S
LYMPHOCYTES # BLD AUTO: 1.8 K/UL (ref 1–4.8)
LYMPHOCYTES NFR BLD: 34.6 % (ref 18–48)
MAGNESIUM SERPL-MCNC: 2.2 MG/DL (ref 1.6–2.6)
MCH RBC QN AUTO: 31 PG (ref 27–31)
MCHC RBC AUTO-ENTMCNC: 33.5 G/DL (ref 32–36)
MCV RBC AUTO: 93 FL (ref 82–98)
MONOCYTES # BLD AUTO: 0.6 K/UL (ref 0.3–1)
MONOCYTES NFR BLD: 11.2 % (ref 4–15)
MV PEAK E VEL: 1.09 M/S
NEUTROPHILS # BLD AUTO: 2.5 K/UL (ref 1.8–7.7)
NEUTROPHILS NFR BLD: 48.9 % (ref 38–73)
NRBC BLD-RTO: 0 /100 WBC
OHS LV EJECTION FRACTION SIMPSONS BIPLANE MOD: 56 %
OHS QRS DURATION: 86 MS
OHS QTC CALCULATION: 542 MS
PHOSPHATE SERPL-MCNC: 2.9 MG/DL (ref 2.7–4.5)
PISA TR MAX VEL: 2.03 M/S
PLATELET # BLD AUTO: 244 K/UL (ref 150–450)
PMV BLD AUTO: 10.6 FL (ref 9.2–12.9)
POTASSIUM SERPL-SCNC: 3.9 MMOL/L (ref 3.5–5.1)
RA MAJOR: 5.43 CM
RA PRESSURE ESTIMATED: 3 MMHG
RA WIDTH: 3.25 CM
RBC # BLD AUTO: 4.74 M/UL (ref 4–5.4)
RIGHT VENTRICULAR END-DIASTOLIC DIMENSION: 3.01 CM
RV TB RVSP: 5 MMHG
SINUS: 2.84 CM
SODIUM SERPL-SCNC: 138 MMOL/L (ref 136–145)
STJ: 2.62 CM
TDI LATERAL: 0.16 M/S
TDI SEPTAL: 0.18 M/S
TDI: 0.17 M/S
TR MAX PG: 16 MMHG
TRICUSPID ANNULAR PLANE SYSTOLIC EXCURSION: 1.72 CM
TV REST PULMONARY ARTERY PRESSURE: 19 MMHG
WBC # BLD AUTO: 5.08 K/UL (ref 3.9–12.7)
Z-SCORE OF LEFT VENTRICULAR DIMENSION IN END DIASTOLE: -0.99
Z-SCORE OF LEFT VENTRICULAR DIMENSION IN END SYSTOLE: -2.98

## 2024-03-24 PROCEDURE — 96366 THER/PROPH/DIAG IV INF ADDON: CPT

## 2024-03-24 PROCEDURE — 93010 ELECTROCARDIOGRAM REPORT: CPT | Mod: ,,, | Performed by: INTERNAL MEDICINE

## 2024-03-24 PROCEDURE — 36415 COLL VENOUS BLD VENIPUNCTURE: CPT | Performed by: PHYSICIAN ASSISTANT

## 2024-03-24 PROCEDURE — 84100 ASSAY OF PHOSPHORUS: CPT | Performed by: PHYSICIAN ASSISTANT

## 2024-03-24 PROCEDURE — 85025 COMPLETE CBC W/AUTO DIFF WBC: CPT | Performed by: PHYSICIAN ASSISTANT

## 2024-03-24 PROCEDURE — 20600001 HC STEP DOWN PRIVATE ROOM

## 2024-03-24 PROCEDURE — 25000003 PHARM REV CODE 250: Performed by: STUDENT IN AN ORGANIZED HEALTH CARE EDUCATION/TRAINING PROGRAM

## 2024-03-24 PROCEDURE — 63600175 PHARM REV CODE 636 W HCPCS: Performed by: STUDENT IN AN ORGANIZED HEALTH CARE EDUCATION/TRAINING PROGRAM

## 2024-03-24 PROCEDURE — 25000003 PHARM REV CODE 250: Performed by: PHYSICIAN ASSISTANT

## 2024-03-24 PROCEDURE — 80048 BASIC METABOLIC PNL TOTAL CA: CPT | Performed by: PHYSICIAN ASSISTANT

## 2024-03-24 PROCEDURE — 93005 ELECTROCARDIOGRAM TRACING: CPT

## 2024-03-24 PROCEDURE — 99231 SBSQ HOSP IP/OBS SF/LOW 25: CPT | Mod: GC,,, | Performed by: INTERNAL MEDICINE

## 2024-03-24 PROCEDURE — 83735 ASSAY OF MAGNESIUM: CPT | Performed by: PHYSICIAN ASSISTANT

## 2024-03-24 RX ADMIN — APIXABAN 5 MG: 5 TABLET, FILM COATED ORAL at 09:03

## 2024-03-24 RX ADMIN — LEVOTHYROXINE SODIUM 25 MCG: 25 TABLET ORAL at 06:03

## 2024-03-24 RX ADMIN — APIXABAN 5 MG: 5 TABLET, FILM COATED ORAL at 08:03

## 2024-03-24 RX ADMIN — PRAVASTATIN SODIUM 40 MG: 40 TABLET ORAL at 09:03

## 2024-03-24 RX ADMIN — CEFTRIAXONE 1 G: 1 INJECTION, POWDER, FOR SOLUTION INTRAMUSCULAR; INTRAVENOUS at 10:03

## 2024-03-24 RX ADMIN — DILTIAZEM HYDROCHLORIDE 240 MG: 120 CAPSULE, COATED, EXTENDED RELEASE ORAL at 09:03

## 2024-03-24 NOTE — PLAN OF CARE
[facility] - Cardiology Stepdown  Initial Discharge Assessment       Primary Care Provider: Naz Condon MD    Admission Diagnosis: Palpitations [R00.2]  Dysrhythmia [I49.9]  Tachycardia [R00.0]  Chest pain [R07.9]    Admission Date: 3/22/2024  Expected Discharge Date: 3/25/2024    Transition of Care Barriers: (P) None    Payor: HU[employer] MANAGED MEDICARE / Plan: HU[employer] MEDICARE HMO / Product Type: Capitation /     Extended Emergency Contact Information  Primary Emergency Contact: Dorian Fuentes  Address: [address] [place] [address]            [place] [address] [place]  Mobile Phone: [phone]  Relation: Spouse    Discharge Plan A: (P) Home with family  Discharge Plan B: (P) Home      [id number] [place], [place] - [address] [facility] AT CORNER OF [address] [address]  Phone: [phone] Fax: [phone]    [facility] - [place] [id number] [facility]  [address] [facility] [facility] [address]  Phone: [phone] Fax: [phone]      Initial Assessment (most recent)       Adult Discharge Assessment - 03/24/24 1725          Discharge Assessment    Assessment Type Discharge Planning Assessment (P)      Confirmed/corrected address, phone number and insurance Yes (P)      Confirmed Demographics Correct on Facesheet (P)      Source of Information patient (P)      When was your last doctors appointment? -- (P)    August 2023.    Does patient/caregiver understand observation status No (P)      Was observation education provided? Yes (P)      Communicated TIMA with patient/caregiver Yes (P)      Reason For Admission Palpitations, Acute Cystitis (P)      People in Home spouse;parent(s) (P)      Do you expect to return to your current living situation? Yes (P)      Do you have help at home or someone to help you manage your care at home? Yes (P)      Who are your caregiver(s) and their phone number(s)? Dorian Caldera (c) [phone] (P)       Prior to hospitilization cognitive status: Alert/Oriented (P)      Current cognitive status: Alert/Oriented (P)      Walking or Climbing Stairs Difficulty no (P)      Dressing/Bathing Difficulty no (P)      Equipment Currently Used at Home none (P)      Readmission within 30 days? No (P)      Patient currently being followed by outpatient case management? No (P)      Do you currently have service(s) that help you manage your care at home? No (P)      Do you take prescription medications? Yes (P)      Do you have prescription coverage? Yes (P)      Coverage [employer] Medicare (P)      Do you have any problems affording any of your prescribed medications? No (P)      Is the patient taking medications as prescribed? yes (P)      Who is going to help you get home at discharge? Spouse (P)      How do you get to doctors appointments? car, drives self (P)      Are you on dialysis? No (P)      Do you take coumadin? No (P)      Discharge Plan A Home with family (P)      Discharge Plan B Home (P)      DME Needed Upon Discharge  none (P)      Discharge Plan discussed with: Patient (P)      Transition of Care Barriers None (P)         Physical Activity    On average, how many days per week do you engage in moderate to strenuous exercise (like a brisk walk)? 0 days (P)      On average, how many minutes do you engage in exercise at this level? 0 min (P)         Financial Resource Strain    How hard is it for you to pay for the very basics like food, housing, medical care, and heating? Not hard at all (P)         Housing Stability    In the last 12 months, was there a time when you were not able to pay the mortgage or rent on time? No (P)      In the last 12 months, how many places have you lived? 1 (P)      In the last 12 months, was there a time when you did not have a steady place to sleep or slept in a shelter (including now)? No (P)         Transportation Needs    In the past 12 months, has lack of transportation kept you  from medical appointments or from getting medications? No (P)      In the past 12 months, has lack of transportation kept you from meetings, work, or from getting things needed for daily living? No (P)         Food Insecurity    Within the past 12 months, you worried that your food would run out before you got the money to buy more. Never true (P)      Within the past 12 months, the food you bought just didn't last and you didn't have money to get more. Never true (P)         Stress    Do you feel stress - tense, restless, nervous, or anxious, or unable to sleep at night because your mind is troubled all the time - these days? To some extent (P)         Social Connections    In a typical week, how many times do you talk on the phone with family, friends, or neighbors? More than three times a week (P)      How often do you get together with friends or relatives? More than three times a week (P)      How often do you attend Taoist or Yazidism services? More than 4 times per year (P)      Do you belong to any clubs or organizations such as Taoist groups, unions, fraternal or athletic groups, or school groups? No (P)      How often do you attend meetings of the clubs or organizations you belong to? Never (P)      Are you , , , , never , or living with a partner?  (P)         Alcohol Use    Q1: How often do you have a drink containing alcohol? Never (P)      Q2: How many drinks containing alcohol do you have on a typical day when you are drinking? Patient does not drink (P)      Q3: How often do you have six or more drinks on one occasion? Never (P)         OTHER    Name(s) of People in Home Spouse & Father (P)                    SW met with pt at bedside to complete Brief Discharge Assessment. Pt., spouse & pt's father live in their home in [place], [place]. The home is a single story home with no steps/stairs to enter. No DME, Home Health, Dialysis or Coumadin. Pt drives, but  spouse will provide transportation home upon discharge. SW noting pt is experiencing some caregiver stress related to her father (90's) at home on hospice. Pt. Preparing for discharge tomorrow following a Cardioversion procedure.     Discharge Plan A and Plan B have been determined by review of patient's clinical status, future medical and therapeutic needs, and coverage/benefits for post-acute care in coordination with multidisciplinary team members.     Osiris Kyle LMSW

## 2024-03-24 NOTE — SUBJECTIVE & OBJECTIVE
Interval History: Pt seen and examined this morning on rounds. JULITA. Continues to do well, awaiting EP procedure tomorrow. Care plan reviewed. Otherwise, doing well and with no further complaints at this time.      Objective:     Vital Signs (Most Recent):  Temp: 97.4 °F (36.3 °C) (03/24/24 0819)  Pulse: (!) 112 (03/24/24 1115)  Resp: 20 (03/24/24 0819)  BP: (!) 146/72 (03/24/24 0819)  SpO2: 97 % (03/24/24 0819) Vital Signs (24h Range):  Temp:  [97.4 °F (36.3 °C)-97.7 °F (36.5 °C)] 97.4 °F (36.3 °C)  Pulse:  [] 112  Resp:  [18-20] 20  SpO2:  [96 %-99 %] 97 %  BP: (112-149)/(72-77) 146/72     Weight: 95.7 kg (211 lb)  Body mass index is 33.05 kg/m².    Intake/Output Summary (Last 24 hours) at 3/24/2024 1131  Last data filed at 3/23/2024 2304  Gross per 24 hour   Intake 240 ml   Output 300 ml   Net -60 ml           Physical Exam  Gen: in NAD, appears stated age; pt is obese  Neuro: AAOx4, CN2-12 grossly intact BL; motor, sensory, and strength grossly intact BL  HEENT: NTNC, EOMI, PERRLA, MMM; no thyromegaly or lymphadenopathy; no JVD appreciated  CVS: RRR, no m/r/g; S1/S2 auscultated with no S3 or S4; capillary refill < 2 sec  Resp: lungs CTAB, no w/r/r; no belabored breathing or accessory muscle use appreciated   Abd: BS+ in all 4 quadrants; NTND, soft to palpation; no organomegaly appreciated   Extrem: pulses full, equal, and regular over all 4 extremities; no UE or LE edema BL        Significant Labs: All pertinent labs within the past 24 hours have been reviewed.    Significant Imaging: I have reviewed all pertinent imaging results/findings within the past 24 hours.

## 2024-03-24 NOTE — PROGRESS NOTES
[facility] - Cardiology Stepdown  Cardiac Electrophysiology  Progress Note    Admission Date: 3/22/2024  Code Status: Full Code   Attending Physician: Oliver Mckeon MD   Expected Discharge Date: 3/25/2024  Principal Problem:Atrial flutter    Subjective:     Interval  Patient continues to be in slow aflutter at 2:1 conduction, HR 100s. Reports she feels palpitations in her chest but otherwise no other sxs. NAEON.    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 Dr Viktor Toure    Osteopenia of neck of left femur 6/4/2020    Paroxysmal atrioventricular tachycardia     Plantar fasciitis     Right knee meniscal tear     Dr Mayfield, tx conservatively    Severe obesity (BMI 35.0-35.9 with comorbidity) 1/24/2017    Slow transit constipation 7/13/2021    Despite fruits & veg & 1200 dunia diet, try Colace daily    Stress incontinence, female 03/28/2018    hasn't tried oxybutynin, After HYST, Kegels & PT  didn't work, worse at night wearing pads, Dr Infante    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    Thyroid disease        Past Surgical History:   Procedure Laterality Date    ABLATION OF ARRHYTHMOGENIC FOCUS FOR ATRIAL FIBRILLATION N/A 10/17/2023    Procedure: Ablation atrial fibrillation;  Surgeon: Ameya Servin MD;  Location: [facility] EP LAB;  Service: Cardiology;  Laterality: N/A;  AF, PERICO, PVI, WPW, RFA, POPEYE, Gen, DM, 3 Prep *MDT ILR*    ABLATION, MECHANOCHEMICAL, VARICOSE VEIN Right 12/8/2023    Procedure:  ABLATION, MECHANOCHEMICAL, VARICOSE VEIN;  Surgeon: Simone Shannon MD;  Location: [facility] CATH LAB/EP;  Service: Cardiology;  Laterality: Right;    APPENDECTOMY      COLONOSCOPY N/A 8/13/2020    Procedure: COLONOSCOPY;  Surgeon: Angus Ac MD;  Location: [facility] ENDO (4TH FLR);  Service: Endoscopy;  Laterality: N/A;  ok to hold Eliquis 2 days per Dr Servin     COVID test at [place] on 8/10-GT    ECHOCARDIOGRAM,TRANSESOPHAGEAL N/A 9/20/2023    Procedure: Transesophageal echo (PERICO) intra-procedure log documentation;  Surgeon: Provider, Dosc [facility];  Location: [facility] EP LAB;  Service: Cardiology;  Laterality: N/A;    HYSTERECTOMY  1990    TAHBSO for fibroids    INSERTION OF IMPLANTABLE LOOP RECORDER Left 11/1/2021    Procedure: Insertion, Implantable Loop Recorder;  Surgeon: Ameya Servin MD;  Location: [facility] EP LAB;  Service: Cardiology;  Laterality: Left;  AF, ILR implant, MDT,  DM, 3 Prep    MANDIBLE SURGERY  2015    L mandible, Dr Toure    MANDIBLE SURGERY Left 8/27/2019    OOPHORECTOMY      TONSILLECTOMY      TRANSESOPHAGEAL ECHOCARDIOGRAM WITH POSSIBLE CARDIOVERSION (PERICO W/ POSS CARDIOVERSION) N/A 1/19/2024    Procedure: Transesophageal echo (PERICO) intra-procedure log documentation;  Surgeon: JV Rosenthal MD;  Location: [facility] EP LAB;  Service: Cardiology;  Laterality: N/A;    TRANSESOPHAGEAL ECHOCARDIOGRAPHY N/A 10/17/2023    Procedure: ECHOCARDIOGRAM, TRANSESOPHAGEAL;  Surgeon: Kathy Malik MD;  Location: [facility] EP LAB;  Service: Cardiology;  Laterality: N/A;    TREATMENT OF CARDIAC ARRHYTHMIA N/A 9/20/2023    Procedure: Cardioversion or Defibrillation;  Surgeon: JV Rosenthal MD;  Location: [facility] EP LAB;  Service: Cardiology;  Laterality: N/A;  AF, DCCV/PERICO, ANES, EH,     TREATMENT OF CARDIAC ARRHYTHMIA N/A 1/19/2024    Procedure: Cardioversion or Defibrillation;  Surgeon: JV Rosenthal MD;  Location: [facility] EP LAB;  Service: Cardiology;  Laterality: N/A;  AFL, PERICO/DCCV, ANES, EH, RM  303    TREATMENT OF CARDIAC ARRHYTHMIA N/A 1/19/2024    Procedure: Cardioversion or Defibrillation;  Surgeon: Luis Joiner MD;  Location: [facility] EP LAB;  Service: Cardiology;  Laterality: N/A;  AFL, DCCV ONLY, ANES, EH,        Review of patient's allergies indicates:   Allergen Reactions    Decongestant d [pseudoephedrine-dm]      Atrial Fibrillation    Augmentin [amoxicillin-pot clavulanate]      palpitations      Amoxicillin Palpitations    Diphenhydramine-pseudoephed Palpitations     TACHYCARDIA    Povidone-iodine Rash     Mild erythema of skin       Current Facility-Administered Medications on File Prior to Encounter   Medication    sodium chloride 0.9% bolus 1,000 mL    vancomycin in dextrose 5 % 1 gram/250 mL IVPB 1,000 mg     Current Outpatient Medications on File Prior to Encounter   Medication Sig    diltiaZEM (CARDIZEM CD) 240 MG 24 hr capsule Take 1 capsule (240 mg total) by mouth once daily.    ELIQUIS 5 mg Tab TAKE 1 TABLET TWICE DAILY    fluticasone propionate (FLONASE) 50 mcg/actuation nasal spray USE 1 SPRAY IN EACH NOSTRIL EVERY DAY    levothyroxine (SYNTHROID) 25 MCG tablet TAKE 1 TABLET ONE TIME DAILY    nystatin (MYCOSTATIN) cream Apply topically 2 (two) times daily.    pravastatin (PRAVACHOL) 40 MG tablet TAKE 1 TABLET ONE TIME DAILY     Family History       Problem Relation (Age of Onset)    Cancer Mother          Tobacco Use    Smoking status: Never    Smokeless tobacco: Never   Substance and Sexual Activity    Alcohol use: No    Drug use: No    Sexual activity: Not Currently     Review of Systems   Constitutional: Negative for chills, fever, malaise/fatigue and night sweats.   HENT:  Negative for congestion, nosebleeds, sore throat, stridor and tinnitus.    Eyes:  Negative for blurred vision, discharge, double vision, pain, vision loss in left eye, vision loss in right eye, visual disturbance and visual halos.   Cardiovascular:  Positive for palpitations. Negative for chest pain,  dyspnea on exertion, leg swelling, near-syncope, orthopnea and syncope.   Respiratory:  Negative for cough, hemoptysis, shortness of breath, snoring, sputum production and wheezing.    Endocrine: Negative for cold intolerance, heat intolerance and polydipsia.   Hematologic/Lymphatic: Negative for adenopathy and bleeding problem. Does not bruise/bleed easily.   Skin:  Negative for color change, dry skin, flushing, poor wound healing and suspicious lesions.   Musculoskeletal:  Negative for arthritis, back pain, gout, joint pain and joint swelling.   Gastrointestinal:  Negative for bloating, abdominal pain, constipation and diarrhea.   Genitourinary:  Negative for dysuria, frequency and hematuria.   Neurological:  Negative for dizziness, focal weakness, headaches, light-headedness, loss of balance, numbness and weakness.   Psychiatric/Behavioral:  Negative for altered mental status, hallucinations and hypervigilance. The patient is not nervous/anxious.      Objective:     Vital Signs (Most Recent):  Temp: 97.4 °F (36.3 °C) (03/24/24 0819)  Pulse: 103 (03/24/24 0819)  Resp: 20 (03/24/24 0819)  BP: (!) 146/72 (03/24/24 0819)  SpO2: 97 % (03/24/24 0819) Vital Signs (24h Range):  Temp:  [97.3 °F (36.3 °C)-97.7 °F (36.5 °C)] 97.4 °F (36.3 °C)  Pulse:  [] 103  Resp:  [18-20] 20  SpO2:  [96 %-99 %] 97 %  BP: (112-149)/(60-77) 146/72       Weight: 95.7 kg (211 lb)  Body mass index is 33.05 kg/m².    SpO2: 97 %        Physical Exam  Constitutional:       General: She is not in acute distress.     Appearance: Normal appearance. She is normal weight. She is not ill-appearing or toxic-appearing.   HENT:      Head: Normocephalic.   Eyes:      General:         Right eye: No discharge.         Left eye: No discharge.      Extraocular Movements: Extraocular movements intact.      Conjunctiva/sclera: Conjunctivae normal.      Pupils: Pupils are equal, round, and reactive to light.   Cardiovascular:      Rate and Rhythm: Regular  rhythm. Tachycardia present.      Pulses: Normal pulses.      Heart sounds: Normal heart sounds. No murmur heard.     No friction rub.   Pulmonary:      Effort: Pulmonary effort is normal. No respiratory distress.      Breath sounds: Normal breath sounds. No wheezing or rales.   Abdominal:      General: Abdomen is flat. Bowel sounds are normal. There is no distension.      Palpations: Abdomen is soft.      Tenderness: There is no abdominal tenderness.   Musculoskeletal:         General: No swelling, deformity or signs of injury. Normal range of motion.      Cervical back: Normal range of motion and neck supple. No rigidity.      Right lower leg: No edema.      Left lower leg: No edema.   Lymphadenopathy:      Cervical: No cervical adenopathy.   Skin:     General: Skin is warm and dry.      Capillary Refill: Capillary refill takes less than 2 seconds.      Coloration: Skin is not jaundiced.      Findings: No bruising or lesion.   Neurological:      General: No focal deficit present.      Mental Status: She is oriented to person, place, and time. Mental status is at baseline.      Cranial Nerves: No cranial nerve deficit.      Motor: No weakness.   Psychiatric:         Mood and Affect: Mood normal.         Behavior: Behavior normal.         Thought Content: Thought content normal.         Judgment: Judgment normal.            Significant Labs: None    Significant Imaging:  None  Assessment and Plan:     * Atrial flutter  73F pmhx pAfib (PVI 10/2024), MATTHEW, patient of Dr. Servin, admitted for palpitations, found to be in atypical atrial flutter. Atrial flutter is rate controlled. At home she is on dilt 240mg and PIP flecainide. Last took amiodarone in February for 3 months after her 10/2023 ablation.  -Patient of Dr. Servin, planned ablation in 4/16/24  -Continue diltiazem 240mg. Will start Flecainide after, then hold 5 days prior to procedure.   -continue eliquis  -Will take for DCCV only (pre+post orders in) tomorrow if  continuous. Atrial rate is slower at 200bpm, at 2:1 conduction.        Efra Villatoro MD  Cardiac Electrophysiology  [facility] - Cardiology Stepdown

## 2024-03-24 NOTE — PROGRESS NOTES
[facility] - Cardiology [facility] Medicine  Progress Note    Patient Name: Flores Fuentes  MRN: [id number]  Patient Class: OP- Observation   Admission Date: 3/22/2024  Length of Stay: 0 days  Attending Physician: Oliver Mckeon MD  Primary Care Provider: Naz Condon MD        Subjective:     Principal Problem:Acute cystitis without hematuria        HPI:  Flores Fuentes is a 73 y.o. female with a PMHx of paroxysmal Afib on eliquis, hypothyroidism, HLD, hx WPW who presents to [facility] for evaluation of palpitations. Patient reports feeling herself go into Afib on Tuesday night. Since then, she's been having intermittent palpitations and tachycardia with her HR randing from . She had an episode of lightheadedness/dizziness earlier today when she felt like she was going to pass out. She sat down to rest with resolution of symptoms. No actual LOC. She has a loop recorder in place and was told by her outpatient cardiologist that she went into afib 3 times over the last few days. She is scheduled to get another ablation done on 4/14. Recently taken off amiodarone about 30 days ago. Has chronic BLE edema due to venous insufficiency without recent worsening. Denies fever, chills, chest pain, SOB, N/V, abdominal pain, calf pain, vision changes, or syncope.    ED: AFVSS. No leukocytosis or electrolyte abnormalities. EKG shows NSR. CXR without acute process. Admitted to hospital medicine.     Overview/Hospital Course:  [place] [facility] and remained stable overnight and into 3/23/2024. No acute events on tele, remained grossly asymptomatic. EP consulted: plans for PERICO/DCCV on 3/25.    Interval History: Pt seen and examined this morning on roundsLouise CANCINO. Continues to do well, awaiting EP procedure tomorrow. Care plan reviewed. Otherwise, doing well and with no further complaints at this time.      Objective:     Vital Signs (Most Recent):  Temp: 97.4 °F (36.3 °C) (03/24/24 0819)  Pulse: (!) 112  (03/24/24 1115)  Resp: 20 (03/24/24 0819)  BP: (!) 146/72 (03/24/24 0819)  SpO2: 97 % (03/24/24 0819) Vital Signs (24h Range):  Temp:  [97.4 °F (36.3 °C)-97.7 °F (36.5 °C)] 97.4 °F (36.3 °C)  Pulse:  [] 112  Resp:  [18-20] 20  SpO2:  [96 %-99 %] 97 %  BP: (112-149)/(72-77) 146/72     Weight: 95.7 kg (211 lb)  Body mass index is 33.05 kg/m².    Intake/Output Summary (Last 24 hours) at 3/24/2024 1131  Last data filed at 3/23/2024 2304  Gross per 24 hour   Intake 240 ml   Output 300 ml   Net -60 ml           Physical Exam  Gen: in NAD, appears stated age; pt is obese  Neuro: AAOx4, CN2-12 grossly intact BL; motor, sensory, and strength grossly intact BL  HEENT: NTNC, EOMI, PERRLA, MMM; no thyromegaly or lymphadenopathy; no JVD appreciated  CVS: RRR, no m/r/g; S1/S2 auscultated with no S3 or S4; capillary refill < 2 sec  Resp: lungs CTAB, no w/r/r; no belabored breathing or accessory muscle use appreciated   Abd: BS+ in all 4 quadrants; NTND, soft to palpation; no organomegaly appreciated   Extrem: pulses full, equal, and regular over all 4 extremities; no UE or LE edema BL        Significant Labs: All pertinent labs within the past 24 hours have been reviewed.    Significant Imaging: I have reviewed all pertinent imaging results/findings within the past 24 hours.    Assessment/Plan:      * Acute cystitis without hematuria  - Interval history and physical exam findings as described above  - UA findings reviewed  - UCx pending  - Afebrile, no leukocytosis  - Empiric rocephin provided pending final C&S results    Atrial flutter  - Interval history and physical exam findings as described above  - Hx multiple cardioversion and ablation: mixed Afib/Aflutter  - Continue eliquis and diltiazem  - EP consulted: plans for PERICO/DCCV on 3/25  - Monitoring on tele    Paroxysmal A-fib  - As above    Class 1 obesity due to excess calories with serious comorbidity and body mass index (BMI) of 33.0 to 33.9 in adult  - Body mass  index is 33.08 kg/m².  - Needs dietary and lifestyle modifications in relation to health  - Consider dietary/nutrition consult outpatient    Hypothyroidism  - TSH WNL  - continue synthroid     Kym-Parkinson-White (WPW) pattern  - reports being told she didn't actually have WPW after her ablation     MATTHEW (obstructive sleep apnea)  - use home CPAP    Essential hypertension  - chronic, controlled    Mixed hyperlipidemia  - continue statin       VTE Risk Mitigation (From admission, onward)           Ordered     apixaban tablet 5 mg  2 times daily         03/23/24 0008     Reason for No Pharmacological VTE Prophylaxis  Once        Question:  Reasons:  Answer:  Already adequately anticoagulated on oral Anticoagulants    03/22/24 2121     IP VTE HIGH RISK PATIENT  Once         03/22/24 2121     Place sequential compression device  Until discontinued         03/22/24 2114                    Discharge Planning   TIMA: 3/25/2024     Code Status: Full Code   Is the patient medically ready for discharge?: No    Reason for patient still in hospital (select all that apply): Patient trending condition           Oliver Mckeon MD  Attending Physician  Medical Director - [facility] Observation Unit  Department of Hospital Medicine  3/24/2024

## 2024-03-24 NOTE — SUBJECTIVE & OBJECTIVE
Interval  Patient continues to be in slow aflutter at 2:1 conduction, HR 100s. Reports she feels palpitations in her chest but otherwise no other sxs. NAEON.    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 Dr Viktor Toure    Osteopenia of neck of left femur 6/4/2020    Paroxysmal atrioventricular tachycardia     Plantar fasciitis     Right knee meniscal tear     Dr Mayfield, tx conservatively    Severe obesity (BMI 35.0-35.9 with comorbidity) 1/24/2017    Slow transit constipation 7/13/2021    Despite fruits & veg & 1200 dunia diet, try Colace daily    Stress incontinence, female 03/28/2018    hasn't tried oxybutynin, After HYST, Kegels & PT  didn't work, worse at night wearing pads, Dr Infante    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    Thyroid disease        Past Surgical History:   Procedure Laterality Date    ABLATION OF ARRHYTHMOGENIC FOCUS FOR ATRIAL FIBRILLATION N/A 10/17/2023    Procedure: Ablation atrial fibrillation;  Surgeon: Ameya Servin MD;  Location: [facility] EP LAB;  Service: Cardiology;  Laterality: N/A;  AF, PERICO, PVI, WPW, RFA, POPEYE, Gen, DM, 3 Prep *MDT ILR*    ABLATION, MECHANOCHEMICAL, VARICOSE VEIN Right 12/8/2023    Procedure: ABLATION, MECHANOCHEMICAL, VARICOSE VEIN;  Surgeon: Simone Shannon MD;  Location: [facility] CATH LAB/EP;  Service: Cardiology;  Laterality: Right;    APPENDECTOMY      COLONOSCOPY N/A 8/13/2020    Procedure: COLONOSCOPY;  Surgeon: Angus Ac MD;  Location:  [facility] ENDO (4TH FLR);  Service: Endoscopy;  Laterality: N/A;  ok to hold Eliquis 2 days per Dr Servin     COVID test at [place] on 8/10-GT    ECHOCARDIOGRAM,TRANSESOPHAGEAL N/A 9/20/2023    Procedure: Transesophageal echo (PERICO) intra-procedure log documentation;  Surgeon: Provider, Dosc Diagnostic;  Location: [facility] EP LAB;  Service: Cardiology;  Laterality: N/A;    HYSTERECTOMY  1990    TAHBSO for fibroids    INSERTION OF IMPLANTABLE LOOP RECORDER Left 11/1/2021    Procedure: Insertion, Implantable Loop Recorder;  Surgeon: Ameya Servin MD;  Location: [facility] EP LAB;  Service: Cardiology;  Laterality: Left;  AF, ILR implant, MDT,  DM, 3 Prep    MANDIBLE SURGERY  2015    L mandible, Dr Toure    MANDIBLE SURGERY Left 8/27/2019    OOPHORECTOMY      TONSILLECTOMY      TRANSESOPHAGEAL ECHOCARDIOGRAM WITH POSSIBLE CARDIOVERSION (PERICO W/ POSS CARDIOVERSION) N/A 1/19/2024    Procedure: Transesophageal echo (PERICO) intra-procedure log documentation;  Surgeon: JV Rosenthal MD;  Location: [facility] EP LAB;  Service: Cardiology;  Laterality: N/A;    TRANSESOPHAGEAL ECHOCARDIOGRAPHY N/A 10/17/2023    Procedure: ECHOCARDIOGRAM, TRANSESOPHAGEAL;  Surgeon: Kathy Malik MD;  Location: [facility] EP LAB;  Service: Cardiology;  Laterality: N/A;    TREATMENT OF CARDIAC ARRHYTHMIA N/A 9/20/2023    Procedure: Cardioversion or Defibrillation;  Surgeon: JV Rosenthal MD;  Location: [facility] EP LAB;  Service: Cardiology;  Laterality: N/A;  AF, DCCV/PERICO, ANES, EH,     TREATMENT OF CARDIAC ARRHYTHMIA N/A 1/19/2024    Procedure: Cardioversion or Defibrillation;  Surgeon: JV Rosenthal MD;  Location: [facility] EP LAB;  Service: Cardiology;  Laterality: N/A;  AFL, PERICO/DCCV, ANES, EH,     TREATMENT OF CARDIAC ARRHYTHMIA N/A 1/19/2024    Procedure: Cardioversion or Defibrillation;  Surgeon: Luis Joiner MD;  Location: [facility] EP LAB;  Service: Cardiology;  Laterality: N/A;  AFL, DCCV ONLY, ANES, EH,        Review of patient's  allergies indicates:   Allergen Reactions    Decongestant d [pseudoephedrine-dm]      Atrial Fibrillation    Augmentin [amoxicillin-pot clavulanate]      palpitations      Amoxicillin Palpitations    Diphenhydramine-pseudoephed Palpitations     TACHYCARDIA    Povidone-iodine Rash     Mild erythema of skin       Current Facility-Administered Medications on File Prior to Encounter   Medication    sodium chloride 0.9% bolus 1,000 mL    vancomycin in dextrose 5 % 1 gram/250 mL IVPB 1,000 mg     Current Outpatient Medications on File Prior to Encounter   Medication Sig    diltiaZEM (CARDIZEM CD) 240 MG 24 hr capsule Take 1 capsule (240 mg total) by mouth once daily.    ELIQUIS 5 mg Tab TAKE 1 TABLET TWICE DAILY    fluticasone propionate (FLONASE) 50 mcg/actuation nasal spray USE 1 SPRAY IN EACH NOSTRIL EVERY DAY    levothyroxine (SYNTHROID) 25 MCG tablet TAKE 1 TABLET ONE TIME DAILY    nystatin (MYCOSTATIN) cream Apply topically 2 (two) times daily.    pravastatin (PRAVACHOL) 40 MG tablet TAKE 1 TABLET ONE TIME DAILY     Family History       Problem Relation (Age of Onset)    Cancer Mother          Tobacco Use    Smoking status: Never    Smokeless tobacco: Never   Substance and Sexual Activity    Alcohol use: No    Drug use: No    Sexual activity: Not Currently     Review of Systems   Constitutional: Negative for chills, fever, malaise/fatigue and night sweats.   HENT:  Negative for congestion, nosebleeds, sore throat, stridor and tinnitus.    Eyes:  Negative for blurred vision, discharge, double vision, pain, vision loss in left eye, vision loss in right eye, visual disturbance and visual halos.   Cardiovascular:  Positive for palpitations. Negative for chest pain, dyspnea on exertion, leg swelling, near-syncope, orthopnea and syncope.   Respiratory:  Negative for cough, hemoptysis, shortness of breath, snoring, sputum production and wheezing.    Endocrine: Negative for cold intolerance, heat intolerance and polydipsia.    Hematologic/Lymphatic: Negative for adenopathy and bleeding problem. Does not bruise/bleed easily.   Skin:  Negative for color change, dry skin, flushing, poor wound healing and suspicious lesions.   Musculoskeletal:  Negative for arthritis, back pain, gout, joint pain and joint swelling.   Gastrointestinal:  Negative for bloating, abdominal pain, constipation and diarrhea.   Genitourinary:  Negative for dysuria, frequency and hematuria.   Neurological:  Negative for dizziness, focal weakness, headaches, light-headedness, loss of balance, numbness and weakness.   Psychiatric/Behavioral:  Negative for altered mental status, hallucinations and hypervigilance. The patient is not nervous/anxious.      Objective:     Vital Signs (Most Recent):  Temp: 97.4 °F (36.3 °C) (03/24/24 0819)  Pulse: 103 (03/24/24 0819)  Resp: 20 (03/24/24 0819)  BP: (!) 146/72 (03/24/24 0819)  SpO2: 97 % (03/24/24 0819) Vital Signs (24h Range):  Temp:  [97.3 °F (36.3 °C)-97.7 °F (36.5 °C)] 97.4 °F (36.3 °C)  Pulse:  [] 103  Resp:  [18-20] 20  SpO2:  [96 %-99 %] 97 %  BP: (112-149)/(60-77) 146/72       Weight: 95.7 kg (211 lb)  Body mass index is 33.05 kg/m².    SpO2: 97 %        Physical Exam  Constitutional:       General: She is not in acute distress.     Appearance: Normal appearance. She is normal weight. She is not ill-appearing or toxic-appearing.   HENT:      Head: Normocephalic.   Eyes:      General:         Right eye: No discharge.         Left eye: No discharge.      Extraocular Movements: Extraocular movements intact.      Conjunctiva/sclera: Conjunctivae normal.      Pupils: Pupils are equal, round, and reactive to light.   Cardiovascular:      Rate and Rhythm: Regular rhythm. Tachycardia present.      Pulses: Normal pulses.      Heart sounds: Normal heart sounds. No murmur heard.     No friction rub.   Pulmonary:      Effort: Pulmonary effort is normal. No respiratory distress.      Breath sounds: Normal breath sounds. No  wheezing or rales.   Abdominal:      General: Abdomen is flat. Bowel sounds are normal. There is no distension.      Palpations: Abdomen is soft.      Tenderness: There is no abdominal tenderness.   Musculoskeletal:         General: No swelling, deformity or signs of injury. Normal range of motion.      Cervical back: Normal range of motion and neck supple. No rigidity.      Right lower leg: No edema.      Left lower leg: No edema.   Lymphadenopathy:      Cervical: No cervical adenopathy.   Skin:     General: Skin is warm and dry.      Capillary Refill: Capillary refill takes less than 2 seconds.      Coloration: Skin is not jaundiced.      Findings: No bruising or lesion.   Neurological:      General: No focal deficit present.      Mental Status: She is oriented to person, place, and time. Mental status is at baseline.      Cranial Nerves: No cranial nerve deficit.      Motor: No weakness.   Psychiatric:         Mood and Affect: Mood normal.         Behavior: Behavior normal.         Thought Content: Thought content normal.         Judgment: Judgment normal.            Significant Labs: None    Significant Imaging:  None

## 2024-03-25 ENCOUNTER — ANESTHESIA EVENT (OUTPATIENT)
Dept: MEDSURG UNIT | Facility: HOSPITAL | Age: 74
DRG: 309 | End: 2024-03-25
Payer: MEDICARE

## 2024-03-25 ENCOUNTER — ANESTHESIA (OUTPATIENT)
Dept: MEDSURG UNIT | Facility: HOSPITAL | Age: 74
DRG: 309 | End: 2024-03-25
Payer: MEDICARE

## 2024-03-25 VITALS
TEMPERATURE: 98 F | HEIGHT: 67 IN | WEIGHT: 215 LBS | BODY MASS INDEX: 33.74 KG/M2 | DIASTOLIC BLOOD PRESSURE: 66 MMHG | RESPIRATION RATE: 18 BRPM | HEART RATE: 68 BPM | OXYGEN SATURATION: 98 % | SYSTOLIC BLOOD PRESSURE: 149 MMHG

## 2024-03-25 PROBLEM — I45.6 WOLFF-PARKINSON-WHITE (WPW) PATTERN: Chronic | Status: RESOLVED | Noted: 2021-07-01 | Resolved: 2024-03-25

## 2024-03-25 LAB
ANION GAP SERPL CALC-SCNC: 8 MMOL/L (ref 8–16)
ASCENDING AORTA: 3.5 CM
BACTERIA UR CULT: ABNORMAL
BASOPHILS # BLD AUTO: 0.06 K/UL (ref 0–0.2)
BASOPHILS NFR BLD: 1.3 % (ref 0–1.9)
BSA FOR ECHO PROCEDURE: 2.15 M2
BUN SERPL-MCNC: 19 MG/DL (ref 8–23)
CALCIUM SERPL-MCNC: 9.2 MG/DL (ref 8.7–10.5)
CHLORIDE SERPL-SCNC: 107 MMOL/L (ref 95–110)
CO2 SERPL-SCNC: 20 MMOL/L (ref 23–29)
CREAT SERPL-MCNC: 0.7 MG/DL (ref 0.5–1.4)
DIFFERENTIAL METHOD BLD: NORMAL
EOSINOPHIL # BLD AUTO: 0.2 K/UL (ref 0–0.5)
EOSINOPHIL NFR BLD: 4 % (ref 0–8)
ERYTHROCYTE [DISTWIDTH] IN BLOOD BY AUTOMATED COUNT: 14.1 % (ref 11.5–14.5)
EST. GFR  (NO RACE VARIABLE): >60 ML/MIN/1.73 M^2
GLUCOSE SERPL-MCNC: 94 MG/DL (ref 70–110)
HCT VFR BLD AUTO: 43.4 % (ref 37–48.5)
HGB BLD-MCNC: 14.5 G/DL (ref 12–16)
IMM GRANULOCYTES # BLD AUTO: 0.01 K/UL (ref 0–0.04)
IMM GRANULOCYTES NFR BLD AUTO: 0.2 % (ref 0–0.5)
LAA PV: 55 CM/S
LYMPHOCYTES # BLD AUTO: 1.4 K/UL (ref 1–4.8)
LYMPHOCYTES NFR BLD: 29.6 % (ref 18–48)
MAGNESIUM SERPL-MCNC: 2.1 MG/DL (ref 1.6–2.6)
MCH RBC QN AUTO: 31 PG (ref 27–31)
MCHC RBC AUTO-ENTMCNC: 33.4 G/DL (ref 32–36)
MCV RBC AUTO: 93 FL (ref 82–98)
MONOCYTES # BLD AUTO: 0.6 K/UL (ref 0.3–1)
MONOCYTES NFR BLD: 11.5 % (ref 4–15)
NEUTROPHILS # BLD AUTO: 2.6 K/UL (ref 1.8–7.7)
NEUTROPHILS NFR BLD: 53.4 % (ref 38–73)
NRBC BLD-RTO: 0 /100 WBC
PHOSPHATE SERPL-MCNC: 3.4 MG/DL (ref 2.7–4.5)
PLATELET # BLD AUTO: 249 K/UL (ref 150–450)
PMV BLD AUTO: 10.7 FL (ref 9.2–12.9)
POTASSIUM SERPL-SCNC: 3.7 MMOL/L (ref 3.5–5.1)
RBC # BLD AUTO: 4.68 M/UL (ref 4–5.4)
SINUS: 3.5 CM
SODIUM SERPL-SCNC: 135 MMOL/L (ref 136–145)
STJ: 2.8 CM
WBC # BLD AUTO: 4.8 K/UL (ref 3.9–12.7)

## 2024-03-25 PROCEDURE — 94761 N-INVAS EAR/PLS OXIMETRY MLT: CPT

## 2024-03-25 PROCEDURE — 84100 ASSAY OF PHOSPHORUS: CPT | Performed by: PHYSICIAN ASSISTANT

## 2024-03-25 PROCEDURE — 37000008 HC ANESTHESIA 1ST 15 MINUTES: Performed by: INTERNAL MEDICINE

## 2024-03-25 PROCEDURE — D9220A PRA ANESTHESIA: Mod: CRNA,,, | Performed by: STUDENT IN AN ORGANIZED HEALTH CARE EDUCATION/TRAINING PROGRAM

## 2024-03-25 PROCEDURE — 36415 COLL VENOUS BLD VENIPUNCTURE: CPT | Performed by: PHYSICIAN ASSISTANT

## 2024-03-25 PROCEDURE — 92960 CARDIOVERSION ELECTRIC EXT: CPT | Performed by: INTERNAL MEDICINE

## 2024-03-25 PROCEDURE — 37000009 HC ANESTHESIA EA ADD 15 MINS: Performed by: INTERNAL MEDICINE

## 2024-03-25 PROCEDURE — 25000003 PHARM REV CODE 250: Performed by: STUDENT IN AN ORGANIZED HEALTH CARE EDUCATION/TRAINING PROGRAM

## 2024-03-25 PROCEDURE — 93005 ELECTROCARDIOGRAM TRACING: CPT

## 2024-03-25 PROCEDURE — 5A2204Z RESTORATION OF CARDIAC RHYTHM, SINGLE: ICD-10-PCS | Performed by: INTERNAL MEDICINE

## 2024-03-25 PROCEDURE — 63600175 PHARM REV CODE 636 W HCPCS: Performed by: STUDENT IN AN ORGANIZED HEALTH CARE EDUCATION/TRAINING PROGRAM

## 2024-03-25 PROCEDURE — 25000003 PHARM REV CODE 250: Performed by: PHYSICIAN ASSISTANT

## 2024-03-25 PROCEDURE — 85025 COMPLETE CBC W/AUTO DIFF WBC: CPT | Performed by: PHYSICIAN ASSISTANT

## 2024-03-25 PROCEDURE — 93010 ELECTROCARDIOGRAM REPORT: CPT | Mod: ,,, | Performed by: INTERNAL MEDICINE

## 2024-03-25 PROCEDURE — 92960 CARDIOVERSION ELECTRIC EXT: CPT | Mod: ,,, | Performed by: INTERNAL MEDICINE

## 2024-03-25 PROCEDURE — 83735 ASSAY OF MAGNESIUM: CPT | Performed by: PHYSICIAN ASSISTANT

## 2024-03-25 PROCEDURE — D9220A PRA ANESTHESIA: Mod: ANES,,, | Performed by: ANESTHESIOLOGY

## 2024-03-25 PROCEDURE — 80048 BASIC METABOLIC PNL TOTAL CA: CPT | Performed by: PHYSICIAN ASSISTANT

## 2024-03-25 RX ORDER — CEFDINIR 300 MG/1
300 CAPSULE ORAL 2 TIMES DAILY
Qty: 10 CAPSULE | Refills: 0 | Status: SHIPPED | OUTPATIENT
Start: 2024-03-25 | End: 2024-03-30

## 2024-03-25 RX ORDER — PROPOFOL 10 MG/ML
VIAL (ML) INTRAVENOUS CONTINUOUS PRN
Status: DISCONTINUED | OUTPATIENT
Start: 2024-03-25 | End: 2024-03-25

## 2024-03-25 RX ORDER — LIDOCAINE HYDROCHLORIDE 20 MG/ML
INJECTION INTRAVENOUS
Status: DISCONTINUED | OUTPATIENT
Start: 2024-03-25 | End: 2024-03-25

## 2024-03-25 RX ORDER — PROPOFOL 10 MG/ML
VIAL (ML) INTRAVENOUS
Status: DISCONTINUED | OUTPATIENT
Start: 2024-03-25 | End: 2024-03-25

## 2024-03-25 RX ORDER — SODIUM CHLORIDE 0.9 % (FLUSH) 0.9 %
10 SYRINGE (ML) INJECTION
Status: CANCELLED | OUTPATIENT
Start: 2024-03-25

## 2024-03-25 RX ORDER — LIDOCAINE HYDROCHLORIDE 20 MG/ML
SOLUTION OROPHARYNGEAL
Status: DISCONTINUED | OUTPATIENT
Start: 2024-03-25 | End: 2024-03-25

## 2024-03-25 RX ORDER — FLECAINIDE ACETATE 100 MG/1
100 TABLET ORAL EVERY 12 HOURS
Qty: 60 TABLET | Refills: 0 | Status: ON HOLD | OUTPATIENT
Start: 2024-03-25 | End: 2024-04-16

## 2024-03-25 RX ADMIN — PROPOFOL 20 MG: 10 INJECTION, EMULSION INTRAVENOUS at 02:03

## 2024-03-25 RX ADMIN — APIXABAN 5 MG: 5 TABLET, FILM COATED ORAL at 09:03

## 2024-03-25 RX ADMIN — PROPOFOL 40 MG: 10 INJECTION, EMULSION INTRAVENOUS at 02:03

## 2024-03-25 RX ADMIN — GLYCOPYRROLATE 0.2 MG: 0.2 INJECTION, SOLUTION INTRAMUSCULAR; INTRAVENOUS at 02:03

## 2024-03-25 RX ADMIN — SODIUM CHLORIDE: 9 INJECTION, SOLUTION INTRAVENOUS at 02:03

## 2024-03-25 RX ADMIN — DILTIAZEM HYDROCHLORIDE 240 MG: 120 CAPSULE, COATED, EXTENDED RELEASE ORAL at 09:03

## 2024-03-25 RX ADMIN — PROPOFOL 50 MCG/KG/MIN: 10 INJECTION, EMULSION INTRAVENOUS at 02:03

## 2024-03-25 RX ADMIN — CEFTRIAXONE 1 G: 1 INJECTION, POWDER, FOR SOLUTION INTRAMUSCULAR; INTRAVENOUS at 09:03

## 2024-03-25 RX ADMIN — LEVOTHYROXINE SODIUM 25 MCG: 25 TABLET ORAL at 06:03

## 2024-03-25 RX ADMIN — LIDOCAINE HYDROCHLORIDE 100 MG: 20 INJECTION INTRAVENOUS at 02:03

## 2024-03-25 RX ADMIN — LIDOCAINE HYDROCHLORIDE 10 ML: 20 SOLUTION OROPHARYNGEAL at 02:03

## 2024-03-25 RX ADMIN — PRAVASTATIN SODIUM 40 MG: 40 TABLET ORAL at 09:03

## 2024-03-25 RX ADMIN — PROPOFOL 10 MG: 10 INJECTION, EMULSION INTRAVENOUS at 02:03

## 2024-03-25 NOTE — PROGRESS NOTES
[facility] - Cardiology Stepdown  Cardiac Electrophysiology  Progress Note    Admission Date: 3/22/2024  Code Status: Full Code   Attending Physician: Oliver Mckeon MD   Expected Discharge Date: 3/25/2024  Principal Problem:Atrial flutter    Subjective:     Interval  Patient continues to be in slow aflutter at 2:1 conduction, HR fixed at around 105bpm. Reports she feels palpitations in her chest but otherwise no other sxs. NAEON.    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 Dr Viktor Toure    Osteopenia of neck of left femur 6/4/2020    Paroxysmal atrioventricular tachycardia     Plantar fasciitis     Right knee meniscal tear     Dr Mayfield, tx conservatively    Severe obesity (BMI 35.0-35.9 with comorbidity) 1/24/2017    Slow transit constipation 7/13/2021    Despite fruits & veg & 1200 dunia diet, try Colace daily    Stress incontinence, female 03/28/2018    hasn't tried oxybutynin, After HYST, Kegels & PT  didn't work, worse at night wearing pads, Dr Infante    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    Thyroid disease        Past Surgical History:   Procedure Laterality Date    ABLATION OF ARRHYTHMOGENIC FOCUS FOR ATRIAL FIBRILLATION N/A 10/17/2023    Procedure: Ablation atrial fibrillation;  Surgeon: Ameya Servin MD;  Location: [facility] EP LAB;  Service: Cardiology;  Laterality: N/A;  AF, PERICO, PVI, WPW, RFA, POPEYE, Gen, DM, 3 Prep *MDT ILR*    ABLATION, MECHANOCHEMICAL, VARICOSE VEIN Right  12/8/2023    Procedure: ABLATION, MECHANOCHEMICAL, VARICOSE VEIN;  Surgeon: Simone Shannon MD;  Location: [facility] CATH LAB/EP;  Service: Cardiology;  Laterality: Right;    APPENDECTOMY      COLONOSCOPY N/A 8/13/2020    Procedure: COLONOSCOPY;  Surgeon: Angus Ac MD;  Location: [facility] ENDO (4TH FLR);  Service: Endoscopy;  Laterality: N/A;  ok to hold Eliquis 2 days per Dr Malathi HERNANDEZ test at [place] on 8/10-GT    ECHOCARDIOGRAM,TRANSESOPHAGEAL N/A 9/20/2023    Procedure: Transesophageal echo (PERICO) intra-procedure log documentation;  Surgeon: Provider, Dosc Diagnostic;  Location: [facility] EP LAB;  Service: Cardiology;  Laterality: N/A;    HYSTERECTOMY  1990    TAHBSO for fibroids    INSERTION OF IMPLANTABLE LOOP RECORDER Left 11/1/2021    Procedure: Insertion, Implantable Loop Recorder;  Surgeon: Ameya Servin MD;  Location: [facility] EP LAB;  Service: Cardiology;  Laterality: Left;  AF, ILR implant, MDT,  DM, 3 Prep    MANDIBLE SURGERY  2015    L mandible, Dr Toure    MANDIBLE SURGERY Left 8/27/2019    OOPHORECTOMY      TONSILLECTOMY      TRANSESOPHAGEAL ECHOCARDIOGRAM WITH POSSIBLE CARDIOVERSION (PERICO W/ POSS CARDIOVERSION) N/A 1/19/2024    Procedure: Transesophageal echo (PERICO) intra-procedure log documentation;  Surgeon: JV Rosenthal MD;  Location: [facility] EP LAB;  Service: Cardiology;  Laterality: N/A;    TRANSESOPHAGEAL ECHOCARDIOGRAPHY N/A 10/17/2023    Procedure: ECHOCARDIOGRAM, TRANSESOPHAGEAL;  Surgeon: Kathy Malik MD;  Location: [facility] EP LAB;  Service: Cardiology;  Laterality: N/A;    TREATMENT OF CARDIAC ARRHYTHMIA N/A 9/20/2023    Procedure: Cardioversion or Defibrillation;  Surgeon: JV Rosenthal MD;  Location: [facility] EP LAB;  Service: Cardiology;  Laterality: N/A;  AF, DCCV/PERICO, ANES, EH,     TREATMENT OF CARDIAC ARRHYTHMIA N/A 1/19/2024    Procedure: Cardioversion or Defibrillation;  Surgeon: JV Rosenthal MD;  Location: [facility] EP LAB;  Service: Cardiology;  Laterality: N/A;  AFL,  PERICO/DCCV, ANES, EH,     TREATMENT OF CARDIAC ARRHYTHMIA N/A 1/19/2024    Procedure: Cardioversion or Defibrillation;  Surgeon: Luis Joiner MD;  Location: [facility] EP LAB;  Service: Cardiology;  Laterality: N/A;  AFL, DCCV ONLY, ANES, EH,        Review of patient's allergies indicates:   Allergen Reactions    Decongestant d [pseudoephedrine-dm]      Atrial Fibrillation    Augmentin [amoxicillin-pot clavulanate]      palpitations      Amoxicillin Palpitations    Diphenhydramine-pseudoephed Palpitations     TACHYCARDIA    Povidone-iodine Rash     Mild erythema of skin       Current Facility-Administered Medications on File Prior to Encounter   Medication    sodium chloride 0.9% bolus 1,000 mL    vancomycin in dextrose 5 % 1 gram/250 mL IVPB 1,000 mg     Current Outpatient Medications on File Prior to Encounter   Medication Sig    diltiaZEM (CARDIZEM CD) 240 MG 24 hr capsule Take 1 capsule (240 mg total) by mouth once daily.    ELIQUIS 5 mg Tab TAKE 1 TABLET TWICE DAILY    fluticasone propionate (FLONASE) 50 mcg/actuation nasal spray USE 1 SPRAY IN EACH NOSTRIL EVERY DAY    levothyroxine (SYNTHROID) 25 MCG tablet TAKE 1 TABLET ONE TIME DAILY    nystatin (MYCOSTATIN) cream Apply topically 2 (two) times daily.    pravastatin (PRAVACHOL) 40 MG tablet TAKE 1 TABLET ONE TIME DAILY     Family History       Problem Relation (Age of Onset)    Cancer Mother          Tobacco Use    Smoking status: Never    Smokeless tobacco: Never   Substance and Sexual Activity    Alcohol use: No    Drug use: No    Sexual activity: Not Currently     Review of Systems   Constitutional: Negative for chills, fever, malaise/fatigue and night sweats.   HENT:  Negative for congestion, nosebleeds, sore throat, stridor and tinnitus.    Eyes:  Negative for blurred vision, discharge, double vision, pain, vision loss in left eye, vision loss in right eye, visual disturbance and visual halos.   Cardiovascular:  Positive for palpitations.  Negative for chest pain, dyspnea on exertion, leg swelling, near-syncope, orthopnea and syncope.   Respiratory:  Negative for cough, hemoptysis, shortness of breath, snoring, sputum production and wheezing.    Endocrine: Negative for cold intolerance, heat intolerance and polydipsia.   Hematologic/Lymphatic: Negative for adenopathy and bleeding problem. Does not bruise/bleed easily.   Skin:  Negative for color change, dry skin, flushing, poor wound healing and suspicious lesions.   Musculoskeletal:  Negative for arthritis, back pain, gout, joint pain and joint swelling.   Gastrointestinal:  Negative for bloating, abdominal pain, constipation and diarrhea.   Genitourinary:  Negative for dysuria, frequency and hematuria.   Neurological:  Negative for dizziness, focal weakness, headaches, light-headedness, loss of balance, numbness and weakness.   Psychiatric/Behavioral:  Negative for altered mental status, hallucinations and hypervigilance. The patient is not nervous/anxious.      Objective:     Vital Signs (Most Recent):  Temp: 97.5 °F (36.4 °C) (03/25/24 0747)  Pulse: 108 (03/25/24 0747)  Resp: 20 (03/25/24 0747)  BP: 119/70 (03/25/24 0747)  SpO2: 98 % (03/25/24 0747) Vital Signs (24h Range):  Temp:  [97.3 °F (36.3 °C)-97.9 °F (36.6 °C)] 97.5 °F (36.4 °C)  Pulse:  [104-112] 108  Resp:  [17-20] 20  SpO2:  [97 %-98 %] 98 %  BP: (110-121)/(69-76) 119/70       Weight: 97.6 kg (215 lb 2.7 oz)  Body mass index is 33.7 kg/m².    SpO2: 98 %        Physical Exam  Constitutional:       General: She is not in acute distress.     Appearance: Normal appearance. She is normal weight. She is not ill-appearing or toxic-appearing.   HENT:      Head: Normocephalic.   Eyes:      General:         Right eye: No discharge.         Left eye: No discharge.      Extraocular Movements: Extraocular movements intact.      Conjunctiva/sclera: Conjunctivae normal.      Pupils: Pupils are equal, round, and reactive to light.   Cardiovascular:       Rate and Rhythm: Regular rhythm. Tachycardia present.      Pulses: Normal pulses.      Heart sounds: Normal heart sounds. No murmur heard.     No friction rub.   Pulmonary:      Effort: Pulmonary effort is normal. No respiratory distress.      Breath sounds: Normal breath sounds. No wheezing or rales.   Abdominal:      General: Abdomen is flat. Bowel sounds are normal. There is no distension.      Palpations: Abdomen is soft.      Tenderness: There is no abdominal tenderness.   Musculoskeletal:         General: No swelling, deformity or signs of injury. Normal range of motion.      Cervical back: Normal range of motion and neck supple. No rigidity.      Right lower leg: No edema.      Left lower leg: No edema.   Lymphadenopathy:      Cervical: No cervical adenopathy.   Skin:     General: Skin is warm and dry.      Capillary Refill: Capillary refill takes less than 2 seconds.      Coloration: Skin is not jaundiced.      Findings: No bruising or lesion.   Neurological:      General: No focal deficit present.      Mental Status: She is oriented to person, place, and time. Mental status is at baseline.      Cranial Nerves: No cranial nerve deficit.      Motor: No weakness.   Psychiatric:         Mood and Affect: Mood normal.         Behavior: Behavior normal.         Thought Content: Thought content normal.         Judgment: Judgment normal.            Significant Labs: None    Significant Imaging:  None  Assessment and Plan:     * Atrial flutter  73F pmhx pAfib (PVI 10/2024), MATTHEW, patient of Dr. Servin, admitted for palpitations, found to be in atypical atrial flutter. Atrial flutter is rate controlled. At home she is on dilt 240mg and PIP flecainide. Last took amiodarone in February for 3 months after her 10/2023 ablation. Of note, during the ablation, adenosine was given and confirmed no presence of WPW accessory pathway.  -Patient of Dr. Servin, planned ablation in 4/16/24   -continue eliquis  -Cardioversion today  without PERICO  -Initiate Flecainide 100mg PO BID after cardioversion. Continue diltiazem. Hold both 5 days prior to procedure (can keep diltiazem if needed for rate control during this time)        Connor M Gillies, DO  Cardiac Electrophysiology  [facility] - Cardiology Stepdown

## 2024-03-25 NOTE — HPI
Patient is a 73 year old female with paroxysmal Afib (CHADS2-VASc 3) on eliquis, hypothyroidism, HLD, hx WPW [place] [facility] [place]. Patient reports feeling herself go into Afib on Tuesday night. Since then, she's been having intermittent palpitations and tachycardia with her HR randing from . She had an episode of lightheadedness/dizziness earlier today when she felt like she was going to pass out. She sat down to rest with resolution of symptoms. No actual LOC. She has a loop recorder in place and was told by her outpatient cardiologist that she went into afib 3 times over the last few days. She is scheduled to get another ablation done on 4/14. Recently taken off amiodarone about 30 days ago. Has chronic BLE edema due to venous insufficiency without recent worsening. Denies fever, chills, chest pain, SOB, N/V, abdominal pain, calf pain, vision changes, or syncope.    Dysphagia or odynophagia:  No  Liver Disease, esophageal disease, or known varices:  No  Upper GI Bleeding: No  Prior neck surgery or radiation:  No  History of anesthetic difficulties:  No  Family history of anesthetic difficulties:  No  Able to move neck in all directions:  Yes  Snoring:  No  Sleep Apnea:  No  Last oral intake:  12 hours ago

## 2024-03-25 NOTE — SUBJECTIVE & OBJECTIVE
Interval  Patient continues to be in slow aflutter at 2:1 conduction, HR fixed at around 105bpm. Reports she feels palpitations in her chest but otherwise no other sxs. NAEON.    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 Dr Viktor Toure    Osteopenia of neck of left femur 6/4/2020    Paroxysmal atrioventricular tachycardia     Plantar fasciitis     Right knee meniscal tear     Dr Mayfield, tx conservatively    Severe obesity (BMI 35.0-35.9 with comorbidity) 1/24/2017    Slow transit constipation 7/13/2021    Despite fruits & veg & 1200 dunia diet, try Colace daily    Stress incontinence, female 03/28/2018    hasn't tried oxybutynin, After HYST, Kegels & PT  didn't work, worse at night wearing pads, Dr Infante    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    Thyroid disease        Past Surgical History:   Procedure Laterality Date    ABLATION OF ARRHYTHMOGENIC FOCUS FOR ATRIAL FIBRILLATION N/A 10/17/2023    Procedure: Ablation atrial fibrillation;  Surgeon: Ameya Servin MD;  Location: [facility] EP LAB;  Service: Cardiology;  Laterality: N/A;  AF, PERICO, PVI, WPW, RFA, POPEYE, Gen, DM, 3 Prep *MDT ILR*    ABLATION, MECHANOCHEMICAL, VARICOSE VEIN Right 12/8/2023    Procedure: ABLATION, MECHANOCHEMICAL, VARICOSE VEIN;  Surgeon: Simone Shannon MD;  Location: [facility] CATH LAB/EP;  Service: Cardiology;  Laterality: Right;    APPENDECTOMY      COLONOSCOPY N/A 8/13/2020    Procedure: COLONOSCOPY;  Surgeon: Angus Ac  MD;  Location: [facility] ENDO (4TH FLR);  Service: Endoscopy;  Laterality: N/A;  ok to hold Eliquis 2 days per Dr Servin     COVID test at [facility] on 8/10-GT    ECHOCARDIOGRAM,TRANSESOPHAGEAL N/A 9/20/2023    Procedure: Transesophageal echo (PERICO) intra-procedure log documentation;  Surgeon: Provider, [facility];  Location: [facility] EP LAB;  Service: Cardiology;  Laterality: N/A;    HYSTERECTOMY  1990    TAHBSO for fibroids    INSERTION OF IMPLANTABLE LOOP RECORDER Left 11/1/2021    Procedure: Insertion, Implantable Loop Recorder;  Surgeon: Ameya Servin MD;  Location: [facility] EP LAB;  Service: Cardiology;  Laterality: Left;  AF, ILR implant, MDT,  DM, 3 Prep    MANDIBLE SURGERY  2015    L mandible, Dr Toure    MANDIBLE SURGERY Left 8/27/2019    OOPHORECTOMY      TONSILLECTOMY      TRANSESOPHAGEAL ECHOCARDIOGRAM WITH POSSIBLE CARDIOVERSION (PERICO W/ POSS CARDIOVERSION) N/A 1/19/2024    Procedure: Transesophageal echo (PERICO) intra-procedure log documentation;  Surgeon: JV Rosenthal MD;  Location: [facility] EP LAB;  Service: Cardiology;  Laterality: N/A;    TRANSESOPHAGEAL ECHOCARDIOGRAPHY N/A 10/17/2023    Procedure: ECHOCARDIOGRAM, TRANSESOPHAGEAL;  Surgeon: Kathy Malik MD;  Location: [facility] EP LAB;  Service: Cardiology;  Laterality: N/A;    TREATMENT OF CARDIAC ARRHYTHMIA N/A 9/20/2023    Procedure: Cardioversion or Defibrillation;  Surgeon: JV Rosenthal MD;  Location: [facility] EP LAB;  Service: Cardiology;  Laterality: N/A;  AF, DCCV/PERICO, ANES, EH,     TREATMENT OF CARDIAC ARRHYTHMIA N/A 1/19/2024    Procedure: Cardioversion or Defibrillation;  Surgeon: JV Rosenthal MD;  Location: [facility] EP LAB;  Service: Cardiology;  Laterality: N/A;  AFL, PERICO/DCCV, ANES, EH,     TREATMENT OF CARDIAC ARRHYTHMIA N/A 1/19/2024    Procedure: Cardioversion or Defibrillation;  Surgeon: Luis Joiner MD;  Location: [facility] EP LAB;  Service: Cardiology;  Laterality: N/A;  AFL, DCCV ONLY, ANES, EH,        Review  of patient's allergies indicates:   Allergen Reactions    Decongestant d [pseudoephedrine-dm]      Atrial Fibrillation    Augmentin [amoxicillin-pot clavulanate]      palpitations      Amoxicillin Palpitations    Diphenhydramine-pseudoephed Palpitations     TACHYCARDIA    Povidone-iodine Rash     Mild erythema of skin       Current Facility-Administered Medications on File Prior to Encounter   Medication    sodium chloride 0.9% bolus 1,000 mL    vancomycin in dextrose 5 % 1 gram/250 mL IVPB 1,000 mg     Current Outpatient Medications on File Prior to Encounter   Medication Sig    diltiaZEM (CARDIZEM CD) 240 MG 24 hr capsule Take 1 capsule (240 mg total) by mouth once daily.    ELIQUIS 5 mg Tab TAKE 1 TABLET TWICE DAILY    fluticasone propionate (FLONASE) 50 mcg/actuation nasal spray USE 1 SPRAY IN EACH NOSTRIL EVERY DAY    levothyroxine (SYNTHROID) 25 MCG tablet TAKE 1 TABLET ONE TIME DAILY    nystatin (MYCOSTATIN) cream Apply topically 2 (two) times daily.    pravastatin (PRAVACHOL) 40 MG tablet TAKE 1 TABLET ONE TIME DAILY     Family History       Problem Relation (Age of Onset)    Cancer Mother          Tobacco Use    Smoking status: Never    Smokeless tobacco: Never   Substance and Sexual Activity    Alcohol use: No    Drug use: No    Sexual activity: Not Currently     Review of Systems   Constitutional: Negative for chills, fever, malaise/fatigue and night sweats.   HENT:  Negative for congestion, nosebleeds, sore throat, stridor and tinnitus.    Eyes:  Negative for blurred vision, discharge, double vision, pain, vision loss in left eye, vision loss in right eye, visual disturbance and visual halos.   Cardiovascular:  Positive for palpitations. Negative for chest pain, dyspnea on exertion, leg swelling, near-syncope, orthopnea and syncope.   Respiratory:  Negative for cough, hemoptysis, shortness of breath, snoring, sputum production and wheezing.    Endocrine: Negative for cold intolerance, heat intolerance  and polydipsia.   Hematologic/Lymphatic: Negative for adenopathy and bleeding problem. Does not bruise/bleed easily.   Skin:  Negative for color change, dry skin, flushing, poor wound healing and suspicious lesions.   Musculoskeletal:  Negative for arthritis, back pain, gout, joint pain and joint swelling.   Gastrointestinal:  Negative for bloating, abdominal pain, constipation and diarrhea.   Genitourinary:  Negative for dysuria, frequency and hematuria.   Neurological:  Negative for dizziness, focal weakness, headaches, light-headedness, loss of balance, numbness and weakness.   Psychiatric/Behavioral:  Negative for altered mental status, hallucinations and hypervigilance. The patient is not nervous/anxious.      Objective:     Vital Signs (Most Recent):  Temp: 97.5 °F (36.4 °C) (03/25/24 0747)  Pulse: 108 (03/25/24 0747)  Resp: 20 (03/25/24 0747)  BP: 119/70 (03/25/24 0747)  SpO2: 98 % (03/25/24 0747) Vital Signs (24h Range):  Temp:  [97.3 °F (36.3 °C)-97.9 °F (36.6 °C)] 97.5 °F (36.4 °C)  Pulse:  [104-112] 108  Resp:  [17-20] 20  SpO2:  [97 %-98 %] 98 %  BP: (110-121)/(69-76) 119/70       Weight: 97.6 kg (215 lb 2.7 oz)  Body mass index is 33.7 kg/m².    SpO2: 98 %        Physical Exam  Constitutional:       General: She is not in acute distress.     Appearance: Normal appearance. She is normal weight. She is not ill-appearing or toxic-appearing.   HENT:      Head: Normocephalic.   Eyes:      General:         Right eye: No discharge.         Left eye: No discharge.      Extraocular Movements: Extraocular movements intact.      Conjunctiva/sclera: Conjunctivae normal.      Pupils: Pupils are equal, round, and reactive to light.   Cardiovascular:      Rate and Rhythm: Regular rhythm. Tachycardia present.      Pulses: Normal pulses.      Heart sounds: Normal heart sounds. No murmur heard.     No friction rub.   Pulmonary:      Effort: Pulmonary effort is normal. No respiratory distress.      Breath sounds: Normal  breath sounds. No wheezing or rales.   Abdominal:      General: Abdomen is flat. Bowel sounds are normal. There is no distension.      Palpations: Abdomen is soft.      Tenderness: There is no abdominal tenderness.   Musculoskeletal:         General: No swelling, deformity or signs of injury. Normal range of motion.      Cervical back: Normal range of motion and neck supple. No rigidity.      Right lower leg: No edema.      Left lower leg: No edema.   Lymphadenopathy:      Cervical: No cervical adenopathy.   Skin:     General: Skin is warm and dry.      Capillary Refill: Capillary refill takes less than 2 seconds.      Coloration: Skin is not jaundiced.      Findings: No bruising or lesion.   Neurological:      General: No focal deficit present.      Mental Status: She is oriented to person, place, and time. Mental status is at baseline.      Cranial Nerves: No cranial nerve deficit.      Motor: No weakness.   Psychiatric:         Mood and Affect: Mood normal.         Behavior: Behavior normal.         Thought Content: Thought content normal.         Judgment: Judgment normal.            Significant Labs: None    Significant Imaging:  None

## 2024-03-25 NOTE — DISCHARGE SUMMARY
[facility] - Cardiology  [facility]  Discharge Summary      Patient Name: Flores Fuentes  MRN: [id number]  PATRICK: [id number]  Patient Class: IP- Inpatient  Admission Date: 3/22/2024  Hospital Length of Stay: 1 days  Discharge Date and Time:  03/25/2024 2:31 PM  Attending Physician: Oliver Mckeon MD   Discharging Provider: Oliver Mckeon MD  Primary Care Provider: Naz Condon MD  Hospital Medicine Team: [facility] Oliver Mckeon MD  Primary Care Team: [facility]    HPI:   Flores Fuentes is a 73 y.o. female with a PMHx of paroxysmal Afib on eliquis, hypothyroidism, HLD, hx WPW who presents to [facility] for evaluation of palpitations. Patient reports feeling herself go into Afib on Tuesday night. Since then, she's been having intermittent palpitations and tachycardia with her HR randing from . She had an episode of lightheadedness/dizziness earlier today when she felt like she was going to pass out. She sat down to rest with resolution of symptoms. No actual LOC. She has a loop recorder in place and was told by her outpatient cardiologist that she went into afib 3 times over the last few days. She is scheduled to get another ablation done on 4/14. Recently taken off amiodarone about 30 days ago. Has chronic BLE edema due to venous insufficiency without recent worsening. Denies fever, chills, chest pain, SOB, N/V, abdominal pain, calf pain, vision changes, or syncope.    ED: AFVSS. No leukocytosis or electrolyte abnormalities. EKG shows NSR. CXR without acute process. Admitted to hospital medicine.     Procedure(s) (LRB):  Cardioversion or Defibrillation (N/A)  Transesophageal echo (PERICO) intra-procedure log documentation      Hospital Course:   Pt admitted to [facility] and remained stable overnight and into 3/23/2024. No acute events on tele, remained grossly asymptomatic. EP consulted: pt underwent successful cardioversion on 3/25/2024. Cleared by EP for discharge, and to initiate Flecainide  pt informed to f/u with her PCP/gyn to have her urine culture repeated. pt is asymptomatic 100mg PO BID after cardioversion and continue diltiazem; hold both 5 days prior to ablation planned outpatient on 4/16 (can keep diltiazem if needed for rate control during this time). Cefdinir provided for UTI. Pt deemed appropriate for discharge; seen and examined prior to departure. Plan discussed with pt, who was agreeable and amenable; medications were discussed and reviewed, outpatient follow-up scheduled, ER precautions were given, all questions were answered to the pt's satisfaction, and Ms. Fuentes was subsequently discharged.       Goals of Care Treatment Preferences:  Code Status: Full Code      Consults:   Consults (From admission, onward)          Status Ordering Provider     Inpatient consult to Electrophysiology  Once        Provider:  (Not yet assigned)    Completed JAEL YING            No new Assessment & Plan notes have been filed under this hospital service since the last note was generated.  Service: Hospital Medicine    Final Active Diagnoses:    Diagnosis Date Noted POA    PRINCIPAL PROBLEM:  Paroxysmal A-fib [I48.0] 10/14/2014 Yes     Chronic    Atrial flutter [I48.92] 01/19/2024 Yes    Acute cystitis without hematuria [N30.00] 03/24/2024 Yes    Class 1 obesity due to excess calories with serious comorbidity and body mass index (BMI) of 33.0 to 33.9 in adult [E66.09, Z68.33] 03/23/2024 Not Applicable    Hypothyroidism [E03.9] 01/18/2024 Yes    MATTHEW (obstructive sleep apnea) [G47.33]  Yes     Chronic    Essential hypertension [I10] 06/19/2019 Yes     Chronic    Mixed hyperlipidemia [E78.2] 11/07/2013 Yes     Chronic      Problems Resolved During this Admission:    Diagnosis Date Noted Date Resolved POA    Palpitations [R00.2] 03/22/2024 03/23/2024 Yes    Palpitations [R00.2] 08/01/2023 03/23/2024 Yes    Kym-Parkinson-White (WPW) pattern [I45.6] 07/01/2021 03/25/2024 Yes     Chronic       Discharged Condition: stable    Disposition: Home or Self Care    Follow Up:   Follow-up  Information       Naz Condon MD. Schedule an appointment as soon as possible for a visit.    Specialty: Family Medicine  Contact information:  [address] [facility]  [address]  [facility] [address]  [phone]                           Patient Instructions:      Ambulatory referral/consult to Cardiology   Standing Status: Future   Referral Priority: Routine Referral Type: Consultation   Referral Reason: Specialty Services Required   Requested Specialty: Cardiology   Number of Visits Requested: 1     Ambulatory referral/consult to Family Practice   Standing Status: Future   Referral Priority: Routine Referral Type: Consultation   Referral Reason: Specialty Services Required   Requested Specialty: Family Medicine   Number of Visits Requested: 1     Diet Cardiac     Notify your health care provider if you experience any of the following:  increased confusion or weakness     Notify your health care provider if you experience any of the following:  persistent dizziness, light-headedness, or visual disturbances     Notify your health care provider if you experience any of the following:  worsening rash     Notify your health care provider if you experience any of the following:  severe persistent headache     Notify your health care provider if you experience any of the following:  difficulty breathing or increased cough     Notify your health care provider if you experience any of the following:  severe uncontrolled pain     Notify your health care provider if you experience any of the following:  persistent nausea and vomiting or diarrhea     Notify your health care provider if you experience any of the following:  temperature >100.4     Activity as tolerated       Significant Diagnostic Studies: Labs: All labs within the past 24 hours have been reviewed    Pending Diagnostic Studies:       Procedure Component Value Units Date/Time    EKG 12-lead [[id number]]     Order Status: Sent Lab Status: No result             Medications:  Reconciled Home Medications:      Medication List        START taking these medications      cefdinir 300 MG capsule  Commonly known as: OMNICEF  Take 1 capsule (300 mg total) by mouth 2 (two) times daily. for 5 days     flecainide 100 MG Tab  Commonly known as: TAMBOCOR  Take 1 tablet (100 mg total) by mouth every 12 (twelve) hours.            CONTINUE taking these medications      diltiaZEM 240 MG 24 hr capsule  Commonly known as: CARDIZEM CD  Take 1 capsule (240 mg total) by mouth once daily.     ELIQUIS 5 mg Tab  Generic drug: apixaban  TAKE 1 TABLET TWICE DAILY     fluticasone propionate 50 mcg/actuation nasal spray  Commonly known as: FLONASE  USE 1 SPRAY IN EACH NOSTRIL EVERY DAY     levothyroxine 25 MCG tablet  Commonly known as: SYNTHROID  TAKE 1 TABLET ONE TIME DAILY     nystatin cream  Commonly known as: MYCOSTATIN  Apply topically 2 (two) times daily.     pravastatin 40 MG tablet  Commonly known as: PRAVACHOL  TAKE 1 TABLET ONE TIME DAILY              Indwelling Lines/Drains at time of discharge:   Lines/Drains/Airways       None                   Time spent on the discharge of patient: 35 minutes         Oliver Mckeon MD  Attending Physician  Medical Director - [facility]  Department of Hospital Medicine  3/25/2024

## 2024-03-25 NOTE — SUBJECTIVE & OBJECTIVE
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 Dr Viktor Toure    Osteopenia of neck of left femur 6/4/2020    Paroxysmal atrioventricular tachycardia     Plantar fasciitis     Right knee meniscal tear     Dr Mayfield, tx conservatively    Severe obesity (BMI 35.0-35.9 with comorbidity) 1/24/2017    Slow transit constipation 7/13/2021    Despite fruits & veg & 1200 dunia diet, try Colace daily    Stress incontinence, female 03/28/2018    hasn't tried oxybutynin, After HYST, Kegels & PT  didn't work, worse at night wearing pads, Dr Infante    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    Thyroid disease        Past Surgical History:   Procedure Laterality Date    ABLATION OF ARRHYTHMOGENIC FOCUS FOR ATRIAL FIBRILLATION N/A 10/17/2023    Procedure: Ablation atrial fibrillation;  Surgeon: Ameya Servin MD;  Location: [facility] EP LAB;  Service: Cardiology;  Laterality: N/A;  AF, PERICO, PVI, WPW, RFA, POPEYE, Gen, DM, 3 Prep *MDT ILR*    ABLATION, MECHANOCHEMICAL, VARICOSE VEIN Right 12/8/2023    Procedure: ABLATION, MECHANOCHEMICAL, VARICOSE VEIN;  Surgeon: Simone Shannon MD;  Location: [facility] CATH LAB/EP;  Service: Cardiology;  Laterality: Right;    APPENDECTOMY      COLONOSCOPY N/A 8/13/2020    Procedure: COLONOSCOPY;  Surgeon: Angus Ac MD;  Location: [facility] ENDO ([address] [place] [address]);  Service: Endoscopy;  Laterality: N/A;  ok to hold Eliquis 2 days per Dr Servin     COVID test at [place] on 8/10-GT     ECHOCARDIOGRAM,TRANSESOPHAGEAL N/A 9/20/2023    Procedure: Transesophageal echo (PERICO) intra-procedure log documentation;  Surgeon: Provider, [facility];  Location: [facility] EP LAB;  Service: Cardiology;  Laterality: N/A;    HYSTERECTOMY  1990    TAHBSO for fibroids    INSERTION OF IMPLANTABLE LOOP RECORDER Left 11/1/2021    Procedure: Insertion, Implantable Loop Recorder;  Surgeon: Ameya Servin MD;  Location: [facility] EP LAB;  Service: Cardiology;  Laterality: Left;  AF, ILR implant, MDT,  DM, 3 Prep    MANDIBLE SURGERY  2015    L mandible, Dr Toure    MANDIBLE SURGERY Left 8/27/2019    OOPHORECTOMY      TONSILLECTOMY      TRANSESOPHAGEAL ECHOCARDIOGRAM WITH POSSIBLE CARDIOVERSION (PERICO W/ POSS CARDIOVERSION) N/A 1/19/2024    Procedure: Transesophageal echo (PERICO) intra-procedure log documentation;  Surgeon: JV Rosenthal MD;  Location: [facility] EP LAB;  Service: Cardiology;  Laterality: N/A;    TRANSESOPHAGEAL ECHOCARDIOGRAPHY N/A 10/17/2023    Procedure: ECHOCARDIOGRAM, TRANSESOPHAGEAL;  Surgeon: Kathy Malik MD;  Location: [facility] EP LAB;  Service: Cardiology;  Laterality: N/A;    TREATMENT OF CARDIAC ARRHYTHMIA N/A 9/20/2023    Procedure: Cardioversion or Defibrillation;  Surgeon: JV Rosenthal MD;  Location: [facility] EP LAB;  Service: Cardiology;  Laterality: N/A;  AF, DCCV/PERICO, ANES, EH,     TREATMENT OF CARDIAC ARRHYTHMIA N/A 1/19/2024    Procedure: Cardioversion or Defibrillation;  Surgeon: JV Rosenthal MD;  Location: [facility] EP LAB;  Service: Cardiology;  Laterality: N/A;  AFL, PERICO/DCCV, ANES, EH,     TREATMENT OF CARDIAC ARRHYTHMIA N/A 1/19/2024    Procedure: Cardioversion or Defibrillation;  Surgeon: Luis Joiner MD;  Location: [facility] EP LAB;  Service: Cardiology;  Laterality: N/A;  AFL, DCCV ONLY, ANES, EH,        Review of patient's allergies indicates:   Allergen Reactions    Decongestant d [pseudoephedrine-dm]      Atrial Fibrillation    Augmentin [amoxicillin-pot  clavulanate]      palpitations      Amoxicillin Palpitations    Diphenhydramine-pseudoephed Palpitations     TACHYCARDIA    Povidone-iodine Rash     Mild erythema of skin       Current Facility-Administered Medications on File Prior to Encounter   Medication    sodium chloride 0.9% bolus 1,000 mL    vancomycin in dextrose 5 % 1 gram/250 mL IVPB 1,000 mg     Current Outpatient Medications on File Prior to Encounter   Medication Sig    diltiaZEM (CARDIZEM CD) 240 MG 24 hr capsule Take 1 capsule (240 mg total) by mouth once daily.    ELIQUIS 5 mg Tab TAKE 1 TABLET TWICE DAILY    fluticasone propionate (FLONASE) 50 mcg/actuation nasal spray USE 1 SPRAY IN EACH NOSTRIL EVERY DAY    levothyroxine (SYNTHROID) 25 MCG tablet TAKE 1 TABLET ONE TIME DAILY    nystatin (MYCOSTATIN) cream Apply topically 2 (two) times daily.    pravastatin (PRAVACHOL) 40 MG tablet TAKE 1 TABLET ONE TIME DAILY     Family History       Problem Relation (Age of Onset)    Cancer Mother          Tobacco Use    Smoking status: Never    Smokeless tobacco: Never   Substance and Sexual Activity    Alcohol use: No    Drug use: No    Sexual activity: Not Currently     Review of Systems   Constitutional: Negative for chills and fever.   Cardiovascular:  Negative for chest pain, orthopnea and palpitations.   Respiratory:  Negative for cough and shortness of breath.    Gastrointestinal:  Negative for abdominal pain.   Neurological:  Negative for dizziness, headaches and light-headedness.   Psychiatric/Behavioral:  Negative for altered mental status.      Objective:     Vital Signs (Most Recent):  Temp: 98 °F (36.7 °C) (03/25/24 1115)  Pulse: 110 (03/25/24 1115)  Resp: 20 (03/25/24 1115)  BP: (!) 155/79 (03/25/24 1354)  SpO2: 99 % (03/25/24 1115) Vital Signs (24h Range):  Temp:  [97.3 °F (36.3 °C)-98 °F (36.7 °C)] 98 °F (36.7 °C)  Pulse:  [104-110] 110  Resp:  [17-20] 20  SpO2:  [97 %-99 %] 99 %  BP: (112-155)/(69-80) 155/79     Weight: 97.5 kg (215 lb)  Body  mass index is 33.67 kg/m².    SpO2: 99 %         Intake/Output Summary (Last 24 hours) at 3/25/2024 1356  Last data filed at 3/24/2024 2015  Gross per 24 hour   Intake 462 ml   Output --   Net 462 ml       Lines/Drains/Airways       Peripheral Intravenous Line  Duration                  Peripheral IV - Single Lumen 03/22/24 1836 20 G Anterior;Proximal;Right Forearm 2 days                     Physical Exam  Vitals and nursing note reviewed.   Constitutional:       General: She is not in acute distress.     Appearance: Normal appearance. She is not toxic-appearing.   HENT:      Head: Normocephalic and atraumatic.      Mouth/Throat:      Mouth: Mucous membranes are moist.   Cardiovascular:      Rate and Rhythm: Normal rate. Rhythm irregular.      Heart sounds: No murmur heard.     No friction rub. No gallop.   Pulmonary:      Effort: Pulmonary effort is normal. No respiratory distress.      Breath sounds: Normal breath sounds.   Abdominal:      Palpations: Abdomen is soft.   Musculoskeletal:      Right lower leg: No edema.      Left lower leg: No edema.   Skin:     General: Skin is warm.   Neurological:      Mental Status: She is alert and oriented to person, place, and time. Mental status is at baseline.          Significant Labs: BMP:   Recent Labs   Lab 03/24/24  0428 03/25/24  0530   GLU 87 94    135*   K 3.9 3.7    107   CO2 19* 20*   BUN 12 19   CREATININE 0.7 0.7   CALCIUM 9.3 9.2   MG 2.2 2.1   , CMP   Recent Labs   Lab 03/24/24  0428 03/25/24  0530    135*   K 3.9 3.7    107   CO2 19* 20*   GLU 87 94   BUN 12 19   CREATININE 0.7 0.7   CALCIUM 9.3 9.2   ANIONGAP 10 8   , CBC   Recent Labs   Lab 03/24/24  0428 03/25/24  0529   WBC 5.08 4.80   HGB 14.7 14.5   HCT 43.9 43.4    249   , and All pertinent lab results from the last 24 hours have been reviewed.    Significant Imaging: Echocardiogram: Transthoracic echo (TTE) complete (Cupid Only):   Results for orders placed or performed  during the hospital encounter of 03/22/24   Echo   Result Value Ref Range    RA Width 3.25 cm    LA volume (mod) 56.53 cm3    Left Atrium Major Axis 6.22 cm    Left Atrium Minor Axis 6.15 cm    RA Major Axis 5.43 cm    LV Diastolic Volume 84.21 mL    LV Systolic Volume 25.64 mL    TR Max Esa 2.03 m/s    MV Peak E Esa 1.09 m/s    Ao VTI 21.59 cm    Ao peak esa 1.10 m/s    LVOT peak VTI 20.88 cm    LVOT peak esa 1.06 m/s    LVOT diameter 1.96 cm    IVRT 78.02 msec    AV mean gradient 3 mmHg    TAPSE 1.72 cm    RVDD 3.01 cm    LA size 4.00 cm    Ascending aorta 3.49 cm    STJ 2.62 cm    Sinus 2.84 cm    LVIDs 2.64 2.1 - 4.0 cm    Posterior Wall 0.9 0.6 - 1.1 cm    IVS 0.9 0.6 - 1.1 cm    LVIDd 5.7 3.5 - 6.0 cm    TDI LATERAL 0.16 m/s    LA WIDTH 4.02 cm    TDI SEPTAL 0.18 m/s    LV LATERAL E/E' RATIO 6.81 m/s    LV SEPTAL E/E' RATIO 6.06 m/s    FS 54 (A) 28 - 44 %    LA volume 84.53 cm3    LV mass 197.52 g    ZLVIDD -0.99     ZLVIDS -2.98     Left Ventricle Relative Wall Thickness 0.32 cm    AV valve area 2.92 cm²    AV Velocity Ratio 0.96     AV index (prosthetic) 0.97     Mean e' 0.17 m/s    LVOT area 3.0 cm2    LVOT stroke volume 62.97 cm3    AV peak gradient 5 mmHg    E/E' ratio 6.41 m/s    LV Systolic Volume Index 12.4 mL/m2    LV Diastolic Volume Index 40.68 mL/m2    LA Volume Index 40.8 mL/m2    LV Mass Index 95 g/m2    Triscuspid Valve Regurgitation Peak Gradient 16 mmHg    LA Volume Index (Mod) 27.3 mL/m2    DEAN by Velocity Ratio 2.91 cm²    BSA 2.13 m2    TV resting pulmonary artery pressure 19 mmHg    RV TB RVSP 5 mmHg    Est. RA pres 3 mmHg    Byrne's Biplane MOD Ejection Fraction 56 %    Narrative      Left Ventricle: The left ventricle is normal in size. Ventricular mass   is normal. Normal wall thickness. Normal wall motion. There is normal   systolic function with a visually estimated ejection fraction of 55 - 60%.   Biplane (2D) method of discs ejection fraction is 56%. There is normal   diastolic  function. Normal left ventricular filling pressure.    Right Ventricle: Normal right ventricular cavity size. Wall thickness   is normal. Right ventricle wall motion  is normal. Systolic function is   normal.    Aortic Valve: The aortic valve is a trileaflet valve. There is mild   annular calcification present.    Aorta: Aortic root is normal in size measuring 2.84 cm. Ascending aorta   is normal measuring 3.49 cm.    Pulmonary Artery: The estimated pulmonary artery systolic pressure is   19 mmHg.    IVC/SVC: Normal venous pressure at 3 mmHg.

## 2024-03-25 NOTE — CONSULTS
[facility]  Cardiology  Consult Note    Patient Name: Flores Fuentes  MRN: [id number]  Admission Date: 3/22/2024  Hospital Length of Stay: 1 days  Code Status: Full Code   Attending Provider: Oliver Mckeon MD   Consulting Provider: Jaime Bah MD  Primary Care Physician: Naz Condon MD  Principal Problem:Paroxysmal A-fib    Patient information was obtained from patient, past medical records, and ER records.     Consults  Subjective:     Chief Complaint:  PERICO for STEF eval prior to DCCV     HPI:   Patient is a 73 year old female with paroxysmal Afib (CHADS2-VASc 3) on eliquis, hypothyroidism, HLD, hx WPW who presents to [facility] for evaluation of palpitations. Patient reports feeling herself go into Afib on Tuesday night. Since then, she's been having intermittent palpitations and tachycardia with her HR randing from . She had an episode of lightheadedness/dizziness earlier today when she felt like she was going to pass out. She sat down to rest with resolution of symptoms. No actual LOC. She has a loop recorder in place and was told by her outpatient cardiologist that she went into afib 3 times over the last few days. She is scheduled to get another ablation done on 4/14. Recently taken off amiodarone about 30 days ago. Has chronic BLE edema due to venous insufficiency without recent worsening. Denies fever, chills, chest pain, SOB, N/V, abdominal pain, calf pain, vision changes, or syncope.    Dysphagia or odynophagia:  No  Liver Disease, esophageal disease, or known varices:  No  Upper GI Bleeding: No  Prior neck surgery or radiation:  No  History of anesthetic difficulties:  No  Family history of anesthetic difficulties:  No  Able to move neck in all directions:  Yes  Snoring:  No  Sleep Apnea:  No  Last oral intake:  12 hours ago         Past Medical History:   Diagnosis Date    Asymptomatic microscopic hematuria 6/2/2020    Atrial fibrillation 2014    nerves tip off, cardioverted 2017,  Eliquis, q 6 mo, Dr Servin, flecainide at home prn flare up 4/2019, 9/2018)    Cervical muscle strain 1/24/2017    Chronic anticoagulation 6/10/2021    DJD (degenerative joint disease) of cervical spine 1/24/2017    Essential hypertension 6/19/2019    Hyperlipidemia     Mitral valve disease     Mixed hyperlipidemia 11/07/2013    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, l mandible, 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 Dr Viktor Toure    Osteopenia of neck of left femur 6/4/2020    Paroxysmal atrioventricular tachycardia     Plantar fasciitis     Right knee meniscal tear     Dr Mayfield, tx conservatively    Severe obesity (BMI 35.0-35.9 with comorbidity) 1/24/2017    Slow transit constipation 7/13/2021    Despite fruits & veg & 1200 dunia diet, try Colace daily    Stress incontinence, female 03/28/2018    hasn't tried oxybutynin, After HYST, Kegels & PT  didn't work, worse at night wearing pads, Dr Infante    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    Thyroid disease        Past Surgical History:   Procedure Laterality Date    ABLATION OF ARRHYTHMOGENIC FOCUS FOR ATRIAL FIBRILLATION N/A 10/17/2023    Procedure: Ablation atrial fibrillation;  Surgeon: Ameya Servin MD;  Location: [facility] EP LAB;  Service: Cardiology;  Laterality: N/A;  AF, PERICO, PVI, WPW, RFA, POPEYE, Gen, DM, 3 Prep *MDT ILR*    ABLATION, MECHANOCHEMICAL, VARICOSE VEIN Right 12/8/2023    Procedure: ABLATION, MECHANOCHEMICAL, VARICOSE VEIN;  Surgeon: Simone Shannon MD;  Location: [facility] CATH LAB/EP;  Service: Cardiology;  Laterality: Right;    APPENDECTOMY      COLONOSCOPY N/A 8/13/2020    Procedure: COLONOSCOPY;  Surgeon: Angus Ac MD;  Location: [facility] ENDO (4TH FLR);  Service: Endoscopy;  Laterality: N/A;  ok to hold Eliquis 2 days per Dr Malathi HERNANDEZ test at [place] on 8/10-GT    ECHOCARDIOGRAM,TRANSESOPHAGEAL N/A 9/20/2023    Procedure: Transesophageal echo (PERICO) intra-procedure log documentation;  Surgeon: Provider, [facility];  Location: [facility] EP  LAB;  Service: Cardiology;  Laterality: N/A;    HYSTERECTOMY  1990    TAHBSO for fibroids    INSERTION OF IMPLANTABLE LOOP RECORDER Left 11/1/2021    Procedure: Insertion, Implantable Loop Recorder;  Surgeon: Ameya Servin MD;  Location: [facility] EP LAB;  Service: Cardiology;  Laterality: Left;  AF, ILR implant, MDT,  DM, 3 Prep    MANDIBLE SURGERY  2015    L mandible, Dr Toure    MANDIBLE SURGERY Left 8/27/2019    OOPHORECTOMY      TONSILLECTOMY      TRANSESOPHAGEAL ECHOCARDIOGRAM WITH POSSIBLE CARDIOVERSION (PERICO W/ POSS CARDIOVERSION) N/A 1/19/2024    Procedure: Transesophageal echo (PERICO) intra-procedure log documentation;  Surgeon: JV Rosenthal MD;  Location: [facility] EP LAB;  Service: Cardiology;  Laterality: N/A;    TRANSESOPHAGEAL ECHOCARDIOGRAPHY N/A 10/17/2023    Procedure: ECHOCARDIOGRAM, TRANSESOPHAGEAL;  Surgeon: Kathy Malik MD;  Location: [facility] EP LAB;  Service: Cardiology;  Laterality: N/A;    TREATMENT OF CARDIAC ARRHYTHMIA N/A 9/20/2023    Procedure: Cardioversion or Defibrillation;  Surgeon: JV Rosenthal MD;  Location: [facility] EP LAB;  Service: Cardiology;  Laterality: N/A;  AF, DCCV/PERICO, ANES, EH,     TREATMENT OF CARDIAC ARRHYTHMIA N/A 1/19/2024    Procedure: Cardioversion or Defibrillation;  Surgeon: JV Rosenthal MD;  Location: [facility] EP LAB;  Service: Cardiology;  Laterality: N/A;  AFL, PERICO/DCCV, ANES, EH,     TREATMENT OF CARDIAC ARRHYTHMIA N/A 1/19/2024    Procedure: Cardioversion or Defibrillation;  Surgeon: Luis Joiner MD;  Location: [facility] EP LAB;  Service: Cardiology;  Laterality: N/A;  AFL, DCCV ONLY, ANES, EH,        Review of patient's allergies indicates:   Allergen Reactions    Decongestant d [pseudoephedrine-dm]      Atrial Fibrillation    Augmentin [amoxicillin-pot clavulanate]      palpitations      Amoxicillin Palpitations    Diphenhydramine-pseudoephed Palpitations     TACHYCARDIA    Povidone-iodine Rash     Mild erythema of skin        Current Facility-Administered Medications on File Prior to Encounter   Medication    sodium chloride 0.9% bolus 1,000 mL    vancomycin in dextrose 5 % 1 gram/250 mL IVPB 1,000 mg     Current Outpatient Medications on File Prior to Encounter   Medication Sig    diltiaZEM (CARDIZEM CD) 240 MG 24 hr capsule Take 1 capsule (240 mg total) by mouth once daily.    ELIQUIS 5 mg Tab TAKE 1 TABLET TWICE DAILY    fluticasone propionate (FLONASE) 50 mcg/actuation nasal spray USE 1 SPRAY IN EACH NOSTRIL EVERY DAY    levothyroxine (SYNTHROID) 25 MCG tablet TAKE 1 TABLET ONE TIME DAILY    nystatin (MYCOSTATIN) cream Apply topically 2 (two) times daily.    pravastatin (PRAVACHOL) 40 MG tablet TAKE 1 TABLET ONE TIME DAILY     Family History       Problem Relation (Age of Onset)    Cancer Mother          Tobacco Use    Smoking status: Never    Smokeless tobacco: Never   Substance and Sexual Activity    Alcohol use: No    Drug use: No    Sexual activity: Not Currently     Review of Systems   Constitutional: Negative for chills and fever.   Cardiovascular:  Negative for chest pain, orthopnea and palpitations.   Respiratory:  Negative for cough and shortness of breath.    Gastrointestinal:  Negative for abdominal pain.   Neurological:  Negative for dizziness, headaches and light-headedness.   Psychiatric/Behavioral:  Negative for altered mental status.      Objective:     Vital Signs (Most Recent):  Temp: 98 °F (36.7 °C) (03/25/24 1115)  Pulse: 110 (03/25/24 1115)  Resp: 20 (03/25/24 1115)  BP: (!) 155/79 (03/25/24 1354)  SpO2: 99 % (03/25/24 1115) Vital Signs (24h Range):  Temp:  [97.3 °F (36.3 °C)-98 °F (36.7 °C)] 98 °F (36.7 °C)  Pulse:  [104-110] 110  Resp:  [17-20] 20  SpO2:  [97 %-99 %] 99 %  BP: (112-155)/(69-80) 155/79     Weight: 97.5 kg (215 lb)  Body mass index is 33.67 kg/m².    SpO2: 99 %         Intake/Output Summary (Last 24 hours) at 3/25/2024 1356  Last data filed at 3/24/2024 2015  Gross per 24 hour   Intake 462  ml   Output --   Net 462 ml       Lines/Drains/Airways       Peripheral Intravenous Line  Duration                  Peripheral IV - Single Lumen 03/22/24 1836 20 G Anterior;Proximal;Right Forearm 2 days                     Physical Exam  Vitals and nursing note reviewed.   Constitutional:       General: She is not in acute distress.     Appearance: Normal appearance. She is not toxic-appearing.   HENT:      Head: Normocephalic and atraumatic.      Mouth/Throat:      Mouth: Mucous membranes are moist.   Cardiovascular:      Rate and Rhythm: Normal rate. Rhythm irregular.      Heart sounds: No murmur heard.     No friction rub. No gallop.   Pulmonary:      Effort: Pulmonary effort is normal. No respiratory distress.      Breath sounds: Normal breath sounds.   Abdominal:      Palpations: Abdomen is soft.   Musculoskeletal:      Right lower leg: No edema.      Left lower leg: No edema.   Skin:     General: Skin is warm.   Neurological:      Mental Status: She is alert and oriented to person, place, and time. Mental status is at baseline.          Significant Labs: BMP:   Recent Labs   Lab 03/24/24  0428 03/25/24  0530   GLU 87 94    135*   K 3.9 3.7    107   CO2 19* 20*   BUN 12 19   CREATININE 0.7 0.7   CALCIUM 9.3 9.2   MG 2.2 2.1   , CMP   Recent Labs   Lab 03/24/24  0428 03/25/24  0530    135*   K 3.9 3.7    107   CO2 19* 20*   GLU 87 94   BUN 12 19   CREATININE 0.7 0.7   CALCIUM 9.3 9.2   ANIONGAP 10 8   , CBC   Recent Labs   Lab 03/24/24  0428 03/25/24  0529   WBC 5.08 4.80   HGB 14.7 14.5   HCT 43.9 43.4    249   , and All pertinent lab results from the last 24 hours have been reviewed.    Significant Imaging: Echocardiogram: Transthoracic echo (TTE) complete (Cupid Only):   Results for orders placed or performed during the hospital encounter of 03/22/24   Echo   Result Value Ref Range    RA Width 3.25 cm    LA volume (mod) 56.53 cm3    Left Atrium Major Axis 6.22 cm    Left Atrium  Minor Axis 6.15 cm    RA Major Axis 5.43 cm    LV Diastolic Volume 84.21 mL    LV Systolic Volume 25.64 mL    TR Max Esa 2.03 m/s    MV Peak E Esa 1.09 m/s    Ao VTI 21.59 cm    Ao peak esa 1.10 m/s    LVOT peak VTI 20.88 cm    LVOT peak esa 1.06 m/s    LVOT diameter 1.96 cm    IVRT 78.02 msec    AV mean gradient 3 mmHg    TAPSE 1.72 cm    RVDD 3.01 cm    LA size 4.00 cm    Ascending aorta 3.49 cm    STJ 2.62 cm    Sinus 2.84 cm    LVIDs 2.64 2.1 - 4.0 cm    Posterior Wall 0.9 0.6 - 1.1 cm    IVS 0.9 0.6 - 1.1 cm    LVIDd 5.7 3.5 - 6.0 cm    TDI LATERAL 0.16 m/s    LA WIDTH 4.02 cm    TDI SEPTAL 0.18 m/s    LV LATERAL E/E' RATIO 6.81 m/s    LV SEPTAL E/E' RATIO 6.06 m/s    FS 54 (A) 28 - 44 %    LA volume 84.53 cm3    LV mass 197.52 g    ZLVIDD -0.99     ZLVIDS -2.98     Left Ventricle Relative Wall Thickness 0.32 cm    AV valve area 2.92 cm²    AV Velocity Ratio 0.96     AV index (prosthetic) 0.97     Mean e' 0.17 m/s    LVOT area 3.0 cm2    LVOT stroke volume 62.97 cm3    AV peak gradient 5 mmHg    E/E' ratio 6.41 m/s    LV Systolic Volume Index 12.4 mL/m2    LV Diastolic Volume Index 40.68 mL/m2    LA Volume Index 40.8 mL/m2    LV Mass Index 95 g/m2    Triscuspid Valve Regurgitation Peak Gradient 16 mmHg    LA Volume Index (Mod) 27.3 mL/m2    DEAN by Velocity Ratio 2.91 cm²    BSA 2.13 m2    TV resting pulmonary artery pressure 19 mmHg    RV TB RVSP 5 mmHg    Est. RA pres 3 mmHg    Byrne's Biplane MOD Ejection Fraction 56 %    Narrative      Left Ventricle: The left ventricle is normal in size. Ventricular mass   is normal. Normal wall thickness. Normal wall motion. There is normal   systolic function with a visually estimated ejection fraction of 55 - 60%.   Biplane (2D) method of discs ejection fraction is 56%. There is normal   diastolic function. Normal left ventricular filling pressure.    Right Ventricle: Normal right ventricular cavity size. Wall thickness   is normal. Right ventricle wall motion  is  normal. Systolic function is   normal.    Aortic Valve: The aortic valve is a trileaflet valve. There is mild   annular calcification present.    Aorta: Aortic root is normal in size measuring 2.84 cm. Ascending aorta   is normal measuring 3.49 cm.    Pulmonary Artery: The estimated pulmonary artery systolic pressure is   19 mmHg.    IVC/SVC: Normal venous pressure at 3 mmHg.       Assessment and Plan:     * Paroxysmal A-fib  Patient is a 73 y.o. female with paroxysmal Afib (CHADS2-VASc 3) on eliquis, hypothyroidism, HLD, hx WPW, HTN who presents to [facility] for evaluation of palpitations. Patient reports feeling herself go into Afib on Tuesday night.     1. PERICO for evaluation of left atrial appendage prior to DC cardioversion  -No absolute contraindications of esophageal stricture, tumor, perforation, laceration,or diverticulum and/or active GI bleed  -The risks, benefits & alternatives of the procedure were explained to the patient.   -The risks of transesophageal echo include but are not limited to:  Dental trauma, esophageal trauma/perforation, bleeding, laryngospasm/brochospasm, aspiration, sore throat/hoarseness, & dislodgement of the endotracheal tube/nasogastric tube (where applicable).    -The risks of moderate sedation include hypotension, respiratory depression, arrhythmias, bronchospasm, & death.    -Informed consent was obtained. The patient is agreeable to proceed with the procedure and all questions and concerns addressed.    Staff attestation to follow. Further recommendations per attending addendum             VTE Risk Mitigation (From admission, onward)           Ordered     apixaban tablet 5 mg  2 times daily         03/23/24 0008     Reason for No Pharmacological VTE Prophylaxis  Once        Question:  Reasons:  Answer:  Already adequately anticoagulated on oral Anticoagulants    03/22/24 2121     IP VTE HIGH RISK PATIENT  Once         03/22/24 2121     Place sequential compression device  Until  discontinued         03/22/24 2115                    Thank you for your consult. We will sign off post PERICO. Please contact us if you have any additional questions.    Jaime Bah MD  Cardiology PGY6  [facility]

## 2024-03-25 NOTE — PLAN OF CARE
Patient stable. Remains in atrial fib. No complaints of pain. Patient able to ambulate to bathroom without difficulty. Patient NPO after midnight for planned cardioversion. Patient is aware of plan of care.   Problem: Adult Inpatient Plan of Care  Goal: Plan of Care Review  Outcome: Ongoing, Progressing  Goal: Patient-Specific Goal (Individualized)  Outcome: Ongoing, Progressing  Goal: Absence of Hospital-Acquired Illness or Injury  Outcome: Ongoing, Progressing  Goal: Optimal Comfort and Wellbeing  Outcome: Ongoing, Progressing  Goal: Readiness for Transition of Care  Outcome: Ongoing, Progressing     Problem: Infection  Goal: Absence of Infection Signs and Symptoms  Outcome: Ongoing, Progressing     Problem: Fall Injury Risk  Goal: Absence of Fall and Fall-Related Injury  Outcome: Ongoing, Progressing

## 2024-03-25 NOTE — ANESTHESIA POSTPROCEDURE EVALUATION
Anesthesia Post Evaluation    Patient: Flores Fuentes    Procedure(s) Performed: Procedure(s) (LRB):  Cardioversion or Defibrillation (N/A)  Transesophageal echo (PERICO) intra-procedure log documentation    Final Anesthesia Type: general (Natural airway)      Patient location during evaluation: PACU  Patient participation: Yes- Able to Participate  Level of consciousness: awake and alert  Post-procedure vital signs: reviewed and stable  Pain management: adequate  Airway patency: patent    PONV status at discharge: No PONV  Anesthetic complications: no      Cardiovascular status: hemodynamically stable  Respiratory status: unassisted  Hydration status: euvolemic  Follow-up not needed.              Vitals Value Taken Time   /69 03/25/24 1546   Temp 36.7 °C (98.1 °F) 03/25/24 1435   Pulse 63 03/25/24 1601   Resp 18 03/25/24 1601   SpO2 100 % 03/25/24 1601   Vitals shown include unvalidated device data.      No case tracking events are documented in the log.      Pain/Dalia Score: Dalia Score: 10 (3/25/2024  3:45 PM)

## 2024-03-25 NOTE — PLAN OF CARE
Received report from AVE Agarwal and MEIR Zuñiga. Patient s/p Mat/cardioversion, AAOx4. VSS, no c/o pain or discomfort at this time, resp even and unlabored. Post procedure protocol reviewed with patient. Understanding verbalized.  updated via wheelchair. RN at bedside.

## 2024-03-25 NOTE — ANESTHESIA PREPROCEDURE EVALUATION
03/25/2024  Flores Fuentes is a 73 y.o., female.    Pre-operative evaluation for Procedure(s) (LRB):  Cardioversion or Defibrillation (N/A)    Flores Fuentes is a 73 y.o. female     Patient Active Problem List   Diagnosis    Mixed hyperlipidemia    Subclinical iodine-deficiency hypothyroidism    Paroxysmal A-fib    Primary localized osteoarthrosis, lower leg    Atrial fibrillation with RVR    Cervical muscle strain    DJD (degenerative joint disease) of cervical spine    Stress incontinence, female    Essential hypertension    MATTHEW (obstructive sleep apnea)    Asymptomatic microscopic hematuria    Osteopenia of neck of left femur    Chronic anticoagulation    Slow transit constipation    Morbid obesity    Stress at home    Weight loss    Kym-Parkinson-White (WPW) syndrome    Hypothyroidism    Atrial flutter    Class 1 obesity due to excess calories with serious comorbidity and body mass index (BMI) of 33.0 to 33.9 in adult    Acute cystitis without hematuria       Review of patient's allergies indicates:   Allergen Reactions    Decongestant d [pseudoephedrine-dm]      Atrial Fibrillation    Augmentin [amoxicillin-pot clavulanate]      palpitations      Amoxicillin Palpitations    Diphenhydramine-pseudoephed Palpitations     TACHYCARDIA    Povidone-iodine Rash     Mild erythema of skin       Current Facility-Administered Medications on File Prior to Encounter   Medication Dose Route Frequency Provider Last Rate Last Admin    sodium chloride 0.9% bolus 1,000 mL  1,000 mL Intravenous Once Carley Cabrera NP        vancomycin in dextrose 5 % 1 gram/250 mL IVPB 1,000 mg  1,000 mg Intravenous On Call Procedure Carley Cabrera, BIANCA 166.7 mL/hr at 11/01/21 1249 1,000 mg at 11/01/21 1249     Current Outpatient Medications on File Prior to Encounter   Medication Sig Dispense Refill    diltiaZEM (CARDIZEM  CD) 240 MG 24 hr capsule Take 1 capsule (240 mg total) by mouth once daily. 90 capsule 3    ELIQUIS 5 mg Tab TAKE 1 TABLET TWICE DAILY 180 tablet 3    fluticasone propionate (FLONASE) 50 mcg/actuation nasal spray USE 1 SPRAY IN EACH NOSTRIL EVERY DAY 32 g 2    levothyroxine (SYNTHROID) 25 MCG tablet TAKE 1 TABLET ONE TIME DAILY 90 tablet 2    nystatin (MYCOSTATIN) cream Apply topically 2 (two) times daily. 30 g 11    pravastatin (PRAVACHOL) 40 MG tablet TAKE 1 TABLET ONE TIME DAILY 90 tablet 3       Past Surgical History:   Procedure Laterality Date    ABLATION OF ARRHYTHMOGENIC FOCUS FOR ATRIAL FIBRILLATION N/A 10/17/2023    Procedure: Ablation atrial fibrillation;  Surgeon: Ameya Servin MD;  Location: [facility] EP LAB;  Service: Cardiology;  Laterality: N/A;  AF, PERICO, PVI, WPW, RFA, POPEYE, Gen, DM, 3 Prep *MDT ILR*    ABLATION, MECHANOCHEMICAL, VARICOSE VEIN Right 12/8/2023    Procedure: ABLATION, MECHANOCHEMICAL, VARICOSE VEIN;  Surgeon: Simone Shannon MD;  [facility] CATH LAB[facility]EP;  Service: Cardiology;  Laterality: Right;    APPENDECTOMY      COLONOSCOPY N/A 8/13/2020    Procedure: COLONOSCOPY;  Surgeon: Angus Ac MD;  Location: [facility] ENDO ([address] [place] [address]);  Service: Endoscopy;  Laterality: N/A;  ok to hold Eliquis 2 days per Dr Servin     COVID test at [place] on 8/10-GT    ECHOCARDIOGRAM,TRANSESOPHAGEAL N/A 9/20/2023    Procedure: Transesophageal echo (PERICO) intra-procedure log documentation;  Surgeon: Provider, [facility];  Location: [facility] EP LAB;  Service: Cardiology;  Laterality: N/A;    HYSTERECTOMY  1990    TAHBSO for fibroids    INSERTION OF IMPLANTABLE LOOP RECORDER Left 11/1/2021    Procedure: Insertion, Implantable Loop Recorder;  Surgeon: Ameya Servin MD;  Location: [facility] EP LAB;  Service: Cardiology;  Laterality: Left;  AF, ILR implant, MDT,  DM, 3 Prep    MANDIBLE SURGERY  2015    JANI ayala, Dr Toure    MANDIBLE SURGERY Left 8/27/2019    OOPHORECTOMY      TONSILLECTOMY      TRANSESOPHAGEAL  ECHOCARDIOGRAM WITH POSSIBLE CARDIOVERSION (PERICO W/ POSS CARDIOVERSION) N/A 1/19/2024    Procedure: Transesophageal echo (PERICO) intra-procedure log documentation;  Surgeon: JV Rosenthal MD;  Location: [facility] EP LAB;  Service: Cardiology;  Laterality: N/A;    TRANSESOPHAGEAL ECHOCARDIOGRAPHY N/A 10/17/2023    Procedure: ECHOCARDIOGRAM, TRANSESOPHAGEAL;  Surgeon: Kathy Malik MD;  Location: [facility] EP LAB;  Service: Cardiology;  Laterality: N/A;    TREATMENT OF CARDIAC ARRHYTHMIA N/A 9/20/2023    Procedure: Cardioversion or Defibrillation;  Surgeon: JV Rosenthal MD;  Location: [facility] EP LAB;  Service: Cardiology;  Laterality: N/A;  AF, DCCV/PERICO, ANES, EH,     TREATMENT OF CARDIAC ARRHYTHMIA N/A 1/19/2024    Procedure: Cardioversion or Defibrillation;  Surgeon: JV Rosenthal MD;  Location: [facility] EP LAB;  Service: Cardiology;  Laterality: N/A;  AFL, PERICO/DCCV, ANES, EH,     TREATMENT OF CARDIAC ARRHYTHMIA N/A 1/19/2024    Procedure: Cardioversion or Defibrillation;  Surgeon: Luis Joiner MD;  Location: [facility] EP LAB;  Service: Cardiology;  Laterality: N/A;  AFL, DCCV ONLY, ANES, EH,        Social History     Socioeconomic History    Marital status:    Tobacco Use    Smoking status: Never    Smokeless tobacco: Never   Substance and Sexual Activity    Alcohol use: No    Drug use: No    Sexual activity: Not Currently   Other Topics Concern    Are you pregnant or think you may be? No     Social Determinants of Health     Financial Resource Strain: Low Risk  (3/24/2024)    Overall Financial Resource Strain (CARDIA)     Difficulty of Paying Living Expenses: Not hard at all   Food Insecurity: No Food Insecurity (3/24/2024)    Hunger Vital Sign     Worried About Running Out of Food in the Last Year: Never true     Ran Out of Food in the Last Year: Never true   Transportation Needs: No Transportation Needs (3/24/2024)    PRAPARE - Transportation     Lack of Transportation (Medical): No  "    Lack of Transportation (Non-Medical): No   Physical Activity: Inactive (3/24/2024)    Exercise Vital Sign     Days of Exercise per Week: 0 days     Minutes of Exercise per Session: 0 min   Stress: Stress Concern Present (3/24/2024)    [employer] - Occupational Stress Questionnaire     Feeling of Stress : To some extent   Social Connections: Moderately Integrated (3/24/2024)    Social Connection and Isolation Panel [NHANES]     Frequency of Communication with Friends and Family: More than three times a week     Frequency of Social Gatherings with Friends and Family: More than three times a week     Attends Faith Services: More than 4 times per year     Active Member of Clubs or Organizations: No     Attends Club or Organization Meetings: Never     Marital Status:    Housing Stability: Low Risk  (3/24/2024)    Housing Stability Vital Sign     Unable to Pay for Housing in the Last Year: No     Number of Places Lived in the Last Year: 1     Unstable Housing in the Last Year: No         CBC:   Recent Labs     24  0428 24  0529   WBC 5.08 4.80   RBC 4.74 4.68   HGB 14.7 14.5   HCT 43.9 43.4    249   MCV 93 93   MCH 31.0 31.0   MCHC 33.5 33.4       CMP:   Recent Labs     24  1835 24  0535 24  0428 24  0530      < > 138 135*   K 3.9   < > 3.9 3.7      < > 109 107   CO2 25   < > 19* 20*   BUN 22   < > 12 19   CREATININE 0.8   < > 0.7 0.7   GLU 86   < > 87 94   MG 2.3   < > 2.2 2.1   PHOS  --    < > 2.9 3.4   CALCIUM 9.8   < > 9.3 9.2   ALBUMIN 4.3  --   --   --    PROT 8.1  --   --   --    ALKPHOS 88  --   --   --    ALT 12  --   --   --    AST 17  --   --   --    BILITOT 0.6  --   --   --     < > = values in this interval not displayed.       INR  No results for input(s): "PT", "INR", "PROTIME", "APTT" in the last 72 hours.        Diagnostic Studies:      EKD Echo:  Results for orders placed or performed during the " hospital encounter of 05/21/18   2D echo with color flow doppler   Result Value Ref Range    EF + QEF 65 55 - 65    Diastolic Dysfunction No     Est. PA Systolic Pressure 21.66     Mitral Valve Mobility NORMAL     Tricuspid Valve Regurgitation TRIVIAL            Pre-op Assessment    I have reviewed the Patient Summary Reports.    I have reviewed the NPO Status.   I have reviewed the Medications.     Review of Systems  Anesthesia Hx:  No problems with previous Anesthesia               Denies Personal Hx of Anesthesia complications.                    Cardiovascular:  Exercise tolerance: good   Hypertension    Dysrhythmias atrial fibrillation                                   Pulmonary:        Sleep Apnea                Renal/:  Renal/ Normal                 Hepatic/GI:  Hepatic/GI Normal                 Neurological:    Denies CVA.    Denies Seizures.                                    Physical Exam  General: Cooperative, Alert and Oriented    Airway:  Mallampati: III   Mouth Opening: Normal  TM Distance: 4 - 6 cm  Tongue: Normal  Neck ROM: Normal ROM    Dental:  Intact        Anesthesia Plan  Type of Anesthesia, risks & benefits discussed:    Anesthesia Type: Gen Natural Airway, MAC  Intra-op Monitoring Plan: Standard ASA Monitors  Induction:  IV  Informed Consent: Informed consent signed with the Patient and all parties understand the risks and agree with anesthesia plan.  All questions answered.   ASA Score: 3  Day of Surgery Review of History & Physical: H&P Update referred to the surgeon/provider.    Ready For Surgery From Anesthesia Perspective.     .

## 2024-03-25 NOTE — PLAN OF CARE
Received report from AVE Agarwal and MEIR Zuñiga. Patient s/p Mat/cardioversion, AAOx4. VSS, no c/o pain or discomfort at this time, resp even and unlabored. Post procedure protocol reviewed with patient. Understanding verbalized.  updated via telephone. RN at bedside.

## 2024-03-25 NOTE — NURSING
Patient had a successful cardioversion. Patient stable and able to tolerate dinner. No complaints of pain or chest discomfort. Patient able to ambulate without difficult. Patient remains in normal sinus rhythm. Patient left with medication. Discharge instructions reviewed with patient and  at her side. Both the patient and  verbalized understanding. Peripheral IV removed. Tele removed and returned to the nurse station. Patient left with all personal belongings. Patient escort to parking garage for pickup.

## 2024-03-25 NOTE — NURSING TRANSFER
Nursing Transfer Note      3/25/2024   4:10 PM    Nurse giving handoff:Esther  Nurse receiving handoff:Kiko    Reason patient is being transferred: back to inpatient bed    Transfer To: U 351    Transfer via bed    Transfer with cardiac monitoring    Transported by RN and transporter    Transfer Vital Signs:  Blood Pressure:141/70  Heart Rate:63  O2:100  Temperature:98.1  Respirations:18      Order for Tele Monitor? Yes    Any special needs or follow-up needed: none    Patient belongings transferred with patient: Yes    Chart send with patient: Yes

## 2024-03-25 NOTE — TRANSFER OF CARE
"Anesthesia Transfer of Care Note    Patient: Flores Fuentes    Procedure(s) Performed: Procedure(s) (LRB):  Cardioversion or Defibrillation (N/A)  Transesophageal echo (PERICO) intra-procedure log documentation    Patient location: PACU    Anesthesia Type: general    Transport from OR: Transported from OR on room air with adequate spontaneous ventilation    Post pain: adequate analgesia    Post assessment: no apparent anesthetic complications    Post vital signs: stable    Level of consciousness: awake    Nausea/Vomiting: no nausea/vomiting    Complications: none    Transfer of care protocol was followed      Last vitals: Visit Vitals  BP (!) 155/79   Pulse 110   Temp 36.7 °C (98 °F)   Resp 20   Ht 5' 7" (1.702 m)   Wt 97.5 kg (215 lb)   SpO2 99%   BMI 33.67 kg/m²     "

## 2024-03-26 ENCOUNTER — PATIENT MESSAGE (OUTPATIENT)
Dept: ELECTROPHYSIOLOGY | Facility: CLINIC | Age: 74
End: 2024-03-26
Payer: MEDICARE

## 2024-03-26 LAB
OHS QRS DURATION: 100 MS
OHS QRS DURATION: 84 MS
OHS QTC CALCULATION: 484 MS
OHS QTC CALCULATION: 501 MS

## 2024-03-26 NOTE — PLAN OF CARE
Pt discharged home with no post-acute needs. F/U appts scheduled by [facility].    Maci Juan [facility]  Case Management Department  [email or web address]    Boo Khalil - Cardiology Stepdown  Discharge Final Note    Primary Care Provider: Naz Condon MD    Expected Discharge Date: 3/25/2024    Final Discharge Note (most recent)       Final Note - 03/26/24 0912          Final Note    Assessment Type Final Discharge Note     Anticipated Discharge Disposition Home or Self Care     Hospital Resources/Appts/Education Provided Provided patient/caregiver with written discharge plan information;Appointments scheduled and added to AVS        Post-Acute Status    Post-Acute Authorization Other     Other Status No Post-Acute Service Needs     Discharge Delays None known at this time                     Important Message from Medicare             Contact Info       Naz Condon MD   Specialty: Family Medicine   Relationship: PCP - General    [address] [place] [address]  [address]  [place] [address]   Phone: [phone]       Next Steps: Schedule an appointment as soon as possible for a visit          Future Appointments   Date Time Provider Department Center   4/9/2024  8:20 AM LAB, [facility] RVPH LAB [facility]   4/16/2024  5:15 AM 3PREP, [facility]IL [facility]   4/16/2024  8:00 AM INPATIENT, PERICO [facility] ECHOSTR [facility]   4/26/2024  2:30 PM Carito Ac MD [facility] DERM [place]   5/13/2024  8:40 AM Lynette France OD [facility] OPTOMTY [place]   8/6/2024  7:10 AM LAB, [facility] RVPH LAB [facility]   8/6/2024  7:30 AM RVPH MAMMO1 RVPH MAMMO [facility]   8/13/2024 11:20 AM Naz Condon MD [facility]

## 2024-03-27 NOTE — ADDENDUM NOTE
Addendum  created 03/27/24 1146 by Lombardi, Nicole A., CRNA    Flowsheet accepted, Intraprocedure Event deleted, Intraprocedure Flowsheets edited, Intraprocedure Meds edited, Intraprocedure Staff edited

## 2024-04-09 ENCOUNTER — LAB VISIT (OUTPATIENT)
Dept: LAB | Facility: HOSPITAL | Age: 74
End: 2024-04-09
Attending: INTERNAL MEDICINE
Payer: MEDICARE

## 2024-04-09 DIAGNOSIS — I48.0 PAROXYSMAL A-FIB: Chronic | ICD-10-CM

## 2024-04-09 LAB
ANION GAP SERPL CALC-SCNC: 10 MMOL/L (ref 8–16)
APTT PPP: 34.3 SEC (ref 21–32)
CALCIUM SERPL-MCNC: 9.5 MG/DL (ref 8.7–10.5)
CHLORIDE SERPL-SCNC: 108 MMOL/L (ref 95–110)
CO2 SERPL-SCNC: 25 MMOL/L (ref 23–29)
CREAT SERPL-MCNC: 0.66 MG/DL (ref 0.5–1.4)
ERYTHROCYTE [DISTWIDTH] IN BLOOD BY AUTOMATED COUNT: 13.9 % (ref 11.5–14.5)
EST. GFR  (NO RACE VARIABLE): >60 ML/MIN/1.73 M^2
GLUCOSE SERPL-MCNC: 100 MG/DL (ref 70–110)
HCT VFR BLD AUTO: 40.1 % (ref 37–48.5)
HGB BLD-MCNC: 13.2 G/DL (ref 12–16)
INR PPP: 1 (ref 0.8–1.2)
MCH RBC QN AUTO: 30.8 PG (ref 27–31)
MCHC RBC AUTO-ENTMCNC: 32.9 G/DL (ref 32–36)
MCV RBC AUTO: 94 FL (ref 82–98)
PLATELET # BLD AUTO: 248 K/UL (ref 150–450)
PMV BLD AUTO: 10.2 FL (ref 9.2–12.9)
POTASSIUM SERPL-SCNC: 4.9 MMOL/L (ref 3.5–5.1)
PROTHROMBIN TIME: 10.9 SEC (ref 9–12.5)
RBC # BLD AUTO: 4.29 M/UL (ref 4–5.4)
SODIUM SERPL-SCNC: 143 MMOL/L (ref 136–145)
UUN UR-MCNC: 16 MG/DL (ref 7–17)
WBC # BLD AUTO: 5.2 K/UL (ref 3.9–12.7)

## 2024-04-09 PROCEDURE — 36415 COLL VENOUS BLD VENIPUNCTURE: CPT | Mod: HCNC,PN | Performed by: INTERNAL MEDICINE

## 2024-04-09 PROCEDURE — 85730 THROMBOPLASTIN TIME PARTIAL: CPT | Mod: HCNC,PN | Performed by: INTERNAL MEDICINE

## 2024-04-09 PROCEDURE — 85610 PROTHROMBIN TIME: CPT | Mod: HCNC,PN | Performed by: INTERNAL MEDICINE

## 2024-04-09 PROCEDURE — 80048 BASIC METABOLIC PNL TOTAL CA: CPT | Mod: HCNC,PN | Performed by: INTERNAL MEDICINE

## 2024-04-09 PROCEDURE — 85027 COMPLETE CBC AUTOMATED: CPT | Mod: HCNC,PN | Performed by: INTERNAL MEDICINE

## 2024-04-12 ENCOUNTER — TELEPHONE (OUTPATIENT)
Dept: ELECTROPHYSIOLOGY | Facility: CLINIC | Age: 74
End: 2024-04-12
Payer: MEDICARE

## 2024-04-12 NOTE — TELEPHONE ENCOUNTER
Spoke to patient    CONFIRMED procedure arrival time of 515 Tuesday     Reiterated instructions including:  -Directions to check in desk  -NPO after midnight night prior to procedure  -High importance of HOLDING diltiazem and flecainide 5 days-confirmed last dose was 4/10, instructed to no take eliquis only on the morning of the procedure   -Pre-procedure LABS reviewed no alerts noted   -Confirmed absence or presence of implanted device/stimulator ILR MDT  -Confirmed no fever, cough, or shortness of breath in the past 30 days  -Confirmed no redness, rash, irritation, or yeast infection to groin area.   -Reviewed current visitor policy    Patient verbalized understanding of above and appreciated the call.

## 2024-04-15 ENCOUNTER — ANESTHESIA EVENT (OUTPATIENT)
Dept: MEDSURG UNIT | Facility: HOSPITAL | Age: 74
End: 2024-04-15
Payer: MEDICARE

## 2024-04-16 ENCOUNTER — ANESTHESIA (OUTPATIENT)
Dept: MEDSURG UNIT | Facility: HOSPITAL | Age: 74
End: 2024-04-16
Payer: MEDICARE

## 2024-04-16 ENCOUNTER — HOSPITAL ENCOUNTER (OUTPATIENT)
Dept: CARDIOLOGY | Facility: HOSPITAL | Age: 74
Discharge: HOME | End: 2024-04-16
Attending: INTERNAL MEDICINE | Admitting: INTERNAL MEDICINE
Payer: MEDICARE

## 2024-04-16 ENCOUNTER — HOSPITAL ENCOUNTER (OUTPATIENT)
Facility: HOSPITAL | Age: 74
Discharge: HOME | End: 2024-04-16
Attending: INTERNAL MEDICINE | Admitting: INTERNAL MEDICINE
Payer: MEDICARE

## 2024-04-16 VITALS
BODY MASS INDEX: 32.96 KG/M2 | OXYGEN SATURATION: 100 % | TEMPERATURE: 98 F | HEIGHT: 67 IN | WEIGHT: 210 LBS | SYSTOLIC BLOOD PRESSURE: 150 MMHG | SYSTOLIC BLOOD PRESSURE: 155 MMHG | DIASTOLIC BLOOD PRESSURE: 65 MMHG | DIASTOLIC BLOOD PRESSURE: 67 MMHG | HEART RATE: 68 BPM | HEIGHT: 67 IN | BODY MASS INDEX: 32.96 KG/M2 | RESPIRATION RATE: 18 BRPM | WEIGHT: 210 LBS

## 2024-04-16 DIAGNOSIS — I48.0 PAROXYSMAL A-FIB: ICD-10-CM

## 2024-04-16 DIAGNOSIS — I48.19 ATRIAL FIBRILLATION, PERSISTENT: ICD-10-CM

## 2024-04-16 DIAGNOSIS — I48.0 PAROXYSMAL A-FIB: Chronic | ICD-10-CM

## 2024-04-16 DIAGNOSIS — I48.4 ATYPICAL ATRIAL FLUTTER: Primary | ICD-10-CM

## 2024-04-16 DIAGNOSIS — I49.9 ARRHYTHMIA: ICD-10-CM

## 2024-04-16 DIAGNOSIS — I48.91 ATRIAL FIBRILLATION: ICD-10-CM

## 2024-04-16 LAB
ASCENDING AORTA: 3.5 CM
BSA FOR ECHO PROCEDURE: 2.12 M2
OHS QRS DURATION: 102 MS
OHS QRS DURATION: 96 MS
OHS QTC CALCULATION: 472 MS
OHS QTC CALCULATION: 522 MS
SINUS: 3.5 CM
STJ: 2.7 CM

## 2024-04-16 PROCEDURE — 63600175 PHARM REV CODE 636 W HCPCS: Mod: HCNC | Performed by: ANESTHESIOLOGY

## 2024-04-16 PROCEDURE — C1894 INTRO/SHEATH, NON-LASER: HCPCS | Mod: HCNC | Performed by: INTERNAL MEDICINE

## 2024-04-16 PROCEDURE — C1732 CATH, EP, DIAG/ABL, 3D/VECT: HCPCS | Mod: HCNC | Performed by: INTERNAL MEDICINE

## 2024-04-16 PROCEDURE — 25500020 PHARM REV CODE 255: Mod: HCNC | Performed by: INTERNAL MEDICINE

## 2024-04-16 PROCEDURE — D9220A PRA ANESTHESIA: Mod: HCNC,CRNA,, | Performed by: NURSE ANESTHETIST, CERTIFIED REGISTERED

## 2024-04-16 PROCEDURE — 36620 INSERTION CATHETER ARTERY: CPT | Mod: HCNC,,, | Performed by: ANESTHESIOLOGY

## 2024-04-16 PROCEDURE — 93655 ICAR CATH ABLTJ DSCRT ARRHYT: CPT | Mod: HCNC,,, | Performed by: INTERNAL MEDICINE

## 2024-04-16 PROCEDURE — 63600175 PHARM REV CODE 636 W HCPCS: Mod: HCNC | Performed by: NURSE ANESTHETIST, CERTIFIED REGISTERED

## 2024-04-16 PROCEDURE — C1753 CATH, INTRAVAS ULTRASOUND: HCPCS | Mod: HCNC | Performed by: INTERNAL MEDICINE

## 2024-04-16 PROCEDURE — 25000003 PHARM REV CODE 250: Mod: HCNC | Performed by: INTERNAL MEDICINE

## 2024-04-16 PROCEDURE — 93656 COMPRE EP EVAL ABLTJ ATR FIB: CPT | Mod: HCNC | Performed by: INTERNAL MEDICINE

## 2024-04-16 PROCEDURE — 93325 DOPPLER ECHO COLOR FLOW MAPG: CPT | Mod: 26,HCNC,, | Performed by: INTERNAL MEDICINE

## 2024-04-16 PROCEDURE — 25000003 PHARM REV CODE 250: Mod: HCNC | Performed by: NURSE PRACTITIONER

## 2024-04-16 PROCEDURE — 93005 ELECTROCARDIOGRAM TRACING: CPT | Mod: HCNC

## 2024-04-16 PROCEDURE — C1730 CATH, EP, 19 OR FEW ELECT: HCPCS | Mod: HCNC | Performed by: INTERNAL MEDICINE

## 2024-04-16 PROCEDURE — 99234 HOSP IP/OBS SM DT SF/LOW 45: CPT | Mod: 25,HCNC,GC, | Performed by: INTERNAL MEDICINE

## 2024-04-16 PROCEDURE — 27201423 OPTIME MED/SURG SUP & DEVICES STERILE SUPPLY: Mod: HCNC | Performed by: INTERNAL MEDICINE

## 2024-04-16 PROCEDURE — 93656 COMPRE EP EVAL ABLTJ ATR FIB: CPT | Mod: HCNC,,, | Performed by: INTERNAL MEDICINE

## 2024-04-16 PROCEDURE — 93005 ELECTROCARDIOGRAM TRACING: CPT | Mod: HCNC,59

## 2024-04-16 PROCEDURE — 63600175 PHARM REV CODE 636 W HCPCS: Mod: HCNC | Performed by: INTERNAL MEDICINE

## 2024-04-16 PROCEDURE — 51702 INSERT TEMP BLADDER CATH: CPT | Mod: HCNC

## 2024-04-16 PROCEDURE — 93010 ELECTROCARDIOGRAM REPORT: CPT | Mod: HCNC,76,, | Performed by: INTERNAL MEDICINE

## 2024-04-16 PROCEDURE — C1769 GUIDE WIRE: HCPCS | Mod: HCNC | Performed by: INTERNAL MEDICINE

## 2024-04-16 PROCEDURE — 93312 ECHO TRANSESOPHAGEAL: CPT | Mod: 26,HCNC,, | Performed by: INTERNAL MEDICINE

## 2024-04-16 PROCEDURE — C1751 CATH, INF, PER/CENT/MIDLINE: HCPCS | Mod: HCNC | Performed by: ANESTHESIOLOGY

## 2024-04-16 PROCEDURE — D9220A PRA ANESTHESIA: Mod: HCNC,ANES,, | Performed by: ANESTHESIOLOGY

## 2024-04-16 PROCEDURE — 93320 DOPPLER ECHO COMPLETE: CPT | Mod: 26,HCNC,, | Performed by: INTERNAL MEDICINE

## 2024-04-16 PROCEDURE — C1766 INTRO/SHEATH,STRBLE,NON-PEEL: HCPCS | Mod: HCNC | Performed by: INTERNAL MEDICINE

## 2024-04-16 PROCEDURE — 25000003 PHARM REV CODE 250: Mod: HCNC | Performed by: NURSE ANESTHETIST, CERTIFIED REGISTERED

## 2024-04-16 PROCEDURE — 93655 ICAR CATH ABLTJ DSCRT ARRHYT: CPT | Mod: HCNC | Performed by: INTERNAL MEDICINE

## 2024-04-16 PROCEDURE — 37000008 HC ANESTHESIA 1ST 15 MINUTES: Mod: HCNC | Performed by: INTERNAL MEDICINE

## 2024-04-16 PROCEDURE — 93657 TX L/R ATRIAL FIB ADDL: CPT | Mod: HCNC,,, | Performed by: INTERNAL MEDICINE

## 2024-04-16 PROCEDURE — 93325 DOPPLER ECHO COLOR FLOW MAPG: CPT | Mod: HCNC

## 2024-04-16 PROCEDURE — 37000009 HC ANESTHESIA EA ADD 15 MINS: Mod: HCNC | Performed by: INTERNAL MEDICINE

## 2024-04-16 PROCEDURE — 93657 TX L/R ATRIAL FIB ADDL: CPT | Mod: HCNC | Performed by: INTERNAL MEDICINE

## 2024-04-16 PROCEDURE — 93010 ELECTROCARDIOGRAM REPORT: CPT | Mod: HCNC,,, | Performed by: INTERNAL MEDICINE

## 2024-04-16 RX ORDER — PROTAMINE SULFATE 10 MG/ML
INJECTION, SOLUTION INTRAVENOUS
Status: DISCONTINUED | OUTPATIENT
Start: 2024-04-16 | End: 2024-04-16

## 2024-04-16 RX ORDER — LIDOCAINE HYDROCHLORIDE 20 MG/ML
INJECTION INTRAVENOUS
Status: DISCONTINUED | OUTPATIENT
Start: 2024-04-16 | End: 2024-04-16

## 2024-04-16 RX ORDER — ONDANSETRON HYDROCHLORIDE 2 MG/ML
INJECTION, SOLUTION INTRAVENOUS
Status: DISCONTINUED | OUTPATIENT
Start: 2024-04-16 | End: 2024-04-16

## 2024-04-16 RX ORDER — PROPOFOL 10 MG/ML
VIAL (ML) INTRAVENOUS
Status: DISCONTINUED | OUTPATIENT
Start: 2024-04-16 | End: 2024-04-16

## 2024-04-16 RX ORDER — SODIUM CHLORIDE 0.9 % (FLUSH) 0.9 %
10 SYRINGE (ML) INJECTION
Status: DISCONTINUED | OUTPATIENT
Start: 2024-04-16 | End: 2024-04-16 | Stop reason: HOSPADM

## 2024-04-16 RX ORDER — ROCURONIUM BROMIDE 10 MG/ML
INJECTION, SOLUTION INTRAVENOUS
Status: DISCONTINUED | OUTPATIENT
Start: 2024-04-16 | End: 2024-04-16

## 2024-04-16 RX ORDER — LANSOPRAZOLE 30 MG/1
30 CAPSULE, DELAYED RELEASE ORAL DAILY
Qty: 30 CAPSULE | Refills: 0 | Status: SHIPPED | OUTPATIENT
Start: 2024-04-16 | End: 2024-04-18

## 2024-04-16 RX ORDER — HEPARIN SOD,PORCINE/0.9 % NACL 1000/500ML
INTRAVENOUS SOLUTION INTRAVENOUS
Status: DISCONTINUED | OUTPATIENT
Start: 2024-04-16 | End: 2024-04-16 | Stop reason: HOSPADM

## 2024-04-16 RX ORDER — LIDOCAINE HYDROCHLORIDE 20 MG/ML
INJECTION, SOLUTION INFILTRATION; PERINEURAL
Status: DISCONTINUED | OUTPATIENT
Start: 2024-04-16 | End: 2024-04-16 | Stop reason: HOSPADM

## 2024-04-16 RX ORDER — EPHEDRINE SULFATE 50 MG/ML
INJECTION, SOLUTION INTRAVENOUS
Status: DISCONTINUED | OUTPATIENT
Start: 2024-04-16 | End: 2024-04-16

## 2024-04-16 RX ORDER — FLECAINIDE ACETATE 100 MG/1
100 TABLET ORAL EVERY 12 HOURS
Qty: 60 TABLET | Refills: 3 | Status: SHIPPED | OUTPATIENT
Start: 2024-04-16 | End: 2024-04-18 | Stop reason: SDUPTHER

## 2024-04-16 RX ORDER — SUCCINYLCHOLINE CHLORIDE 20 MG/ML
INJECTION INTRAMUSCULAR; INTRAVENOUS
Status: DISCONTINUED | OUTPATIENT
Start: 2024-04-16 | End: 2024-04-16

## 2024-04-16 RX ORDER — DEXMEDETOMIDINE HYDROCHLORIDE 100 UG/ML
INJECTION, SOLUTION INTRAVENOUS
Status: DISCONTINUED | OUTPATIENT
Start: 2024-04-16 | End: 2024-04-16

## 2024-04-16 RX ORDER — HYDROMORPHONE HYDROCHLORIDE 1 MG/ML
0.2 INJECTION, SOLUTION INTRAMUSCULAR; INTRAVENOUS; SUBCUTANEOUS EVERY 5 MIN PRN
Status: DISCONTINUED | OUTPATIENT
Start: 2024-04-16 | End: 2024-04-16 | Stop reason: HOSPADM

## 2024-04-16 RX ORDER — GLYCOPYRROLATE 0.2 MG/ML
INJECTION INTRAMUSCULAR; INTRAVENOUS
Status: DISCONTINUED | OUTPATIENT
Start: 2024-04-16 | End: 2024-04-16

## 2024-04-16 RX ORDER — PHENYLEPHRINE HYDROCHLORIDE 10 MG/ML
INJECTION INTRAVENOUS CONTINUOUS PRN
Status: DISCONTINUED | OUTPATIENT
Start: 2024-04-16 | End: 2024-04-16

## 2024-04-16 RX ORDER — ACETAMINOPHEN 325 MG/1
650 TABLET ORAL EVERY 4 HOURS PRN
Status: DISCONTINUED | OUTPATIENT
Start: 2024-04-16 | End: 2024-04-16 | Stop reason: HOSPADM

## 2024-04-16 RX ORDER — DEXAMETHASONE SODIUM PHOSPHATE 4 MG/ML
INJECTION, SOLUTION INTRA-ARTICULAR; INTRALESIONAL; INTRAMUSCULAR; INTRAVENOUS; SOFT TISSUE
Status: DISCONTINUED | OUTPATIENT
Start: 2024-04-16 | End: 2024-04-16

## 2024-04-16 RX ORDER — ONDANSETRON HYDROCHLORIDE 2 MG/ML
4 INJECTION, SOLUTION INTRAVENOUS ONCE
Status: COMPLETED | OUTPATIENT
Start: 2024-04-16 | End: 2024-04-16

## 2024-04-16 RX ORDER — HEPARIN SODIUM 1000 [USP'U]/ML
INJECTION, SOLUTION INTRAVENOUS; SUBCUTANEOUS
Status: DISCONTINUED | OUTPATIENT
Start: 2024-04-16 | End: 2024-04-16

## 2024-04-16 RX ORDER — HEPARIN SODIUM 10000 [USP'U]/100ML
INJECTION, SOLUTION INTRAVENOUS CONTINUOUS PRN
Status: DISCONTINUED | OUTPATIENT
Start: 2024-04-16 | End: 2024-04-16

## 2024-04-16 RX ORDER — FENTANYL CITRATE 50 UG/ML
INJECTION, SOLUTION INTRAMUSCULAR; INTRAVENOUS
Status: DISCONTINUED | OUTPATIENT
Start: 2024-04-16 | End: 2024-04-16

## 2024-04-16 RX ADMIN — DEXMEDETOMIDINE 4 MCG: 100 INJECTION, SOLUTION, CONCENTRATE INTRAVENOUS at 11:04

## 2024-04-16 RX ADMIN — ONDANSETRON 4 MG: 2 INJECTION INTRAMUSCULAR; INTRAVENOUS at 01:04

## 2024-04-16 RX ADMIN — HYDROMORPHONE HYDROCHLORIDE 0.2 MG: 1 INJECTION, SOLUTION INTRAMUSCULAR; INTRAVENOUS; SUBCUTANEOUS at 01:04

## 2024-04-16 RX ADMIN — FENTANYL CITRATE 100 MCG: 50 INJECTION, SOLUTION INTRAMUSCULAR; INTRAVENOUS at 07:04

## 2024-04-16 RX ADMIN — GLYCOPYRROLATE 0.2 MG: 0.2 INJECTION INTRAMUSCULAR; INTRAVENOUS at 08:04

## 2024-04-16 RX ADMIN — EPHEDRINE SULFATE 10 MG: 50 INJECTION INTRAVENOUS at 08:04

## 2024-04-16 RX ADMIN — PROPOFOL 50 MG: 10 INJECTION, EMULSION INTRAVENOUS at 08:04

## 2024-04-16 RX ADMIN — PROTAMINE SULFATE 95 MG: 10 INJECTION, SOLUTION INTRAVENOUS at 10:04

## 2024-04-16 RX ADMIN — LIDOCAINE HYDROCHLORIDE 60 MG: 20 INJECTION INTRAVENOUS at 07:04

## 2024-04-16 RX ADMIN — PROPOFOL 200 MG: 10 INJECTION, EMULSION INTRAVENOUS at 07:04

## 2024-04-16 RX ADMIN — DEXMEDETOMIDINE 8 MCG: 100 INJECTION, SOLUTION, CONCENTRATE INTRAVENOUS at 11:04

## 2024-04-16 RX ADMIN — ACETAMINOPHEN 650 MG: 325 TABLET ORAL at 12:04

## 2024-04-16 RX ADMIN — HYDROMORPHONE HYDROCHLORIDE 0.2 MG: 1 INJECTION, SOLUTION INTRAMUSCULAR; INTRAVENOUS; SUBCUTANEOUS at 12:04

## 2024-04-16 RX ADMIN — PHENYLEPHRINE HYDROCHLORIDE 0.2 MCG/KG/MIN: 10 INJECTION INTRAVENOUS at 08:04

## 2024-04-16 RX ADMIN — HEPARIN SODIUM 2850 UNITS: 1000 INJECTION, SOLUTION INTRAVENOUS; SUBCUTANEOUS at 08:04

## 2024-04-16 RX ADMIN — ROCURONIUM BROMIDE 5 MG: 10 INJECTION, SOLUTION INTRAVENOUS at 07:04

## 2024-04-16 RX ADMIN — DEXAMETHASONE SODIUM PHOSPHATE 4 MG: 4 INJECTION, SOLUTION INTRAMUSCULAR; INTRAVENOUS at 07:04

## 2024-04-16 RX ADMIN — ONDANSETRON 4 MG: 2 INJECTION INTRAMUSCULAR; INTRAVENOUS at 10:04

## 2024-04-16 RX ADMIN — HEPARIN SODIUM 19000 UNITS: 1000 INJECTION, SOLUTION INTRAVENOUS; SUBCUTANEOUS at 08:04

## 2024-04-16 RX ADMIN — DEXMEDETOMIDINE 4 MCG: 100 INJECTION, SOLUTION, CONCENTRATE INTRAVENOUS at 10:04

## 2024-04-16 RX ADMIN — HUMAN ALBUMIN MICROSPHERES AND PERFLUTREN 0.66 MG: 10; .22 INJECTION, SOLUTION INTRAVENOUS at 09:04

## 2024-04-16 RX ADMIN — SUCCINYLCHOLINE CHLORIDE 140 MG: 20 INJECTION, SOLUTION INTRAMUSCULAR; INTRAVENOUS at 07:04

## 2024-04-16 RX ADMIN — EPHEDRINE SULFATE 5 MG: 50 INJECTION INTRAVENOUS at 08:04

## 2024-04-16 RX ADMIN — HEPARIN SODIUM AND DEXTROSE 12 UNITS/KG/HR: 10000; 5 INJECTION INTRAVENOUS at 08:04

## 2024-04-16 RX ADMIN — HEPARIN SODIUM 2850 UNITS: 1000 INJECTION, SOLUTION INTRAVENOUS; SUBCUTANEOUS at 09:04

## 2024-04-16 RX ADMIN — SODIUM CHLORIDE: 0.9 INJECTION, SOLUTION INTRAVENOUS at 07:04

## 2024-04-16 NOTE — PLAN OF CARE
Received report from AVE Barraza . Patient s/p Ablation, AAOx4. VSS, no c/o pain or discomfort at this time, resp even and unlabored. Stopcocks/sutures to bilateral groins are open to air,dry, and intact. No active bleeding. No hematoma noted. Post procedure protocol reviewed with patient and patient's . Understanding verbalized.  at bedside. Nurse call bell within reach.

## 2024-04-16 NOTE — SUBJECTIVE & OBJECTIVE
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 Dr Viktor Toure    Osteopenia of neck of left femur 6/4/2020    Paroxysmal atrioventricular tachycardia     Plantar fasciitis     Right knee meniscal tear     Dr Mayfield, tx conservatively    Severe obesity (BMI 35.0-35.9 with comorbidity) 1/24/2017    Slow transit constipation 7/13/2021    Despite fruits & veg & 1200 dunia diet, try Colace daily    Stress incontinence, female 03/28/2018    hasn't tried oxybutynin, After HYST, Kegels & PT  didn't work, worse at night wearing pads, Dr Infante    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    Thyroid disease        Past Surgical History:   Procedure Laterality Date    ABLATION OF ARRHYTHMOGENIC FOCUS FOR ATRIAL FIBRILLATION N/A 10/17/2023    Procedure: Ablation atrial fibrillation;  Surgeon: Ameya Servin MD;  Location: [facility] EP LAB;  Service: Cardiology;  Laterality: N/A;  AF, PERICO, PVI, WPW, RFA, POPEYE, Gen, DM, 3 Prep *MDT ILR*    ABLATION, MECHANOCHEMICAL, VARICOSE VEIN Right 12/8/2023    Procedure: ABLATION, MECHANOCHEMICAL, VARICOSE VEIN;  Surgeon: Simone Shannno MD;  Location: [facility] CATH LAB/EP;  Service: Cardiology;  Laterality: Right;    APPENDECTOMY      COLONOSCOPY N/A 8/13/2020    Procedure: COLONOSCOPY;  Surgeon: Angus Ac MD;  Location: [facility] ENDO ([address] [place] [address]);  Service: Endoscopy;  Laterality: N/A;  ok to hold Eliquis 2 days per Dr Servin     COVID test at [place] on 8/10-GT     ECHOCARDIOGRAM,TRANSESOPHAGEAL N/A 9/20/2023    Procedure: Transesophageal echo (PERICO) intra-procedure log documentation;  Surgeon: Provider, Dosmary Diagnostic;  Location: [facility] EP LAB;  Service: Cardiology;  Laterality: N/A;    ECHOCARDIOGRAM,TRANSESOPHAGEAL  3/25/2024    Procedure: Transesophageal echo (PERICO) intra-procedure log documentation;  Surgeon: Kathy Malik MD;  Location: [facility] EP LAB;  Service: Cardiology;;    HYSTERECTOMY  1990    TAHBSO for fibroids    INSERTION OF IMPLANTABLE LOOP RECORDER Left 11/1/2021    Procedure: Insertion, Implantable Loop Recorder;  Surgeon: Ameya Servin MD;  Location: [facility] EP LAB;  Service: Cardiology;  Laterality: Left;  AF, ILR implant, MDT,  DM, 3 Prep    MANDIBLE SURGERY  2015    L mandible, Dr Toure    MANDIBLE SURGERY Left 8/27/2019    OOPHORECTOMY      TONSILLECTOMY      TRANSESOPHAGEAL ECHOCARDIOGRAM WITH POSSIBLE CARDIOVERSION (PERICO W/ POSS CARDIOVERSION) N/A 1/19/2024    Procedure: Transesophageal echo (PERICO) intra-procedure log documentation;  Surgeon: JV Rosenthal MD;  Location: [facility] EP LAB;  Service: Cardiology;  Laterality: N/A;    TRANSESOPHAGEAL ECHOCARDIOGRAPHY N/A 10/17/2023    Procedure: ECHOCARDIOGRAM, TRANSESOPHAGEAL;  Surgeon: Kathy Malik MD;  Location: [facility] EP LAB;  Service: Cardiology;  Laterality: N/A;    TREATMENT OF CARDIAC ARRHYTHMIA N/A 9/20/2023    Procedure: Cardioversion or Defibrillation;  Surgeon: JV Rosenthal MD;  Location: [facility] EP LAB;  Service: Cardiology;  Laterality: N/A;  AF, DCCV/PERICO, ANES, EH,     TREATMENT OF CARDIAC ARRHYTHMIA N/A 1/19/2024    Procedure: Cardioversion or Defibrillation;  Surgeon: Luis Joiner MD;  Location: [facility] EP LAB;  Service: Cardiology;  Laterality: N/A;  AFL, DCCV ONLY, ANES, EH,     TREATMENT OF CARDIAC ARRHYTHMIA N/A 3/25/2024    Procedure: Cardioversion or Defibrillation;  Surgeon: Ameya Servin MD;  Location: [facility] EP LAB;  Service: Cardiology;  Laterality:  N/A;  AF, DCCV ONLY, ANES, SD, 351    TREATMENT OF CARDIAC ARRHYTHMIA N/A 1/19/2024    Procedure: Cardioversion or Defibrillation;  Surgeon: JV Rosenthal MD;  Location: [facility] EP LAB;  Service: Cardiology;  Laterality: N/A;  AFL, PERICO/DCCV, ANES, EH,        Review of patient's allergies indicates:   Allergen Reactions    Decongestant d [pseudoephedrine-dm]      Atrial Fibrillation    Augmentin [amoxicillin-pot clavulanate]      palpitations      Amoxicillin Palpitations    Diphenhydramine-pseudoephed Palpitations     TACHYCARDIA    Povidone-iodine Rash     Mild erythema of skin       No current facility-administered medications for this encounter.     Facility-Administered Medications Ordered in Other Encounters   Medication Dose Route Frequency Provider Last Rate Last Admin    sodium chloride 0.9% bolus 1,000 mL  1,000 mL Intravenous Once Carley Cabrera NP        vancomycin in dextrose 5 % 1 gram/250 mL IVPB 1,000 mg  1,000 mg Intravenous On Call Procedure Carley Cabrera .7 mL/hr at 11/01/21 1249 1,000 mg at 11/01/21 1249     Family History       Problem Relation (Age of Onset)    Cancer Mother          Tobacco Use    Smoking status: Never    Smokeless tobacco: Never   Substance and Sexual Activity    Alcohol use: No    Drug use: No    Sexual activity: Not Currently     Review of Systems   Constitutional: Negative for chills, fever and malaise/fatigue.   HENT:  Negative for congestion and nosebleeds.    Eyes:  Negative for blurred vision.   Cardiovascular:  Negative for chest pain, dyspnea on exertion, irregular heartbeat, leg swelling, near-syncope, orthopnea, palpitations, paroxysmal nocturnal dyspnea and syncope.   Respiratory:  Negative for cough, hemoptysis, shortness of breath, sleep disturbances due to breathing, sputum production and wheezing.    Endocrine: Negative for polyphagia.   Hematologic/Lymphatic: Negative for bleeding problem. Does not bruise/bleed easily.   Skin:  Negative for  itching and rash.   Musculoskeletal:  Negative for back pain, joint swelling, muscle cramps and muscle weakness.        Right calf/ankle larger than left x many yeast s/p venous surgery-no concerns.   Gastrointestinal:  Negative for bloating, abdominal pain, hematemesis, hematochezia, nausea and vomiting.   Genitourinary:  Negative for dysuria and hematuria.   Neurological:  Negative for dizziness, focal weakness, headaches, light-headedness, loss of balance, numbness and weakness.   Psychiatric/Behavioral:  Negative for altered mental status.      Objective:     Vital Signs (Most Recent):  Temp: 97.9 °F (36.6 °C) (04/16/24 0608)  Pulse: (!) 58 (04/16/24 0608)  Resp: 18 (04/16/24 0608)  BP: (!) 155/67 (04/16/24 0609)  SpO2: 100 % (04/16/24 0608) Vital Signs (24h Range):  Temp:  [97.9 °F (36.6 °C)] 97.9 °F (36.6 °C)  Pulse:  [58] 58  Resp:  [18] 18  SpO2:  [100 %] 100 %  BP: (151-155)/(67-70) 155/67       Weight: 95.3 kg (210 lb)  Body mass index is 32.89 kg/m².    SpO2: 100 %        Physical Exam  Vitals and nursing note reviewed.   Constitutional:       General: She is not in acute distress.     Appearance: Normal appearance. She is well-developed. She is not diaphoretic.   HENT:      Head: Normocephalic and atraumatic.      Mouth/Throat:      Mouth: Mucous membranes are moist.      Pharynx: No oropharyngeal exudate.   Eyes:      Conjunctiva/sclera: Conjunctivae normal.      Pupils: Pupils are equal, round, and reactive to light.   Cardiovascular:      Rate and Rhythm: Normal rate and regular rhythm.      Pulses: Intact distal pulses.           Radial pulses are 2+ on the right side and 2+ on the left side.        Dorsalis pedis pulses are 1+ on the right side and 1+ on the left side.      Heart sounds: Normal heart sounds, S1 normal and S2 normal.   Pulmonary:      Effort: Pulmonary effort is normal. No accessory muscle usage or respiratory distress.      Breath sounds: Normal breath sounds. No decreased breath  sounds, wheezing, rhonchi or rales.   Chest:      Chest wall: No tenderness.   Abdominal:      General: Bowel sounds are normal. There is no distension.      Palpations: Abdomen is soft.      Tenderness: There is no abdominal tenderness. There is no guarding.   Musculoskeletal:         General: Normal range of motion.      Cervical back: Normal range of motion and neck supple.      Comments: Right LE larger than left, no deformity, edema, or discoloration   Skin:     General: Skin is warm and dry.      Findings: No erythema or rash.   Neurological:      Mental Status: She is alert and oriented to person, place, and time. She is not disoriented.      Sensory: No sensory deficit.      Motor: No abnormal muscle tone.      Coordination: Coordination normal.      Gait: Gait normal.   Psychiatric:         Mood and Affect: Mood normal.         Behavior: Behavior normal.         Thought Content: Thought content normal.         Judgment: Judgment normal.            Significant Labs: Pre procedure labs from 4/9/24 through today reviewed    Significant Imaging:  ECG

## 2024-04-16 NOTE — NURSING
Pt ambulated around unit. Tolerated walk well. Vital signs remain stable. Gauze/tegaderm dressing to bilateral groin sites are CDI. No active bleeding. No hematoma noted.

## 2024-04-16 NOTE — NURSING TRANSFER
Nursing Transfer Note      4/16/2024   3:15 PM    Nurse giving handoff:JOSSE DORADO   Nurse receiving handoff:LANA DORADO     Reason patient is being transferred: D/C CRITERIA MET     [facility]    Transfer via stretcher    Transfer with cardiac monitoring AND REGISTERED BOX AND VERIFIED ABLE TO SEE PATIENT ON MONITOR     Transported by JOSSE DORADO     Transfer Vital Signs:  Blood Pressure:SEE EPIC   Heart Rate SEE EPIC   O2:ROOM AIR   Temperature:SEE EPIC   Respirations:SEE EPIC     Telemetry: YES   Order for Tele Monitor? YES     Additional Lines: PERCY     4eyes on Skin: YES IN RECOVERY     Medicines sent: NONE     Any special needs or follow-up needed: D/C CRITIERIA MET WENT OVER SUTURE REMOVAL TIME     Patient belongings transferred with patient: N/A     Chart send with patient: YES     Notified: REPORTED TO LANA DORADO     Patient reassessed at: SEE EPIC  (date, time)  1  Upon arrival to floor: TO ROOM NO COMPLAINTS NO DISTRESS NOTED.

## 2024-04-16 NOTE — NURSING
Bilateral sutures/stopcocks removed per protocol. Pt tolerated well. Bilateral groin sites dressed with gauze and tegaderm CDI. No hematoma noted. No bleeding noted. Call bell in reach of patient.

## 2024-04-16 NOTE — ANESTHESIA PREPROCEDURE EVALUATION
04/16/2024  Flores Fuentes is a 73 y.o., female with hx of afib (on eliquis) here for ablation. Normal LVEF    Results for orders placed during the hospital encounter of 03/22/24    Echo    Interpretation Summary    Left Ventricle: The left ventricle is normal in size. Ventricular mass is normal. Normal wall thickness. Normal wall motion. There is normal systolic function with a visually estimated ejection fraction of 55 - 60%. Biplane (2D) method of discs ejection fraction is 56%. There is normal diastolic function. Normal left ventricular filling pressure.    Right Ventricle: Normal right ventricular cavity size. Wall thickness is normal. Right ventricle wall motion  is normal. Systolic function is normal.    Aortic Valve: The aortic valve is a trileaflet valve. There is mild annular calcification present.    Aorta: Aortic root is normal in size measuring 2.84 cm. Ascending aorta is normal measuring 3.49 cm.    Pulmonary Artery: The estimated pulmonary artery systolic pressure is 19 mmHg.    IVC/SVC: Normal venous pressure at 3 mmHg.        Pre-op Assessment    I have reviewed the Patient Summary Reports.     I have reviewed the Nursing Notes. I have reviewed the NPO Status.   I have reviewed the Medications.     Review of Systems  Anesthesia Hx:  No problems with previous Anesthesia               Denies Personal Hx of Anesthesia complications.                    Social:  Non-Smoker       Cardiovascular:  Exercise tolerance: good   Hypertension    Dysrhythmias atrial fibrillation                                   Pulmonary:        Sleep Apnea                Renal/:  Renal/ Normal                 Hepatic/GI:  Hepatic/GI Normal                 Neurological:    Denies CVA.    Denies Seizures.                                    Physical Exam  General: Cooperative, Alert and  Oriented    Airway:  Mallampati: III / II  Mouth Opening: Small, but > 3cm  TM Distance: 4 - 6 cm  Tongue: Normal  Neck ROM: Normal ROM    Dental:  Intact        Anesthesia Plan  Type of Anesthesia, risks & benefits discussed:    Anesthesia Type: Gen ETT  Intra-op Monitoring Plan: Standard ASA Monitors  Post Op Pain Control Plan: multimodal analgesia  Induction:  IV  Airway Plan: Direct, Post-Induction  Informed Consent: Informed consent signed with the Patient and all parties understand the risks and agree with anesthesia plan.  All questions answered. Patient consented to blood products? Yes  ASA Score: 2    Ready For Surgery From Anesthesia Perspective.     .

## 2024-04-16 NOTE — PROGRESS NOTES
PATIENT ADMITTED TO RECOVERY SEE [facility] FOR COMPLETE ASSESSMENT PACU BCG'S  MAINTAINED SAFETY MEASURES VERIFIED PATIENT INSTRUCTED ON PAIN SCALE AND PATIENT VERBALIZED UNDERSTANDING. CALLED FOR EKG AND IT WAS DONE AND PLACED IN CHART. ALSO CALLED PATIENT'S  AND UPDATED ON PATIENT LOCATION WITH THE PERMISSION OFPATIENT .

## 2024-04-16 NOTE — NURSING
No longer holding pressure to right groin site. Hemostasis achieved. No bleeding or hematoma noted. R groin dressed with quickclot and tegaderm. Pt tolerated well. Call light in place.  at bedside.

## 2024-04-16 NOTE — DISCHARGE SUMMARY
[facility] - Cardiology  Cardiac Electrophysiology  Discharge Summary      Patient Name: Flores Fuentes  MRN: [id number]  Admission Date: 4/16/2024  Hospital Length of Stay: 0 days  Discharge Date and Time: 4/16/2024  6:56 PM  Attending Physician: No att. providers found    Discharging Provider: Erika Brush NP  Primary Care Physician: Naz Condon MD    HPI:  Ms. Fuentes is a 73 year old female with a PMHx of pAF, HLD, obesity, ILR implant and hypothyroid.     History obtained through patient report and clinic notes:     AF first dx 2006 after lots of caffeine. Few episodes since then. Pill in pocket strategy.  HL, on meds  obesity  hypothyroid, on med  MATTHEW, on CPAP since early 2022     Went to ER 3/10 with palps. Underwent DCCV 3/13/17 after remaining in AF with RVR. Started on multaq, and BB d/c'd.  Since, feeling not quite as well as usually, and on 4/17, at which time HR was 120s and didn't feel same as prior AF episodes.  She'd been on BB for years w/o issues. Good exertion tolerance. No CP.     Due to rare PAF episodes, we adopted a pill-in-the-pocket strategy. She used it first in 11/17; went to the ER. AF resolved <30 min after taking dose. Had 2 other episodes, self-treated successfully, in 12/17 and 1/18. Since last seen, has had 4 such episodes, all aborted with PIP.      Had AF episode 8/2023 without termination from PIP flec. Standing-dose flec started. Has had 2 episodes of AF since.     10/17/2023 PVI with no evidence of AP during EPS (including ADO use)     2/25/24 During last office visit, 3 month follow up s/p PVI 10/17/23 without complication. Discharged on amiodarone. No recurrent of AF with ILR in situ.   ILR without any AF/AF  EF 60-65%  Rt GSV chemical ablation 12/23  Tolerating medications  I personally reviewed the ECG/telemetry strip today which shows NSR without ectopy  Plan to stop amio and continue to monitor ILR.      3/25/24 Presented to ED for palpitations, light  headedness, and tachycardia. In AF, PERICO/DCCV successful, discharged home on flecainide and Cardizem. Plan for redo PVI.     Ms. Fuentes presents today to [facility] for scheduled PVI ablation with Dr. Servin. She denies any chest pain, palpitations, SOB, JOHANSEN, dizziness, light headedness, weakness, syncope, or near syncopal episodes. She does endorse palpitations when out of rhythm. She denies any bleeding, infections, fevers, rashes, or surgeries in the past 30 days. She is currently taking diltiazem 240 mg daily (last dose 4/10/24), flecainide 100 mg BID (last dose 4/10/24), and eliquis 5 mg BID (last dose taken yesterday PM).     ECG today shows sinus rhythm at 60 bpm  ms QRS 96 ms QT/Qtc 472/472 ms.    Procedure(s) (LRB):  Ablation atrial fibrillation (N/A)  ECHOCARDIOGRAM, TRANSESOPHAGEAL (N/A)  Ablation, Atrial Flutter, Atypical  Ablation     Indwelling Lines/Drains at time of discharge:  Lines/Drains/Airways    None     Hospital Course:  Patient underwent successful pulmonary vein RF ablation (re-isolating the RSPV), successful posterior box lesion set ablation (i.e., 2 additional LA linear lesions), successful anteromedial mitral line for inducible mitral annular flutter (i.e., a third additional LA linear lesion) (see procedure note for details), tolerated procedure well with no acute complications. Admitted to PACU, post-procedure ECG showed sinus rhythm with PACs at 70 bpm  ms  ms QT/QTc 484/522 ms. Ms. Fuentes does note a pins and needles feeling in her right thigh. This is consistent with a feeling she is familiar with at home in this same location. It resolves spontaneously when she is home. She denies any numbness and is able to move all extremities without difficulty. Distal pulses intact, no visual changes to right leg. Dr. Servin also spoke to patient at bedside. OK to transfer patient to [facility] to continue recovery. Bilateral groin sites with sutures, removed, dressings C/D/I. No bleeding,  hematoma, or bruit noted. Bedrest completed x 6 hours. Upon ambulation, Ms. Fuentes was noted to have oozing at right groin site on gauze dressing. Pressure held x 20 minutes, instructed nurses to extend bedrest for 1 extra hour after hemostasis achieved. Ms. Fuentes was monitored on telemetry, no acute arrhythmias. Patient ambulating, tolerating PO intake, and voiding with no issues. Denies any chest pain or SOB. Discharge plans discussed with Dr. Servin. Patient to continue all home medications including AAD flecainide 100 mg BID, Diltiazem 240 mg daily, and eliquis 5 mg BID for CVA prophylaxis. After previous ablation, Ms. Fuentes was not prescribed protonix at discharge due to her iodine allergy and told to call the clinic for an alternative PPI. Dr. Servin order prevacid daily for 30 days. We will again order prevacid x 30 days as substitute PPI. Follow up with Dr. Servin in 3 months. Ms. Fuentes was assessed at bedside prior to discharge. She reported feeling well and denied chest discomfort, shortness of breath, palpitations, lightheadedness, further bleeding, or any other acute symptoms. Discharge plans/instructions discussed with patient and  who verbalized understanding and agreement of plans of care. No further questions or concerns voiced at this time. She was discharged home in stable condition.     Goals of Care Treatment Preferences:  Code Status: Full Code    Consults: PERICO Cardiology    Significant Diagnostic Studies: PERICO    Left Ventricle: There is normal systolic function with a visually estimated ejection fraction of 60 - 65%.    Left Atrium: Left atrium is dilated. The left atrial appendage has a windsock morphology. Appendage velocity is reduced at less than 40 cm/sec. There is no thrombus in the left atrial appendage after evaluation by echocontrast.. The pulmonary veins have normal venous flow. diastolic dominance.    Aortic Valve: The aortic valve is a trileaflet valve. There is mild aortic  "regurgitation.    Mitral Valve: There is mild bileaflet sclerosis. There is mild to moderate regurgitation.    Tricuspid Valve: The tricuspid valve is structurally normal. There is no significant regurgitation.    Aorta: Aortic root is normal in size measuring 3.5 cm. Ascending aorta is normal measuring 3.5 cm.  Final Active Diagnoses:    Diagnosis Date Noted POA    PRINCIPAL PROBLEM:  Paroxysmal A-fib [I48.0] 10/14/2014 Yes     Chronic      Problems Resolved During this Admission:     Discharged Condition: stable    Disposition: Home or Self Care    Follow Up:   Follow-up Information       Ameya Servin MD Follow up in 3 month(s).    Specialties: Electrophysiology, Cardiology  Why: Post ablation  Contact information:  [facility] [id number] [phone]                           Patient Instructions:      Other restrictions (specify):   Order Comments: Ablation Discharge Instructions and Care of Your Groin      Follow up:  · Follow up with Dr. Servin in 3 months.    Medications:  · Make sure to continue taking your blood thinner eliquis after your procedure as prescribed  · Continue all other home medications including flecainide and diltiazem   · Take pantoprazole (trade name: Protonix) nightly for at least 30 days after your procedure. This is to protect your esophagus during the post-operative period.  · If given a prescription of furosemide (trade name: Lasix), which is a diuretic (fluid pill), you can take it daily or twice daily as needed for fluid retention or shortness of breath following your ablation  · You may experience chest discomfort (also known as "pericarditis") with deep breathes, coughing, and/or laying down which is typically normal following your procedure. If this occurs, you can take ibuprofen (Motrin) 800 mg every 8 hours for 2-3 days. If the chest pain is persistent or severe please visit the nearest emergency department.    Groin site management, precautions, and " restrictions:  · Remove the bandages over your groin area the morning after your procedure. You can shower after you remove these bandages. Wash the sites at least once daily with soap and water. Keep the groin sites clean and dry. You do not need to apply ointments or bandages to the area.   · Wear loose clothes and loose underwear.  · Do not take a bath or submerge your groin area (for example: Jacuzzi, swimming pool, or lake) or at least 1 week or when the puncture sites in your groin have completely healed.     If bleeding should occur at the groin sites following discharge:  · If oozing from groin site occurs, lay down and apply pressure to the puncture site with your fingers without letting up for 15 minutes and lay flat for 1 hour. If bleeding has resolved, you can continue to monitor. If the bleeding continues or there is significant swelling or pain in the groin area, please call 911 immediately and visit the nearest ER for evaluation and treatment. Do not drive yourself to the hospital. DO NOT STOP TAKING YOUR BLOOD THINNER UNLESS INSTRUCTED BY A PHYSICIAN.     Activity Instructions:  · Do not drive or operate any dangerous machinery for 24 hours, but optimally 48-72 hours since you were given general anesthesia.   · Do not strain during bowel movements for the first 3 to 4 days after the procedure to prevent bleeding from the groin sites.  · Avoid heavy lifting (more than 10 pounds) and pushing or pulling heavy objects for the first 5 to 7 days after the procedure.  · Do not participate in strenuous activities for 5 days after the procedure. This includes most sports - jogging, golfing, play tennis, and bowling.  · You may climb stairs if needed, but walk up and down the stairs more slowly than usual.  · Gradually increase your activities until you reach your normal activity level within one week after the procedure.    Go to the Emergency Department if you develop:   · Change in color or temperature of  the leg  · Redness, warmth, or drainage/pus at the groin sites  · Chills or fever greater than 101.0 F   · Severe bleeding or swelling at the groin sites  · Acute Weakness or numbness   · Visual, gait or speech disturbances   · New chest pain, palpitations, shortness of breath, fainting     No driving until:   Order Comments: No driving or operating heavy machinery for 24-48 hours after your procedure because you received sedation.     Lifting restrictions     Notify your health care provider if you experience any of the following:  increased confusion or weakness     Notify your health care provider if you experience any of the following:  persistent dizziness, light-headedness, or visual disturbances     Notify your health care provider if you experience any of the following:  worsening rash     Notify your health care provider if you experience any of the following:  severe persistent headache     Notify your health care provider if you experience any of the following:  difficulty breathing or increased cough     Notify your health care provider if you experience any of the following:  redness, tenderness, or signs of infection (pain, swelling, redness, odor or green/yellow discharge around incision site)     Notify your health care provider if you experience any of the following:  severe uncontrolled pain     Notify your health care provider if you experience any of the following:  persistent nausea and vomiting or diarrhea     Notify your health care provider if you experience any of the following:  temperature >100.4     Remove dressing in 24 hours     No driving until:   Order Comments: No driving or operating heavy machinery for 24-48 hours after your procedure because you received sedation.     Other restrictions (specify):   Order Comments: Ablation Discharge Instructions and Care of Your Groin      Follow up:  · Follow up with Dr. Servin in 3 months.    Medications:  · Make sure to continue taking your  "blood thinner eliquis after your procedure as prescribed  · Continue all other home medications including flecainide and diltiazem   · Take prevacid once nightly for at least 30 days after your procedure. This is to protect your esophagus during the post-operative period.  · If given a prescription of furosemide (trade name: Lasix), which is a diuretic (fluid pill), you can take it daily or twice daily as needed for fluid retention or shortness of breath following your ablation  · You may experience chest discomfort (also known as "pericarditis") with deep breathes, coughing, and/or laying down which is typically normal following your procedure. If this occurs, you can take ibuprofen (Motrin) 800 mg every 8 hours for 2-3 days. If the chest pain is persistent or severe please visit the nearest emergency department.    Groin site management, precautions, and restrictions:  · Remove the bandages over your groin area the morning after your procedure. You can shower after you remove these bandages. Wash the sites at least once daily with soap and water. Keep the groin sites clean and dry. You do not need to apply ointments or bandages to the area.   · Wear loose clothes and loose underwear.  · Do not take a bath or submerge your groin area (for example: Jacuzzi, swimming pool, or lake) or at least 1 week or when the puncture sites in your groin have completely healed.     If bleeding should occur at the groin sites following discharge:  · If oozing from groin site occurs, lay down and apply pressure to the puncture site with your fingers without letting up for 15 minutes and lay flat for 1 hour. If bleeding has resolved, you can continue to monitor. If the bleeding continues or there is significant swelling or pain in the groin area, please call 911 immediately and visit the nearest ER for evaluation and treatment. Do not drive yourself to the hospital. DO NOT STOP TAKING YOUR BLOOD THINNER UNLESS INSTRUCTED BY A " PHYSICIAN.     Activity Instructions:  · Do not drive or operate any dangerous machinery for 24 hours, but optimally 48-72 hours since you were given general anesthesia.   · Do not strain during bowel movements for the first 3 to 4 days after the procedure to prevent bleeding from the groin sites.  · Avoid heavy lifting (more than 10 pounds) and pushing or pulling heavy objects for the first 5 to 7 days after the procedure.  · Do not participate in strenuous activities for 5 days after the procedure. This includes most sports - jogging, golfing, play tennis, and bowling.  · You may climb stairs if needed, but walk up and down the stairs more slowly than usual.  · Gradually increase your activities until you reach your normal activity level within one week after the procedure.    Go to the Emergency Department if you develop:   · Change in color or temperature of the leg  · Redness, warmth, or drainage/pus at the groin sites  · Chills or fever greater than 101.0 F   · Severe bleeding or swelling at the groin sites  · Acute Weakness or numbness   · Visual, gait or speech disturbances   · New chest pain, palpitations, shortness of breath, fainting     Lifting restrictions     Notify your health care provider if you experience any of the following:  increased confusion or weakness     Notify your health care provider if you experience any of the following:  persistent dizziness, light-headedness, or visual disturbances     Notify your health care provider if you experience any of the following:  worsening rash     Notify your health care provider if you experience any of the following:  severe persistent headache     Notify your health care provider if you experience any of the following:  difficulty breathing or increased cough     Notify your health care provider if you experience any of the following:  redness, tenderness, or signs of infection (pain, swelling, redness, odor or green/yellow discharge around incision  site)     Notify your health care provider if you experience any of the following:  severe uncontrolled pain     Notify your health care provider if you experience any of the following:  persistent nausea and vomiting or diarrhea     Notify your health care provider if you experience any of the following:  temperature >100.4     Remove dressing in 24 hours     Weight bearing restrictions (specify):     Shower on day dressing removed (No bath)     Weight bearing restrictions (specify):     Shower on day dressing removed (No bath)     Medications:  Reconciled Home Medications:      Medication List        START taking these medications      lansoprazole 30 MG capsule  Commonly known as: PREVACID  Take 1 capsule (30 mg total) by mouth once daily.            CONTINUE taking these medications      diltiaZEM 240 MG 24 hr capsule  Commonly known as: CARDIZEM CD  Take 1 capsule (240 mg total) by mouth once daily.     ELIQUIS 5 mg Tab  Generic drug: apixaban  TAKE 1 TABLET TWICE DAILY     flecainide 100 MG Tab  Commonly known as: TAMBOCOR  Take 1 tablet (100 mg total) by mouth every 12 (twelve) hours.     fluticasone propionate 50 mcg/actuation nasal spray  Commonly known as: FLONASE  USE 1 SPRAY IN EACH NOSTRIL EVERY DAY     levothyroxine 25 MCG tablet  Commonly known as: SYNTHROID  TAKE 1 TABLET ONE TIME DAILY     nystatin cream  Commonly known as: MYCOSTATIN  Apply topically 2 (two) times daily.     pravastatin 40 MG tablet  Commonly known as: PRAVACHOL  TAKE 1 TABLET ONE TIME DAILY            Plan:  -Continue all home medications including flecainide, eliquis, and diltiazem.  -Start prevacid x 30 days  -Follow up with Dr. Servin in 3 months.    Time spent on the discharge of patient: 20 minutes    Erika Brush NP  Cardiac Electrophysiology  [facility] - Cardiology    Attending: Ameya Servin MD

## 2024-04-16 NOTE — NURSING
Patient discharged per MD orders. Instructions given on medications, wound care, activity, signs of infection, when to call MD, and follow up appointments. Pt and spouse verbalized understanding. Telemetry and PIV x2 removed. Patient transferred off of unit by .

## 2024-04-16 NOTE — CONSULTS
[facility], [place]  PERICO Consult      Flores Fuentes  YOB: 1950  Medical Record Number:  [id number]  Attending Physician:  Ameya Servin MD   Current Principal Problem:  Paroxysmal A-fib    History     Cc:     HPI   73 year old female with a PMHx of pAF, HLD, obesity, ILR implant and hypothyroidism who presents to the hospial for PVI by Dr. Servin.First Dx'd wih afib in 2006. S/p DCCV in 2017 followed by initiation of Multaq and BB.     ECG today shows sinus rhythm at 60 bpm  ms QRS 96 ms QT/Qtc 472/472 ms.     Dysphagia or odynophagia:  No  Liver Disease, esophageal disease, or known varices:  No  Upper GI Bleeding: No  Snoring:  No  Sleep Apnea:  No  Prior neck surgery or radiation:  No  History of anesthetic difficulties:  No  Family history of anesthetic difficulties:  No  Last oral intake: yesterday before midnight  Able to move neck in all directions:  Yes    Medications - Outpatient  Prior to Admission medications    Medication Sig Start Date End Date Taking? Authorizing Provider   ELIQUIS 5 mg Tab TAKE 1 TABLET TWICE DAILY 2/29/24  Yes Ameya Servin MD   levothyroxine (SYNTHROID) 25 MCG tablet TAKE 1 TABLET ONE TIME DAILY 10/10/23  Yes Naz Condon MD   nystatin (MYCOSTATIN) cream Apply topically 2 (two) times daily. 9/6/23  Yes Anette Infante NP   pravastatin (PRAVACHOL) 40 MG tablet TAKE 1 TABLET ONE TIME DAILY 2/29/24  Yes Naz Condon MD   diltiaZEM (CARDIZEM CD) 240 MG 24 hr capsule Take 1 capsule (240 mg total) by mouth once daily. 3/20/24   Ameya Servin MD   flecainide (TAMBOCOR) 100 MG Tab Take 1 tablet (100 mg total) by mouth every 12 (twelve) hours. 3/25/24 3/25/25  Oliver Mckeon MD   fluticasone propionate (FLONASE) 50 mcg/actuation nasal spray USE 1 SPRAY IN EACH NOSTRIL EVERY DAY 10/10/23   Naz Condon MD       Medications - Current  Scheduled Meds:  Current Facility-Administered Medications   Medication Dose Route  Frequency Provider Last Rate Last Admin    heparin (porcine) 3,000 Units in sodium chloride 0.9% 3,000 mL    PRAmeya Rasmussen MD   Given at 04/16/24 0728    heparin infusion 1,000 units/500 ml in 0.9% NaCl (on sterile field)    Ameya Boss MD   1,000 mL at 04/16/24 0728    LIDOcaine HCL 20 mg/ml (2%) injection    PRN Ameya Servin MD   10 mL at 04/16/24 0824     Facility-Administered Medications Ordered in Other Encounters   Medication Dose Route Frequency Provider Last Rate Last Admin    dexAMETHasone injection   Intravenous PRN Kathy Mosher, CRNA   4 mg at 04/16/24 0745    fentaNYL injection   Intravenous PRN Kathy Mosher, CRNA   100 mcg at 04/16/24 0727    glycopyrrolate injection   Intravenous PRN Kathy Mosher, CRNA   0.2 mg at 04/16/24 0815    LIDOcaine (cardiac) injection   Intravenous PRN Kathy Mosher, CRNA   60 mg at 04/16/24 0727    propofol (DIPRIVAN) 10 mg/mL infusion   Intravenous PRN Kathy Mosher, CRNA   200 mg at 04/16/24 0727    rocuronium injection   Intravenous PRN Kathy Mosher, CRNA   5 mg at 04/16/24 0727    sodium chloride 0.9% bolus 1,000 mL  1,000 mL Intravenous Once Carley Cabrera NP        sodium chloride 0.9% infusion   Intravenous Continuous PRN Kathy Mosher, CRNA   New Bag at 04/16/24 0722    sodium chloride 0.9% infusion   Intravenous Continuous PRN Kathy Mosher, CRNA   New Bag at 04/16/24 0735    succinylcholine injection   Intravenous PRN Kathy Mosher, CRNA   140 mg at 04/16/24 0727    vancomycin in dextrose 5 % 1 gram/250 mL IVPB 1,000 mg  1,000 mg Intravenous On Call Procedure Carley Cabrera .7 mL/hr at 11/01/21 1249 1,000 mg at 11/01/21 1249     Continuous Infusions:  Current Facility-Administered Medications   Medication Dose Route Frequency Provider Last Rate Last Admin    heparin (porcine) 3,000 Units in sodium chloride 0.9% 3,000 mL    PRN Ameya Servin MD   Given at 04/16/24 0728    heparin infusion 1,000  units/500 ml in 0.9% NaCl (on sterile field)    PRN Ameya Servin MD   1,000 mL at 04/16/24 0728    LIDOcaine HCL 20 mg/ml (2%) injection    PRN Ameya Servin MD   10 mL at 04/16/24 0824     Facility-Administered Medications Ordered in Other Encounters   Medication Dose Route Frequency Provider Last Rate Last Admin    dexAMETHasone injection   Intravenous PRN Kathy Mosher, CRNA   4 mg at 04/16/24 0745    fentaNYL injection   Intravenous PRN Kathy Mosher, CRNA   100 mcg at 04/16/24 0727    glycopyrrolate injection   Intravenous PRN Kathy Mosher, CRNA   0.2 mg at 04/16/24 0815    LIDOcaine (cardiac) injection   Intravenous PRN Kathy Mosher, CRNA   60 mg at 04/16/24 0727    propofol (DIPRIVAN) 10 mg/mL infusion   Intravenous PRN Kathy Mosher, CRNA   200 mg at 04/16/24 0727    rocuronium injection   Intravenous PRN Kathy Mosher, CRNA   5 mg at 04/16/24 0727    sodium chloride 0.9% bolus 1,000 mL  1,000 mL Intravenous Once Carley Cabrera, BIANCA        sodium chloride 0.9% infusion   Intravenous Continuous PRN Kathy Mosher, CRNA   New Bag at 04/16/24 0722    sodium chloride 0.9% infusion   Intravenous Continuous PRN Kathy Mosher, CRNA   New Bag at 04/16/24 0735    succinylcholine injection   Intravenous PRN Kathy Mosher, CRNA   140 mg at 04/16/24 0727    vancomycin in dextrose 5 % 1 gram/250 mL IVPB 1,000 mg  1,000 mg Intravenous On Call Procedure Carley Cabrera, BIANCA 166.7 mL/hr at 11/01/21 1249 1,000 mg at 11/01/21 1249       Allergies  Review of patient's allergies indicates:   Allergen Reactions    Decongestant d [pseudoephedrine-dm]      Atrial Fibrillation    Augmentin [amoxicillin-pot clavulanate]      palpitations      Amoxicillin Palpitations    Diphenhydramine-pseudoephed Palpitations     TACHYCARDIA    Povidone-iodine Rash     Mild erythema of skin     Past Medical History  Past Medical History:   Diagnosis Date    Asymptomatic microscopic hematuria 6/2/2020    Atrial  fibrillation 2014    nerves tip off, cardioverted 2017, Eliquis, q 6 mo, Dr Servin, flecainide at home prn flare up 4/2019, 9/2018)    Cervical muscle strain 1/24/2017    Chronic anticoagulation 6/10/2021    DJD (degenerative joint disease) of cervical spine 1/24/2017    Essential hypertension 6/19/2019    Hyperlipidemia     Mitral valve disease     Mixed hyperlipidemia 11/07/2013    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, l mandible, 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 Dr Viktor Toure    Osteopenia of neck of left femur 6/4/2020    Paroxysmal atrioventricular tachycardia     Plantar fasciitis     Right knee meniscal tear     Dr Mayfield, tx conservatively    Severe obesity (BMI 35.0-35.9 with comorbidity) 1/24/2017    Slow transit constipation 7/13/2021    Despite fruits & veg & 1200 dunia diet, try Colace daily    Stress incontinence, female 03/28/2018    hasn't tried oxybutynin, After HYST, Kegels & PT  didn't work, worse at night wearing pads, Dr Infante    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    Thyroid disease      Past Surgical History  Past Surgical History:   Procedure Laterality Date    ABLATION OF ARRHYTHMOGENIC FOCUS FOR ATRIAL FIBRILLATION N/A 10/17/2023    Procedure: Ablation atrial fibrillation;  Surgeon: Ameya Servin MD;  Location: [facility] EP LAB;  Service: Cardiology;  Laterality: N/A;  AF, PERICO, PVI, WPW, RFA, POPEYE, Gen, DM, 3 Prep *MDT ILR*    ABLATION, MECHANOCHEMICAL, VARICOSE VEIN Right 12/8/2023    Procedure: ABLATION, MECHANOCHEMICAL, VARICOSE VEIN;  Surgeon: Simone Shannon MD;  Location: [facility] CATH LAB/EP;  Service: Cardiology;  Laterality: Right;    APPENDECTOMY      COLONOSCOPY N/A 8/13/2020    Procedure: COLONOSCOPY;  Surgeon: Angus Ac MD;  Location: [facility] ENDO (4TH FLR);  Service: Endoscopy;  Laterality: N/A;  ok to hold Eliquis 2 days per Dr Servin     COVID test at [place] on 8/10-GT    ECHOCARDIOGRAM,TRANSESOPHAGEAL N/A 9/20/2023    Procedure: Transesophageal echo (PERICO)  intra-procedure log documentation;  Surgeon: Provider, Dosc Diagnostic;  Location: [facility] EP LAB;  Service: Cardiology;  Laterality: N/A;    ECHOCARDIOGRAM,TRANSESOPHAGEAL  3/25/2024    Procedure: Transesophageal echo (PERICO) intra-procedure log documentation;  Surgeon: Kathy Malik MD;  Location: [facility] EP LAB;  Service: Cardiology;;    HYSTERECTOMY  1990    TAHBSO for fibroids    INSERTION OF IMPLANTABLE LOOP RECORDER Left 11/1/2021    Procedure: Insertion, Implantable Loop Recorder;  Surgeon: Ameya Servin MD;  Location: [facility] EP LAB;  Service: Cardiology;  Laterality: Left;  AF, ILR implant, MDT,  DM, 3 Prep    MANDIBLE SURGERY  2015    L mandible, Dr Toure    MANDIBLE SURGERY Left 8/27/2019    OOPHORECTOMY      TONSILLECTOMY      TRANSESOPHAGEAL ECHOCARDIOGRAM WITH POSSIBLE CARDIOVERSION (PERICO W/ POSS CARDIOVERSION) N/A 1/19/2024    Procedure: Transesophageal echo (PERICO) intra-procedure log documentation;  Surgeon: JV Rosenthal MD;  Location: [facility] EP LAB;  Service: Cardiology;  Laterality: N/A;    TRANSESOPHAGEAL ECHOCARDIOGRAPHY N/A 10/17/2023    Procedure: ECHOCARDIOGRAM, TRANSESOPHAGEAL;  Surgeon: Kathy Malik MD;  Location: [facility] EP LAB;  Service: Cardiology;  Laterality: N/A;    TREATMENT OF CARDIAC ARRHYTHMIA N/A 9/20/2023    Procedure: Cardioversion or Defibrillation;  Surgeon: JV Rosenthal MD;  Location: [facility] EP LAB;  Service: Cardiology;  Laterality: N/A;  AF, DCCV/PERICO, ANES, EH,     TREATMENT OF CARDIAC ARRHYTHMIA N/A 1/19/2024    Procedure: Cardioversion or Defibrillation;  Surgeon: Luis Joiner MD;  Location: [facility] EP LAB;  Service: Cardiology;  Laterality: N/A;  AFL, DCCV ONLY, ANES, EH,     TREATMENT OF CARDIAC ARRHYTHMIA N/A 3/25/2024    Procedure: Cardioversion or Defibrillation;  Surgeon: Ameya Servin MD;  Location: [facility] EP LAB;  Service: Cardiology;  Laterality: N/A;  AF, DCCV ONLY, ANES, SD, 351    TREATMENT OF CARDIAC ARRHYTHMIA N/A 1/19/2024     Procedure: Cardioversion or Defibrillation;  Surgeon: JV Rosenthal MD;  Location: [facility] EP LAB;  Service: Cardiology;  Laterality: N/A;  AFL, PERICO/DCCV, ANES, EH,      ROS  10 point ROS performed and negative except as stated in HPI     Physical Examination   Vital Signs  Vitals  Temp: 97.9 °F (36.6 °C)  Temp Source: Temporal  Pulse: (!) 58  Heart Rate Source: SpO2  Resp: 18  SpO2: 100 %  BP: (!) 155/67  MAP (mmHg): 96  BP Location: Right arm  BP Method: Automatic  Patient Position: Lying    24 Hour VS Range  Temp:  [97.9 °F (36.6 °C)]   Pulse:  [58]   Resp:  [18]   BP: (151-155)/(67-70)   SpO2:  [100 %]     Intake/Output Summary (Last 24 hours) at 4/16/2024 0828  Last data filed at 4/16/2024 0819  Gross per 24 hour   Intake 400 ml   Output --   Net 400 ml         Physical Exam:   Constitutional: no acute distress  HEENT: NCAT, EOMI, no scleral icterus  Cardiovascular: Regular rate and rhythm  Pulmonary: Normal respiratory effort   Abdomen: nontender, non-distended   Neuro: alert and oriented, no focal deficits  Extremities: warm, no edema   MSK: no deformities  Skin: intact, no rashes     Data     Recent Labs   Lab 04/09/24  0854   WBC 5.20   HGB 13.2   HCT 40.1         Recent Labs   Lab 04/09/24  0854   INR 1.0      Recent Labs   Lab 04/09/24  0854      K 4.9      CO2 25   BUN 16   CREATININE 0.66   ANIONGAP 10   CALCIUM 9.5      Assessment & Plan     PERICO for STEF assessment prior to ablation  -No absolute contraindications of esophageal stricture, tumor, perforation, laceration,or diverticulum and/or active GI bleed  -The risks, benefits & alternatives of the procedure were explained to the patient.   -The risks of transesophageal echo include but are not limited to:  Dental trauma, esophageal trauma/perforation, bleeding, laryngospasm/brochospasm, aspiration, sore throat/hoarseness, & dislodgement of the endotracheal tube/nasogastric tube (where applicable).    -The risks of ANES  monitored sedation include hypotension, respiratory depression, arrhythmias, bronchospasm, & death.    -Informed consent was obtained. The patient is agreeable to proceed with the procedure and all questions and concerns addressed.    Case discussed with an attending in echocardiography lab.    Dano Ordoñez MD  [facility]   Cardiovascular Disease PGY-V

## 2024-04-16 NOTE — DISCHARGE INSTRUCTIONS
"Ablation Discharge Instructions and Care of Your Groin      Follow up:  Follow up with Dr. Servin in 3 months.    Medications:  Make sure to continue taking your blood thinner eliquis after your procedure as prescribed  Continue all other home medications including flecainide and diltiazem   Take prevacid once nightly for at least 30 days after your procedure. This is to protect your esophagus during the post-operative period.  If given a prescription of furosemide (trade name: Lasix), which is a diuretic (fluid pill), you can take it daily or twice daily as needed for fluid retention or shortness of breath following your ablation  You may experience chest discomfort (also known as "pericarditis") with deep breathes, coughing, and/or laying down which is typically normal following your procedure. If this occurs, you can take ibuprofen (Motrin) 800 mg every 8 hours for 2-3 days. If the chest pain is persistent or severe please visit the nearest emergency department.    Groin site management, precautions, and restrictions:  Remove the bandages over your groin area the morning after your procedure. You can shower after you remove these bandages. Wash the sites at least once daily with soap and water. Keep the groin sites clean and dry. You do not need to apply ointments or bandages to the area.   Wear loose clothes and loose underwear.  Do not take a bath or submerge your groin area (for example: Jacuzzi, swimming pool, or lake) or at least 1 week or when the puncture sites in your groin have completely healed.     If bleeding should occur at the groin sites following discharge:  If oozing from groin site occurs, lay down and apply pressure to the puncture site with your fingers without letting up for 15 minutes and lay flat for 1 hour. If bleeding has resolved, you can continue to monitor. If the bleeding continues or there is significant swelling or pain in the groin area, please call 911 immediately and visit the " nearest ER for evaluation and treatment. Do not drive yourself to the hospital. DO NOT STOP TAKING YOUR BLOOD THINNER UNLESS INSTRUCTED BY A PHYSICIAN.     Activity Instructions:  Do not drive or operate any dangerous machinery for 24 hours, but optimally 48-72 hours since you were given general anesthesia.   Do not strain during bowel movements for the first 3 to 4 days after the procedure to prevent bleeding from the groin sites.  Avoid heavy lifting (more than 10 pounds) and pushing or pulling heavy objects for the first 5 to 7 days after the procedure.  Do not participate in strenuous activities for 5 days after the procedure. This includes most sports - jogging, golfing, play tennis, and bowling.  You may climb stairs if needed, but walk up and down the stairs more slowly than usual.  Gradually increase your activities until you reach your normal activity level within one week after the procedure.    Go to the Emergency Department if you develop:   Change in color or temperature of the leg  Redness, warmth, or drainage/pus at the groin sites  Chills or fever greater than 101.0 F   Severe bleeding or swelling at the groin sites  Acute Weakness or numbness   Visual, gait or speech disturbances   New chest pain, palpitations, shortness of breath, fainting

## 2024-04-16 NOTE — PROGRESS NOTES
PATIENT STATES SHE IS FEELING BETTER HEADACHE IS BETTER STILL HAVE SOME C/O PINS TO RIGHT LEG AND TINGLING AS PER PREVIOUS AT HOME NO WORSE AND NO NAUSEA FUENTES NP TO BEDSIDE OKAY TO GO TO [facility] PER FUENTES. REPORTED TO LANA [facility] RN.

## 2024-04-16 NOTE — NURSING
BIANCA James at bedside answering patient's questions and assessing patient's groin sites. No new orders in place; okay to discharge patient.

## 2024-04-16 NOTE — HPI
Ms. Fuentes is a 73 year old female with a PMHx of pAF, HLD, obesity, ILR implant and hypothyroid.    History obtained through patient report and clinic notes:    AF first dx 2006 after lots of caffeine. Few episodes since then. Pill in pocket strategy.  HL, on meds  obesity  hypothyroid, on med  MATTHEW, on CPAP since early 2022     Went to ER 3/10 with palps. Underwent DCCV 3/13/17 after remaining in AF with RVR. Started on multaq, and BB d/c'd.  Since, feeling not quite as well as usually, and on 4/17, at which time HR was 120s and didn't feel same as prior AF episodes.  She'd been on BB for years w/o issues. Good exertion tolerance. No CP.     Due to rare PAF episodes, we adopted a pill-in-the-pocket strategy. She used it first in 11/17; went to the ER. AF resolved <30 min after taking dose. Had 2 other episodes, self-treated successfully, in 12/17 and 1/18. Since last seen, has had 4 such episodes, all aborted with PIP.      Had AF episode 8/2023 without termination from PIP flec. Standing-dose flec started. Has had 2 episodes of AF since.     10/17/2023 PVI with no evidence of AP during EPS (including ADO use)    2/25/24 During last office visit, 3 month follow up s/p PVI 10/17/23 without complication. Discharged on amiodarone. No recurrent of AF with ILR in situ.   ILR without any AF/AF  EF 60-65%  Rt GSV chemical ablation 12/23  Tolerating medications  I personally reviewed the ECG/telemetry strip today which shows NSR without ectopy  Plan to stop amio and continue to monitor ILR.     3/25/24 Presented to ED for palpitations, light headedness, and tachycardia. In AF, PERICO/DCCV successful, discharged home on flecainide and Cardizem. Plan for redo PVI.    Ms. Fuentes presents today to [facility] for scheduled PVI ablation with Dr. Servin. She denies any chest pain, palpitations, SOB, JOHANSEN, dizziness, light headedness, weakness, syncope, or near syncopal episodes. She denies any bleeding, infections, fevers, rashes, or  surgeries in the past 30 days. She is currently taking diltiazem 240 mg daily (last dose 4/10/24), flecainide 100 mg BID (last dose 4/10/24), and eliquis 5 mg BID (last dose taken yesterday PM).    ECG today shows sinus rhythm at 60 bpm  ms QRS 96 ms QT/Qtc 472/472 ms.

## 2024-04-16 NOTE — PROGRESS NOTES
PATIENT C/O SLIGHT HEADACHE TO RIGHT SIDE OF HEAD PER PATIENT. CALLED FUENTES VALENZUELA OVER AND SHE CAME SPEAK WITH PATIENT OKAY TO GIVE TYLENOL GAVE PATIENT TYLENOL AND WILL MONITOR.. ALSO PATIENT DID STATE THAT AT HOME SHE GETS SOME TINGLING TO RIGHT LEG AND SHE IS HAVING IT NOW STATES IT IS A PIN TYPE OF FEELING AND SHE DOES GET THIS AT HOME ABLE TO MOVE ALL EXTREMITIES AND NO NUMBNESS NOTED AND BRISK CAP REFILL. FUENTES VALENZUELA AWARE ALSO CALLED DR. DIAZ HERE TO SEE PATIENT AND SHE CAME TO BEDSIDE TO ASSESS PATIENT AWARE OF ABOVE WILL MONITOR.

## 2024-04-16 NOTE — H&P
[facility] - Short Stay Cardiac Unit  Cardiac Electrophysiology  History and Physical     Admission Date: 4/16/2024  Code Status: Prior   Attending Provider: Ameya Servin MD   Principal Problem:Paroxysmal A-fib    Subjective:     Chief Complaint:  Paroxysmal A-fib     HPI: Ms. Fuentes is a 73 year old female with a PMHx of pAF, HLD, obesity, ILR implant and hypothyroid.    History obtained through patient report and clinic notes:    AF first dx 2006 after lots of caffeine. Few episodes since then. Pill in pocket strategy.  HL, on meds  obesity  hypothyroid, on med  MATTHEW, on CPAP since early 2022     Went to ER 3/10 with palps. Underwent DCCV 3/13/17 after remaining in AF with RVR. Started on multaq, and BB d/c'd.  Since, feeling not quite as well as usually, and on 4/17, at which time HR was 120s and didn't feel same as prior AF episodes.  She'd been on BB for years w/o issues. Good exertion tolerance. No CP.     Due to rare PAF episodes, we adopted a pill-in-the-pocket strategy. She used it first in 11/17; went to the ER. AF resolved <30 min after taking dose. Had 2 other episodes, self-treated successfully, in 12/17 and 1/18. Since last seen, has had 4 such episodes, all aborted with PIP.      Had AF episode 8/2023 without termination from PIP flec. Standing-dose flec started. Has had 2 episodes of AF since.     10/17/2023 PVI with no evidence of AP during EPS (including ADO use)    2/25/24 During last office visit, 3 month follow up s/p PVI 10/17/23 without complication. Discharged on amiodarone. No recurrent of AF with ILR in situ.   ILR without any AF/AF  EF 60-65%  Rt GSV chemical ablation 12/23  Tolerating medications  I personally reviewed the ECG/telemetry strip today which shows NSR without ectopy  Plan to stop amio and continue to monitor ILR.     3/25/24 Presented to ED for palpitations, light headedness, and tachycardia. In AF, PERICO/DCCV successful, discharged home on flecainide and Cardizem. Plan for  redo PVI.    Ms. Fuentes presents today to [facility] for scheduled PVI ablation with Dr. Servin. She denies any chest pain, palpitations, SOB, JOHANSEN, dizziness, light headedness, weakness, syncope, or near syncopal episodes. She does endorse palpitations when out of rhythm. She denies any bleeding, infections, fevers, rashes, or surgeries in the past 30 days. She is currently taking diltiazem 240 mg daily (last dose 4/10/24), flecainide 100 mg BID (last dose 4/10/24), and eliquis 5 mg BID (last dose taken yesterday PM).    ECG today shows sinus rhythm at 60 bpm  ms QRS 96 ms QT/Qtc 472/472 ms.    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 Dr Viktor Toure    Osteopenia of neck of left femur 6/4/2020    Paroxysmal atrioventricular tachycardia     Plantar fasciitis     Right knee meniscal tear     Dr Mayfield, tx conservatively    Severe obesity (BMI 35.0-35.9 with comorbidity) 1/24/2017    Slow transit constipation 7/13/2021    Despite fruits & veg & 1200 dunia diet, try Colace daily    Stress incontinence, female 03/28/2018    hasn't tried oxybutynin, After HYST, Kegels & PT  didn't work, worse at night wearing pads, Dr Infante    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    Thyroid disease        Past Surgical History:   Procedure Laterality Date    ABLATION OF ARRHYTHMOGENIC FOCUS FOR ATRIAL FIBRILLATION N/A 10/17/2023    Procedure: Ablation atrial fibrillation;  Surgeon: Ameya Servin MD;  Location:  [facility] EP LAB;  Service: Cardiology;  Laterality: N/A;  AF, PERICO, PVI, WPW, RFA, POPEYE, Gen, DM, 3 Prep *MDT ILR*    ABLATION, MECHANOCHEMICAL, VARICOSE VEIN Right 12/8/2023    Procedure: ABLATION, MECHANOCHEMICAL, VARICOSE VEIN;  Surgeon: Simone Shannon MD;  Location: [facility] CATH LAB/EP;  Service: Cardiology;  Laterality: Right;    APPENDECTOMY      COLONOSCOPY N/A 8/13/2020    Procedure: COLONOSCOPY;  Surgeon: Angus Ac MD;  Location: [facility] ENDO (4TH FLR);  Service: Endoscopy;  Laterality: N/A;  ok to hold Eliquis 2 days per Dr Servin     COVID test at [facility] on 8/10-GT    ECHOCARDIOGRAM,TRANSESOPHAGEAL N/A 9/20/2023    Procedure: Transesophageal echo (PERICO) intra-procedure log documentation;  Surgeon: Provider, [facility];  Location: [facility] EP LAB;  Service: Cardiology;  Laterality: N/A;    ECHOCARDIOGRAM,TRANSESOPHAGEAL  3/25/2024    Procedure: Transesophageal echo (PERICO) intra-procedure log documentation;  Surgeon: Kathy Malik MD;  Location: [facility] EP LAB;  Service: Cardiology;;    HYSTERECTOMY  1990    TAHBSO for fibroids    INSERTION OF IMPLANTABLE LOOP RECORDER Left 11/1/2021    Procedure: Insertion, Implantable Loop Recorder;  Surgeon: Ameya Servin MD;  Location: [facility] EP LAB;  Service: Cardiology;  Laterality: Left;  AF, ILR implant, MDT,  DM, 3 Prep    MANDIBLE SURGERY  2015    L mandible, Dr Toure    MANDIBLE SURGERY Left 8/27/2019    OOPHORECTOMY      TONSILLECTOMY      TRANSESOPHAGEAL ECHOCARDIOGRAM WITH POSSIBLE CARDIOVERSION (PERICO W/ POSS CARDIOVERSION) N/A 1/19/2024    Procedure: Transesophageal echo (PERICO) intra-procedure log documentation;  Surgeon: JV Rosenthal MD;  Location: [facility] EP LAB;  Service: Cardiology;  Laterality: N/A;    TRANSESOPHAGEAL ECHOCARDIOGRAPHY N/A 10/17/2023    Procedure: ECHOCARDIOGRAM, TRANSESOPHAGEAL;  Surgeon: Kathy Malik MD;  Location: [facility] EP LAB;  Service: Cardiology;  Laterality: N/A;    TREATMENT OF CARDIAC ARRHYTHMIA N/A 9/20/2023    Procedure:  Cardioversion or Defibrillation;  Surgeon: JV Rosenthal MD;  Location: [facility] EP LAB;  Service: Cardiology;  Laterality: N/A;  AF, DCCV/PERICO, ANES, EH,     TREATMENT OF CARDIAC ARRHYTHMIA N/A 1/19/2024    Procedure: Cardioversion or Defibrillation;  Surgeon: Luis Joiner MD;  Location: [facility] EP LAB;  Service: Cardiology;  Laterality: N/A;  AFL, DCCV ONLY, ANES, EH,     TREATMENT OF CARDIAC ARRHYTHMIA N/A 3/25/2024    Procedure: Cardioversion or Defibrillation;  Surgeon: Ameya Servin MD;  Location: [facility] EP LAB;  Service: Cardiology;  Laterality: N/A;  AF, DCCV ONLY, ANES, WA, 351    TREATMENT OF CARDIAC ARRHYTHMIA N/A 1/19/2024    Procedure: Cardioversion or Defibrillation;  Surgeon: JV Rosenthal MD;  Location: [facility] EP LAB;  Service: Cardiology;  Laterality: N/A;  AFL, PERICO/DCCV, ANES, EH,        Review of patient's allergies indicates:   Allergen Reactions    Decongestant d [pseudoephedrine-dm]      Atrial Fibrillation    Augmentin [amoxicillin-pot clavulanate]      palpitations      Amoxicillin Palpitations    Diphenhydramine-pseudoephed Palpitations     TACHYCARDIA    Povidone-iodine Rash     Mild erythema of skin       No current facility-administered medications for this encounter.     Facility-Administered Medications Ordered in Other Encounters   Medication Dose Route Frequency Provider Last Rate Last Admin    sodium chloride 0.9% bolus 1,000 mL  1,000 mL Intravenous Once Carley Cabrera NP        vancomycin in dextrose 5 % 1 gram/250 mL IVPB 1,000 mg  1,000 mg Intravenous On Call Procedure Carley Cabrera .7 mL/hr at 11/01/21 1249 1,000 mg at 11/01/21 1249     Family History       Problem Relation (Age of Onset)    Cancer Mother          Tobacco Use    Smoking status: Never    Smokeless tobacco: Never   Substance and Sexual Activity    Alcohol use: No    Drug use: No    Sexual activity: Not Currently     Review of Systems   Constitutional: Negative for chills, fever  and malaise/fatigue.   HENT:  Negative for congestion and nosebleeds.    Eyes:  Negative for blurred vision.   Cardiovascular:  Negative for chest pain, dyspnea on exertion, irregular heartbeat, leg swelling, near-syncope, orthopnea, palpitations, paroxysmal nocturnal dyspnea and syncope.   Respiratory:  Negative for cough, hemoptysis, shortness of breath, sleep disturbances due to breathing, sputum production and wheezing.    Endocrine: Negative for polyphagia.   Hematologic/Lymphatic: Negative for bleeding problem. Does not bruise/bleed easily.   Skin:  Negative for itching and rash.   Musculoskeletal:  Negative for back pain, joint swelling, muscle cramps and muscle weakness.        Right calf/ankle larger than left x many yeast s/p venous surgery-no concerns.   Gastrointestinal:  Negative for bloating, abdominal pain, hematemesis, hematochezia, nausea and vomiting.   Genitourinary:  Negative for dysuria and hematuria.   Neurological:  Negative for dizziness, focal weakness, headaches, light-headedness, loss of balance, numbness and weakness.   Psychiatric/Behavioral:  Negative for altered mental status.      Objective:     Vital Signs (Most Recent):  Temp: 97.9 °F (36.6 °C) (04/16/24 0608)  Pulse: (!) 58 (04/16/24 0608)  Resp: 18 (04/16/24 0608)  BP: (!) 155/67 (04/16/24 0609)  SpO2: 100 % (04/16/24 0608) Vital Signs (24h Range):  Temp:  [97.9 °F (36.6 °C)] 97.9 °F (36.6 °C)  Pulse:  [58] 58  Resp:  [18] 18  SpO2:  [100 %] 100 %  BP: (151-155)/(67-70) 155/67       Weight: 95.3 kg (210 lb)  Body mass index is 32.89 kg/m².    SpO2: 100 %        Physical Exam  Vitals and nursing note reviewed.   Constitutional:       General: She is not in acute distress.     Appearance: Normal appearance. She is well-developed. She is not diaphoretic.   HENT:      Head: Normocephalic and atraumatic.      Mouth/Throat:      Mouth: Mucous membranes are moist.      Pharynx: No oropharyngeal exudate.   Eyes:      Conjunctiva/sclera:  Conjunctivae normal.      Pupils: Pupils are equal, round, and reactive to light.   Cardiovascular:      Rate and Rhythm: Normal rate and regular rhythm.      Pulses: Intact distal pulses.           Radial pulses are 2+ on the right side and 2+ on the left side.        Dorsalis pedis pulses are 1+ on the right side and 1+ on the left side.      Heart sounds: Normal heart sounds, S1 normal and S2 normal.   Pulmonary:      Effort: Pulmonary effort is normal. No accessory muscle usage or respiratory distress.      Breath sounds: Normal breath sounds. No decreased breath sounds, wheezing, rhonchi or rales.   Chest:      Chest wall: No tenderness.   Abdominal:      General: Bowel sounds are normal. There is no distension.      Palpations: Abdomen is soft.      Tenderness: There is no abdominal tenderness. There is no guarding.   Musculoskeletal:         General: Normal range of motion.      Cervical back: Normal range of motion and neck supple.      Comments: Right LE larger than left, no deformity, edema, or discoloration   Skin:     General: Skin is warm and dry.      Findings: No erythema or rash.   Neurological:      Mental Status: She is alert and oriented to person, place, and time. She is not disoriented.      Sensory: No sensory deficit.      Motor: No abnormal muscle tone.      Coordination: Coordination normal.      Gait: Gait normal.   Psychiatric:         Mood and Affect: Mood normal.         Behavior: Behavior normal.         Thought Content: Thought content normal.         Judgment: Judgment normal.     Significant Labs: Pre procedure labs from 4/9/24 through today reviewed    Significant Imaging:  ECG today shows sinus rhythm at 60 bpm  ms QRS 96 ms QT/Qtc 472/472 ms.  Assessment and Plan:   Plan:  -PVI ablation redo  -PERICO prior to rule out thrombus  -Anesthesia for sedation    Prior to procedure, Dr. Servin at bedside speaking with patient and family. Extensive discussion with patient regarding the  nature of RFA PVI including transseptal puncture. We discussed risks and benefits at length. Our discussion included, but was not limited to the risk of death, infection, bleeding, stroke, MI, cardiac perforation, embolism, cardiac tamponade, vascular injury, AE fistula, injury to phrenic nerve, pulmonary vein stenosis and other organic injury including the possibility for need for surgery or pacemaker implantation. We discussed success rates and possible need for redo procedures. Patient verbalized understanding. All questions answered, no further questions or concerns, patient would like to proceed. Consents signed.    Erika Brush NP  Cardiac Electrophysiology  [facility] - Short Stay Cardiac Unit    Attending: Ameya Servin MD

## 2024-04-16 NOTE — NURSING
Pt ambulated around unit. Tolerated walk well. Vital signs remain stable.  No c/o pain or discomfort at this time, resp even and unlabored. Gauze/tegaderm dressing to left groin site is CDI. No active bleeding. No hematoma noted.Oozing noted to right groin site. Pt walked back to bed and pressure being held. NP notified. Orders to hold pressure for 20 minutes and extend bedrest a hour after hemostasis.

## 2024-04-16 NOTE — ANESTHESIA PROCEDURE NOTES
Intubation    Date/Time: 4/16/2024 7:29 AM    Performed by: Kathy Mosher CRNA  Authorized by: Esther Arzate MD    Intubation:     Induction:  Intravenous    Intubated:  Postinduction    Mask Ventilation:  Not attempted    Attempts:  1    Attempted By:  Student    Method of Intubation:  Video laryngoscopy    Blade:  [place] 3    Laryngeal View Grade: Grade IIA - cords partially seen      Difficult Airway Encountered?: No      Airway Device:  Oral endotracheal tube    Airway Device Size:  7.0    Style/Cuff Inflation:  Cuffed (inflated to minimal occlusive pressure)    Tube secured:  22    Secured at:  The lips    Placement Verified By:  Capnometry, Fiber optic visualization and Revisualization with laryngoscopy    Complicating Factors:  Small mouth, short neck and retrognathia    Findings Post-Intubation:  BS equal bilateral and atraumatic/condition of teeth unchanged

## 2024-04-16 NOTE — ANESTHESIA PROCEDURE NOTES
Arterial    Diagnosis: A fib    Patient location during procedure: done in OR    Staffing  Authorizing Provider: Esther Arzate MD  Performing Provider: Esther Arzate MD    Staffing  Performed by: Esther Arzate MD  Authorized by: Esther Arzate MD    Anesthesiologist was present at the time of the procedure.    Preanesthetic Checklist  Completed: patient identified, IV checked, site marked, risks and benefits discussed, surgical consent, monitors and equipment checked, pre-op evaluation, timeout performed and anesthesia consent givenArterial  Skin Prep: chlorhexidine gluconate  Local Infiltration: none  Orientation: left  Location: radial    Catheter Size: 20 G  Catheter placement by Ultrasound guidance. Heme positive aspiration all ports.   Vessel Caliber: small, patent  Needle advanced into vessel with real time Ultrasound guidance.Insertion Attempts: 3  Assessment  Dressing: secured with tape and tegaderm  Patient: Tolerated well  Additional Notes  Attempted on right side - vessel very small, unable to thread catheter.  Placed on left with ultrasound guidance, 1 attempt

## 2024-04-16 NOTE — TRANSFER OF CARE
"Anesthesia Transfer of Care Note    Patient: Flores Fuentes    Procedure(s) Performed: Procedure(s) (LRB):  Ablation atrial fibrillation (N/A)  ECHOCARDIOGRAM, TRANSESOPHAGEAL (N/A)  Ablation, Atrial Flutter, Atypical  Ablation    Patient location: PACU    Anesthesia Type: general    Transport from OR: Transported from OR on 6-10 L/min O2 by face mask with adequate spontaneous ventilation    Post pain: adequate analgesia    Post assessment: no apparent anesthetic complications    Post vital signs: stable    Level of consciousness: sedated    Nausea/Vomiting: no nausea/vomiting    Complications: none    Transfer of care protocol was followed      Last vitals: Visit Vitals  BP (!) 145/78 (BP Location: Left arm, Patient Position: Lying)   Pulse 80   Temp 36.6 °C (97.9 °F) (Temporal)   Resp 14   Ht 5' 7" (1.702 m)   Wt 95.3 kg (210 lb)   SpO2 100%   Breastfeeding No   BMI 32.89 kg/m²     "

## 2024-04-17 LAB
POC ACTIVATED CLOTTING TIME K: 147 SEC (ref 74–137)
POC ACTIVATED CLOTTING TIME K: 152 SEC (ref 74–137)
POC ACTIVATED CLOTTING TIME K: 314 SEC (ref 74–137)
POC ACTIVATED CLOTTING TIME K: 325 SEC (ref 74–137)
POC ACTIVATED CLOTTING TIME K: 395 SEC (ref 74–137)
POC ACTIVATED CLOTTING TIME K: 401 SEC (ref 74–137)
POC ACTIVATED CLOTTING TIME K: 423 SEC (ref 74–137)
SAMPLE: ABNORMAL

## 2024-04-18 ENCOUNTER — CLINICAL SUPPORT (OUTPATIENT)
Dept: CARDIOLOGY | Facility: HOSPITAL | Age: 74
End: 2024-04-18
Attending: INTERNAL MEDICINE
Payer: MEDICARE

## 2024-04-18 ENCOUNTER — PATIENT MESSAGE (OUTPATIENT)
Dept: ELECTROPHYSIOLOGY | Facility: CLINIC | Age: 74
End: 2024-04-18

## 2024-04-18 ENCOUNTER — HOSPITAL ENCOUNTER (EMERGENCY)
Facility: HOSPITAL | Age: 74
Discharge: HOME | End: 2024-04-18
Attending: EMERGENCY MEDICINE
Payer: MEDICARE

## 2024-04-18 VITALS
OXYGEN SATURATION: 97 % | HEIGHT: 67 IN | WEIGHT: 210 LBS | SYSTOLIC BLOOD PRESSURE: 133 MMHG | HEART RATE: 61 BPM | BODY MASS INDEX: 32.96 KG/M2 | TEMPERATURE: 98 F | DIASTOLIC BLOOD PRESSURE: 68 MMHG | RESPIRATION RATE: 18 BRPM

## 2024-04-18 DIAGNOSIS — R06.02 SHORTNESS OF BREATH: ICD-10-CM

## 2024-04-18 DIAGNOSIS — R07.9 CHEST PAIN: ICD-10-CM

## 2024-04-18 DIAGNOSIS — I31.9 PERICARDITIS, UNSPECIFIED CHRONICITY, UNSPECIFIED TYPE: Primary | ICD-10-CM

## 2024-04-18 DIAGNOSIS — I49.8 OTHER SPECIFIED CARDIAC ARRHYTHMIAS: ICD-10-CM

## 2024-04-18 DIAGNOSIS — Z95.818 PRESENCE OF OTHER CARDIAC IMPLANTS AND GRAFTS: ICD-10-CM

## 2024-04-18 DIAGNOSIS — R07.89 CHEST DISCOMFORT: ICD-10-CM

## 2024-04-18 LAB
ALBUMIN SERPL BCP-MCNC: 3.9 G/DL (ref 3.5–5.2)
ALP SERPL-CCNC: 76 U/L (ref 55–135)
ALT SERPL W/O P-5'-P-CCNC: 11 U/L (ref 10–44)
ANION GAP SERPL CALC-SCNC: 9 MMOL/L (ref 8–16)
AST SERPL-CCNC: 21 U/L (ref 10–40)
BASOPHILS # BLD AUTO: 0.04 K/UL (ref 0–0.2)
BASOPHILS NFR BLD: 0.5 % (ref 0–1.9)
BILIRUB SERPL-MCNC: 0.7 MG/DL (ref 0.1–1)
BNP SERPL-MCNC: 30 PG/ML (ref 0–99)
BUN SERPL-MCNC: 26 MG/DL (ref 8–23)
CALCIUM SERPL-MCNC: 9.1 MG/DL (ref 8.7–10.5)
CHLORIDE SERPL-SCNC: 110 MMOL/L (ref 95–110)
CO2 SERPL-SCNC: 23 MMOL/L (ref 23–29)
CREAT SERPL-MCNC: 0.8 MG/DL (ref 0.5–1.4)
DIFFERENTIAL METHOD BLD: ABNORMAL
EOSINOPHIL # BLD AUTO: 0.1 K/UL (ref 0–0.5)
EOSINOPHIL NFR BLD: 1.2 % (ref 0–8)
ERYTHROCYTE [DISTWIDTH] IN BLOOD BY AUTOMATED COUNT: 14.5 % (ref 11.5–14.5)
EST. GFR  (NO RACE VARIABLE): >60 ML/MIN/1.73 M^2
GLUCOSE SERPL-MCNC: 89 MG/DL (ref 70–110)
HCT VFR BLD AUTO: 40.8 % (ref 37–48.5)
HGB BLD-MCNC: 13.3 G/DL (ref 12–16)
IMM GRANULOCYTES # BLD AUTO: 0.03 K/UL (ref 0–0.04)
IMM GRANULOCYTES NFR BLD AUTO: 0.4 % (ref 0–0.5)
LYMPHOCYTES # BLD AUTO: 1.4 K/UL (ref 1–4.8)
LYMPHOCYTES NFR BLD: 19 % (ref 18–48)
MCH RBC QN AUTO: 31.4 PG (ref 27–31)
MCHC RBC AUTO-ENTMCNC: 32.6 G/DL (ref 32–36)
MCV RBC AUTO: 97 FL (ref 82–98)
MONOCYTES # BLD AUTO: 0.7 K/UL (ref 0.3–1)
MONOCYTES NFR BLD: 9.6 % (ref 4–15)
NEUTROPHILS # BLD AUTO: 5.2 K/UL (ref 1.8–7.7)
NEUTROPHILS NFR BLD: 69.3 % (ref 38–73)
NRBC BLD-RTO: 0 /100 WBC
OHS CV AF BURDEN PERCENT: < 1
OHS CV DC REMOTE DEVICE TYPE: NORMAL
OHS QRS DURATION: 102 MS
OHS QRS DURATION: 104 MS
OHS QTC CALCULATION: 461 MS
OHS QTC CALCULATION: 462 MS
PLATELET # BLD AUTO: 199 K/UL (ref 150–450)
PMV BLD AUTO: 10.4 FL (ref 9.2–12.9)
POTASSIUM SERPL-SCNC: 4 MMOL/L (ref 3.5–5.1)
PROT SERPL-MCNC: 7.1 G/DL (ref 6–8.4)
RBC # BLD AUTO: 4.23 M/UL (ref 4–5.4)
SODIUM SERPL-SCNC: 142 MMOL/L (ref 136–145)
TROPONIN I SERPL DL<=0.01 NG/ML-MCNC: 0.69 NG/ML (ref 0–0.03)
TROPONIN I SERPL DL<=0.01 NG/ML-MCNC: 0.74 NG/ML (ref 0–0.03)
WBC # BLD AUTO: 7.52 K/UL (ref 3.9–12.7)

## 2024-04-18 PROCEDURE — 83880 ASSAY OF NATRIURETIC PEPTIDE: CPT | Mod: HCNC

## 2024-04-18 PROCEDURE — 93010 ELECTROCARDIOGRAM REPORT: CPT | Mod: HCNC,,, | Performed by: INTERNAL MEDICINE

## 2024-04-18 PROCEDURE — 80053 COMPREHEN METABOLIC PANEL: CPT | Mod: HCNC

## 2024-04-18 PROCEDURE — 93005 ELECTROCARDIOGRAM TRACING: CPT | Mod: HCNC

## 2024-04-18 PROCEDURE — 85025 COMPLETE CBC W/AUTO DIFF WBC: CPT | Mod: HCNC

## 2024-04-18 PROCEDURE — 93298 REM INTERROG DEV EVAL SCRMS: CPT | Mod: 26,HCNC,, | Performed by: INTERNAL MEDICINE

## 2024-04-18 PROCEDURE — 93010 ELECTROCARDIOGRAM REPORT: CPT | Mod: HCNC,76,, | Performed by: INTERNAL MEDICINE

## 2024-04-18 PROCEDURE — 84484 ASSAY OF TROPONIN QUANT: CPT | Mod: HCNC

## 2024-04-18 PROCEDURE — 99285 EMERGENCY DEPT VISIT HI MDM: CPT | Mod: 25

## 2024-04-18 PROCEDURE — 25000003 PHARM REV CODE 250: Mod: HCNC

## 2024-04-18 PROCEDURE — 99284 EMERGENCY DEPT VISIT MOD MDM: CPT | Mod: HCNC,GC,, | Performed by: INTERNAL MEDICINE

## 2024-04-18 RX ORDER — ASPIRIN 325 MG
325 TABLET, DELAYED RELEASE (ENTERIC COATED) ORAL
Status: COMPLETED | OUTPATIENT
Start: 2024-04-18 | End: 2024-04-18

## 2024-04-18 RX ORDER — COLCHICINE 0.6 MG/1
0.6 TABLET ORAL 2 TIMES DAILY
Qty: 180 TABLET | Refills: 0 | Status: SHIPPED | OUTPATIENT
Start: 2024-04-18 | End: 2024-07-17

## 2024-04-18 RX ORDER — LANSOPRAZOLE 30 MG/1
30 CAPSULE, DELAYED RELEASE ORAL DAILY
Qty: 30 CAPSULE | Refills: 0 | Status: SHIPPED | OUTPATIENT
Start: 2024-04-18 | End: 2024-07-17

## 2024-04-18 RX ORDER — IBUPROFEN 200 MG
TABLET ORAL
Qty: 420 TABLET | Refills: 0 | Status: SHIPPED | OUTPATIENT
Start: 2024-04-18 | End: 2024-06-13

## 2024-04-18 RX ORDER — ALUMINUM HYDROXIDE, MAGNESIUM HYDROXIDE, AND SIMETHICONE 1200; 120; 1200 MG/30ML; MG/30ML; MG/30ML
30 SUSPENSION ORAL ONCE
Status: COMPLETED | OUTPATIENT
Start: 2024-04-18 | End: 2024-04-18

## 2024-04-18 RX ORDER — LIDOCAINE HYDROCHLORIDE 20 MG/ML
15 SOLUTION OROPHARYNGEAL ONCE
Status: COMPLETED | OUTPATIENT
Start: 2024-04-18 | End: 2024-04-18

## 2024-04-18 RX ADMIN — ASPIRIN 325 MG: 325 TABLET, COATED ORAL at 07:04

## 2024-04-18 RX ADMIN — LIDOCAINE HYDROCHLORIDE 15 ML: 20 SOLUTION ORAL at 08:04

## 2024-04-18 RX ADMIN — ALUMINUM HYDROXIDE, MAGNESIUM HYDROXIDE, AND SIMETHICONE 30 ML: 1200; 120; 1200 SUSPENSION ORAL at 08:04

## 2024-04-18 NOTE — DISCHARGE INSTRUCTIONS
Take medicine as prescribed for pericarditis.  The ibuprofen has a very specific taper/ changes in doses each week so take  as prescribed.  Follow-up with primary care physician within 1 week for repeat evaluation.  Follow up with Cardiology on previously scheduled appointment   And if appointment is not within the next 1-2 weeks call to schedule an appointment.      Return to emergency department if you develop, difficulty breathing, mental status changes or any other new or concerning symptoms.

## 2024-04-18 NOTE — CONSULTS
[facility]  Cardiology  Consult Note    Patient Name: Flores Fuentes  MRN: [id number]  Admission Date: 4/18/2024  Hospital Length of Stay: 0 days  Code Status: Prior   Attending Provider: Riddhi Lin MD   Consulting Provider: Joe Knutson MD  Primary Care Physician: Naz Condon MD  Principal Problem:<principal problem not specified>    Patient information was obtained from patient and ER records.     Consults  Subjective:     Chief Complaint:  chest pain     HPI:   Ms. Fuentes is a 73 year old female with a PMHx of pAF, HLD, obesity, ILR implant and hypothyroid who presents to [facility] with the CC of chest pain    She was recently admitted to SSCU with Dr. Servin for re-do PVI.  Procedure successful and pt was discharged home same day.  Tolerating all of her medications including eliquis.  Yesterday she started to get a chest burning pain that would rise into her throat.  Worsened with deep inspiration and leaning forward.  Could not get pain to go away.  Denies any SOB, Sood, PND, LE swelling, syncope.  She came to the ED    In the ED she was AF with stable VS on RA.  CBC and CMP normal.  Trop elevated at 0.7, BNP 30.  EKG showing sinus rhythm without NEIL or NH changes.  Cardiology c/s for elevated troponin.  I evaluated the pt at the bedside.  She says her chest pain still present.  Bedside TTE showed preserved and small pericardial effusion posterior to the LV.  She has a murmur but difficult to discern if EDITH vs friction rub.    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 Dr Viktor Toure    Osteopenia of neck of left femur 6/4/2020    Paroxysmal atrioventricular tachycardia     Plantar fasciitis     Right knee meniscal tear     Dr Mayfield, tx conservatively    Severe obesity (BMI 35.0-35.9 with comorbidity) 1/24/2017    Slow transit constipation 7/13/2021    Despite fruits & veg & 1200 dunia diet, try Colace daily    Stress incontinence, female 03/28/2018    hasn't tried oxybutynin, After HYST, Kegels & PT  didn't work, worse at night wearing pads, Dr Infante    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    Thyroid disease        Past Surgical History:   Procedure Laterality Date    ABLATION  4/16/2024    Procedure: Ablation;  Surgeon: Ameya Servin MD;  Location: [facility] EP LAB;  Service: Cardiology;;    ABLATION OF ARRHYTHMOGENIC FOCUS FOR ATRIAL FIBRILLATION N/A 10/17/2023    Procedure: Ablation atrial fibrillation;  Surgeon: Ameya Servin MD;  Location: [facility] EP LAB;  Service: Cardiology;  Laterality: N/A;  AF, PERICO, PVI, WPW, RFA, POPEYE, Gen, DM, 3 Prep *MDT ILR*    ABLATION OF ARRHYTHMOGENIC FOCUS FOR ATRIAL FIBRILLATION N/A 4/16/2024    Procedure: Ablation atrial fibrillation;  Surgeon: Ameya Servin MD;  Location: [facility] EP LAB;  Service: Cardiology;  Laterality: N/A;  AF, PERICO (definite), PVI (redo), RFA, Carto, Gen, DM, 3 Prep *ILR MDT*    ABLATION, ATRIAL FLUTTER, ATYPICAL  4/16/2024    Procedure: Ablation, Atrial Flutter, Atypical;  Surgeon: Ameya Servin MD;  Location: [facility] EP LAB;  Service: Cardiology;;    ABLATION, MECHANOCHEMICAL, VARICOSE VEIN Right 12/8/2023    Procedure: ABLATION, MECHANOCHEMICAL, VARICOSE VEIN;  Surgeon: Simone Shannon MD;  Location: [facility] CATH LAB/EP;  Service: Cardiology;  Laterality: Right;    APPENDECTOMY      COLONOSCOPY N/A 8/13/2020    Procedure: COLONOSCOPY;  Surgeon: Angus Ac MD;  Location: [facility] ENDO (4TH FLR);   Service: Endoscopy;  Laterality: N/A;  ok to hold Eliquis 2 days per Dr Malathi HERNANDEZ test at [place] on 8/10-GT    ECHOCARDIOGRAM,TRANSESOPHAGEAL N/A 9/20/2023    Procedure: Transesophageal echo (PERICO) intra-procedure log documentation;  Surgeon: Jerry, Alonso Diagnostic;  Location: [facility] EP LAB;  Service: Cardiology;  Laterality: N/A;    ECHOCARDIOGRAM,TRANSESOPHAGEAL  3/25/2024    Procedure: Transesophageal echo (PERICO) intra-procedure log documentation;  Surgeon: Kathy Malik MD;  Location: [facility] EP LAB;  Service: Cardiology;;    HYSTERECTOMY  1990    TAHBSO for fibroids    INSERTION OF IMPLANTABLE LOOP RECORDER Left 11/1/2021    Procedure: Insertion, Implantable Loop Recorder;  Surgeon: Ameya Servin MD;  Location: [facility] EP LAB;  Service: Cardiology;  Laterality: Left;  AF, ILR implant, MDT,  DM, 3 Prep    MANDIBLE SURGERY  2015    L mandible, Dr Toure    MANDIBLE SURGERY Left 8/27/2019    OOPHORECTOMY      TONSILLECTOMY      TRANSESOPHAGEAL ECHOCARDIOGRAM WITH POSSIBLE CARDIOVERSION (PERICO W/ POSS CARDIOVERSION) N/A 1/19/2024    Procedure: Transesophageal echo (PERICO) intra-procedure log documentation;  Surgeon: JV Rosenthal MD;  Location: [facility] EP LAB;  Service: Cardiology;  Laterality: N/A;    TRANSESOPHAGEAL ECHOCARDIOGRAPHY N/A 10/17/2023    Procedure: ECHOCARDIOGRAM, TRANSESOPHAGEAL;  Surgeon: Kathy Malik MD;  Location: [facility] EP LAB;  Service: Cardiology;  Laterality: N/A;    TRANSESOPHAGEAL ECHOCARDIOGRAPHY N/A 4/16/2024    Procedure: ECHOCARDIOGRAM, TRANSESOPHAGEAL;  Surgeon: Alonso Edwards Diagnostic;  Location: [facility] EP LAB;  Service: Cardiology;  Laterality: N/A;    TREATMENT OF CARDIAC ARRHYTHMIA N/A 9/20/2023    Procedure: Cardioversion or Defibrillation;  Surgeon: JV Rosenthal MD;  Location: [facility] EP LAB;  Service: Cardiology;  Laterality: N/A;  AF, DCCV/PERICO, ANES, EH,     TREATMENT OF CARDIAC ARRHYTHMIA N/A 1/19/2024    Procedure: Cardioversion or Defibrillation;   Surgeon: Luis Joiner MD;  Location: [facility] EP LAB;  Service: Cardiology;  Laterality: N/A;  AFL, DCCV ONLY, ANES, EH,     TREATMENT OF CARDIAC ARRHYTHMIA N/A 3/25/2024    Procedure: Cardioversion or Defibrillation;  Surgeon: Ameya Servin MD;  Location: [facility] EP LAB;  Service: Cardiology;  Laterality: N/A;  AF, DCCV ONLY, ANES, [place], 351    TREATMENT OF CARDIAC ARRHYTHMIA N/A 1/19/2024    Procedure: Cardioversion or Defibrillation;  Surgeon: JV Rosenthal MD;  Location: [facility] EP LAB;  Service: Cardiology;  Laterality: N/A;  AFL, PERICO/DCCV, ANES, EH,        Review of patient's allergies indicates:   Allergen Reactions    Decongestant d [pseudoephedrine-dm]      Atrial Fibrillation    Augmentin [amoxicillin-pot clavulanate]      palpitations      Amoxicillin Palpitations    Diphenhydramine-pseudoephed Palpitations     TACHYCARDIA    Povidone-iodine Rash     Mild erythema of skin       Current Facility-Administered Medications   Medication Dose Route Frequency Provider Last Rate Last Admin    sodium chloride 0.9% flush 10 mL  10 mL Intravenous PRN Simone Shannon MD         Current Outpatient Medications   Medication Sig Dispense Refill    diltiaZEM (CARDIZEM CD) 240 MG 24 hr capsule Take 1 capsule (240 mg total) by mouth once daily. 90 capsule 3    ELIQUIS 5 mg Tab TAKE 1 TABLET TWICE DAILY 180 tablet 3    flecainide (TAMBOCOR) 100 MG Tab Take 1 tablet (100 mg total) by mouth every 12 (twelve) hours. 60 tablet 3    lansoprazole (PREVACID) 30 MG capsule Take 1 capsule (30 mg total) by mouth once daily. 30 capsule 0    levothyroxine (SYNTHROID) 25 MCG tablet TAKE 1 TABLET ONE TIME DAILY 90 tablet 2    nystatin (MYCOSTATIN) cream Apply topically 2 (two) times daily. 30 g 11    pravastatin (PRAVACHOL) 40 MG tablet TAKE 1 TABLET ONE TIME DAILY 90 tablet 3    fluticasone propionate (FLONASE) 50 mcg/actuation nasal spray USE 1 SPRAY IN EACH NOSTRIL EVERY DAY 32 g 2     Facility-Administered Medications  Ordered in Other Encounters   Medication Dose Route Frequency Provider Last Rate Last Admin    sodium chloride 0.9% bolus 1,000 mL  1,000 mL Intravenous Once Carley Cabrera NP        vancomycin in dextrose 5 % 1 gram/250 mL IVPB 1,000 mg  1,000 mg Intravenous On Call Procedure Carley Cabrera .7 mL/hr at 11/01/21 1249 1,000 mg at 11/01/21 1249     Family History       Problem Relation (Age of Onset)    Cancer Mother          Tobacco Use    Smoking status: Never    Smokeless tobacco: Never   Substance and Sexual Activity    Alcohol use: No    Drug use: No    Sexual activity: Not Currently     Review of Systems   Constitutional: Negative for fever and malaise/fatigue.   HENT:  Negative for congestion and sore throat.    Eyes:  Negative for blurred vision, vision loss in left eye and vision loss in right eye.   Cardiovascular:  Positive for chest pain. Negative for dyspnea on exertion, irregular heartbeat, leg swelling, near-syncope, palpitations and syncope.   Respiratory:  Negative for shortness of breath and wheezing.    Endocrine: Negative.    Hematologic/Lymphatic: Negative.    Skin: Negative.    Musculoskeletal:  Negative for arthritis, back pain, joint pain and myalgias.   Gastrointestinal:  Negative for abdominal pain, hematemesis, hematochezia and melena.   Genitourinary: Negative.    Neurological:  Negative for light-headedness and loss of balance.   Psychiatric/Behavioral: Negative.       Objective:     Vital Signs (Most Recent):  Temp: 98.7 °F (37.1 °C) (04/18/24 0847)  Pulse: 67 (04/18/24 0847)  Resp: 16 (04/18/24 0847)  BP: (!) 160/71 (04/18/24 0847)  SpO2: 99 % (04/18/24 0830) Vital Signs (24h Range):  Temp:  [97.8 °F (36.6 °C)-98.7 °F (37.1 °C)] 98.7 °F (37.1 °C)  Pulse:  [62-67] 67  Resp:  [16-20] 16  SpO2:  [99 %-100 %] 99 %  BP: (139-178)/(62-80) 160/71     Weight: 95.3 kg (210 lb)  Body mass index is 32.89 kg/m².    SpO2: 99 %       No intake or output data in the 24 hours ending 04/18/24  1102    Lines/Drains/Airways       Peripheral Intravenous Line  Duration                  Peripheral IV - Single Lumen 04/18/24 0635 20 G Left Antecubital <1 day                     Physical Exam  Vitals and nursing note reviewed.   Constitutional:       Appearance: Normal appearance. She is normal weight.   HENT:      Head: Atraumatic.      Mouth/Throat:      Mouth: Mucous membranes are moist.   Eyes:      General: No scleral icterus.     Extraocular Movements: Extraocular movements intact.   Neck:      Vascular: No carotid bruit.   Cardiovascular:      Rate and Rhythm: Normal rate and regular rhythm.      Pulses: Normal pulses.      Heart sounds: Murmur heard.      No gallop.      Comments: Unclear if murmur vs friction rub  Pulmonary:      Effort: Pulmonary effort is normal. No respiratory distress.      Breath sounds: Normal breath sounds. No wheezing.   Abdominal:      General: Abdomen is flat. Bowel sounds are normal.      Palpations: Abdomen is soft.   Musculoskeletal:         General: Normal range of motion.      Right lower leg: No edema.      Left lower leg: No edema.   Skin:     General: Skin is warm and dry.      Capillary Refill: Capillary refill takes less than 2 seconds.   Neurological:      General: No focal deficit present.      Mental Status: She is alert and oriented to person, place, and time. Mental status is at baseline.          Significant Labs: All pertinent lab results from the last 24 hours have been reviewed.    Significant Imaging: Echocardiogram: Transthoracic echo (TTE) complete (Cupid Only):   Results for orders placed or performed during the hospital encounter of 03/22/24   Echo   Result Value Ref Range    RA Width 3.25 cm    LA volume (mod) 56.53 cm3    Left Atrium Major Axis 6.22 cm    Left Atrium Minor Axis 6.15 cm    RA Major Axis 5.43 cm    LV Diastolic Volume 84.21 mL    LV Systolic Volume 25.64 mL    TR Max Esa 2.03 m/s    MV Peak E Esa 1.09 m/s    Ao VTI 21.59 cm    Ao peak  amber 1.10 m/s    LVOT peak VTI 20.88 cm    LVOT peak amber 1.06 m/s    LVOT diameter 1.96 cm    IVRT 78.02 msec    AV mean gradient 3 mmHg    TAPSE 1.72 cm    RVDD 3.01 cm    LA size 4.00 cm    Ascending aorta 3.49 cm    STJ 2.62 cm    Sinus 2.84 cm    LVIDs 2.64 2.1 - 4.0 cm    Posterior Wall 0.9 0.6 - 1.1 cm    IVS 0.9 0.6 - 1.1 cm    LVIDd 5.7 3.5 - 6.0 cm    TDI LATERAL 0.16 m/s    LA WIDTH 4.02 cm    TDI SEPTAL 0.18 m/s    LV LATERAL E/E' RATIO 6.81 m/s    LV SEPTAL E/E' RATIO 6.06 m/s    FS 54 (A) 28 - 44 %    LA volume 84.53 cm3    LV mass 197.52 g    ZLVIDD -0.99     ZLVIDS -2.98     Left Ventricle Relative Wall Thickness 0.32 cm    AV valve area 2.92 cm²    AV Velocity Ratio 0.96     AV index (prosthetic) 0.97     Mean e' 0.17 m/s    LVOT area 3.0 cm2    LVOT stroke volume 62.97 cm3    AV peak gradient 5 mmHg    E/E' ratio 6.41 m/s    LV Systolic Volume Index 12.4 mL/m2    LV Diastolic Volume Index 40.68 mL/m2    LA Volume Index 40.8 mL/m2    LV Mass Index 95 g/m2    Triscuspid Valve Regurgitation Peak Gradient 16 mmHg    LA Volume Index (Mod) 27.3 mL/m2    DEAN by Velocity Ratio 2.91 cm²    BSA 2.13 m2    TV resting pulmonary artery pressure 19 mmHg    RV TB RVSP 5 mmHg    Est. RA pres 3 mmHg    Byrne's Biplane MOD Ejection Fraction 56 %    Narrative      Left Ventricle: The left ventricle is normal in size. Ventricular mass   is normal. Normal wall thickness. Normal wall motion. There is normal   systolic function with a visually estimated ejection fraction of 55 - 60%.   Biplane (2D) method of discs ejection fraction is 56%. There is normal   diastolic function. Normal left ventricular filling pressure.    Right Ventricle: Normal right ventricular cavity size. Wall thickness   is normal. Right ventricle wall motion  is normal. Systolic function is   normal.    Aortic Valve: The aortic valve is a trileaflet valve. There is mild   annular calcification present.    Aorta: Aortic root is normal in size  measuring 2.84 cm. Ascending aorta   is normal measuring 3.49 cm.    Pulmonary Artery: The estimated pulmonary artery systolic pressure is   19 mmHg.    IVC/SVC: Normal venous pressure at 3 mmHg.       Assessment and Plan:     Pericarditis  Pt with recent redo PVI on 4/16 presents with chest pain and elevated troponin.  Exam consistent with pericarditis and has small effusion  - discussed case with Dr. Servin and in agreement  - start ibuprofen 800mg TID for 2 weeks.  Taper by 200mg per week until finished  - start colchicine at 0.6mg BID x3 months, no taper  - already on lansoprazole for GI ppx  - ok to discharge from the ED  - to f/u with Dr. Servin and her cardiologist        VTE Risk Mitigation (From admission, onward)      None            Thank you for your consult. I will sign off. Please contact us if you have any additional questions.    Joe Knutson MD  Cardiology   [facility]

## 2024-04-18 NOTE — ED TRIAGE NOTES
73-year-old female with a past medical history of Afib now s/p ablation two days ago, HLD, HTN, hx of WPW who presents due to pleuritic chest pain. Patient states that she once had an ablation before and did not have any symptoms afterwards, but feels this is different. The pain extends all the way from the top of her chest down into the superior portion of her abdomen. She also notes that it radiates to her back somewhat. Prior to the procedure she was in her otherwise normal state of health and asymptomatic. She notes that she has not short of breath, but that taking deep breaths exacerbates the pain. She denies all other symptoms at this time. Of note, patient had a venous ablation in her right lower extremity and states that her right leg is generally more swollen than the left. She has no pain in either of her lower extremities.

## 2024-04-18 NOTE — SUBJECTIVE & OBJECTIVE
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 Dr Viktor Toure    Osteopenia of neck of left femur 6/4/2020    Paroxysmal atrioventricular tachycardia     Plantar fasciitis     Right knee meniscal tear     Dr Mayfield, tx conservatively    Severe obesity (BMI 35.0-35.9 with comorbidity) 1/24/2017    Slow transit constipation 7/13/2021    Despite fruits & veg & 1200 dunia diet, try Colace daily    Stress incontinence, female 03/28/2018    hasn't tried oxybutynin, After HYST, Kegels & PT  didn't work, worse at night wearing pads, Dr Infante    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    Thyroid disease        Past Surgical History:   Procedure Laterality Date    ABLATION  4/16/2024    Procedure: Ablation;  Surgeon: Ameya Servin MD;  Location: [facility] EP LAB;  Service: Cardiology;;    ABLATION OF ARRHYTHMOGENIC FOCUS FOR ATRIAL FIBRILLATION N/A 10/17/2023    Procedure: Ablation atrial fibrillation;  Surgeon: Ameya Servin MD;  Location: [facility] EP LAB;  Service: Cardiology;  Laterality: N/A;  AF, PERICO, PVI, WPW, RFA, POPEYE, Gen, DM, 3 Prep *MDT ILR*    ABLATION OF ARRHYTHMOGENIC FOCUS FOR ATRIAL FIBRILLATION N/A 4/16/2024    Procedure: Ablation atrial fibrillation;  Surgeon: Ameya Servin MD;  Location: [facility] EP LAB;  Service: Cardiology;  Laterality: N/A;  AF, PERICO (definite), PVI (redo), RFA, Carto, Gen, DM, 3 Prep *ILR MDT*    ABLATION, ATRIAL FLUTTER, ATYPICAL  4/16/2024    Procedure: Ablation, Atrial  Flutter, Atypical;  Surgeon: Ameya Servin MD;  Location: [facility] EP LAB;  Service: Cardiology;;    ABLATION, MECHANOCHEMICAL, VARICOSE VEIN Right 12/8/2023    Procedure: ABLATION, MECHANOCHEMICAL, VARICOSE VEIN;  Surgeon: Simone Shannon MD;  Location: [facility] CATH LAB/EP;  Service: Cardiology;  Laterality: Right;    APPENDECTOMY      COLONOSCOPY N/A 8/13/2020    Procedure: COLONOSCOPY;  Surgeon: Angus Ac MD;  Location: [facility] ENDO (4TH FLR);  Service: Endoscopy;  Laterality: N/A;  ok to hold Eliquis 2 days per Dr Servin     COVID test at [place] on 8/10-GT    ECHOCARDIOGRAM,TRANSESOPHAGEAL N/A 9/20/2023    Procedure: Transesophageal echo (PERICO) intra-procedure log documentation;  Surgeon: Provider, [facility];  Location: [facility] EP LAB;  Service: Cardiology;  Laterality: N/A;    ECHOCARDIOGRAM,TRANSESOPHAGEAL  3/25/2024    Procedure: Transesophageal echo (PERICO) intra-procedure log documentation;  Surgeon: Kathy Malik MD;  Location: [facility] EP LAB;  Service: Cardiology;;    HYSTERECTOMY  1990    TAHBSO for fibroids    INSERTION OF IMPLANTABLE LOOP RECORDER Left 11/1/2021    Procedure: Insertion, Implantable Loop Recorder;  Surgeon: Ameya Servin MD;  Location: [facility] EP LAB;  Service: Cardiology;  Laterality: Left;  AF, ILR implant, MDT,  DM, 3 Prep    MANDIBLE SURGERY  2015    L mandible, Dr Toure    MANDIBLE SURGERY Left 8/27/2019    OOPHORECTOMY      TONSILLECTOMY      TRANSESOPHAGEAL ECHOCARDIOGRAM WITH POSSIBLE CARDIOVERSION (PERICO W/ POSS CARDIOVERSION) N/A 1/19/2024    Procedure: Transesophageal echo (PERICO) intra-procedure log documentation;  Surgeon: JV Rosenthal MD;  Location: [facility] EP LAB;  Service: Cardiology;  Laterality: N/A;    TRANSESOPHAGEAL ECHOCARDIOGRAPHY N/A 10/17/2023    Procedure: ECHOCARDIOGRAM, TRANSESOPHAGEAL;  Surgeon: Kathy Malik MD;  Location: [facility] EP LAB;  Service: Cardiology;  Laterality: N/A;    TRANSESOPHAGEAL ECHOCARDIOGRAPHY N/A 4/16/2024    Procedure:  ECHOCARDIOGRAM, TRANSESOPHAGEAL;  Surgeon: Provider, [facility];  Location: [facility] EP LAB;  Service: Cardiology;  Laterality: N/A;    TREATMENT OF CARDIAC ARRHYTHMIA N/A 9/20/2023    Procedure: Cardioversion or Defibrillation;  Surgeon: JV Rosenthal MD;  Location: [facility] EP LAB;  Service: Cardiology;  Laterality: N/A;  AF, DCCV/PERICO, ANES, EH,     TREATMENT OF CARDIAC ARRHYTHMIA N/A 1/19/2024    Procedure: Cardioversion or Defibrillation;  Surgeon: Luis Joiner MD;  Location: [facility] EP LAB;  Service: Cardiology;  Laterality: N/A;  AFL, DCCV ONLY, ANES, EH,     TREATMENT OF CARDIAC ARRHYTHMIA N/A 3/25/2024    Procedure: Cardioversion or Defibrillation;  Surgeon: Ameya Servin MD;  Location: [facility] EP LAB;  Service: Cardiology;  Laterality: N/A;  AF, DCCV ONLY, ANES, IA, 351    TREATMENT OF CARDIAC ARRHYTHMIA N/A 1/19/2024    Procedure: Cardioversion or Defibrillation;  Surgeon: JV Rosenthal MD;  Location: [facility] EP LAB;  Service: Cardiology;  Laterality: N/A;  AFL, PERICO/DCCV, ANES, EH,        Review of patient's allergies indicates:   Allergen Reactions    Decongestant d [pseudoephedrine-dm]      Atrial Fibrillation    Augmentin [amoxicillin-pot clavulanate]      palpitations      Amoxicillin Palpitations    Diphenhydramine-pseudoephed Palpitations     TACHYCARDIA    Povidone-iodine Rash     Mild erythema of skin       Current Facility-Administered Medications   Medication Dose Route Frequency Provider Last Rate Last Admin    sodium chloride 0.9% flush 10 mL  10 mL Intravenous PRN Simone Shannon MD         Current Outpatient Medications   Medication Sig Dispense Refill    diltiaZEM (CARDIZEM CD) 240 MG 24 hr capsule Take 1 capsule (240 mg total) by mouth once daily. 90 capsule 3    ELIQUIS 5 mg Tab TAKE 1 TABLET TWICE DAILY 180 tablet 3    flecainide (TAMBOCOR) 100 MG Tab Take 1 tablet (100 mg total) by mouth every 12 (twelve) hours. 60 tablet 3    lansoprazole (PREVACID) 30 MG  capsule Take 1 capsule (30 mg total) by mouth once daily. 30 capsule 0    levothyroxine (SYNTHROID) 25 MCG tablet TAKE 1 TABLET ONE TIME DAILY 90 tablet 2    nystatin (MYCOSTATIN) cream Apply topically 2 (two) times daily. 30 g 11    pravastatin (PRAVACHOL) 40 MG tablet TAKE 1 TABLET ONE TIME DAILY 90 tablet 3    fluticasone propionate (FLONASE) 50 mcg/actuation nasal spray USE 1 SPRAY IN EACH NOSTRIL EVERY DAY 32 g 2     Facility-Administered Medications Ordered in Other Encounters   Medication Dose Route Frequency Provider Last Rate Last Admin    sodium chloride 0.9% bolus 1,000 mL  1,000 mL Intravenous Once Carley Cabrera, BIANCA        vancomycin in dextrose 5 % 1 gram/250 mL IVPB 1,000 mg  1,000 mg Intravenous On Call Procedure Carley Cabrera .7 mL/hr at 11/01/21 1249 1,000 mg at 11/01/21 1249     Family History       Problem Relation (Age of Onset)    Cancer Mother          Tobacco Use    Smoking status: Never    Smokeless tobacco: Never   Substance and Sexual Activity    Alcohol use: No    Drug use: No    Sexual activity: Not Currently     Review of Systems   Constitutional: Negative for fever and malaise/fatigue.   HENT:  Negative for congestion and sore throat.    Eyes:  Negative for blurred vision, vision loss in left eye and vision loss in right eye.   Cardiovascular:  Positive for chest pain. Negative for dyspnea on exertion, irregular heartbeat, leg swelling, near-syncope, palpitations and syncope.   Respiratory:  Negative for shortness of breath and wheezing.    Endocrine: Negative.    Hematologic/Lymphatic: Negative.    Skin: Negative.    Musculoskeletal:  Negative for arthritis, back pain, joint pain and myalgias.   Gastrointestinal:  Negative for abdominal pain, hematemesis, hematochezia and melena.   Genitourinary: Negative.    Neurological:  Negative for light-headedness and loss of balance.   Psychiatric/Behavioral: Negative.       Objective:     Vital Signs (Most Recent):  Temp: 98.7 °F  (37.1 °C) (04/18/24 0847)  Pulse: 67 (04/18/24 0847)  Resp: 16 (04/18/24 0847)  BP: (!) 160/71 (04/18/24 0847)  SpO2: 99 % (04/18/24 0830) Vital Signs (24h Range):  Temp:  [97.8 °F (36.6 °C)-98.7 °F (37.1 °C)] 98.7 °F (37.1 °C)  Pulse:  [62-67] 67  Resp:  [16-20] 16  SpO2:  [99 %-100 %] 99 %  BP: (139-178)/(62-80) 160/71     Weight: 95.3 kg (210 lb)  Body mass index is 32.89 kg/m².    SpO2: 99 %       No intake or output data in the 24 hours ending 04/18/24 1102    Lines/Drains/Airways       Peripheral Intravenous Line  Duration                  Peripheral IV - Single Lumen 04/18/24 0635 20 G Left Antecubital <1 day                     Physical Exam  Vitals and nursing note reviewed.   Constitutional:       Appearance: Normal appearance. She is normal weight.   HENT:      Head: Atraumatic.      Mouth/Throat:      Mouth: Mucous membranes are moist.   Eyes:      General: No scleral icterus.     Extraocular Movements: Extraocular movements intact.   Neck:      Vascular: No carotid bruit.   Cardiovascular:      Rate and Rhythm: Normal rate and regular rhythm.      Pulses: Normal pulses.      Heart sounds: Murmur heard.      No gallop.      Comments: Unclear if murmur vs friction rub  Pulmonary:      Effort: Pulmonary effort is normal. No respiratory distress.      Breath sounds: Normal breath sounds. No wheezing.   Abdominal:      General: Abdomen is flat. Bowel sounds are normal.      Palpations: Abdomen is soft.   Musculoskeletal:         General: Normal range of motion.      Right lower leg: No edema.      Left lower leg: No edema.   Skin:     General: Skin is warm and dry.      Capillary Refill: Capillary refill takes less than 2 seconds.   Neurological:      General: No focal deficit present.      Mental Status: She is alert and oriented to person, place, and time. Mental status is at baseline.          Significant Labs: All pertinent lab results from the last 24 hours have been reviewed.    Significant Imaging:  Echocardiogram: Transthoracic echo (TTE) complete (Cupid Only):   Results for orders placed or performed during the hospital encounter of 03/22/24   Echo   Result Value Ref Range    RA Width 3.25 cm    LA volume (mod) 56.53 cm3    Left Atrium Major Axis 6.22 cm    Left Atrium Minor Axis 6.15 cm    RA Major Axis 5.43 cm    LV Diastolic Volume 84.21 mL    LV Systolic Volume 25.64 mL    TR Max Esa 2.03 m/s    MV Peak E Esa 1.09 m/s    Ao VTI 21.59 cm    Ao peak esa 1.10 m/s    LVOT peak VTI 20.88 cm    LVOT peak esa 1.06 m/s    LVOT diameter 1.96 cm    IVRT 78.02 msec    AV mean gradient 3 mmHg    TAPSE 1.72 cm    RVDD 3.01 cm    LA size 4.00 cm    Ascending aorta 3.49 cm    STJ 2.62 cm    Sinus 2.84 cm    LVIDs 2.64 2.1 - 4.0 cm    Posterior Wall 0.9 0.6 - 1.1 cm    IVS 0.9 0.6 - 1.1 cm    LVIDd 5.7 3.5 - 6.0 cm    TDI LATERAL 0.16 m/s    LA WIDTH 4.02 cm    TDI SEPTAL 0.18 m/s    LV LATERAL E/E' RATIO 6.81 m/s    LV SEPTAL E/E' RATIO 6.06 m/s    FS 54 (A) 28 - 44 %    LA volume 84.53 cm3    LV mass 197.52 g    ZLVIDD -0.99     ZLVIDS -2.98     Left Ventricle Relative Wall Thickness 0.32 cm    AV valve area 2.92 cm²    AV Velocity Ratio 0.96     AV index (prosthetic) 0.97     Mean e' 0.17 m/s    LVOT area 3.0 cm2    LVOT stroke volume 62.97 cm3    AV peak gradient 5 mmHg    E/E' ratio 6.41 m/s    LV Systolic Volume Index 12.4 mL/m2    LV Diastolic Volume Index 40.68 mL/m2    LA Volume Index 40.8 mL/m2    LV Mass Index 95 g/m2    Triscuspid Valve Regurgitation Peak Gradient 16 mmHg    LA Volume Index (Mod) 27.3 mL/m2    DEAN by Velocity Ratio 2.91 cm²    BSA 2.13 m2    TV resting pulmonary artery pressure 19 mmHg    RV TB RVSP 5 mmHg    Est. RA pres 3 mmHg    Byrne's Biplane MOD Ejection Fraction 56 %    Narrative      Left Ventricle: The left ventricle is normal in size. Ventricular mass   is normal. Normal wall thickness. Normal wall motion. There is normal   systolic function with a visually estimated ejection  fraction of 55 - 60%.   Biplane (2D) method of discs ejection fraction is 56%. There is normal   diastolic function. Normal left ventricular filling pressure.    Right Ventricle: Normal right ventricular cavity size. Wall thickness   is normal. Right ventricle wall motion  is normal. Systolic function is   normal.    Aortic Valve: The aortic valve is a trileaflet valve. There is mild   annular calcification present.    Aorta: Aortic root is normal in size measuring 2.84 cm. Ascending aorta   is normal measuring 3.49 cm.    Pulmonary Artery: The estimated pulmonary artery systolic pressure is   19 mmHg.    IVC/SVC: Normal venous pressure at 3 mmHg.

## 2024-04-18 NOTE — ED PROVIDER NOTES
Encounter Date: 4/18/2024       History     Chief Complaint   Patient presents with    Shortness of Breath     Pt states she had an ablation done on Tuesday and then started feeling short of breath with chest tightness last night. Pt denies any N/V     The history is provided by the patient.     Ms. Fuentes  is a 73-year-old female with a past medical history of Afib now s/p ablation two days ago, HLD, HTN, hx of WPW who presents due to pleuritic chest pain. Patient states that she once had an ablation before  and did not have any symptoms afterwards, but feels this is different. The pain extends all the way from the top of her chest down into the superior portion of her abdomen.  She also notes that it radiates to her back somewhat.  Prior to the procedure she was in her otherwise normal state of health and asymptomatic.   She notes that she has not short of breath, but that taking deep breaths exacerbates the pain.  She denies all other symptoms at this time.  Of note, patient had a venous ablation in her right lower extremity and states that her right leg is generally more swollen than the left.  She has no pain in either of her lower extremities.    Review of patient's allergies indicates:   Allergen Reactions    Decongestant d [pseudoephedrine-dm]      Atrial Fibrillation    Augmentin [amoxicillin-pot clavulanate]      palpitations      Amoxicillin Palpitations    Diphenhydramine-pseudoephed Palpitations     TACHYCARDIA    Povidone-iodine Rash     Mild erythema of skin     Past Medical History:   Diagnosis Date    Asymptomatic microscopic hematuria 6/2/2020    Atrial fibrillation 2014    nerves tip off, cardioverted 2017, Eliquis, q 6 mo, Dr Servin, flecainide at home prn flare up 4/2019, 9/2018)    Cervical muscle strain 1/24/2017    Chronic anticoagulation 6/10/2021    DJD (degenerative joint disease) of cervical spine 1/24/2017    Essential hypertension 6/19/2019    Hyperlipidemia     Mitral valve disease      Mixed hyperlipidemia 11/07/2013    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, l mandible, 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 Dr Viktor Toure    Osteopenia of neck of left femur 6/4/2020    Paroxysmal atrioventricular tachycardia     Plantar fasciitis     Right knee meniscal tear     Dr Mayfield, tx conservatively    Severe obesity (BMI 35.0-35.9 with comorbidity) 1/24/2017    Slow transit constipation 7/13/2021    Despite fruits & veg & 1200 dunia diet, try Colace daily    Stress incontinence, female 03/28/2018    hasn't tried oxybutynin, After HYST, Kegels & PT  didn't work, worse at night wearing pads, Dr Infante    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    Thyroid disease      Past Surgical History:   Procedure Laterality Date    ABLATION  4/16/2024    Procedure: Ablation;  Surgeon: Ameya Servin MD;  Location: [facility] EP LAB;  Service: Cardiology;;    ABLATION OF ARRHYTHMOGENIC FOCUS FOR ATRIAL FIBRILLATION N/A 10/17/2023    Procedure: Ablation atrial fibrillation;  Surgeon: Ameya Servin MD;  Location: [facility] EP LAB;  Service: Cardiology;  Laterality: N/A;  AF, PERICO, PVI, WPW, RFA, POPEYE, Gen, DM, 3 Prep *MDT ILR*    ABLATION OF ARRHYTHMOGENIC FOCUS FOR ATRIAL FIBRILLATION N/A 4/16/2024    Procedure: Ablation atrial fibrillation;  Surgeon: Ameya Servin MD;  Location: [facility] EP LAB;  Service: Cardiology;  Laterality: N/A;  AF, PERICO (definite), PVI (redo), RFA, Carto, Gen, DM, 3 Prep *ILR MDT*    ABLATION, ATRIAL FLUTTER, ATYPICAL  4/16/2024    Procedure: Ablation, Atrial Flutter, Atypical;  Surgeon: Ameya Servin MD;  Location: [facility] EP LAB;  Service: Cardiology;;    ABLATION, MECHANOCHEMICAL, VARICOSE VEIN Right 12/8/2023    Procedure: ABLATION, MECHANOCHEMICAL, VARICOSE VEIN;  Surgeon: Simone Shannon MD;  Location: [facility] CATH LAB/EP;  Service: Cardiology;  Laterality: Right;    APPENDECTOMY      COLONOSCOPY N/A 8/13/2020    Procedure: COLONOSCOPY;  Surgeon: Angus Ac MD;  Location: [facility] ENDO (4TH FLR);   Service: Endoscopy;  Laterality: N/A;  ok to hold Eliquis 2 days per Dr Malathi HERNANDEZ test at [place] on 8/10-GT    ECHOCARDIOGRAM,TRANSESOPHAGEAL N/A 9/20/2023    Procedure: Transesophageal echo (PERICO) intra-procedure log documentation;  Surgeon: Jerry, [facility];  Location: [facility] EP LAB;  Service: Cardiology;  Laterality: N/A;    ECHOCARDIOGRAM,TRANSESOPHAGEAL  3/25/2024    Procedure: Transesophageal echo (PERICO) intra-procedure log documentation;  Surgeon: Kathy Malik MD;  Location: [facility] EP LAB;  Service: Cardiology;;    HYSTERECTOMY  1990    TAHBSO for fibroids    INSERTION OF IMPLANTABLE LOOP RECORDER Left 11/1/2021    Procedure: Insertion, Implantable Loop Recorder;  Surgeon: Ameya Servin MD;  Location: [facility] EP LAB;  Service: Cardiology;  Laterality: Left;  AF, ILR implant, MDT,  DM, 3 Prep    MANDIBLE SURGERY  2015    L mandible, Dr Toure    MANDIBLE SURGERY Left 8/27/2019    OOPHORECTOMY      TONSILLECTOMY      TRANSESOPHAGEAL ECHOCARDIOGRAM WITH POSSIBLE CARDIOVERSION (PERICO W/ POSS CARDIOVERSION) N/A 1/19/2024    Procedure: Transesophageal echo (PERICO) intra-procedure log documentation;  Surgeon: JV Rosenthal MD;  Location: [facility] EP LAB;  Service: Cardiology;  Laterality: N/A;    TRANSESOPHAGEAL ECHOCARDIOGRAPHY N/A 10/17/2023    Procedure: ECHOCARDIOGRAM, TRANSESOPHAGEAL;  Surgeon: Kathy Malik MD;  Location: [facility] EP LAB;  Service: Cardiology;  Laterality: N/A;    TRANSESOPHAGEAL ECHOCARDIOGRAPHY N/A 4/16/2024    Procedure: ECHOCARDIOGRAM, TRANSESOPHAGEAL;  Surgeon: [facility];  Location: [facility] EP LAB;  Service: Cardiology;  Laterality: N/A;    TREATMENT OF CARDIAC ARRHYTHMIA N/A 9/20/2023    Procedure: Cardioversion or Defibrillation;  Surgeon: JV Rosenthal MD;  Location: [facility] EP LAB;  Service: Cardiology;  Laterality: N/A;  AF, DCCV/PERICO, ANES, EH,     TREATMENT OF CARDIAC ARRHYTHMIA N/A 1/19/2024    Procedure: Cardioversion or Defibrillation;   Surgeon: Luis Joiner MD;  Location: [facility] EP LAB;  Service: Cardiology;  Laterality: N/A;  AFL, DCCV ONLY, ANES, EH,     TREATMENT OF CARDIAC ARRHYTHMIA N/A 3/25/2024    Procedure: Cardioversion or Defibrillation;  Surgeon: Ameya Servin MD;  Location: [facility] EP LAB;  Service: Cardiology;  Laterality: N/A;  AF, DCCV ONLY, ANES, CT, 351    TREATMENT OF CARDIAC ARRHYTHMIA N/A 1/19/2024    Procedure: Cardioversion or Defibrillation;  Surgeon: JV Rosenthal MD;  Location: [facility] EP LAB;  Service: Cardiology;  Laterality: N/A;  AFL, PERICO/DCCV, ANES, EH,      Family History   Problem Relation Name Age of Onset    Cancer Mother          stomach, liver    Breast cancer Neg Hx      Ovarian cancer Neg Hx      Cervical cancer Neg Hx      Endometrial cancer Neg Hx      Vaginal cancer Neg Hx      Melanoma Neg Hx      Colon cancer Neg Hx      Esophageal cancer Neg Hx       Social History     Tobacco Use    Smoking status: Never    Smokeless tobacco: Never   Substance Use Topics    Alcohol use: No    Drug use: No       Physical Exam     Initial Vitals [04/18/24 0458]   BP Pulse Resp Temp SpO2   (!) 170/70 64 18 97.9 °F (36.6 °C) 100 %      MAP       --         Physical Exam    Nursing note and vitals reviewed.  Constitutional: She appears well-developed. No distress.   HENT:   Head: Normocephalic and atraumatic.   Mouth/Throat: Oropharynx is clear and moist and mucous membranes are normal.   Eyes: EOM are normal. Pupils are equal, round, and reactive to light.   Neck:   Normal range of motion.  Cardiovascular:  Normal rate, regular rhythm and normal heart sounds.           No murmur heard.  Pulmonary/Chest: Breath sounds normal. No respiratory distress. She has no wheezes. She has no rhonchi. She has no rales.   Abdominal: Abdomen is soft. She exhibits no distension. There is no abdominal tenderness. There is no rebound and no guarding.   Musculoskeletal:      Cervical back: Normal range of motion.       Comments: Mild 1+ pitting edema noted in the right lower extremity     Neurological: She is alert and oriented to person, place, and time.   Skin: Skin is warm.   Psychiatric: She has a normal mood and affect. Her behavior is normal. Thought content normal.         ED Course   Procedures  Labs Reviewed   CBC W/ AUTO DIFFERENTIAL   COMPREHENSIVE METABOLIC PANEL   TROPONIN I   B-TYPE NATRIURETIC PEPTIDE   POCT TROPONIN   POCT TROPONIN     EKG Readings: (Independently Interpreted)   Normal sinus rhythm, left axis deviation, T-wave inversions noted in AVR, OR interval within normal limits, QRS complex is narrow, QT segment within normal limits, no STEMI.       Imaging Results              X-Ray Chest AP Portable (In process)                      Medications - No data to display  Medical Decision Making  Ms. Fuentes is a 73-year-old female who presents due to pleuritic chest pain that started 2 days ago after she had an ablation procedure.  Differential diagnosis includes but is not limited to post procedural pain, ACS, PE, pneumonia, costochondritis, aortic dissection, CHF. Upon my initial evaluation of the patient, she had overall reassuring vital signs as well as a reassuring physical exam.  As part of the workup for my differential I will obtain basic labs, EKG, troponin, BNP and chest x-ray.  I offered to be of the patient medication for pain but she declined.    Patient signed out pending the entirety of her workup.  Disposition dependent on the results from studies.    Amount and/or Complexity of Data Reviewed  External Data Reviewed: notes.     Details:   Previous medical records, specifically details from her most recent ablation 2 days ago were reviewed to better understand the patient's current/past medical problems.  Labs: ordered.  Radiology: ordered.                           Attending ED Notes:   ATTENDING PHYSICIAN ATTESTATION    I have personally seen and examined this patient and repeated the key  portions of the resident's history and physical, reviewed and agree with the resident medical documentation, and supervised and managed the medical care of the patient.  I was present and supervising any critical portions of procedures performed    73-year-old female presents the ER for evaluation of pleuritic chest pain status post cardiac ablation on Tuesday.  Endorses chest pain shortness of breath progressively worsening.  Arrived in the ER no distress hemodynamically stable.  Will plan cardiac workup observation reassess                   Clinical Impression:  Final diagnoses:  [R06.02] Shortness of breath  [R07.9] Chest pain                 Orion Kramer MD  Resident  04/18/24 0647       Orion Kramer MD  Resident  04/18/24 0648

## 2024-04-18 NOTE — HPI
Ms. Fuentes is a 73 year old female with a PMHx of pAF, HLD, obesity, ILR implant and hypothyroid who presents to [facility] with the CC of chest pain    She was recently admitted to SSCU with Dr. Servin for re-do PVI.  Procedure successful and pt was discharged home same day.  Tolerating all of her medications including eliquis.  Yesterday she started to get a chest burning pain that would rise into her throat.  Worsened with deep inspiration and leaning forward.  Could not get pain to go away.  Denies any SOB, Sood, PND, LE swelling, syncope.  She came to the ED    In the ED she was AF with stable VS on RA.  CBC and CMP normal.  Trop elevated at 0.7, BNP 30.  EKG showing sinus rhythm without NEIL or NC changes.  Cardiology c/s for elevated troponin.  I evaluated the pt at the bedside.  She says her chest pain still present.  Bedside TTE showed preserved and small pericardial effusion posterior to the LV.  She has a murmur but difficult to discern if EDITH vs friction rub.

## 2024-04-18 NOTE — ED TRIAGE NOTES
Pt arrives with  at bedside. She states that she had her second ablation on Tuesday and she has been feeling   SOB since and more so with exertion yesterday. She said she tried to sleep all night but just had mid sternal pain and pain in her upper to left mid back.

## 2024-04-18 NOTE — ASSESSMENT & PLAN NOTE
Pt with recent redo PVI on 4/16 presents with chest pain and elevated troponin.  Exam consistent with pericarditis and has small effusion  - discussed case with Dr. Servin and in agreement  - start ibuprofen 800mg TID for 2 weeks.  Taper by 200mg per week until finished  - start colchicine at 0.6mg BID x3 months, no taper  - already on lansoprazole for GI ppx  - ok to discharge from the ED  - to f/u with Dr. Servin and her cardiologist

## 2024-04-18 NOTE — PROVIDER PROGRESS NOTES - EMERGENCY DEPT.
"Encounter Date: 4/18/2024    ED Physician Progress Notes        ED Resident HAND-OFF NOTE:  8:47 AM 4/18/2024  Flores Fuentes is a 73 y.o. female who presented to the ED on 4/18/2024 and has been managed by  and Dr. Kramer , who reports patient C/O   Chest discomfort extending in the anterior chest wall up into  the throat and back. I assumed care of patient from off-going ED physician team at 8:47 AM pending  labs and imaging.  Chest x-ray obtained concern for possible developing consolidation.  Patient complaining of persistent discomfort and positional chest pain.  Repeat EKG obtained no signs of ST elevations, IA depressions noted.  Troponin elevated at 0.7 concern for pericarditis versus elevation from ablation versus less likely ACS.  Repeat troponin ordered and pending.  Patient had recent ablation 2 days ago discussed with cardiology who evaluated patient at bedside pending recommendations.  Repeat troponin downtrending at this time. At this time cardiology does feel that this patient is suffering from pericarditis and recommended and ibuprofen taper as well as a prolonged course of colchicine with follow-up with Cardiology.  Cardiology at bedside and discussed plan with patient as well.  At this time patient is stable for discharge.  Patient discharged return precautions discussed and understood.  Patient agreeable with plan.    On my evaluation, Flores Fuentes appears well, hemodynamically stable and in NAD. Thus far, Flores Fuentes has received:  Medications   aspirin EC tablet 325 mg (325 mg Oral Given 4/18/24 0734)   aluminum-magnesium hydroxide-simethicone 200-200-20 mg/5 mL suspension 30 mL (30 mLs Oral Given 4/18/24 0838)     And   LIDOcaine viscous HCl 2% oral solution 15 mL (15 mLs Oral Given 4/18/24 0838)       On my exam, I appreciate:  /62 (Patient Position: Lying)   Pulse 65   Temp 97.8 °F (36.6 °C) (Oral)   Resp 17   Ht 5' 7" (1.702 m)   Wt 95.3 kg " (210 lb)   SpO2 99%   BMI 32.89 kg/m²     Disposition:  Patient discharged  I have discussed and counseled Flores Fuentes regarding exam, results, diagnosis, treatment, and plan.  ______________________  Simona Payan MD  Emergency Medicine Resident  8:47 AM 4/18/2024      Attending Note:  Agree with above.   Signed out pending labs, xr, cardiology consult.   No significant chest x-ray findings, lateral study was added after review of AP film.  Troponin downtrending, lower suspicion for ACS.  Lower suspicion for PE, no tachycardia or hypoxia. Appreciate cardiology evaluation of the patient, favor pericarditis after ablation, recommend discharge.  Patient feels improved and understands and agrees with this plan.  Strict return precautions given. Has f/u with cardiology 4/26.

## 2024-04-19 ENCOUNTER — PATIENT OUTREACH (OUTPATIENT)
Dept: EMERGENCY MEDICINE | Facility: HOSPITAL | Age: 74
End: 2024-04-19
Payer: MEDICARE

## 2024-04-19 ENCOUNTER — PATIENT MESSAGE (OUTPATIENT)
Dept: ELECTROPHYSIOLOGY | Facility: CLINIC | Age: 74
End: 2024-04-19
Payer: MEDICARE

## 2024-04-19 RX ORDER — FLECAINIDE ACETATE 100 MG/1
100 TABLET ORAL EVERY 12 HOURS
Qty: 180 TABLET | Refills: 3 | Status: SHIPPED | OUTPATIENT
Start: 2024-04-19

## 2024-04-19 NOTE — ANESTHESIA POSTPROCEDURE EVALUATION
Anesthesia Post Evaluation    Patient: Flores Fuentes    Procedure(s) Performed: Procedure(s) (LRB):  Ablation atrial fibrillation (N/A)  ECHOCARDIOGRAM, TRANSESOPHAGEAL (N/A)  Ablation, Atrial Flutter, Atypical  Ablation    Final Anesthesia Type: general      Patient location during evaluation: PACU  Patient participation: Yes- Able to Participate  Level of consciousness: awake and alert and oriented  Post-procedure vital signs: reviewed and stable  Pain management: adequate  Airway patency: patent    PONV status at discharge: No PONV  Anesthetic complications: no      Cardiovascular status: blood pressure returned to baseline and hemodynamically stable  Respiratory status: unassisted and spontaneous ventilation  Hydration status: euvolemic  Follow-up not needed.              Vitals Value Taken Time   /68 04/18/24 1233   Temp 36.9 °C (98.4 °F) 04/18/24 1233   Pulse 61 04/18/24 1233   Resp 18 04/18/24 1233   SpO2 97 % 04/18/24 1233         No case tracking events are documented in the log.      Pain/Dalia Score: Pain Rating Post Med Admin: 5 (4/18/2024  8:38 AM)

## 2024-04-19 NOTE — PROGRESS NOTES
Pt stated she is doing 100% better. Pt declined assistance with appts.    Sheeba Da Silva  ED Navigator  [phone]

## 2024-04-24 ENCOUNTER — TELEPHONE (OUTPATIENT)
Dept: PRIMARY CARE CLINIC | Facility: CLINIC | Age: 74
End: 2024-04-24
Payer: MEDICARE

## 2024-04-24 ENCOUNTER — PATIENT MESSAGE (OUTPATIENT)
Dept: ELECTROPHYSIOLOGY | Facility: CLINIC | Age: 74
End: 2024-04-24
Payer: MEDICARE

## 2024-04-24 NOTE — TELEPHONE ENCOUNTER
LVM asking pt if she needed a sooner appt for any particular reason or just wanted  her annaul sooner. According to her chart she had her annaul on 08/08/23

## 2024-04-26 ENCOUNTER — OFFICE VISIT (OUTPATIENT)
Dept: DERMATOLOGY | Facility: CLINIC | Age: 74
End: 2024-04-26
Payer: MEDICARE

## 2024-04-26 VITALS — BODY MASS INDEX: 32.89 KG/M2 | WEIGHT: 210 LBS

## 2024-04-26 DIAGNOSIS — R58 ECCHYMOSIS: ICD-10-CM

## 2024-04-26 DIAGNOSIS — L57.0 MULTIPLE ACTINIC KERATOSES: Primary | ICD-10-CM

## 2024-04-26 DIAGNOSIS — L30.9 DERMATITIS: ICD-10-CM

## 2024-04-26 DIAGNOSIS — I48.0 PAROXYSMAL A-FIB: Primary | ICD-10-CM

## 2024-04-26 PROCEDURE — 99214 OFFICE O/P EST MOD 30 MIN: CPT | Mod: S$GLB,,, | Performed by: DERMATOLOGY

## 2024-04-26 PROCEDURE — 1101F PT FALLS ASSESS-DOCD LE1/YR: CPT | Mod: CPTII,S$GLB,, | Performed by: DERMATOLOGY

## 2024-04-26 PROCEDURE — 3288F FALL RISK ASSESSMENT DOCD: CPT | Mod: CPTII,S$GLB,, | Performed by: DERMATOLOGY

## 2024-04-26 PROCEDURE — 1160F RVW MEDS BY RX/DR IN RCRD: CPT | Mod: CPTII,S$GLB,, | Performed by: DERMATOLOGY

## 2024-04-26 PROCEDURE — 1159F MED LIST DOCD IN RCRD: CPT | Mod: CPTII,S$GLB,, | Performed by: DERMATOLOGY

## 2024-04-26 PROCEDURE — 99999 PR PBB SHADOW E&M-EST. PATIENT-LVL III: CPT | Mod: PBBFAC,,, | Performed by: DERMATOLOGY

## 2024-04-26 PROCEDURE — 1126F AMNT PAIN NOTED NONE PRSNT: CPT | Mod: CPTII,S$GLB,, | Performed by: DERMATOLOGY

## 2024-04-26 PROCEDURE — 3044F HG A1C LEVEL LT 7.0%: CPT | Mod: CPTII,S$GLB,, | Performed by: DERMATOLOGY

## 2024-04-26 PROCEDURE — 3008F BODY MASS INDEX DOCD: CPT | Mod: CPTII,S$GLB,, | Performed by: DERMATOLOGY

## 2024-04-26 RX ORDER — FLUOROURACIL 50 MG/G
CREAM TOPICAL
Qty: 40 G | Refills: 3 | Status: SHIPPED | OUTPATIENT
Start: 2024-04-26

## 2024-04-26 NOTE — PROGRESS NOTES
Subjective:      Patient ID:  Flores Fuentes is a 73 y.o. female who presents for   Chief Complaint   Patient presents with    Rash     Left chest several months, itching     Spot     forehead     Has some spots on her forehead which are new.  See previous notes Dr Morales, the biopsy proven LS and A has not recurred on her chest.  She has a new area of eczema near her areola.     Rash - Initial  Affected locations: chest, left arm, right arm and abdomen  Signs / symptoms: itching      Review of Systems   Constitutional:  Negative for fever, chills, weight loss, weight gain, fatigue, night sweats and malaise.   Skin:  Positive for itching, rash and dry skin. Negative for daily sunscreen use, activity-related sunscreen use and wears hat.   Hematologic/Lymphatic: Bruises/bleeds easily.       Objective:   Physical Exam   Constitutional: She appears well-developed and well-nourished.   Neurological: She is alert and oriented to person, place, and time.   Psychiatric: She has a normal mood and affect.   Skin:   Areas Examined (abnormalities noted in diagram):   Head / Face Inspection Performed  Neck Inspection Performed  Chest / Axilla Inspection Performed  RUE Inspected  LUE Inspection Performed                 Diagram Legend     Erythematous scaling macule/papule c/w actinic keratosis       Vascular papule c/w angioma      Pigmented verrucoid papule/plaque c/w seborrheic keratosis      Yellow umbilicated papule c/w sebaceous hyperplasia      Irregularly shaped tan macule c/w lentigo     1-2 mm smooth white papules consistent with Milia      Movable subcutaneous cyst with punctum c/w epidermal inclusion cyst      Subcutaneous movable cyst c/w pilar cyst      Firm pink to brown papule c/w dermatofibroma      Pedunculated fleshy papule(s) c/w skin tag(s)      Evenly pigmented macule c/w junctional nevus     Mildly variegated pigmented, slightly irregular-bordered macule c/w mildly atypical nevus      Flesh colored to  evenly pigmented papule c/w intradermal nevus       Pink pearly papule/plaque c/w basal cell carcinoma      Erythematous hyperkeratotic cursted plaque c/w SCC      Surgical scar with no sign of skin cancer recurrence      Open and closed comedones      Inflammatory papules and pustules      Verrucoid papule consistent consistent with wart     Erythematous eczematous patches and plaques     Dystrophic onycholytic nail with subungual debris c/w onychomycosis     Umbilicated papule    Erythematous-base heme-crusted tan verrucoid plaque consistent with inflamed seborrheic keratosis     Erythematous Silvery Scaling Plaque c/w Psoriasis     See annotation      Assessment / Plan:        Multiple actinic keratoses  -     fluorouraciL (EFUDEX) 5 % cream; Use hs for 2 weeks  Dispense: 40 g; Refill: 3 use ion forehead    Dermatitis left breast likely irritant dermatitis  Vinegar and water 1/2 each use hs apply and let dry for pruritus  Cicaplast cream       Ecchymosis  reassurance               Follow up in about 6 months (around 10/26/2024).

## 2024-04-30 ENCOUNTER — PATIENT MESSAGE (OUTPATIENT)
Dept: CARDIOLOGY | Facility: CLINIC | Age: 74
End: 2024-04-30
Payer: MEDICARE

## 2024-05-01 ENCOUNTER — TELEPHONE (OUTPATIENT)
Dept: CARDIOLOGY | Facility: CLINIC | Age: 74
End: 2024-05-01
Payer: MEDICARE

## 2024-05-01 ENCOUNTER — PATIENT MESSAGE (OUTPATIENT)
Dept: ELECTROPHYSIOLOGY | Facility: CLINIC | Age: 74
End: 2024-05-01
Payer: MEDICARE

## 2024-05-01 NOTE — TELEPHONE ENCOUNTER
Simone Shannon MD  You; Romy Gonzalez, RN1 hour ago (2:45 PM)      We can reschedule the procedure for June, so she can come off the blood thinner safely.                               You  Simone Shannon MD1 hour ago (2:33 PM)    NW  Dear ,    Pt Had  RFA procedure with Malathi 4-16-24    And has  procedure with you  5-17-24.    PT wants to know if this will be safe to have this procedure done within 4 weeks.        Leon Nichols Staff (supporting Simone Shannon MD)22 hours ago (5:47 PM)      I see where I have a procedure scheduled for 5-17.  My question is I just had my 2nd heart ablation April 16th and need to know if this will be safe to have this procedure done within 4 weeks

## 2024-05-02 ENCOUNTER — TELEPHONE (OUTPATIENT)
Dept: CARDIOLOGY | Facility: CLINIC | Age: 74
End: 2024-05-02
Payer: MEDICARE

## 2024-05-03 ENCOUNTER — TELEPHONE (OUTPATIENT)
Dept: CARDIOLOGY | Facility: CLINIC | Age: 74
End: 2024-05-03
Payer: MEDICARE

## 2024-05-06 ENCOUNTER — PATIENT MESSAGE (OUTPATIENT)
Dept: SLEEP MEDICINE | Facility: CLINIC | Age: 74
End: 2024-05-06
Payer: MEDICARE

## 2024-05-13 ENCOUNTER — OFFICE VISIT (OUTPATIENT)
Dept: OPTOMETRY | Facility: CLINIC | Age: 74
End: 2024-05-13
Payer: COMMERCIAL

## 2024-05-13 DIAGNOSIS — H26.9 CORTICAL CATARACT OF BOTH EYES: ICD-10-CM

## 2024-05-13 DIAGNOSIS — H52.4 PRESBYOPIA: Primary | ICD-10-CM

## 2024-05-13 DIAGNOSIS — H52.03 HYPEROPIA, BILATERAL: ICD-10-CM

## 2024-05-13 PROCEDURE — 92014 COMPRE OPH EXAM EST PT 1/>: CPT | Mod: S$GLB,,, | Performed by: OPTOMETRIST

## 2024-05-13 PROCEDURE — 99999 PR PBB SHADOW E&M-EST. PATIENT-LVL III: CPT | Mod: PBBFAC,,, | Performed by: OPTOMETRIST

## 2024-05-13 PROCEDURE — 92015 DETERMINE REFRACTIVE STATE: CPT | Mod: S$GLB,,, | Performed by: OPTOMETRIST

## 2024-05-13 NOTE — PROGRESS NOTES
HPI    BARB: 5/24/2022 - Dr. France     CC: Pt is here today for a routine eye exam. Pt states that her near   vision has become more blurry since her last exam.      (-)Dryness  (-)Burning  (-)Itchiness  (-)Tearing  (-)Flashes  (+)Floaters   (-)Photophobia  (-)Eye Pain      Diabetic: no    Contact Lens: no    Eye Meds: no    PD: 63.5    Last edited by Smita Bolden MA on 5/13/2024  8:38 AM.            Assessment /Plan     For exam results, see Encounter Report.         Presbyopia              Rx specs     MATTHEW (obstructive sleep apnea)  Essential hypertension              Good overall ocular health     NS (nuclear sclerosis), bilateral  Cortical age-related cataract of both eyes             Borderline VS OD>OS   Consult for cat eval when ready       RTC 1 year, sooner PRN

## 2024-05-19 ENCOUNTER — CLINICAL SUPPORT (OUTPATIENT)
Dept: CARDIOLOGY | Facility: HOSPITAL | Age: 74
End: 2024-05-19
Attending: INTERNAL MEDICINE
Payer: MEDICARE

## 2024-05-19 DIAGNOSIS — Z95.818 PRESENCE OF OTHER CARDIAC IMPLANTS AND GRAFTS: ICD-10-CM

## 2024-05-19 DIAGNOSIS — I49.8 OTHER SPECIFIED CARDIAC ARRHYTHMIAS: ICD-10-CM

## 2024-05-19 PROCEDURE — 93298 REM INTERROG DEV EVAL SCRMS: CPT | Mod: 26,,, | Performed by: INTERNAL MEDICINE

## 2024-05-19 PROCEDURE — 93298 REM INTERROG DEV EVAL SCRMS: CPT | Performed by: INTERNAL MEDICINE

## 2024-05-20 LAB
OHS CV AF BURDEN PERCENT: < 1
OHS CV DC REMOTE DEVICE TYPE: NORMAL

## 2024-06-19 ENCOUNTER — CLINICAL SUPPORT (OUTPATIENT)
Dept: CARDIOLOGY | Facility: HOSPITAL | Age: 74
End: 2024-06-19
Attending: INTERNAL MEDICINE
Payer: MEDICARE

## 2024-06-19 DIAGNOSIS — I49.8 OTHER SPECIFIED CARDIAC ARRHYTHMIAS: ICD-10-CM

## 2024-06-19 DIAGNOSIS — I48.0 PAROXYSMAL ATRIAL FIBRILLATION: ICD-10-CM

## 2024-06-19 PROCEDURE — 93298 REM INTERROG DEV EVAL SCRMS: CPT | Performed by: INTERNAL MEDICINE

## 2024-06-19 PROCEDURE — 93298 REM INTERROG DEV EVAL SCRMS: CPT | Mod: 26,,, | Performed by: INTERNAL MEDICINE

## 2024-06-22 LAB
OHS CV AF BURDEN PERCENT: < 1
OHS CV DC REMOTE DEVICE TYPE: NORMAL

## 2024-07-09 ENCOUNTER — PATIENT MESSAGE (OUTPATIENT)
Dept: UROGYNECOLOGY | Facility: CLINIC | Age: 74
End: 2024-07-09
Payer: MEDICARE

## 2024-07-09 ENCOUNTER — TELEPHONE (OUTPATIENT)
Dept: ELECTROPHYSIOLOGY | Facility: CLINIC | Age: 74
End: 2024-07-09
Payer: MEDICARE

## 2024-07-15 ENCOUNTER — TELEPHONE (OUTPATIENT)
Dept: UROGYNECOLOGY | Facility: CLINIC | Age: 74
End: 2024-07-15
Payer: MEDICARE

## 2024-07-15 DIAGNOSIS — R32 ENURESIS: Primary | ICD-10-CM

## 2024-07-15 DIAGNOSIS — R35.1 NOCTURIA: ICD-10-CM

## 2024-07-15 NOTE — TELEPHONE ENCOUNTER
"Requesting prescription for pure wick due to [employer] now paying 80% of it. States seen in September, 2023 by BIANCA Cheema. Patient reports has urge incontinence and "tired of getting up frequently during night or sleeping straight through incontinence episode." Will notify Anette. Call ended.    ----- Message from Cony Galaviz sent at 7/15/2024  8:08 AM CDT -----  Regarding: self [phone]  Type: Patient Call Back    Who called: self     What is the request in detail: pt stated she sent a msg on the portal and called in for a msg to be sent Friday, she has not heard back, she would like an RX for  Pure wick to be sent to [facility].     Can the clinic reply by [facility]? no    Would the patient rather a call back or a response via [facility]?  Call back     Best call back number: [phone]  "

## 2024-07-17 ENCOUNTER — OFFICE VISIT (OUTPATIENT)
Dept: SLEEP MEDICINE | Facility: CLINIC | Age: 74
End: 2024-07-17
Payer: MEDICARE

## 2024-07-17 DIAGNOSIS — G47.33 OSA (OBSTRUCTIVE SLEEP APNEA): Primary | ICD-10-CM

## 2024-07-17 PROCEDURE — 99213 OFFICE O/P EST LOW 20 MIN: CPT | Mod: HCNC,95,, | Performed by: INTERNAL MEDICINE

## 2024-07-17 PROCEDURE — 3044F HG A1C LEVEL LT 7.0%: CPT | Mod: HCNC,CPTII,95, | Performed by: INTERNAL MEDICINE

## 2024-07-17 NOTE — PROGRESS NOTES
The patient location is: [place]  The chief complaint leading to consultation is:     Visit type: audiovisual    15 minutes of total time spent on the encounter, which includes face to face time and non-face to face time preparing to see the patient (eg, review of tests), Obtaining and/or reviewing separately obtained history, Documenting clinical information in the electronic or other health record, Independently interpreting results (not separately reported) and communicating results to the patient/family/caregiver, or Care coordination (not separately reported).     Each patient to whom he or she provides medical services by telemedicine is:  (1) informed of the relationship between the physician and patient and the respective role of any other health care provider with respect to management of the patient; and (2) notified that he or she may decline to receive medical services by telemedicine and may withdraw from such care at any time.      ESTABLISHED PATIENT VISIT     Flores Fuentes  is a pleasant 73 y.o. female  with history of WPW, A-fib, HLD, HTN, slow transit constipation, stress incontinence, subclinical iodine-def hypothyroidism, DDD cervical, osteopenia, odontogenic tumor, and MATTHEW dx 2020.    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 Dr Viktor Toure    Osteopenia of neck of left femur 6/4/2020     Paroxysmal atrioventricular tachycardia     Plantar fasciitis     Right knee meniscal tear     Dr Mayfield, tx conservatively    Severe obesity (BMI 35.0-35.9 with comorbidity) 1/24/2017    Slow transit constipation 7/13/2021    Despite fruits & veg & 1200 dunia diet, try Colace daily    Stress incontinence, female 03/28/2018    hasn't tried oxybutynin, After HYST, Kegels & PT  didn't work, worse at night wearing pads, Dr Infante    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    Thyroid disease      Patient Active Problem List   Diagnosis    Mixed hyperlipidemia    Subclinical iodine-deficiency hypothyroidism    Paroxysmal A-fib    Primary localized osteoarthrosis, lower leg    Atrial fibrillation with RVR    Cervical muscle strain    DJD (degenerative joint disease) of cervical spine    Stress incontinence, female    Essential hypertension    MATTHEW (obstructive sleep apnea)    Asymptomatic microscopic hematuria    Osteopenia of neck of left femur    Chronic anticoagulation    Slow transit constipation    Morbid obesity    Stress at home    Weight loss    Kym-Parkinson-White (WPW) syndrome    Hypothyroidism    Atrial flutter    Class 1 obesity due to excess calories with serious comorbidity and body mass index (BMI) of 33.0 to 33.9 in adult    Acute cystitis without hematuria    Pericarditis       Current Outpatient Medications:     colchicine (COLCRYS) 0.6 mg tablet, Take 1 tablet (0.6 mg total) by mouth 2 (two) times daily., Disp: 180 tablet, Rfl: 0    diltiaZEM (CARDIZEM CD) 240 MG 24 hr capsule, Take 1 capsule (240 mg total) by mouth once daily., Disp: 90 capsule, Rfl: 3    ELIQUIS 5 mg Tab, TAKE 1 TABLET TWICE DAILY, Disp: 180 tablet, Rfl: 3    flecainide (TAMBOCOR) 100 MG Tab, Take 1 tablet (100 mg total) by mouth every 12 (twelve) hours., Disp: 180 tablet, Rfl: 3    fluorouraciL (EFUDEX) 5 % cream, Use hs for 2 weeks, Disp: 40 g, Rfl: 3    fluticasone propionate (FLONASE) 50 mcg/actuation nasal  spray, USE 1 SPRAY IN EACH NOSTRIL EVERY DAY, Disp: 32 g, Rfl: 2    lansoprazole (PREVACID) 30 MG capsule, Take 1 capsule (30 mg total) by mouth once daily., Disp: 30 capsule, Rfl: 0    levothyroxine (SYNTHROID) 25 MCG tablet, TAKE 1 TABLET ONE TIME DAILY, Disp: 90 tablet, Rfl: 2    nystatin (MYCOSTATIN) cream, Apply topically 2 (two) times daily., Disp: 30 g, Rfl: 11    pravastatin (PRAVACHOL) 40 MG tablet, TAKE 1 TABLET ONE TIME DAILY, Disp: 90 tablet, Rfl: 3    Current Facility-Administered Medications:     sodium chloride 0.9% flush 10 mL, 10 mL, Intravenous, PRN, Simone Shannon MD    Facility-Administered Medications Ordered in Other Visits:     sodium chloride 0.9% bolus 1,000 mL, 1,000 mL, Intravenous, Once, aCrley Cabrera, NP    vancomycin in dextrose 5 % 1 gram/250 mL IVPB 1,000 mg, 1,000 mg, Intravenous, On Call Procedure, Carley Cabrera, NP, Last Rate: 166.7 mL/hr at 11/01/21 1249, 1,000 mg at 11/01/21 1249     There were no vitals filed for this visit.  Physical Exam:    GEN:   Well-appearing  Psych:  Appropriate affect, demonstrates insight  SKIN:  No rash on the face or bridge of the nose      LABS:  Lab Results   Component Value Date    HGB 13.3 04/18/2024    CO2 23 04/18/2024         RECORDS REVIEWED PREVIOUSLY:    HST 5/14/20 AHI 13    4.11.23. 30/30 x 9 hours 18 mins, 5-11cwp (7), mask fit 99%, AHI 2.5      PROBLEM DESCRIPTION/ Sx on Presentation Interval Hx STATUS PLAN   Mild MATTEHW   AHI 13      PAP history   Problems    Mask nose   Pressure Feels pretty low   DME HME   Machine age DS2 12/15/22 , CO!          Since last visit:     Doing well    No trouble with the pressure change   controlled   -adjusted 6-11 with ramp + 5cwp    PAP PLAN   E min 6 cwp (continue)   I max 11 cwp (continue)   PS/epr    RAMP    Other    Altn.        New supplies ordered        The patient is using and benefitting from PAP therapy     Insomnia       SLEEP SCHEDULE   Duration    Wind- down    Envmnt    CBTi    Meds  prior    Meds now    Bed Time 8PM   Lights out    Latency Not long   Arousals 2 times   Back to sleep    Stim. ctrl    Wake time 5-6AM   Caffeine    Naps    Nocturia 1   Work         Waking after 3-4 hours, 1-2 hours to get back to sleep              Since last visit:     Still sleeping in 2 phases.  No problem with it     N/A   N/A   Other issues: nocturia once per night    RTC:  yearly or sooner if problems arise

## 2024-07-19 ENCOUNTER — PATIENT MESSAGE (OUTPATIENT)
Dept: ELECTROPHYSIOLOGY | Facility: CLINIC | Age: 74
End: 2024-07-19
Payer: MEDICARE

## 2024-07-20 ENCOUNTER — CLINICAL SUPPORT (OUTPATIENT)
Dept: CARDIOLOGY | Facility: HOSPITAL | Age: 74
End: 2024-07-20
Attending: INTERNAL MEDICINE
Payer: MEDICARE

## 2024-07-20 DIAGNOSIS — I49.8 OTHER SPECIFIED CARDIAC ARRHYTHMIAS: ICD-10-CM

## 2024-07-20 DIAGNOSIS — I48.0 PAROXYSMAL ATRIAL FIBRILLATION: ICD-10-CM

## 2024-07-20 PROCEDURE — 93298 REM INTERROG DEV EVAL SCRMS: CPT | Mod: 26,HCNC,, | Performed by: INTERNAL MEDICINE

## 2024-07-20 PROCEDURE — 93298 REM INTERROG DEV EVAL SCRMS: CPT | Mod: HCNC | Performed by: INTERNAL MEDICINE

## 2024-07-23 ENCOUNTER — PATIENT MESSAGE (OUTPATIENT)
Dept: ELECTROPHYSIOLOGY | Facility: CLINIC | Age: 74
End: 2024-07-23
Payer: MEDICARE

## 2024-07-24 ENCOUNTER — TELEPHONE (OUTPATIENT)
Dept: UROGYNECOLOGY | Facility: CLINIC | Age: 74
End: 2024-07-24
Payer: MEDICARE

## 2024-07-24 DIAGNOSIS — R32 ENURESIS: Primary | ICD-10-CM

## 2024-07-24 DIAGNOSIS — R35.1 NOCTURIA: ICD-10-CM

## 2024-07-24 RX ORDER — FLUTICASONE PROPIONATE 50 MCG
SPRAY, SUSPENSION (ML) NASAL
Qty: 32 G | Refills: 3 | Status: SHIPPED | OUTPATIENT
Start: 2024-07-24

## 2024-07-24 RX ORDER — LEVOTHYROXINE SODIUM 25 UG/1
25 TABLET ORAL
Qty: 90 TABLET | Refills: 3 | Status: SHIPPED | OUTPATIENT
Start: 2024-07-24

## 2024-07-24 NOTE — TELEPHONE ENCOUNTER
----- Message from Alea Washburn MA sent at 7/24/2024  8:53 AM CDT -----  Please advise.  ----- Message -----  From: Katherine Pratt  Sent: 7/24/2024   8:18 AM CDT  To: Arelis Cheema Staff        Can the clinic reply in [facility]:Y        Please refill the medication(s) listed below. Please call the patient when the prescription(s) is ready for  at this phone number         Medication #1 Pure Wick    Medication #2       Preferred Pharmacy: [facility] - [facility] [address]   Phone: [phone]  Fax: [phone]

## 2024-07-24 NOTE — TELEPHONE ENCOUNTER
Care Due:                  Date            Visit Type   Department     Provider  --------------------------------------------------------------------------------                                MYCHART                              FOLLOWUP/OF  [facility]  Last Visit: 08-      FICE VISIT   CARE           Naz Condon                              EP -                              PRIMARY      [facility]  Next Visit: 08-      CARE ([facility])   CARE           Naz Condon                                                            Last  Test          Frequency    Reason                     Performed    Due Date  --------------------------------------------------------------------------------    Lipid Panel.  12 months..  pravastatin..............  08- 07-    [employer] Embedded Care Due Messages. Reference number: [id number].   7/24/2024 3:07:36 AM CDT

## 2024-07-25 LAB
OHS CV AF BURDEN PERCENT: < 1
OHS CV DC REMOTE DEVICE TYPE: NORMAL

## 2024-08-06 ENCOUNTER — HOSPITAL ENCOUNTER (OUTPATIENT)
Dept: RADIOLOGY | Facility: HOSPITAL | Age: 74
Discharge: HOME | End: 2024-08-06
Attending: FAMILY MEDICINE
Payer: MEDICARE

## 2024-08-06 DIAGNOSIS — Z12.31 SCREENING MAMMOGRAM, ENCOUNTER FOR: ICD-10-CM

## 2024-08-06 PROCEDURE — 77067 SCR MAMMO BI INCL CAD: CPT | Mod: 26,HCNC,, | Performed by: RADIOLOGY

## 2024-08-06 PROCEDURE — 77067 SCR MAMMO BI INCL CAD: CPT | Mod: TC,HCNC,PN

## 2024-08-06 PROCEDURE — 77063 BREAST TOMOSYNTHESIS BI: CPT | Mod: 26,HCNC,, | Performed by: RADIOLOGY

## 2024-08-07 PROBLEM — I70.0 ATHEROSCLEROSIS OF AORTA: Status: ACTIVE | Noted: 2024-08-07

## 2024-08-07 NOTE — PROGRESS NOTES
Ms. Fuentes is a patient of Dr. Servin and was last seen in clinic 2/23/2024.      Subjective:   Patient ID:  Flores Fuentes is a 73 y.o. female who presents for follow up of Atrial Fibrillation  .     HPI:    Ms. Fuentes is a 73 y.o. female with SVT (no accessory pathway per EPS 10/2023), AF (PVI 10/2023, PVI/PWI/mitral annular flutter RFA 4/2024) , HLD, obesity, hypothyroid, MATTHEW, venous insufficiency, ILR here for follow up after ablation.    Background:    AF first dx 2006 after lots of caffeine. Few episodes since then. Pill in pocket strategy.  HL, on meds  obesity  hypothyroid, on med  MATTHEW, on CPAP since early 2022     Went to ER 3/10 with palps. Underwent DCCV 3/13/17 after remaining in AF with RVR. Started on multaq, and BB d/c'd.  Since, feeling not quite as well as usually, and on 4/17, at which time HR was 120s and didn't feel same as prior AF episodes.  She'd been on BB for years w/o issues. Good exertion tolerance. No CP.     Due to rare PAF episodes, we adopted a pill-in-the-pocket strategy. She used it first in 11/17; went to the ER. AF resolved <30 min after taking dose. Had 2 other episodes, self-treated successfully, in 12/17 and 1/18. Since last seen, has had 4 such episodes, all aborted with PIP.      Had AF episode 8/2023 without termination from PIP flec. Standing-dose flec started. Has had 2 episodes of AF since.     Update 1/17/24:  PVI with no evidence of AP during EPS (including ADO use)  ILR without any AF/AF  EF 60-65%  Rt GSV chemical ablation 12/23  Tolerating medications  ECG/telemetry strip today which shows NSR without ectopy  - Stop amiodarone  - Continue medical regimen otheriwse  - Routine ILR checks    2/23/2024: DCCV 1/19/2024    Update (08/12/2024):    3/25/2024: Cardioversion was successfully performed with restoration of normal sinus rhythm.     4/16/2024:    Successful pulmonary vein RF ablation (re-isolating the RSPV).    Inducible mitral annular flutter.     Successful posterior box lesion set ablation (i.e., 2 additional LA linear lesions). Successful anteromedial mitral line for inducible mitral annular flutter (i.e., a third additional LA linear lesion).    Catheter ablation of two mechanistically distinct tachycardias.    Today she says she has been feeling well. Occ irreg HB. No sustained palpitations, CP, JOHANSEN, LH, syncope reported.    ILR has shown no AF since procedure.    She is currently taking eliquis 5mg BID for stroke prophylaxis and denies significant bleeding episodes. She is currently being treated with flecainide 100mg BID for rhythm control and diltiazem 240mg daily for HR control.  Kidney function is stable, with a creatinine of 0.66 on 8/6/2024.    I have personally reviewed the patient's EKG today, which shows sinus bradycardia with 1st deg AVB at 56bpm. W[place] interval is 236. QRS is 104. QT is 490.    Relevant Cardiac Test Results:    PERICO (4/16/2024):    Left Ventricle: There is normal systolic function with a visually estimated ejection fraction of 60 - 65%.    Left Atrium: Left atrium is dilated. The left atrial appendage has a windsock morphology. Appendage velocity is reduced at less than 40 cm/sec. There is no thrombus in the left atrial appendage after evaluation by echocontrast.. The pulmonary veins have normal venous flow. diastolic dominance.    Aortic Valve: The aortic valve is a trileaflet valve. There is mild aortic regurgitation.    Mitral Valve: There is mild bileaflet sclerosis. There is mild to moderate regurgitation.    Tricuspid Valve: The tricuspid valve is structurally normal. There is no significant regurgitation.    Aorta: Aortic root is normal in size measuring 3.5 cm. Ascending aorta is normal measuring 3.5 cm.    Current Outpatient Medications   Medication Sig    colchicine (COLCRYS) 0.6 mg tablet Take 1 tablet (0.6 mg total) by mouth 2 (two) times daily.    diltiaZEM (CARDIZEM CD) 240 MG 24 hr capsule Take 1 capsule  "(240 mg total) by mouth once daily.    ELIQUIS 5 mg Tab TAKE 1 TABLET TWICE DAILY    flecainide (TAMBOCOR) 100 MG Tab Take 1 tablet (100 mg total) by mouth every 12 (twelve) hours.    fluorouraciL (EFUDEX) 5 % cream Use hs for 2 weeks    fluticasone propionate (FLONASE) 50 mcg/actuation nasal spray USE 1 SPRAY IN EACH NOSTRIL EVERY DAY    lansoprazole (PREVACID) 30 MG capsule Take 1 capsule (30 mg total) by mouth once daily.    levothyroxine (SYNTHROID) 25 MCG tablet TAKE 1 TABLET ONE TIME DAILY    nystatin (MYCOSTATIN) cream Apply topically 2 (two) times daily.    pravastatin (PRAVACHOL) 40 MG tablet TAKE 1 TABLET ONE TIME DAILY     Current Facility-Administered Medications   Medication    sodium chloride 0.9% flush 10 mL     Facility-Administered Medications Ordered in Other Visits   Medication    sodium chloride 0.9% bolus 1,000 mL    vancomycin in dextrose 5 % 1 gram/250 mL IVPB 1,000 mg       Review of Systems   Constitutional: Negative for malaise/fatigue.   Cardiovascular:  Positive for irregular heartbeat (occ). Negative for chest pain, dyspnea on exertion, leg swelling and palpitations.   Respiratory:  Negative for shortness of breath.    Hematologic/Lymphatic: Negative for bleeding problem.   Skin:  Negative for rash.   Musculoskeletal:  Negative for myalgias.   Gastrointestinal:  Negative for hematemesis, hematochezia and nausea.   Genitourinary:  Negative for hematuria.   Neurological:  Negative for light-headedness.   Psychiatric/Behavioral:  Negative for altered mental status.    Allergic/Immunologic: Negative for persistent infections.       Objective:          /64   Pulse (!) 56   Ht 5' 7" (1.702 m)   Wt 94 kg (207 lb 3.7 oz)   BMI 32.46 kg/m²     Physical Exam  Vitals and nursing note reviewed.   Constitutional:       Appearance: Normal appearance. She is well-developed.   HENT:      Head: Normocephalic.      Nose: Nose normal.   Eyes:      Pupils: Pupils are equal, round, and " reactive to light.   Cardiovascular:      Rate and Rhythm: Regular rhythm. Bradycardia present.   Pulmonary:      Effort: No respiratory distress.   Musculoskeletal:         General: Normal range of motion.   Skin:     General: Skin is warm and dry.      Findings: No erythema.   Neurological:      Mental Status: She is alert and oriented to person, place, and time.   Psychiatric:         Speech: Speech normal.         Behavior: Behavior normal.         Lab Results   Component Value Date     08/06/2024    K 4.3 08/06/2024    MG 2.1 03/25/2024    BUN 20 (H) 08/06/2024    CREATININE 0.66 08/06/2024    ALT 11 08/06/2024    AST 21 08/06/2024    HGB 13.7 08/06/2024    HCT 41.3 08/06/2024    HCT 50 08/12/2020    TSH 2.757 03/22/2024    LDLCALC 101.6 08/06/2024       Recent Labs   Lab 11/01/21  1226 10/10/23  0720 04/09/24  0854   INR 1.0 1.0 1.0       Assessment:     1. Paroxysmal A-fib    2. Atherosclerosis of aorta    3. Atrial flutter, unspecified type    4. Essential hypertension    5. Chronic anticoagulation    6. MATTHEW (obstructive sleep apnea)    7. Morbid obesity        Plan:     In summary, Ms. Fuentes is a 73 y.o. female with SVT (no accessory pathway per EPS 10/2023), AF (PVI 10/2023, PVI/PWI/mitral annular flutter RFA 4/2024) , HLD, obesity, hypothyroid, MATTHEW, venous insufficiency, ILR here for follow up after ablation.  She is now almost 3 mo s/p redo PVI and mitral flutter ablation. She is doing well from a rhythm standpoint, with no documented or symptomatic recurrence of arrhythmia since procedure. No AF on loop.  She remains on flecainide and would like to continue for now. ECG with acceptable intervals. CHADSVASc 3 on eliquis. Vein ablation scheduled for 8/23/2024.    Continue current meds and loop monitoring  RTC 3 mo, sooner if needed    *A copy of this note has been sent to Dr. Servin*    Follow up in about 3 months (around  11/12/2024).    ------------------------------------------------------------------    LEXY Bal, NP-C  Cardiac Electrophysiology

## 2024-08-12 ENCOUNTER — OFFICE VISIT (OUTPATIENT)
Dept: ELECTROPHYSIOLOGY | Facility: CLINIC | Age: 74
End: 2024-08-12
Payer: MEDICARE

## 2024-08-12 VITALS
HEART RATE: 56 BPM | SYSTOLIC BLOOD PRESSURE: 138 MMHG | HEIGHT: 67 IN | WEIGHT: 207.25 LBS | DIASTOLIC BLOOD PRESSURE: 64 MMHG | BODY MASS INDEX: 32.53 KG/M2

## 2024-08-12 DIAGNOSIS — I48.92 ATRIAL FLUTTER, UNSPECIFIED TYPE: ICD-10-CM

## 2024-08-12 DIAGNOSIS — I70.0 ATHEROSCLEROSIS OF AORTA: ICD-10-CM

## 2024-08-12 DIAGNOSIS — Z79.01 CHRONIC ANTICOAGULATION: Chronic | ICD-10-CM

## 2024-08-12 DIAGNOSIS — E66.01 MORBID OBESITY: Chronic | ICD-10-CM

## 2024-08-12 DIAGNOSIS — I48.0 PAROXYSMAL A-FIB: Primary | Chronic | ICD-10-CM

## 2024-08-12 DIAGNOSIS — I70.0 ATHEROSCLEROSIS OF AORTA: Primary | ICD-10-CM

## 2024-08-12 DIAGNOSIS — I10 ESSENTIAL HYPERTENSION: Chronic | ICD-10-CM

## 2024-08-12 DIAGNOSIS — G47.33 OSA (OBSTRUCTIVE SLEEP APNEA): Chronic | ICD-10-CM

## 2024-08-12 LAB
OHS QRS DURATION: 104 MS
OHS QTC CALCULATION: 472 MS

## 2024-08-12 PROCEDURE — 1159F MED LIST DOCD IN RCRD: CPT | Mod: HCNC,CPTII,S$GLB, | Performed by: NURSE PRACTITIONER

## 2024-08-12 PROCEDURE — 93010 ELECTROCARDIOGRAM REPORT: CPT | Mod: HCNC,S$GLB,, | Performed by: INTERNAL MEDICINE

## 2024-08-12 PROCEDURE — 99999 PR PBB SHADOW E&M-EST. PATIENT-LVL IV: CPT | Mod: PBBFAC,HCNC,, | Performed by: NURSE PRACTITIONER

## 2024-08-12 PROCEDURE — 3044F HG A1C LEVEL LT 7.0%: CPT | Mod: HCNC,CPTII,S$GLB, | Performed by: NURSE PRACTITIONER

## 2024-08-12 PROCEDURE — 99214 OFFICE O/P EST MOD 30 MIN: CPT | Mod: HCNC,S$GLB,, | Performed by: NURSE PRACTITIONER

## 2024-08-12 PROCEDURE — 3008F BODY MASS INDEX DOCD: CPT | Mod: HCNC,CPTII,S$GLB, | Performed by: NURSE PRACTITIONER

## 2024-08-12 PROCEDURE — 1160F RVW MEDS BY RX/DR IN RCRD: CPT | Mod: HCNC,CPTII,S$GLB, | Performed by: NURSE PRACTITIONER

## 2024-08-12 PROCEDURE — 3078F DIAST BP <80 MM HG: CPT | Mod: HCNC,CPTII,S$GLB, | Performed by: NURSE PRACTITIONER

## 2024-08-12 PROCEDURE — 1101F PT FALLS ASSESS-DOCD LE1/YR: CPT | Mod: HCNC,CPTII,S$GLB, | Performed by: NURSE PRACTITIONER

## 2024-08-12 PROCEDURE — 1126F AMNT PAIN NOTED NONE PRSNT: CPT | Mod: HCNC,CPTII,S$GLB, | Performed by: NURSE PRACTITIONER

## 2024-08-12 PROCEDURE — 3288F FALL RISK ASSESSMENT DOCD: CPT | Mod: HCNC,CPTII,S$GLB, | Performed by: NURSE PRACTITIONER

## 2024-08-12 PROCEDURE — 3075F SYST BP GE 130 - 139MM HG: CPT | Mod: HCNC,CPTII,S$GLB, | Performed by: NURSE PRACTITIONER

## 2024-08-12 PROCEDURE — 93005 ELECTROCARDIOGRAM TRACING: CPT | Mod: HCNC,S$GLB,, | Performed by: INTERNAL MEDICINE

## 2024-08-12 RX ORDER — DILTIAZEM HYDROCHLORIDE 240 MG/1
240 CAPSULE, COATED, EXTENDED RELEASE ORAL DAILY
Qty: 90 CAPSULE | Refills: 3 | Status: SHIPPED | OUTPATIENT
Start: 2024-08-12

## 2024-08-12 RX ORDER — FLECAINIDE ACETATE 100 MG/1
100 TABLET ORAL EVERY 12 HOURS
Qty: 180 TABLET | Refills: 3 | Status: SHIPPED | OUTPATIENT
Start: 2024-08-12

## 2024-08-13 ENCOUNTER — OFFICE VISIT (OUTPATIENT)
Dept: PRIMARY CARE CLINIC | Facility: CLINIC | Age: 74
End: 2024-08-13
Payer: MEDICARE

## 2024-08-13 VITALS
SYSTOLIC BLOOD PRESSURE: 134 MMHG | WEIGHT: 205.69 LBS | DIASTOLIC BLOOD PRESSURE: 60 MMHG | TEMPERATURE: 99 F | HEART RATE: 63 BPM | HEIGHT: 67 IN | BODY MASS INDEX: 32.28 KG/M2 | OXYGEN SATURATION: 98 %

## 2024-08-13 DIAGNOSIS — R63.4 WEIGHT LOSS: ICD-10-CM

## 2024-08-13 DIAGNOSIS — E02 SUBCLINICAL IODINE-DEFICIENCY HYPOTHYROIDISM: Chronic | ICD-10-CM

## 2024-08-13 DIAGNOSIS — Z00.00 ROUTINE GENERAL MEDICAL EXAMINATION AT A HEALTH CARE FACILITY: Primary | ICD-10-CM

## 2024-08-13 DIAGNOSIS — I48.91 ATRIAL FIBRILLATION WITH RVR: ICD-10-CM

## 2024-08-13 PROBLEM — I10 ESSENTIAL HYPERTENSION: Chronic | Status: RESOLVED | Noted: 2019-06-19 | Resolved: 2024-08-13

## 2024-08-13 PROCEDURE — 99999 PR PBB SHADOW E&M-EST. PATIENT-LVL IV: CPT | Mod: PBBFAC,HCNC,, | Performed by: FAMILY MEDICINE

## 2024-08-13 PROCEDURE — 1101F PT FALLS ASSESS-DOCD LE1/YR: CPT | Mod: HCNC,CPTII,S$GLB, | Performed by: FAMILY MEDICINE

## 2024-08-13 PROCEDURE — 3044F HG A1C LEVEL LT 7.0%: CPT | Mod: HCNC,CPTII,S$GLB, | Performed by: FAMILY MEDICINE

## 2024-08-13 PROCEDURE — 1160F RVW MEDS BY RX/DR IN RCRD: CPT | Mod: HCNC,CPTII,S$GLB, | Performed by: FAMILY MEDICINE

## 2024-08-13 PROCEDURE — 3075F SYST BP GE 130 - 139MM HG: CPT | Mod: HCNC,CPTII,S$GLB, | Performed by: FAMILY MEDICINE

## 2024-08-13 PROCEDURE — 99397 PER PM REEVAL EST PAT 65+ YR: CPT | Mod: HCNC,S$GLB,, | Performed by: FAMILY MEDICINE

## 2024-08-13 PROCEDURE — 1126F AMNT PAIN NOTED NONE PRSNT: CPT | Mod: HCNC,CPTII,S$GLB, | Performed by: FAMILY MEDICINE

## 2024-08-13 PROCEDURE — 3288F FALL RISK ASSESSMENT DOCD: CPT | Mod: HCNC,CPTII,S$GLB, | Performed by: FAMILY MEDICINE

## 2024-08-13 PROCEDURE — 1159F MED LIST DOCD IN RCRD: CPT | Mod: HCNC,CPTII,S$GLB, | Performed by: FAMILY MEDICINE

## 2024-08-13 PROCEDURE — 3008F BODY MASS INDEX DOCD: CPT | Mod: HCNC,CPTII,S$GLB, | Performed by: FAMILY MEDICINE

## 2024-08-13 PROCEDURE — 3078F DIAST BP <80 MM HG: CPT | Mod: HCNC,CPTII,S$GLB, | Performed by: FAMILY MEDICINE

## 2024-08-13 RX ORDER — PRAVASTATIN SODIUM 40 MG/1
40 TABLET ORAL DAILY
Qty: 90 TABLET | Refills: 2 | Status: SHIPPED | OUTPATIENT
Start: 2024-08-13

## 2024-08-13 RX ORDER — CETIRIZINE HYDROCHLORIDE 10 MG/1
10 TABLET ORAL
COMMUNITY
Start: 2024-05-07 | End: 2024-08-13

## 2024-08-13 RX ORDER — FLUTICASONE PROPIONATE 50 MCG
SPRAY, SUSPENSION (ML) NASAL
Qty: 32 G | Refills: 2 | Status: SHIPPED | OUTPATIENT
Start: 2024-08-13

## 2024-08-13 RX ORDER — AMIODARONE HYDROCHLORIDE 200 MG/1
TABLET ORAL
COMMUNITY
Start: 2024-06-04 | End: 2024-08-13

## 2024-08-13 RX ORDER — LEVOTHYROXINE SODIUM 25 UG/1
25 TABLET ORAL DAILY
Qty: 90 TABLET | Refills: 2 | Status: SHIPPED | OUTPATIENT
Start: 2024-08-13

## 2024-08-13 NOTE — PROGRESS NOTES
"      Assessment:     1. Routine general medical examination at a health care facility    2. Atrial fibrillation with RVR    3. Subclinical iodine-deficiency hypothyroidism    4. Weight loss      Plan:     Routine general medical examination at a health care facility  Follow with GYN for female health & cancer prevention  Move more, High fiber, good fat (avocado, olive oil, nuts) foods  Eat more food grown from the earth (picked from trees or out of the ground)  Colon cancer screening at 44 yo  Follow up yearly with LABS ONE WEEK PRIOR so we can discuss at your visit      Atrial fibrillation with RVR  Albation 2023 & 2024  Stable, continue Diltiazem 240 qd   Flecainide 100 BID   ELIQUIS  5 BID   Follow w Cardiologist EP BIANCA Guerrero    Weight loss  Congrats lost 82#  w  intermittent fasting & 1200 calories a day. Feels great , no longer has pain in knees. Walking more.     Refilled meds    HPI: Flores Fuentes is a 73 y.o. female with is here today for general exam.     Denies chest pain, shortness of breath    Current Outpatient Medications   Medication Instructions    colchicine (COLCRYS) 0.6 mg, Oral, 2 times daily    diltiaZEM (CARDIZEM CD) 240 mg, Oral, Daily    ELIQUIS 5 mg Tab TAKE 1 TABLET TWICE DAILY    flecainide (TAMBOCOR) 100 mg, Oral, Every 12 hours    fluticasone propionate (FLONASE) 50 mcg/actuation nasal spray USE 1 SPRAY IN EACH NOSTRIL EVERY DAY    lansoprazole (PREVACID) 30 mg, Oral, Daily    levothyroxine (SYNTHROID) 25 mcg, Oral    pravastatin (PRAVACHOL) 40 mg, Oral       Lab Results   Component Value Date    HGBA1C 5.3 03/23/2024    HGBA1C 5.7 10/07/2014     No results found for: "MICALBCREAT"  Lab Results   Component Value Date    LDLCALC 101.6 08/06/2024    LDLCALC 70.6 08/03/2023    CHOL 163 08/06/2024    HDL 50 08/06/2024    TRIG 57 08/06/2024       Lab Results   Component Value Date     08/06/2024    K 4.3 08/06/2024     08/06/2024    CO2 27 08/06/2024    GLU 89 08/06/2024 " "   BUN 20 (H) 08/06/2024    CREATININE 0.66 08/06/2024    CALCIUM 9.3 08/06/2024    PROT 7.9 08/06/2024    ALBUMIN 4.5 08/06/2024    BILITOT 0.7 08/06/2024    ALKPHOS 71 08/06/2024    AST 21 08/06/2024    ALT 11 08/06/2024    ANIONGAP 12 08/06/2024    ESTGFRAFRICA >60.0 06/09/2022    EGFRNONAA >60.0 06/09/2022    WBC 4.77 08/06/2024    HGB 13.7 08/06/2024    HGB 13.3 04/18/2024    HCT 41.3 08/06/2024    MCV 94 08/06/2024     08/06/2024    TSH 2.757 03/22/2024    HEPCAB Non-reactive 08/01/2023       No results found for: "LH", "FSH", "TOTALTESTOST", "PROGESTERONE", "ESTRADIOL", "[id number]", "[id number]", "FERRITIN", "IRON", "TRANSFERRIN", "TIBC", "FESATURATED", "ZINC"      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 Dr Viktor Toure    Osteopenia of neck of left femur 6/4/2020    Paroxysmal atrioventricular tachycardia     Plantar fasciitis     Right knee meniscal tear     Dr Mayfield, tx conservatively    Severe obesity (BMI 35.0-35.9 with comorbidity) 1/24/2017    Slow transit constipation 7/13/2021    Despite fruits & veg & 1200 dunia diet, try Colace daily    Stress incontinence, female 03/28/2018    hasn't tried oxybutynin, After HYST, Kegels & PT  didn't work, worse at night wearing pads, Dr Infante    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    Thyroid disease      Past Surgical History:   Procedure Laterality Date    ABLATION  4/16/2024    Procedure: Ablation;  Surgeon: Ameya Servin, " MD;  Location: [facility] EP LAB;  Service: Cardiology;;    ABLATION OF ARRHYTHMOGENIC FOCUS FOR ATRIAL FIBRILLATION N/A 10/17/2023    Procedure: Ablation atrial fibrillation;  Surgeon: Ameya Servin MD;  Location: [facility] EP LAB;  Service: Cardiology;  Laterality: N/A;  AF, PERICO, PVI, WPW, RFA, POPEYE, Gen, DM, 3 Prep *MDT ILR*    ABLATION OF ARRHYTHMOGENIC FOCUS FOR ATRIAL FIBRILLATION N/A 4/16/2024    Procedure: Ablation atrial fibrillation;  Surgeon: Ameya Servin MD;  Location: [facility] EP LAB;  Service: Cardiology;  Laterality: N/A;  AF, PERICO (definite), PVI (redo), RFA, Carto, Gen, DM, 3 Prep *ILR MDT*    ABLATION, ATRIAL FLUTTER, ATYPICAL  4/16/2024    Procedure: Ablation, Atrial Flutter, Atypical;  Surgeon: Ameya Servin MD;  Location: [facility] EP LAB;  Service: Cardiology;;    ABLATION, MECHANOCHEMICAL, VARICOSE VEIN Right 12/8/2023    Procedure: ABLATION, MECHANOCHEMICAL, VARICOSE VEIN;  Surgeon: Simone Shannon MD;  Location: [facility] CATH LAB/EP;  Service: Cardiology;  Laterality: Right;    APPENDECTOMY      COLONOSCOPY N/A 8/13/2020    Procedure: COLONOSCOPY;  Surgeon: Angus Ac MD;  Location: [facility] ENDO [address]);  Service: Endoscopy;  Laterality: N/A;  ok to hold Eliquis 2 days per Dr Servin     COVID test at [place] on 8/10-GT    ECHOCARDIOGRAM,TRANSESOPHAGEAL N/A 9/20/2023    Procedure: Transesophageal echo (PERICO) intra-procedure log documentation;  Surgeon: Provider, [facility];  Location: [facility] EP LAB;  Service: Cardiology;  Laterality: N/A;    ECHOCARDIOGRAM,TRANSESOPHAGEAL  3/25/2024    Procedure: Transesophageal echo (PERICO) intra-procedure log documentation;  Surgeon: Kathy Malik MD;  Location: [facility] EP LAB;  Service: Cardiology;;    HYSTERECTOMY  1990    TAHBSO for fibroids    INSERTION OF IMPLANTABLE LOOP RECORDER Left 11/1/2021    Procedure: Insertion, Implantable Loop Recorder;  Surgeon: Ameya Servin MD;  Location: [facility] EP LAB;  Service: Cardiology;  Laterality: Left;  AF, ILR implant,  "MDT,  DM, 3 Prep    MANDIBLE SURGERY  2015    L mandible, Dr Toure    MANDIBLE SURGERY Left 8/27/2019    OOPHORECTOMY      TONSILLECTOMY      TRANSESOPHAGEAL ECHOCARDIOGRAM WITH POSSIBLE CARDIOVERSION (PERICO W/ POSS CARDIOVERSION) N/A 1/19/2024    Procedure: Transesophageal echo (PERICO) intra-procedure log documentation;  Surgeon: JV Rosenthal MD;  Location: [facility] EP LAB;  Service: Cardiology;  Laterality: N/A;    TRANSESOPHAGEAL ECHOCARDIOGRAPHY N/A 10/17/2023    Procedure: ECHOCARDIOGRAM, TRANSESOPHAGEAL;  Surgeon: Kathy Malik MD;  Location: [facility] EP LAB;  Service: Cardiology;  Laterality: N/A;    TRANSESOPHAGEAL ECHOCARDIOGRAPHY N/A 4/16/2024    Procedure: ECHOCARDIOGRAM, TRANSESOPHAGEAL;  Surgeon: Jerry [facility];  Location: [facility] EP LAB;  Service: Cardiology;  Laterality: N/A;    TREATMENT OF CARDIAC ARRHYTHMIA N/A 9/20/2023    Procedure: Cardioversion or Defibrillation;  Surgeon: JV Rosenthal MD;  Location: [facility] EP LAB;  Service: Cardiology;  Laterality: N/A;  AF, DCCV/PERICO, ANES, EH,     TREATMENT OF CARDIAC ARRHYTHMIA N/A 1/19/2024    Procedure: Cardioversion or Defibrillation;  Surgeon: Luis Joiner MD;  Location: [facility] EP LAB;  Service: Cardiology;  Laterality: N/A;  AFL, DCCV ONLY, ANES, EH,     TREATMENT OF CARDIAC ARRHYTHMIA N/A 3/25/2024    Procedure: Cardioversion or Defibrillation;  Surgeon: Ameya Servin MD;  Location: [facility] EP LAB;  Service: Cardiology;  Laterality: N/A;  AF, DCCV ONLY, ANES, MS, 351    TREATMENT OF CARDIAC ARRHYTHMIA N/A 1/19/2024    Procedure: Cardioversion or Defibrillation;  Surgeon: JV Rosenthal MD;  Location: [facility] EP LAB;  Service: Cardiology;  Laterality: N/A;  AFL, PERICO/DCCV, ANES, EH,      Vitals:    08/13/24 1123   BP: 134/60   Pulse: 63   Temp: 98.5 °F (36.9 °C)   TempSrc: Oral   SpO2: 98%   Weight: 93.3 kg (205 lb 11 oz)   Height: 5' 7" (1.702 m)   PainSc: 0-No pain     Wt Readings from Last 5 Encounters:   08/13/24 " 93.3 kg (205 lb 11 oz)   08/12/24 94 kg (207 lb 3.7 oz)   04/26/24 95.3 kg (210 lb)   04/18/24 95.3 kg (210 lb)   04/16/24 95.3 kg (210 lb)     Objective:   Physical Exam  Constitutional:       Appearance: She is well-developed.   Eyes:      Pupils: Pupils are equal, round, and reactive to light.   Cardiovascular:      Rate and Rhythm: Normal rate and regular rhythm.      Heart sounds: Normal heart sounds. No murmur heard.  Pulmonary:      Effort: Pulmonary effort is normal.      Breath sounds: Normal breath sounds. No wheezing.   Abdominal:      General: Bowel sounds are normal. There is no distension.      Palpations: Abdomen is soft. There is no mass.      Tenderness: There is no abdominal tenderness. There is no guarding or rebound.   Musculoskeletal:      Cervical back: Neck supple.   Skin:     General: Skin is warm and dry.   Neurological:      Mental Status: She is alert.   Psychiatric:         Behavior: Behavior normal.

## 2024-08-13 NOTE — ASSESSMENT & PLAN NOTE
Congrats lost 82#  w  intermittent fasting & 1200 calories a day. Feels great , no longer has pain in knees. Walking more.

## 2024-08-13 NOTE — ASSESSMENT & PLAN NOTE
Albation 2023 & 2024  Stable, continue Diltiazem 240 qd   Flecainide 100 BID   ELIQUIS  5 BID   Follow w Cardiologist TRESA Guerrero

## 2024-08-20 ENCOUNTER — CLINICAL SUPPORT (OUTPATIENT)
Dept: CARDIOLOGY | Facility: HOSPITAL | Age: 74
End: 2024-08-20
Attending: INTERNAL MEDICINE
Payer: MEDICARE

## 2024-08-20 DIAGNOSIS — I49.8 OTHER SPECIFIED CARDIAC ARRHYTHMIAS: ICD-10-CM

## 2024-08-20 DIAGNOSIS — I48.0 PAROXYSMAL ATRIAL FIBRILLATION: ICD-10-CM

## 2024-08-20 PROCEDURE — 93298 REM INTERROG DEV EVAL SCRMS: CPT | Mod: HCNC | Performed by: INTERNAL MEDICINE

## 2024-08-20 PROCEDURE — 93298 REM INTERROG DEV EVAL SCRMS: CPT | Mod: 26,HCNC,, | Performed by: INTERNAL MEDICINE

## 2024-08-23 ENCOUNTER — HOSPITAL ENCOUNTER (OUTPATIENT)
Facility: HOSPITAL | Age: 74
Discharge: HOME | End: 2024-08-23
Attending: INTERNAL MEDICINE | Admitting: INTERNAL MEDICINE
Payer: MEDICARE

## 2024-08-23 VITALS
HEART RATE: 64 BPM | DIASTOLIC BLOOD PRESSURE: 65 MMHG | WEIGHT: 190 LBS | OXYGEN SATURATION: 99 % | TEMPERATURE: 98 F | BODY MASS INDEX: 29.82 KG/M2 | SYSTOLIC BLOOD PRESSURE: 148 MMHG | RESPIRATION RATE: 20 BRPM | HEIGHT: 67 IN

## 2024-08-23 DIAGNOSIS — I87.2 VENOUS INSUFFICIENCY OF BOTH LOWER EXTREMITIES: ICD-10-CM

## 2024-08-23 PROCEDURE — 25000003 PHARM REV CODE 250: Mod: HCNC | Performed by: INTERNAL MEDICINE

## 2024-08-23 PROCEDURE — C1894 INTRO/SHEATH, NON-LASER: HCPCS | Mod: HCNC | Performed by: INTERNAL MEDICINE

## 2024-08-23 PROCEDURE — 27201423 OPTIME MED/SURG SUP & DEVICES STERILE SUPPLY: Mod: HCNC | Performed by: INTERNAL MEDICINE

## 2024-08-23 PROCEDURE — 63600175 PHARM REV CODE 636 W HCPCS: Mod: HCNC | Performed by: INTERNAL MEDICINE

## 2024-08-23 PROCEDURE — 36482 ENDOVEN THER CHEM ADHES 1ST: CPT | Mod: HCNC,RT,, | Performed by: INTERNAL MEDICINE

## 2024-08-23 PROCEDURE — 36482 ENDOVEN THER CHEM ADHES 1ST: CPT | Mod: HCNC,RT | Performed by: INTERNAL MEDICINE

## 2024-08-23 RX ORDER — HEPARIN SODIUM 200 [USP'U]/100ML
INJECTION, SOLUTION INTRAVENOUS
Status: DISCONTINUED | OUTPATIENT
Start: 2024-08-23 | End: 2024-08-23 | Stop reason: HOSPADM

## 2024-08-23 RX ORDER — LIDOCAINE HYDROCHLORIDE 10 MG/ML
INJECTION, SOLUTION INFILTRATION; PERINEURAL
Status: DISCONTINUED | OUTPATIENT
Start: 2024-08-23 | End: 2024-08-23 | Stop reason: HOSPADM

## 2024-08-23 NOTE — PLAN OF CARE
Pt arrived independently from home, ambulates with/out assistance. Patient lying in bed with no complaints of pain or distress noted. Monitors applied. VSS, AAOx4.  Yellow non-skid socks placed on patient. Fall risk reviewed with patient. Procedure and recovery process explained to patient and all questions answered.  Patient verbalized understanding, denies questions. Will continue to monitor for safety and needs.

## 2024-08-23 NOTE — BRIEF OP NOTE
Procedure:    Ablation of  Rt  GSV      Duplex ultrasound was used to map out the insufficient saphenous vein, and access was determined and marked on the   overlying skin. The depth and diameter of the vein(s) to be treated was documented. The patient was placed supine on the   procedure table and the leg was prepped and draped using sterile technique.     Ultrasound guidance was again used to localize the access site. 1% lidocaine was injected as a local anesthetic in the   subcutaneous tissues at the target location in the GSV in the lower leg. Using ultrasound guidance, access was gained at this   location with the 21 gauge thin walled access needle and followed by introduction of a short guidewire, location confirmed with   ultrasound. A small, 3 mm incision was made at the access site to allow for introduction and placement of the 7 F x7 cm   introducer/dilator. The dilator and guidewire were removed. The 0.035 guidewire from the VenasealTM kit was then introduced   and positioned at the saphenofemoral junction using ultrasound guidance. The 80 cm 7 F introducer sheath/dilator was   positioned 5cm from the saphenofemoral junction. The guidewire and dilator were removed, and the remaining sheath was   flushed with sterile saline, with the syringe remaining in place prior to the next steps.     The cyanoacrylate adhesive was precisely primed into the 5 F delivery catheter and this catheter/syringe combination was   attached within the dispenser gun. This assembly was introduced through the 7 F sheath and positioned 5 cm caudal of the   saphenofemoral junction under ultrasound guidance. The steps from the IFU were followed for dispensing amounts, locations   and compression times, e.g 2 aliquots proximally with 3 minutes of compression, and 1 aliquot every 3cm distally with 30 sec of   compression along the course of the vessel (insert specific language per physician preference). Following the last  injection and   compression sequence, the catheter and introducer sheath were pulled out from the access site.     Hemostasis was achieved with manual compression and an adhesive bandage was applied to the incision. Ultrasound confirmed complete coaptation and   closure of the treated segments of the GSV, and the absence of any DVT at the saphenofemoral junction.       Treatment time was approximately 15 minutes and the vein length treated was 32 cm.  The drapes were removed and the patient cleaned and prepared for discharge. Post-op ultrasound check is scheduled per protocol   and the patient was given written post-op instructions      Plan:  Compression stocking       Surveillance ultrasound

## 2024-08-23 NOTE — Clinical Note
The right groin and right foot was prepped. The site was prepped with ChloraPrep. The site was clipped. The patient was draped. R leg

## 2024-08-23 NOTE — DISCHARGE SUMMARY
[place] - Cath Lab (Hospital)  Discharge Note  Short Stay    Procedure(s) (LRB):  Ablation, Chemical Sealant, Varicose Vein (Right)  Procedure:     Ablation of  Rt  GSV        Duplex ultrasound was used to map out the insufficient saphenous vein, and access was determined and marked on the   overlying skin. The depth and diameter of the vein(s) to be treated was documented. The patient was placed supine on the   procedure table and the leg was prepped and draped using sterile technique.      Ultrasound guidance was again used to localize the access site. 1% lidocaine was injected as a local anesthetic in the   subcutaneous tissues at the target location in the GSV in the lower leg. Using ultrasound guidance, access was gained at this   location with the 21 gauge thin walled access needle and followed by introduction of a short guidewire, location confirmed with   ultrasound. A small, 3 mm incision was made at the access site to allow for introduction and placement of the 7 F x7 cm   introducer/dilator. The dilator and guidewire were removed. The 0.035 guidewire from the VenasealTM kit was then introduced   and positioned at the saphenofemoral junction using ultrasound guidance. The 80 cm 7 F introducer sheath/dilator was   positioned 5cm from the saphenofemoral junction. The guidewire and dilator were removed, and the remaining sheath was   flushed with sterile saline, with the syringe remaining in place prior to the next steps.      The cyanoacrylate adhesive was precisely primed into the 5 F delivery catheter and this catheter/syringe combination was   attached within the dispenser gun. This assembly was introduced through the 7 F sheath and positioned 5 cm caudal of the   saphenofemoral junction under ultrasound guidance. The steps from the IFU were followed for dispensing amounts, locations   and compression times, e.g 2 aliquots proximally with 3 minutes of compression, and 1 aliquot every 3cm distally with 30  sec of   compression along the course of the vessel (insert specific language per physician preference). Following the last injection and   compression sequence, the catheter and introducer sheath were pulled out from the access site.      Hemostasis was achieved with manual compression and an adhesive bandage was applied to the incision. Ultrasound confirmed complete coaptation and   closure of the treated segments of the GSV, and the absence of any DVT at the saphenofemoral junction.         Treatment time was approximately 15 minutes and the vein length treated was 32 cm.  The drapes were removed and the patient cleaned and prepared for discharge. Post-op ultrasound check is scheduled per protocol   and the patient was given written post-op instructions        Plan:  Compression stocking         Surveillance ultrasound     OUTCOME: Patient tolerated treatment/procedure well without complication and is now ready for discharge.    DISPOSITION: Home or Self Care    FINAL DIAGNOSIS:  <principal problem not specified>    FOLLOWUP: In clinic    DISCHARGE INSTRUCTIONS:  No discharge procedures on file.     TIME SPENT ON DISCHARGE: 25 minutes

## 2024-08-23 NOTE — DISCHARGE INSTRUCTIONS
Recovering at home  Once at home, follow all the instructions youve been given. Be sure to:  Take all medicines as directed  Care for the catheter insertion site as directed  Check for signs of infection at the catheter insertion site: check for warmth, redness, yellow/green discharge from access site  DO NOT GET WET BANDAGE WET  Wear bandage as directed for 24 hours. Once 24 hours completed remove bandage.  Walk a few times a day. Walk for at least 5 minutes every hours, while awake.  Avoid heavy exercise, lifting, and standing for long periods as advised  Avoid air travel, hot baths, saunas, or whirlpools for the next week  If any pain, take Ibuprofen or Tylenol as needed.

## 2024-08-23 NOTE — Clinical Note
A percutaneous stick to the Right and greater saphenous vein vein was performed. Ultrasound guidance was used to obtain access.

## 2024-08-23 NOTE — NURSING
Pt discharged per MD order. Vitals stable. Pt ambulated in unit with no problems and dressed self. Right lower leg sites WNL, no swelling, bleeding, oozing, tenderness, or hematoma present. All discharge instructions given and pt verbalizes full understanding to all instructions and all questions answered. Pt walked downstairs to car with no issues.

## 2024-08-23 NOTE — H&P
Subjective:   @Patient ID:  Flores Fuentes is a 73 y.o. female who presents for evaluation of Pre-op risk stratification.   HPI:     8/23/2024: Here for elective Rt GSV ablation with Venaseal. Previous Varithena didn't work       February 2024:  follow up post right GSV chemical ablation with Varithena.  On repeat ultrasound GSV remains open with reflux.  Admitted in January 2024 with recurrent Afib with RVR/ flutter post cardioversion.  She is doing okay today.  Compliant with compression stocking        11/2023: F/U. Post PVI, there was not accessory pathway identified during EPS. U/S venous with b/l GSV reflux and Rt pop reflux.  Rt LE edema>> LT. Heaviness, itching and pain. She is compliant with compression stocking which helps some.     CEAP 4S  VCCC score is 11      8/24/2023: F/U. She was hospitalized with a.fib, wasn't responding to pill in the pocket.  Now she is taking flecainide along with the Cardizem.  Has an appointment with Dr. Servin.  She does have significant symptomatic right lower extremity varicose vein since the age of 12 that has been getting worse.  Right lower extremity edema along with varicosity and heaviness.  Not consistently wearing compression stockings    7/22/2021: Patient is here for follow up. She saw Dr. Servin in 7/2021, EKg with intermittent wpw pattern, plan for ILR to evaluate for possible SVT episodes.     She stated that she has been ok since last visit.  Last a.fib episode was in 5/2021 and she took flecainide which helped. She gets different  tachypalpitations  that are different from her a.fib     Tolerating OAC well.     No prior CAD. No Tobacco. No DM.    HTN: BP is well controlled.   Paroxysmal A.fib: Currently SR and stable on AAD/ CCB/BB    She has congestion and cold symptoms, along with cough..     Pertinent cardiac hx:    Paroxysmal A.fib, F/U with Dr. Servin. On rhythm control strategy with flecainide (PIP). On Eliquis for anticoagulation.     Venous U/S  9/2023:    There is no evidence of a right lower extremity DVT.    There is no evidence of a left lower extremity DVT.    The right greater saphenous vein has reflux.    The right popliteal vein is has reflux.    The left greater saphenous vein has reflux.    2D Echo (6/3/2019):  Normal left ventricular systolic function. The estimated ejection fraction is 55%  Normal LV diastolic function.  Normal right ventricular systolic function.  Mild left atrial enlargement.  The estimated PA systolic pressure is 31 mm Hg  Normal central venous pressure (3 mm Hg).    Patient Active Problem List    Diagnosis Date Noted    Atherosclerosis of aorta 08/07/2024    Pericarditis 04/18/2024    Acute cystitis without hematuria 03/24/2024    Class 1 obesity due to excess calories with serious comorbidity and body mass index (BMI) of 33.0 to 33.9 in adult 03/23/2024    Atrial flutter 01/19/2024    Hypothyroidism 01/18/2024    Kym-Parkinson-White (WPW) syndrome 09/19/2023    Stress at home 08/08/2023    Weight loss 08/08/2023    Morbid obesity 07/22/2021    Slow transit constipation 07/13/2021     Despite fruits & veg & 1200 dunia diet, try Colace daily      Chronic anticoagulation 06/10/2021    Routine general medical examination at a health care facility 08/13/2020    Osteopenia of neck of left femur 06/04/2020    Asymptomatic microscopic hematuria 06/02/2020    MATTHEW (obstructive sleep apnea)     Stress incontinence, female 03/28/2018     After HYST, Try Kegels      Cervical muscle strain 01/24/2017    DJD (degenerative joint disease) of cervical spine 01/24/2017    Atrial fibrillation with RVR     Primary localized osteoarthrosis, lower leg 11/17/2014    Paroxysmal A-fib 10/14/2014    Mixed hyperlipidemia 11/07/2013    Subclinical iodine-deficiency hypothyroidism 11/07/2013                        Lipid panel  Lab Results   Component Value Date    CHOL 163 08/06/2024    CHOL 126 08/03/2023    CHOL 164 06/09/2022     Lab Results    Component Value Date    HDL 50 08/06/2024    HDL 41 08/03/2023    HDL 45 06/09/2022     Lab Results   Component Value Date    LDLCALC 101.6 08/06/2024    LDLCALC 70.6 08/03/2023    LDLCALC 103.8 06/09/2022     Lab Results   Component Value Date    TRIG 57 08/06/2024    TRIG 72 08/03/2023    TRIG 76 06/09/2022     Lab Results   Component Value Date    CHOLHDL 30.7 08/06/2024    CHOLHDL 32.5 08/03/2023    CHOLHDL 27.4 06/09/2022            Review of Systems   Constitutional: Negative for chills and fever.   HENT:  Negative for congestion, hearing loss and nosebleeds.    Eyes:  Negative for blurred vision.   Cardiovascular:  Positive for leg swelling (RT side , chronic lymphedema. ). Negative for chest pain and palpitations.   Respiratory:  Negative for cough, hemoptysis and shortness of breath.    Hematologic/Lymphatic: Negative for bleeding problem.   Skin:  Negative for itching.   Musculoskeletal:  Positive for arthritis.   Gastrointestinal:  Negative for abdominal pain and hematochezia.   Genitourinary:  Negative for hematuria.   Neurological:  Negative for dizziness.   Psychiatric/Behavioral:  Negative for altered mental status and depression.        Objective:   Physical Exam  Constitutional:       Appearance: She is well-developed.   HENT:      Head: Normocephalic and atraumatic.   Eyes:      Conjunctiva/sclera: Conjunctivae normal.   Neck:      Vascular: No JVD.   Cardiovascular:      Rate and Rhythm: Normal rate and regular rhythm.      Heart sounds: Normal heart sounds. No murmur heard.     No friction rub. No gallop.   Pulmonary:      Effort: Pulmonary effort is normal. No respiratory distress.      Breath sounds: Normal breath sounds. No stridor. No wheezing.   Abdominal:      General: There is no distension.      Palpations: Abdomen is soft.      Tenderness: There is no abdominal tenderness.   Musculoskeletal:      Cervical back: Neck supple.   Skin:     General: Skin is warm and dry.   Neurological:       Mental Status: She is alert and oriented to person, place, and time.   Psychiatric:         Behavior: Behavior normal.         Assessment:     1. Venous insufficiency of both lower extremities          Plan:   Continue oral anticoagulation.  Significant superficial and deep reflux. She is very symptomatic. CEAP 4S. Plan for Rt GSV ablation   Compliant with compression stocking 20-30 mm Hg.       We had a long discussion regarding the pathophysiology of venous insufficiency.  We discussed both superficial and deep venous reflux disease.  Initial treatment is usually conservative with compression stockings, exercise, weight loss, and calf muscle compressive therapy.  If conservative measures fail then we will proceed with superficial venous reflux disease management with ablative therapy.  If symptoms persist then we will proceed with evaluation of deep venous reflux disease.  This requires a venogram with intravascular ultrasound for guidance of intervention if clinically indicated.  The patient expressed verbal understanding of the plan.  Weight loss.  Low-salt diet.  Exercise.   Leg elevation    Will proceed with  repeat Rt GSV ablation with  Venaseal     Weight loss encouraged  Statin        - Return sooner for concerns or questions. If symptoms persist go to the ED  I have reviewed all pertinent data on this patient   I have reviewed the patient's medical history in detail and updated the computerized patient record.  Follow up as scheduled. Return sooner for concerns or questions  She expressed verbal understanding and agreed with the plan      It was my please seeing Ms. Fuentes. Please don't hesitate to contact us with any questions. Than you.     Current Discharge Medication List        CONTINUE these medications which have NOT CHANGED    Details   diltiaZEM (CARDIZEM CD) 240 MG 24 hr capsule Take 1 capsule (240 mg total) by mouth once daily.  Qty: 90 capsule, Refills: 3    Comments: .  Associated Diagnoses:  Paroxysmal A-fib      ELIQUIS 5 mg Tab TAKE 1 TABLET TWICE DAILY  Qty: 180 tablet, Refills: 3    Associated Diagnoses: Paroxysmal A-fib      flecainide (TAMBOCOR) 100 MG Tab Take 1 tablet (100 mg total) by mouth every 12 (twelve) hours.  Qty: 180 tablet, Refills: 3      fluticasone propionate (FLONASE) 50 mcg/actuation nasal spray USE 1 SPRAY IN EACH NOSTRIL EVERY DAY  Qty: 32 g, Refills: 2      levothyroxine (SYNTHROID) 25 MCG tablet Take 1 tablet (25 mcg total) by mouth once daily.  Qty: 90 tablet, Refills: 2      pravastatin (PRAVACHOL) 40 MG tablet Take 1 tablet (40 mg total) by mouth once daily.  Qty: 90 tablet, Refills: 2      lansoprazole (PREVACID) 30 MG capsule Take 1 capsule (30 mg total) by mouth once daily.  Qty: 30 capsule, Refills: 0

## 2024-08-24 LAB
OHS CV AF BURDEN PERCENT: < 1
OHS CV DC REMOTE DEVICE TYPE: NORMAL

## 2024-09-02 ENCOUNTER — PATIENT MESSAGE (OUTPATIENT)
Dept: DERMATOLOGY | Facility: CLINIC | Age: 74
End: 2024-09-02
Payer: MEDICARE

## 2024-09-13 ENCOUNTER — PATIENT MESSAGE (OUTPATIENT)
Dept: PRIMARY CARE CLINIC | Facility: CLINIC | Age: 74
End: 2024-09-13
Payer: MEDICARE

## 2024-09-19 ENCOUNTER — PATIENT MESSAGE (OUTPATIENT)
Dept: PRIMARY CARE CLINIC | Facility: CLINIC | Age: 74
End: 2024-09-19
Payer: MEDICARE

## 2024-09-20 ENCOUNTER — CLINICAL SUPPORT (OUTPATIENT)
Dept: CARDIOLOGY | Facility: HOSPITAL | Age: 74
End: 2024-09-20
Attending: INTERNAL MEDICINE
Payer: MEDICARE

## 2024-09-20 DIAGNOSIS — I49.8 OTHER SPECIFIED CARDIAC ARRHYTHMIAS: ICD-10-CM

## 2024-09-20 DIAGNOSIS — I48.0 PAROXYSMAL ATRIAL FIBRILLATION: ICD-10-CM

## 2024-09-20 PROCEDURE — 93298 REM INTERROG DEV EVAL SCRMS: CPT | Mod: 26,HCNC,, | Performed by: INTERNAL MEDICINE

## 2024-09-20 PROCEDURE — 93298 REM INTERROG DEV EVAL SCRMS: CPT | Mod: HCNC | Performed by: INTERNAL MEDICINE

## 2024-09-23 ENCOUNTER — PATIENT MESSAGE (OUTPATIENT)
Dept: DERMATOLOGY | Facility: CLINIC | Age: 74
End: 2024-09-23
Payer: MEDICARE

## 2024-09-24 LAB
OHS CV AF BURDEN PERCENT: < 1
OHS CV DC REMOTE DEVICE TYPE: NORMAL

## 2024-10-07 ENCOUNTER — HOSPITAL ENCOUNTER (OUTPATIENT)
Dept: CARDIOLOGY | Facility: HOSPITAL | Age: 74
Discharge: HOME | End: 2024-10-07
Attending: INTERNAL MEDICINE
Payer: MEDICARE

## 2024-10-07 DIAGNOSIS — I87.2 VENOUS INSUFFICIENCY (CHRONIC) (PERIPHERAL): ICD-10-CM

## 2024-10-07 PROCEDURE — 93971 EXTREMITY STUDY: CPT | Mod: TC,HCNC,RT

## 2024-10-07 PROCEDURE — 93971 EXTREMITY STUDY: CPT | Mod: 26,HCNC,RT, | Performed by: INTERNAL MEDICINE

## 2024-10-08 LAB
RIGHT GREAT SAPHENOUS DISTAL THIGH DIA: 0.61 CM
RIGHT GREAT SAPHENOUS JUNCTION DIA: 0.95 CM
RIGHT GREAT SAPHENOUS KNEE DIA: 0.95 CM
RIGHT GREAT SAPHENOUS MIDDLE THIGH DIA: 1.59 CM
RIGHT GREAT SAPHENOUS PROXIMAL CALF DIA: 0.7 CM
RIGHT GREAT SAPHENOUS PROXIMAL CALF REFLUX: 1359 MS
RIGHT SMALL SAPHENOUS KNEE DIA: 0.3 CM

## 2024-10-14 ENCOUNTER — OFFICE VISIT (OUTPATIENT)
Dept: CARDIOLOGY | Facility: CLINIC | Age: 74
End: 2024-10-14
Payer: MEDICARE

## 2024-10-14 VITALS
DIASTOLIC BLOOD PRESSURE: 55 MMHG | HEART RATE: 61 BPM | BODY MASS INDEX: 30.76 KG/M2 | SYSTOLIC BLOOD PRESSURE: 131 MMHG | WEIGHT: 196 LBS | HEIGHT: 67 IN

## 2024-10-14 DIAGNOSIS — E78.2 MIXED HYPERLIPIDEMIA: Chronic | ICD-10-CM

## 2024-10-14 DIAGNOSIS — E66.01 MORBID OBESITY: Chronic | ICD-10-CM

## 2024-10-14 DIAGNOSIS — I48.0 PAROXYSMAL A-FIB: Chronic | ICD-10-CM

## 2024-10-14 DIAGNOSIS — I87.2 VENOUS INSUFFICIENCY OF BOTH LOWER EXTREMITIES: ICD-10-CM

## 2024-10-14 DIAGNOSIS — I70.0 ATHEROSCLEROSIS OF AORTA: Primary | ICD-10-CM

## 2024-10-14 DIAGNOSIS — G47.33 OSA (OBSTRUCTIVE SLEEP APNEA): Chronic | ICD-10-CM

## 2024-10-14 DIAGNOSIS — I45.6 WOLFF-PARKINSON-WHITE (WPW) SYNDROME: ICD-10-CM

## 2024-10-14 PROBLEM — I31.9 PERICARDITIS: Status: RESOLVED | Noted: 2024-04-18 | Resolved: 2024-10-14

## 2024-10-14 PROCEDURE — 1101F PT FALLS ASSESS-DOCD LE1/YR: CPT | Mod: HCNC,CPTII,S$GLB, | Performed by: INTERNAL MEDICINE

## 2024-10-14 PROCEDURE — 3075F SYST BP GE 130 - 139MM HG: CPT | Mod: HCNC,CPTII,S$GLB, | Performed by: INTERNAL MEDICINE

## 2024-10-14 PROCEDURE — 1159F MED LIST DOCD IN RCRD: CPT | Mod: HCNC,CPTII,S$GLB, | Performed by: INTERNAL MEDICINE

## 2024-10-14 PROCEDURE — 99999 PR PBB SHADOW E&M-EST. PATIENT-LVL III: CPT | Mod: PBBFAC,HCNC,, | Performed by: INTERNAL MEDICINE

## 2024-10-14 PROCEDURE — 3288F FALL RISK ASSESSMENT DOCD: CPT | Mod: HCNC,CPTII,S$GLB, | Performed by: INTERNAL MEDICINE

## 2024-10-14 PROCEDURE — 3044F HG A1C LEVEL LT 7.0%: CPT | Mod: HCNC,CPTII,S$GLB, | Performed by: INTERNAL MEDICINE

## 2024-10-14 PROCEDURE — 99214 OFFICE O/P EST MOD 30 MIN: CPT | Mod: HCNC,S$GLB,, | Performed by: INTERNAL MEDICINE

## 2024-10-14 PROCEDURE — 3008F BODY MASS INDEX DOCD: CPT | Mod: HCNC,CPTII,S$GLB, | Performed by: INTERNAL MEDICINE

## 2024-10-14 PROCEDURE — 3078F DIAST BP <80 MM HG: CPT | Mod: HCNC,CPTII,S$GLB, | Performed by: INTERNAL MEDICINE

## 2024-10-14 NOTE — PROGRESS NOTES
Subjective:   @Patient ID:  Flores Fuentes is a 74 y.o. female who presents for evaluation of Pre-op risk stratification.   HPI:   October 2024:  Post re-attempt right GSV ablation with venoseal.  Repeat ultrasound as below.  GSV still open.  Now deep reflux noted in the femoral and popliteal.  She is compliant with the compression stockings.  She is losing significant weight with exercise and dietary changes.  Lost 88 lb over the last year and feels great.     Venous ultrasound October 2024    There is no evidence of a right lower extremity DVT.    The right greater saphenous vein partially compressible post venoseal.  Above the knee it is open with no significant reflux, below-the-knee partially occluded with a reflux around ankle    The right popliteal and femoral vein have reflux.    February 2024:  follow up post right GSV chemical ablation with Varithena.  On repeat ultrasound GSV remains open with reflux.  Admitted in January 2024 with recurrent Afib with RVR/ flutter post cardioversion.  She is doing okay today.  Compliant with compression stocking        11/2023: F/U. Post PVI, there was not accessory pathway identified during EPS. U/S venous with b/l GSV reflux and Rt pop reflux.  Rt LE edema>> LT. Heaviness, itching and pain. She is compliant with compression stocking which helps some.     CEAP 4S  VCCC score is 11      8/24/2023: F/U. She was hospitalized with angel, wasn't responding to pill in the pocket.  Now she is taking flecainide along with the Cardizem.  Has an appointment with Dr. Servin.  She does have significant symptomatic right lower extremity varicose vein since the age of 12 that has been getting worse.  Right lower extremity edema along with varicosity and heaviness.  Not consistently wearing compression stockings    7/22/2021: Patient is here for follow up. She saw Dr. Servin in 7/2021, EKg with intermittent wpw pattern, plan for ILR to evaluate for possible SVT episodes.     She  stated that she has been ok since last visit.  Last a.fib episode was in 5/2021 and she took flecainide which helped. She gets different  tachypalpitations  that are different from her a.fib     Tolerating OAC well.     No prior CAD. No Tobacco. No DM.    HTN: BP is well controlled.   Paroxysmal A.fib: Currently SR and stable on AAD/ CCB/BB    She has congestion and cold symptoms, along with cough..     Pertinent cardiac hx:    Paroxysmal A.fib, F/U with Dr. Servin. On rhythm control strategy with flecainide (PIP). On Eliquis for anticoagulation.     Venous U/S 9/2023:    There is no evidence of a right lower extremity DVT.    There is no evidence of a left lower extremity DVT.    The right greater saphenous vein has reflux.    The right popliteal vein is has reflux.    The left greater saphenous vein has reflux.    2D Echo (6/3/2019):  Normal left ventricular systolic function. The estimated ejection fraction is 55%  Normal LV diastolic function.  Normal right ventricular systolic function.  Mild left atrial enlargement.  The estimated PA systolic pressure is 31 mm Hg  Normal central venous pressure (3 mm Hg).    Patient Active Problem List    Diagnosis Date Noted    Venous insufficiency of both lower extremities 08/23/2024    Atherosclerosis of aorta 08/07/2024    Acute cystitis without hematuria 03/24/2024    Class 1 obesity due to excess calories with serious comorbidity and body mass index (BMI) of 33.0 to 33.9 in adult 03/23/2024    Atrial flutter 01/19/2024    Hypothyroidism 01/18/2024    Kym-Parkinson-White (WPW) syndrome 09/19/2023    Stress at home 08/08/2023    Weight loss 08/08/2023    Morbid obesity 07/22/2021    Slow transit constipation 07/13/2021     Despite fruits & veg & 1200 dunia diet, try Colace daily      Chronic anticoagulation 06/10/2021    Routine general medical examination at a health care facility 08/13/2020    Osteopenia of neck of left femur 06/04/2020    Asymptomatic microscopic  hematuria 2020    MATTHEW (obstructive sleep apnea)     Stress incontinence, female 2018     After HYST, Try Kegels      Cervical muscle strain 2017    DJD (degenerative joint disease) of cervical spine 2017    Atrial fibrillation with RVR     Primary localized osteoarthrosis, lower leg 2014    Paroxysmal A-fib 10/14/2014    Mixed hyperlipidemia 2013    Subclinical iodine-deficiency hypothyroidism 2013           Right Arm BP - Sittin/54  Left Arm BP - Sittin/55            Lipid panel  Lab Results   Component Value Date    CHOL 163 2024    CHOL 126 2023    CHOL 164 2022     Lab Results   Component Value Date    HDL 50 2024    HDL 41 2023    HDL 45 2022     Lab Results   Component Value Date    LDLCALC 101.6 2024    LDLCALC 70.6 2023    LDLCALC 103.8 2022     Lab Results   Component Value Date    TRIG 57 2024    TRIG 72 2023    TRIG 76 2022     Lab Results   Component Value Date    CHOLHDL 30.7 2024    CHOLHDL 32.5 2023    CHOLHDL 27.4 2022            Review of Systems   Constitutional: Negative for chills and fever.   HENT:  Negative for congestion, hearing loss and nosebleeds.    Eyes:  Negative for blurred vision.   Cardiovascular:  Positive for leg swelling (RT side , chronic lymphedema. ). Negative for chest pain and palpitations.   Respiratory:  Negative for cough, hemoptysis and shortness of breath.    Hematologic/Lymphatic: Negative for bleeding problem.   Skin:  Negative for itching.   Musculoskeletal:  Positive for arthritis.   Gastrointestinal:  Negative for abdominal pain and hematochezia.   Genitourinary:  Negative for hematuria.   Neurological:  Negative for dizziness.   Psychiatric/Behavioral:  Negative for altered mental status and depression.        Objective:   Physical Exam  Constitutional:       Appearance: She is well-developed.   HENT:      Head:  Normocephalic and atraumatic.   Eyes:      Conjunctiva/sclera: Conjunctivae normal.   Neck:      Vascular: No JVD.   Cardiovascular:      Rate and Rhythm: Normal rate and regular rhythm.      Heart sounds: Normal heart sounds. No murmur heard.     No friction rub. No gallop.   Pulmonary:      Effort: Pulmonary effort is normal. No respiratory distress.      Breath sounds: Normal breath sounds. No stridor. No wheezing.   Abdominal:      General: There is no distension.      Palpations: Abdomen is soft.      Tenderness: There is no abdominal tenderness.   Musculoskeletal:      Cervical back: Neck supple.   Skin:     General: Skin is warm and dry.   Neurological:      Mental Status: She is alert and oriented to person, place, and time.   Psychiatric:         Behavior: Behavior normal.         Assessment:     1. Atherosclerosis of aorta    2. Mixed hyperlipidemia    3. Paroxysmal A-fib    4. Venous insufficiency of both lower extremities    5. Kym-Parkinson-White (WPW) syndrome    6. Morbid obesity    7. MATTHEW (obstructive sleep apnea)          Plan:   Continue flecainide and Cardizem.  Remains sinus rhythm.  Continue oral anticoagulation  Post right GSV ablation x2 with a recurrent reopening.  Now she has a deep reflux noted.  Strongly suspect she has central vein compression.  She reports lower extremity edema on the right side for years.  We discussed about venogram with ultrasound and possible stenting.  We will reassess in six-months after weight loss and consider scaling venogram of the time  CEAP 4S  Compliant with compression stocking 20-30 mm Hg.   Weight loss encouraged  Statin      - Return sooner for concerns or questions. If symptoms persist go to the ED  I have reviewed all pertinent data on this patient   I have reviewed the patient's medical history in detail and updated the computerized patient record.  Follow up as scheduled. Return sooner for concerns or questions  She expressed verbal understanding  and agreed with the plan      It was my please seeing Ms. Fuentes. Please don't hesitate to contact us with any questions. Than you.     Patient's Medications   New Prescriptions    No medications on file   Previous Medications    DILTIAZEM (CARDIZEM CD) 240 MG 24 HR CAPSULE    Take 1 capsule (240 mg total) by mouth once daily.    ELIQUIS 5 MG TAB    TAKE 1 TABLET TWICE DAILY    FLECAINIDE (TAMBOCOR) 100 MG TAB    Take 1 tablet (100 mg total) by mouth every 12 (twelve) hours.    FLUTICASONE PROPIONATE (FLONASE) 50 MCG/ACTUATION NASAL SPRAY    USE 1 SPRAY IN EACH NOSTRIL EVERY DAY    LANSOPRAZOLE (PREVACID) 30 MG CAPSULE    Take 1 capsule (30 mg total) by mouth once daily.    LEVOTHYROXINE (SYNTHROID) 25 MCG TABLET    Take 1 tablet (25 mcg total) by mouth once daily.    PRAVASTATIN (PRAVACHOL) 40 MG TABLET    Take 1 tablet (40 mg total) by mouth once daily.   Modified Medications    No medications on file   Discontinued Medications    No medications on file

## 2024-10-16 ENCOUNTER — PATIENT MESSAGE (OUTPATIENT)
Dept: ELECTROPHYSIOLOGY | Facility: CLINIC | Age: 74
End: 2024-10-16
Payer: MEDICARE

## 2024-10-21 ENCOUNTER — CLINICAL SUPPORT (OUTPATIENT)
Dept: CARDIOLOGY | Facility: HOSPITAL | Age: 74
End: 2024-10-21
Attending: INTERNAL MEDICINE
Payer: MEDICARE

## 2024-10-21 DIAGNOSIS — I49.8 OTHER SPECIFIED CARDIAC ARRHYTHMIAS: ICD-10-CM

## 2024-10-21 DIAGNOSIS — I48.0 PAROXYSMAL ATRIAL FIBRILLATION: ICD-10-CM

## 2024-10-21 PROCEDURE — 93298 REM INTERROG DEV EVAL SCRMS: CPT | Mod: 26,HCNC,, | Performed by: INTERNAL MEDICINE

## 2024-10-21 PROCEDURE — 93298 REM INTERROG DEV EVAL SCRMS: CPT | Mod: HCNC | Performed by: INTERNAL MEDICINE

## 2024-10-25 LAB
OHS CV AF BURDEN PERCENT: < 1
OHS CV DC REMOTE DEVICE TYPE: NORMAL

## 2024-10-29 ENCOUNTER — TELEPHONE (OUTPATIENT)
Dept: ELECTROPHYSIOLOGY | Facility: CLINIC | Age: 74
End: 2024-10-29
Payer: MEDICARE

## 2024-11-04 ENCOUNTER — PATIENT MESSAGE (OUTPATIENT)
Dept: PRIMARY CARE CLINIC | Facility: CLINIC | Age: 74
End: 2024-11-04
Payer: MEDICARE

## 2024-11-08 ENCOUNTER — TELEPHONE (OUTPATIENT)
Dept: OPHTHALMOLOGY | Facility: CLINIC | Age: 74
End: 2024-11-08

## 2024-11-08 ENCOUNTER — OFFICE VISIT (OUTPATIENT)
Dept: OPHTHALMOLOGY | Facility: CLINIC | Age: 74
End: 2024-11-08
Payer: MEDICARE

## 2024-11-08 DIAGNOSIS — H18.513 CORNEAL GUTTATA OF BOTH EYES: ICD-10-CM

## 2024-11-08 DIAGNOSIS — H25.11 NUCLEAR SCLEROTIC CATARACT OF RIGHT EYE: Primary | ICD-10-CM

## 2024-11-08 DIAGNOSIS — H25.12 NUCLEAR SCLEROTIC CATARACT OF LEFT EYE: ICD-10-CM

## 2024-11-08 PROCEDURE — 99999 PR PBB SHADOW E&M-EST. PATIENT-LVL II: CPT | Mod: PBBFAC,HCNC,, | Performed by: OPHTHALMOLOGY

## 2024-11-08 NOTE — PROGRESS NOTES
HPI    Referred by Dr. France      MATTHEW (obstructive sleep apnea)  Essential hypertension           NS (nuclear sclerosis), bilateral  Cortical age-related cataract of both eyes    SX: None     Gtt's: None    Patient is here for cataract evaluation.  Pt. States vision is doing okay but stronger glasses prescription is not    giving her a good definition.  Pt. Have noticed that during the summer she became very sensitive to   light.  Pt. Denies pain or discomfort.  Last edited by Teresa Whiting on 11/8/2024 11:21 AM.            Assessment /Plan     For exam results, see Encounter Report.    Nuclear sclerotic cataract of right eye  -     IOL Master - OD - Right Eye    Nuclear sclerotic cataract of left eye                       Visually significant nuclear sclerotic cataract    - Cataracts are interfering with activities of daily living, including night time driving.  - Pt desires cataract surgery for visual rehabilitation.   - Risks / benefits/ alternatives were discussed and patient agrees to proceed with surgery.   - IOL options discussed as well, according to patient's goals and concomitant ocular pathology.  - Target: plano.      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      Discourage mfiol.    Today's visit is associated with current and anticipated ongoing medical care related to this patient's single serious/complex condition (cornea guttae). Follow up is to be continued indefinitely to monitor the condition.

## 2024-11-08 NOTE — TELEPHONE ENCOUNTER
Left pt message letting her know that is okay to use steroid drops.     ----- Message from Katelynn Donohue MD sent at 11/8/2024  4:21 PM CST -----  Can you call pt and let her know it's def okay to use steroids.  ----- Message -----  From: Zuleika Ha MA  Sent: 11/8/2024   1:00 PM CST  To: Katelynn Donohue MD      ----- Message -----  From: Simone Shannon MD  Sent: 11/8/2024  12:57 PM CST  To: Katelynn Donohue MD; Jay[facility]UER[facility] Staff    Hi Dr. Donohue,     Yes absolutely fine with us. Ok to hold anticoagulation for 2 days prior if needed and restart post.     Trevon Nichols  ----- Message -----  From: Katelynn Donohue MD  Sent: 11/8/2024  12:55 PM CST  To: Simone Shannon MD; [facility] Staff    Hi Dr. Shannon,    Our mutual patient Ms. Fuentes needs cataract surgery.  I have patients use topical steroid eye drops post operatively to help with inflammation.  Ms. Fuentes is concerned about steroids causing A-Fib.  I reassured her that systemic absorption is minimal with topical eye drops and she can do punctal occlusion to mitigate that even further, but wanted to run this by you first to get your green light.    Thank you,  Katelynn Donohue

## 2024-11-21 ENCOUNTER — CLINICAL SUPPORT (OUTPATIENT)
Dept: CARDIOLOGY | Facility: HOSPITAL | Age: 74
End: 2024-11-21
Attending: INTERNAL MEDICINE
Payer: MEDICARE

## 2024-11-21 DIAGNOSIS — I48.0 PAROXYSMAL ATRIAL FIBRILLATION: ICD-10-CM

## 2024-11-21 DIAGNOSIS — I49.8 OTHER SPECIFIED CARDIAC ARRHYTHMIAS: ICD-10-CM

## 2024-11-21 PROCEDURE — 93298 REM INTERROG DEV EVAL SCRMS: CPT | Mod: 26,HCNC,, | Performed by: INTERNAL MEDICINE

## 2024-11-21 PROCEDURE — 93298 REM INTERROG DEV EVAL SCRMS: CPT | Mod: HCNC | Performed by: INTERNAL MEDICINE

## 2024-12-04 NOTE — H&P
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    Family History/Social History: refer to chart    Allergies:   Review of patient's allergies indicates:   Allergen Reactions    Decongestant d [pseudoephedrine-dm]      Atrial Fibrillation    Augmentin [amoxicillin-pot clavulanate]      palpitations      Amoxicillin Palpitations    Diphenhydramine-pseudoephed Palpitations     TACHYCARDIA    Povidone-iodine Rash     Mild erythema of skin       Current Medications: No current facility-administered medications for this encounter.    Current Outpatient Medications:     diltiaZEM (CARDIZEM CD) 240 MG 24 hr capsule, Take 1 capsule (240 mg total) by mouth once daily., Disp: 90 capsule, Rfl: 3    ELIQUIS 5 mg Tab, TAKE 1 TABLET TWICE DAILY, Disp: 180 tablet, Rfl: 3    flecainide (TAMBOCOR) 100 MG Tab, Take 1 tablet (100 mg total) by mouth every 12 (twelve) hours., Disp: 180 tablet, Rfl: 3    fluticasone propionate (FLONASE) 50 mcg/actuation nasal spray, USE 1 SPRAY IN EACH NOSTRIL EVERY DAY, Disp: 32 g, Rfl: 2    lansoprazole (PREVACID) 30 MG capsule, Take 1 capsule (30 mg total) by mouth once daily., Disp: 30  capsule, Rfl: 0    levothyroxine (SYNTHROID) 25 MCG tablet, Take 1 tablet (25 mcg total) by mouth once daily., Disp: 90 tablet, Rfl: 2    pravastatin (PRAVACHOL) 40 MG tablet, Take 1 tablet (40 mg total) by mouth once daily., Disp: 90 tablet, Rfl: 2    Facility-Administered Medications Ordered in Other Encounters:     sodium chloride 0.9% bolus 1,000 mL, 1,000 mL, Intravenous, Once, Carley Cabrera, NP    vancomycin in dextrose 5 % 1 gram/250 mL IVPB 1,000 mg, 1,000 mg, Intravenous, On Call Procedure, Carley Cabrera, BIANCA, Last Rate: 166.7 mL/hr at 11/01/21 1249, 1,000 mg at 11/01/21 1249    ASA Score: II     Mallampati Score: II     Plan for Sedation: Moderate Sedation     Patient or Family History of Anesthesia problems : No    Physical Exam    BP: Vital signs stable  General: No apparent distress  HEENT: nuclear sclerotic cataract  Lungs: adequate respirations  Heart: + pulses  Abdomen: soft  Rectal/pelvic: deferred    Impression: Visually significant Cataract.    See previous clinic notes for surgical indications.    Plan: Phacoemulsification with implantation of Intraocular lens

## 2024-12-06 ENCOUNTER — OFFICE VISIT (OUTPATIENT)
Dept: DERMATOLOGY | Facility: CLINIC | Age: 74
End: 2024-12-06
Payer: MEDICARE

## 2024-12-06 ENCOUNTER — PATIENT MESSAGE (OUTPATIENT)
Dept: OPHTHALMOLOGY | Facility: CLINIC | Age: 74
End: 2024-12-06
Payer: MEDICARE

## 2024-12-06 VITALS — WEIGHT: 196 LBS | BODY MASS INDEX: 30.7 KG/M2

## 2024-12-06 DIAGNOSIS — L57.0 ACTINIC KERATOSIS: ICD-10-CM

## 2024-12-06 DIAGNOSIS — H25.11 NUCLEAR SCLEROTIC CATARACT OF RIGHT EYE: Primary | ICD-10-CM

## 2024-12-06 DIAGNOSIS — L30.9 DERMATITIS: Primary | ICD-10-CM

## 2024-12-06 PROCEDURE — 99999 PR PBB SHADOW E&M-EST. PATIENT-LVL III: CPT | Mod: PBBFAC,HCNC,, | Performed by: DERMATOLOGY

## 2024-12-06 RX ORDER — MOMETASONE FUROATE 1 MG/G
OINTMENT TOPICAL DAILY
Qty: 45 G | Refills: 0 | Status: SHIPPED | OUTPATIENT
Start: 2024-12-06

## 2024-12-06 RX ORDER — PREDNISOLONE/MOXIFLOX/BROMFEN 1 %-0.5 %
1 SUSPENSION, DROPS(FINAL DOSAGE FORM)(ML) OPHTHALMIC (EYE) 3 TIMES DAILY
Qty: 5 ML | Refills: 3 | Status: SHIPPED | OUTPATIENT
Start: 2024-12-06

## 2024-12-06 NOTE — PROGRESS NOTES
Subjective:      Patient ID:  Flores Fuentes is a 74 y.o. female who presents for   Chief Complaint   Patient presents with    Follow-up     Left breast    Spot     Left side of the face, several months      Follow up rash on left breast some improvement with the cicaplast still with some erythema.  This has been ongoing off and on for several years and has been biopsied x 2, see previous notes Dr Morales   2019 She also asks about rash on left breast; she has had rash on this nipple in the past that was biopsied to r/o paget's and returned as eczema. She reports using TAC 0.1 consistently and the 3 dots of scaly skin have not resolved.    2022   Patient is a 70 yo female present for UBSE. Patient has complaints about Dry skin on forehead. Last visit was 12/19  hx recurrent rash on left breast, clinically and with path c/w LS&A, completely responded to clobetasol cream and is no longer an issue.  Recurred 4/2024 and has improved some.     Also has recurrence of ak on left cheek, see bx Cr Cool 4/2019 used efudex and had resolved       Follow-up - Follow-up  Affected locations: chest  Signs / symptoms: redness    Spot      Review of Systems   Constitutional:  Negative for fever, chills, weight loss, weight gain, fatigue, night sweats and malaise.   Skin:  Positive for daily sunscreen use and activity-related sunscreen use. Negative for wears hat.   Hematologic/Lymphatic: Bruises/bleeds easily.       Objective:   Physical Exam   Constitutional: She appears well-developed and well-nourished.   Neurological: She is alert and oriented to person, place, and time.   Psychiatric: She has a normal mood and affect.   Skin:   Areas Examined (abnormalities noted in diagram):   Chest / Axilla Inspection Performed                Diagram Legend     Erythematous scaling macule/papule c/w actinic keratosis       Vascular papule c/w angioma      Pigmented verrucoid papule/plaque c/w seborrheic keratosis      Yellow umbilicated  papule c/w sebaceous hyperplasia      Irregularly shaped tan macule c/w lentigo     1-2 mm smooth white papules consistent with Milia      Movable subcutaneous cyst with punctum c/w epidermal inclusion cyst      Subcutaneous movable cyst c/w pilar cyst      Firm pink to brown papule c/w dermatofibroma      Pedunculated fleshy papule(s) c/w skin tag(s)      Evenly pigmented macule c/w junctional nevus     Mildly variegated pigmented, slightly irregular-bordered macule c/w mildly atypical nevus      Flesh colored to evenly pigmented papule c/w intradermal nevus       Pink pearly papule/plaque c/w basal cell carcinoma      Erythematous hyperkeratotic cursted plaque c/w SCC      Surgical scar with no sign of skin cancer recurrence      Open and closed comedones      Inflammatory papules and pustules      Verrucoid papule consistent consistent with wart     Erythematous eczematous patches and plaques     Dystrophic onycholytic nail with subungual debris c/w onychomycosis     Umbilicated papule    Erythematous-base heme-crusted tan verrucoid plaque consistent with inflamed seborrheic keratosis     Erythematous Silvery Scaling Plaque c/w Psoriasis     See annotation      Assessment / Plan:        Dermatitis left breast  Likely irritant contact/asteototic  vs early recurrent ls and a   -     mometasone (ELOCON) 0.1 % ointment; Apply topically once daily.  Dispense: 45 g; Refill: 0        Aveeno lotion after showers        Supportive sports bra for exercise (walks, has lost 90 lbs)        Fragrance free detergent and bounce        No hot water    Call if not improved/resolved 2-3 weeks    Actinic keratosis   Cryosurgery Procedure Note    Verbal consent from the patient is obtained and the patient is aware of the precancerous quality and need for treatment of these lesions. Liquid nitrogen cryosurgery is applied to the 1 actinic keratosis, as detailed in the physical exam, to produce a freeze injury.               Follow up  in about 2 months (around 2/6/2025).

## 2024-12-09 DIAGNOSIS — H25.11 NUCLEAR SCLEROTIC CATARACT OF RIGHT EYE: ICD-10-CM

## 2024-12-09 DIAGNOSIS — H25.12 NUCLEAR SCLEROTIC CATARACT OF LEFT EYE: ICD-10-CM

## 2024-12-09 NOTE — TELEPHONE ENCOUNTER
MARTINV with  arrival time of 8:30 for sx on 12/11/24 with Dr. Donohue @ [place]. Sent message to my chart

## 2024-12-10 NOTE — PRE-PROCEDURE INSTRUCTIONS
Unable to reach pt via phone.  Left voicemail with arrival time also informing pt of need for responsible  accompaniment and instructing pt to follow pre-procedure instructions provided via [facility] portal.  The following message was sent to pt's portal.        Dear Flores     Below you will find basic pre-procedure instructions in preparation for your procedure on 12/11/24 with Dr. Donohue              You should receive your arrival time within 24-48 hours of your scheduled procedure date from the  at your surgeon's office.     -Nothing to eat or drink after midnight the night before your procedure until after your procedure, except AM meds with small sips of water.     - HOLD all oral Diabetic medications night before and morning of procedure  - HOLD all Insulin morning of procedure  - HOLD all Fluid pills morning of procedure  - HOLD all non-insulin shots until after surgery (Ozempic, Mounjaro, Trulicity, Victoza, Byetta, Wegovy and Adlyxin) (7 days prior)  - HOLD all vitamins, minerals and herbal supplements morning of procedure   - TAKE all B/P meds, EXCEPT those that contain a fluid pill (ex. Lasix, Hydroclorothiazide/HCTZ, Spirnolactone)  - USE inhalers as needed and bring AM of surgery  - USE EYE DROPS as directed  -TAKE blood thinner meds AM of surgery unless otherwise instructed by your provider to not take     - Shower and wash face with antibacterial soap (ex. Dial) for 3 mins PM prior and AM of surgery  - No powder, lotions, creams, oils, gels, ointments, makeup,  or jewelry    - Wear comfortable clothing (button up shirt)     (Patient is required to have a responsible ride to transport home, ride may not leave while patient is in surgery)     -- [facility], 2nd floor Surgery Center, located   @ [address] [place] [address]  2nd Floor Registration        If you have any questions or concerns please feel free to contact your surgeon's office.            Please reply to this message as receipt of delivery.     Catina, LPN [facility]  Pre-Admit - Anesthesia Dept

## 2024-12-11 ENCOUNTER — TELEPHONE (OUTPATIENT)
Dept: ELECTROPHYSIOLOGY | Facility: CLINIC | Age: 74
End: 2024-12-11
Payer: MEDICARE

## 2024-12-11 ENCOUNTER — HOSPITAL ENCOUNTER (OUTPATIENT)
Facility: HOSPITAL | Age: 74
Discharge: HOME | End: 2024-12-11
Attending: OPHTHALMOLOGY | Admitting: OPHTHALMOLOGY
Payer: MEDICARE

## 2024-12-11 VITALS
HEIGHT: 67 IN | WEIGHT: 187 LBS | TEMPERATURE: 98 F | SYSTOLIC BLOOD PRESSURE: 134 MMHG | HEART RATE: 59 BPM | DIASTOLIC BLOOD PRESSURE: 61 MMHG | BODY MASS INDEX: 29.35 KG/M2 | OXYGEN SATURATION: 100 % | RESPIRATION RATE: 18 BRPM

## 2024-12-11 DIAGNOSIS — H25.10 AGE-RELATED NUCLEAR CATARACT: ICD-10-CM

## 2024-12-11 DIAGNOSIS — H25.11 NUCLEAR SCLEROTIC CATARACT OF RIGHT EYE: Primary | ICD-10-CM

## 2024-12-11 PROCEDURE — 63600175 PHARM REV CODE 636 W HCPCS: Performed by: OPHTHALMOLOGY

## 2024-12-11 PROCEDURE — 66984 XCAPSL CTRC RMVL W/O ECP: CPT | Mod: HCNC,RT,, | Performed by: OPHTHALMOLOGY

## 2024-12-11 PROCEDURE — 27201423 OPTIME MED/SURG SUP & DEVICES STERILE SUPPLY: Performed by: OPHTHALMOLOGY

## 2024-12-11 PROCEDURE — 71000015 HC POSTOP RECOV 1ST HR: Performed by: OPHTHALMOLOGY

## 2024-12-11 PROCEDURE — 36000706: Performed by: OPHTHALMOLOGY

## 2024-12-11 PROCEDURE — 99152 MOD SED SAME PHYS/QHP 5/>YRS: CPT | Mod: HCNC,,, | Performed by: OPHTHALMOLOGY

## 2024-12-11 PROCEDURE — 25000003 PHARM REV CODE 250: Performed by: OPHTHALMOLOGY

## 2024-12-11 PROCEDURE — 99900035 HC TECH TIME PER 15 MIN (STAT)

## 2024-12-11 PROCEDURE — 94761 N-INVAS EAR/PLS OXIMETRY MLT: CPT

## 2024-12-11 PROCEDURE — 36000707: Performed by: OPHTHALMOLOGY

## 2024-12-11 PROCEDURE — V2632 POST CHMBR INTRAOCULAR LENS: HCPCS | Performed by: OPHTHALMOLOGY

## 2024-12-11 DEVICE — LENS EYHANCE +22.5D: Type: IMPLANTABLE DEVICE | Site: EYE | Status: FUNCTIONAL

## 2024-12-11 RX ORDER — LIDOCAINE HYDROCHLORIDE 40 MG/ML
INJECTION, SOLUTION RETROBULBAR
Status: DISCONTINUED | OUTPATIENT
Start: 2024-12-11 | End: 2024-12-11 | Stop reason: HOSPADM

## 2024-12-11 RX ORDER — TETRACAINE HYDROCHLORIDE 5 MG/ML
1 SOLUTION OPHTHALMIC EVERY 5 MIN PRN
Status: COMPLETED | OUTPATIENT
Start: 2024-12-11 | End: 2024-12-11

## 2024-12-11 RX ORDER — LIDOCAINE HYDROCHLORIDE 10 MG/ML
INJECTION, SOLUTION EPIDURAL; INFILTRATION; INTRACAUDAL; PERINEURAL
Status: DISCONTINUED | OUTPATIENT
Start: 2024-12-11 | End: 2024-12-11 | Stop reason: HOSPADM

## 2024-12-11 RX ORDER — TETRACAINE HYDROCHLORIDE 5 MG/ML
SOLUTION OPHTHALMIC
Status: DISCONTINUED | OUTPATIENT
Start: 2024-12-11 | End: 2024-12-11 | Stop reason: HOSPADM

## 2024-12-11 RX ORDER — MOXIFLOXACIN 5 MG/ML
1 SOLUTION/ DROPS OPHTHALMIC
Status: COMPLETED | OUTPATIENT
Start: 2024-12-11 | End: 2024-12-11

## 2024-12-11 RX ORDER — CYCLOP/TROP/PROPA/PHEN/KET/WAT 1-1-0.1%
1 DROPS (EA) OPHTHALMIC (EYE)
Status: DISCONTINUED | OUTPATIENT
Start: 2024-12-11 | End: 2024-12-11

## 2024-12-11 RX ORDER — PHENYLEPHRINE HYDROCHLORIDE 25 MG/ML
1 SOLUTION/ DROPS OPHTHALMIC EVERY 5 MIN PRN
Status: COMPLETED | OUTPATIENT
Start: 2024-12-11 | End: 2024-12-11

## 2024-12-11 RX ORDER — MOXIFLOXACIN 5 MG/ML
SOLUTION/ DROPS OPHTHALMIC
Status: DISCONTINUED | OUTPATIENT
Start: 2024-12-11 | End: 2024-12-11 | Stop reason: HOSPADM

## 2024-12-11 RX ORDER — ACETAMINOPHEN 325 MG/1
650 TABLET ORAL EVERY 4 HOURS PRN
Status: DISCONTINUED | OUTPATIENT
Start: 2024-12-11 | End: 2024-12-11 | Stop reason: HOSPADM

## 2024-12-11 RX ORDER — FENTANYL CITRATE 50 UG/ML
25 INJECTION, SOLUTION INTRAMUSCULAR; INTRAVENOUS EVERY 5 MIN PRN
Status: DISCONTINUED | OUTPATIENT
Start: 2024-12-11 | End: 2024-12-11 | Stop reason: HOSPADM

## 2024-12-11 RX ORDER — PREDNISOLONE/MOXIFLOX/BROMFEN 1 %-0.5 %
1 SUSPENSION, DROPS(FINAL DOSAGE FORM)(ML) OPHTHALMIC (EYE) 3 TIMES DAILY
Qty: 5 ML | Refills: 3 | Status: SHIPPED | OUTPATIENT
Start: 2024-12-11

## 2024-12-11 RX ORDER — PROPARACAINE HYDROCHLORIDE 5 MG/ML
1 SOLUTION/ DROPS OPHTHALMIC
Status: DISCONTINUED | OUTPATIENT
Start: 2024-12-11 | End: 2024-12-11 | Stop reason: HOSPADM

## 2024-12-11 RX ORDER — TROPICAMIDE 10 MG/ML
1 SOLUTION/ DROPS OPHTHALMIC EVERY 5 MIN PRN
Status: COMPLETED | OUTPATIENT
Start: 2024-12-11 | End: 2024-12-11

## 2024-12-11 RX ORDER — PREDNISOLONE ACETATE 10 MG/ML
SUSPENSION/ DROPS OPHTHALMIC
Status: DISCONTINUED | OUTPATIENT
Start: 2024-12-11 | End: 2024-12-11 | Stop reason: HOSPADM

## 2024-12-11 RX ORDER — MIDAZOLAM HYDROCHLORIDE 1 MG/ML
2 INJECTION, SOLUTION INTRAMUSCULAR; INTRAVENOUS
Status: DISCONTINUED | OUTPATIENT
Start: 2024-12-11 | End: 2024-12-11 | Stop reason: HOSPADM

## 2024-12-11 RX ADMIN — MOXIFLOXACIN 1 DROP: 5 SOLUTION/ DROPS OPHTHALMIC at 10:12

## 2024-12-11 RX ADMIN — TROPICAMIDE 1 DROP: 10 SOLUTION/ DROPS OPHTHALMIC at 09:12

## 2024-12-11 RX ADMIN — TETRACAINE HYDROCHLORIDE 1 DROP: 5 SOLUTION OPHTHALMIC at 09:12

## 2024-12-11 RX ADMIN — MOXIFLOXACIN OPHTHALMIC 1 DROP: 5 SOLUTION/ DROPS OPHTHALMIC at 09:12

## 2024-12-11 RX ADMIN — PHENYLEPHRINE HYDROCHLORIDE 1 DROP: 25 SOLUTION/ DROPS OPHTHALMIC at 09:12

## 2024-12-11 RX ADMIN — MIDAZOLAM HYDROCHLORIDE 2 MG: 1 INJECTION, SOLUTION INTRAMUSCULAR; INTRAVENOUS at 09:12

## 2024-12-11 NOTE — DISCHARGE SUMMARY
[facility]  Discharge Note  Short Stay    Procedure(s) (LRB):  EXTRACTION, CATARACT, WITH IOL INSERTION (Right)    BRIEF DISCHARGE NOTE:    Date of discharge: 12/11/2024    Reason for hospitalization -  Cataract surgery     Final Diagnosis - Visually significant Cataract    Procedures and treatment provided - Status post phacoemulsification with placement of intraocular lens     Diet - Advance to regular as tolerated    Activity - as tolerated    Disposition at the end of the case - Good.    Discharge: to home    The patient tolerated the procedure well and knows to follow up with me tomorrow in the eye clinic, sooner if needed.    Patient and family instructions (as appropriate) - Given to patient on discharge    Katelynn Donohue MD

## 2024-12-11 NOTE — DISCHARGE INSTRUCTIONS
Dr. Donohue     Cataract Post-Operative Instructions       Day of surgery:     -Resume drops THREE times daily into the operative eye.     -Do not rub your eye     -Wear protective sunglasses during the day.     -Resume moderate activity.     -Bathe/shower/wash face normally     -Do not apply makeup around the operative eye for 1 week.     -You should expect:     Blurry vision and halos for 24-48 hours     Dilated pupil for 24-48 hours     Scratchy feeling in the eye for 1-2 days     Curved shadow in your peripheral vision for 2-3 weeks     Occasional flickering of lights for up to 1 week     -If you experience severe pain or nausea, call Dr. Donohue or the on-call doctor at [phone].       Plan to see Dr. Donohue at:     [facility] [address]    [address] [place]  [address]    **Most patients can drive the next morning.  If you do not feel comfortable driving, please arrange for transportation. **

## 2024-12-11 NOTE — OP NOTE
DATE OF PROCEDURE: 12/11/2024    SURGEON: ALEC CARRANZA MD    PREOPERATIVE DIAGNOSIS:  Senile nuclear sclerotic cataract right eye.     POSTOPERATIVE DIAGNOSIS: Senile nuclear sclerotic cataract right eye.     PROCEDURE PERFORMED:  Phacoemulsification with placement of intraocular lens, right eye.    IMPLANT:  DIBOO  22.5    Anesthesia: Moderate sedation with topical Lidocaine. Patient ID confirmed and re-evaluated the patient and anesthesia plan confirming it is suitable for the patient's condition and procedure.     COMPLICATIONS: none    ESTIMATED BLOOD LOSS: <1cc    SPECIMENS: none    INDICATIONS FOR PROCEDURE:   The patient has a history of painless progressive vision loss.  The patient has described difficulties with activities of daily living, which specifically include driving, which is secondary to cataract formation and progression. After we had a thorough discussion about risks, benefits, and alternatives to cataract surgery, the patient agreed to proceed with phacoemulsification and implantation of a lens in the right eye.  These risks include, but are not limited to, hemorrhage, pain, infection, need for additional surgery, need for glasses or contacts, loss of vision, or even loss of the eye.    PROCEDURE IN DETAIL:  The patient was met in the preop holding area.  Consent was confirmed to be signed.  The operative site was marked.  The patient was brought into the operating room by the anesthesia team and placed under monitored anesthesia care.  The right eye was prepped and draped in a sterile ophthalmic fashion.  A Moshe speculum was placed into the right eye.   A paracentesis site was made and 1% preservative-free lidocaine was injected into the anterior chamber.  Viscoelastic  material was injected into the anterior chamber.  A keratome blade was used to make a clear corneal incision.  A cystotome was used to initiate the continuous curvilinear capsulorrhexis which was completed with Utrata  forceps.  BSS on a perry cannula was used to perform hydrodissection.  The phacoemulsification tip was introduced into the eye and the nucleus was removed in a standard divide-and-conquer fashion.  Remaining cortical material was removed from the eye using irrigation-aspiration.  The capsular bag was filled with viscoelastic material and the intraocular lens was injected and positioned into place. Remaining viscoelastic material was removed from the eye using irrigation and aspiration.  The corneal wounds were hydrated.  The eye was filled to physiologic pressure. The wounds were found to be watertight. Drops of Vigamox and prednisilone were placed into the eye.  The eye was washed, dried, and shielded.  The patient tolerated the procedure well and knows to follow up with me tomorrow morning, sooner if needed.      Under my direct supervision, intravenous moderate sedation was administered during the course of this procedure, with continuous monitoring of hemodynamic parameters.  Monitoring of the patient's vital signs and respiratory status was provided by trained nursing staff during the entire course of the procedure and under my supervision and recorded in the patient's medical record.  The total time for sedation was 15 minutes.

## 2024-12-12 ENCOUNTER — PATIENT MESSAGE (OUTPATIENT)
Dept: OPHTHALMOLOGY | Facility: CLINIC | Age: 74
End: 2024-12-12
Payer: MEDICARE

## 2024-12-12 ENCOUNTER — OFFICE VISIT (OUTPATIENT)
Dept: OPTOMETRY | Facility: CLINIC | Age: 74
End: 2024-12-12
Payer: MEDICARE

## 2024-12-12 DIAGNOSIS — Z98.41 STATUS POST CATARACT EXTRACTION AND INSERTION OF INTRAOCULAR LENS OF RIGHT EYE: Primary | ICD-10-CM

## 2024-12-12 DIAGNOSIS — Z96.1 STATUS POST CATARACT EXTRACTION AND INSERTION OF INTRAOCULAR LENS OF RIGHT EYE: Primary | ICD-10-CM

## 2024-12-12 PROCEDURE — 3288F FALL RISK ASSESSMENT DOCD: CPT | Mod: CPTII,S$GLB,, | Performed by: OPTOMETRIST

## 2024-12-12 PROCEDURE — 1126F AMNT PAIN NOTED NONE PRSNT: CPT | Mod: CPTII,S$GLB,, | Performed by: OPTOMETRIST

## 2024-12-12 PROCEDURE — 1159F MED LIST DOCD IN RCRD: CPT | Mod: CPTII,S$GLB,, | Performed by: OPTOMETRIST

## 2024-12-12 PROCEDURE — 1101F PT FALLS ASSESS-DOCD LE1/YR: CPT | Mod: CPTII,S$GLB,, | Performed by: OPTOMETRIST

## 2024-12-12 PROCEDURE — 99999 PR PBB SHADOW E&M-EST. PATIENT-LVL II: CPT | Mod: PBBFAC,,, | Performed by: OPTOMETRIST

## 2024-12-12 PROCEDURE — 99024 POSTOP FOLLOW-UP VISIT: CPT | Mod: S$GLB,,, | Performed by: OPTOMETRIST

## 2024-12-12 PROCEDURE — 3044F HG A1C LEVEL LT 7.0%: CPT | Mod: CPTII,S$GLB,, | Performed by: OPTOMETRIST

## 2024-12-12 NOTE — H&P
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    Family History/Social History: refer to chart    Allergies:   Review of patient's allergies indicates:   Allergen Reactions    Decongestant d [pseudoephedrine-dm]      Atrial Fibrillation    Augmentin [amoxicillin-pot clavulanate]      palpitations      Amoxicillin Palpitations    Diphenhydramine-pseudoephed Palpitations     TACHYCARDIA    Povidone-iodine Rash     Mild erythema of skin       Current Medications: No current facility-administered medications for this encounter.    Current Outpatient Medications:     diltiaZEM (CARDIZEM CD) 240 MG 24 hr capsule, Take 1 capsule (240 mg total) by mouth once daily., Disp: 90 capsule, Rfl: 3    ELIQUIS 5 mg Tab, TAKE 1 TABLET TWICE DAILY, Disp: 180 tablet, Rfl: 3    flecainide (TAMBOCOR) 100 MG Tab, Take 1 tablet (100 mg total) by mouth every 12 (twelve) hours., Disp: 180 tablet, Rfl: 3    fluticasone propionate (FLONASE) 50 mcg/actuation nasal spray, USE 1 SPRAY IN EACH NOSTRIL EVERY DAY, Disp: 32 g, Rfl: 2    levothyroxine (SYNTHROID) 25 MCG tablet, Take 1 tablet (25 mcg total) by mouth once daily., Disp: 90  tablet, Rfl: 2    mometasone (ELOCON) 0.1 % ointment, Apply topically once daily., Disp: 45 g, Rfl: 0    pravastatin (PRAVACHOL) 40 MG tablet, Take 1 tablet (40 mg total) by mouth once daily., Disp: 90 tablet, Rfl: 2    prednisoLONE-moxiflox-bromfen 1-0.5-0.075 % DrpS, Apply 1 Drop/kg to eye 3 (three) times daily., Disp: 5 mL, Rfl: 3    prednisoLONE-moxiflox-bromfen 1-0.5-0.075 % DrpS, Apply 1 drop to eye 3 (three) times daily., Disp: 5 mL, Rfl: 3    Facility-Administered Medications Ordered in Other Encounters:     sodium chloride 0.9% bolus 1,000 mL, 1,000 mL, Intravenous, Once, Carley Cabrera, NP    vancomycin in dextrose 5 % 1 gram/250 mL IVPB 1,000 mg, 1,000 mg, Intravenous, On Call Procedure, Carley Cabrera, NP, Last Rate: 166.7 mL/hr at 11/01/21 1249, 1,000 mg at 11/01/21 1249    ASA Score: II     Mallampati Score: II     Plan for Sedation: Moderate Sedation     Patient or Family History of Anesthesia problems : No    Physical Exam    BP: Vital signs stable  General: No apparent distress  HEENT: nuclear sclerotic cataract  Lungs: adequate respirations  Heart: + pulses  Abdomen: soft  Rectal/pelvic: deferred    Impression: Visually significant Cataract.    See previous clinic notes for surgical indications.    Plan: Phacoemulsification with implantation of Intraocular lens

## 2024-12-12 NOTE — PROGRESS NOTES
HPI    Patient is here today for POD 1 OD. Denies pain. States that she is still   seeing flashes outer peripheral OD.   PMB TID OD.   Last edited by Татьяна Christy, OD on 12/12/2024  1:13 PM.            Assessment /Plan     For exam results, see Encounter Report.    Status post cataract extraction and insertion of intraocular lens of right eye      Slit Lamp Exam  L/L - normal  C/s - quiet  Cornea - clear  A/C - 1+ cell  Lens - PCIOL     POD #1 s/p phaco/IOL  - doing well  - continue the following drops:     vigamox or ocuflox TID x 1 wk then stop  Pred forte TID x  4 wks  Ketorolac TID until runs out     Versus:     Combination drop - 1 drop TID x total of 1 month     Appropriate precautions and post op medications reviewed.  Patient instructed to call or come in if symptoms of redness, decreased vision, or pain are experienced.     -f/up 1-2 wks Dr. Donohue, [place] PRN.

## 2024-12-13 LAB
OHS CV AF BURDEN PERCENT: < 1
OHS CV DC REMOTE DEVICE TYPE: NORMAL

## 2024-12-15 ENCOUNTER — PATIENT MESSAGE (OUTPATIENT)
Dept: ELECTROPHYSIOLOGY | Facility: CLINIC | Age: 74
End: 2024-12-15
Payer: MEDICARE

## 2024-12-16 DIAGNOSIS — I48.0 PAROXYSMAL A-FIB: Chronic | ICD-10-CM

## 2024-12-19 ENCOUNTER — OFFICE VISIT (OUTPATIENT)
Dept: OPHTHALMOLOGY | Facility: CLINIC | Age: 74
End: 2024-12-19
Payer: MEDICARE

## 2024-12-19 DIAGNOSIS — Z96.1 STATUS POST CATARACT EXTRACTION AND INSERTION OF INTRAOCULAR LENS, RIGHT: Primary | ICD-10-CM

## 2024-12-19 DIAGNOSIS — Z98.41 STATUS POST CATARACT EXTRACTION AND INSERTION OF INTRAOCULAR LENS, RIGHT: Primary | ICD-10-CM

## 2024-12-19 DIAGNOSIS — H25.12 NUCLEAR SCLEROSIS OF LEFT EYE: ICD-10-CM

## 2024-12-19 PROCEDURE — 99999 PR PBB SHADOW E&M-EST. PATIENT-LVL II: CPT | Mod: PBBFAC,HCNC,, | Performed by: OPHTHALMOLOGY

## 2024-12-19 NOTE — PROGRESS NOTES
HPI    Referred by Dr. France     S/p Phacoemulsification with placement of intraocular lens, right eye.   12/11/2024  MATTHEW (obstructive sleep apnea)  Essential hypertension           NS (nuclear sclerosis), bilateral  Cortical age-related cataract of both eyes  K guttae / thick cornea      Gtt's:   PMB TID OD    Patient is here for 1 week post op of right eye.  Pt. States vision is doing well.  Pt. Denies pain or discomfort.    Last edited by Teresa Whiting on 12/19/2024  9:28 AM.            Assessment /Plan     For exam results, see Encounter Report.    Status post cataract extraction and insertion of intraocular lens, right    Nuclear sclerosis of left eye  -     IOL Master - OU - Both Eyes    PO week #1 s/p phaco/IOL -    - doing well, no issues    Continue combo drops for a total of 1 month versus: d/c abx gtt, continue PF/ketorolocTID for total of 1 month    Visually significant nuclear sclerotic cataract    - Cataracts are interfering with activities of daily living, including night time driving.  - Pt desires cataract surgery for visual rehabilitation.   - Risks / benefits/ alternatives were discussed and patient agrees to proceed with surgery.   - IOL options discussed as well, according to patient's goals and concomitant ocular pathology.  - Target: plano.         DIBOO 23.0 OS    Thick pachy. 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      Discourage mfiol.    Today's visit is associated with current and anticipated ongoing medical care related to this patient's single serious/complex condition (cornea guttae). Follow up is to be continued indefinitely to monitor the condition.

## 2024-12-20 NOTE — PRE-PROCEDURE INSTRUCTIONS
Patient reviewed on 12/20/2024.  Okay to proceed at [place]. The following pre-procedure instructions and arrival time have been reviewed with patient via phone and sent to patient portal for review.  Patient verbalized an understanding.  Pt to be accompanied by Dorian day of procedure as responsible .      Dear Flores     Below you will find basic pre-procedure instructions in preparation for your procedure on 12/23/24 with Dr. Donohue                 You should receive your arrival time within 24-48 hours of your scheduled procedure date from the  at your surgeon's office.     -Nothing to eat or drink after midnight the night before your procedure until after your procedure, except AM meds with small sips of water.     - HOLD all oral Diabetic medications night before and morning of procedure  - HOLD all Insulin morning of procedure  - HOLD all Fluid pills morning of procedure  - HOLD all non-insulin shots until after surgery (Ozempic, Mounjaro, Trulicity, Victoza, Byetta, Wegovy and Adlyxin) (7 days prior)  - HOLD all vitamins, minerals and herbal supplements morning of procedure   - TAKE all B/P meds, EXCEPT those that contain a fluid pill (ex. Lasix, Hydroclorothiazide/HCTZ, Spirnolactone)  - USE inhalers as needed and bring AM of surgery  - USE EYE DROPS as directed  -TAKE blood thinner meds AM of surgery unless otherwise instructed by your provider to not take     - Shower and wash face with antibacterial soap (ex. Dial) for 3 mins PM prior and AM of surgery  - No powder, lotions, creams, oils, gels, ointments, makeup,  or jewelry    - Wear comfortable clothing (button up shirt)     (Patient is required to have a responsible ride to transport home, ride may not leave while patient is in surgery)     -- [facility], 2nd floor Surgery Center, located   @ [address] [place] [address]  2nd Floor Registration        If you have any questions or concerns please  feel free to contact your surgeon's office.           Please reply to this message as receipt of delivery.     Catina, LPN [facility]  Pre-Admit - Anesthesia Dept

## 2024-12-22 ENCOUNTER — CLINICAL SUPPORT (OUTPATIENT)
Dept: CARDIOLOGY | Facility: HOSPITAL | Age: 74
End: 2024-12-22
Attending: INTERNAL MEDICINE
Payer: MEDICARE

## 2024-12-22 DIAGNOSIS — I49.8 OTHER SPECIFIED CARDIAC ARRHYTHMIAS: ICD-10-CM

## 2024-12-22 DIAGNOSIS — I48.0 PAROXYSMAL ATRIAL FIBRILLATION: ICD-10-CM

## 2024-12-22 PROCEDURE — 93298 REM INTERROG DEV EVAL SCRMS: CPT | Mod: 26,HCNC,, | Performed by: INTERNAL MEDICINE

## 2024-12-22 PROCEDURE — 93298 REM INTERROG DEV EVAL SCRMS: CPT | Mod: HCNC | Performed by: INTERNAL MEDICINE

## 2024-12-23 ENCOUNTER — HOSPITAL ENCOUNTER (OUTPATIENT)
Facility: HOSPITAL | Age: 74
Discharge: HOME | End: 2024-12-23
Attending: OPHTHALMOLOGY | Admitting: OPHTHALMOLOGY
Payer: MEDICARE

## 2024-12-23 ENCOUNTER — OFFICE VISIT (OUTPATIENT)
Dept: OPHTHALMOLOGY | Facility: CLINIC | Age: 74
End: 2024-12-23
Payer: MEDICARE

## 2024-12-23 VITALS
SYSTOLIC BLOOD PRESSURE: 128 MMHG | WEIGHT: 186 LBS | BODY MASS INDEX: 29.19 KG/M2 | HEART RATE: 55 BPM | DIASTOLIC BLOOD PRESSURE: 60 MMHG | TEMPERATURE: 98 F | OXYGEN SATURATION: 99 % | RESPIRATION RATE: 17 BRPM | HEIGHT: 67 IN

## 2024-12-23 DIAGNOSIS — H25.12 AGE-RELATED NUCLEAR CATARACT, LEFT: ICD-10-CM

## 2024-12-23 DIAGNOSIS — H25.12 NUCLEAR SCLEROTIC CATARACT OF LEFT EYE: ICD-10-CM

## 2024-12-23 DIAGNOSIS — Z98.890 POST-OPERATIVE STATE: Primary | ICD-10-CM

## 2024-12-23 DIAGNOSIS — H25.12 NUCLEAR SCLEROTIC CATARACT OF LEFT EYE: Primary | ICD-10-CM

## 2024-12-23 PROCEDURE — V2632 POST CHMBR INTRAOCULAR LENS: HCPCS | Performed by: OPHTHALMOLOGY

## 2024-12-23 PROCEDURE — 3288F FALL RISK ASSESSMENT DOCD: CPT | Mod: CPTII,S$GLB,, | Performed by: OPHTHALMOLOGY

## 2024-12-23 PROCEDURE — 36000706: Performed by: OPHTHALMOLOGY

## 2024-12-23 PROCEDURE — 27201423 OPTIME MED/SURG SUP & DEVICES STERILE SUPPLY: Performed by: OPHTHALMOLOGY

## 2024-12-23 PROCEDURE — 1126F AMNT PAIN NOTED NONE PRSNT: CPT | Mod: CPTII,S$GLB,, | Performed by: OPHTHALMOLOGY

## 2024-12-23 PROCEDURE — 71000015 HC POSTOP RECOV 1ST HR: Performed by: OPHTHALMOLOGY

## 2024-12-23 PROCEDURE — 25000003 PHARM REV CODE 250: Performed by: OPHTHALMOLOGY

## 2024-12-23 PROCEDURE — 99024 POSTOP FOLLOW-UP VISIT: CPT | Mod: S$GLB,,, | Performed by: OPHTHALMOLOGY

## 2024-12-23 PROCEDURE — 3044F HG A1C LEVEL LT 7.0%: CPT | Mod: CPTII,S$GLB,, | Performed by: OPHTHALMOLOGY

## 2024-12-23 PROCEDURE — 36000707: Performed by: OPHTHALMOLOGY

## 2024-12-23 PROCEDURE — 66984 XCAPSL CTRC RMVL W/O ECP: CPT | Mod: 79,HCNC,LT, | Performed by: OPHTHALMOLOGY

## 2024-12-23 PROCEDURE — 99152 MOD SED SAME PHYS/QHP 5/>YRS: CPT | Mod: HCNC,,, | Performed by: OPHTHALMOLOGY

## 2024-12-23 PROCEDURE — 99900035 HC TECH TIME PER 15 MIN (STAT)

## 2024-12-23 PROCEDURE — 63600175 PHARM REV CODE 636 W HCPCS: Performed by: OPHTHALMOLOGY

## 2024-12-23 PROCEDURE — 1101F PT FALLS ASSESS-DOCD LE1/YR: CPT | Mod: CPTII,S$GLB,, | Performed by: OPHTHALMOLOGY

## 2024-12-23 PROCEDURE — 1159F MED LIST DOCD IN RCRD: CPT | Mod: CPTII,S$GLB,, | Performed by: OPHTHALMOLOGY

## 2024-12-23 PROCEDURE — 99999 PR PBB SHADOW E&M-EST. PATIENT-LVL II: CPT | Mod: PBBFAC,,, | Performed by: OPHTHALMOLOGY

## 2024-12-23 PROCEDURE — 94761 N-INVAS EAR/PLS OXIMETRY MLT: CPT

## 2024-12-23 DEVICE — LENS EYHANCE +23.0D: Type: IMPLANTABLE DEVICE | Site: EYE | Status: FUNCTIONAL

## 2024-12-23 RX ORDER — MOXIFLOXACIN 5 MG/ML
1 SOLUTION/ DROPS OPHTHALMIC
Status: COMPLETED | OUTPATIENT
Start: 2024-12-23 | End: 2024-12-23

## 2024-12-23 RX ORDER — MOXIFLOXACIN 5 MG/ML
SOLUTION/ DROPS OPHTHALMIC
Status: DISCONTINUED | OUTPATIENT
Start: 2024-12-23 | End: 2024-12-23 | Stop reason: HOSPADM

## 2024-12-23 RX ORDER — LIDOCAINE HYDROCHLORIDE 40 MG/ML
INJECTION, SOLUTION RETROBULBAR
Status: DISCONTINUED | OUTPATIENT
Start: 2024-12-23 | End: 2024-12-23 | Stop reason: HOSPADM

## 2024-12-23 RX ORDER — PREDNISOLONE ACETATE 10 MG/ML
SUSPENSION/ DROPS OPHTHALMIC
Status: DISCONTINUED | OUTPATIENT
Start: 2024-12-23 | End: 2024-12-23 | Stop reason: HOSPADM

## 2024-12-23 RX ORDER — FENTANYL CITRATE 50 UG/ML
25 INJECTION, SOLUTION INTRAMUSCULAR; INTRAVENOUS EVERY 5 MIN PRN
Status: DISCONTINUED | OUTPATIENT
Start: 2024-12-23 | End: 2024-12-23 | Stop reason: HOSPADM

## 2024-12-23 RX ORDER — CYCLOP/TROP/PROPA/PHEN/KET/WAT 1-1-0.1%
1 DROPS (EA) OPHTHALMIC (EYE)
Status: DISCONTINUED | OUTPATIENT
Start: 2024-12-23 | End: 2024-12-23

## 2024-12-23 RX ORDER — PROPARACAINE HYDROCHLORIDE 5 MG/ML
1 SOLUTION/ DROPS OPHTHALMIC
Status: DISCONTINUED | OUTPATIENT
Start: 2024-12-23 | End: 2024-12-23 | Stop reason: HOSPADM

## 2024-12-23 RX ORDER — ACETAMINOPHEN 325 MG/1
650 TABLET ORAL EVERY 4 HOURS PRN
Status: DISCONTINUED | OUTPATIENT
Start: 2024-12-23 | End: 2024-12-23 | Stop reason: HOSPADM

## 2024-12-23 RX ORDER — TETRACAINE HYDROCHLORIDE 5 MG/ML
1 SOLUTION OPHTHALMIC EVERY 5 MIN PRN
Status: COMPLETED | OUTPATIENT
Start: 2024-12-23 | End: 2024-12-23

## 2024-12-23 RX ORDER — TROPICAMIDE 10 MG/ML
1 SOLUTION/ DROPS OPHTHALMIC EVERY 5 MIN PRN
Status: COMPLETED | OUTPATIENT
Start: 2024-12-23 | End: 2024-12-23

## 2024-12-23 RX ORDER — PHENYLEPHRINE HYDROCHLORIDE 25 MG/ML
1 SOLUTION/ DROPS OPHTHALMIC EVERY 5 MIN PRN
Status: COMPLETED | OUTPATIENT
Start: 2024-12-23 | End: 2024-12-23

## 2024-12-23 RX ORDER — MIDAZOLAM HYDROCHLORIDE 1 MG/ML
2 INJECTION, SOLUTION INTRAMUSCULAR; INTRAVENOUS
Status: DISCONTINUED | OUTPATIENT
Start: 2024-12-23 | End: 2024-12-23 | Stop reason: HOSPADM

## 2024-12-23 RX ORDER — LIDOCAINE HYDROCHLORIDE 10 MG/ML
INJECTION, SOLUTION EPIDURAL; INFILTRATION; INTRACAUDAL; PERINEURAL
Status: DISCONTINUED | OUTPATIENT
Start: 2024-12-23 | End: 2024-12-23 | Stop reason: HOSPADM

## 2024-12-23 RX ADMIN — MOXIFLOXACIN 1 DROP: 5 SOLUTION/ DROPS OPHTHALMIC at 02:12

## 2024-12-23 RX ADMIN — MOXIFLOXACIN OPHTHALMIC 1 DROP: 5 SOLUTION/ DROPS OPHTHALMIC at 11:12

## 2024-12-23 RX ADMIN — MOXIFLOXACIN OPHTHALMIC 1 DROP: 5 SOLUTION/ DROPS OPHTHALMIC at 12:12

## 2024-12-23 RX ADMIN — TROPICAMIDE 1 DROP: 10 SOLUTION/ DROPS OPHTHALMIC at 12:12

## 2024-12-23 RX ADMIN — PHENYLEPHRINE HYDROCHLORIDE 1 DROP: 25 SOLUTION/ DROPS OPHTHALMIC at 12:12

## 2024-12-23 RX ADMIN — PHENYLEPHRINE HYDROCHLORIDE 1 DROP: 25 SOLUTION/ DROPS OPHTHALMIC at 11:12

## 2024-12-23 RX ADMIN — TETRACAINE HYDROCHLORIDE 1 DROP: 5 SOLUTION OPHTHALMIC at 12:12

## 2024-12-23 RX ADMIN — TROPICAMIDE 1 DROP: 10 SOLUTION/ DROPS OPHTHALMIC at 11:12

## 2024-12-23 RX ADMIN — TETRACAINE HYDROCHLORIDE 1 DROP: 5 SOLUTION OPHTHALMIC at 11:12

## 2024-12-23 RX ADMIN — MIDAZOLAM HYDROCHLORIDE 2 MG: 1 INJECTION, SOLUTION INTRAMUSCULAR; INTRAVENOUS at 01:12

## 2024-12-23 NOTE — PROGRESS NOTES
HPI    Referred by Dr. France     S/p Phacoemulsification with placement of intraocular lens, right eye.   12/11/2024   Phacoemulsification with placement of intraocular lens, left eye.    MATTHEW (obstructive sleep apnea)   Essential hypertension             Same day Post Op OS    PGB TID OS   Last edited by Lino Biggs on 12/23/2024  3:00 PM.            Assessment /Plan     For exam results, see Encounter Report.    Post-operative state    Nuclear sclerotic cataract of left eye      Slit Lamp Exam  L/L - normal  C/s - quiet  Cornea - clear  A/C - 1+ cell  Lens - PCIOL    POD #1 s/p phaco/IOL  - doing well  - continue the following drops:    vigamox or ocuflox TID x 1 wk then stop  Pred forte TID x  4 wks  Ketorolac TID until runs out    Versus:    Combination drop - 1 drop TID x total of 1 month    Appropriate precautions and post op medications reviewed.  Patient instructed to call or come in if symptoms of redness, decreased vision, or pain are experienced.    -f/up 1-2 wks, sooner PRN. Or 4 wks with [facility]om for mrx if needed.

## 2024-12-23 NOTE — OP NOTE
DATE OF PROCEDURE: 12/23/2024    SURGEON: ALEC CARRANZA MD    PREOPERATIVE DIAGNOSIS:  Senile nuclear sclerotic cataract left eye.     POSTOPERATIVE DIAGNOSIS: Senile nuclear sclerotic cataract left eye.     PROCEDURE PERFORMED:  Phacoemulsification with placement of intraocular lens, left eye.    IMPLANT:  DIB00 23.0    Anesthesia: Moderate sedation with topical Lidocaine. Patient ID confirmed and re-evaluated the patient and anesthesia plan confirming it is suitable for the patient's condition and procedure.     COMPLICATIONS: none    ESTIMATED BLOOD LOSS: <1cc    SPECIMENS: none    INDICATIONS FOR PROCEDURE:   The patient has a history of painless progressive vision loss.  The patient has described difficulties with activities of daily living, which specifically include driving, which is secondary to cataract formation and progression. After we had a thorough discussion about risks, benefits, and alternatives to cataract surgery, the patient agreed to proceed with phacoemulsification and implantation of a lens in the left eye.  These risks include, but are not limited to, hemorrhage, pain, infection, need for additional surgery, need for glasses or contacts, loss of vision, or even loss of the eye.    PROCEDURE IN DETAIL:  The patient was met in the preop holding area.  Consent was confirmed to be signed.  The operative site was marked.  The patient was brought into the operating room by the anesthesia team and placed under monitored anesthesia care.  The left eye was prepped and draped in a sterile ophthalmic fashion.  A Moshe speculum was placed into the left eye.   A paracentesis site was made and 1% preservative-free lidocaine was injected into the anterior chamber.  Viscoelastic  material was injected into the anterior chamber.  A keratome blade was used to make a clear corneal incision.  A cystotome was used to initiate the continuous curvilinear capsulorrhexis which was completed with Utrata forceps.   BSS on a perry cannula was used to perform hydrodissection.  The phacoemulsification tip was introduced into the eye and the nucleus was removed in a standard divide-and-conquer fashion.  Remaining cortical material was removed from the eye using irrigation-aspiration.  The capsular bag was filled with viscoelastic material and the intraocular lens was injected and positioned into place. Remaining viscoelastic material was removed from the eye using irrigation and aspiration.  The corneal wounds were hydrated.  The eye was filled to physiologic pressure. The wounds were found to be watertight. Drops of Vigamox and prednisilone were placed into the eye.  The eye was washed, dried, and shielded.  The patient tolerated the procedure well and knows to follow up with me tomorrow morning, sooner if needed.      Under my direct supervision, intravenous moderate sedation was administered during the course of this procedure, with continuous monitoring of hemodynamic parameters.  Monitoring of the patient's vital signs and respiratory status was provided by trained nursing staff during the entire course of the procedure and under my supervision and recorded in the patient's medical record.  The total time for sedation was 13 minutes.

## 2024-12-23 NOTE — PLAN OF CARE
Discharge instructions given to patient and spouse with verbalization of understanding all.  VSS, denies n/v and tolerating PO, rates pain level tolerable. IV removed, and family notified for patient discharge home.

## 2024-12-23 NOTE — PLAN OF CARE
Pt in preop bay 37, VSS, meds given and IV inserted. Pt denies any open wounds on body or the use of any weight loss injections. Procedural consents verified with pt.

## 2024-12-23 NOTE — DISCHARGE SUMMARY
[facility]  Discharge Note  Short Stay    Procedure(s) (LRB):  EXTRACTION, CATARACT, WITH IOL INSERTION (Left)  BRIEF DISCHARGE NOTE:    Date of discharge: 12/23/2024    Reason for hospitalization -  Cataract surgery     Final Diagnosis - Visually significant Cataract    Procedures and treatment provided - Status post phacoemulsification with placement of intraocular lens     Diet - Advance to regular as tolerated    Activity - as tolerated    Disposition at the end of the case - Good.    Discharge: to home    The patient tolerated the procedure well and knows to follow up with me tomorrow in the eye clinic, sooner if needed.    Patient and family instructions (as appropriate) - Given to patient on discharge    Katelynn Donohue MD

## 2025-01-08 LAB
OHS CV AF BURDEN PERCENT: < 1
OHS CV DC REMOTE DEVICE TYPE: NORMAL

## 2025-01-22 ENCOUNTER — CLINICAL SUPPORT (OUTPATIENT)
Dept: CARDIOLOGY | Facility: HOSPITAL | Age: 75
End: 2025-01-22
Attending: INTERNAL MEDICINE
Payer: MEDICARE

## 2025-01-22 ENCOUNTER — CLINICAL SUPPORT (OUTPATIENT)
Dept: CARDIOLOGY | Facility: HOSPITAL | Age: 75
End: 2025-01-22
Payer: MEDICARE

## 2025-01-22 DIAGNOSIS — I48.0 PAROXYSMAL ATRIAL FIBRILLATION: ICD-10-CM

## 2025-01-22 DIAGNOSIS — I49.8 OTHER SPECIFIED CARDIAC ARRHYTHMIAS: ICD-10-CM

## 2025-01-22 PROCEDURE — 93298 REM INTERROG DEV EVAL SCRMS: CPT | Mod: 26,HCNC,, | Performed by: INTERNAL MEDICINE

## 2025-01-22 PROCEDURE — 93298 REM INTERROG DEV EVAL SCRMS: CPT | Mod: HCNC | Performed by: INTERNAL MEDICINE

## 2025-01-24 ENCOUNTER — OFFICE VISIT (OUTPATIENT)
Dept: OPTOMETRY | Facility: CLINIC | Age: 75
End: 2025-01-24
Payer: MEDICARE

## 2025-01-24 ENCOUNTER — TELEPHONE (OUTPATIENT)
Dept: ELECTROPHYSIOLOGY | Facility: CLINIC | Age: 75
End: 2025-01-24
Payer: MEDICARE

## 2025-01-24 DIAGNOSIS — Z98.41 STATUS POST CATARACT EXTRACTION OF BOTH EYES WITH INSERTION OF INTRAOCULAR LENS: Primary | ICD-10-CM

## 2025-01-24 DIAGNOSIS — Z96.1 STATUS POST CATARACT EXTRACTION OF BOTH EYES WITH INSERTION OF INTRAOCULAR LENS: Primary | ICD-10-CM

## 2025-01-24 DIAGNOSIS — Z98.42 STATUS POST CATARACT EXTRACTION OF BOTH EYES WITH INSERTION OF INTRAOCULAR LENS: Primary | ICD-10-CM

## 2025-01-24 PROBLEM — I45.6 WOLFF-PARKINSON-WHITE (WPW) SYNDROME: Status: RESOLVED | Noted: 2023-09-19 | Resolved: 2025-01-24

## 2025-01-24 PROCEDURE — 1159F MED LIST DOCD IN RCRD: CPT | Mod: HCNC,CPTII,S$GLB, | Performed by: OPTOMETRIST

## 2025-01-24 PROCEDURE — 99999 PR PBB SHADOW E&M-EST. PATIENT-LVL II: CPT | Mod: PBBFAC,HCNC,, | Performed by: OPTOMETRIST

## 2025-01-24 PROCEDURE — 99024 POSTOP FOLLOW-UP VISIT: CPT | Mod: HCNC,S$GLB,, | Performed by: OPTOMETRIST

## 2025-01-24 PROCEDURE — 92015 DETERMINE REFRACTIVE STATE: CPT | Mod: HCNC,S$GLB,, | Performed by: OPTOMETRIST

## 2025-01-24 NOTE — PROGRESS NOTES
"HPI    Patient here today for 1mo post op PCIOL OU  Pt doing well overall  Does feel like she might need a little additional correction for distance   and near but overall much improvement.  No pain or discomfort  Occasional "twitching" in eye ball OD unrelated to flashes or floaters per   pt   Last edited by Zuleika Belcher on 1/24/2025  1:45 PM.            Assessment /Plan     For exam results, see Encounter Report.    Status post cataract extraction of both eyes with insertion of intraocular lens      Educated on today's WNL findings OU. Eyes well healed from cataract surgery OU. Pt OK to use OTC readers.     Pt to return for un-dilated re-refraction (already billed) due to difficult MR today - will finalize PAL SRx at that time     RTC in 1 year for annual eye exam unless changes noted sooner.    "

## 2025-01-27 LAB
OHS CV AF BURDEN PERCENT: < 1
OHS CV DC REMOTE DEVICE TYPE: NORMAL

## 2025-02-11 ENCOUNTER — OFFICE VISIT (OUTPATIENT)
Dept: OPTOMETRY | Facility: CLINIC | Age: 75
End: 2025-02-11
Payer: MEDICARE

## 2025-02-11 DIAGNOSIS — Z98.42 STATUS POST CATARACT EXTRACTION OF BOTH EYES WITH INSERTION OF INTRAOCULAR LENS: Primary | ICD-10-CM

## 2025-02-11 DIAGNOSIS — Z96.1 STATUS POST CATARACT EXTRACTION OF BOTH EYES WITH INSERTION OF INTRAOCULAR LENS: Primary | ICD-10-CM

## 2025-02-11 DIAGNOSIS — H26.499: ICD-10-CM

## 2025-02-11 DIAGNOSIS — Z98.41 STATUS POST CATARACT EXTRACTION OF BOTH EYES WITH INSERTION OF INTRAOCULAR LENS: Primary | ICD-10-CM

## 2025-02-11 PROCEDURE — 3288F FALL RISK ASSESSMENT DOCD: CPT | Mod: CPTII,S$GLB,, | Performed by: OPTOMETRIST

## 2025-02-11 PROCEDURE — 99024 POSTOP FOLLOW-UP VISIT: CPT | Mod: S$GLB,,, | Performed by: OPTOMETRIST

## 2025-02-11 PROCEDURE — 1101F PT FALLS ASSESS-DOCD LE1/YR: CPT | Mod: CPTII,S$GLB,, | Performed by: OPTOMETRIST

## 2025-02-11 PROCEDURE — 1159F MED LIST DOCD IN RCRD: CPT | Mod: CPTII,S$GLB,, | Performed by: OPTOMETRIST

## 2025-02-11 PROCEDURE — 1126F AMNT PAIN NOTED NONE PRSNT: CPT | Mod: CPTII,S$GLB,, | Performed by: OPTOMETRIST

## 2025-02-11 NOTE — PROGRESS NOTES
HPI    Patient is here today for a glasses follow up. Denies pain.   Last edited by Emily Vega on 2/11/2025 10:42 AM.            Assessment /Plan     For exam results, see Encounter Report.    Status post cataract extraction of both eyes with insertion of intraocular lens    Posterior capsular opacification, not obscuring vision, unspecified laterality      1.   Eyeglass Final Rx       Eyeglass Final Rx         Sphere Cylinder Axis Add    Right Stephenson +0.50 090 +2.50    Left -0.25 +0.50 090 +2.50      Type: PAL    Expiration Date: 2/11/2026                   2. Educated pt on early PCO/wrinkling--would like to follow up with Dr. Donohue in 4-6 months for PCO check and possible YAG CAP if warranted.    RTC 4-6 month PCO check with Dr. Donohue

## 2025-02-21 ENCOUNTER — CLINICAL SUPPORT (OUTPATIENT)
Dept: CARDIOLOGY | Facility: HOSPITAL | Age: 75
End: 2025-02-21
Payer: MEDICARE

## 2025-02-21 ENCOUNTER — OFFICE VISIT (OUTPATIENT)
Dept: CARDIOLOGY | Facility: CLINIC | Age: 75
End: 2025-02-21
Payer: MEDICARE

## 2025-02-21 VITALS
HEIGHT: 67 IN | BODY MASS INDEX: 28.41 KG/M2 | HEART RATE: 57 BPM | DIASTOLIC BLOOD PRESSURE: 76 MMHG | WEIGHT: 181 LBS | SYSTOLIC BLOOD PRESSURE: 144 MMHG

## 2025-02-21 DIAGNOSIS — I48.0 PAROXYSMAL ATRIAL FIBRILLATION: ICD-10-CM

## 2025-02-21 DIAGNOSIS — I49.8 OTHER SPECIFIED CARDIAC ARRHYTHMIAS: ICD-10-CM

## 2025-02-21 DIAGNOSIS — Z00.00 ENCOUNTER FOR MEDICARE ANNUAL WELLNESS EXAM: ICD-10-CM

## 2025-02-21 DIAGNOSIS — I48.0 PAROXYSMAL A-FIB: ICD-10-CM

## 2025-02-21 DIAGNOSIS — G47.33 OSA (OBSTRUCTIVE SLEEP APNEA): Chronic | ICD-10-CM

## 2025-02-21 DIAGNOSIS — I70.0 ATHEROSCLEROSIS OF AORTA: ICD-10-CM

## 2025-02-21 DIAGNOSIS — E66.01 MORBID OBESITY: ICD-10-CM

## 2025-02-21 DIAGNOSIS — I48.4 ATYPICAL ATRIAL FLUTTER: Primary | ICD-10-CM

## 2025-02-21 NOTE — PROGRESS NOTES
Subjective   Patient ID:  Flores Fuentes is a 74 y.o. female who presents for follow-up of Atrial Fibrillation      HPI  Prior Hx:  I had the pleasure of seeing Mrs Fuentes today in our electrophysiology clinic in follow-up for her atrial arrhythmias. As you are aware she is a 74 year-old woman, former patient of Dr. Serivn, with AF/atypical AFL s/p PVI in 10/2023 then redo PVI/PWI/anterior mitral isthmus lesion set 4/2024), reported SVT (no accessory pathway observed with adenosine testing 10/2023), obesity, MATTHEW, and ILR placement. AF first diagnosed in 2006. Ultimately had a DCCV in 2017 for persistence and started on multaq. She later switched to pill-in-pocket strategy. She failed this in 8/2023 and then was started on daily flecainide with continued pAF. Underwent PVI 10/2023 and then redo as noted 4/2024.     8/2024: Seen by Saira Guerrero. No AF noted on ILR since redo-ablation. Remained on eliquis, dilt/flec.    Interim Hx:  Mrs. Fuentes returns for follow-up. No AF observed on ILR. Feels well.    My interpretation of today's in clinic ECG is sinus rhythm with a narrow QRS. (WA 228ms)       Review of Systems   Constitutional: Negative for fever and malaise/fatigue.   HENT:  Negative for congestion and sore throat.    Eyes:  Negative for blurred vision and visual disturbance.   Cardiovascular:  Negative for chest pain, dyspnea on exertion, irregular heartbeat, near-syncope, palpitations and syncope.   Respiratory:  Negative for cough and shortness of breath.    Hematologic/Lymphatic: Negative for bleeding problem. Does not bruise/bleed easily.   Skin: Negative.    Musculoskeletal: Negative.    Gastrointestinal:  Negative for bloating, abdominal pain, hematochezia and melena.   Neurological:  Negative for focal weakness and weakness.   Psychiatric/Behavioral: Negative.            Objective     Physical Exam  Vitals reviewed.   Constitutional:       General: She is not in acute distress.     Appearance:  She is well-developed. She is not diaphoretic.   HENT:      Head: Normocephalic and atraumatic.   Eyes:      General:         Right eye: No discharge.         Left eye: No discharge.      Conjunctiva/sclera: Conjunctivae normal.   Cardiovascular:      Rate and Rhythm: Normal rate and regular rhythm.      Heart sounds: No murmur heard.     No friction rub. No gallop.   Pulmonary:      Effort: Pulmonary effort is normal. No respiratory distress.      Breath sounds: Normal breath sounds. No wheezing or rales.   Abdominal:      General: Bowel sounds are normal. There is no distension.      Palpations: Abdomen is soft.      Tenderness: There is no abdominal tenderness.   Musculoskeletal:      Cervical back: Neck supple.   Skin:     General: Skin is warm and dry.   Neurological:      Mental Status: She is alert and oriented to person, place, and time.   Psychiatric:         Behavior: Behavior normal.         Thought Content: Thought content normal.         Judgment: Judgment normal.            Assessment and Plan     1. Atypical atrial flutter    2. Morbid obesity    3. Paroxysmal A-fib    4. Atherosclerosis of aorta    5. MATTHEW (obstructive sleep apnea)        Plan:  In summary, Mrs Fuentes is a 74 year-old woman, former patient of Dr. Servin, with AF/atypical AFL s/p PVI in 10/2023 then redo PVI/PWI/anterior mitral isthmus lesion set 4/2024), reported SVT (no accessory pathway observed with adenosine testing 10/2023), obesity, MATTHEW, and ILR placement. No AF since redo-ablation. [id number] 3. She should remain on eliquis. She elects to remain on flec/diltiazem.    RTC in 6 months    Thank you for allowing me to participate in the care of this patient. Please do not hesitate to call me with any questions or concerns.    Luis Joiner MD, PhD  Cardiac Electrophysiology

## 2025-02-22 ENCOUNTER — CLINICAL SUPPORT (OUTPATIENT)
Dept: CARDIOLOGY | Facility: HOSPITAL | Age: 75
End: 2025-02-22
Attending: INTERNAL MEDICINE
Payer: MEDICARE

## 2025-02-22 DIAGNOSIS — I49.8 OTHER SPECIFIED CARDIAC ARRHYTHMIAS: ICD-10-CM

## 2025-02-22 DIAGNOSIS — I48.0 PAROXYSMAL ATRIAL FIBRILLATION: ICD-10-CM

## 2025-02-22 PROCEDURE — 93298 REM INTERROG DEV EVAL SCRMS: CPT | Mod: HCNC | Performed by: INTERNAL MEDICINE

## 2025-02-22 PROCEDURE — 93298 REM INTERROG DEV EVAL SCRMS: CPT | Mod: 26,HCNC,, | Performed by: INTERNAL MEDICINE

## 2025-02-24 ENCOUNTER — PATIENT MESSAGE (OUTPATIENT)
Dept: SLEEP MEDICINE | Facility: CLINIC | Age: 75
End: 2025-02-24
Payer: MEDICARE

## 2025-02-24 ENCOUNTER — PATIENT MESSAGE (OUTPATIENT)
Dept: PRIMARY CARE CLINIC | Facility: CLINIC | Age: 75
End: 2025-02-24
Payer: MEDICARE

## 2025-02-24 ENCOUNTER — PATIENT MESSAGE (OUTPATIENT)
Dept: CARDIOLOGY | Facility: CLINIC | Age: 75
End: 2025-02-24
Payer: MEDICARE

## 2025-02-24 DIAGNOSIS — E78.2 MIXED HYPERLIPIDEMIA: ICD-10-CM

## 2025-02-24 DIAGNOSIS — E02 SUBCLINICAL IODINE-DEFICIENCY HYPOTHYROIDISM: Primary | ICD-10-CM

## 2025-02-24 DIAGNOSIS — Z12.31 SCREENING MAMMOGRAM, ENCOUNTER FOR: ICD-10-CM

## 2025-02-24 DIAGNOSIS — M85.852 OSTEOPENIA OF NECK OF LEFT FEMUR: ICD-10-CM

## 2025-03-07 ENCOUNTER — PATIENT MESSAGE (OUTPATIENT)
Dept: UROGYNECOLOGY | Facility: CLINIC | Age: 75
End: 2025-03-07
Payer: MEDICARE

## 2025-03-10 LAB
OHS CV AF BURDEN PERCENT: < 1
OHS CV DC REMOTE DEVICE TYPE: NORMAL

## 2025-03-25 ENCOUNTER — CLINICAL SUPPORT (OUTPATIENT)
Dept: CARDIOLOGY | Facility: HOSPITAL | Age: 75
End: 2025-03-25
Attending: INTERNAL MEDICINE
Payer: MEDICARE

## 2025-03-25 ENCOUNTER — CLINICAL SUPPORT (OUTPATIENT)
Dept: CARDIOLOGY | Facility: HOSPITAL | Age: 75
End: 2025-03-25
Payer: MEDICARE

## 2025-03-25 DIAGNOSIS — I48.0 PAROXYSMAL ATRIAL FIBRILLATION: ICD-10-CM

## 2025-03-25 DIAGNOSIS — I49.8 OTHER SPECIFIED CARDIAC ARRHYTHMIAS: ICD-10-CM

## 2025-03-25 PROCEDURE — 93298 REM INTERROG DEV EVAL SCRMS: CPT | Mod: 26,HCNC,, | Performed by: INTERNAL MEDICINE

## 2025-03-25 PROCEDURE — 93298 REM INTERROG DEV EVAL SCRMS: CPT | Mod: HCNC | Performed by: INTERNAL MEDICINE

## 2025-03-31 ENCOUNTER — PATIENT MESSAGE (OUTPATIENT)
Dept: OPTOMETRY | Facility: CLINIC | Age: 75
End: 2025-03-31
Payer: MEDICARE

## 2025-04-07 ENCOUNTER — OFFICE VISIT (OUTPATIENT)
Dept: OPHTHALMOLOGY | Facility: CLINIC | Age: 75
End: 2025-04-07
Payer: MEDICARE

## 2025-04-07 DIAGNOSIS — H26.492 POSTERIOR CAPSULAR OPACIFICATION VISUALLY SIGNIFICANT, LEFT EYE: ICD-10-CM

## 2025-04-07 DIAGNOSIS — H26.491 POSTERIOR CAPSULAR OPACIFICATION VISUALLY SIGNIFICANT, RIGHT EYE: Primary | ICD-10-CM

## 2025-04-07 PROCEDURE — 66821 AFTER CATARACT LASER SURGERY: CPT | Mod: 50,S$GLB,, | Performed by: OPHTHALMOLOGY

## 2025-04-07 PROCEDURE — 99214 OFFICE O/P EST MOD 30 MIN: CPT | Mod: 57,S$GLB,, | Performed by: OPHTHALMOLOGY

## 2025-04-07 PROCEDURE — 3288F FALL RISK ASSESSMENT DOCD: CPT | Mod: CPTII,S$GLB,, | Performed by: OPHTHALMOLOGY

## 2025-04-07 PROCEDURE — 1101F PT FALLS ASSESS-DOCD LE1/YR: CPT | Mod: CPTII,S$GLB,, | Performed by: OPHTHALMOLOGY

## 2025-04-07 PROCEDURE — 1159F MED LIST DOCD IN RCRD: CPT | Mod: CPTII,S$GLB,, | Performed by: OPHTHALMOLOGY

## 2025-04-07 PROCEDURE — 1126F AMNT PAIN NOTED NONE PRSNT: CPT | Mod: CPTII,S$GLB,, | Performed by: OPHTHALMOLOGY

## 2025-04-07 PROCEDURE — 99999 PR PBB SHADOW E&M-EST. PATIENT-LVL II: CPT | Mod: PBBFAC,,, | Performed by: OPHTHALMOLOGY

## 2025-04-07 NOTE — PROGRESS NOTES
HPI    Referred by Dr. Christy      S/p Phacoemulsification with placement of intraocular lens, right eye.   12/11/2024   MATTHEW (obstructive sleep apnea)   Essential hypertension           NS (nuclear sclerosis), bilateral   Cortical age-related cataract of both eyes   K  guttae / thick cornea     No GTTS    Pt is here today for a YAG. Pt states she has blurry vision in OU. Pt   states she has floaters, denies flashers. Pt states she has light   sensitivity. Pt denies pain or discomfort.        Last edited by Rachel Cruz on 4/7/2025  2:04 PM.            Assessment /Plan     For exam results, see Encounter Report.    Posterior capsular opacification visually significant, right eye  -     Yag Capsulotomy - OS - Left Eye  -     Yag Capsulotomy - OD - Right Eye    Posterior capsular opacification visually significant, left eye  -     Yag Capsulotomy - OS - Left Eye  -     Yag Capsulotomy - OD - Right Eye      Visually significant posterior capsular opacity present.  OU'  - discussed risks, benefits, and alternatives to laser surgery - pt wishes to proceed with yag laser  - Informed consent obtained and correct eye(s) verified with patient.  - Intraocular Pressure to be taken post procedure.   - PF QID x 4 days then d/c  - f/up as scheduled    DIAGNOSIS: Visually significant posterior capsular opacity    PROCEDURE: YAG Laser Capsulotomy ou    COMPLICATIONS: none     DESCRIPTION OF PROCEDURE IN DETAIL:  1 drop of topical Proparacaine and Iopidine instilled, and eye(s) dilated with 1% Tropicamide 2.5% Phenylephrine. YAG laser applied to posterior capsule in cruciate pattern.      DISPOSITION:  Patient tolerated procedure well.      Patient to call or message us if there are any concerns following the procedure.    Increase in floaters expected for the first few weeks after the procedure, and I anticipate these to subside.    F/up optom DAHIANA MÁRQUEZ sicca    -start ATs.

## 2025-04-11 LAB
OHS CV AF BURDEN PERCENT: < 1
OHS CV DC REMOTE DEVICE TYPE: NORMAL

## 2025-04-25 ENCOUNTER — CLINICAL SUPPORT (OUTPATIENT)
Dept: CARDIOLOGY | Facility: HOSPITAL | Age: 75
End: 2025-04-25
Payer: MEDICARE

## 2025-04-25 ENCOUNTER — CLINICAL SUPPORT (OUTPATIENT)
Dept: CARDIOLOGY | Facility: HOSPITAL | Age: 75
End: 2025-04-25
Attending: INTERNAL MEDICINE
Payer: MEDICARE

## 2025-04-25 DIAGNOSIS — I48.0 PAROXYSMAL ATRIAL FIBRILLATION: ICD-10-CM

## 2025-04-25 DIAGNOSIS — I49.8 OTHER SPECIFIED CARDIAC ARRHYTHMIAS: ICD-10-CM

## 2025-04-25 PROCEDURE — 93298 REM INTERROG DEV EVAL SCRMS: CPT | Mod: HCNC | Performed by: INTERNAL MEDICINE

## 2025-04-25 PROCEDURE — 93298 REM INTERROG DEV EVAL SCRMS: CPT | Mod: 26,HCNC,, | Performed by: INTERNAL MEDICINE

## 2025-05-09 LAB
OHS CV AF BURDEN PERCENT: < 1
OHS CV DC REMOTE DEVICE TYPE: NORMAL

## 2025-05-14 RX ORDER — PRAVASTATIN SODIUM 40 MG/1
40 TABLET ORAL DAILY
Qty: 90 TABLET | Refills: 0 | Status: SHIPPED | OUTPATIENT
Start: 2025-05-14

## 2025-05-14 NOTE — TELEPHONE ENCOUNTER
Care Due:                  Date            Visit Type   Department     Provider  --------------------------------------------------------------------------------                                EP -                              PRIMARY      [facility] PRIMARY  Last Visit: 08-      CARE ([facility])   CARE           Naz Condon                              MYCHART                              FOLLOWUP/OF  [facility] PRIMARY  Next Visit: 08-      [facility] VISIT   CARE           Naz Condon                                                            Last  Test          Frequency    Reason                     Performed    Due Date  --------------------------------------------------------------------------------    CMP.........  12 months..  pravastatin..............  08- 08-    Lipid Panel.  12 months..  pravastatin..............  08- 08-    TSH.........  12 months..  levothyroxine............  03- 03-    Health [facility] Embedded Care Due Messages. Reference number: [id number].   5/14/2025 12:17:28 PM CDT

## 2025-05-26 ENCOUNTER — CLINICAL SUPPORT (OUTPATIENT)
Dept: CARDIOLOGY | Facility: HOSPITAL | Age: 75
End: 2025-05-26
Attending: INTERNAL MEDICINE
Payer: MEDICARE

## 2025-05-26 ENCOUNTER — CLINICAL SUPPORT (OUTPATIENT)
Dept: CARDIOLOGY | Facility: HOSPITAL | Age: 75
End: 2025-05-26
Payer: MEDICARE

## 2025-05-26 DIAGNOSIS — I49.8 OTHER SPECIFIED CARDIAC ARRHYTHMIAS: ICD-10-CM

## 2025-05-26 DIAGNOSIS — I48.0 PAROXYSMAL ATRIAL FIBRILLATION: ICD-10-CM

## 2025-05-26 PROCEDURE — 93298 REM INTERROG DEV EVAL SCRMS: CPT | Mod: 26,HCNC,, | Performed by: INTERNAL MEDICINE

## 2025-05-26 PROCEDURE — 93298 REM INTERROG DEV EVAL SCRMS: CPT | Mod: HCNC | Performed by: INTERNAL MEDICINE

## 2025-05-27 NOTE — PROGRESS NOTES
05/28/2025    SUBJECTIVE:   74 y.o. female for annual exam.    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 Dr Viktor Toure    Osteopenia of neck of left femur 6/4/2020    Paroxysmal atrioventricular tachycardia     Plantar fasciitis     Right knee meniscal tear     Dr Mayfield, tx conservatively    Severe obesity (BMI 35.0-35.9 with comorbidity) 1/24/2017    Slow transit constipation 7/13/2021    Despite fruits & veg & 1200 dunia diet, try Colace daily    Stress incontinence, female 03/28/2018    hasn't tried oxybutynin, After HYST, Kegels & PT  didn't work, worse at night wearing pads, Dr Infante    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    Thyroid disease        Past Surgical History:   Procedure Laterality Date    ABLATION  4/16/2024    Procedure: Ablation;  Surgeon: Ameya Servin MD;  Location: [facility] EP LAB;  Service: Cardiology;;    ABLATION OF ARRHYTHMOGENIC FOCUS FOR ATRIAL FIBRILLATION N/A 10/17/2023    Procedure: Ablation atrial fibrillation;  Surgeon: Ameya Servin MD;  Location: [facility] EP LAB;  Service: Cardiology;  Laterality: N/A;  AF, PERICO, PVI, WPW, RFA, POPEYE, Gen, DM, 3 Prep *MDT ILR*    ABLATION OF ARRHYTHMOGENIC FOCUS FOR ATRIAL FIBRILLATION N/A 4/16/2024    Procedure: Ablation atrial fibrillation;  Surgeon: Ameya Servin MD;  Location: [facility] EP LAB;  Service: Cardiology;  Laterality: N/A;  AF, PERICO (definite), PVI (redo), RFA, Carto, Gen, DM, 3 Prep *ILR MDT*    ABLATION, ATRIAL  FLUTTER, ATYPICAL  4/16/2024    Procedure: Ablation, Atrial Flutter, Atypical;  Surgeon: Ameya Servin MD;  Location: [facility] EP LAB;  Service: Cardiology;;    ABLATION, CHEMICAL SEALANT, VARICOSE VEIN Right 8/23/2024    Procedure: Ablation, Chemical Sealant, Varicose Vein;  Surgeon: Simone Shannon MD;  Location: [facility] CATH LAB/EP;  Service: Cardiology;  Laterality: Right;  Please contact Venoseal rep    ABLATION, MECHANOCHEMICAL, VARICOSE VEIN Right 12/8/2023    Procedure: ABLATION, MECHANOCHEMICAL, VARICOSE VEIN;  Surgeon: Simone Shannon MD;  Location: [facility] CATH LAB/EP;  Service: Cardiology;  Laterality: Right;    APPENDECTOMY      CATARACT EXTRACTION W/  INTRAOCULAR LENS IMPLANT Right 12/11/2024    Procedure: EXTRACTION, CATARACT, WITH IOL INSERTION;  Surgeon: Katelynn Donohue MD;  Location: [facility] OR;  Service: Ophthalmology;  Laterality: Right;    CATARACT EXTRACTION W/  INTRAOCULAR LENS IMPLANT Left 12/23/2024    Procedure: EXTRACTION, CATARACT, WITH IOL INSERTION;  Surgeon: Katelynn Donohue MD;  Location: [facility] OR;  Service: Ophthalmology;  Laterality: Left;    COLONOSCOPY N/A 8/13/2020    Procedure: COLONOSCOPY;  Surgeon: Angus Ac MD;  Location: [facility] ENDO ([address] [place] [address]);  Service: Endoscopy;  Laterality: N/A;  ok to hold Eliquis 2 days per Dr Malathi HERNANDEZ test at [place] on 8/10-GT    ECHOCARDIOGRAM,TRANSESOPHAGEAL N/A 9/20/2023    Procedure: Transesophageal echo (PERICO) intra-procedure log documentation;  Surgeon: Provider, Dos Diagnostic;  Location: [facility] EP LAB;  Service: Cardiology;  Laterality: N/A;    ECHOCARDIOGRAM,TRANSESOPHAGEAL  3/25/2024    Procedure: Transesophageal echo (PERICO) intra-procedure log documentation;  Surgeon: Kathy Malik MD;  Location: [facility] EP LAB;  Service: Cardiology;;    HYSTERECTOMY  1990    TAHBSO for fibroids    INSERTION OF IMPLANTABLE LOOP RECORDER Left 11/1/2021    Procedure: Insertion, Implantable Loop Recorder;  Surgeon: Ameya Servin MD;  Location: [facility]  EP LAB;  Service: Cardiology;  Laterality: Left;  AF, ILR implant, MDT,  DM, 3 Prep    MANDIBLE SURGERY  2015    L mandible, Dr Toure    MANDIBLE SURGERY Left 8/27/2019    OOPHORECTOMY      TONSILLECTOMY      TRANSESOPHAGEAL ECHOCARDIOGRAM WITH POSSIBLE CARDIOVERSION (PERICO W/ POSS CARDIOVERSION) N/A 1/19/2024    Procedure: Transesophageal echo (PERICO) intra-procedure log documentation;  Surgeon: JV Rosenthal MD;  Location: [facility] EP LAB;  Service: Cardiology;  Laterality: N/A;    TRANSESOPHAGEAL ECHOCARDIOGRAPHY N/A 10/17/2023    Procedure: ECHOCARDIOGRAM, TRANSESOPHAGEAL;  Surgeon: Kathy Malik MD;  Location: [facility] EP LAB;  Service: Cardiology;  Laterality: N/A;    TRANSESOPHAGEAL ECHOCARDIOGRAPHY N/A 4/16/2024    Procedure: ECHOCARDIOGRAM, TRANSESOPHAGEAL;  Surgeon: Jerry, Alonso Diagnostic;  Location: [facility] EP LAB;  Service: Cardiology;  Laterality: N/A;    TREATMENT OF CARDIAC ARRHYTHMIA N/A 9/20/2023    Procedure: Cardioversion or Defibrillation;  Surgeon: JV Rosenthal MD;  Location: [facility] EP LAB;  Service: Cardiology;  Laterality: N/A;  AF, DCCV/PERICO, ANES, EH,     TREATMENT OF CARDIAC ARRHYTHMIA N/A 1/19/2024    Procedure: Cardioversion or Defibrillation;  Surgeon: Luis Joiner MD;  Location: [facility] EP LAB;  Service: Cardiology;  Laterality: N/A;  AFL, DCCV ONLY, ANES, EH,     TREATMENT OF CARDIAC ARRHYTHMIA N/A 3/25/2024    Procedure: Cardioversion or Defibrillation;  Surgeon: Ameya Servin MD;  Location: [facility] EP LAB;  Service: Cardiology;  Laterality: N/A;  AF, DCCV ONLY, ANES, AR, 351    TREATMENT OF CARDIAC ARRHYTHMIA N/A 1/19/2024    Procedure: Cardioversion or Defibrillation;  Surgeon: JV Rosenthal MD;  Location: [facility] EP LAB;  Service: Cardiology;  Laterality: N/A;  AFL, PERICO/DCCV, ANES, EH,        Family History   Problem Relation Name Age of Onset    Cancer Mother          stomach, liver    Breast cancer Neg Hx      Ovarian cancer Neg Hx      Cervical cancer  Neg Hx      Endometrial cancer Neg Hx      Vaginal cancer Neg Hx      Melanoma Neg Hx      Colon cancer Neg Hx      Esophageal cancer Neg Hx         Social History     Socioeconomic History    Marital status:    Tobacco Use    Smoking status: Never    Smokeless tobacco: Never   Substance and Sexual Activity    Alcohol use: No    Drug use: No    Sexual activity: Not Currently   Other Topics Concern    Are you pregnant or think you may be? No     Social Drivers of Health     Financial Resource Strain: Low Risk  (2/14/2025)    Overall Financial Resource Strain (CARDIA)     Difficulty of Paying Living Expenses: Not hard at all   Food Insecurity: No Food Insecurity (2/14/2025)    Hunger Vital Sign     Worried About Running Out of Food in the Last Year: Never true     Ran Out of Food in the Last Year: Never true   Transportation Needs: No Transportation Needs (2/14/2025)    PRAPARE - Transportation     Lack of Transportation (Medical): No     Lack of Transportation (Non-Medical): No   Physical Activity: Sufficiently Active (2/14/2025)    Exercise Vital Sign     Days of Exercise per Week: 7 days     Minutes of Exercise per Session: 60 min   Stress: No Stress Concern Present (2/14/2025)    [employer] [place] [employer] - Occupational Stress Questionnaire     Feeling of Stress : Only a little   Housing Stability: Low Risk  (2/14/2025)    Housing Stability Vital Sign     Unable to Pay for Housing in the Last Year: No     Number of Times Moved in the Last Year: 0     Homeless in the Last Year: No       Current Outpatient Medications   Medication Sig Dispense Refill    apixaban (ELIQUIS) 5 mg Tab Take 1 tablet (5 mg total) by mouth 2 (two) times daily. 180 tablet 3    diltiaZEM (CARDIZEM CD) 240 MG 24 hr capsule Take 1 capsule (240 mg total) by mouth once daily. 90 capsule 3    flecainide (TAMBOCOR) 100 MG Tab TAKE 1 TABLET EVERY 12 HOURS 180 tablet 3    fluticasone propionate (FLONASE) 50 mcg/actuation nasal  spray USE 1 SPRAY IN EACH NOSTRIL EVERY DAY 32 g 2    levothyroxine (SYNTHROID) 25 MCG tablet Take 1 tablet (25 mcg total) by mouth once daily. 90 tablet 2    mometasone (ELOCON) 0.1 % ointment Apply topically once daily. 45 g 0    pravastatin (PRAVACHOL) 40 MG tablet Take 1 tablet (40 mg total) by mouth once daily. 90 tablet 0    prednisoLONE-moxiflox-bromfen 1-0.5-0.075 % DrpS Apply 1 Drop/kg to eye 3 (three) times daily. 5 mL 3    prednisoLONE-moxiflox-bromfen 1-0.5-0.075 % DrpS Apply 1 drop to eye 3 (three) times daily. 5 mL 3     No current facility-administered medications for this visit.     Facility-Administered Medications Ordered in Other Visits   Medication Dose Route Frequency Provider Last Rate Last Admin    sodium chloride 0.9% bolus 1,000 mL  1,000 mL Intravenous Once Carley Cabrera NP        vancomycin in dextrose 5 % 1 gram/250 mL IVPB 1,000 mg  1,000 mg Intravenous On Call Procedure Carley Cabrera .7 mL/hr at 21 1249 1,000 mg at 21 1249       Review of patient's allergies indicates:   Allergen Reactions    Decongestant d [pseudoephedrine-dm]      Atrial Fibrillation    Augmentin [amoxicillin-pot clavulanate]      palpitations      Amoxicillin Palpitations    Diphenhydramine-pseudoephed Palpitations     TACHYCARDIA    Povidone-iodine Rash     Mild erythema of skin       No LMP recorded. Patient has had a hysterectomy.    Well Woman:  Pap test: post hyst History of abnormal paps: No.  History of STIs:  No  Mammogram: Date of last: 2024   Result: Normal  Colonoscopy: Date of last: 2018.  Result:  External hemorrhoids.  Repeat due:  10 years.  --doing cologuard now  DEXA:  Date of last:2020  Result:  normal.       OB History          2    Para   2    Term   2            AB        Living   2         SAB        IAB        Ectopic        Multiple        Live Births   2                   ROS:  Feeling well.   No dyspnea or chest pain on exertion.    No abdominal  "pain. + constipation --started fiber supplement  1.Mixed urinary incontinence, urge > stress:    --voiding every 1 1/2 hours  --using cpap machine--does have enuresis--using 2 pads/ night--does limit fluids  --UUI if she ignores the urge.   +ANNIKA (minimal)  --using 3-6 pads/day with moderate wetness during the day and 2/night  --does not want to take medication-- worried that they will increase her chance of an arrhythmia  --did go to pelvic floor PT     2. Vaginal atrophy (dryness):     --not using coconut oil      .   GYN ROS: no breast pain or new or enlarging lumps on self exam, no vaginal bleeding. No neurological complaints.    OBJECTIVE:   The patient appears well, alert, oriented x 3, in no distress.  BP (!) 164/68 (BP Location: Left arm, Patient Position: Sitting)   Pulse (!) 59   Ht 5' 7" (1.702 m)   Wt 82.3 kg (181 lb 7 oz)   BMI 28.42 kg/m²   denies chest pain, shortness of breath, blurry vision, or headache  ENT normal.  Neck supple. No adenopathy or thyromegaly. NANY.   Normal respiratory effort  Pulse with  regular rate and rhythm.   Abdomen soft without tenderness, guarding, mass or organomegaly.   Extremities show no edema, normal peripheral pulses.   Neurological is normal, no focal findings.    BREAST EXAM: patient declines to have breast exam    PELVIC EXAM:   VULVA: normal appearing vulva with no masses, tenderness or lesions, cystic area with varicosity on R labia  VAGINA: normal appearing vagina with normal color and discharge, no lesions, atrophic,  CERVIX: surgically absent,   UTERUS: absent,   ADNEXA: no masses,   RECTAL: normal rectal, no masses    Aa/Ba  0    ASSESSMENT:   1. Well woman exam        2. Enuresis  BD PureWick urine collection system      3. Nocturia  BD PureWick urine collection system      4. Urge incontinence  BD PureWick urine collection system      5. Labial cyst              PLAN:     Well woman  --up to date    Vaginal atrophy  --use coconut oil/ olive oil just " inside vagina      Midline cystocele stage 2  --stable/ asymptomatic     Mixed urinary incontinence, urge > stress:    --Empty bladder every 3 hours.  Empty well: wait a minute, lean forward on toilet.    --Avoid dietary irritants (see sheet).  Keep diary x 3-5 days to determine your irritants.  --KEGELS: do 10 in AM and 10 in PM, holding each x 10 seconds.  When you feel urge to go, STOP, KEGEL, and when urge has passed, then go to bathroom.  Consider PT in future.    --URGE: consider gemtesa.  If you do not want to take medication, would need to do urodynamics to discuss 3rd line therapy such as botox or sacral nerve modulation.  Takes 2-4 weeks to see if will have effect.  For dry mouth: get sour, sugar free lozenge or gum.  --STRESS:  Pessary vs. Sling.     Nocturia  --[employer]    Liberator [phone]    Cost 2 units  $ 329  3 year warranty  $399 battery back up unit 3 year warranty  Catheters $195/30 pads--lasts one month     RTC 1 year for follow up      20 minutes were spent in face to face time with this patient  90 % of this time was spent in counseling and/or coordination of care    Anette VILLARREAL [facility]and [facility]  Division of Female Pelvic Medicine and Reconstructive Surgery  Department of Obstetrics & Gynecology

## 2025-05-28 ENCOUNTER — OFFICE VISIT (OUTPATIENT)
Dept: UROGYNECOLOGY | Facility: CLINIC | Age: 75
End: 2025-05-28
Payer: MEDICARE

## 2025-05-28 VITALS
HEIGHT: 67 IN | HEART RATE: 59 BPM | SYSTOLIC BLOOD PRESSURE: 164 MMHG | DIASTOLIC BLOOD PRESSURE: 68 MMHG | WEIGHT: 181.44 LBS | BODY MASS INDEX: 28.48 KG/M2

## 2025-05-28 DIAGNOSIS — R35.1 NOCTURIA: ICD-10-CM

## 2025-05-28 DIAGNOSIS — N39.41 URGE INCONTINENCE: ICD-10-CM

## 2025-05-28 DIAGNOSIS — R32 ENURESIS: ICD-10-CM

## 2025-05-28 DIAGNOSIS — N90.7 LABIAL CYST: ICD-10-CM

## 2025-05-28 DIAGNOSIS — Z01.419 WELL WOMAN EXAM: Primary | ICD-10-CM

## 2025-05-28 PROCEDURE — 99999 PR PBB SHADOW E&M-EST. PATIENT-LVL IV: CPT | Mod: PBBFAC,HCNC,, | Performed by: NURSE PRACTITIONER

## 2025-05-28 RX ORDER — FLECAINIDE ACETATE 100 MG/1
100 TABLET ORAL EVERY 12 HOURS
Qty: 180 TABLET | Refills: 3 | Status: SHIPPED | OUTPATIENT
Start: 2025-05-28

## 2025-05-28 NOTE — PATIENT INSTRUCTIONS
Well woman  --up to date    Vaginal atrophy  --use coconut oil/ olive oil just inside vagina     Mixed urinary incontinence, urge > stress:    --Empty bladder every 3 hours.  Empty well: wait a minute, lean forward on toilet.    --Avoid dietary irritants (see sheet).  Keep diary x 3-5 days to determine your irritants.  --KEGELS: do 10 in AM and 10 in PM, holding each x 10 seconds.  When you feel urge to go, STOP, KEGEL, and when urge has passed, then go to bathroom.  Consider PT in future.    --URGE: consider gemtesa.  If you do not want to take medication, would need to do urodynamics to discuss 3rd line therapy such as botox or sacral nerve modulation.  Takes 2-4 weeks to see if will have effect.  For dry mouth: get sour, sugar free lozenge or gum.  --STRESS:  Pessary vs. Sling.         Midline cystocele stage 2  --stable/ asymptomatic    Nocturia  --[employer]    Liberator [phone]    Cost 2 units  $ 329  3 year warranty  $399 battery back up unit 3 year warranty  Catheters $195/30 pads--lasts one month     RTC 1 year for follow up

## 2025-05-29 LAB
OHS CV AF BURDEN PERCENT: < 1
OHS CV DC REMOTE DEVICE TYPE: NORMAL

## 2025-05-30 ENCOUNTER — TELEPHONE (OUTPATIENT)
Dept: UROGYNECOLOGY | Facility: CLINIC | Age: 75
End: 2025-05-30
Payer: MEDICARE

## 2025-05-30 NOTE — TELEPHONE ENCOUNTER
Attempted to reach insurance regarding purewik--unable to reach representative. Anette Infante, FNP-BC

## 2025-05-30 NOTE — TELEPHONE ENCOUNTER
----- Message from Med Assistant Bojorquez sent at 5/29/2025  5:00 PM CDT -----    ----- Message -----  From: Saira Castro  Sent: 5/29/2025  11:23 AM CDT  To: Arelis Cheema Staff    Name of Who is Calling: Isa  What is the request in detail: Rep calling cause because she need prior auth for devic, rep didn't go into detail she was rude and talked really fast .Please call back to further assist.   Can the clinic reply by [facility]: No  [facility] [phone]

## 2025-06-05 ENCOUNTER — TELEPHONE (OUTPATIENT)
Dept: UROGYNECOLOGY | Facility: CLINIC | Age: 75
End: 2025-06-05
Payer: MEDICARE

## 2025-06-12 ENCOUNTER — PATIENT MESSAGE (OUTPATIENT)
Dept: UROGYNECOLOGY | Facility: CLINIC | Age: 75
End: 2025-06-12
Payer: MEDICARE

## 2025-06-12 ENCOUNTER — TELEPHONE (OUTPATIENT)
Dept: UROGYNECOLOGY | Facility: CLINIC | Age: 75
End: 2025-06-12
Payer: MEDICARE

## 2025-06-12 NOTE — TELEPHONE ENCOUNTER
----- Message from Catia sent at 6/12/2025  8:31 AM CDT -----  Type: Patient Call BackWho called:machelle hutton [phone] case no [id number] is the request in detail: please follow up for patient in regards to pure wick system. Self catheters. The company [employer] [phone] is the one to send prescription to. Call pt Can the clinic reply by [facility]?Would the patient rather a call back or a response via [facility]? callBe call back number:[phone] (home) [phone] (work)Additional Information:

## 2025-06-13 ENCOUNTER — TELEPHONE (OUTPATIENT)
Dept: UROGYNECOLOGY | Facility: CLINIC | Age: 75
End: 2025-06-13
Payer: MEDICARE

## 2025-06-13 NOTE — TELEPHONE ENCOUNTER
Notified patient that   [facility] [employer] phone [phone]  [facility] [employer] [phone]  [facility] [phone]    All do not provide purewick to the home. SABAS Pinto-BC

## 2025-06-13 NOTE — TELEPHONE ENCOUNTER
Catia Morris Staff Caller: Unspecified (Yesterday, 8:31 AM) Type: Patient Call Back Who called:machelle hutton [phone] case no [id number] What is the request in detail: please follow up for patient in regards to pure wick system. Self catheters. The company [employer] [phone] is the one to send prescription to. Call pt Can the clinic reply by [facility]? Would the patient rather a call back or a response via [facility]? call Best call back number:[phone] (home) [phone] (work)     Reference number [id number]    The dme companies to contact are:  [employer] phone [phone]  [facility] [email or web address] [phone]  [facility] [phone]    Anette Infante, SABAS-BC

## 2025-06-16 DIAGNOSIS — I48.0 PAROXYSMAL A-FIB: Chronic | ICD-10-CM

## 2025-06-17 RX ORDER — DILTIAZEM HYDROCHLORIDE 240 MG/1
240 CAPSULE, COATED, EXTENDED RELEASE ORAL
Qty: 90 CAPSULE | Refills: 3 | Status: SHIPPED | OUTPATIENT
Start: 2025-06-17

## 2025-06-26 ENCOUNTER — CLINICAL SUPPORT (OUTPATIENT)
Dept: CARDIOLOGY | Facility: HOSPITAL | Age: 75
End: 2025-06-26
Attending: INTERNAL MEDICINE
Payer: MEDICARE

## 2025-06-26 ENCOUNTER — CLINICAL SUPPORT (OUTPATIENT)
Dept: CARDIOLOGY | Facility: HOSPITAL | Age: 75
End: 2025-06-26
Payer: MEDICARE

## 2025-06-26 DIAGNOSIS — I48.0 PAROXYSMAL ATRIAL FIBRILLATION: ICD-10-CM

## 2025-06-26 DIAGNOSIS — I49.8 OTHER SPECIFIED CARDIAC ARRHYTHMIAS: ICD-10-CM

## 2025-06-26 PROCEDURE — 93298 REM INTERROG DEV EVAL SCRMS: CPT | Mod: HCNC | Performed by: INTERNAL MEDICINE

## 2025-06-26 PROCEDURE — 93298 REM INTERROG DEV EVAL SCRMS: CPT | Mod: 26,HCNC,, | Performed by: INTERNAL MEDICINE

## 2025-06-27 LAB
OHS CV AF BURDEN PERCENT: < 1
OHS CV DC REMOTE DEVICE TYPE: NORMAL

## 2025-07-11 ENCOUNTER — TELEPHONE (OUTPATIENT)
Dept: PHARMACY | Facility: CLINIC | Age: 75
End: 2025-07-11
Payer: MEDICARE

## 2025-07-11 NOTE — TELEPHONE ENCOUNTER
[facility]/Population Health Chart Review & Patient Outreach Details For Medication Adherence Project    Reason for Outreach Encounter: 3rd Party payor non-compliance report ([employer], [employer], C, etc)  2.  Patient Outreach Method: Reviewed patient chart  and LP33.TVt message  3.   Medication in question:    Hyperlipidemia Medications              pravastatin (PRAVACHOL) 40 MG tablet Take 1 tablet (40 mg total) by mouth once daily.                  pravastatin  last filled  2/27 for 90 day supply      4.  Reviewed and or Updates Made To: Patient Chart  5. Outreach Outcomes and/or actions taken: Sent inquiry to patient: Waiting for response  Additional Notes:

## 2025-07-27 ENCOUNTER — CLINICAL SUPPORT (OUTPATIENT)
Dept: CARDIOLOGY | Facility: HOSPITAL | Age: 75
End: 2025-07-27
Payer: MEDICARE

## 2025-07-27 ENCOUNTER — CLINICAL SUPPORT (OUTPATIENT)
Dept: CARDIOLOGY | Facility: HOSPITAL | Age: 75
End: 2025-07-27
Attending: INTERNAL MEDICINE
Payer: MEDICARE

## 2025-07-27 DIAGNOSIS — I48.0 PAROXYSMAL ATRIAL FIBRILLATION: ICD-10-CM

## 2025-07-27 DIAGNOSIS — I49.8 OTHER SPECIFIED CARDIAC ARRHYTHMIAS: ICD-10-CM

## 2025-07-27 PROCEDURE — 93298 REM INTERROG DEV EVAL SCRMS: CPT | Mod: 26,HCNC,, | Performed by: INTERNAL MEDICINE

## 2025-07-27 PROCEDURE — 93298 REM INTERROG DEV EVAL SCRMS: CPT | Mod: HCNC | Performed by: INTERNAL MEDICINE

## 2025-07-28 NOTE — PROGRESS NOTES
The patient location is: [place]  The chief complaint leading to consultation is: trouble with sleep    Visit type: audiovisual (switched to telephone due to difficulties with AV connection)    15 minutes of total time spent on the encounter, which includes face to face time and non-face to face time preparing to see the patient (eg, review of tests), Obtaining and/or reviewing separately obtained history, Documenting clinical information in the electronic or other health record, Independently interpreting results (not separately reported) and communicating results to the patient/family/caregiver, or Care coordination (not separately reported).     Each patient to whom he or she provides medical services by telemedicine is:  (1) informed of the relationship between the physician and patient and the respective role of any other health care provider with respect to management of the patient; and (2) notified that he or she may decline to receive medical services by telemedicine and may withdraw from such care at any time.    ESTABLISHED PATIENT VISIT     Flores Fuentes  is a pleasant 74 y.o. female  established with the [facility] [place] [facility]    See interval history in table below    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 Dr Viktor Toure    Osteopenia of neck of left femur 6/4/2020    Paroxysmal atrioventricular tachycardia     Plantar fasciitis     Right knee meniscal tear     Dr Mayfield, tx  "conservatively    Severe obesity (BMI 35.0-35.9 with comorbidity) 1/24/2017    Slow transit constipation 7/13/2021    Despite fruits & veg & 1200 dunia diet, try Colace daily    Stress incontinence, female 03/28/2018    hasn't tried oxybutynin, After HYST, Kegels & PT  didn't work, worse at night wearing pads, Dr Infante    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    Thyroid disease      There were no vitals filed for this visit.   Physical Exam:    GEN:   Well-appearing  Psych:  Appropriate affect, demonstrates insight  SKIN:  No rash on the face or bridge of the nose      LABS:  No results found for: "HGB", "CO2"        RECORDS REVIEWED PREVIOUSLY:    HST 5/14/20 AHI 13    4.11.23. 30/30 x 9 hours 18 mins, 5-11cwp (7), mask fit 99%, AHI 2.5    with history of WPW, A-fib, HLD, HTN, slow transit constipation, stress incontinence, subclinical iodine-def hypothyroidism, DDD cervical, osteopenia, odontogenic tumor, and MATTHEW dx 2020.    PROBLEM DESCRIPTION/ Sx on Presentation Interval Hx STATUS PLAN   Mild MATTHEW   AHI 13        PAP history   Dx Study    Mask nasal   DME HME   My [employer]    CPAP age DS2 12/15/22 , CO!   PAP altn    Benefits Sleeps better   PROBS               LOV 7.17.24: doing great  Only needs supplies        7.27.25: 30/30 x 3q30fkc, 6-11 (7.5/8.6/9.7), leak 0m 42s, AHI 2.5    Wearing nightly, doing well with the machine controlled   PAP PLAN   E min 6 cwp    I max 11 cwp (continue)   PS/epr    RAMP 5 cwp   Other    Altn.    Pressure Continue 6-11 cwp       New supplies ordered    The patient is using and benefitting from PAP therapy    CHECKOUT   Recall yearly or sooner if problems arise      DME    Other           Insomnia       SLEEP SCHEDULE   Duration    Wind- down    Envmnt    CBTi    Meds prior    Meds now    Bed Time 8PM   Lights out    Latency Not long   Arousals 2 times   Back to sleep    Stim. ctrl    Wake time 5-6AM   Caffeine    Naps    Nocturia 1   Work         Waking after 3-4 " hours, 1-2 hours to get back to sleep    Not bothersome              Sleeping well in 2 phases       'stable     stable   Other issues: nocturia once per night

## 2025-07-30 ENCOUNTER — OFFICE VISIT (OUTPATIENT)
Dept: SLEEP MEDICINE | Facility: CLINIC | Age: 75
End: 2025-07-30
Payer: MEDICARE

## 2025-07-30 DIAGNOSIS — G47.33 OSA (OBSTRUCTIVE SLEEP APNEA): Primary | ICD-10-CM

## 2025-07-30 DIAGNOSIS — F51.09 OTHER INSOMNIA NOT DUE TO A SUBSTANCE OR KNOWN PHYSIOLOGICAL CONDITION: ICD-10-CM

## 2025-07-31 LAB
OHS CV AF BURDEN PERCENT: < 1
OHS CV DC REMOTE DEVICE TYPE: NORMAL

## 2025-08-18 ENCOUNTER — PATIENT MESSAGE (OUTPATIENT)
Dept: CARDIOLOGY | Facility: CLINIC | Age: 75
End: 2025-08-18
Payer: MEDICARE

## 2025-08-25 ENCOUNTER — RESULTS FOLLOW-UP (OUTPATIENT)
Dept: PRIMARY CARE CLINIC | Facility: CLINIC | Age: 75
End: 2025-08-25
Payer: MEDICARE

## 2025-08-25 ENCOUNTER — HOSPITAL ENCOUNTER (OUTPATIENT)
Dept: RADIOLOGY | Facility: HOSPITAL | Age: 75
Discharge: HOME | End: 2025-08-25
Attending: FAMILY MEDICINE
Payer: MEDICARE

## 2025-08-25 DIAGNOSIS — M85.852 OSTEOPENIA OF NECK OF LEFT FEMUR: ICD-10-CM

## 2025-08-25 DIAGNOSIS — Z12.31 SCREENING MAMMOGRAM, ENCOUNTER FOR: ICD-10-CM

## 2025-08-25 PROCEDURE — 77067 SCR MAMMO BI INCL CAD: CPT | Mod: 26,HCNC,, | Performed by: RADIOLOGY

## 2025-08-25 PROCEDURE — 77063 BREAST TOMOSYNTHESIS BI: CPT | Mod: TC,HCNC,PN

## 2025-08-25 PROCEDURE — 77080 DXA BONE DENSITY AXIAL: CPT | Mod: 26,HCNC,, | Performed by: RADIOLOGY

## 2025-08-25 PROCEDURE — 77063 BREAST TOMOSYNTHESIS BI: CPT | Mod: 26,HCNC,, | Performed by: RADIOLOGY

## 2025-08-25 PROCEDURE — 77080 DXA BONE DENSITY AXIAL: CPT | Mod: TC,HCNC,PN

## 2025-08-27 ENCOUNTER — CLINICAL SUPPORT (OUTPATIENT)
Dept: CARDIOLOGY | Facility: HOSPITAL | Age: 75
End: 2025-08-27
Attending: INTERNAL MEDICINE
Payer: MEDICARE

## 2025-08-27 ENCOUNTER — CLINICAL SUPPORT (OUTPATIENT)
Dept: CARDIOLOGY | Facility: HOSPITAL | Age: 75
End: 2025-08-27
Payer: MEDICARE

## 2025-08-27 DIAGNOSIS — I48.0 PAROXYSMAL ATRIAL FIBRILLATION: ICD-10-CM

## 2025-08-27 DIAGNOSIS — I49.8 OTHER SPECIFIED CARDIAC ARRHYTHMIAS: ICD-10-CM

## 2025-08-27 PROCEDURE — 93298 REM INTERROG DEV EVAL SCRMS: CPT | Mod: 26,HCNC,, | Performed by: INTERNAL MEDICINE

## 2025-08-27 PROCEDURE — 93298 REM INTERROG DEV EVAL SCRMS: CPT | Mod: HCNC | Performed by: INTERNAL MEDICINE

## 2025-08-28 ENCOUNTER — OFFICE VISIT (OUTPATIENT)
Dept: PRIMARY CARE CLINIC | Facility: CLINIC | Age: 75
End: 2025-08-28
Payer: MEDICARE

## 2025-08-28 VITALS
OXYGEN SATURATION: 98 % | WEIGHT: 189.38 LBS | TEMPERATURE: 98 F | BODY MASS INDEX: 29.72 KG/M2 | DIASTOLIC BLOOD PRESSURE: 60 MMHG | SYSTOLIC BLOOD PRESSURE: 126 MMHG | HEIGHT: 67 IN | HEART RATE: 64 BPM

## 2025-08-28 DIAGNOSIS — I48.91 ATRIAL FIBRILLATION WITH RVR: ICD-10-CM

## 2025-08-28 DIAGNOSIS — Z00.00 ROUTINE GENERAL MEDICAL EXAMINATION AT A HEALTH CARE FACILITY: Primary | ICD-10-CM

## 2025-08-28 DIAGNOSIS — E02 SUBCLINICAL IODINE-DEFICIENCY HYPOTHYROIDISM: Chronic | ICD-10-CM

## 2025-08-28 DIAGNOSIS — M85.852 OSTEOPENIA OF NECK OF LEFT FEMUR: ICD-10-CM

## 2025-08-28 DIAGNOSIS — G47.33 OSA (OBSTRUCTIVE SLEEP APNEA): Chronic | ICD-10-CM

## 2025-08-28 DIAGNOSIS — I87.2 VENOUS INSUFFICIENCY OF BOTH LOWER EXTREMITIES: ICD-10-CM

## 2025-08-28 PROBLEM — E03.9 HYPOTHYROIDISM: Status: RESOLVED | Noted: 2024-01-18 | Resolved: 2025-08-28

## 2025-08-28 PROCEDURE — 99999 PR PBB SHADOW E&M-EST. PATIENT-LVL III: CPT | Mod: PBBFAC,HCNC,, | Performed by: FAMILY MEDICINE

## 2025-08-28 RX ORDER — LEVOTHYROXINE SODIUM 25 UG/1
25 TABLET ORAL DAILY
Qty: 90 TABLET | Refills: 2 | Status: SHIPPED | OUTPATIENT
Start: 2025-08-28

## 2025-09-03 ENCOUNTER — TELEPHONE (OUTPATIENT)
Dept: ELECTROPHYSIOLOGY | Facility: CLINIC | Age: 75
End: 2025-09-03
Payer: MEDICARE

## 2025-09-04 DIAGNOSIS — I48.0 PAROXYSMAL A-FIB: Primary | ICD-10-CM

## 2025-09-04 LAB
OHS CV AF BURDEN PERCENT: < 1
OHS CV DC REMOTE DEVICE TYPE: NORMAL

## (undated) DEVICE — BOWL FLUID - BACK STOP

## (undated) DEVICE — COVER PROBE US 5.5X58L NON LTX

## (undated) DEVICE — R CATH HIS OCTAPOLAR 7FRX115CM

## (undated) DEVICE — DRAPE OPTIMA MAJOR PEDIATRIC

## (undated) DEVICE — KIT MICRO INTRODUCER 4F .018IN

## (undated) DEVICE — SYR LUER LOCK 1CC

## (undated) DEVICE — GUIDEWIRE TORAY INOUE

## (undated) DEVICE — ADHESIVE DERMABOND ADVANCED

## (undated) DEVICE — KIT VENASEAL CLOSURE SYSTEM

## (undated) DEVICE — DRAPE OPHTHALMIC 48X62 FEN

## (undated) DEVICE — INTRO FAST-CATH SL1 8.5FR 63CM

## (undated) DEVICE — PACK RF ABLATION PROCEDURE

## (undated) DEVICE — PACK EP DRAPE OMC

## (undated) DEVICE — SET TUBING COOL POINT IRR

## (undated) DEVICE — GLOVE BIOGEL ECLIPSE SZ 6.5

## (undated) DEVICE — NDL TRNSSPTL BRK-1 18GA 71CM

## (undated) DEVICE — COVER BAND BAG 40 X 40

## (undated) DEVICE — CATH THERMOCOOL SMTCH SF D F

## (undated) DEVICE — COVER PRB TRNSDUC 7.6X183CM

## (undated) DEVICE — PAD DEFIB CADENCE ADULT R2

## (undated) DEVICE — INTRO AGILIS MED CRL 8.5F 71CM

## (undated) DEVICE — CATH PENTARY F 2-6-2MM 115CM

## (undated) DEVICE — R CATH ACUSON ACUNAV 8FR

## (undated) DEVICE — PAD GROUND UNIV STYLE CORD 9IN

## (undated) DEVICE — DUOVISC

## (undated) DEVICE — Device

## (undated) DEVICE — INTRODUCER HEMOSTASIS 7.5F

## (undated) DEVICE — SET SMARTABLATE IRR TUBE

## (undated) DEVICE — R CATH BIDIRECTIONL DF CRV 7FR

## (undated) DEVICE — CATH ADVISOR VL CIRC MAP BI-D

## (undated) DEVICE — CATH ACUSON ACUNAV 8FR

## (undated) DEVICE — PACK ADMIN VARITHENA UNIVERSAL

## (undated) DEVICE — SOL BETADINE 5%

## (undated) DEVICE — PAD RADI FEMORAL

## (undated) DEVICE — ELECTRODE REM PLYHSV RETURN 9

## (undated) DEVICE — PATCH CARTO REFERENCE

## (undated) DEVICE — DRESSING TRANS 2X2 TEGADERM

## (undated) DEVICE — CATH TACTICATH ABLAT BIDIR D-F

## (undated) DEVICE — NDL PERCUTANEOUS ENTRYBSDN 18

## (undated) DEVICE — KIT GLIDESHEATH SLEND 6FR 10CM

## (undated) DEVICE — KIT ENSITE ELECTRODE SURFACE

## (undated) DEVICE — COVER EQUIP BAND STRL 40X60IN

## (undated) DEVICE — KIT PROBE COVER WITH GEL

## (undated) DEVICE — SHEATH HEMOSTASIS 8.5FR